# Patient Record
Sex: FEMALE | Race: WHITE | Employment: OTHER | URBAN - METROPOLITAN AREA
[De-identification: names, ages, dates, MRNs, and addresses within clinical notes are randomized per-mention and may not be internally consistent; named-entity substitution may affect disease eponyms.]

---

## 2018-02-16 ENCOUNTER — HOSPITAL ENCOUNTER (OUTPATIENT)
Age: 58
Discharge: HOME | End: 2018-02-18
Payer: COMMERCIAL

## 2018-02-16 ENCOUNTER — HOSPITAL ENCOUNTER (OUTPATIENT)
Dept: GENERAL RADIOLOGY | Age: 58
Discharge: HOME | End: 2018-02-18
Payer: COMMERCIAL

## 2018-02-16 DIAGNOSIS — R06.00 DYSPNEA, UNSPECIFIED TYPE: ICD-10-CM

## 2018-02-16 PROCEDURE — 71046 X-RAY EXAM CHEST 2 VIEWS: CPT

## 2018-03-15 ENCOUNTER — HOSPITAL ENCOUNTER (OUTPATIENT)
Dept: PULMONOLOGY | Age: 58
Setting detail: THERAPIES SERIES
Discharge: HOME | End: 2018-03-15
Payer: COMMERCIAL

## 2018-03-15 VITALS — WEIGHT: 112.4 LBS

## 2018-03-15 PROCEDURE — G0424 PULMONARY REHAB W EXER: HCPCS

## 2018-03-15 RX ORDER — ALBUTEROL SULFATE 0.63 MG/3ML
1 SOLUTION RESPIRATORY (INHALATION) EVERY 6 HOURS PRN
COMMUNITY

## 2018-03-15 RX ORDER — MIRTAZAPINE 15 MG/1
15 TABLET, FILM COATED ORAL NIGHTLY
COMMUNITY
End: 2020-11-16

## 2018-03-15 RX ORDER — AMLODIPINE BESYLATE 2.5 MG/1
2.5 TABLET ORAL DAILY
COMMUNITY
End: 2019-01-25

## 2018-03-15 RX ORDER — CLONAZEPAM 0.5 MG/1
0.5 TABLET ORAL 4 TIMES DAILY
Status: ON HOLD | COMMUNITY
End: 2020-11-30 | Stop reason: HOSPADM

## 2018-03-15 RX ORDER — FLUCONAZOLE 100 MG/1
100 TABLET ORAL DAILY
Status: ON HOLD | COMMUNITY
End: 2019-04-08

## 2018-03-15 RX ORDER — IBUPROFEN 800 MG/1
800 TABLET ORAL 3 TIMES DAILY
COMMUNITY
End: 2019-01-25

## 2018-03-15 RX ORDER — ALBUTEROL SULFATE 90 UG/1
2 AEROSOL, METERED RESPIRATORY (INHALATION) EVERY 6 HOURS PRN
COMMUNITY
End: 2022-04-05 | Stop reason: SDUPTHER

## 2018-03-28 ENCOUNTER — HOSPITAL ENCOUNTER (OUTPATIENT)
Dept: PULMONOLOGY | Age: 58
Setting detail: THERAPIES SERIES
Discharge: HOME | End: 2018-03-28
Payer: COMMERCIAL

## 2018-03-28 VITALS — WEIGHT: 116 LBS

## 2018-03-28 PROCEDURE — G0424 PULMONARY REHAB W EXER: HCPCS

## 2018-04-11 ENCOUNTER — HOSPITAL ENCOUNTER (OUTPATIENT)
Dept: PULMONOLOGY | Age: 58
Setting detail: THERAPIES SERIES
Discharge: HOME | End: 2018-04-11
Payer: COMMERCIAL

## 2018-04-11 VITALS — WEIGHT: 116.8 LBS

## 2018-04-11 PROCEDURE — G0424 PULMONARY REHAB W EXER: HCPCS

## 2018-04-16 ENCOUNTER — HOSPITAL ENCOUNTER (OUTPATIENT)
Dept: PULMONOLOGY | Age: 58
Setting detail: THERAPIES SERIES
Discharge: HOME | End: 2018-04-16
Payer: COMMERCIAL

## 2018-04-16 VITALS — WEIGHT: 118.2 LBS

## 2018-04-16 PROCEDURE — G0424 PULMONARY REHAB W EXER: HCPCS

## 2018-04-25 ENCOUNTER — HOSPITAL ENCOUNTER (OUTPATIENT)
Dept: PULMONOLOGY | Age: 58
Setting detail: THERAPIES SERIES
Discharge: HOME | End: 2018-04-25
Payer: COMMERCIAL

## 2018-04-25 VITALS — WEIGHT: 121 LBS

## 2018-04-25 PROCEDURE — G0424 PULMONARY REHAB W EXER: HCPCS

## 2018-04-30 ENCOUNTER — HOSPITAL ENCOUNTER (OUTPATIENT)
Dept: PULMONOLOGY | Age: 58
Setting detail: THERAPIES SERIES
Discharge: HOME | End: 2018-04-30
Payer: COMMERCIAL

## 2018-04-30 VITALS — WEIGHT: 120.2 LBS

## 2018-04-30 PROCEDURE — G0424 PULMONARY REHAB W EXER: HCPCS

## 2018-05-02 ENCOUNTER — HOSPITAL ENCOUNTER (OUTPATIENT)
Dept: PULMONOLOGY | Age: 58
Setting detail: THERAPIES SERIES
Discharge: HOME | End: 2018-05-02
Payer: COMMERCIAL

## 2018-05-02 VITALS — WEIGHT: 118.4 LBS

## 2018-05-02 PROCEDURE — G0424 PULMONARY REHAB W EXER: HCPCS

## 2018-05-07 ENCOUNTER — HOSPITAL ENCOUNTER (OUTPATIENT)
Dept: PULMONOLOGY | Age: 58
Setting detail: THERAPIES SERIES
Discharge: HOME | End: 2018-05-07
Payer: COMMERCIAL

## 2018-05-07 VITALS — WEIGHT: 121 LBS

## 2018-05-07 PROCEDURE — G0424 PULMONARY REHAB W EXER: HCPCS

## 2018-05-09 ENCOUNTER — HOSPITAL ENCOUNTER (OUTPATIENT)
Dept: PULMONOLOGY | Age: 58
Setting detail: THERAPIES SERIES
Discharge: HOME | End: 2018-05-09
Payer: COMMERCIAL

## 2018-05-09 VITALS — WEIGHT: 121 LBS

## 2018-05-09 PROCEDURE — G0424 PULMONARY REHAB W EXER: HCPCS

## 2018-05-14 ENCOUNTER — HOSPITAL ENCOUNTER (OUTPATIENT)
Dept: PULMONOLOGY | Age: 58
Setting detail: THERAPIES SERIES
Discharge: HOME | End: 2018-05-14
Payer: COMMERCIAL

## 2018-05-14 VITALS — WEIGHT: 121.4 LBS

## 2018-05-14 PROCEDURE — G0424 PULMONARY REHAB W EXER: HCPCS

## 2018-05-23 ENCOUNTER — HOSPITAL ENCOUNTER (OUTPATIENT)
Dept: PULMONOLOGY | Age: 58
Setting detail: THERAPIES SERIES
Discharge: HOME | End: 2018-05-23
Payer: COMMERCIAL

## 2018-05-23 ENCOUNTER — APPOINTMENT (OUTPATIENT)
Dept: PULMONOLOGY | Age: 58
End: 2018-05-23
Payer: COMMERCIAL

## 2018-05-23 VITALS — WEIGHT: 121.8 LBS

## 2018-05-23 PROCEDURE — G0424 PULMONARY REHAB W EXER: HCPCS

## 2018-05-28 ENCOUNTER — APPOINTMENT (OUTPATIENT)
Dept: PULMONOLOGY | Age: 58
End: 2018-05-28
Payer: COMMERCIAL

## 2018-06-06 ENCOUNTER — HOSPITAL ENCOUNTER (OUTPATIENT)
Dept: PULMONOLOGY | Age: 58
Setting detail: THERAPIES SERIES
Discharge: HOME | End: 2018-06-06
Payer: COMMERCIAL

## 2018-06-06 VITALS — WEIGHT: 121 LBS

## 2018-06-06 PROCEDURE — G0424 PULMONARY REHAB W EXER: HCPCS

## 2018-06-25 ENCOUNTER — APPOINTMENT (OUTPATIENT)
Dept: PULMONOLOGY | Age: 58
End: 2018-06-25
Payer: COMMERCIAL

## 2018-07-04 ENCOUNTER — APPOINTMENT (OUTPATIENT)
Dept: PULMONOLOGY | Age: 58
End: 2018-07-04
Payer: COMMERCIAL

## 2018-07-09 ENCOUNTER — HOSPITAL ENCOUNTER (OUTPATIENT)
Dept: PULMONOLOGY | Age: 58
Setting detail: THERAPIES SERIES
Discharge: HOME | End: 2018-07-09
Payer: COMMERCIAL

## 2018-07-09 VITALS — WEIGHT: 126 LBS

## 2018-07-09 PROCEDURE — G0424 PULMONARY REHAB W EXER: HCPCS

## 2018-07-13 ENCOUNTER — HOSPITAL ENCOUNTER (OUTPATIENT)
Age: 58
Discharge: HOME | End: 2018-07-13
Payer: COMMERCIAL

## 2018-07-13 LAB
ABSOLUTE EOS #: 0 K/UL (ref 0–0.4)
ABSOLUTE IMMATURE GRANULOCYTE: ABNORMAL K/UL (ref 0–0.3)
ABSOLUTE LYMPH #: 2.4 K/UL (ref 1–4.8)
ABSOLUTE MONO #: 0.6 K/UL (ref 0.1–1.3)
ALBUMIN SERPL-MCNC: 4.3 G/DL (ref 3.5–5.2)
ALBUMIN/GLOBULIN RATIO: ABNORMAL (ref 1–2.5)
ALP BLD-CCNC: 47 U/L (ref 35–104)
ALT SERPL-CCNC: 14 U/L (ref 5–33)
ANION GAP SERPL CALCULATED.3IONS-SCNC: 12 MMOL/L (ref 9–17)
AST SERPL-CCNC: 18 U/L
BASOPHILS # BLD: 1 % (ref 0–2)
BASOPHILS ABSOLUTE: 0 K/UL (ref 0–0.2)
BILIRUB SERPL-MCNC: 0.54 MG/DL (ref 0.3–1.2)
BUN BLDV-MCNC: 7 MG/DL (ref 6–20)
BUN/CREAT BLD: ABNORMAL (ref 9–20)
CALCIUM SERPL-MCNC: 8.3 MG/DL (ref 8.6–10.4)
CHLORIDE BLD-SCNC: 102 MMOL/L (ref 98–107)
CHOLESTEROL/HDL RATIO: 2.4
CHOLESTEROL: 153 MG/DL
CO2: 26 MMOL/L (ref 20–31)
CREAT SERPL-MCNC: 0.71 MG/DL (ref 0.5–0.9)
DIFFERENTIAL TYPE: ABNORMAL
EOSINOPHILS RELATIVE PERCENT: 0 % (ref 0–4)
FOLATE: 19.3 NG/ML
GFR AFRICAN AMERICAN: >60 ML/MIN
GFR NON-AFRICAN AMERICAN: >60 ML/MIN
GFR SERPL CREATININE-BSD FRML MDRD: ABNORMAL ML/MIN/{1.73_M2}
GFR SERPL CREATININE-BSD FRML MDRD: ABNORMAL ML/MIN/{1.73_M2}
GLUCOSE BLD-MCNC: 91 MG/DL (ref 70–99)
HCT VFR BLD CALC: 41.7 % (ref 36–46)
HDLC SERPL-MCNC: 65 MG/DL
HEMOGLOBIN: 14 G/DL (ref 12–16)
IMMATURE GRANULOCYTES: ABNORMAL %
LDL CHOLESTEROL: 79 MG/DL (ref 0–130)
LYMPHOCYTES # BLD: 37 % (ref 24–44)
MAGNESIUM, IONIZED: 0.49 MMOL/L (ref 0.45–0.6)
MAGNESIUM: 1.7 MG/DL (ref 1.6–2.6)
MCH RBC QN AUTO: 32.8 PG (ref 26–34)
MCHC RBC AUTO-ENTMCNC: 33.5 G/DL (ref 31–37)
MCV RBC AUTO: 98.1 FL (ref 80–100)
MONOCYTES # BLD: 9 % (ref 1–7)
NRBC AUTOMATED: ABNORMAL PER 100 WBC
PDW BLD-RTO: 13.5 % (ref 11.5–14.9)
PLATELET # BLD: 241 K/UL (ref 150–450)
PLATELET ESTIMATE: ABNORMAL
PMV BLD AUTO: 7.6 FL (ref 6–12)
POTASSIUM SERPL-SCNC: 4.6 MMOL/L (ref 3.7–5.3)
RBC # BLD: 4.25 M/UL (ref 4–5.2)
RBC # BLD: ABNORMAL 10*6/UL
SEG NEUTROPHILS: 53 % (ref 36–66)
SEGMENTED NEUTROPHILS ABSOLUTE COUNT: 3.5 K/UL (ref 1.3–9.1)
SODIUM BLD-SCNC: 140 MMOL/L (ref 135–144)
TOTAL PROTEIN: 6.7 G/DL (ref 6.4–8.3)
TRIGL SERPL-MCNC: 44 MG/DL
TSH SERPL DL<=0.05 MIU/L-ACNC: 0.6 MIU/L (ref 0.3–5)
VITAMIN B-12: 449 PG/ML (ref 232–1245)
VITAMIN D 25-HYDROXY: 43.6 NG/ML (ref 30–100)
VLDLC SERPL CALC-MCNC: NORMAL MG/DL (ref 1–30)
WBC # BLD: 6.5 K/UL (ref 3.5–11)
WBC # BLD: ABNORMAL 10*3/UL

## 2018-07-13 PROCEDURE — 84443 ASSAY THYROID STIM HORMONE: CPT

## 2018-07-13 PROCEDURE — 82746 ASSAY OF FOLIC ACID SERUM: CPT

## 2018-07-13 PROCEDURE — 36415 COLL VENOUS BLD VENIPUNCTURE: CPT

## 2018-07-13 PROCEDURE — 85025 COMPLETE CBC W/AUTO DIFF WBC: CPT

## 2018-07-13 PROCEDURE — 80061 LIPID PANEL: CPT

## 2018-07-13 PROCEDURE — 80053 COMPREHEN METABOLIC PANEL: CPT

## 2018-07-13 PROCEDURE — 82306 VITAMIN D 25 HYDROXY: CPT

## 2018-07-13 PROCEDURE — 83735 ASSAY OF MAGNESIUM: CPT

## 2018-07-13 PROCEDURE — G0328 FECAL BLOOD SCRN IMMUNOASSAY: HCPCS

## 2018-07-13 PROCEDURE — 82607 VITAMIN B-12: CPT

## 2018-07-17 ENCOUNTER — HOSPITAL ENCOUNTER (OUTPATIENT)
Age: 58
Setting detail: SPECIMEN
Discharge: HOME | End: 2018-07-17
Payer: COMMERCIAL

## 2018-07-18 ENCOUNTER — HOSPITAL ENCOUNTER (OUTPATIENT)
Dept: PULMONOLOGY | Age: 58
Setting detail: THERAPIES SERIES
Discharge: HOME | End: 2018-07-18
Payer: COMMERCIAL

## 2018-07-18 VITALS — WEIGHT: 127 LBS

## 2018-07-18 LAB
DATE, STOOL #1: NORMAL
DATE, STOOL #2: NORMAL
DATE, STOOL #3: NORMAL
HEMOCCULT SP1 STL QL: NEGATIVE
HEMOCCULT SP2 STL QL: NEGATIVE
HEMOCCULT SP3 STL QL: NEGATIVE
TIME, STOOL #1: NORMAL
TIME, STOOL #2: NORMAL
TIME, STOOL #3: NORMAL

## 2018-07-18 PROCEDURE — G0424 PULMONARY REHAB W EXER: HCPCS

## 2018-07-18 NOTE — PROGRESS NOTES
-Discussed with patient and capacity to carry out ADL's & to understand his/her path     physiology of dyspnea so that exacerbations may be avoided or minimized. -Discussed individuals understanding of his/ her disease process so that the patient can   lessen the disease process thus frequency of exacerbations.  -Discussed with patient the goal to decrease reliance on others, promote independence    & prevent or delay worsening of lung disease progression & decrease number of ER   Visits. Instructed patient on controling anxiety and panic control with relaxation techniques.

## 2018-07-30 ENCOUNTER — HOSPITAL ENCOUNTER (OUTPATIENT)
Dept: PULMONOLOGY | Age: 58
Setting detail: THERAPIES SERIES
Discharge: HOME | End: 2018-07-30
Payer: COMMERCIAL

## 2018-07-30 VITALS — WEIGHT: 128.8 LBS

## 2018-07-30 PROCEDURE — G0424 PULMONARY REHAB W EXER: HCPCS

## 2018-08-01 ENCOUNTER — HOSPITAL ENCOUNTER (OUTPATIENT)
Dept: PULMONOLOGY | Age: 58
Setting detail: THERAPIES SERIES
Discharge: HOME | End: 2018-08-01
Payer: COMMERCIAL

## 2018-08-01 VITALS — WEIGHT: 129.2 LBS

## 2018-08-01 PROCEDURE — G0424 PULMONARY REHAB W EXER: HCPCS

## 2018-08-08 ENCOUNTER — APPOINTMENT (OUTPATIENT)
Dept: PULMONOLOGY | Age: 58
End: 2018-08-08
Payer: COMMERCIAL

## 2018-08-15 ENCOUNTER — HOSPITAL ENCOUNTER (OUTPATIENT)
Dept: PULMONOLOGY | Age: 58
Setting detail: THERAPIES SERIES
Discharge: HOME | End: 2018-08-15
Payer: COMMERCIAL

## 2018-08-15 VITALS — WEIGHT: 128 LBS

## 2018-08-15 PROCEDURE — G0424 PULMONARY REHAB W EXER: HCPCS

## 2018-08-20 ENCOUNTER — APPOINTMENT (OUTPATIENT)
Dept: PULMONOLOGY | Age: 58
End: 2018-08-20
Payer: COMMERCIAL

## 2018-08-22 ENCOUNTER — APPOINTMENT (OUTPATIENT)
Dept: PULMONOLOGY | Age: 58
End: 2018-08-22
Payer: COMMERCIAL

## 2018-08-27 ENCOUNTER — APPOINTMENT (OUTPATIENT)
Dept: PULMONOLOGY | Age: 58
End: 2018-08-27
Payer: COMMERCIAL

## 2018-08-29 ENCOUNTER — APPOINTMENT (OUTPATIENT)
Dept: PULMONOLOGY | Age: 58
End: 2018-08-29
Payer: COMMERCIAL

## 2018-09-08 ENCOUNTER — HOSPITAL ENCOUNTER (OUTPATIENT)
Dept: MRI IMAGING | Age: 58
Discharge: HOME | End: 2018-09-10
Payer: COMMERCIAL

## 2018-09-08 DIAGNOSIS — M48.061 SPINAL STENOSIS OF LUMBAR REGION, UNSPECIFIED WHETHER NEUROGENIC CLAUDICATION PRESENT: ICD-10-CM

## 2018-09-08 PROCEDURE — 72148 MRI LUMBAR SPINE W/O DYE: CPT

## 2019-01-21 ENCOUNTER — TELEPHONE (OUTPATIENT)
Dept: GASTROENTEROLOGY | Age: 59
End: 2019-01-21

## 2019-01-25 ENCOUNTER — OFFICE VISIT (OUTPATIENT)
Dept: GASTROENTEROLOGY | Age: 59
End: 2019-01-25
Payer: COMMERCIAL

## 2019-01-25 VITALS — HEART RATE: 67 BPM | DIASTOLIC BLOOD PRESSURE: 75 MMHG | SYSTOLIC BLOOD PRESSURE: 121 MMHG | WEIGHT: 126.2 LBS

## 2019-01-25 DIAGNOSIS — R10.10 PAIN OF UPPER ABDOMEN: ICD-10-CM

## 2019-01-25 DIAGNOSIS — R19.7 DIARRHEA, UNSPECIFIED TYPE: ICD-10-CM

## 2019-01-25 DIAGNOSIS — R14.0 ABDOMINAL BLOATING: ICD-10-CM

## 2019-01-25 PROCEDURE — 3017F COLORECTAL CA SCREEN DOC REV: CPT | Performed by: INTERNAL MEDICINE

## 2019-01-25 PROCEDURE — G8421 BMI NOT CALCULATED: HCPCS | Performed by: INTERNAL MEDICINE

## 2019-01-25 PROCEDURE — 4004F PT TOBACCO SCREEN RCVD TLK: CPT | Performed by: INTERNAL MEDICINE

## 2019-01-25 PROCEDURE — G8427 DOCREV CUR MEDS BY ELIG CLIN: HCPCS | Performed by: INTERNAL MEDICINE

## 2019-01-25 PROCEDURE — G8484 FLU IMMUNIZE NO ADMIN: HCPCS | Performed by: INTERNAL MEDICINE

## 2019-01-25 PROCEDURE — 99213 OFFICE O/P EST LOW 20 MIN: CPT | Performed by: INTERNAL MEDICINE

## 2019-01-25 RX ORDER — TRAMADOL HYDROCHLORIDE 50 MG/1
50 TABLET ORAL EVERY 6 HOURS PRN
Status: ON HOLD | COMMUNITY
End: 2019-04-08

## 2019-01-25 RX ORDER — DICYCLOMINE HYDROCHLORIDE 10 MG/1
10 CAPSULE ORAL
Qty: 120 CAPSULE | Refills: 2 | Status: SHIPPED | OUTPATIENT
Start: 2019-01-25 | End: 2019-04-29

## 2019-01-25 RX ORDER — PANTOPRAZOLE SODIUM 20 MG/1
20 TABLET, DELAYED RELEASE ORAL DAILY
Status: ON HOLD | COMMUNITY
End: 2019-04-08

## 2019-01-25 RX ORDER — LEVOTHYROXINE SODIUM 88 UG/1
88 TABLET ORAL DAILY
Status: ON HOLD | COMMUNITY
End: 2020-11-30 | Stop reason: HOSPADM

## 2019-01-25 RX ORDER — CARVEDILOL 3.12 MG/1
3.12 TABLET ORAL 2 TIMES DAILY WITH MEALS
COMMUNITY
End: 2022-08-11 | Stop reason: SDUPTHER

## 2019-01-25 ASSESSMENT — ENCOUNTER SYMPTOMS
CONSTIPATION: 0
SINUS PAIN: 1
SINUS PRESSURE: 1
BACK PAIN: 1
DIARRHEA: 1
VOMITING: 0
CHEST TIGHTNESS: 0
ANAL BLEEDING: 0
SHORTNESS OF BREATH: 0
RECTAL PAIN: 0
NAUSEA: 0
BLOOD IN STOOL: 0
ABDOMINAL DISTENTION: 1
SORE THROAT: 0
COUGH: 0
TROUBLE SWALLOWING: 0
ABDOMINAL PAIN: 1

## 2019-03-04 ENCOUNTER — OFFICE VISIT (OUTPATIENT)
Dept: GASTROENTEROLOGY | Age: 59
End: 2019-03-04
Payer: COMMERCIAL

## 2019-03-04 VITALS — SYSTOLIC BLOOD PRESSURE: 132 MMHG | WEIGHT: 128 LBS | DIASTOLIC BLOOD PRESSURE: 85 MMHG | HEART RATE: 78 BPM

## 2019-03-04 DIAGNOSIS — R10.84 GENERALIZED ABDOMINAL PAIN: Primary | ICD-10-CM

## 2019-03-04 PROCEDURE — G8427 DOCREV CUR MEDS BY ELIG CLIN: HCPCS | Performed by: INTERNAL MEDICINE

## 2019-03-04 PROCEDURE — 99213 OFFICE O/P EST LOW 20 MIN: CPT | Performed by: INTERNAL MEDICINE

## 2019-03-04 PROCEDURE — G8484 FLU IMMUNIZE NO ADMIN: HCPCS | Performed by: INTERNAL MEDICINE

## 2019-03-04 PROCEDURE — 3017F COLORECTAL CA SCREEN DOC REV: CPT | Performed by: INTERNAL MEDICINE

## 2019-03-04 PROCEDURE — 4004F PT TOBACCO SCREEN RCVD TLK: CPT | Performed by: INTERNAL MEDICINE

## 2019-03-04 PROCEDURE — G8421 BMI NOT CALCULATED: HCPCS | Performed by: INTERNAL MEDICINE

## 2019-03-04 RX ORDER — ALUMINUM ZIRCONIUM OCTACHLOROHYDREX GLY 16 G/100G
1 GEL TOPICAL DAILY
Qty: 30 CAPSULE | Refills: 4 | Status: SHIPPED | OUTPATIENT
Start: 2019-03-04 | End: 2019-04-03

## 2019-03-04 RX ORDER — PANTOPRAZOLE SODIUM 40 MG/1
40 TABLET, DELAYED RELEASE ORAL
Qty: 30 TABLET | Refills: 5 | Status: SHIPPED | OUTPATIENT
Start: 2019-03-04 | End: 2019-04-29

## 2019-03-05 RX ORDER — SODIUM, POTASSIUM,MAG SULFATES 17.5-3.13G
1 SOLUTION, RECONSTITUTED, ORAL ORAL ONCE
Qty: 1 BOTTLE | Refills: 0 | Status: SHIPPED | OUTPATIENT
Start: 2019-03-05 | End: 2019-03-05

## 2019-04-04 ENCOUNTER — TELEPHONE (OUTPATIENT)
Dept: GASTROENTEROLOGY | Age: 59
End: 2019-04-04

## 2019-04-08 ENCOUNTER — ANESTHESIA (OUTPATIENT)
Dept: OPERATING ROOM | Age: 59
End: 2019-04-08
Payer: COMMERCIAL

## 2019-04-08 ENCOUNTER — ANESTHESIA EVENT (OUTPATIENT)
Dept: OPERATING ROOM | Age: 59
End: 2019-04-08
Payer: COMMERCIAL

## 2019-04-08 ENCOUNTER — HOSPITAL ENCOUNTER (OUTPATIENT)
Age: 59
Setting detail: OUTPATIENT SURGERY
Discharge: HOME | End: 2019-04-08
Attending: INTERNAL MEDICINE | Admitting: INTERNAL MEDICINE
Payer: COMMERCIAL

## 2019-04-08 VITALS — SYSTOLIC BLOOD PRESSURE: 131 MMHG | OXYGEN SATURATION: 99 % | DIASTOLIC BLOOD PRESSURE: 67 MMHG

## 2019-04-08 VITALS
HEART RATE: 58 BPM | BODY MASS INDEX: 20.33 KG/M2 | WEIGHT: 122 LBS | HEIGHT: 65 IN | DIASTOLIC BLOOD PRESSURE: 62 MMHG | TEMPERATURE: 98.1 F | OXYGEN SATURATION: 98 % | RESPIRATION RATE: 12 BRPM | SYSTOLIC BLOOD PRESSURE: 113 MMHG

## 2019-04-08 PROCEDURE — 2709999900 HC NON-CHARGEABLE SUPPLY: Performed by: INTERNAL MEDICINE

## 2019-04-08 PROCEDURE — 3700000001 HC ADD 15 MINUTES (ANESTHESIA): Performed by: INTERNAL MEDICINE

## 2019-04-08 PROCEDURE — 2580000003 HC RX 258: Performed by: ANESTHESIOLOGY

## 2019-04-08 PROCEDURE — 45378 DIAGNOSTIC COLONOSCOPY: CPT | Performed by: INTERNAL MEDICINE

## 2019-04-08 PROCEDURE — 3609009500 HC COLONOSCOPY DIAGNOSTIC OR SCREENING: Performed by: INTERNAL MEDICINE

## 2019-04-08 PROCEDURE — 6360000002 HC RX W HCPCS: Performed by: NURSE ANESTHETIST, CERTIFIED REGISTERED

## 2019-04-08 PROCEDURE — 7100000000 HC PACU RECOVERY - FIRST 15 MIN: Performed by: INTERNAL MEDICINE

## 2019-04-08 PROCEDURE — 3700000000 HC ANESTHESIA ATTENDED CARE: Performed by: INTERNAL MEDICINE

## 2019-04-08 PROCEDURE — 7100000001 HC PACU RECOVERY - ADDTL 15 MIN: Performed by: INTERNAL MEDICINE

## 2019-04-08 RX ORDER — DIAZEPAM 5 MG/1
2.5 TABLET ORAL EVERY 6 HOURS PRN
COMMUNITY
End: 2019-09-30

## 2019-04-08 RX ORDER — OXYCODONE HYDROCHLORIDE AND ACETAMINOPHEN 5; 325 MG/1; MG/1
1 TABLET ORAL EVERY 4 HOURS PRN
COMMUNITY
End: 2019-04-29

## 2019-04-08 RX ORDER — FLUVOXAMINE MALEATE 100 MG
100 TABLET ORAL NIGHTLY
Status: ON HOLD | COMMUNITY
End: 2020-11-30 | Stop reason: HOSPADM

## 2019-04-08 RX ORDER — SODIUM CHLORIDE, SODIUM LACTATE, POTASSIUM CHLORIDE, CALCIUM CHLORIDE 600; 310; 30; 20 MG/100ML; MG/100ML; MG/100ML; MG/100ML
INJECTION, SOLUTION INTRAVENOUS CONTINUOUS
Status: DISCONTINUED | OUTPATIENT
Start: 2019-04-08 | End: 2019-04-08 | Stop reason: HOSPADM

## 2019-04-08 RX ORDER — PROPOFOL 10 MG/ML
INJECTION, EMULSION INTRAVENOUS CONTINUOUS PRN
Status: DISCONTINUED | OUTPATIENT
Start: 2019-04-08 | End: 2019-04-08 | Stop reason: SDUPTHER

## 2019-04-08 RX ADMIN — SODIUM CHLORIDE, POTASSIUM CHLORIDE, SODIUM LACTATE AND CALCIUM CHLORIDE: 600; 310; 30; 20 INJECTION, SOLUTION INTRAVENOUS at 09:47

## 2019-04-08 RX ADMIN — PROPOFOL 125 MCG/KG/MIN: 10 INJECTION, EMULSION INTRAVENOUS at 10:03

## 2019-04-08 ASSESSMENT — PULMONARY FUNCTION TESTS
PIF_VALUE: 0
PIF_VALUE: 1
PIF_VALUE: 0
PIF_VALUE: 1
PIF_VALUE: 0
PIF_VALUE: 2
PIF_VALUE: 1
PIF_VALUE: 0
PIF_VALUE: 1
PIF_VALUE: 0
PIF_VALUE: 0
PIF_VALUE: 1

## 2019-04-08 ASSESSMENT — PAIN SCALES - GENERAL
PAINLEVEL_OUTOF10: 0
PAINLEVEL_OUTOF10: 0

## 2019-04-08 ASSESSMENT — PAIN - FUNCTIONAL ASSESSMENT: PAIN_FUNCTIONAL_ASSESSMENT: 0-10

## 2019-04-08 ASSESSMENT — COPD QUESTIONNAIRES: CAT_SEVERITY: NO INTERVAL CHANGE

## 2019-04-08 ASSESSMENT — PAIN DESCRIPTION - DESCRIPTORS: DESCRIPTORS: ACHING

## 2019-04-08 ASSESSMENT — LIFESTYLE VARIABLES: SMOKING_STATUS: 1

## 2019-04-08 NOTE — H&P
HISTORY and [facility] [address]       NAME:  Rodger Asif  MRN: [id number]   YOB: 1960   Date: 4/8/2019   Age: 62 y.o. Gender: female       COMPLAINT AND PRESENT HISTORY:   Rodger Asif is 62 y.o.,   female, having a colonoscopy due to generalized abdominal pain. Pt reports diffuse bloating and pain, increase in belching. Denies rectal bleeding. No hx of colonoscopy. Patient denies any GI symptoms. No nausea / vomiting, No diarrhea or constipation. No abdominal pains or cramping. No changes in the color, caliber or consistency of the stools. No fever or chills. She takes Protonix for GERD which has been helping along with probiotic pill. she has hx of fibromyalgia, has chronic pain. She was admitted to the hospital within the last few months and was dx with \"bacterial lung infection,\" EGD was performed and she had some inflammation. PAST MEDICAL HISTORY     Past Medical History:   Diagnosis Date    Acid reflux     Anxiety     Arthritis     Cancer ([facility] [address]     cervical    COPD (chronic obstructive pulmonary disease) ([facility] [address] 02/23/2018    DDD (degenerative disc disease), cervical     Depression     Hiatal hernia     Spinal stenosis     Tachycardia     Thyroid disease     hypo     SURGICAL HISTORY       Past Surgical History:   Procedure Laterality Date    BACK SURGERY      diskectomy, L3-L5    DILATION AND CURETTAGE OF UTERUS      OTHER SURGICAL HISTORY      ectopic pregnancy with tube removal    THYROIDECTOMY, PARTIAL      UPPER GASTROINTESTINAL ENDOSCOPY         FAMILY HISTORY     History reviewed. No pertinent family history.     SOCIAL HISTORY       Social History     Socioeconomic History    Marital status:      Spouse name: None    Number of children: None    Years of education: None    Highest education level: None   Occupational History    None   Social Needs    Financial resource strain: None    Food insecurity:     Worry: None Inability: None    Transportation needs:     Medical: None     Non-medical: None   Tobacco Use    Smoking status: Light Tobacco Smoker    Smokeless tobacco: Never Used   Substance and Sexual Activity    Alcohol use: Yes     Comment: occassional    Drug use: Never    Sexual activity: None   Lifestyle    Physical activity:     Days per week: None     Minutes per session: None    Stress: None   Relationships    Social connections:     Talks on phone: None     Gets together: None     Attends [employer] service: None     Active member of club or organization: None     Attends meetings of clubs or organizations: None     Relationship status: None    Intimate partner violence:     Fear of current or ex partner: None     Emotionally abused: None     Physically abused: None     Forced sexual activity: None   Other Topics Concern    None   Social History Narrative    None           REVIEW OF SYSTEMS      No Known Allergies    No current facility-administered medications on file prior to encounter. Current Outpatient Medications on File Prior to Encounter   Medication Sig Dispense Refill    oxyCODONE-acetaminophen (PERCOCET) 5-325 MG per tablet Take 1 tablet by mouth every 4 hours as needed for Pain.  diazepam (VALIUM) 5 MG tablet Take 2.5 mg by mouth every 6 hours as needed for Anxiety.  fluvoxaMINE (LUVOX) 100 MG tablet Take 100 mg by mouth nightly      Lactobacillus (PROBIOTIC ACIDOPHILUS PO) Take 1 tablet by mouth daily      pantoprazole (PROTONIX) 40 MG tablet Take 1 tablet by mouth every morning (before breakfast) 30 tablet 5    carvedilol (COREG) 3.125 MG tablet Take 3.125 mg by mouth 2 times daily (with meals)      levothyroxine (SYNTHROID) 88 MCG tablet Take 88 mcg by mouth Daily      clonazePAM (KLONOPIN) 0.5 MG tablet Take 0.5 mg by mouth 3 times daily.       albuterol sulfate  (90 Base) MCG/ACT inhaler Inhale 2 puffs into the lungs every 6 hours as needed for Wheezing      albuterol (ACCUNEB) 0.63 MG/3ML nebulizer solution Take 1 ampule by nebulization every 6 hours as needed for Wheezing      mirtazapine (REMERON) 15 MG tablet Take 15 mg by mouth nightly      dicyclomine (BENTYL) 10 MG capsule Take 1 capsule by mouth 3 times daily (before meals) 120 capsule 2        General health:  Fairly good. No fever or chills. Skin:  No itching, redness or rash. HEENT:  No headache, epistaxis or sore throat. Neck:  No pain, stiffness or masses. Cardiovascular/Respiratory system:  No chest pain, palpitation. SOB on exertion, hx COPD. Gastrointestinal tract: See HPI. Genitourinary:  No burning on micturition. No hesitancy, urgency, frequency or discoloration of urine. Locomotor:  Fibromyalgia. Neuropsychiatric:  No referable complaints. GENERAL PHYSICAL EXAM:     Vitals: /61   Pulse 55   Temp 97 °F (36.1 °C) (Oral)   Resp 18   Ht 5' 5\" (1.651 m)   Wt 122 lb (55.3 kg)   SpO2 97%   BMI 20.30 kg/m²  Body mass index is 20.3 kg/m². GENERAL APPEARANCE:   John Landry is 62 y.o.,  female, not obese, nourished, conscious, alert. Does not appear to be distress or pain at this time. SKIN:  Warm, dry, no cyanosis or jaundice. HEAD:  Normocephalic, atraumatic, no swelling or tenderness. EYES:  Pupils equal, reactive to light. EARS:  No discharge, no marked hearing loss. NOSE:  No rhinorrhea, epistaxis or septal deformity. THROAT:  Dentures. Not congested. No ulceration bleeding or discharge. NECK:  No stiffness, trachea central.                  CHEST:  Symmetrical and equal on expansion. HEART:  Bradycardic rate, regular rhythm. S1 > S2. No audible murmurs or gallops.                  LUNGS:  Equal on expansion, mildly diminished bases, laterally. ABDOMEN:  Soft on palpation. No localized tenderness. No guarding or rigidity. LYMPHATICS:  No palpable cervical lymphadenopathy. LOCOMOTOR, BACK AND SPINE:  No tenderness or deformities. EXTREMITIES:  Symmetrical, no pedal edema. Moves all extremities with no difficulty. NEUROLOGIC:  The patient is conscious, alert, oriented, No apparent focal sensory or motor deficits.                                                                                      PROVISIONAL DIAGNOSES / SURGERY:      Generalized abdominal pain  Colonoscopy    Emelia Libman, APRN - CNP on 4/8/2019 at 9:46 AM

## 2019-04-08 NOTE — ANESTHESIA PRE PROCEDURE
Department of Anesthesiology  Preprocedure Note       Name:  Betsy Pinedo   Age:  62 y.o.  :  1960                                          MRN:  [id number]         Date:  2019      Surgeon: Ruben Arguelles):  iSnan Vickers MD    Procedure: COLONOSCOPY DIAGNOSTIC (N/A )    Medications prior to admission:   Prior to Admission medications    Medication Sig Start Date End Date Taking? Authorizing Provider   oxyCODONE-acetaminophen (PERCOCET) 5-325 MG per tablet Take 1 tablet by mouth every 4 hours as needed for Pain. Yes Historical Provider, MD   diazepam (VALIUM) 5 MG tablet Take 2.5 mg by mouth every 6 hours as needed for Anxiety. Yes Historical Provider, MD   fluvoxaMINE (LUVOX) 100 MG tablet Take 100 mg by mouth nightly   Yes Historical Provider, MD   Lactobacillus (PROBIOTIC ACIDOPHILUS PO) Take 1 tablet by mouth daily   Yes Historical Provider, MD   pantoprazole (PROTONIX) 40 MG tablet Take 1 tablet by mouth every morning (before breakfast) 3/4/19  Yes Sinan Vickers MD   carvedilol (COREG) 3.125 MG tablet Take 3.125 mg by mouth 2 times daily (with meals)   Yes Historical Provider, MD   levothyroxine (SYNTHROID) 88 MCG tablet Take 88 mcg by mouth Daily   Yes Historical Provider, MD   clonazePAM (KLONOPIN) 0.5 MG tablet Take 0.5 mg by mouth 3 times daily.    Yes Historical Provider, MD   albuterol sulfate  (90 Base) MCG/ACT inhaler Inhale 2 puffs into the lungs every 6 hours as needed for Wheezing   Yes Historical Provider, MD   albuterol (ACCUNEB) 0.63 MG/3ML nebulizer solution Take 1 ampule by nebulization every 6 hours as needed for Wheezing   Yes Historical Provider, MD   mirtazapine (REMERON) 15 MG tablet Take 15 mg by mouth nightly   Yes Historical Provider, MD   dicyclomine (BENTYL) 10 MG capsule Take 1 capsule by mouth 3 times daily (before meals) 19  Sinan Vickers MD       Current medications:    Current Facility-Administered Medications   Medication Dose Route Frequency Provider Last Rate Last Dose    lactated ringers infusion   Intravenous Continuous Valarie Galvez MD           Allergies:  No Known Allergies    Problem List:    Patient Active Problem List   Diagnosis Code    Abdominal bloating R14.0    Pain of upper abdomen R10.10    Diarrhea R19.7       Past Medical History:        Diagnosis Date    Acid reflux     Anxiety     Arthritis     Cancer ([facility] [address]     cervical    COPD (chronic obstructive pulmonary disease) ([facility] [address] 02/23/2018    DDD (degenerative disc disease), cervical     Depression     Hiatal hernia     Spinal stenosis     Tachycardia     Thyroid disease     hypo       Past Surgical History:        Procedure Laterality Date    BACK SURGERY      diskectomy, L3-L5    DILATION AND CURETTAGE OF UTERUS      OTHER SURGICAL HISTORY      ectopic pregnancy with tube removal    THYROIDECTOMY, PARTIAL      UPPER GASTROINTESTINAL ENDOSCOPY         Social History:    Social History     Tobacco Use    Smoking status: Light Tobacco Smoker    Smokeless tobacco: Never Used   Substance Use Topics    Alcohol use: Yes     Comment: occassional                                Ready to quit: Not Answered  Counseling given: Not Answered      Vital Signs (Current):   Vitals:    04/08/19 0920   BP: 101/61   Pulse: 55   Resp: 18   Temp: 97 °F (36.1 °C)   TempSrc: Oral   SpO2: 97%   Weight: 122 lb (55.3 kg)   Height: 5' 5\" (1.651 m)                                              BP Readings from Last 3 Encounters:   04/08/19 101/61   03/04/19 132/85   01/25/19 121/75       NPO Status:                                                                                 BMI:   Wt Readings from Last 3 Encounters:   04/08/19 122 lb (55.3 kg)   03/04/19 128 lb (58.1 kg)   01/25/19 126 lb 3.2 oz (57.2 kg)     Body mass index is 20.3 kg/m².     CBC:   Lab Results   Component Value Date    WBC 6.5 07/13/2018    RBC 4.25 07/13/2018    HGB 14.0 07/13/2018    HCT 41.7 07/13/2018    MCV 98.1 07/13/2018    RDW 13.5 07/13/2018     07/13/2018       CMP:   Lab Results   Component Value Date     07/13/2018    K 4.6 07/13/2018     07/13/2018    CO2 26 07/13/2018    BUN 7 07/13/2018    CREATININE 0.71 07/13/2018    GFRAA >60 07/13/2018    LABGLOM >60 07/13/2018    GLUCOSE 91 07/13/2018    PROT 6.7 07/13/2018    CALCIUM 8.3 07/13/2018    BILITOT 0.54 07/13/2018    ALKPHOS 47 07/13/2018    AST 18 07/13/2018    ALT 14 07/13/2018       POC Tests: No results for input(s): POCGLU, POCNA, POCK, POCCL, POCBUN, POCHEMO, POCHCT in the last 72 hours. Coags: No results found for: PROTIME, INR, APTT    HCG (If Applicable): No results found for: PREGTESTUR, PREGSERUM, HCG, HCGQUANT     ABGs: No results found for: PHART, PO2ART, PEY4FZY, ECR1CMF, BEART, L6JQNSAD     Type & Screen (If Applicable):  No results found for: LABABO, [address]    Anesthesia Evaluation  Patient summary reviewed and Nursing notes reviewed  Airway: Mallampati: II  TM distance: >3 FB   Neck ROM: full  Mouth opening: > = 3 FB Dental: normal exam         Pulmonary:normal exam    (+) COPD: no interval change,  current smoker                           Cardiovascular:            Rhythm: regular  Rate: normal                    Neuro/Psych:   (+) neuromuscular disease:, psychiatric history:            GI/Hepatic/Renal:   (+) hiatal hernia, GERD: no interval change,           Endo/Other:    (+) hypothyroidism::., .                 Abdominal:           Vascular:                                        Anesthesia Plan      MAC     ASA 2       Induction: intravenous. Anesthetic plan and risks discussed with patient. Plan discussed with CRNA.                   Reilly Levin MD   4/8/2019

## 2019-04-29 ENCOUNTER — OFFICE VISIT (OUTPATIENT)
Dept: GASTROENTEROLOGY | Age: 59
End: 2019-04-29
Payer: COMMERCIAL

## 2019-04-29 ENCOUNTER — TELEPHONE (OUTPATIENT)
Dept: GASTROENTEROLOGY | Age: 59
End: 2019-04-29

## 2019-04-29 VITALS
HEART RATE: 56 BPM | SYSTOLIC BLOOD PRESSURE: 127 MMHG | BODY MASS INDEX: 20.39 KG/M2 | DIASTOLIC BLOOD PRESSURE: 80 MMHG | WEIGHT: 122.5 LBS

## 2019-04-29 DIAGNOSIS — R14.0 ABDOMINAL BLOATING: ICD-10-CM

## 2019-04-29 DIAGNOSIS — R19.7 DIARRHEA, UNSPECIFIED TYPE: ICD-10-CM

## 2019-04-29 DIAGNOSIS — R10.10 PAIN OF UPPER ABDOMEN: Primary | ICD-10-CM

## 2019-04-29 PROCEDURE — 99213 OFFICE O/P EST LOW 20 MIN: CPT | Performed by: INTERNAL MEDICINE

## 2019-04-29 PROCEDURE — 4004F PT TOBACCO SCREEN RCVD TLK: CPT | Performed by: INTERNAL MEDICINE

## 2019-04-29 PROCEDURE — 3017F COLORECTAL CA SCREEN DOC REV: CPT | Performed by: INTERNAL MEDICINE

## 2019-04-29 PROCEDURE — G8427 DOCREV CUR MEDS BY ELIG CLIN: HCPCS | Performed by: INTERNAL MEDICINE

## 2019-04-29 PROCEDURE — G8420 CALC BMI NORM PARAMETERS: HCPCS | Performed by: INTERNAL MEDICINE

## 2019-04-29 RX ORDER — PANTOPRAZOLE SODIUM 40 MG/1
40 TABLET, DELAYED RELEASE ORAL
Qty: 60 TABLET | Refills: 5 | Status: SHIPPED | OUTPATIENT
Start: 2019-04-29 | End: 2019-08-05 | Stop reason: ALTCHOICE

## 2019-04-29 RX ORDER — PHENOBARBITAL, HYOSCYAMINE SULFATE, ATROPINE SULFATE AND SCOPOLAMINE HYDROBROMIDE .0194; .1037; 16.2; .0065 MG/1; MG/1; MG/1; MG/1
1 TABLET ORAL 3 TIMES DAILY PRN
Qty: 90 TABLET | Refills: 2 | Status: SHIPPED | OUTPATIENT
Start: 2019-04-29 | End: 2019-09-30

## 2019-04-29 NOTE — PROGRESS NOTES
DIGESTIVE HEALTH PROGRESS NOTE    HISTORY OF PRESENT ILLNESS: Ms. Marilynn Vidal is a 62 y.o. female who presents for follow up on bloating. She continues to have issues. No real improvement. Bloating. No diarrhea. No nausea or vomiting. Past Medical, Family, and Social History reviewed and does not contribute to the patient presenting condition. Patient's PMH/PSH,SH,PSYCH Hx, MEDs, ALLERGIES, and ROS were all reviewed and updated in the appropriate sections. PAST MEDICAL HISTORY:  Past Medical History:   Diagnosis Date    Acid reflux     Anxiety     Arthritis     Cancer ([facility] [address]     cervical    COPD (chronic obstructive pulmonary disease) ([facility] [address] 02/23/2018    DDD (degenerative disc disease), cervical     Depression     Hiatal hernia     Spinal stenosis     Tachycardia     Thyroid disease     hypo       Past Surgical History:   Procedure Laterality Date    BACK SURGERY      diskectomy, L3-L5    COLONOSCOPY N/A 4/8/2019    COLONOSCOPY DIAGNOSTIC performed by Alejandra Bell MD at [address] [place] [address]      ectopic pregnancy with tube removal    THYROIDECTOMY, PARTIAL      UPPER GASTROINTESTINAL ENDOSCOPY         CURRENT MEDICATIONS:    Current Outpatient Medications:     diazepam (VALIUM) 5 MG tablet, Take 2.5 mg by mouth every 6 hours as needed for Anxiety. , Disp: , Rfl:     fluvoxaMINE (LUVOX) 100 MG tablet, Take 100 mg by mouth nightly, Disp: , Rfl:     Lactobacillus (PROBIOTIC ACIDOPHILUS PO), Take 1 tablet by mouth daily, Disp: , Rfl:     pantoprazole (PROTONIX) 40 MG tablet, Take 1 tablet by mouth every morning (before breakfast), Disp: 30 tablet, Rfl: 5    carvedilol (COREG) 3.125 MG tablet, Take 3.125 mg by mouth 2 times daily (with meals), Disp: , Rfl:     levothyroxine (SYNTHROID) 88 MCG tablet, Take 88 mcg by mouth Daily, Disp: , Rfl:     clonazePAM (KLONOPIN) 0.5 MG tablet, Take 0.5 mg by mouth 3 times daily. , Disp: , Rfl:     albuterol sulfate  (90 Base) MCG/ACT inhaler, Inhale 2 puffs into the lungs every 6 hours as needed for Wheezing, Disp: , Rfl:     albuterol (ACCUNEB) 0.63 MG/3ML nebulizer solution, Take 1 ampule by nebulization every 6 hours as needed for Wheezing, Disp: , Rfl:     mirtazapine (REMERON) 15 MG tablet, Take 15 mg by mouth nightly, Disp: , Rfl:     ALLERGIES:   No Known Allergies    SOCIAL HISTORY:   Social History     Socioeconomic History    Marital status:      Spouse name: Not on file    Number of children: Not on file    Years of education: Not on file    Highest education level: Not on file   Occupational History    Not on file   Social Needs    Financial resource strain: Not on file    Food insecurity:     Worry: Not on file     Inability: Not on file    Transportation needs:     Medical: Not on file     Non-medical: Not on file   Tobacco Use    Smoking status: Light Tobacco Smoker    Smokeless tobacco: Never Used   Substance and Sexual Activity    Alcohol use: Yes     Comment: occassional    Drug use: Never    Sexual activity: Not on file   Lifestyle    Physical activity:     Days per week: Not on file     Minutes per session: Not on file    Stress: Not on file   Relationships    Social connections:     Talks on phone: Not on file     Gets together: Not on file     Attends Voodoo service: Not on file     Active member of club or organization: Not on file     Attends meetings of clubs or organizations: Not on file     Relationship status: Not on file    Intimate partner violence:     Fear of current or ex partner: Not on file     Emotionally abused: Not on file     Physically abused: Not on file     Forced sexual activity: Not on file   Other Topics Concern    Not on file   Social History Narrative    Not on file       REVIEW OF SYSTEMS: A 12-point review of systems was obtained and pertinent positives and negatives were enumerated above in the history of present illness. All other reviewed systems / symptoms were negative. Review of Systems     PHYSICAL EXAMINATION: Vital signs reviewed per the nursing documentation. /80   Pulse 56   Wt 122 lb 8 oz (55.6 kg)   BMI 20.39 kg/m²   Body mass index is 20.39 kg/m². I personally reviewed the nurse's notes and documentation and I agree with her notes. General: alert, appears stated age and cooperative Psych: Normal. and Alert and oriented, appropriate affect. . Normal affect. Mentation normal  HEENT: PERRLA. Clear conjunctivae and sclerae. Moist oral mucosae, no lesions or ulcers. The neck is supple, without lymphadenopathy or jugular venous distension. No masses. Normal thyroid. Cardiovascular: S1 S2 RRR no rubs or murmurs. Pulmonary: clear BL. No accessory muscle usage. Abdominal Exam: Soft, NT ND, no hepato or spleno megaly, +BS, no ascites. LABORATORY DATA: Reviewed  Lab Results   Component Value Date    WBC 6.5 07/13/2018    HGB 14.0 07/13/2018    HCT 41.7 07/13/2018    MCV 98.1 07/13/2018     07/13/2018     07/13/2018    K 4.6 07/13/2018     07/13/2018    CO2 26 07/13/2018    BUN 7 07/13/2018    CREATININE 0.71 07/13/2018    LABALBU 4.3 07/13/2018    BILITOT 0.54 07/13/2018    ALKPHOS 47 07/13/2018    AST 18 07/13/2018    ALT 14 07/13/2018         Lab Results   Component Value Date    RBC 4.25 07/13/2018    HGB 14.0 07/13/2018    MCV 98.1 07/13/2018    MCH 32.8 07/13/2018    MCHC 33.5 07/13/2018    RDW 13.5 07/13/2018    MPV 7.6 07/13/2018    BASOPCT 1 07/13/2018    LYMPHSABS 2.40 07/13/2018    MONOSABS 0.60 07/13/2018    NEUTROABS 3.50 07/13/2018    EOSABS 0.00 07/13/2018    BASOSABS 0.00 07/13/2018         DIAGNOSTIC TESTING:   No results found. IMPRESSION: Ms. Nevaeh Tena is a 62 y.o. female with vomiting. Very likely functional.  Check for celiac disease. Trial of probiotics. Donnatal.  Follow-up in one month.     Lindy Seip MD [facility]      Please note that this chart was generated using voice recognition Dragon dictation software. Although every effort was made to ensure the accuracy of this automated transcription, some errors in transcription may have occurred.

## 2019-06-04 ENCOUNTER — TELEPHONE (OUTPATIENT)
Dept: GASTROENTEROLOGY | Age: 59
End: 2019-06-04

## 2019-06-04 NOTE — TELEPHONE ENCOUNTER
Moved devon't from 7/8/19 to 8/5/19 ( schedule conflict) LVM for patient to call to confirm change Sending letter.

## 2019-06-24 NOTE — TELEPHONE ENCOUNTER
Patient calling stating she recently went on Vacation and was hospitalized. Patient says she is being tested for H pylori currently and is having issues with aspirating.  Patient asking if she can get in any sooner than August?

## 2019-08-02 ENCOUNTER — TELEPHONE (OUTPATIENT)
Dept: GASTROENTEROLOGY | Age: 59
End: 2019-08-02

## 2019-08-05 ENCOUNTER — OFFICE VISIT (OUTPATIENT)
Dept: GASTROENTEROLOGY | Age: 59
End: 2019-08-05
Payer: COMMERCIAL

## 2019-08-05 VITALS
WEIGHT: 127 LBS | SYSTOLIC BLOOD PRESSURE: 124 MMHG | HEART RATE: 68 BPM | DIASTOLIC BLOOD PRESSURE: 80 MMHG | BODY MASS INDEX: 21.13 KG/M2

## 2019-08-05 DIAGNOSIS — R14.0 ABDOMINAL BLOATING: Primary | ICD-10-CM

## 2019-08-05 DIAGNOSIS — R10.10 PAIN OF UPPER ABDOMEN: ICD-10-CM

## 2019-08-05 PROCEDURE — G8420 CALC BMI NORM PARAMETERS: HCPCS | Performed by: INTERNAL MEDICINE

## 2019-08-05 PROCEDURE — 99213 OFFICE O/P EST LOW 20 MIN: CPT | Performed by: INTERNAL MEDICINE

## 2019-08-05 PROCEDURE — 4004F PT TOBACCO SCREEN RCVD TLK: CPT | Performed by: INTERNAL MEDICINE

## 2019-08-05 PROCEDURE — 3017F COLORECTAL CA SCREEN DOC REV: CPT | Performed by: INTERNAL MEDICINE

## 2019-08-05 PROCEDURE — G8427 DOCREV CUR MEDS BY ELIG CLIN: HCPCS | Performed by: INTERNAL MEDICINE

## 2019-08-05 RX ORDER — FAMOTIDINE 20 MG/1
20 TABLET, FILM COATED ORAL DAILY
COMMUNITY
End: 2020-05-21 | Stop reason: SDUPTHER

## 2019-08-05 RX ORDER — RANITIDINE 300 MG/1
300 TABLET ORAL NIGHTLY
Qty: 30 TABLET | Refills: 3 | Status: SHIPPED | OUTPATIENT
Start: 2019-08-05 | End: 2020-02-18 | Stop reason: SDUPTHER

## 2019-08-05 RX ORDER — OMEPRAZOLE 40 MG/1
40 CAPSULE, DELAYED RELEASE ORAL
Qty: 30 CAPSULE | Refills: 5 | Status: SHIPPED | OUTPATIENT
Start: 2019-08-05 | End: 2019-09-30

## 2019-08-05 NOTE — PROGRESS NOTES
DIGESTIVE HEALTH PROGRESS NOTE    HISTORY OF PRESENT ILLNESS: Ms. Kvng Mendez is a 62 y.o. female who presents for follow up on abd she continues to report intermittent abdominal pain.pain. Frequent heartburns. Regurgitation. No blood in the stool or melena. No clear triggers for pain. She was in the hospital recently. CT scan was negative. Cardiac work-up was negative. She is currently on Pepcid AC. Prior to that she was on omeprazole twice a day. Bentyl did not help much. Donnatal was not covered. Past Medical, Family, and Social History reviewed and does not contribute to the patient presenting condition. Patient's PMH/PSH,SH,PSYCH Hx, MEDs, ALLERGIES, and ROS were all reviewed and updated in the appropriate sections. PAST MEDICAL HISTORY:  Past Medical History:   Diagnosis Date    Acid reflux     Anxiety     Arthritis     Cancer ([facility] [address]     cervical    COPD (chronic obstructive pulmonary disease) ([facility] [address] 02/23/2018    DDD (degenerative disc disease), cervical     Depression     Hiatal hernia     Spinal stenosis     Tachycardia     Thyroid disease     hypo       Past Surgical History:   Procedure Laterality Date    BACK SURGERY      diskectomy, L3-L5    COLONOSCOPY N/A 4/8/2019    COLONOSCOPY DIAGNOSTIC performed by Rafat Elam MD at [address] [place] [address]      ectopic pregnancy with tube removal    THYROIDECTOMY, PARTIAL      UPPER GASTROINTESTINAL ENDOSCOPY         CURRENT MEDICATIONS:    Current Outpatient Medications:     atropine-PHENobarbital-scopolamine-hyoscyamine (DONNATAL) 16.2 MG per tablet, Take 1 tablet by mouth 3 times daily as needed (abd pain), Disp: 90 tablet, Rfl: 2    pantoprazole (PROTONIX) 40 MG tablet, Take 1 tablet by mouth 2 times daily (before meals), Disp: 60 tablet, Rfl: 5    diazepam (VALIUM) 5 MG tablet, Take 2.5 mg by mouth every 6 hours as needed for Anxiety. , Disp: , Rfl:     fluvoxaMINE

## 2019-08-14 ENCOUNTER — HOSPITAL ENCOUNTER (OUTPATIENT)
Dept: GENERAL RADIOLOGY | Age: 59
Discharge: HOME | End: 2019-08-16
Payer: COMMERCIAL

## 2019-08-14 DIAGNOSIS — R10.10 PAIN OF UPPER ABDOMEN: ICD-10-CM

## 2019-08-14 DIAGNOSIS — R14.0 ABDOMINAL BLOATING: ICD-10-CM

## 2019-08-14 PROCEDURE — 74220 X-RAY XM ESOPHAGUS 1CNTRST: CPT

## 2019-08-14 PROCEDURE — 2500000003 HC RX 250 WO HCPCS: Performed by: INTERNAL MEDICINE

## 2019-08-14 RX ADMIN — BARIUM SULFATE 160 ML: 960 POWDER, FOR SUSPENSION ORAL at 10:29

## 2019-08-14 RX ADMIN — BARIUM SULFATE 140 ML: 980 POWDER, FOR SUSPENSION ORAL at 10:29

## 2019-09-27 ENCOUNTER — TELEPHONE (OUTPATIENT)
Dept: GASTROENTEROLOGY | Age: 59
End: 2019-09-27

## 2019-09-27 NOTE — TELEPHONE ENCOUNTER
Writer contacted patient and LVM to contact the office at [phone] to confirm appointment at the [facility] location. Writer states to please bring insurance card and ID and that if the patient has a co-pay, that would be due at the time of visit as well.

## 2019-09-30 ENCOUNTER — OFFICE VISIT (OUTPATIENT)
Dept: GASTROENTEROLOGY | Age: 59
End: 2019-09-30
Payer: COMMERCIAL

## 2019-09-30 VITALS
DIASTOLIC BLOOD PRESSURE: 82 MMHG | SYSTOLIC BLOOD PRESSURE: 134 MMHG | HEART RATE: 89 BPM | WEIGHT: 132 LBS | BODY MASS INDEX: 21.97 KG/M2

## 2019-09-30 DIAGNOSIS — R14.0 ABDOMINAL BLOATING: Primary | ICD-10-CM

## 2019-09-30 DIAGNOSIS — R10.10 PAIN OF UPPER ABDOMEN: ICD-10-CM

## 2019-09-30 PROCEDURE — 3017F COLORECTAL CA SCREEN DOC REV: CPT | Performed by: INTERNAL MEDICINE

## 2019-09-30 PROCEDURE — G8427 DOCREV CUR MEDS BY ELIG CLIN: HCPCS | Performed by: INTERNAL MEDICINE

## 2019-09-30 PROCEDURE — 4004F PT TOBACCO SCREEN RCVD TLK: CPT | Performed by: INTERNAL MEDICINE

## 2019-09-30 PROCEDURE — G8420 CALC BMI NORM PARAMETERS: HCPCS | Performed by: INTERNAL MEDICINE

## 2019-09-30 PROCEDURE — 99213 OFFICE O/P EST LOW 20 MIN: CPT | Performed by: INTERNAL MEDICINE

## 2019-09-30 RX ORDER — TRAMADOL HYDROCHLORIDE 50 MG/1
TABLET ORAL
Refills: 0 | COMMUNITY
Start: 2019-08-22 | End: 2020-07-24

## 2019-10-01 RX ORDER — POLYETHYLENE GLYCOL 3350 17 G/17G
POWDER, FOR SOLUTION ORAL
Qty: 255 G | Refills: 0 | Status: SHIPPED | OUTPATIENT
Start: 2019-10-01 | End: 2020-07-24

## 2019-11-11 ENCOUNTER — TELEPHONE (OUTPATIENT)
Dept: GASTROENTEROLOGY | Age: 59
End: 2019-11-11

## 2019-11-13 ENCOUNTER — ANESTHESIA (OUTPATIENT)
Dept: ENDOSCOPY | Age: 59
End: 2019-11-13
Payer: COMMERCIAL

## 2019-11-13 ENCOUNTER — ANESTHESIA EVENT (OUTPATIENT)
Dept: ENDOSCOPY | Age: 59
End: 2019-11-13
Payer: COMMERCIAL

## 2019-11-13 ENCOUNTER — HOSPITAL ENCOUNTER (OUTPATIENT)
Age: 59
Setting detail: OUTPATIENT SURGERY
Discharge: HOME | End: 2019-11-13
Attending: INTERNAL MEDICINE | Admitting: INTERNAL MEDICINE
Payer: COMMERCIAL

## 2019-11-13 VITALS
DIASTOLIC BLOOD PRESSURE: 79 MMHG | SYSTOLIC BLOOD PRESSURE: 118 MMHG | OXYGEN SATURATION: 93 % | RESPIRATION RATE: 19 BRPM

## 2019-11-13 VITALS
TEMPERATURE: 97.6 F | OXYGEN SATURATION: 94 % | BODY MASS INDEX: 21.16 KG/M2 | DIASTOLIC BLOOD PRESSURE: 79 MMHG | HEIGHT: 65 IN | RESPIRATION RATE: 24 BRPM | HEART RATE: 62 BPM | WEIGHT: 127 LBS | SYSTOLIC BLOOD PRESSURE: 131 MMHG

## 2019-11-13 PROCEDURE — 7100000011 HC PHASE II RECOVERY - ADDTL 15 MIN: Performed by: INTERNAL MEDICINE

## 2019-11-13 PROCEDURE — 6360000002 HC RX W HCPCS: Performed by: NURSE ANESTHETIST, CERTIFIED REGISTERED

## 2019-11-13 PROCEDURE — 7100000010 HC PHASE II RECOVERY - FIRST 15 MIN: Performed by: INTERNAL MEDICINE

## 2019-11-13 PROCEDURE — 45380 COLONOSCOPY AND BIOPSY: CPT | Performed by: INTERNAL MEDICINE

## 2019-11-13 PROCEDURE — 2500000003 HC RX 250 WO HCPCS: Performed by: NURSE ANESTHETIST, CERTIFIED REGISTERED

## 2019-11-13 PROCEDURE — 3700000000 HC ANESTHESIA ATTENDED CARE: Performed by: INTERNAL MEDICINE

## 2019-11-13 PROCEDURE — 88305 TISSUE EXAM BY PATHOLOGIST: CPT

## 2019-11-13 PROCEDURE — 2709999900 HC NON-CHARGEABLE SUPPLY: Performed by: INTERNAL MEDICINE

## 2019-11-13 PROCEDURE — 45385 COLONOSCOPY W/LESION REMOVAL: CPT | Performed by: INTERNAL MEDICINE

## 2019-11-13 PROCEDURE — 3609010600 HC COLONOSCOPY POLYPECTOMY SNARE/COLD BIOPSY: Performed by: INTERNAL MEDICINE

## 2019-11-13 PROCEDURE — 3700000001 HC ADD 15 MINUTES (ANESTHESIA): Performed by: INTERNAL MEDICINE

## 2019-11-13 PROCEDURE — 2580000003 HC RX 258: Performed by: INTERNAL MEDICINE

## 2019-11-13 RX ORDER — SODIUM CHLORIDE 9 MG/ML
INJECTION, SOLUTION INTRAVENOUS CONTINUOUS
Status: DISCONTINUED | OUTPATIENT
Start: 2019-11-13 | End: 2019-11-13 | Stop reason: HOSPADM

## 2019-11-13 RX ORDER — PROPOFOL 10 MG/ML
INJECTION, EMULSION INTRAVENOUS PRN
Status: DISCONTINUED | OUTPATIENT
Start: 2019-11-13 | End: 2019-11-13 | Stop reason: SDUPTHER

## 2019-11-13 RX ORDER — LIDOCAINE HYDROCHLORIDE 10 MG/ML
INJECTION, SOLUTION EPIDURAL; INFILTRATION; INTRACAUDAL; PERINEURAL PRN
Status: DISCONTINUED | OUTPATIENT
Start: 2019-11-13 | End: 2019-11-13 | Stop reason: SDUPTHER

## 2019-11-13 RX ADMIN — LIDOCAINE HYDROCHLORIDE 40 MG: 10 INJECTION, SOLUTION EPIDURAL; INFILTRATION; INTRACAUDAL at 13:26

## 2019-11-13 RX ADMIN — PROPOFOL 340 MG: 10 INJECTION, EMULSION INTRAVENOUS at 13:26

## 2019-11-13 RX ADMIN — SODIUM CHLORIDE: 9 INJECTION, SOLUTION INTRAVENOUS at 11:48

## 2019-11-13 ASSESSMENT — COPD QUESTIONNAIRES: CAT_SEVERITY: MODERATE

## 2019-11-13 ASSESSMENT — PAIN SCALES - GENERAL
PAINLEVEL_OUTOF10: 0

## 2019-11-13 ASSESSMENT — PAIN - FUNCTIONAL ASSESSMENT: PAIN_FUNCTIONAL_ASSESSMENT: 0-10

## 2019-11-13 ASSESSMENT — LIFESTYLE VARIABLES: SMOKING_STATUS: 1

## 2019-11-14 LAB — SURGICAL PATHOLOGY REPORT: NORMAL

## 2020-02-10 ENCOUNTER — TELEPHONE (OUTPATIENT)
Dept: GASTROENTEROLOGY | Age: 60
End: 2020-02-10

## 2020-02-18 ENCOUNTER — OFFICE VISIT (OUTPATIENT)
Dept: GASTROENTEROLOGY | Age: 60
End: 2020-02-18
Payer: COMMERCIAL

## 2020-02-18 ENCOUNTER — TELEPHONE (OUTPATIENT)
Dept: GASTROENTEROLOGY | Age: 60
End: 2020-02-18

## 2020-02-18 VITALS
BODY MASS INDEX: 21.97 KG/M2 | SYSTOLIC BLOOD PRESSURE: 139 MMHG | HEART RATE: 67 BPM | DIASTOLIC BLOOD PRESSURE: 89 MMHG | WEIGHT: 132 LBS

## 2020-02-18 PROCEDURE — G8484 FLU IMMUNIZE NO ADMIN: HCPCS | Performed by: INTERNAL MEDICINE

## 2020-02-18 PROCEDURE — G8420 CALC BMI NORM PARAMETERS: HCPCS | Performed by: INTERNAL MEDICINE

## 2020-02-18 PROCEDURE — 4004F PT TOBACCO SCREEN RCVD TLK: CPT | Performed by: INTERNAL MEDICINE

## 2020-02-18 PROCEDURE — 3017F COLORECTAL CA SCREEN DOC REV: CPT | Performed by: INTERNAL MEDICINE

## 2020-02-18 PROCEDURE — 99213 OFFICE O/P EST LOW 20 MIN: CPT | Performed by: INTERNAL MEDICINE

## 2020-02-18 PROCEDURE — G8427 DOCREV CUR MEDS BY ELIG CLIN: HCPCS | Performed by: INTERNAL MEDICINE

## 2020-02-18 RX ORDER — RANITIDINE 300 MG/1
300 TABLET ORAL NIGHTLY
Qty: 90 TABLET | Refills: 3 | Status: SHIPPED | OUTPATIENT
Start: 2020-02-18 | End: 2020-05-21 | Stop reason: SDUPTHER

## 2020-02-18 ASSESSMENT — ENCOUNTER SYMPTOMS
RECTAL PAIN: 0
TROUBLE SWALLOWING: 0
COUGH: 0
ABDOMINAL PAIN: 1
ABDOMINAL DISTENTION: 1
ALLERGIC/IMMUNOLOGIC NEGATIVE: 1
CHOKING: 1
ANAL BLEEDING: 0
BLOOD IN STOOL: 0
CONSTIPATION: 0
VOMITING: 0
NAUSEA: 0
SHORTNESS OF BREATH: 1
BACK PAIN: 1
DIARRHEA: 0
CHEST TIGHTNESS: 0
SINUS PRESSURE: 1
SINUS PAIN: 1
SORE THROAT: 0

## 2020-02-18 NOTE — PROGRESS NOTES
Activity    Alcohol use: Yes     Comment: occassional    Drug use: Never    Sexual activity: Not on file   Lifestyle    Physical activity:     Days per week: Not on file     Minutes per session: Not on file    Stress: Not on file   Relationships    Social connections:     Talks on phone: Not on file     Gets together: Not on file     Attends Voodoo service: Not on file     Active member of club or organization: Not on file     Attends meetings of clubs or organizations: Not on file     Relationship status: Not on file    Intimate partner violence:     Fear of current or ex partner: Not on file     Emotionally abused: Not on file     Physically abused: Not on file     Forced sexual activity: Not on file   Other Topics Concern    Not on file   Social History Narrative    Not on file       REVIEW OF SYSTEMS: A 12-point review of systemswas obtained and pertinent positives and negatives were enumerated above in the history of present illness. All other reviewed systems / symptoms were negative. Review of Systems   Constitutional: Negative for appetite change, fatigue and fever. HENT: Positive for sinus pressure and sinus pain. Negative for congestion, sore throat and trouble swallowing. Eyes: Positive for visual disturbance. Respiratory: Positive for choking and shortness of breath (COPD). Negative for cough and chest tightness. Cardiovascular: Negative for chest pain and leg swelling. Gastrointestinal: Positive for abdominal distention and abdominal pain. Negative for anal bleeding, blood in stool, constipation, diarrhea, nausea, rectal pain and vomiting. Endocrine: Negative. Genitourinary: Negative. Negative for difficulty urinating. Musculoskeletal: Positive for arthralgias, back pain and gait problem. Skin: Negative. Allergic/Immunologic: Negative. Negative for environmental allergies and food allergies.    Neurological: Negative for dizziness, weakness, light-headedness and headaches. Hematological: Negative. Does not bruise/bleed easily. Psychiatric/Behavioral: Positive for sleep disturbance. The patient is nervous/anxious. LABORATORY DATA: Reviewed  Lab Results   Component Value Date    WBC 6.5 07/13/2018    HGB 14.0 07/13/2018    HCT 41.7 07/13/2018    MCV 98.1 07/13/2018     07/13/2018     07/13/2018    K 4.6 07/13/2018     07/13/2018    CO2 26 07/13/2018    BUN 7 07/13/2018    CREATININE 0.71 07/13/2018    LABALBU 4.3 07/13/2018    BILITOT 0.54 07/13/2018    ALKPHOS 47 07/13/2018    AST 18 07/13/2018    ALT 14 07/13/2018         Lab Results   Component Value Date    RBC 4.25 07/13/2018    HGB 14.0 07/13/2018    MCV 98.1 07/13/2018    MCH 32.8 07/13/2018    MCHC 33.5 07/13/2018    RDW 13.5 07/13/2018    MPV 7.6 07/13/2018    BASOPCT 1 07/13/2018    LYMPHSABS 2.40 07/13/2018    MONOSABS 0.60 07/13/2018    NEUTROABS 3.50 07/13/2018    EOSABS 0.00 07/13/2018    BASOSABS 0.00 07/13/2018         DIAGNOSTIC TESTING:     No results found. PHYSICAL EXAMINATION: Vital signs reviewed per the nursing documentation. There were no vitals taken for this visit. There is no height or weight on file to calculate BMI. Physical Exam      I personally reviewed the nurse's notes and documentation and I agree with her notes. General: alert, appears stated age and cooperative Psych: Normal. and Alert and oriented, appropriate affect. . Normal affect. Mentation normal  HEENT: PERRLA. Clear conjunctivae and sclerae. Moist oral mucosae, no lesions or ulcers. The neck is supple, without lymphadenopathy or jugular venous distension. No masses. Normal thyroid. Cardiovascular: S1 S2 RRR no rubs or murmurs. Pulmonary: clear BL. No accessory muscle usage. Abdominal Exam: Soft, NT ND, no hepato or spleno megaly, +BS, no ascites. IMPRESSION: Ms. Jessenia Sandoval is a 61 y.o. female with bloating. Functional.  We will give her a printout of FODMAP diet. Follow-up in 1 month. Thank you for allowing me to participate in the care of Ms. Rhodes. For any further questions please do not hesitate to contact me. I have reviewed and agree with the ROS entered by the MA/LPN. Note is dictated utilizing voice recognition software. Unfortunately this leads to occasional typographical errors. Please contact our office if you have any questions.     Ruth Roldan MD  [facility]  O: #[phone]

## 2020-04-06 ENCOUNTER — TELEPHONE (OUTPATIENT)
Dept: GASTROENTEROLOGY | Age: 60
End: 2020-04-06

## 2020-05-21 ENCOUNTER — TELEPHONE (OUTPATIENT)
Dept: GASTROENTEROLOGY | Age: 60
End: 2020-05-21

## 2020-05-21 RX ORDER — RANITIDINE 300 MG/1
300 TABLET ORAL NIGHTLY
Qty: 90 TABLET | Refills: 3 | Status: SHIPPED | OUTPATIENT
Start: 2020-05-21 | End: 2020-07-24

## 2020-05-21 RX ORDER — FAMOTIDINE 20 MG/1
20 TABLET, FILM COATED ORAL 2 TIMES DAILY
Qty: 60 TABLET | Refills: 3 | Status: SHIPPED | OUTPATIENT
Start: 2020-05-21 | End: 2020-11-16

## 2020-05-21 NOTE — TELEPHONE ENCOUNTER
Patient called the office stating that she is out of her ranitidine. Needs refills. Stated that she does have some omeprazole but does not know how much she can take at bedtime if she does. Would like the ranitidine sent to her [facility] in [place]. Next appointment is on 7/24/20. Please follow up with the patient.

## 2020-07-24 ENCOUNTER — OFFICE VISIT (OUTPATIENT)
Dept: GASTROENTEROLOGY | Age: 60
End: 2020-07-24
Payer: COMMERCIAL

## 2020-07-24 VITALS — BODY MASS INDEX: 21.63 KG/M2 | WEIGHT: 130 LBS | TEMPERATURE: 97.1 F

## 2020-07-24 PROCEDURE — 4004F PT TOBACCO SCREEN RCVD TLK: CPT | Performed by: INTERNAL MEDICINE

## 2020-07-24 PROCEDURE — 3017F COLORECTAL CA SCREEN DOC REV: CPT | Performed by: INTERNAL MEDICINE

## 2020-07-24 PROCEDURE — G8420 CALC BMI NORM PARAMETERS: HCPCS | Performed by: INTERNAL MEDICINE

## 2020-07-24 PROCEDURE — 99213 OFFICE O/P EST LOW 20 MIN: CPT | Performed by: INTERNAL MEDICINE

## 2020-07-24 PROCEDURE — G8427 DOCREV CUR MEDS BY ELIG CLIN: HCPCS | Performed by: INTERNAL MEDICINE

## 2020-07-24 RX ORDER — OMEPRAZOLE 40 MG/1
40 CAPSULE, DELAYED RELEASE ORAL 2 TIMES DAILY
Qty: 180 CAPSULE | Refills: 3 | Status: ON HOLD | OUTPATIENT
Start: 2020-07-24 | End: 2020-11-30 | Stop reason: HOSPADM

## 2020-07-24 RX ORDER — OMEPRAZOLE 40 MG/1
CAPSULE, DELAYED RELEASE ORAL
COMMUNITY
Start: 2020-07-22 | End: 2020-07-24 | Stop reason: SDUPTHER

## 2020-07-24 ASSESSMENT — ENCOUNTER SYMPTOMS
ALLERGIC/IMMUNOLOGIC NEGATIVE: 1
RECTAL PAIN: 0
CONSTIPATION: 0
CHOKING: 1
ABDOMINAL DISTENTION: 1
ABDOMINAL PAIN: 1
BLOOD IN STOOL: 0
SINUS PAIN: 1
SINUS PRESSURE: 1
SHORTNESS OF BREATH: 1
CHEST TIGHTNESS: 0
BACK PAIN: 1
TROUBLE SWALLOWING: 0
NAUSEA: 0
VOMITING: 0
COUGH: 0
ANAL BLEEDING: 0
SORE THROAT: 0
DIARRHEA: 0

## 2020-07-24 NOTE — PROGRESS NOTES
GI CLINIC FOLLOW UP    INTERVAL HISTORY:   No referring provider defined for this encounter. Chief Complaint   Patient presents with    Bloated           HISTORY OF PRESENT ILLNESS: Jessa Tucker is a 61 y.o. female with bloating. She reports abdominal distention. No clear triggers. She has been avoiding certain foods. We gave her FODMAP diet last time. This helps some. Not a lot. No heartburns. No blood in stool or melena. Bowels moving okay. She has moderate ventral hernia. This bulges out and becomes painful at times. Past Medical,Family, and Social History reviewed and does not contribute to the patient presentingcondition. Patient's PMH/PSH,SH,PSYCH Hx, MEDs, ALLERGIES, and ROS were all reviewed and updated in the appropriate sections.     PAST MEDICAL HISTORY:  Past Medical History:   Diagnosis Date    Acid reflux     Anxiety     Arthritis     Cancer ([facility]     cervical    COPD (chronic obstructive pulmonary disease) ([facility] 02/23/2018    DDD (degenerative disc disease), cervical     Depression     Hiatal hernia     Spinal stenosis     Tachycardia     Thyroid disease     hypo       Past Surgical History:   Procedure Laterality Date    BACK SURGERY      diskectomy, L3-L5    COLONOSCOPY N/A 4/8/2019    COLONOSCOPY DIAGNOSTIC performed by Emmanuel Sanchez MD at [facility]  11/13/2019    COLONOSCOPY POLYPECTOMY SNARE/COLD BIOPSY performed by Emmanuel Sanchez MD at [address] [place] [address]      ectopic pregnancy with tube removal    THYROIDECTOMY, PARTIAL      UPPER GASTROINTESTINAL ENDOSCOPY         CURRENT MEDICATIONS:    Current Outpatient Medications:     raNITIdine (ZANTAC) 300 MG tablet, Take 1 tablet by mouth nightly, Disp: 90 tablet, Rfl: 3    famotidine (PEPCID) 20 MG tablet, Take 1 tablet by mouth 2 times daily, Disp: 60 tablet, Rfl: 3    polyethylene glycol (MIRALAX) powder, Use as directed by Negative. Does not bruise/bleed easily. Psychiatric/Behavioral: Positive for sleep disturbance. The patient is nervous/anxious. LABORATORY DATA: Reviewed  Lab Results   Component Value Date    WBC 6.5 07/13/2018    HGB 14.0 07/13/2018    HCT 41.7 07/13/2018    MCV 98.1 07/13/2018     07/13/2018     07/13/2018    K 4.6 07/13/2018     07/13/2018    CO2 26 07/13/2018    BUN 7 07/13/2018    CREATININE 0.71 07/13/2018    LABALBU 4.3 07/13/2018    BILITOT 0.54 07/13/2018    ALKPHOS 47 07/13/2018    AST 18 07/13/2018    ALT 14 07/13/2018         Lab Results   Component Value Date    RBC 4.25 07/13/2018    HGB 14.0 07/13/2018    MCV 98.1 07/13/2018    MCH 32.8 07/13/2018    MCHC 33.5 07/13/2018    RDW 13.5 07/13/2018    MPV 7.6 07/13/2018    BASOPCT 1 07/13/2018    LYMPHSABS 2.40 07/13/2018    MONOSABS 0.60 07/13/2018    NEUTROABS 3.50 07/13/2018    EOSABS 0.00 07/13/2018    BASOSABS 0.00 07/13/2018         DIAGNOSTIC TESTING:     No results found. PHYSICAL EXAMINATION: Vital signs reviewed per the nursing documentation. There were no vitals taken for this visit. There is no height or weight on file to calculate BMI. Physical Exam      I personally reviewed the nurse's notes and documentation and I agree with her notes. General: alert, appears stated age and cooperative Psych: Normal. and Alert and oriented, appropriate affect. . Normal affect. Mentation normal  HEENT: PERRLA. Clear conjunctivae and sclerae. Moist oral mucosae, no lesions or ulcers. The neck is supple, without lymphadenopathy or jugular venous distension. No masses. Normal thyroid. Cardiovascular: S1 S2 RRR no rubs or murmurs. Pulmonary: clear BL. No accessory muscle usage. Abdominal Exam: Soft, NT ND, no hepato or spleno megaly, +BS, no ascites. Moderate ventral hernia. Reducible. IMPRESSION: Ms. Mauricio Paz is a 61 y.o. female with bloating. Moderately large ventral hernia.   Will refer to surgery. Follow-up in 3 months. In case her bloating persists she may need further testing by allergy. Thank you for allowing me to participate in the care of Ms. Rhodes. For any further questions please do not hesitate to contact me. I have reviewed and agree with the ROS entered by the MA/LPN. Note is dictated utilizing voice recognition software. Unfortunately this leads to occasional typographical errors. Please contact our office if you have any questions.     Santos Francisco MD  [facility]  O: #[phone]

## 2020-08-19 ENCOUNTER — HOSPITAL ENCOUNTER (OUTPATIENT)
Dept: CT IMAGING | Age: 60
Discharge: HOME | End: 2020-08-21
Payer: COMMERCIAL

## 2020-08-19 PROCEDURE — 6360000004 HC RX CONTRAST MEDICATION: Performed by: INTERNAL MEDICINE

## 2020-08-19 PROCEDURE — 2580000003 HC RX 258: Performed by: INTERNAL MEDICINE

## 2020-08-19 PROCEDURE — 74177 CT ABD & PELVIS W/CONTRAST: CPT

## 2020-08-19 RX ORDER — 0.9 % SODIUM CHLORIDE 0.9 %
80 INTRAVENOUS SOLUTION INTRAVENOUS ONCE
Status: COMPLETED | OUTPATIENT
Start: 2020-08-19 | End: 2020-08-19

## 2020-08-19 RX ORDER — SODIUM CHLORIDE 0.9 % (FLUSH) 0.9 %
10 SYRINGE (ML) INJECTION ONCE
Status: COMPLETED | OUTPATIENT
Start: 2020-08-19 | End: 2020-08-19

## 2020-08-19 RX ADMIN — SODIUM CHLORIDE 80 ML: 9 INJECTION, SOLUTION INTRAVENOUS at 15:27

## 2020-08-19 RX ADMIN — IOVERSOL 75 ML: 741 INJECTION INTRA-ARTERIAL; INTRAVENOUS at 15:27

## 2020-08-19 RX ADMIN — Medication 10 ML: at 15:27

## 2020-08-21 ENCOUNTER — INITIAL CONSULT (OUTPATIENT)
Dept: BARIATRICS/WEIGHT MGMT | Age: 60
End: 2020-08-21
Payer: COMMERCIAL

## 2020-08-21 VITALS — TEMPERATURE: 97.2 F | BODY MASS INDEX: 21.83 KG/M2 | HEIGHT: 65 IN | WEIGHT: 131 LBS

## 2020-08-21 PROCEDURE — G8420 CALC BMI NORM PARAMETERS: HCPCS | Performed by: SURGERY

## 2020-08-21 PROCEDURE — G8427 DOCREV CUR MEDS BY ELIG CLIN: HCPCS | Performed by: SURGERY

## 2020-08-21 PROCEDURE — 3017F COLORECTAL CA SCREEN DOC REV: CPT | Performed by: SURGERY

## 2020-08-21 PROCEDURE — 4004F PT TOBACCO SCREEN RCVD TLK: CPT | Performed by: SURGERY

## 2020-08-21 PROCEDURE — 99204 OFFICE O/P NEW MOD 45 MIN: CPT | Performed by: SURGERY

## 2020-08-26 ENCOUNTER — TELEPHONE (OUTPATIENT)
Dept: BARIATRICS/WEIGHT MGMT | Age: 60
End: 2020-08-26

## 2020-08-26 NOTE — TELEPHONE ENCOUNTER
Waiting on Cardiac clearance and pulmonary clearance    Clearance faxed to  (Pulmonary)  [facility]

## 2020-08-26 NOTE — TELEPHONE ENCOUNTER
Patient seen dr Salas Anaya on 8/21 will need to be scheduled for a robotic ventral hernia repair ( length 2 hours)

## 2020-08-30 ENCOUNTER — HOSPITAL ENCOUNTER (EMERGENCY)
Age: 60
Discharge: HOME | End: 2020-08-30
Attending: EMERGENCY MEDICINE
Payer: COMMERCIAL

## 2020-08-30 ENCOUNTER — APPOINTMENT (OUTPATIENT)
Dept: GENERAL RADIOLOGY | Age: 60
End: 2020-08-30
Payer: COMMERCIAL

## 2020-08-30 ENCOUNTER — APPOINTMENT (OUTPATIENT)
Dept: CT IMAGING | Age: 60
End: 2020-08-30
Payer: COMMERCIAL

## 2020-08-30 VITALS
SYSTOLIC BLOOD PRESSURE: 95 MMHG | DIASTOLIC BLOOD PRESSURE: 75 MMHG | BODY MASS INDEX: 21.66 KG/M2 | OXYGEN SATURATION: 96 % | HEIGHT: 65 IN | RESPIRATION RATE: 20 BRPM | WEIGHT: 130 LBS | TEMPERATURE: 98 F | HEART RATE: 78 BPM

## 2020-08-30 LAB
-: ABNORMAL
ABSOLUTE EOS #: 0 K/UL (ref 0–0.4)
ABSOLUTE IMMATURE GRANULOCYTE: ABNORMAL K/UL (ref 0–0.3)
ABSOLUTE LYMPH #: 1.8 K/UL (ref 1–4.8)
ABSOLUTE MONO #: 0.8 K/UL (ref 0.1–1.3)
ALBUMIN SERPL-MCNC: 4 G/DL (ref 3.5–5.2)
ALBUMIN/GLOBULIN RATIO: NORMAL (ref 1–2.5)
ALP BLD-CCNC: 72 U/L (ref 35–104)
ALT SERPL-CCNC: 12 U/L (ref 5–33)
AMORPHOUS: ABNORMAL
ANION GAP SERPL CALCULATED.3IONS-SCNC: 10 MMOL/L (ref 9–17)
AST SERPL-CCNC: 21 U/L
BACTERIA: ABNORMAL
BASOPHILS # BLD: 1 % (ref 0–2)
BASOPHILS ABSOLUTE: 0.1 K/UL (ref 0–0.2)
BILIRUB SERPL-MCNC: 0.57 MG/DL (ref 0.3–1.2)
BILIRUBIN URINE: NEGATIVE
BUN BLDV-MCNC: 9 MG/DL (ref 6–20)
BUN/CREAT BLD: NORMAL (ref 9–20)
CALCIUM SERPL-MCNC: 9 MG/DL (ref 8.6–10.4)
CASTS UA: ABNORMAL /LPF
CHLORIDE BLD-SCNC: 102 MMOL/L (ref 98–107)
CO2: 30 MMOL/L (ref 20–31)
COLOR: YELLOW
COMMENT UA: ABNORMAL
CREAT SERPL-MCNC: 0.54 MG/DL (ref 0.5–0.9)
CRYSTALS, UA: ABNORMAL /HPF
DIFFERENTIAL TYPE: ABNORMAL
EOSINOPHILS RELATIVE PERCENT: 0 % (ref 0–4)
EPITHELIAL CELLS UA: ABNORMAL /HPF
GFR AFRICAN AMERICAN: >60 ML/MIN
GFR NON-AFRICAN AMERICAN: >60 ML/MIN
GFR SERPL CREATININE-BSD FRML MDRD: NORMAL ML/MIN/{1.73_M2}
GFR SERPL CREATININE-BSD FRML MDRD: NORMAL ML/MIN/{1.73_M2}
GLUCOSE BLD-MCNC: 90 MG/DL (ref 70–99)
GLUCOSE URINE: NEGATIVE
HCT VFR BLD CALC: 40.2 % (ref 36–46)
HEMOGLOBIN: 13.6 G/DL (ref 12–16)
IMMATURE GRANULOCYTES: ABNORMAL %
KETONES, URINE: NEGATIVE
LEUKOCYTE ESTERASE, URINE: ABNORMAL
LIPASE: 17 U/L (ref 13–60)
LYMPHOCYTES # BLD: 22 % (ref 24–44)
MCH RBC QN AUTO: 32.4 PG (ref 26–34)
MCHC RBC AUTO-ENTMCNC: 34 G/DL (ref 31–37)
MCV RBC AUTO: 95.5 FL (ref 80–100)
MONOCYTES # BLD: 9 % (ref 1–7)
MUCUS: ABNORMAL
NITRITE, URINE: NEGATIVE
NRBC AUTOMATED: ABNORMAL PER 100 WBC
OTHER OBSERVATIONS UA: ABNORMAL
PDW BLD-RTO: 14.1 % (ref 11.5–14.9)
PH UA: 7.5 (ref 5–8)
PLATELET # BLD: 251 K/UL (ref 150–450)
PLATELET ESTIMATE: ABNORMAL
PMV BLD AUTO: 7.8 FL (ref 6–12)
POTASSIUM SERPL-SCNC: 4.1 MMOL/L (ref 3.7–5.3)
PROTEIN UA: NEGATIVE
RBC # BLD: 4.21 M/UL (ref 4–5.2)
RBC # BLD: ABNORMAL 10*6/UL
RBC UA: ABNORMAL /HPF
RENAL EPITHELIAL, UA: ABNORMAL /HPF
SEG NEUTROPHILS: 68 % (ref 36–66)
SEGMENTED NEUTROPHILS ABSOLUTE COUNT: 5.8 K/UL (ref 1.3–9.1)
SODIUM BLD-SCNC: 142 MMOL/L (ref 135–144)
SPECIFIC GRAVITY UA: 1 (ref 1–1.03)
TOTAL PROTEIN: 6.5 G/DL (ref 6.4–8.3)
TRICHOMONAS: ABNORMAL
TURBIDITY: ABNORMAL
URINE HGB: ABNORMAL
UROBILINOGEN, URINE: NORMAL
WBC # BLD: 8.4 K/UL (ref 3.5–11)
WBC # BLD: ABNORMAL 10*3/UL
WBC UA: ABNORMAL /HPF
YEAST: ABNORMAL

## 2020-08-30 PROCEDURE — 83690 ASSAY OF LIPASE: CPT

## 2020-08-30 PROCEDURE — 87088 URINE BACTERIA CULTURE: CPT

## 2020-08-30 PROCEDURE — 80053 COMPREHEN METABOLIC PANEL: CPT

## 2020-08-30 PROCEDURE — 87186 SC STD MICRODIL/AGAR DIL: CPT

## 2020-08-30 PROCEDURE — 87086 URINE CULTURE/COLONY COUNT: CPT

## 2020-08-30 PROCEDURE — 94761 N-INVAS EAR/PLS OXIMETRY MLT: CPT

## 2020-08-30 PROCEDURE — 81001 URINALYSIS AUTO W/SCOPE: CPT

## 2020-08-30 PROCEDURE — 36415 COLL VENOUS BLD VENIPUNCTURE: CPT

## 2020-08-30 PROCEDURE — 85025 COMPLETE CBC W/AUTO DIFF WBC: CPT

## 2020-08-30 PROCEDURE — 99284 EMERGENCY DEPT VISIT MOD MDM: CPT

## 2020-08-30 PROCEDURE — 94640 AIRWAY INHALATION TREATMENT: CPT

## 2020-08-30 PROCEDURE — 6360000002 HC RX W HCPCS: Performed by: EMERGENCY MEDICINE

## 2020-08-30 PROCEDURE — 71045 X-RAY EXAM CHEST 1 VIEW: CPT

## 2020-08-30 PROCEDURE — 6370000000 HC RX 637 (ALT 250 FOR IP): Performed by: EMERGENCY MEDICINE

## 2020-08-30 PROCEDURE — 2580000003 HC RX 258: Performed by: EMERGENCY MEDICINE

## 2020-08-30 PROCEDURE — 74176 CT ABD & PELVIS W/O CONTRAST: CPT

## 2020-08-30 PROCEDURE — 96365 THER/PROPH/DIAG IV INF INIT: CPT

## 2020-08-30 PROCEDURE — 96375 TX/PRO/DX INJ NEW DRUG ADDON: CPT

## 2020-08-30 RX ORDER — PREDNISONE 10 MG/1
40 TABLET ORAL 2 TIMES DAILY
Qty: 40 TABLET | Refills: 0 | Status: SHIPPED | OUTPATIENT
Start: 2020-08-30 | End: 2020-09-04

## 2020-08-30 RX ORDER — CEPHALEXIN 500 MG/1
500 CAPSULE ORAL 2 TIMES DAILY
Qty: 20 CAPSULE | Refills: 0 | Status: SHIPPED | OUTPATIENT
Start: 2020-08-30 | End: 2020-09-09

## 2020-08-30 RX ORDER — ONDANSETRON 2 MG/ML
4 INJECTION INTRAMUSCULAR; INTRAVENOUS ONCE
Status: COMPLETED | OUTPATIENT
Start: 2020-08-30 | End: 2020-08-30

## 2020-08-30 RX ORDER — METHYLPREDNISOLONE SODIUM SUCCINATE 125 MG/2ML
125 INJECTION, POWDER, LYOPHILIZED, FOR SOLUTION INTRAMUSCULAR; INTRAVENOUS ONCE
Status: COMPLETED | OUTPATIENT
Start: 2020-08-30 | End: 2020-08-30

## 2020-08-30 RX ORDER — IPRATROPIUM BROMIDE AND ALBUTEROL SULFATE 2.5; .5 MG/3ML; MG/3ML
1 SOLUTION RESPIRATORY (INHALATION) ONCE
Status: COMPLETED | OUTPATIENT
Start: 2020-08-30 | End: 2020-08-30

## 2020-08-30 RX ORDER — MORPHINE SULFATE 4 MG/ML
4 INJECTION, SOLUTION INTRAMUSCULAR; INTRAVENOUS ONCE
Status: COMPLETED | OUTPATIENT
Start: 2020-08-30 | End: 2020-08-30

## 2020-08-30 RX ORDER — SODIUM CHLORIDE 9 MG/ML
1000 INJECTION, SOLUTION INTRAVENOUS CONTINUOUS
Status: DISCONTINUED | OUTPATIENT
Start: 2020-08-30 | End: 2020-08-30 | Stop reason: HOSPADM

## 2020-08-30 RX ADMIN — METHYLPREDNISOLONE SODIUM SUCCINATE 125 MG: 125 INJECTION, POWDER, FOR SOLUTION INTRAMUSCULAR; INTRAVENOUS at 19:14

## 2020-08-30 RX ADMIN — ONDANSETRON 4 MG: 2 INJECTION INTRAMUSCULAR; INTRAVENOUS at 16:15

## 2020-08-30 RX ADMIN — IPRATROPIUM BROMIDE AND ALBUTEROL SULFATE 1 AMPULE: .5; 3 SOLUTION RESPIRATORY (INHALATION) at 18:26

## 2020-08-30 RX ADMIN — SODIUM CHLORIDE 1000 ML: 9 INJECTION, SOLUTION INTRAVENOUS at 16:15

## 2020-08-30 RX ADMIN — CEFTRIAXONE SODIUM 1 G: 1 INJECTION, POWDER, FOR SOLUTION INTRAMUSCULAR; INTRAVENOUS at 17:56

## 2020-08-30 RX ADMIN — MORPHINE SULFATE 4 MG: 4 INJECTION, SOLUTION INTRAMUSCULAR; INTRAVENOUS at 16:15

## 2020-08-30 ASSESSMENT — PAIN SCALES - GENERAL
PAINLEVEL_OUTOF10: 7
PAINLEVEL_OUTOF10: 4
PAINLEVEL_OUTOF10: 7

## 2020-08-30 NOTE — PROGRESS NOTES
[facility]  [facility] INVASIVE BARIATRIC SURG  [address] [address]  Dept: [phone]    ROBOTIC & MINIMALLY INVASIVE SURGERY  PROGRESS NOTE INITIAL EVALUATION     Patient: Cm Ortiz        Service Date: 8/21/2020      HPI:     Chief Complaint   Patient presents with    Consultation    Referral - General     ina        The patient is a pleasant 61y.o. year old female  with a ventral hernia, who stands Height: 5' 5\" (165.1 cm) tall with a weight of Weight: 131 lb (59.4 kg) , resulting in a BMI of Body mass index is 21.8 kg/m². She believes the hernia has been present for about 10 years. It has become more tender over the last 3 months. She denies diarrhea, constipation or change in stools. She does smoke 1/2 PPD and has COPD. The hernia is tender in the epigastric region and getting harder to reduce. She states it has also enlarged. She would like the hernia repaired. She also has a history of tachycardia and follows with cardiology. She presents to discuss options for her hernia. The patient denies  a history of pulmonary embolism, renal failure, hepatic failure and stroke.     Medical History:  Past Medical History:   Diagnosis Date    Acid reflux     Anxiety     Arthritis     Cancer ([address]     cervical    COPD (chronic obstructive pulmonary disease) ([facility] [address] 02/23/2018    DDD (degenerative disc disease), cervical     Depression     Hiatal hernia     Spinal stenosis     Tachycardia     Thyroid disease     hypo       Surgical History:  Past Surgical History:   Procedure Laterality Date    BACK SURGERY      diskectomy, L3-L5    COLONOSCOPY N/A 4/8/2019    COLONOSCOPY DIAGNOSTIC performed by Tonie Prescott MD at [address] [place] [address]  11/13/2019    COLONOSCOPY POLYPECTOMY SNARE/COLD BIOPSY performed by Tonie Prescott MD at [address] [place] [address]      ectopic pregnancy with tube removal  THYROIDECTOMY, PARTIAL      UPPER GASTROINTESTINAL ENDOSCOPY         Family History:  No family history on file. Social History:   Social History     Tobacco Use    Smoking status: Heavy Tobacco Smoker     Packs/day: 0.50     Years: 48.00     Pack years: 24.00     Types: Cigarettes    Smokeless tobacco: Never Used    Tobacco comment: previous 3 ppd smoker   Substance Use Topics    Alcohol use: Yes     Comment: occassional    Drug use: Never       Current Med List:  No current facility-administered medications for this visit. Current Outpatient Medications   Medication Sig Dispense Refill    omeprazole (PRILOSEC) 40 MG delayed release capsule Take 1 capsule by mouth 2 times daily 180 capsule 3    famotidine (PEPCID) 20 MG tablet Take 1 tablet by mouth 2 times daily 60 tablet 3    Tiotropium Bromide-Olodaterol (STIOLTO RESPIMAT) 2.5-2.5 MCG/ACT AERS Inhale 2 puffs into the lungs      fluvoxaMINE (LUVOX) 100 MG tablet Take 100 mg by mouth nightly      carvedilol (COREG) 3.125 MG tablet Take 3.125 mg by mouth 2 times daily (with meals)      levothyroxine (SYNTHROID) 88 MCG tablet Take 88 mcg by mouth Daily      clonazePAM (KLONOPIN) 0.5 MG tablet Take 0.5 mg by mouth 3 times daily.       albuterol sulfate  (90 Base) MCG/ACT inhaler Inhale 2 puffs into the lungs every 6 hours as needed for Wheezing      albuterol (ACCUNEB) 0.63 MG/3ML nebulizer solution Take 1 ampule by nebulization every 6 hours as needed for Wheezing      mirtazapine (REMERON) 15 MG tablet Take 15 mg by mouth nightly       Facility-Administered Medications Ordered in Other Visits   Medication Dose Route Frequency Provider Last Rate Last Dose    0.9 % sodium chloride infusion  1,000 mL Intravenous Continuous Chai Roberson,         ondansetron (ZOFRAN) injection 4 mg  4 mg Intravenous Once [employer]do, DO        morphine sulfate (PF) injection 4 mg  4 mg Intravenous Once [employer]do, DO            No Known Allergies    SOCIAL:      This patient is alone for the evaluation today. [] HIV Risk Factors (i.e.) intravenous drug abuser; at risk sexual behavior; received blood products    [] TB Risk Factors (i.e.) Medically underserved, institutional care, foreign born, endemic area; exposure to active case    [] Hepatitis B&C Risk Factors (i.e.) Received blood transfusion prior to 1992; recreational drug use; high risk sexual behaviors; tattoos or body piercings; contact with blood or needle sticks in the workplace    Comprehension    Ability to grasp concepts and respond to questions:   [x] High   [] Medium   [] Low    Motivation    [x] Asks Questions; eager to learn   [] Needs education   [] Extreme anxiety    [] uncooperative   [] Denies need for education    English Speaking Ability    [x] Speaks English well   [x] Reads English well   [x] Understands spoken Bassam Nancy    [] Understands written English   [] No need for interpretive support      [] Might benefit from interpretive support   []  required for all services     REVIEW OF SYSTEMS: (Negative unless marked otherwise)   Do you or have you had any of the following?   Cardiovascular YES NO Respiratory YES NO   High Blood Pressure   []   [] COPD   [x]   []   Heart Attack   []   [] TB/Positive skin Test   []   []   Congestive Heart Failure   []   [] Obstructive Sleep Apnea   []   []   Coronary Artery Disease   []   [] Asthma   []   []   Circulation Problems   [x]   []      Activity Intolerance   []   [] Gastrointestinal YES NO   Peripheral Vascular Disease   [x]   [] Gastric Problems   [x]   []        Colorectal problems   []   []   Hematological YES NO Ulcer disease   [x]   []   Bleeding Tendencies   []   [] Liver disease   []   []   Blood Transfusion last 30d   []   [] Gallstones   []   []   Anemia   []   [] Refulx or Heartburn   [x]   []   Blood Clots   []   []      High Cholesterol   []   [] Muscoloskeletal YES NO   High Triglycerides   []   [] Joint Limitations   []   []      Muscle Weakness   []   []   Eyes, Ears, Nose, Throat YES NO Multiple Sclerosis   []   []   Cataracts   []   [] Arthritis   [x]   []   Glasses   [x]   []      Blurred Vision   []   [] Cancer   []   []   Hearing Aids   []   [] Type:     Ringing in Ears   []   []      Difficulty Swallowing   []   [] Encodrine YES NO      Diabetes   []   []   Neurological YES NO Thyroid   [x]   []   Stroke   []   []      Seizure   []   [] Psychiatric Disorder YES NO   Dizziness/Blackouts/Fainting   []   [] Depression   [x]   []   Memory Impairement   []   [] Bipolar   []   []   Parkinson's   []   [] Anxiety disorder   [x]   []           Genitourinary/Gyn YES NO Skin Intact   [x]   []   Urinary Infection   []   []      Stones   []   []      Kidney Disease   []   []      Incontinent   []   []      Irregular menstrual cycles   []   []      Possibly Pregnant? []     []      Date of LMP:               PRESENT ILLNESS:     Weight Parameters  Weight 131 lb (59.4 kg)   Height 5' 5\" (1.651 m)   BMI Body mass index is 21.8 kg/m². IBW     EBW               FALLS ASSESSMENT    [x] LOW RISK FOR FALLS    [] MODERATE RISK FOR FALLS    [] Difficulty walking/selfcare    [] Falls in the past 2 months    [] Suspicion of Clinician    [] Other:      SMOKING CESSATION     [] Not needed     [x] Instructed to stop smoking    [] Pamphlet community resources given     VTE SCREEN    [] Family hx DVT/PE  /   [] Personal hx of DVT/PE    [x] Denies any family or personal hx of DVT/PE    Physician Review    [x] Past medical, family, & social history reviewed and discussed with patient.      Review of surgery and post-surgical changes (by surgeon for surgical patients only)    [x] Lifelong diet expectations reviewed with patient    [x] Need for lifelong vitamin supplementation reviewed with patient    PHYSICAL EXAMINATION:      Temp 97.2 °F (36.2 °C) (Temporal)   Ht 5' 5\" (1.651 m)   Wt 131 lb (59.4 kg)   BMI 21.80 kg/m² Constitutional:  Vital signs are normal. The patient appears well-developed   HEENT:      Head: Normocephalic. Atraumatic     Eyes: pupils are equal and reactive. No scleral icterus is present. Neck: No mass and no thyromegaly present. Cardiovascular: Normal rate, regular rhythm, S1 normal and S2 normal.  Bilateral pulses present. Pulmonary/Chest: Effort normal and breath sounds normal. No retractions. Abdominal: Soft. Normal appearance. There is no organomegaly. No tenderness. There is no rigidity, no rebound, no guarding and no Obrien's sign. THERE IS A EPIGASTRIC HERNIA AND UMBILICAL HERNIA, BOTH REDUCE, EPIGASTRIC HERNIA IS TENDER  Musculoskeletal:      Right lower leg: Normal. No tenderness and no edema. Left lower leg: Normal. No tenderness and no edema. Lymphadenopathy:     No cervical adenopathy, No Exrtemity Adenopathy. Neurological: The patient is alert and oriented. Moving all four extremities equally, sensation grossly intact bilateral.  Skin: Skin is warm, dry and intact. Psychiatric: The patient has a normal mood and affect. Speech is normal and behavior is normal. Judgment and thought content normal. Cognition and memory are normal.     RECOMMENDATIONS:     We spent a great deal of time discussing the risks and benefits of robotic/laparoscopic ventral hernia repair, possible open, including but not limited to injury to intra-abdominal organs, breakdown of the hernia repair or recurrence, mesh infection or complication, the need for re-operative therapy, prolonged hospitalization,  mechanical ventilation,  and death. We discussed the possibility of bleeding, the need for blood transfusions, blood clots, hospital-acquired and intra-abdominal infection, and worsening pain or neuralgia. We discussed the need for post-operative visit compliance, behavior modifications and diet changes. PLAN:       Diagnosis Orders   1. Ventral hernia without obstruction or gangrene     2.

## 2020-08-30 NOTE — ED NOTES
Pt. desatting oxygen applied at 3lpm nasal cannula applied by respiratory therapy.      Osmani Cage RN  08/30/20 [id number]

## 2020-08-30 NOTE — ED PROVIDER NOTES
EMERGENCY DEPARTMENT ENCOUNTER    Pt Name: Tennille Delgado  MRN: [id number]  [place] 1960  Date of evaluation: 8/30/20  CHIEF COMPLAINT       Chief Complaint   Patient presents with    Back Pain    Abdominal Pain    Hematuria     Painful urination    Fatigue     HISTORY OF PRESENT ILLNESS   54-year-old female presents with complaint of right flank pain. Patient states he has been having on and off right flank pain for the last 2 weeks and pain is gotten much worse in the last week. Patient states that today she developed worsening pain as well as she noticed some blood in her urine and she had painful urination today. Patient admits nausea and episodes of vomiting, denies fever or chills at home. Denies chest pain, or other abdominal pain. Patient admits mild SOB, states history of COPD and wears 2L at home, has been feeling \"a little more SOB at home. \" Has not needed to increase home O2    The history is provided by the patient. REVIEW OF SYSTEMS     Review of Systems   Constitutional: Negative for fever. HENT: Negative for congestion and ear pain. Eyes: Negative for pain. Respiratory: Positive for shortness of breath. Cardiovascular: Negative for chest pain, palpitations and leg swelling. Gastrointestinal: Negative for abdominal pain. Genitourinary: Positive for dysuria and flank pain. Musculoskeletal: Negative for back pain. Skin: Negative for color change. Neurological: Negative for numbness and headaches. Psychiatric/Behavioral: Negative for confusion. All other systems reviewed and are negative.     PASTMEDICAL HISTORY     Past Medical History:   Diagnosis Date    Acid reflux     Anxiety     Arthritis     Cancer ([facility] [address]     cervical    COPD (chronic obstructive pulmonary disease) (HCC) 02/23/2018    DDD (degenerative disc disease), cervical     Depression     Hiatal hernia     Spinal stenosis     Tachycardia     Thyroid disease     hypo     Past Problem List  Patient Active Problem List   Diagnosis Code    Abdominal bloating R14.0    Pain of upper abdomen R10.10    Diarrhea R19.7     SURGICAL HISTORY       Past Surgical History:   Procedure Laterality Date    BACK SURGERY      diskectomy, L3-L5    COLONOSCOPY N/A 4/8/2019    COLONOSCOPY DIAGNOSTIC performed by Elvi Griffiths MD at [address] [place] [address]  11/13/2019    COLONOSCOPY POLYPECTOMY SNARE/COLD BIOPSY performed by Elvi Griffiths MD at [address] [place] [address]      ectopic pregnancy with tube removal    THYROIDECTOMY, PARTIAL      UPPER GASTROINTESTINAL ENDOSCOPY       CURRENT MEDICATIONS       Discharge Medication List as of 8/30/2020  8:02 PM      CONTINUE these medications which have NOT CHANGED    Details   omeprazole (PRILOSEC) 40 MG delayed release capsule Take 1 capsule by mouth 2 times daily, Disp-180 capsule,R-3Normal      famotidine (PEPCID) 20 MG tablet Take 1 tablet by mouth 2 times daily, Disp-60 tablet,R-3Normal      Tiotropium Bromide-Olodaterol (STIOLTO RESPIMAT) 2.5-2.5 MCG/ACT AERS Inhale 2 puffs into the lungsHistorical Med      fluvoxaMINE (LUVOX) 100 MG tablet Take 100 mg by mouth nightlyHistorical Med      carvedilol (COREG) 3.125 MG tablet Take 3.125 mg by mouth 2 times daily (with meals)Historical Med      levothyroxine (SYNTHROID) 88 MCG tablet Take 88 mcg by mouth DailyHistorical Med      clonazePAM (KLONOPIN) 0.5 MG tablet Take 0.5 mg by mouth 3 times daily. Historical Med      albuterol sulfate  (90 Base) MCG/ACT inhaler Inhale 2 puffs into the lungs every 6 hours as needed for WheezingHistorical Med      albuterol (ACCUNEB) 0.63 MG/3ML nebulizer solution Take 1 ampule by nebulization every 6 hours as needed for WheezingHistorical Med      mirtazapine (REMERON) 15 MG tablet Take 15 mg by mouth nightlyHistorical Med           ALLERGIES     has No Known Allergies.   FAMILY HISTORY     has no family status presents with complaint of right flank pain and painful urination will obtain UA as well as basic blood work to assess for infection. Labs reviewed significant for for large LE and numerous WBC on micro and few bacteria. With patient having flank pain and dysuria will treat for pyelonephritis. Patient will receive dose of rocephin and will be given keflex for outpatient treatment. During ED stay patient complaining of SOB and given breathing treatment which improved symptoms, patient not requiring more than home O2. Patient will also be given solumedrol and prednisone for home for AECOPD. Patient feels comfortable going home, patient given strict return precautions including worsening of pain, development of fever, or worsening of SOB. Patient/Guardian was informed of their diagnosis and told to follow up with PCP in 1-3 days. Patient demonstrates understanding and agreement with the plan. They were given the opportunity to ask questions and those questions were answered to the best of our ability with the available information. Patient/Guardian told to return to the ED for any new, worsening, changing or persistent symptoms. This dictation was prepared using [employer] voice recognition software. As a result, errors may have occurred. When identified, these errors have been corrected. While every attempt is made to correct errors in dictation, errors may still exist.          CRITICAL CARE:       PROCEDURES:    Procedures    DIAGNOSTIC RESULTS   EKG:All EKG's are interpreted by the Emergency Department Physician who either signs or Co-signs this chart in the absence of a cardiologist.        RADIOLOGY:All plain film, CT, MRI, and formal ultrasound images (except ED bedside ultrasound) are read by the radiologist, see reports below, unless otherwisenoted in MDM or here. XR CHEST PORTABLE   Final Result   Stable chest x-ray with evidence of apical predominant emphysema. No acute   disease. CT ABDOMEN PELVIS WO CONTRAST Additional Contrast? None   Final Result   1. No acute abnormality identified. No renal calculi. 2.  Unchanged chronic and benign findings, as above. LABS: All lab results were reviewed by myself, and all abnormals are listed below.   Labs Reviewed   URINE RT REFLEX TO CULTURE - Abnormal; Notable for the following components:       Result Value    Turbidity UA CLOUDY (*)     Urine Hgb LARGE (*)     Leukocyte Esterase, Urine LARGE (*)     All other components within normal limits   CBC WITH AUTO DIFFERENTIAL - Abnormal; Notable for the following components:    Seg Neutrophils 68 (*)     Lymphocytes 22 (*)     Monocytes 9 (*)     All other components within normal limits   MICROSCOPIC URINALYSIS - Abnormal; Notable for the following components:    Bacteria, UA FEW (*)     Amorphous, UA 1+ (*)     All other components within normal limits   CULTURE, URINE   COMPREHENSIVE METABOLIC PANEL W/ REFLEX TO MG FOR LOW K   LIPASE       EMERGENCY DEPARTMENTCOURSE:         Vitals:    Vitals:    08/30/20 1845 08/30/20 1915 08/30/20 1930 08/30/20 2000   BP:    95/75   Pulse: 82 83 75 78   Resp: 15 24 21 20   Temp:    98 °F (36.7 °C)   TempSrc:       SpO2: 95% 98% 98% 96%   Weight:       Height:           The patient was given the following medications while in the emergency department:  Orders Placed This Encounter   Medications    DISCONTD: 0.9 % sodium chloride infusion    ondansetron (ZOFRAN) injection 4 mg    morphine sulfate (PF) injection 4 mg    cefTRIAXone (ROCEPHIN) 1 g IVPB in 50 mL D5W minibag    ipratropium-albuterol (DUONEB) nebulizer solution 1 ampule    methylPREDNISolone sodium (SOLU-MEDROL) injection 125 mg    cephALEXin (KEFLEX) 500 MG capsule     Sig: Take 1 capsule by mouth 2 times daily for 10 days     Dispense:  20 capsule     Refill:  0    predniSONE (DELTASONE) 10 MG tablet     Sig: Take 4 tablets by mouth 2 times daily for 5 days     Dispense:  40 tablet     Refill:  0     CONSULTS:  None    FINAL IMPRESSION      1. Acute pyelonephritis    2.  Chronic obstructive pulmonary disease with acute exacerbation [facility])          DISPOSITION/PLAN   DISPOSITION Decision To Discharge 08/30/2020 07:59:15 PM      PATIENT REFERRED TO:  Loan PHAM Son  [facility] [address]  [phone]    Schedule an appointment as soon as possible for a visit       [facility] ED  [facility] [address]  [address] [address]  [phone]    As needed, If symptoms worsen    DISCHARGE MEDICATIONS:  Discharge Medication List as of 8/30/2020  8:02 PM      START taking these medications    Details   cephALEXin (KEFLEX) 500 MG capsule Take 1 capsule by mouth 2 times daily for 10 days, Disp-20 capsule,R-0Print      predniSONE (DELTASONE) 10 MG tablet Take 4 tablets by mouth 2 times daily for 5 days, Disp-40 tablet,R-0Print           Delos Snellen, DO  Attending Emergency Physician                  Delos Snellen, DO  08/31/20 9664

## 2020-08-31 ASSESSMENT — ENCOUNTER SYMPTOMS
EYE PAIN: 0
ABDOMINAL PAIN: 0
COLOR CHANGE: 0
BACK PAIN: 0
SHORTNESS OF BREATH: 1

## 2020-09-01 LAB
CULTURE: ABNORMAL
Lab: ABNORMAL
SPECIMEN DESCRIPTION: ABNORMAL

## 2020-09-02 NOTE — TELEPHONE ENCOUNTER
Patient will need robotic Vental hernia repair with mesh, possible open, PAT, outpatient, 1.5hrs.  needs cardiac & pulmonary clearance prior to scheduling

## 2020-09-30 ENCOUNTER — TELEPHONE (OUTPATIENT)
Dept: ONCOLOGY | Age: 60
End: 2020-09-30

## 2020-09-30 NOTE — LETTER
9/30/2020        [facility]    Dear Conor Butt: Your healthcare provider has ordered a low dose CT scan of the chest for lung cancer screening. You will find enclosed, information about CT lung screening. Please review the statement of understanding, you will be asked to sign a copy of this at the time of your CT scan    If you have not already been contacted to make the appointment for your scan, please call our scheduling department at [phone]    Keep in mind that CT lung screening does not take the place of smoking cessation. If you are a current smoker, you will find enclosed smoking cessation resources. Please do not hesitate to contact me if you have any questions or concerns.     [address] [place] [address],      [facility] Screening [facility]  [phone]

## 2020-10-05 ENCOUNTER — HOSPITAL ENCOUNTER (OUTPATIENT)
Dept: CT IMAGING | Age: 60
Discharge: HOME | End: 2020-10-07
Payer: COMMERCIAL

## 2020-10-05 PROCEDURE — G0297 LDCT FOR LUNG CA SCREEN: HCPCS

## 2020-11-16 ENCOUNTER — HOSPITAL ENCOUNTER (INPATIENT)
Age: 60
LOS: 14 days | Discharge: HOME | DRG: 885 | End: 2020-11-30
Attending: EMERGENCY MEDICINE | Admitting: PSYCHIATRY & NEUROLOGY
Payer: COMMERCIAL

## 2020-11-16 PROBLEM — R45.851 DEPRESSION WITH SUICIDAL IDEATION: Status: ACTIVE | Noted: 2020-11-16

## 2020-11-16 PROBLEM — F32.A DEPRESSION WITH SUICIDAL IDEATION: Status: ACTIVE | Noted: 2020-11-16

## 2020-11-16 LAB
ETHANOL PERCENT: 0.14 %
ETHANOL: 145 MG/DL
SARS-COV-2, RAPID: NOT DETECTED
SARS-COV-2: NORMAL
SARS-COV-2: NORMAL
SOURCE: NORMAL

## 2020-11-16 PROCEDURE — 99285 EMERGENCY DEPT VISIT HI MDM: CPT

## 2020-11-16 PROCEDURE — 36415 COLL VENOUS BLD VENIPUNCTURE: CPT

## 2020-11-16 PROCEDURE — 6370000000 HC RX 637 (ALT 250 FOR IP): Performed by: PSYCHIATRY & NEUROLOGY

## 2020-11-16 PROCEDURE — U0002 COVID-19 LAB TEST NON-CDC: HCPCS

## 2020-11-16 PROCEDURE — G0480 DRUG TEST DEF 1-7 CLASSES: HCPCS

## 2020-11-16 PROCEDURE — 1240000000 HC EMOTIONAL WELLNESS R&B

## 2020-11-16 RX ORDER — LEVOTHYROXINE SODIUM 88 UG/1
88 TABLET ORAL DAILY
Status: DISCONTINUED | OUTPATIENT
Start: 2020-11-17 | End: 2020-11-30 | Stop reason: HOSPADM

## 2020-11-16 RX ORDER — ALBUTEROL SULFATE 90 UG/1
2 AEROSOL, METERED RESPIRATORY (INHALATION) EVERY 6 HOURS PRN
Status: DISCONTINUED | OUTPATIENT
Start: 2020-11-16 | End: 2020-11-30 | Stop reason: HOSPADM

## 2020-11-16 RX ORDER — CLONAZEPAM 0.5 MG/1
0.5 TABLET ORAL 4 TIMES DAILY
Status: DISCONTINUED | OUTPATIENT
Start: 2020-11-16 | End: 2020-11-17

## 2020-11-16 RX ORDER — PREDNISONE 10 MG/1
1 TABLET ORAL DAILY
COMMUNITY
Start: 2020-09-28 | End: 2020-12-09

## 2020-11-16 RX ORDER — FLUTICASONE PROPIONATE 110 UG/1
1 AEROSOL, METERED RESPIRATORY (INHALATION) 2 TIMES DAILY
Status: DISCONTINUED | OUTPATIENT
Start: 2020-11-16 | End: 2020-11-30 | Stop reason: HOSPADM

## 2020-11-16 RX ORDER — LEVOTHYROXINE SODIUM 88 UG/1
1 CAPSULE ORAL DAILY
COMMUNITY
Start: 2020-08-24 | End: 2020-11-16

## 2020-11-16 RX ORDER — FLUTICASONE PROPIONATE 110 UG/1
1 AEROSOL, METERED RESPIRATORY (INHALATION) 2 TIMES DAILY
COMMUNITY
End: 2022-02-21 | Stop reason: SDUPTHER

## 2020-11-16 RX ORDER — FLUVOXAMINE MALEATE 50 MG/1
100 TABLET, COATED ORAL NIGHTLY
Status: DISCONTINUED | OUTPATIENT
Start: 2020-11-16 | End: 2020-11-18

## 2020-11-16 RX ORDER — MAGNESIUM HYDROXIDE/ALUMINUM HYDROXICE/SIMETHICONE 120; 1200; 1200 MG/30ML; MG/30ML; MG/30ML
30 SUSPENSION ORAL EVERY 6 HOURS PRN
Status: DISCONTINUED | OUTPATIENT
Start: 2020-11-16 | End: 2020-11-30 | Stop reason: HOSPADM

## 2020-11-16 RX ORDER — PREDNISONE 10 MG/1
10 TABLET ORAL DAILY
Status: DISCONTINUED | OUTPATIENT
Start: 2020-11-16 | End: 2020-11-30 | Stop reason: HOSPADM

## 2020-11-16 RX ORDER — MIRTAZAPINE 30 MG/1
30 TABLET, FILM COATED ORAL NIGHTLY
Status: DISCONTINUED | OUTPATIENT
Start: 2020-11-16 | End: 2020-11-24

## 2020-11-16 RX ORDER — CARVEDILOL 3.12 MG/1
3.12 TABLET ORAL 2 TIMES DAILY WITH MEALS
Status: DISCONTINUED | OUTPATIENT
Start: 2020-11-16 | End: 2020-11-30 | Stop reason: HOSPADM

## 2020-11-16 RX ORDER — POLYETHYLENE GLYCOL 3350 17 G/17G
17 POWDER, FOR SOLUTION ORAL DAILY PRN
Status: DISCONTINUED | OUTPATIENT
Start: 2020-11-16 | End: 2020-11-30 | Stop reason: HOSPADM

## 2020-11-16 RX ORDER — ASCORBIC ACID 500 MG
500 TABLET ORAL DAILY
Status: DISCONTINUED | OUTPATIENT
Start: 2020-11-16 | End: 2020-11-30 | Stop reason: HOSPADM

## 2020-11-16 RX ORDER — TRAZODONE HYDROCHLORIDE 50 MG/1
50 TABLET ORAL NIGHTLY PRN
Status: DISCONTINUED | OUTPATIENT
Start: 2020-11-16 | End: 2020-11-30 | Stop reason: HOSPADM

## 2020-11-16 RX ORDER — OYSTER SHELL CALCIUM WITH VITAMIN D 500; 200 MG/1; [IU]/1
1 TABLET, FILM COATED ORAL DAILY
Status: ON HOLD | COMMUNITY
End: 2020-11-30 | Stop reason: HOSPADM

## 2020-11-16 RX ORDER — ACETAMINOPHEN 325 MG/1
650 TABLET ORAL EVERY 4 HOURS PRN
Status: DISCONTINUED | OUTPATIENT
Start: 2020-11-16 | End: 2020-11-30 | Stop reason: HOSPADM

## 2020-11-16 RX ORDER — MIRTAZAPINE 30 MG/1
30 TABLET, FILM COATED ORAL NIGHTLY
Status: ON HOLD | COMMUNITY
Start: 2020-10-30 | End: 2020-11-30 | Stop reason: HOSPADM

## 2020-11-16 RX ORDER — ALBUTEROL SULFATE 2.5 MG/3ML
2.5 SOLUTION RESPIRATORY (INHALATION) EVERY 6 HOURS PRN
Status: DISCONTINUED | OUTPATIENT
Start: 2020-11-16 | End: 2020-11-30 | Stop reason: HOSPADM

## 2020-11-16 RX ORDER — HYDROXYZINE 50 MG/1
50 TABLET, FILM COATED ORAL 3 TIMES DAILY PRN
Status: DISCONTINUED | OUTPATIENT
Start: 2020-11-16 | End: 2020-11-30 | Stop reason: HOSPADM

## 2020-11-16 RX ORDER — IBUPROFEN 600 MG/1
600 TABLET ORAL EVERY 6 HOURS PRN
Status: DISCONTINUED | OUTPATIENT
Start: 2020-11-16 | End: 2020-11-30 | Stop reason: HOSPADM

## 2020-11-16 RX ORDER — FLUTICASONE FUROATE, UMECLIDINIUM BROMIDE AND VILANTEROL TRIFENATATE 100; 62.5; 25 UG/1; UG/1; UG/1
POWDER RESPIRATORY (INHALATION)
COMMUNITY
Start: 2020-09-01 | End: 2020-11-16

## 2020-11-16 RX ORDER — ASCORBIC ACID 500 MG
500 TABLET ORAL DAILY
Status: ON HOLD | COMMUNITY
End: 2020-11-30 | Stop reason: SDUPTHER

## 2020-11-16 RX ORDER — PANTOPRAZOLE SODIUM 40 MG/1
40 TABLET, DELAYED RELEASE ORAL
Status: DISCONTINUED | OUTPATIENT
Start: 2020-11-17 | End: 2020-11-22

## 2020-11-16 RX ADMIN — Medication 1 PUFF: at 21:28

## 2020-11-16 RX ADMIN — OXYCODONE HYDROCHLORIDE AND ACETAMINOPHEN 500 MG: 500 TABLET ORAL at 22:42

## 2020-11-16 RX ADMIN — PREDNISONE 10 MG: 10 TABLET ORAL at 22:42

## 2020-11-16 RX ADMIN — FLUVOXAMINE MALEATE 100 MG: 50 TABLET, COATED ORAL at 22:42

## 2020-11-16 RX ADMIN — MIRTAZAPINE 30 MG: 30 TABLET, FILM COATED ORAL at 21:28

## 2020-11-16 RX ADMIN — CARVEDILOL 3.12 MG: 3.12 TABLET, FILM COATED ORAL at 22:41

## 2020-11-16 RX ADMIN — CLONAZEPAM 0.5 MG: 0.5 TABLET ORAL at 21:28

## 2020-11-16 ASSESSMENT — PAIN DESCRIPTION - PAIN TYPE: TYPE: CHRONIC PAIN

## 2020-11-16 ASSESSMENT — SLEEP AND FATIGUE QUESTIONNAIRES
DIFFICULTY STAYING ASLEEP: YES
DIFFICULTY FALLING ASLEEP: YES
DIFFICULTY ARISING: YES
DO YOU USE A SLEEP AID: NO
DO YOU HAVE DIFFICULTY SLEEPING: YES
RESTFUL SLEEP: NO
SLEEP PATTERN: DIFFICULTY FALLING ASLEEP;DISTURBED/INTERRUPTED SLEEP
AVERAGE NUMBER OF SLEEP HOURS: 4

## 2020-11-16 ASSESSMENT — PAIN DESCRIPTION - LOCATION: LOCATION: BACK

## 2020-11-16 ASSESSMENT — PATIENT HEALTH QUESTIONNAIRE - PHQ9: SUM OF ALL RESPONSES TO PHQ QUESTIONS 1-9: 11

## 2020-11-16 ASSESSMENT — LIFESTYLE VARIABLES: HISTORY_ALCOHOL_USE: YES

## 2020-11-16 ASSESSMENT — PAIN SCALES - GENERAL: PAINLEVEL_OUTOF10: 8

## 2020-11-16 NOTE — ED NOTES
Provisional Diagnosis:   Major Depressive Disorder    Psychosocial and Contextual Factors:   Patient report an increase in depression, anxiety and suicidal thoughts. Patient reports daily alcohol use for the last year and is intoxicated upon arrival.    C-SSRS Summary:      Patient: X  Family:   Agency:     Substance Abuse: Patient reports today she drank a bottle of wine and two beers. Patient reports daily alcohol use for the last year. Present Suicidal Behavior:  Patient reports current suicidal ideation. Patient states she has been attempting to    Verbal:     Attempt:    Past Suicidal Behavior: Patient reports past suicide attempt when she was 15years old. Verbal:    Attempt:      Self-Injurious/Self-Mutilation:Patient denies. Trauma Identified:  Patient reports       Protective Factors:    Patient has housing with her . Patient linked with outpatient mental health services. Risk Factors:    Patient reports poor coping skills. Clinical Summary:    Alvino Hernandez is a 61 y.o. female who presents to the ED for current suicidal ideation. Patient was intoxicated upon arrival and states she is typically drinks 2 bottles of wine a day for the last year. Patient states she feels like a burden on her family and states\"I just don't want to live anymore, everyone would be better off without me\"    Patient states her Pulmonologist told her that \"if I don't stop smoking that I will be dead in a year, and I just keep smoking. \" Patient states she keeps smoking with the intent of it killing her and states \"I am slowly committing suicide\". Patient states \"My mom had COPD and I don't want to suffer like my mom did. \"  Patient reports severe anxiety, and daily panic attacks. Patient states she has been taking more of her klonopin than prescribed due to her increase in anxiety. Patient also reports increase in depressive symptoms and states she feels hopeless and helpless.  Patient reports poor sleep and appetite. Patient reports death of her mother and mother in law over the last 3 years as well as issues with her father have lead to an increase in stress and depression. Patient states she has been diagnosed with obsessive compulsive disorder. Patient states she has been frequently having panic attacks, and hyperventilating when she \"knows I have to leave my house and states she is unsure why. Patient states she has been recently been afraid to take off her oxygen at home due to fear, even though she acknowledges that \"its probably irrational and psychosomatic. 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 Patient states she has a therapist, whom recommended she come to the ED for possible inpatient admission. Upon sobriety, patient reports continued suicidal ideation. Patient still sates she has been intentionally smoking cigarettes to kill herself. Patient states she has been fantasizing of going into the woods and shooting herself with a gun. Patient states she has access to guns. Patient states \"I just feel so overwhelmed with my panic attacks. \"    Level of Care Disposition:    Writer consulted with Dr. Jessica Chapa who recommends inpatient psychiatric admission for safety and stabilization. Patient is in agreement and signed application for voluntary admission.

## 2020-11-16 NOTE — PROGRESS NOTES
Medication History completed:    New medications: Flovent, vitamin C, calcium-vitamin D    Medications discontinued: Famotidine, Tirosint, Trelegy Ellipta inhaler    Changes to dosing:   Mirtazapine increased from 15 mg nightly to 30 mg nightly  Clonazepam increased from 3 times daily to 4 times daily    Stated allergies: NKDA    Other pertinent information: Confirmed medications with patient and [facility] pharmacy.      Thank you,  Chioma Israel, PharmD Candidate [id number]

## 2020-11-16 NOTE — ED PROVIDER NOTES
[address] ED  eMERGENCY dEPARTMENT eNCOUnter      Pt Name: Lele Gutierrez  MRN: [id number]  YOB: 1960  Date of evaluation: 11/16/20  PCP: Loan Cline    CHIEF COMPLAINT:   Chief Complaint   Patient presents with    Suicidal    Anxiety    Depression     HISTORY OF PRESENT ILLNESS    Lele Gutierrez is a 61 y.o. female who presents with a chief complaint of suicidal ideations. Patient states that she is feeling suicidal but cannot really tell me an exact plan. She states that she has been told that she needs to quit smoking or will shorten her life but she continues to do so. She states that she tried to start drinking to stop her smoking and now she is been drinking alcohol every day. Admits to drinking a bottle of wine and 2 beers before coming today. Denies any drug use. No chest pain, difficulty breathing or cough. No recent fevers, chills other illnesses. Symptoms are acute on chronic. Symptoms are moderate. Nothing make symptoms better or worse. Patient has no other complaints at this time. REVIEW OF SYSTEMS       Constitutional: Denies recent fever, chills. Neck: No neck pain. Respiratory: Denies recent shortness of breath. Cardiac:  Denies recent chest pain. GI: Denies any recent abdominal pain nausea or vomiting. : Denies dysuria. Musculoskeletal: Denies focal weakness. Neurologic: Denies headache or focal weakness. Skin:  Denies any rash. Psychiatric: Positive for suicidal thoughts    Negative in 10 essential Systems except as mentioned above and in the HPI. PAST MEDICAL HISTORY   PMH:  has a past medical history of Acid reflux, Anxiety, Arthritis, Cancer ([address] COPD (chronic obstructive pulmonary disease) ([address] DDD (degenerative disc disease), cervical, Depression, Hiatal hernia, Spinal stenosis, Tachycardia, and Thyroid disease.   Surgical History:  has a past surgical history that includes Upper gastrointestinal endoscopy; Thyroidectomy, partial; back surgery; other surgical history; Dilation and curettage of uterus; Colonoscopy (N/A, 4/8/2019); and Colonoscopy (11/13/2019). Social History:  reports that she has been smoking cigarettes. She has a 48.00 pack-year smoking history. She has never used smokeless tobacco. She reports current alcohol use. She reports that she does not use drugs. Family History: Noncontributory at this time  Psychiatric History: See PMH  Allergies:has No Known Allergies. PHYSICAL EXAM     INITIAL VITALS: BP: 107/74  Pulse: 85  Resp: 18  Temp: 98.3 °F (36.8 °C) SpO2: 94 %     Constitutional:  Well developed, no acute distress   Eyes:  Pupils equal and readily reactive to light  HENT:  Atraumatic, external ears normal, nose normal, oropharynx moist. Neck- supple    Respiratory:  Clear to auscultation bilaterally with good air exchange  Cardiovascular:  RRR with normal S1 and S2  GI:  Soft, nondistended and nontender   Musculoskeletal:  No edema, no tenderness, no deformities  Integument:  No rash  Neurologic:  Alert & oriented x 4, no focal deficits noted   Psychiatric: Depressed mood      EMERGENCY DEPARTMENT COURSE     59-year-old female presents with suicidal ideations without a clear plan but does acknowledge that she knows her smoking is killing her. She does seem to be intoxicated. She has mildly slurred speech however she is alert and oriented x4. With how much she has been drinking I am going to check an ethanol level. We will social work evaluate when patient is sober. She may need hospital admission. 4:21 PM EST  Patient is sober at this time. Per social work she is still suicidal.  She never communicated a plan to me however did state that she has fantasize about shooting herself per social work. She is medically clear for psychiatric evaluation and likely admission. FINAL IMPRESSION     1. Suicidal ideation    2.  Acute alcoholic intoxication without complication ([address]

## 2020-11-17 PROBLEM — F10.939 ALCOHOL WITHDRAWAL SYNDROME WITH COMPLICATION (HCC): Status: ACTIVE | Noted: 2020-11-17

## 2020-11-17 PROBLEM — F42.9 OBSESSIVE COMPULSIVE DISORDER: Status: ACTIVE | Noted: 2020-11-17

## 2020-11-17 PROBLEM — F33.2 MDD (MAJOR DEPRESSIVE DISORDER), RECURRENT SEVERE, WITHOUT PSYCHOSIS (HCC): Status: ACTIVE | Noted: 2020-11-17

## 2020-11-17 PROBLEM — F41.0 PANIC DISORDER: Status: ACTIVE | Noted: 2020-11-17

## 2020-11-17 PROCEDURE — 6370000000 HC RX 637 (ALT 250 FOR IP): Performed by: NURSE PRACTITIONER

## 2020-11-17 PROCEDURE — 94664 DEMO&/EVAL PT USE INHALER: CPT

## 2020-11-17 PROCEDURE — 6360000002 HC RX W HCPCS: Performed by: PSYCHIATRY & NEUROLOGY

## 2020-11-17 PROCEDURE — 94640 AIRWAY INHALATION TREATMENT: CPT

## 2020-11-17 PROCEDURE — 94761 N-INVAS EAR/PLS OXIMETRY MLT: CPT

## 2020-11-17 PROCEDURE — 2700000000 HC OXYGEN THERAPY PER DAY

## 2020-11-17 PROCEDURE — 6370000000 HC RX 637 (ALT 250 FOR IP): Performed by: PSYCHIATRY & NEUROLOGY

## 2020-11-17 PROCEDURE — 99223 1ST HOSP IP/OBS HIGH 75: CPT | Performed by: PSYCHIATRY & NEUROLOGY

## 2020-11-17 PROCEDURE — 1240000000 HC EMOTIONAL WELLNESS R&B

## 2020-11-17 RX ORDER — CHLORDIAZEPOXIDE HYDROCHLORIDE 25 MG/1
25 CAPSULE, GELATIN COATED ORAL 4 TIMES DAILY
Status: DISCONTINUED | OUTPATIENT
Start: 2020-11-17 | End: 2020-11-19

## 2020-11-17 RX ORDER — M-VIT,TX,IRON,MINS/CALC/FOLIC 27MG-0.4MG
1 TABLET ORAL DAILY
Status: DISCONTINUED | OUTPATIENT
Start: 2020-11-17 | End: 2020-11-30 | Stop reason: HOSPADM

## 2020-11-17 RX ORDER — LORAZEPAM 2 MG/ML
1 INJECTION INTRAMUSCULAR
Status: DISCONTINUED | OUTPATIENT
Start: 2020-11-17 | End: 2020-11-30 | Stop reason: HOSPADM

## 2020-11-17 RX ORDER — LORAZEPAM 1 MG/1
4 TABLET ORAL
Status: DISCONTINUED | OUTPATIENT
Start: 2020-11-17 | End: 2020-11-30 | Stop reason: HOSPADM

## 2020-11-17 RX ORDER — LORAZEPAM 1 MG/1
3 TABLET ORAL
Status: DISCONTINUED | OUTPATIENT
Start: 2020-11-17 | End: 2020-11-30 | Stop reason: HOSPADM

## 2020-11-17 RX ORDER — LORAZEPAM 1 MG/1
2 TABLET ORAL
Status: DISCONTINUED | OUTPATIENT
Start: 2020-11-17 | End: 2020-11-30 | Stop reason: HOSPADM

## 2020-11-17 RX ORDER — ONDANSETRON 4 MG/1
4 TABLET, FILM COATED ORAL 4 TIMES DAILY PRN
Status: DISCONTINUED | OUTPATIENT
Start: 2020-11-17 | End: 2020-11-30 | Stop reason: HOSPADM

## 2020-11-17 RX ORDER — LORAZEPAM 2 MG/ML
4 INJECTION INTRAMUSCULAR
Status: DISCONTINUED | OUTPATIENT
Start: 2020-11-17 | End: 2020-11-30 | Stop reason: HOSPADM

## 2020-11-17 RX ORDER — LORAZEPAM 2 MG/ML
2 INJECTION INTRAMUSCULAR
Status: DISCONTINUED | OUTPATIENT
Start: 2020-11-17 | End: 2020-11-30 | Stop reason: HOSPADM

## 2020-11-17 RX ORDER — LORAZEPAM 2 MG/ML
3 INJECTION INTRAMUSCULAR
Status: DISCONTINUED | OUTPATIENT
Start: 2020-11-17 | End: 2020-11-30 | Stop reason: HOSPADM

## 2020-11-17 RX ORDER — LORAZEPAM 1 MG/1
1 TABLET ORAL
Status: DISCONTINUED | OUTPATIENT
Start: 2020-11-17 | End: 2020-11-30 | Stop reason: HOSPADM

## 2020-11-17 RX ORDER — THIAMINE MONONITRATE (VIT B1) 100 MG
100 TABLET ORAL 3 TIMES DAILY
Status: DISCONTINUED | OUTPATIENT
Start: 2020-11-17 | End: 2020-11-30 | Stop reason: HOSPADM

## 2020-11-17 RX ADMIN — OXYCODONE HYDROCHLORIDE AND ACETAMINOPHEN 500 MG: 500 TABLET ORAL at 09:04

## 2020-11-17 RX ADMIN — ALBUTEROL SULFATE 2.5 MG: 2.5 SOLUTION RESPIRATORY (INHALATION) at 11:41

## 2020-11-17 RX ADMIN — LEVOTHYROXINE SODIUM 88 MCG: 0.09 TABLET ORAL at 09:04

## 2020-11-17 RX ADMIN — PREDNISONE 10 MG: 10 TABLET ORAL at 09:04

## 2020-11-17 RX ADMIN — Medication 2 PUFF: at 17:00

## 2020-11-17 RX ADMIN — CLONAZEPAM 0.5 MG: 0.5 TABLET ORAL at 17:01

## 2020-11-17 RX ADMIN — CHLORDIAZEPOXIDE HYDROCHLORIDE 25 MG: 25 CAPSULE ORAL at 22:00

## 2020-11-17 RX ADMIN — Medication 1 PUFF: at 22:00

## 2020-11-17 RX ADMIN — THIAMINE HCL TAB 100 MG 100 MG: 100 TAB at 22:01

## 2020-11-17 RX ADMIN — CALCIUM CARBONATE 600 MG (1,500 MG)-VITAMIN D3 400 UNIT TABLET 1 TABLET: at 09:05

## 2020-11-17 RX ADMIN — GLYCOPYRROLATE AND FORMOTEROL FUMARATE 2 PUFF: 9; 4.8 AEROSOL, METERED RESPIRATORY (INHALATION) at 22:00

## 2020-11-17 RX ADMIN — PANTOPRAZOLE SODIUM 40 MG: 40 TABLET, DELAYED RELEASE ORAL at 09:04

## 2020-11-17 RX ADMIN — MIRTAZAPINE 30 MG: 30 TABLET, FILM COATED ORAL at 22:01

## 2020-11-17 RX ADMIN — CLONAZEPAM 0.5 MG: 0.5 TABLET ORAL at 13:34

## 2020-11-17 RX ADMIN — CLONAZEPAM 0.5 MG: 0.5 TABLET ORAL at 09:04

## 2020-11-17 RX ADMIN — CARVEDILOL 3.12 MG: 3.12 TABLET, FILM COATED ORAL at 09:05

## 2020-11-17 RX ADMIN — ONDANSETRON HYDROCHLORIDE 4 MG: 4 TABLET, FILM COATED ORAL at 18:08

## 2020-11-17 RX ADMIN — GLYCOPYRROLATE AND FORMOTEROL FUMARATE 2 PUFF: 9; 4.8 AEROSOL, METERED RESPIRATORY (INHALATION) at 09:03

## 2020-11-17 RX ADMIN — Medication 1 PUFF: at 09:02

## 2020-11-17 RX ADMIN — MULTIPLE VITAMINS W/ MINERALS TAB 1 TABLET: TAB at 18:08

## 2020-11-17 RX ADMIN — FLUVOXAMINE MALEATE 100 MG: 50 TABLET, COATED ORAL at 22:01

## 2020-11-17 ASSESSMENT — SLEEP AND FATIGUE QUESTIONNAIRES
SLEEP PATTERN: DIFFICULTY ARISING
DO YOU HAVE DIFFICULTY SLEEPING: NO
DIFFICULTY STAYING ASLEEP: YES
DIFFICULTY ARISING: YES
DIFFICULTY FALLING ASLEEP: NO
AVERAGE NUMBER OF SLEEP HOURS: 6
DO YOU USE A SLEEP AID: NO
RESTFUL SLEEP: NO

## 2020-11-17 ASSESSMENT — PATIENT HEALTH QUESTIONNAIRE - PHQ9: SUM OF ALL RESPONSES TO PHQ QUESTIONS 1-9: 10

## 2020-11-17 ASSESSMENT — LIFESTYLE VARIABLES: HISTORY_ALCOHOL_USE: YES

## 2020-11-17 NOTE — GROUP NOTE
Group Therapy Note    Date: 11/17/2020    Group Start Time: 1100  Group End Time: 8323  Group Topic: Recreational    GEMA Jimenez        Group Therapy Note    Pt did not attend recreational group d/t resting in room despite staff invitation to attend. 1:1 talk time offered as alternative to group session, pt declined.

## 2020-11-17 NOTE — PLAN OF CARE
Problem: Altered Mood, Depressive Behavior:  Goal: Ability to disclose and discuss suicidal ideas will improve  Description: Ability to disclose and discuss suicidal ideas will improve  11/17/2020 1036 by Maddie Tipton LPN  Outcome: Ongoing   Patient denies thoughts of self harm, remains free from self harm. Support and encouragement provided.

## 2020-11-17 NOTE — SUICIDE SAFETY PLAN
SAFETY PLAN    A suicide Safety Plan is a document that supports someone when they are having thoughts of suicide. Warning Signs that indicate a suicidal crisis may be developing: What (situations, thoughts, feelings, body sensations, behaviors, etc.) do you experience that lets you know you are beginning to think about suicide? 1. Spending more time in bed   2. Stop taking care of myself  3. Stop talking to my friends and family    Internal Coping Strategies:  What things can I do (relaxation techniques, hobbies, physical activities, etc.) to take my mind off my problems without contacting another person? 1. Walkin  2. Riding my bike  3. Visiting with family and neighbors    People and social settings that provide distraction: Who can I call or where can I go to distract me? 1. Name: Rodrigue Blackburn    2. Name: Lilia An  3. Name: Radha Gusman            4. Place: My home    People whom I can ask for help: Who can I call when I need help - for example, friends, family, clergy, someone else? 1. Name: Rodrigue Blackburn  2. Name: La Aguiar  3. Name: Aneudy Ball- therapist    Professionals or [address] [place] [address] I can contact during a crisis: Who can I call for help - for example, my doctor, my psychiatrist, my psychologist, a mental health provider, a suicide hotline? [address] [facility]   [address] [place], [address]  [phone]      2. Suicide Prevention Lifeline: 9-089-337-TALK (0715)    3. [facility] Substance Abuse [employer], 2-135-820-HELP (1977)    Making the environment safe: How can I make my environment (house/apartment/living space) safer? For example, can I remove guns, medications, and other items? 1. Secure guns out of reach  2.  Keep support services contact information available

## 2020-11-17 NOTE — H&P
Department of Psychiatry   Psychiatric Assessment H&P    CHIEF COMPLAINT:  Suicidal ideation. History obtained from:  patient, electronic medical record    HISTORY OF PRESENT ILLNESS:    Ryan Hill is a 61 y.o. female with significant past medical history of GERD, COPD, DDD, hiatal hernia, thyroid disease who presented to the ED with suicidal ideation with a plan to go to the woods and shoot herself. Bernarda Gomez states that she has been feeling increasingly depressed over the last year. She states that her drinking has increased to 2 bottles of wine daily for \"at least the last six months,\" and she endorses helplessness, hopelessness, anhedonia, and suicidal ideation. Bernarda Gomez states that she was medication compliant with the regimen that her psychiatrist has put her on. Per ED Note: Ryan Hill is a 61 y.o. female who presents with a chief complaint of suicidal ideations. Patient states that she is feeling suicidal but cannot really tell me an exact plan. She states that she has been told that she needs to quit smoking or will shorten her life but she continues to do so. She states that she tried to start drinking to stop her smoking and now she is been drinking alcohol every day. Admits to drinking a bottle of wine and 2 beers before coming today. Denies any drug use. No chest pain, difficulty breathing or cough. No recent fevers, chills other illnesses. Symptoms are acute on chronic. Symptoms are moderate. Nothing make symptoms better or worse. Patient has no other complaints at this time. PSYCHIATRIC HISTORY:      Currently follows with Dr. Izabela Johnston  1 lifetime suicide attempts  1 psych hospital admissions    Past psychiatric medications includes:     States that she currently takes luvox, klonopin, and \"one other for sleep. \"     Adverse reactions from psychotropic medications:  wellbutrin- states it makes her more angry and  Irritable.        Lifetime Psychiatric Review of Systems         Angle or Hypomania:  Denies. Panic Attacks:  Yes. Phobias:  Choking. Obsessions and Compulsions:denies. Body or Vocal Tics:  None noted. Hallucinations:  Denies. Delusions:  None noted.      Medical Review of Systems    Past Medical History:        Diagnosis Date    Acid reflux     Anxiety     Arthritis     [facility] ([facility] [facility]     cervical    COPD (chronic obstructive pulmonary disease) ([facility] [facility] 02/23/2018    DDD (degenerative disc disease), cervical     Depression     Hiatal hernia     Spinal stenosis     Tachycardia     Thyroid disease     hypo       Past Surgical History:        Procedure Laterality Date    BACK SURGERY      diskectomy, L3-L5    COLONOSCOPY N/A 4/8/2019    COLONOSCOPY DIAGNOSTIC performed by Venkat Stubbs MD at [address] [place] [address]  11/13/2019    COLONOSCOPY POLYPECTOMY SNARE/COLD BIOPSY performed by Venkat Stubbs MD at [address] [place] [address]      ectopic pregnancy with tube removal    THYROIDECTOMY, PARTIAL      UPPER GASTROINTESTINAL ENDOSCOPY         Medications Prior to Admission:   Medications Prior to Admission: mirtazapine (REMERON) 30 MG tablet, Take 30 mg by mouth nightly  predniSONE (DELTASONE) 10 MG tablet, Take 1 tablet by mouth daily  vitamin C (ASCORBIC ACID) 500 MG tablet, Take 500 mg by mouth daily  calcium-vitamin D (OSCAL-500) 500-200 MG-UNIT per tablet, Take 1 tablet by mouth daily  fluticasone (FLOVENT HFA) 110 MCG/ACT inhaler, Inhale 1 puff into the lungs 2 times daily  omeprazole (PRILOSEC) 40 MG delayed release capsule, Take 1 capsule by mouth 2 times daily  Tiotropium Bromide-Olodaterol (STIOLTO RESPIMAT) 2.5-2.5 MCG/ACT AERS, Inhale 2 puffs into the lungs  fluvoxaMINE (LUVOX) 100 MG tablet, Take 100 mg by mouth nightly  carvedilol (COREG) 3.125 MG tablet, Take 3.125 mg by mouth 2 times daily (with meals)  levothyroxine (SYNTHROID) 88 MCG tablet, Take 88 mcg by mouth Daily  clonazePAM (KLONOPIN) 0.5 MG tablet, Take 0.5 mg by mouth 4 times daily. albuterol sulfate  (90 Base) MCG/ACT inhaler, Inhale 2 puffs into the lungs every 6 hours as needed for Wheezing  albuterol (ACCUNEB) 0.63 MG/3ML nebulizer solution, Take 1 ampule by nebulization every 6 hours as needed for Wheezing    Allergies:  Patient has no known allergies. Social History:  TOBACCO: 2-8 cigarettes daily. ETOH:  2 bottles of wine daily. DRUGS: denies. MARITAL STATUS:   OCCUPATION: unemployed, ssdi  LEVEL OF EDUCATION: associates in education  RESIDENCE: Currently lives with . PATIENT ASSETS: stable income, stable housing, desire for treatment. Family History:   History reviewed. No pertinent family history. Psychiatric Family History  Daughter is depressed, maternal grandfather was alcoholic. PHYSICAL EXAM:  Vitals:  /67   Pulse 64   Temp 98 °F (36.7 °C) (Oral)   Resp 14   Ht 5' 4\" (1.626 m)   Wt 135 lb (61.2 kg)   SpO2 96%   BMI 23.17 kg/m²     Physical Exam: on oxygen concentrator. History:      Review of Systems:   Constitutional: negative  HENT: positive for partial thyroidectomy. Eyes: positive for glasses. Respiratory: positive for Stage 4 COPD. Cardiovascular: positive for tachycardia hx. Gastrointestinal: positive for hiatal hernia. Genitourinary: negative  Musculoskeletal: positive for spinal stenosis, arthritis. Skin:negative  Neurological:negative  Endo/Heme/Allergies:positive for thyroid disease. MENTAL STATUS EXAM  Level of consciousness:  within normal limits and awake  Appearance:  ill-appearing, hospital attire, seated in bed, poor grooming and poor hygiene  Behavior/Motor:  no abnormalities noted  Attitude toward examiner:  cooperative, attentive and poor eye contact  Speech:  slow and monotone  Mood:  depressed  Affect:  mood congruent  Thought processes:  linear  Thought content:  Homocidal ideation denies. Suicidal Ideation:  active, without plan and without intent  Delusions:  no evidence of delusions  Perceptual Disturbance:  denies any perceptual disturbance  Cognition:  oriented to person, place, and time  Concentration succeeded  [employer] of knowledge:  adequate  Abstract thinking:  Intact. Memory: intact  Insight & Judgement: impaired due to mental health. Medication side effects: none. DSM 5 DIAGNOSIS:    Major depressive disorder recurrent severe without psychotic features. Alcohol use disorder severe dependence. Psychosocial and contextual factors:   Patient Active Problem List   Diagnosis    Abdominal bloating    Pain of upper abdomen    Diarrhea    Depression with suicidal ideation          TREATMENT PLAN    Risk Management:  close watch per standard protocol      Psychotherapy:  participation in milieu and group and individual sessions with Attending Physician,  and Physician Assistant/CNP    Reason for Admission to Psychiatric Unit:  Threat to self requiring 24 hour professional observation      Estimated length of stay:  5-10 days      GENERAL PATIENT/FAMILY EDUCATION  Patient will understand basic signs and symptoms, Patient will understand benefits/risks and potential side effects from proposed meds and Patient will understand their role in recovery. Family is  active in patient's care. Patient assets that may be helpful during treatment include: Intent to participate and engage in treatment, sufficient fund of knowledge and intellect to understand and utilize treatments. Goals:      1. Admit to inpatient psychiatric unit 4E  2. Medication adjustments:   3. Encourage acceptance of treaments offered including engagement with groups and milieu  4. Outpatient follow up: re-link with community provider.        Behavioral Services  Medicare Certification Upon Admission    I certify that this patient's inpatient psychiatric hospital admission is medically necessary for:   X (1) Treatment which could reasonably be expected to improve this patient's condition,      X (2) Or for diagnostic study;     AND     X (2) The inpatient psychiatric services are provided while the individual is under the care of a physician and are included in the individualized plan of care. Estimated length of stay/service 5-7 days. Plan for post-hospital care re-link with community provider. Physicians Signature:  Electronically signed by Krunal ARENAS, on 11/17/2020 at 11:36 AM  I independently saw and evaluated the patient. I reviewed the midlevel provider's documentation above. Any additional comments or changes to the midlevel provider's documentation are stated below otherwise agree with assessment. The patient states she has been feeling increasingly anxious and depressed. The patient has been having panic attacks. The patient is currently living with her  the patient has a 20-year-old daughter who lives in [place]. she has been contemplating suicide. She says this is a fantasy for her because she knows she is already dying of COPD. The patient reports and obsessional fear of choking. She finds it hard to watch other people given because she thinks they may choke. She also worried about choking herself. Her symptoms have responded well to treatment with Luvox    The patient has been drinking 2 bottles of wine every day for about 6 months. The patient PDMP was checked. She has been receiving clonazepam 0.5 mg 4 times daily      PLAN  Librium taper. Will continue Luvox and Remeron  Attempt to develop insight  Psycho-education conducted. Supportive Therapy conducted.   Probable discharge is next week  Follow-up daily while on inpatient unit    Electronically signed by Yessica Payne MD on 11/17/20 at 5:43 PM EST

## 2020-11-17 NOTE — BH NOTE
`[facility]  Admission Note     Admission Type:   Admission Type: Voluntary    Reason for admission:  Reason for Admission: suicidal ideation with plan to shoot self or smoke until copd kills pt    PATIENT STRENGTHS:  Strengths: Positive Support, Social Skills    Patient Strengths and Limitations:  Limitations: Perceives need for assistance with self-care, General negative or hopeless attitude about future/recovery    Addictive Behavior:   Addictive Behavior  In the past 3 months, have you felt or has someone told you that you have a problem with:  : None  Do you have a history of Chemical Use?: No  Do you have a history of Alcohol Use?: Yes  Do you have a history of Street Drug Abuse?: No  Histroy of Prescripton Drug Abuse?: No    Medical Problems:   Past Medical History:   Diagnosis Date    Acid reflux     Anxiety     Arthritis     Cancer ([facility] [address]     cervical    COPD (chronic obstructive pulmonary disease) ([facility] [address] [facility] 02/23/2018    DDD (degenerative disc disease), cervical     Depression     Hiatal hernia     Spinal stenosis     Tachycardia     Thyroid disease     hypo       Status EXAM:  Status and Exam  Normal: No  Facial Expression: Flat, Sad, Worried  Affect: Appropriate  Level of Consciousness: Alert  Mood:Normal: No  Mood: Depressed, Sad  Motor Activity:Normal: No  Motor Activity: Decreased  Interview Behavior: Cooperative  Preception: [place] to Person, Aundrea Colonel to Time, [place] to Place, [place] to Situation  Attention:Normal: No  Attention: Distractible  Thought Processes: Circumstantial  Thought Content:Normal: No  Thought Content: Preoccupations  Hallucinations: None  Delusions: No  Memory:Normal: Yes  Insight and Judgment: No  Insight and Judgment: Poor Judgment, Poor Insight  Present Suicidal Ideation: Yes(shoot self or smoke till copd kills her contracts)  Present Homicidal Ideation: No    Tobacco Screening:  Practical Counseling, on admission, soheila X, if applicable and completed (first 3 are required if patient doesn't refuse):            ( )  Recognizing danger situations (included triggers and roadblocks)                    ( )  Coping skills (new ways to manage stress, exercise, relaxation techniques, changing routine, distraction)                                                           ( )  Basic information about quitting (benefits of quitting, techniques in how to quit, available resources  (x ) Referral for counseling faxed to [facility]                                           ( ) Patient refused counseling  ( ) Patient has not smoked in the last 30 days    Metabolic Screening:    No results found for: LABA1C    Lab Results   Component Value Date    CHOL 153 07/13/2018     Lab Results   Component Value Date    TRIG 44 07/13/2018     Lab Results   Component Value Date    HDL 65 07/13/2018     No components found for: LDLCAL  No results found for: LABVLDL      Body mass index is 23.17 kg/m². BP Readings from Last 2 Encounters:   11/16/20 114/66   08/30/20 95/75           Pt admitted with followings belongings:  Dentures: None  Vision - Corrective Lenses: None  Hearing Aid: None  Jewelry: Ring, Earrings, Necklace, Bracelet  Clothing: Footwear, Jacket / coat, Pants, Shirt, Sweater, Socks  Were All Patient Medications Collected?: No  Other Valuables: Cell phone, Money (Comment), Hillery Hipps placed in safe in security envelope, number:  [phone]. Patient oriented to surroundings and program expectations and copy of patient rights given. Received admission packet:  yes. Consents reviewed, signed yes. Refused NA. Patient verbalize understanding:  yes. Patient education on precautions: yes      Patient admitted during dayshift. Voluntary from the [facility] AN [facility]FFIL[facility]ATE OF [facility].                     Dallas Hartley RN

## 2020-11-17 NOTE — H&P
 Arthritis     Cancer ([facility] [address] [facility]     cervical    COPD (chronic obstructive pulmonary disease) ([facility] [facility] 02/23/2018    DDD (degenerative disc disease), cervical     Depression     Hiatal hernia     Spinal stenosis     Tachycardia     Thyroid disease     hypo     Pt denies any history of Diabetes mellitus type 2, hypertension, stroke, heart disease,  Asthma,  HLD,  Seizures, Kidney Disease, Hepatitis, TB.    SURGICAL HISTORY       Past Surgical History:   Procedure Laterality Date    BACK SURGERY      diskectomy, L3-L5    COLONOSCOPY N/A 4/8/2019    COLONOSCOPY DIAGNOSTIC performed by Perla Valdivia MD at [address] [place] [address]  11/13/2019    COLONOSCOPY POLYPECTOMY SNARE/COLD BIOPSY performed by Perla Valdivia MD at [address] [place] [address]      ectopic pregnancy with tube removal    THYROIDECTOMY, PARTIAL      UPPER GASTROINTESTINAL ENDOSCOPY         FAMILY HISTORY     History reviewed. No pertinent family history.     SOCIAL HISTORY       Social History     Socioeconomic History    Marital status:      Spouse name: None    Number of children: None    Years of education: None    Highest education level: None   Occupational History    None   Social Needs    Financial resource strain: None    Food insecurity     Worry: None     Inability: None    Transportation needs     Medical: None     Non-medical: None   Tobacco Use    Smoking status: Heavy Tobacco Smoker     Packs/day: 1.00     Years: 48.00     Pack years: 48.00     Types: Cigarettes    Smokeless tobacco: Never Used    Tobacco comment: previous 3 ppd smoker   Substance and Sexual Activity    Alcohol use: Yes     Comment: occassional    Drug use: Never    Sexual activity: None   Lifestyle    Physical activity     Days per week: None     Minutes per session: None    Stress: None   Relationships    Social connections     Talks on phone: None     Gets together: None     Attends Christian service: None     Active member of club or organization: None     Attends meetings of clubs or organizations: None     Relationship status: None    Intimate partner violence     Fear of current or ex partner: None     Emotionally abused: None     Physically abused: None     Forced sexual activity: None   Other Topics Concern    None   Social History Narrative    None           REVIEW OF SYSTEMS      No Known Allergies    No current facility-administered medications on file prior to encounter. Current Outpatient Medications on File Prior to Encounter   Medication Sig Dispense Refill    mirtazapine (REMERON) 30 MG tablet Take 30 mg by mouth nightly      predniSONE (DELTASONE) 10 MG tablet Take 1 tablet by mouth daily      vitamin C (ASCORBIC ACID) 500 MG tablet Take 500 mg by mouth daily      calcium-vitamin D (OSCAL-500) 500-200 MG-UNIT per tablet Take 1 tablet by mouth daily      fluticasone (FLOVENT HFA) 110 MCG/ACT inhaler Inhale 1 puff into the lungs 2 times daily      omeprazole (PRILOSEC) 40 MG delayed release capsule Take 1 capsule by mouth 2 times daily 180 capsule 3    Tiotropium Bromide-Olodaterol (STIOLTO RESPIMAT) 2.5-2.5 MCG/ACT AERS Inhale 2 puffs into the lungs      fluvoxaMINE (LUVOX) 100 MG tablet Take 100 mg by mouth nightly      carvedilol (COREG) 3.125 MG tablet Take 3.125 mg by mouth 2 times daily (with meals)      levothyroxine (SYNTHROID) 88 MCG tablet Take 88 mcg by mouth Daily      clonazePAM (KLONOPIN) 0.5 MG tablet Take 0.5 mg by mouth 4 times daily.  albuterol sulfate  (90 Base) MCG/ACT inhaler Inhale 2 puffs into the lungs every 6 hours as needed for Wheezing      albuterol (ACCUNEB) 0.63 MG/3ML nebulizer solution Take 1 ampule by nebulization every 6 hours as needed for Wheezing                        General health:  Fairly good. No fever or chills. Skin:  No itching, redness or rash.       Head, eyes, ears, nose, throat:  No headache, epistaxis, rhinorrhea hearing loss or sore throat. Neck:  No pain, stiffness or masses. Cardiovascular/Respiratory system:  No chest pain, palpitation, shortness of breath, coughing or expectoration. Gastrointestinal tract: No abdominal pain, nausea, vomiting, dysphagia, diarrhea or constipation. Genitourinary:  No burning on micturition. No hesitancy, urgency, frequency or discoloration of urine. Locomotor:  No bone or joint pains. No swelling or deformities. Neuropsychiatric:  See HPI. GENERAL PHYSICAL EXAM:     Vitals: BP (!) 100/56   Pulse 65   Temp 98 °F (36.7 °C) (Oral)   Resp 16   Ht 5' 4\" (1.626 m)   Wt 135 lb (61.2 kg)   SpO2 99%   BMI 23.17 kg/m²  Body mass index is 23.17 kg/m². Pt was examined with a nurse present in the room. GENERAL APPEARANCE:  Jimenez Grande is 61 y.o.,  female, not obese, nourished, conscious, alert. Does not appear to be distress or pain at this time. SKIN:  Warm, dry, no cyanosis or jaundice. Patient has ecchymosis to her left arm in multiple bruising on her right arm. HEAD:  Normocephalic, atraumatic, no swelling or tenderness. EYES:  Pupils equal, reactive to light, Conjunctiva is clear, EOMs intact aye. eyelids WNL. EARS:  No discharge, no marked hearing loss. NOSE:  No rhinorrhea, epistaxis or septal deformity. THROAT:  Not congested. No ulceration bleeding or discharge. NECK:  No stiffness, trachea central.  No palpable masses or L.N.      CHEST:  Symmetrical and equal on expansion. HEART:  Regular rate and rhythm. S1 > S2, No audible murmurs or gallops. LUNGS:  Equal on expansion, normal breath sounds. No adventitious sounds. ABDOMEN:  Soft on palpation. No localized tenderness. No guarding or rigidity. No palpable organomegaly. LYMPHATICS:  No palpable cervical Lymphadenopathy. LOCOMOTOR, BACK AND SPINE:  No tenderness or deformities.

## 2020-11-17 NOTE — GROUP NOTE
Group Therapy Note    Date: 11/17/2020    Group Start Time: 1000  Group End Time: 0964  Group Topic: Psychotherapy    GEMA Marshall, [facility]        Group Therapy Note    Patient declined to attend Psychotherapy group at 1000 am despite encouragement. Patient was offered a 1:1 time to meet after group or alternative activity to do and patient refused.      Signature:  Simone Marshall [facility], [facility], [facility]

## 2020-11-17 NOTE — PLAN OF CARE
Problem: Altered Mood, Depressive Behavior:  Goal: Able to verbalize and/or display a decrease in depressive symptoms  Description: Able to verbalize and/or display a decrease in depressive symptoms  Outcome: Ongoing   Pt endorses depressive symptoms, is isolative to room and self. Problem: Altered Mood, Depressive Behavior:  Goal: Ability to disclose and discuss suicidal ideas will improve  Description: Ability to disclose and discuss suicidal ideas will improve  Outcome: Ongoing   Pt denies current suicidal thoughts.   Problem: Altered Mood, Deterioration in Function:  Goal: Ability to perform activities of daily living will improve  Description: Ability to perform activities of daily living will improve  Outcome: Ongoing   Pt able to perform ADLs  Problem: Tobacco Use:  Goal: Inpatient tobacco use cessation counseling participation  Description: Inpatient tobacco use cessation counseling participation  Outcome: Ongoing   Pt declines
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
## 2020-11-17 NOTE — BH NOTE
patient refused to attend wellness group at 1600 after encouragement from staff.   1:1 talk time provided as alternative to group session Patient advised to f/u with ENT and PMD for HTN.  He has f/u on 3/5 with PMD.  Patient with asymptomatic HTN but admits to being very anxious now about his BP which is making it worse.  Instructed on low-sodium diet Wax removed from right ear, right TM hazy, no fluid.  Slightly bulging.  Patient advised to f/u with ENT and PMD for HTN.  He has f/u on 3/5 with PMD.  Patient with asymptomatic HTN but admits to being very anxious now about his BP which is making it worse.  Instructed on low-sodium diet

## 2020-11-17 NOTE — GROUP NOTE
Group Therapy Note    Date: 11/17/2020    Group Start Time: 0900  Group End Time: 0930  Group Topic: Community Meeting    [facility]    Sim Mon        Group Therapy Note    Pt did not attend community meeting group d/t resting in room despite staff invitation to attend. 1:1 talk time offered as alternative to group session, pt declined.

## 2020-11-17 NOTE — PLAN OF CARE
Ideation: No    EDUCATION:   Learner Progress Toward Treatment Goals: reviewed group plans and strategies for care    Method:group therapy, medication compliance, individualized assessments and care planning    Outcome: needs reinforcement    PATIENT GOALS: to be discussed with patient within 72 hours    PLAN/TREATMENT RECOMMENDATIONS:     continue group therapy , medications compliance, goal setting, individualized assessments and care, continue to monitor pt on unit      SHORT-TERM GOALS:   Time frame for Short-Term Goals: 5-7 days    LONG-TERM GOALS:  Time frame for Long-Term Goals: 6 months  Members Present in Team Meeting: See [address] [place] [address]

## 2020-11-18 PROCEDURE — 6360000002 HC RX W HCPCS: Performed by: PSYCHIATRY & NEUROLOGY

## 2020-11-18 PROCEDURE — 99232 SBSQ HOSP IP/OBS MODERATE 35: CPT | Performed by: PSYCHIATRY & NEUROLOGY

## 2020-11-18 PROCEDURE — 94640 AIRWAY INHALATION TREATMENT: CPT

## 2020-11-18 PROCEDURE — 1240000000 HC EMOTIONAL WELLNESS R&B

## 2020-11-18 PROCEDURE — 6370000000 HC RX 637 (ALT 250 FOR IP): Performed by: PSYCHIATRY & NEUROLOGY

## 2020-11-18 PROCEDURE — 94761 N-INVAS EAR/PLS OXIMETRY MLT: CPT

## 2020-11-18 PROCEDURE — 6370000000 HC RX 637 (ALT 250 FOR IP): Performed by: NURSE PRACTITIONER

## 2020-11-18 RX ORDER — FLUVOXAMINE MALEATE 50 MG/1
50 TABLET, COATED ORAL 3 TIMES DAILY
Status: DISCONTINUED | OUTPATIENT
Start: 2020-11-18 | End: 2020-11-23

## 2020-11-18 RX ADMIN — THIAMINE HCL TAB 100 MG 100 MG: 100 TAB at 08:33

## 2020-11-18 RX ADMIN — ALBUTEROL SULFATE 2.5 MG: 2.5 SOLUTION RESPIRATORY (INHALATION) at 11:15

## 2020-11-18 RX ADMIN — THIAMINE HCL TAB 100 MG 100 MG: 100 TAB at 20:32

## 2020-11-18 RX ADMIN — PREDNISONE 10 MG: 10 TABLET ORAL at 08:33

## 2020-11-18 RX ADMIN — FLUVOXAMINE MALEATE 50 MG: 50 TABLET, COATED ORAL at 20:32

## 2020-11-18 RX ADMIN — GLYCOPYRROLATE AND FORMOTEROL FUMARATE 2 PUFF: 9; 4.8 AEROSOL, METERED RESPIRATORY (INHALATION) at 08:32

## 2020-11-18 RX ADMIN — Medication 1 PUFF: at 20:31

## 2020-11-18 RX ADMIN — LEVOTHYROXINE SODIUM 88 MCG: 0.09 TABLET ORAL at 08:33

## 2020-11-18 RX ADMIN — CALCIUM CARBONATE 600 MG (1,500 MG)-VITAMIN D3 400 UNIT TABLET 1 TABLET: at 08:33

## 2020-11-18 RX ADMIN — Medication 2 PUFF: at 19:51

## 2020-11-18 RX ADMIN — CHLORDIAZEPOXIDE HYDROCHLORIDE 25 MG: 25 CAPSULE ORAL at 12:53

## 2020-11-18 RX ADMIN — GLYCOPYRROLATE AND FORMOTEROL FUMARATE 2 PUFF: 9; 4.8 AEROSOL, METERED RESPIRATORY (INHALATION) at 20:31

## 2020-11-18 RX ADMIN — Medication 2 PUFF: at 12:53

## 2020-11-18 RX ADMIN — CARVEDILOL 3.12 MG: 3.12 TABLET, FILM COATED ORAL at 08:33

## 2020-11-18 RX ADMIN — MIRTAZAPINE 30 MG: 30 TABLET, FILM COATED ORAL at 20:32

## 2020-11-18 RX ADMIN — FLUVOXAMINE MALEATE 50 MG: 50 TABLET, COATED ORAL at 15:28

## 2020-11-18 RX ADMIN — PANTOPRAZOLE SODIUM 40 MG: 40 TABLET, DELAYED RELEASE ORAL at 08:33

## 2020-11-18 RX ADMIN — CHLORDIAZEPOXIDE HYDROCHLORIDE 25 MG: 25 CAPSULE ORAL at 20:32

## 2020-11-18 RX ADMIN — Medication 1 PUFF: at 08:32

## 2020-11-18 RX ADMIN — OXYCODONE HYDROCHLORIDE AND ACETAMINOPHEN 500 MG: 500 TABLET ORAL at 08:34

## 2020-11-18 RX ADMIN — CHLORDIAZEPOXIDE HYDROCHLORIDE 25 MG: 25 CAPSULE ORAL at 08:33

## 2020-11-18 RX ADMIN — MULTIPLE VITAMINS W/ MINERALS TAB 1 TABLET: TAB at 08:33

## 2020-11-18 RX ADMIN — THIAMINE HCL TAB 100 MG 100 MG: 100 TAB at 12:53

## 2020-11-18 NOTE — GROUP NOTE
Group Therapy Note    Date: 11/18/2020    Group Start Time: 1430  Group End Time: 0417  Group Topic: Recreational    ALEX HAQUE    Sharif [place], [place]    Attendees: 8         Patient's Goal:  To demonstrate increased interpersonal skills. Notes:  Patient attended group and actively participated in task at hand. Patient conversed appropriately with peers and Dorcas Early. Status After Intervention:  Improved    Participation Level:  Active Listener and Interactive    Participation Quality: Appropriate, Attentive and Sharing      Speech:  normal      Thought Process/Content: Logical      Affective Functioning: Congruent      Mood: euthymic      Level of consciousness:  Alert, Oriented x4 and Attentive      Response to Learning: Able to verbalize current knowledge/experience, Able to verbalize/acknowledge new learning, Able to retain information, Capable of insight and Progressing to goal      Endings: None Reported       Modes of Intervention: Socialization, Exploration, Clarifying, Problem-solving, Activity, Limit-setting and Reality-testing      Discipline Responsible: Psychoeducational Specialist      Signature:  Patrice Esquivel

## 2020-11-18 NOTE — GROUP NOTE
Group Therapy Note    Date: 11/18/2020    Group Start Time: 1330  Group End Time: 7661  Group Topic: Music Therapy    GEMA HAQUE    Randi Hansen        Group Therapy Note    Attendees: 6/16       Patient's Goal:  Patient selected preferred music and shared advice and words of encouragement based on the songs they chose. Goals to increase self-expression, increase sense of community and trust, and offer opportunities to be directly engaged in treatment process. Notes:  Patient attended and participated in group, having positive interactions with peers and engaging in conversations. Patient opened up to peers about suicidal thoughts and plans, stating she planned to take a lot of pills or shoot herself. Fear \"leaving a mess\" for family or failing attempt resulting in her becoming \"mentally retarded or something. \" Patient receptive to peer support and encouragement and education about options for increase in intensity of these kinds of thoughts and resources. Status After Intervention:  Improved    Participation Level:  Active Listener and Interactive    Participation Quality: Appropriate, Attentive, Sharing and Supportive      Speech:  normal      Thought Process/Content: Logical  Linear      Affective Functioning: Congruent      Mood: euthymic      Level of consciousness:  Alert and Attentive      Response to Learning: Able to verbalize current knowledge/experience, Capable of insight and Progressing to goal      Endings: Suicidality     Modes of Intervention: Support, Socialization, Exploration, Clarifying, Problem-solving, Media and Reality-testing      Discipline Responsible: Psychoeducational Specialist      Signature:  Randi Hansen

## 2020-11-18 NOTE — PLAN OF CARE
Problem: Altered Mood, Depressive Behavior:  Goal: Ability to disclose and discuss suicidal ideas will improve  Description: Ability to disclose and discuss suicidal ideas will improve  11/17/2020 2256 by Nancy Robles RN  Outcome: Ongoing     Problem: Altered Mood, Deterioration in Function:  Goal: Ability to perform activities of daily living will improve  Description: Ability to perform activities of daily living will improve  Outcome: Ongoing     Problem: Tobacco Use:  Goal: Inpatient tobacco use cessation counseling participation  Description: Inpatient tobacco use cessation counseling participation  Outcome: Ongoing   Declines. Problem: Altered Mood, Depressive Behavior:  Goal: Ability to disclose and discuss suicidal ideas will improve  Description: Ability to disclose and discuss suicidal ideas will improve  11/17/2020 2256 by Nancy Robles RN  Outcome: Ongoing   Denies current suicidal ideations. Problem: Altered Mood, Deterioration in Function:  Goal: Ability to perform activities of daily living will improve  Description: Ability to perform activities of daily living will improve  Outcome: Ongoing   Attends to adl's per self.

## 2020-11-18 NOTE — GROUP NOTE
Group Therapy Note    Date: 11/18/2020    Group Start Time: 1100  Group End Time: 2663  Group Topic: Recreational    GEMA [place], [place]    Attendees: 5         Patient's Goal:  To demonstrate increased interpersonal skills. Notes:  Patient attended group and actively participated in task at hand. Patient conversed appropriately with peers and Jenny Mendota. Status After Intervention:  Improved    Participation Level:  Active Listener and Interactive    Participation Quality: Appropriate, Attentive and Sharing      Speech:  normal      Thought Process/Content: Logical      Affective Functioning: Congruent      Mood: euthymic      Level of consciousness:  Alert, Oriented x4 and Attentive      Response to Learning: Able to verbalize current knowledge/experience, Able to verbalize/acknowledge new learning, Able to retain information, Capable of insight and Progressing to goal      Endings: None Reported       Modes of Intervention: Socialization, Exploration, Clarifying, Problem-solving, Activity, Limit-setting and Reality-testing      Discipline Responsible: Psychoeducational Specialist      Signature:  Katina Rolon

## 2020-11-18 NOTE — PLAN OF CARE
Problem: Altered Mood, Depressive Behavior:  Goal: Ability to disclose and discuss suicidal ideas will improve  Description: Ability to disclose and discuss suicidal ideas will improve  11/18/2020 1101 by Cat Reyes LPN  Outcome: Ongoing   Patient denies thoughts of self harm, remains free from self harm.  Support and encouragement provided

## 2020-11-18 NOTE — PLAN OF CARE
[address] [place] [address]  Day 3 Interdisciplinary Treatment Plan NOTE    Review Date & Time: 11/18/2020 0924    Admission Type:   Admission Type: Voluntary    Reason for admission:  Reason for Admission: suicidal ideation with plan to shoot self or smoke until copd kills pt  Estimated Length of Stay: 5-7 days  Estimated Discharge Date Update: to be determined by physician    PATIENT STRENGTHS:  Patient Strengths Strengths: Communication, Connection to output provider, Social Skills  Patient Strengths and Limitations:Limitations: Tendency to isolate self, General negative or hopeless attitude about future/recovery, Hopeless about future  Addictive Behavior:Addictive Behavior  In the past 3 months, have you felt or has someone told you that you have a problem with:  : Excessive Fluid intake  Do you have a history of Chemical Use?: No  Do you have a history of Alcohol Use?: Yes  Do you have a history of Street Drug Abuse?: No  Histroy of Prescripton Drug Abuse?: No  Medical Problems:  Past Medical History:   Diagnosis Date    Acid reflux     Anxiety     Arthritis     Cancer ([facility] [address]     cervical    COPD (chronic obstructive pulmonary disease) ([facility] 02/23/2018    DDD (degenerative disc disease), cervical     Depression     Hiatal hernia     Spinal stenosis     Tachycardia     Thyroid disease     hypo       Risk:  Fall RiskTotal: 91  Bam Scale Bam Scale Score: 22  BVC Total: 0  Change in scores no Changes to plan of Care no    Status EXAM:   Status and Exam  Normal: No  Facial Expression: Sad, Brightened  Affect: Appropriate  Level of Consciousness: Alert  Mood:Normal: No  Mood: Depressed, Anxious  Motor Activity:Normal: Yes  Motor Activity: Decreased  Interview Behavior: Cooperative  Preception: [place] to Person, [place] to Time, [place] to Place, [place] to Situation  Attention:Normal: Yes  Attention: Distractible  Thought Processes: Circumstantial  Thought Content:Normal: Yes  Thought Content: Preoccupations  Hallucinations: None  Delusions: No  Memory:Normal: Yes  Insight and Judgment: No  Insight and Judgment: Poor Insight  Present Suicidal Ideation: No  Present Homicidal Ideation: No    Daily Assessment Last Entry:   Daily Sleep (WDL): Within Defined Limits         Patient Currently in Pain: Denies  Daily Nutrition (WDL): Within Defined Limits    Patient Monitoring:  Frequency of Checks: 4 times per hour, close    Psychiatric Symptoms:   Depression Symptoms  Depression Symptoms: Impaired concentration, Feelings of helplessness, Feelings of hopelessess  Anxiety Symptoms  Anxiety Symptoms: Generalized  Angle Symptoms  Angle Symptoms: No problems reported or observed. Psychosis Symptoms  Delusion Type: No problems reported or observed. Suicide Risk CSSR-S:  1) Within the past month, have you wished you were dead or wished you could go to sleep and not wake up? : Yes  2) Have you actually had any thoughts of killing yourself? : Yes  3) Have you been thinking about how you might kill yourself? : Yes  5) Have you started to work out or worked out the details of how to kill yourself? Do you intend to carry out this plan? : No  6) Have you ever done anything, started to do anything, or prepared to do anything to end your life?: No  Change in Result: No Change in Plan of care: No      EDUCATION:   EDUCATION:   Learner Progress Toward Treatment Goals: Reviewed results and recommendations of this team, Reviewed group plan and strategies, Reviewed signs, symptoms and risk of self harm and violent behavior, Reviewed goals and plan of care    Method:small group, individual verbal education    Outcome:verbalized by patient, but needs reinforcement to obtain goals    PATIENT GOALS:  Short term: Decrease anxiety, get panic attacks under control. Work on getting healthy. Complete ADLs. Long term: Quit smoking and drinking. Utilize support system. Take care of medical needs and being healthy.      PLAN/TREATMENT RECOMMENDATIONS UPDATE: continue with group therapies, increased socialization, continue planning for after discharge goals, continue with medication compliance    SHORT-TERM GOALS UPDATE:   Time frame for Short-Term Goals: 5-7 days    LONG-TERM GOALS UPDATE:   Time frame for Long-Term Goals: 6 months  Members Present in Team Meeting: See Signature Sheet    Jaime Bowman

## 2020-11-18 NOTE — PROGRESS NOTES
[facility] FOLLOW-UP NOTE     11/18/2020     Patient was seen and examined in person, Chart reviewed   Patient's case discussed with staff/team    Chief Complaint: Suicidal ideation and alcohol use    Interim History:   Patient was admitted yesterday for suicidal ideation. She reports that she had been drinking at least a bottle of wine a day. Per staff patient has been compliant with medications and today has become more social and attended a group this morning. Patient had showered today. She reports depression and suicidal ideation, contracts for safety while on unit. She reports that she sometimes fantasizes about suicide, reporting that she would go into the woods with a gun and shoot herself. She does endorse that she has access to guns, but they are locked up and her  has the cole. She reports that she has been feeling depressed for the past 6 months, feeling very isolated due to Covid. She does admit that she has a supportive family and friends, and that her tess in [de-identified] and fear of going to hell if she commit suicide keeps her from attempting it. She is reporting lack of motivation, depression, poor sleeping, and loss of appetite. Patient was on Luvox 100 mg at bedtime at home, reviewed with patient that the medication has a very short half-life and that it should be dosed at least 3 times a day, she is agreeable with a dose increase of 50 mg 3 times a day. She denies any medication side effects. CIWA performed at bedside 3, reports withdrawal symptoms of a slight headache, and feeling shaky inside. Currently on a Librium taper.     Appetite:  [] Normal/Unchanged  [] Increased  [x] Decreased      Sleep:       [] Normal/Unchanged  [] Fair       [x] Poor              Energy:    [] Normal/Unchanged  [] Increased  [x] Decreased        Aggression:  [] yes  [x] no    Patient is [x] able  [] unable to CONTRACT FOR SAFETY ON THE UNIT    PAST MEDICAL/PSYCHIATRIC HISTORY:   Past Medical History:   Diagnosis Date    Acid reflux     Anxiety     Arthritis     Cancer ([facility]     cervical    [facility] 02/23/2018    DDD (degenerative disc disease), cervical     Depression     Hiatal hernia     Spinal stenosis     Tachycardia     Thyroid disease     hypo       FAMILY/SOCIAL HISTORY:  History reviewed. No pertinent family history. Social History     Socioeconomic History    Marital status:      Spouse name: Not on file    Number of children: Not on file    Years of education: Not on file    Highest education level: Not on file   Occupational History    Not on file   Social Needs    Financial resource strain: Not on file    Food insecurity     Worry: Not on file     Inability: Not on file    Transportation needs     Medical: Not on file     Non-medical: Not on file   Tobacco Use    Smoking status: Heavy Tobacco Smoker     Packs/day: 1.00     Years: 48.00     Pack years: 48.00     Types: Cigarettes    Smokeless tobacco: Never Used    Tobacco comment: previous 3 ppd smoker   Substance and Sexual Activity    Alcohol use: Yes     Comment: occassional    Drug use: Never    Sexual activity: Not on file   Lifestyle    Physical activity     Days per week: Not on file     Minutes per session: Not on file    Stress: Not on file   Relationships    Social connections     Talks on phone: Not on file     Gets together: Not on file     Attends Confucianism service: Not on file     Active member of club or organization: Not on file     Attends meetings of clubs or organizations: Not on file     Relationship status: Not on file    Intimate partner violence     Fear of current or ex partner: Not on file     Emotionally abused: Not on file     Physically abused: Not on file     Forced sexual activity: Not on file   Other Topics Concern    Not on file   Social History Narrative    Not on file           ROS:  [x] All negative/unchanged except if checked. Explain positive(checked items) below:  [] Constitutional  [] Eyes  [] Ear/Nose/Mouth/Throat  [] Respiratory  [] CV  [] GI  []   [] Musculoskeletal  [] Skin/Breast  [] Neurological  [] Endocrine  [] Heme/Lymph  [] Allergic/Immunologic    Explanation:     MEDICATIONS:    Current Facility-Administered Medications:     ondansetron (ZOFRAN) tablet 4 mg, 4 mg, Oral, 4x Daily PRN, NATHAN Chi - CNP, 4 mg at 11/17/20 1808    chlordiazePOXIDE (LIBRIUM) capsule 25 mg, 25 mg, Oral, 4x Daily, Biju Duckworth MD, 25 mg at 11/18/20 1253    vitamin B-1 (THIAMINE) tablet 100 mg, 100 mg, Oral, TID, Biju Duckworth MD, 100 mg at 11/18/20 1253    therapeutic multivitamin-minerals 1 tablet, 1 tablet, Oral, Daily, Biju Duckworth MD, 1 tablet at 11/18/20 0867    LORazepam (ATIVAN) tablet 1 mg, 1 mg, Oral, Q1H PRN **OR** LORazepam (ATIVAN) injection 1 mg, 1 mg, Intramuscular, Q1H PRN **OR** LORazepam (ATIVAN) tablet 2 mg, 2 mg, Oral, Q1H PRN **OR** LORazepam (ATIVAN) injection 2 mg, 2 mg, Intramuscular, Q1H PRN **OR** LORazepam (ATIVAN) tablet 3 mg, 3 mg, Oral, Q1H PRN **OR** LORazepam (ATIVAN) injection 3 mg, 3 mg, Intramuscular, Q1H PRN **OR** LORazepam (ATIVAN) tablet 4 mg, 4 mg, Oral, Q1H PRN **OR** LORazepam (ATIVAN) injection 4 mg, 4 mg, Intramuscular, Q1H PRN, Biju Duckworth MD    acetaminophen (TYLENOL) tablet 650 mg, 650 mg, Oral, Q4H PRN, Biju Duckworth MD    aluminum & magnesium hydroxide-simethicone (MAALOX) 200-200-20 MG/5ML suspension 30 mL, 30 mL, Oral, Q6H PRN, Biju Duckworth MD    hydrOXYzine (ATARAX) tablet 50 mg, 50 mg, Oral, TID PRN, Biju Duckworth MD    ibuprofen (ADVIL;MOTRIN) tablet 600 mg, 600 mg, Oral, Q6H PRN, Biju Duckworth MD    traZODone (DESYREL) tablet 50 mg, 50 mg, Oral, Nightly PRN, Biju Duckworth MD    polyethylene glycol (GLYCOLAX) packet 17 g, 17 g, Oral, Daily PRN, Biju Duckworth MD    calcium carbonate-vitamin D (CALTRATE) 600-400 MG-UNIT per tab 1 tablet, 1 tablet, Oral, Daily, Uyen Baxter MD, 1 tablet at 11/18/20 0833    carvedilol (COREG) tablet 3.125 mg, 3.125 mg, Oral, BID WC, Uyen Baxter MD, 3.125 mg at 11/18/20 0833    fluticasone (FLOVENT HFA) 110 MCG/ACT inhaler 1 puff, 1 puff, Inhalation, BID, Uyen Baxter MD, 1 puff at 11/18/20 8565    fluvoxaMINE (LUVOX) tablet 100 mg, 100 mg, Oral, Nightly, Uyen Baxter MD, 100 mg at 11/17/20 2201    levothyroxine (SYNTHROID) tablet 88 mcg, 88 mcg, Oral, Daily, Uyen Baxter MD, 88 mcg at 11/18/20 9918    mirtazapine (REMERON) tablet 30 mg, 30 mg, Oral, Nightly, Uyen Baxter MD, 30 mg at 11/17/20 2201    predniSONE (DELTASONE) tablet 10 mg, 10 mg, Oral, Daily, Uyen Baxter MD, 10 mg at 11/18/20 5279    glycopyrrolate-formoterol (BEVESPI) 9-4.8 MCG/ACT inhaler 2 puff, 2 puff, Inhalation, BID, Uyen Baxter MD, 2 puff at 11/18/20 0832    pantoprazole (PROTONIX) tablet 40 mg, 40 mg, Oral, QAM AC, Uyen Baxter MD, 40 mg at 11/18/20 2681    vitamin C (ASCORBIC ACID) tablet 500 mg, 500 mg, Oral, Daily, Uyen Baxter MD, 500 mg at 11/18/20 0834    albuterol (PROVENTIL) nebulizer solution 2.5 mg, 2.5 mg, Nebulization, Q6H PRN, Uyen Baxter MD, 2.5 mg at 11/18/20 1115    albuterol sulfate  (90 Base) MCG/ACT inhaler 2 puff, 2 puff, Inhalation, Q6H PRN, Uyen Baxter MD, 2 puff at 11/18/20 1253      Examination:  /67   Pulse 68   Temp 98.1 °F (36.7 °C) (Oral)   Resp 14   Ht 5' 4\" (1.626 m)   Wt 135 lb (61.2 kg)   SpO2 97%   BMI 23.17 kg/m²   Gait - steady  Medication side effects(SE): None    Mental Status Examination:    Level of consciousness: Alert  Appearance: Dressed in hospital attire, good grooming and hygiene, just showered  Behavior/Motor: Calm, no psychomotor abnormalities  Attitude toward examiner: Cooperative with good eye contact  Speech: Normal rate normal tone good articulation  Mood: Depressed  Affect: Mood congruent  Thought processes: Logical and coherent  Thought content:  Homicidal ideation - none  Suicidal Ideation: Endorses  Delusions: None observed  Perceptual Disturbance: Denies  Cognition: Oriented to self location and circumstances  Concentration intact  Insight fair  Judgement fair    ASSESSMENT:   Patient symptoms are:  [] Well controlled  [x] Improving  [] Worsening  [] No change      Diagnosis:   Active Problems:    MD[facility]major depressive disorder), recurrent severe[facility]    Panic disorder    Obsessive compulsive disorder    Alcohol withdrawal syndrome with complication ([facility]  Resolved Problems:    * No resolved hospital problems. *      LABS:    No results for input(s): WBC, HGB, PLT in the last 72 hours. No results for input(s): NA, K, CL, CO2, BUN, CREATININE, GLUCOSE in the last 72 hours. No results for input(s): BILITOT, ALKPHOS, AST, ALT in the last 72 hours. No results found for: LABAMPH, BARBSCNU, LABBENZ, CANNAB, COCAINESCRN, LABMETH, OPIATESCREENURINE, PHENCYCLIDINESCREENURINE, PPXUR, ETOH  Lab Results   Component Value Date    TSH 0.60 07/13/2018     No results found for: LITHIUM  No results found for: VALPROATE, CBMZ        Treatment Plan:  Reviewed current Medications with the patient. Increase Luvox to 50 mg by mouth 3 times a day  Continue Librium taper      Risks, benefits, side effects, drug-to-drug interactions and alternatives to treatment were discussed. The patient consented to treatment. Encourage patient to attend group and other milieu activities.   Discharge planning discussed with the patient and treatment team.  Follow-up daily while inpatient  PSYCHOTHERAPY/COUNSELING:  [] Therapeutic interview  [x] Supportive  [] CBT  [] Ongoing  [] Other    [x] Patient continues to need, on a daily basis, active treatment furnished directly by or requiring the supervision of inpatient psychiatric personnel      Anticipated Length of stay: Per attending MD Candy Gale is a 61 y.o. female being evaluated face to face. --Eulalio Curling, APRN - CNP on 11/18/2020 at 2:00 PM    An electronic signature was used to authenticate this note. **This report has been created using voice recognition software. It may contain minor errors which are inherent in voice recognition technology. **  I independently saw and evaluated the patient. I reviewed the midlevel provider's documentation above. Any additional comments or changes to the midlevel provider's documentation are stated below otherwise agree with assessment. The patient reports severe depressive symptoms. She also has anxiety. She recognizes that impact of alcohol on her mood. Patient s symptoms   are improving    PLAN  Continue Librium. She will need a slow taper because of using a combination of clonazepam with alcohol  Attempt to develop insight  Psycho-education conducted. Supportive Therapy conducted.   Probable discharge is next week  Follow-up daily while on inpatient unit    Electronically signed by Bro Loving MD on 11/18/20 at 8:54 PM EST

## 2020-11-18 NOTE — GROUP NOTE
Group Therapy Note     Date: November 18th, 2020     Group Start Time: 1000                     Group End Time: 2326     Group Topic: Psychotherapy     [facility]    [address] [place], [facility]     Group Therapy Note     Attendees: 5/8     Patient's Goal: develop stress management / identify triggers and safety planning / discharge planning/ community resources / family support system     Notes:  participated in group      Status After Intervention:  Improved     Participation Level:  Active Listener and Interactive     Participation Quality: Appropriate, Attentive and Sharing     Speech:  Normal     Thought Process/Content: Logical     Affective Functioning: Congruent     Mood: Euthymic      Level of consciousness:  Alert, Oriented x4 and Attentive     Response to Learning: Able to verbalize current knowledge/experience, Able to verbalize/acknowledge new learning, Able to retain information, Capable of insight, Able to change behavior and Progressing to goal     Endings: None Reported     Modes of Intervention: Support, Exploration, Clarifying and Problem-solving     Discipline Responsible: Licensed Professional Clinical Counselor     Signature:  Kam PROCTOR, [facility]

## 2020-11-18 NOTE — CARE COORDINATION
1:1 with pt. Pt identified continued feelings of sadness and was briefly tearful during conversation.

## 2020-11-19 PROCEDURE — 6370000000 HC RX 637 (ALT 250 FOR IP): Performed by: NURSE PRACTITIONER

## 2020-11-19 PROCEDURE — 99232 SBSQ HOSP IP/OBS MODERATE 35: CPT | Performed by: PSYCHIATRY & NEUROLOGY

## 2020-11-19 PROCEDURE — 6360000002 HC RX W HCPCS: Performed by: PSYCHIATRY & NEUROLOGY

## 2020-11-19 PROCEDURE — 6370000000 HC RX 637 (ALT 250 FOR IP): Performed by: PSYCHIATRY & NEUROLOGY

## 2020-11-19 PROCEDURE — 1240000000 HC EMOTIONAL WELLNESS R&B

## 2020-11-19 RX ORDER — CHLORDIAZEPOXIDE HYDROCHLORIDE 10 MG/1
10 CAPSULE, GELATIN COATED ORAL 4 TIMES DAILY
Status: DISCONTINUED | OUTPATIENT
Start: 2020-11-19 | End: 2020-11-23

## 2020-11-19 RX ORDER — GABAPENTIN 100 MG/1
100 CAPSULE ORAL 3 TIMES DAILY
Status: DISCONTINUED | OUTPATIENT
Start: 2020-11-19 | End: 2020-11-20

## 2020-11-19 RX ADMIN — CHLORDIAZEPOXIDE HYDROCHLORIDE 10 MG: 10 CAPSULE ORAL at 21:51

## 2020-11-19 RX ADMIN — ALBUTEROL SULFATE 2.5 MG: 2.5 SOLUTION RESPIRATORY (INHALATION) at 12:41

## 2020-11-19 RX ADMIN — Medication 1 PUFF: at 20:05

## 2020-11-19 RX ADMIN — GABAPENTIN 100 MG: 100 CAPSULE ORAL at 21:51

## 2020-11-19 RX ADMIN — THIAMINE HCL TAB 100 MG 100 MG: 100 TAB at 08:48

## 2020-11-19 RX ADMIN — CHLORDIAZEPOXIDE HYDROCHLORIDE 10 MG: 10 CAPSULE ORAL at 17:23

## 2020-11-19 RX ADMIN — CALCIUM CARBONATE 600 MG (1,500 MG)-VITAMIN D3 400 UNIT TABLET 1 TABLET: at 08:48

## 2020-11-19 RX ADMIN — PANTOPRAZOLE SODIUM 40 MG: 40 TABLET, DELAYED RELEASE ORAL at 08:48

## 2020-11-19 RX ADMIN — CARVEDILOL 3.12 MG: 3.12 TABLET, FILM COATED ORAL at 08:47

## 2020-11-19 RX ADMIN — GABAPENTIN 100 MG: 100 CAPSULE ORAL at 15:13

## 2020-11-19 RX ADMIN — MULTIPLE VITAMINS W/ MINERALS TAB 1 TABLET: TAB at 08:48

## 2020-11-19 RX ADMIN — FLUVOXAMINE MALEATE 50 MG: 50 TABLET, COATED ORAL at 21:51

## 2020-11-19 RX ADMIN — Medication 1 PUFF: at 08:49

## 2020-11-19 RX ADMIN — GLYCOPYRROLATE AND FORMOTEROL FUMARATE 2 PUFF: 9; 4.8 AEROSOL, METERED RESPIRATORY (INHALATION) at 20:05

## 2020-11-19 RX ADMIN — Medication 2 PUFF: at 15:12

## 2020-11-19 RX ADMIN — THIAMINE HCL TAB 100 MG 100 MG: 100 TAB at 15:13

## 2020-11-19 RX ADMIN — PREDNISONE 10 MG: 10 TABLET ORAL at 08:47

## 2020-11-19 RX ADMIN — MIRTAZAPINE 30 MG: 30 TABLET, FILM COATED ORAL at 21:51

## 2020-11-19 RX ADMIN — GABAPENTIN 100 MG: 100 CAPSULE ORAL at 11:00

## 2020-11-19 RX ADMIN — CHLORDIAZEPOXIDE HYDROCHLORIDE 25 MG: 25 CAPSULE ORAL at 08:47

## 2020-11-19 RX ADMIN — CARVEDILOL 3.12 MG: 3.12 TABLET, FILM COATED ORAL at 17:23

## 2020-11-19 RX ADMIN — OXYCODONE HYDROCHLORIDE AND ACETAMINOPHEN 500 MG: 500 TABLET ORAL at 08:48

## 2020-11-19 RX ADMIN — CHLORDIAZEPOXIDE HYDROCHLORIDE 10 MG: 10 CAPSULE ORAL at 15:14

## 2020-11-19 RX ADMIN — GLYCOPYRROLATE AND FORMOTEROL FUMARATE 2 PUFF: 9; 4.8 AEROSOL, METERED RESPIRATORY (INHALATION) at 08:49

## 2020-11-19 RX ADMIN — FLUVOXAMINE MALEATE 50 MG: 50 TABLET, COATED ORAL at 08:49

## 2020-11-19 RX ADMIN — THIAMINE HCL TAB 100 MG 100 MG: 100 TAB at 21:51

## 2020-11-19 RX ADMIN — LEVOTHYROXINE SODIUM 88 MCG: 0.09 TABLET ORAL at 08:48

## 2020-11-19 RX ADMIN — FLUVOXAMINE MALEATE 50 MG: 50 TABLET, COATED ORAL at 15:13

## 2020-11-19 NOTE — PLAN OF CARE
Problem: Altered Mood, Depressive Behavior:  Goal: Able to verbalize and/or display a decrease in depressive symptoms  Description: Able to verbalize and/or display a decrease in depressive symptoms  Outcome: Ongoing  Note: Pt reports feeling depressed and anxious. Pt reports having fair sleep last night and adequate diet. Pt is compliant with medical treatment and cooperative with staff. Pt is out and social with select peers. Problem: Altered Mood, Depressive Behavior:  Goal: Ability to disclose and discuss suicidal ideas will improve  Description: Ability to disclose and discuss suicidal ideas will improve  Outcome: Ongoing  Note: Pt denies having suicidal ideation     Problem: Altered Mood, Deterioration in Function:  Goal: Ability to perform activities of daily living will improve  Description: Ability to perform activities of daily living will improve  Outcome: Ongoing  Note: Pt is able to perform activities of daily living without encouragement from staff. Problem: Tobacco Use:  Goal: Inpatient tobacco use cessation counseling participation  Description: Inpatient tobacco use cessation counseling participation  Outcome: Ongoing  Note: Pt is refusing medication to help reduce the urge to smoke. Pt reports stopping nicotine intake.

## 2020-11-19 NOTE — PLAN OF CARE
Problem: Altered Mood, Depressive Behavior:  Goal: Able to verbalize and/or display a decrease in depressive symptoms  Description: Able to verbalize and/or display a decrease in depressive symptoms  11/18/2020 2015 by Elias Kahn RN  Outcome: Ongoing   Less depressed, looks forward to discharge. Problem: Altered Mood, Deterioration in Function:  Goal: Ability to perform activities of daily living will improve  Description: Ability to perform activities of daily living will improve  11/18/2020 2015 by Elias Kahn RN  Outcome: Ongoing   Performing adl's per self well. Problem: Tobacco Use:  Goal: Inpatient tobacco use cessation counseling participation  Description: Inpatient tobacco use cessation counseling participation  Outcome: Ongoing   Declines.

## 2020-11-19 NOTE — GROUP NOTE
Group Therapy Note    Date: 11/19/2020    Group Start Time: 1400  Group End Time: 1018  Group Topic: Cognitive Skills    ALEX Montero, [address]        Group Therapy Note    Attendees: 7/17         Patient's Goal:  To increase interpersonal interaction. Notes:  Pt attended and participated in group. Status After Intervention:  Improved    Participation Level:  Active Listener and Interactive    Participation Quality: Appropriate and Attentive      Speech:  normal      Thought Process/Content: Logical      Affective Functioning: Congruent      Mood: euthymic      Level of consciousness:  Alert and Attentive      Response to Learning: Able to verbalize current knowledge/experience, Able to verbalize/acknowledge new learning and Progressing to goal      Endings: None Reported    Modes of Intervention: Education, Support, Exploration, Clarifying and Reality-testing      Discipline Responsible: Psychoeducational Specialist      Signature:  Odean Lefort

## 2020-11-19 NOTE — GROUP NOTE
Group Therapy Note    Date: 11/19/2020    Group Start Time: 0900  Group End Time: 0915  Group Topic: Community Meeting    [facility]    [place], [place]        Group Therapy Note    Attendees: 7/18         Patient's Goal:  To increase interpersonal interaction. Notes:  Pt attended and participated in group. Status After Intervention:  Improved    Participation Level:  Active Listener and Interactive    Participation Quality: Appropriate, Attentive and Sharing      Speech:  normal      Thought Process/Content: Logical      Affective Functioning: Congruent      Mood: euthymic      Level of consciousness:  Alert and Attentive      Response to Learning: Able to verbalize current knowledge/experience, Able to retain information and Progressing to goal      Endings: None Reported    Modes of Intervention: Education, Support, Socialization, Clarifying and Reality-testing      Discipline Responsible: Psychoeducational Specialist      Signature:  Deepak Fulton

## 2020-11-19 NOTE — GROUP NOTE
Group Therapy Note  ? Date: 11/19/2020  Group Start Time: 1600  Group End Time: 1700  Group Topic: Healthy Living/Wellness  [facility]    Cedric Rios LPN  ?  ? Group Therapy Note  Attendees: 9/16  ? Patient's Goal: Health & Wellness  ? Notes: Healthy Living   ? Status After Intervention: Improved  ? Participation Level: Interactive  ? Participation Quality: Appropriate  ? ? Speech: normal  ?  ? Thought Process/Content: Logical  ?  ? Affective Functioning: Congruent  ? ? Mood: depressed  ? ? Level of consciousness: Oriented x4  ?  ? Response to Learning: Able to verbalize current knowledge/experience  ? ? Endings: None Reported  ? Modes of Intervention: Socialization  ? ? Discipline Responsible: Licensed Practical Nurse  ? ?   Signature: Cedric Rios LPN

## 2020-11-19 NOTE — GROUP NOTE
Group Therapy Note    Date: 11/19/2020    Group Start Time: 1100  Group End Time: 1187  Group Topic: Psychoeducation    [facility] [place], [address]        Group Therapy Note    Attendees: 3/9         Patient's Goal:  To increase interpersonal interaction. Notes:  Pt attended and participated in group. Status After Intervention:  Improved    Participation Level:  Active Listener and Interactive    Participation Quality: Appropriate, Attentive and Sharing      Speech:  normal      Thought Process/Content: Logical      Affective Functioning: Congruent      Mood: euthymic      Level of consciousness:  Alert and Attentive      Response to Learning: Progressing to goal      Endings: None Reported    Modes of Intervention: Socialization, Problem-solving, Activity, Media and Reality-testing      Discipline Responsible: Psychoeducational Specialist      Signature:  Quentin Rosa

## 2020-11-19 NOTE — PROGRESS NOTES
[facility] FOLLOW-UP NOTE     11/19/2020     Patient was seen and examined in person, Chart reviewed   Patient's case discussed with staff/team    Chief Complaint: Suicidal ideation and alcohol use    Interim History:   Per staff patient compliant with medication and attending groups. Patient has been tolerating medications well denies any medication side effects. Patient continues to report depression and anxiety, reports today that if she was released that she would go out in the woods and shoot herself in the head. She is able to contract for safety on the unit. Patient reports anxiety is mostly related to nicotine withdrawal, discussed that we could start her on nicotine replacement therapy, patient does not want to do this as she does not want nicotine in her system due to a possible pulmonary procedure in the future. Patient is tearful when discussing her alcohol use, we explored possibly transitioning to recovery housing post discharge, patient is interested in this and encouraged her to discuss this with social work. Bedside CIWA 3, withdrawal symptoms of anxiety and slight headache. She is currently on a slow Librium taper due to her Klonopin and alcohol use. Discussed with patient starting her on gabapentin, she reports she has been on gabapentin in the past but was not compliant with it and unsure if it helped with anxiety or pain, she is willing to another trial today. Reports that her sleep is interrupted due to anxiety. Appetite is still decreased but improving. Social work was notified of patient's suicidal ideation regarding guns, and that the patient has access to guns at home, social work to follow-up with her  to make sure firearms are secured in the home at all times.     Appetite:  [] Normal/Unchanged  [] Increased  [x] Decreased      Sleep:       [] Normal/Unchanged  [] Fair       [x] Poor              Energy:    [] Normal/Unchanged  [] Increased  [x] Decreased Aggression:  [] yes  [x] no    Patient is [x] able  [] unable to CONTRACT FOR SAFETY ON THE UNIT    PAST MEDICAL/PSYCHIATRIC HISTORY:   Past Medical History:   Diagnosis Date    Acid reflux     Anxiety     Arthritis     Cancer ([facility] [address]     cervical    COPD (chronic obstructive pulmonary disease) ([facility] [address] 02/23/2018    DDD (degenerative disc disease), cervical     Depression     Hiatal hernia     Spinal stenosis     Tachycardia     Thyroid disease     hypo       FAMILY/SOCIAL HISTORY:  History reviewed. No pertinent family history.   Social History     Socioeconomic History    Marital status:      Spouse name: Not on file    Number of children: Not on file    Years of education: Not on file    Highest education level: Not on file   Occupational History    Not on file   Social Needs    Financial resource strain: Not on file    Food insecurity     Worry: Not on file     Inability: Not on file    Transportation needs     Medical: Not on file     Non-medical: Not on file   Tobacco Use    Smoking status: Heavy Tobacco Smoker     Packs/day: 1.00     Years: 48.00     Pack years: 48.00     Types: Cigarettes    Smokeless tobacco: Never Used    Tobacco comment: previous 3 ppd smoker   Substance and Sexual Activity    Alcohol use: Yes     Comment: occassional    Drug use: Never    Sexual activity: Not on file   Lifestyle    Physical activity     Days per week: Not on file     Minutes per session: Not on file    Stress: Not on file   Relationships    Social connections     Talks on phone: Not on file     Gets together: Not on file     Attends Jewish service: Not on file     Active member of club or organization: Not on file     Attends meetings of clubs or organizations: Not on file     Relationship status: Not on file    Intimate partner violence     Fear of current or ex partner: Not on file     Emotionally abused: Not on file     Physically abused: Not on file     Forced sexual activity: Not on file   Other Topics Concern    Not on file   Social History Narrative    Not on file           ROS:  [x] All negative/unchanged except if checked.  Explain positive(checked items) below:  [] Constitutional  [] Eyes  [] Ear/Nose/Mouth/Throat  [] Respiratory  [] CV  [] GI  []   [] Musculoskeletal  [] Skin/Breast  [x] Neurological  [] Endocrine  [] Heme/Lymph  [] Allergic/Immunologic    Explanation: Headache  MEDICATIONS:    Current Facility-Administered Medications:     chlordiazePOXIDE (LIBRIUM) capsule 10 mg, 10 mg, Oral, 4x Daily, Lydia Artis APRN - CNP    gabapentin (NEURONTIN) capsule 100 mg, 100 mg, Oral, TID, Lydia Artis APRN - CNP, 100 mg at 11/19/20 1100    fluvoxaMINE (LUVOX) tablet 50 mg, 50 mg, Oral, TID, Lydia Artis APRN - CNP, 50 mg at 11/19/20 0849    ondansetron (ZOFRAN) tablet 4 mg, 4 mg, Oral, 4x Daily PRN, NATHAN Veloz - CNP, 4 mg at 11/17/20 1808    vitamin B-1 (THIAMINE) tablet 100 mg, 100 mg, Oral, TID, Brooke Zaman MD, 100 mg at 11/19/20 0848    therapeutic multivitamin-minerals 1 tablet, 1 tablet, Oral, Daily, Brooke Zaman MD, 1 tablet at 11/19/20 0848    LORazepam (ATIVAN) tablet 1 mg, 1 mg, Oral, Q1H PRN **OR** LORazepam (ATIVAN) injection 1 mg, 1 mg, Intramuscular, Q1H PRN **OR** LORazepam (ATIVAN) tablet 2 mg, 2 mg, Oral, Q1H PRN **OR** LORazepam (ATIVAN) injection 2 mg, 2 mg, Intramuscular, Q1H PRN **OR** LORazepam (ATIVAN) tablet 3 mg, 3 mg, Oral, Q1H PRN **OR** LORazepam (ATIVAN) injection 3 mg, 3 mg, Intramuscular, Q1H PRN **OR** LORazepam (ATIVAN) tablet 4 mg, 4 mg, Oral, Q1H PRN **OR** LORazepam (ATIVAN) injection 4 mg, 4 mg, Intramuscular, Q1H PRN, Brooke Zaman MD    acetaminophen (TYLENOL) tablet 650 mg, 650 mg, Oral, Q4H PRN, Brooke Zaman MD    aluminum & magnesium hydroxide-simethicone (MAALOX) 200-200-20 MG/5ML suspension 30 mL, 30 mL, Oral, Q6H PRN, Brooke Zaman MD    hydrOXYzine (ATARAX) tablet 50 mg, 50 mg, Oral, TID PRN, Friddie Reasons Mabel Coleman MD    ibuprofen (ADVIL;MOTRIN) tablet 600 mg, 600 mg, Oral, Q6H PRN, Ok Mejia MD    traZODone (DESYREL) tablet 50 mg, 50 mg, Oral, Nightly PRN, Ok Mejia MD    polyethylene glycol (GLYCOLAX) packet 17 g, 17 g, Oral, Daily PRN, Ok Mejia MD    calcium carbonate-vitamin D (CALTRATE) 600-400 MG-UNIT per tab 1 tablet, 1 tablet, Oral, Daily, Ok Mejia MD, 1 tablet at 11/19/20 0848    carvedilol (COREG) tablet 3.125 mg, 3.125 mg, Oral, BID WC, Ok Mejia MD, 3.125 mg at 11/19/20 0847    fluticasone (FLOVENT HFA) 110 MCG/ACT inhaler 1 puff, 1 puff, Inhalation, BID, Ok Mejia MD, 1 puff at 11/19/20 0849    levothyroxine (SYNTHROID) tablet 88 mcg, 88 mcg, Oral, Daily, Ok Mejia MD, 88 mcg at 11/19/20 0848    mirtazapine (REMERON) tablet 30 mg, 30 mg, Oral, Nightly, Ok Mejia MD, 30 mg at 11/18/20 2032    predniSONE (DELTASONE) tablet 10 mg, 10 mg, Oral, Daily, Ok Mejia MD, 10 mg at 11/19/20 0847    glycopyrrolate-formoterol (BEVESPI) 9-4.8 MCG/ACT inhaler 2 puff, 2 puff, Inhalation, BID, Ok Mejia MD, 2 puff at 11/19/20 0849    pantoprazole (PROTONIX) tablet 40 mg, 40 mg, Oral, QAM AC, Ok Mejia MD, 40 mg at 11/19/20 0848    vitamin C (ASCORBIC ACID) tablet 500 mg, 500 mg, Oral, Daily, Ok Mejia MD, 500 mg at 11/19/20 0848    albuterol (PROVENTIL) nebulizer solution 2.5 mg, 2.5 mg, Nebulization, Q6H PRN, Ok Mejia MD, 2.5 mg at 11/19/20 1241    albuterol sulfate  (90 Base) MCG/ACT inhaler 2 puff, 2 puff, Inhalation, Q6H PRN, Ok Mejia MD, 2 puff at 11/18/20 1951      Examination:  /74   Pulse 71   Temp 97.9 °F (36.6 °C) (Oral)   Resp 14   Ht 5' 4\" (1.626 m)   Wt 135 lb (61.2 kg)   SpO2 97%   BMI 23.17 kg/m²   Gait - steady  Medication side effects(SE): None    Mental Status Examination:    Level of consciousness: Alert  Appearance: Dressed in hospital attire, good grooming and hygiene, just patient and treatment team.  Follow-up daily while inpatient  PSYCHOTHERAPY/COUNSELING:  [] Therapeutic interview  [x] Supportive  [] CBT  [] Ongoing  [] Other    [x] Patient continues to need, on a daily basis, active treatment furnished directly by or requiring the supervision of inpatient psychiatric personnel      Anticipated Length of stay: Per attending MD                                         Phong Virgen is a 61 y.o. female being evaluated face to face. --Raheem Alvarez, APRN - CNP on 11/19/2020 at 1:00 PM    An electronic signature was used to authenticate this note. **This report has been created using voice recognition software. It may contain minor errors which are inherent in voice recognition technology. **      I independently saw and evaluated the patient. I reviewed the midlevel provider's documentation above. Any additional comments or changes to the midlevel provider's documentation are stated below otherwise agree with assessment. The patient seem to have improved energy levels. However subjective experiences still of severe depression    Patient s symptoms   are improving    PLAN  Continue medications as above  Attempt to develop insight  Psycho-education conducted. Supportive Therapy conducted.   Probable discharge is next week  Follow-up daily while on inpatient unit    Electronically signed by Hollis Hudson MD on 11/19/20 at 6:35 PM EST

## 2020-11-20 PROCEDURE — 99232 SBSQ HOSP IP/OBS MODERATE 35: CPT | Performed by: PSYCHIATRY & NEUROLOGY

## 2020-11-20 PROCEDURE — 6370000000 HC RX 637 (ALT 250 FOR IP): Performed by: NURSE PRACTITIONER

## 2020-11-20 PROCEDURE — 6360000002 HC RX W HCPCS: Performed by: PSYCHIATRY & NEUROLOGY

## 2020-11-20 PROCEDURE — 6370000000 HC RX 637 (ALT 250 FOR IP): Performed by: PSYCHIATRY & NEUROLOGY

## 2020-11-20 PROCEDURE — 1240000000 HC EMOTIONAL WELLNESS R&B

## 2020-11-20 RX ORDER — GABAPENTIN 300 MG/1
300 CAPSULE ORAL 3 TIMES DAILY
Status: DISCONTINUED | OUTPATIENT
Start: 2020-11-20 | End: 2020-11-29

## 2020-11-20 RX ADMIN — FLUVOXAMINE MALEATE 50 MG: 50 TABLET, COATED ORAL at 08:29

## 2020-11-20 RX ADMIN — PANTOPRAZOLE SODIUM 40 MG: 40 TABLET, DELAYED RELEASE ORAL at 08:28

## 2020-11-20 RX ADMIN — GLYCOPYRROLATE AND FORMOTEROL FUMARATE 2 PUFF: 9; 4.8 AEROSOL, METERED RESPIRATORY (INHALATION) at 08:31

## 2020-11-20 RX ADMIN — FLUVOXAMINE MALEATE 50 MG: 50 TABLET, COATED ORAL at 21:17

## 2020-11-20 RX ADMIN — LEVOTHYROXINE SODIUM 88 MCG: 0.09 TABLET ORAL at 06:53

## 2020-11-20 RX ADMIN — CHLORDIAZEPOXIDE HYDROCHLORIDE 10 MG: 10 CAPSULE ORAL at 13:44

## 2020-11-20 RX ADMIN — GLYCOPYRROLATE AND FORMOTEROL FUMARATE 2 PUFF: 9; 4.8 AEROSOL, METERED RESPIRATORY (INHALATION) at 21:17

## 2020-11-20 RX ADMIN — CHLORDIAZEPOXIDE HYDROCHLORIDE 10 MG: 10 CAPSULE ORAL at 08:25

## 2020-11-20 RX ADMIN — GABAPENTIN 100 MG: 100 CAPSULE ORAL at 08:30

## 2020-11-20 RX ADMIN — CHLORDIAZEPOXIDE HYDROCHLORIDE 10 MG: 10 CAPSULE ORAL at 21:17

## 2020-11-20 RX ADMIN — Medication 1 PUFF: at 21:17

## 2020-11-20 RX ADMIN — MULTIPLE VITAMINS W/ MINERALS TAB 1 TABLET: TAB at 08:25

## 2020-11-20 RX ADMIN — GABAPENTIN 300 MG: 300 CAPSULE ORAL at 21:17

## 2020-11-20 RX ADMIN — CARVEDILOL 3.12 MG: 3.12 TABLET, FILM COATED ORAL at 17:18

## 2020-11-20 RX ADMIN — MIRTAZAPINE 30 MG: 30 TABLET, FILM COATED ORAL at 21:17

## 2020-11-20 RX ADMIN — THIAMINE HCL TAB 100 MG 100 MG: 100 TAB at 21:17

## 2020-11-20 RX ADMIN — CALCIUM CARBONATE 600 MG (1,500 MG)-VITAMIN D3 400 UNIT TABLET 1 TABLET: at 08:28

## 2020-11-20 RX ADMIN — THIAMINE HCL TAB 100 MG 100 MG: 100 TAB at 13:44

## 2020-11-20 RX ADMIN — FLUVOXAMINE MALEATE 50 MG: 50 TABLET, COATED ORAL at 13:45

## 2020-11-20 RX ADMIN — GABAPENTIN 100 MG: 100 CAPSULE ORAL at 13:44

## 2020-11-20 RX ADMIN — POLYETHYLENE GLYCOL 3350 17 G: 17 POWDER, FOR SOLUTION ORAL at 16:44

## 2020-11-20 RX ADMIN — Medication 1 PUFF: at 08:31

## 2020-11-20 RX ADMIN — PREDNISONE 10 MG: 10 TABLET ORAL at 08:30

## 2020-11-20 RX ADMIN — THIAMINE HCL TAB 100 MG 100 MG: 100 TAB at 08:25

## 2020-11-20 RX ADMIN — CHLORDIAZEPOXIDE HYDROCHLORIDE 10 MG: 10 CAPSULE ORAL at 17:18

## 2020-11-20 RX ADMIN — OXYCODONE HYDROCHLORIDE AND ACETAMINOPHEN 500 MG: 500 TABLET ORAL at 08:27

## 2020-11-20 RX ADMIN — ALBUTEROL SULFATE 2.5 MG: 2.5 SOLUTION RESPIRATORY (INHALATION) at 14:08

## 2020-11-20 NOTE — GROUP NOTE
Group Therapy Note    Date: 11/20/2020    Group Start Time: 1330  Group End Time: 4000  Group Topic: Cognitive Skills    [facility]    [place], [place]        Group Therapy Note    Attendees: 8/17         Patient's Goal:  To increase interpersonal interaction. Notes:  Pt attended and participated in group. Status After Intervention:  Improved    Participation Level:  Active Listener and Interactive    Participation Quality: Appropriate and Attentive      Speech:  normal      Thought Process/Content: Logical      Affective Functioning: Congruent      Mood: euthymic      Level of consciousness:  Alert and Attentive      Response to Learning: Progressing to goal      Endings: None Reported    Modes of Intervention: Education, Exploration, Problem-solving and Reality-testing      Discipline Responsible: Psychoeducational Specialist      Signature:  Cassi Hurst

## 2020-11-20 NOTE — GROUP NOTE
Group Therapy Note    Date: 11/20/2020    Group Start Time: 1600  Group End Time: 36  Group Topic: Healthy Living/Wellness    GEMA Briseno RN        Group Therapy Note    Attendees: 6 out of 15               Patient's Goal:  Improvement in mood through socialization and activity. Notes:  Patient was attentive and cooperative in group.      Status After Intervention:  Improved    Participation Level: Interactive    Participation Quality: Attentive      Speech:  normal      Thought Process/Content: Logical      Affective Functioning: Congruent      Mood: Appropriate      Level of consciousness:  Alert, Oriented x4 and Attentive      Response to Learning: Able to verbalize current knowledge/experience      Endings: None Reported    Modes of Intervention: Education      Discipline Responsible: Registered Nurse      Signature:  Dino Briseno RN

## 2020-11-20 NOTE — GROUP NOTE
Group Therapy Note    Date: 11/20/2020    Group Start Time: 1430  Group End Time: 1136  Group Topic: Psychoeducation    GEMA Umanzor, [address]    Attendees: 8         Patient's Goal:  To demonstrate increased interpersonal skills. Notes:  Patient attended group and actively participated in task at hand. Patient conversed appropriately with peers and Bj Loveer. Patient joined group approximately 15 minutes late after talking with a staff member. Status After Intervention:  Improved    Participation Level:  Active Listener and Interactive    Participation Quality: Appropriate, Attentive and Sharing      Speech:  normal      Thought Process/Content: Logical      Affective Functioning: Congruent      Mood: euthymic      Level of consciousness:  Alert, Oriented x4 and Attentive      Response to Learning: Able to verbalize current knowledge/experience, Able to verbalize/acknowledge new learning, Able to retain information, Capable of insight and Progressing to goal      Endings: None Reported       Modes of Intervention: Socialization, Exploration, Clarifying, Problem-solving, Activity, Confrontation, Limit-setting and Reality-testing      Discipline Responsible: Psychoeducational Specialist      Signature:  Austin Marsh

## 2020-11-20 NOTE — GROUP NOTE
Group Therapy Note    Date: 11/20/2020    Group Start Time: 0900  Group End Time: 0915  Group Topic: Community Meeting    G[facility] Olguin, [address]    Attendees: 8         Patient's Goal:  To verbalize obtainable short term goal for today pertaining to mental health and stability. Notes:  Patient verbalized goal for today to go through her folder of alcohol treatment options, to finish book and to shower. Status After Intervention:  Improved    Participation Level:  Active Listener and Interactive    Participation Quality: Appropriate, Attentive and Sharing      Speech:  normal      Thought Process/Content: Logical      Affective Functioning: Congruent      Mood: euthymic      Level of consciousness:  Alert, Oriented x4 and Attentive      Response to Learning: Able to verbalize current knowledge/experience, Able to verbalize/acknowledge new learning, Able to retain information, Capable of insight and Progressing to goal      Endings: None Reported       Modes of Intervention: Support, Socialization, Exploration, Clarifying, Problem-solving, Limit-setting and Reality-testing      Discipline Responsible: Psychoeducational Specialist      Signature:  Charlene Rowe

## 2020-11-20 NOTE — GROUP NOTE
Group Therapy Note   Date: November 20th, 2020   ? Group Start Time: 1000?????????????????   Group End Time: 3628   ? Group Topic: Psychotherapy   ? [facility], [facility]   ? Group Therapy Note   ? Attendees: 5/10   Patient's Goal: develop stress management / identify triggers and safety planning / discharge planning/ community resources / family support system   ? Notes: ?participated in group   ? Status After Intervention: ?Improved   ? Participation Level: Active Listener and Interactive   ? Participation Quality: Appropriate, Attentive and Sharing   ? Speech: ?Normal   ?   Thought Process/Content: Logical   ?   Affective Functioning: Congruent   ? Mood: Euthymic   ? Level of consciousness: ? Alert, Oriented x4 and Attentive   ? Response to Learning: Able to verbalize current knowledge/experience, Able to verbalize/acknowledge new learning, Able to retain information, Capable of insight, Able to change behavior and Progressing to goal   ?   Endings: None Reported   ? Modes of Intervention: Support, Exploration, Clarifying and Problem-solving   ? Discipline Responsible: Licensed Professional Clinical Counselor   ? Signature: ? Gauri [facility]CTOR, [facility]

## 2020-11-20 NOTE — PLAN OF CARE
Problem: Altered Mood, Depressive Behavior:  Goal: Able to verbalize and/or display a decrease in depressive symptoms  Description: Able to verbalize and/or display a decrease in depressive symptoms  11/19/2020 2217 by Kellie Spencer RN  Outcome: Ongoing   Pt endorses depressive symptoms, isolative to room and self for long intervals. Problem: Altered Mood, Depressive Behavior:  Goal: Ability to disclose and discuss suicidal ideas will improve  Description: Ability to disclose and discuss suicidal ideas will improve  11/19/2020 2217 by Kellie Spencer RN  Outcome: Ongoing   Pt denies suicidal thoughts at this time.    Problem: Altered Mood, Deterioration in Function:  Goal: Ability to perform activities of daily living will improve  Description: Ability to perform activities of daily living will improve  11/19/2020 2217 by Kellie Spencer RN  Outcome: Ongoing   Pt able to perform ADLs  Problem: Tobacco Use:  Goal: Inpatient tobacco use cessation counseling participation  Description: Inpatient tobacco use cessation counseling participation  11/19/2020 2217 by Kellie Spencer RN  Outcome: Ongoing   Pt declines

## 2020-11-20 NOTE — PROGRESS NOTES
Socioeconomic History    Marital status:      Spouse name: Not on file    Number of children: Not on file    Years of education: Not on file    Highest education level: Not on file   Occupational History    Not on file   Social Needs    Financial resource strain: Not on file    Food insecurity     Worry: Not on file     Inability: Not on file    Transportation needs     Medical: Not on file     Non-medical: Not on file   Tobacco Use    Smoking status: Heavy Tobacco Smoker     Packs/day: 1.00     Years: 48.00     Pack years: 48.00     Types: Cigarettes    Smokeless tobacco: Never Used    Tobacco comment: previous 3 ppd smoker   Substance and Sexual Activity    Alcohol use: Yes     Comment: occassional    Drug use: Never    Sexual activity: Not on file   Lifestyle    Physical activity     Days per week: Not on file     Minutes per session: Not on file    Stress: Not on file   Relationships    Social connections     Talks on phone: Not on file     Gets together: Not on file     Attends Yarsanism service: Not on file     Active member of club or organization: Not on file     Attends meetings of clubs or organizations: Not on file     Relationship status: Not on file    Intimate partner violence     Fear of current or ex partner: Not on file     Emotionally abused: Not on file     Physically abused: Not on file     Forced sexual activity: Not on file   Other Topics Concern    Not on file   Social History Narrative    Not on file           ROS:  [x] All negative/unchanged except if checked.  Explain positive(checked items) below:  [] Constitutional  [] Eyes  [] Ear/Nose/Mouth/Throat  [] Respiratory  [] CV  [] GI  []   [] Musculoskeletal  [] Skin/Breast  [] Neurological  [] Endocrine  [] Heme/Lymph  [] Allergic/Immunologic    Explanation:   MEDICATIONS:    Current Facility-Administered Medications:     gabapentin (NEURONTIN) capsule 300 mg, 300 mg, Oral, TID, NATHAN Black CNP  [place] chlordiazePOXIDE (LIBRIUM) capsule 10 mg, 10 mg, Oral, 4x Daily, NATHAN Oden - CNP, 10 mg at 11/20/20 1344    fluvoxaMINE (LUVOX) tablet 50 mg, 50 mg, Oral, TID, Rasheeda Pollock APRN - CNP, 50 mg at 11/20/20 1345    ondansetron (ZOFRAN) tablet 4 mg, 4 mg, Oral, 4x Daily PRN, NATHAN Cochran - CNP, 4 mg at 11/17/20 1808    vitamin B-1 (THIAMINE) tablet 100 mg, 100 mg, Oral, TID, Jonnie Cole MD, 100 mg at 11/20/20 1344    therapeutic multivitamin-minerals 1 tablet, 1 tablet, Oral, Daily, Jonnie Cole MD, 1 tablet at 11/20/20 0825    LORazepam (ATIVAN) tablet 1 mg, 1 mg, Oral, Q1H PRN **OR** LORazepam (ATIVAN) injection 1 mg, 1 mg, Intramuscular, Q1H PRN **OR** LORazepam (ATIVAN) tablet 2 mg, 2 mg, Oral, Q1H PRN **OR** LORazepam (ATIVAN) injection 2 mg, 2 mg, Intramuscular, Q1H PRN **OR** LORazepam (ATIVAN) tablet 3 mg, 3 mg, Oral, Q1H PRN **OR** LORazepam (ATIVAN) injection 3 mg, 3 mg, Intramuscular, Q1H PRN **OR** LORazepam (ATIVAN) tablet 4 mg, 4 mg, Oral, Q1H PRN **OR** LORazepam (ATIVAN) injection 4 mg, 4 mg, Intramuscular, Q1H PRN, Jonnie Cole MD    acetaminophen (TYLENOL) tablet 650 mg, 650 mg, Oral, Q4H PRN, Jonnie Cole MD    aluminum & magnesium hydroxide-simethicone (MAALOX) 200-200-20 MG/5ML suspension 30 mL, 30 mL, Oral, Q6H PRN, Jonnie Cole MD    hydrOXYzine (ATARAX) tablet 50 mg, 50 mg, Oral, TID PRN, Jonnie Cole MD    ibuprofen (ADVIL;MOTRIN) tablet 600 mg, 600 mg, Oral, Q6H PRN, Jonnie Cole MD    traZODone (DESYREL) tablet 50 mg, 50 mg, Oral, Nightly PRN, Jonnie Cole MD    polyethylene glycol (GLYCOLAX) packet 17 g, 17 g, Oral, Daily PRN, Jonnie Cole MD    calcium carbonate-vitamin D (CALTRATE) 600-400 MG-UNIT per tab 1 tablet, 1 tablet, Oral, Daily, Jonnie Cole MD, 1 tablet at 11/20/20 0828    carvedilol (COREG) tablet 3.125 mg, 3.125 mg, Oral, BID WC, Jonnie Cole MD, 3.125 mg at 11/19/20 1723    fluticasone (FLOVENT HFA) 110 MCG/ACT inhaler 1 puff, 1 puff, Inhalation, BID, Terry Mathias MD, 1 puff at 11/20/20 0831    levothyroxine (SYNTHROID) tablet 88 mcg, 88 mcg, Oral, Daily, Terry Mathias MD, 88 mcg at 11/20/20 0653    mirtazapine (REMERON) tablet 30 mg, 30 mg, Oral, Nightly, Terry Mathias MD, 30 mg at 11/19/20 2151    predniSONE (DELTASONE) tablet 10 mg, 10 mg, Oral, Daily, Terry Mathias MD, 10 mg at 11/20/20 0830    glycopyrrolate-formoterol (BEVESPI) 9-4.8 MCG/ACT inhaler 2 puff, 2 puff, Inhalation, BID, Terry Mathias MD, 2 puff at 11/20/20 0831    pantoprazole (PROTONIX) tablet 40 mg, 40 mg, Oral, QAM AC, Terry Mathias MD, 40 mg at 11/20/20 4661    vitamin C (ASCORBIC ACID) tablet 500 mg, 500 mg, Oral, Daily, Terry Mathias MD, 500 mg at 11/20/20 0827    albuterol (PROVENTIL) nebulizer solution 2.5 mg, 2.5 mg, Nebulization, Q6H PRN, Terry Mathias MD, 2.5 mg at 11/19/20 1241    albuterol sulfate  (90 Base) MCG/ACT inhaler 2 puff, 2 puff, Inhalation, Q6H PRN, Terry Mathias MD, 2 puff at 11/19/20 1512      Examination:  BP (!) 103/51   Pulse 53   Temp 98 °F (36.7 °C) (Oral)   Resp 16   Ht 5' 4\" (1.626 m)   Wt 135 lb (61.2 kg)   SpO2 97%   BMI 23.17 kg/m²   Gait - steady  Medication side effects(SE): None    Mental Status Examination:    Level of consciousness: Alert  Appearance: Dressed in hospital attire, good grooming and hygiene, just showered  Behavior/Motor: no psychomotor abnormalities  Attitude toward examiner: Cooperative with good eye contact  Speech: Normal rate normal tone good articulation  Mood: Depressed and anxious  Affect: Mood congruent  Thought processes: Logical and coherent  Thought content:  Homicidal ideation - none  Suicidal Ideation: Endorses  Delusions: None observed  Perceptual Disturbance: Denies  Cognition: Oriented to self location and circumstances  Concentration intact  Insight fair  Judgement fair    ASSESSMENT:   Patient symptoms are:  [] Well controlled  [x] Improving  [] Worsening  [] No change      Diagnosis:   Active Problems:    MDD (major depressive disorder), recurrent severe, without psychosis ([facility] [address]    Panic disorder    Obsessive compulsive disorder    Alcohol withdrawal syndrome with complication ([facility] [address]  Resolved Problems:    * No resolved hospital problems. *      LABS:    No results for input(s): WBC, HGB, PLT in the last 72 hours. No results for input(s): NA, K, CL, CO2, BUN, CREATININE, GLUCOSE in the last 72 hours. No results for input(s): BILITOT, ALKPHOS, AST, ALT in the last 72 hours. No results found for: LABAMPH, BARBSCNU, LABBENZ, CANNAB, COCAINESCRN, LABMETH, OPIATESCREENURINE, PHENCYCLIDINESCREENURINE, PPXUR, ETOH  Lab Results   Component Value Date    TSH 0.60 07/13/2018     No results found for: LITHIUM  No results found for: VALPROATE, CBMZ        Treatment Plan:  Reviewed current Medications with the patient. Increase gabapentin to 300 mg 3 times a day first dose today at 9 PM  Continue Librium taper    Encouraged patient to discuss outpatient recovery services post discharge with social work      Risks, benefits, side effects, drug-to-drug interactions and alternatives to treatment were discussed. The patient consented to treatment. Encourage patient to attend group and other milieu activities. Discharge planning discussed with the patient and treatment team.  Follow-up daily while inpatient  PSYCHOTHERAPY/COUNSELING:  [] Therapeutic interview  [x] Supportive  [] CBT  [] Ongoing  [] Other    [x] Patient continues to need, on a daily basis, active treatment furnished directly by or requiring the supervision of inpatient psychiatric personnel      Anticipated Length of stay: Per attending MD Carmella Mckay is a 61 y.o. female being evaluated face to face. --Keisha Manzano, NATHAN - CNP on 11/20/2020 at 2:18 PM    An electronic signature was used to authenticate this note.      **This report has been created using voice recognition software. It may contain minor errors which are inherent in voice recognition technology. **      I independently saw and evaluated the patient. I reviewed the midlevel provider's documentation above. Any additional comments or changes to the midlevel provider's documentation are stated below otherwise agree with assessment. Patient is discharge focused. However she acknowledges that she will likely relapse and become suicidal if she were to go home. She reports little improvement in her mood    Patient s symptoms   show no change    PLAN  Continue medications as above  Attempt to develop insight  Psycho-education conducted. Supportive Therapy conducted.   Probable discharge is next week  Follow-up daily while on inpatient unit    Electronically signed by Seble Fuller MD on 11/20/20 at 3:42 PM EST

## 2020-11-20 NOTE — CARE COORDINATION
Writer met with pt and dicussed AOD resources for inpatient support provided to her on previous date. She states she has not opened folder at this time despite CNP stating a 30 day program would benefit her. Pt contemplative, discussed holidays and trips with . Writer offered 1:1 support, discussed health concerns with COPD and alcohol use, and motivation interviewing techniques used to motivate stage of change.      Writer updated SW team

## 2020-11-20 NOTE — PLAN OF CARE
Problem: Altered Mood, Depressive Behavior:  Goal: Able to verbalize and/or display a decrease in depressive symptoms  Description: Able to verbalize and/or display a decrease in depressive symptoms  11/20/2020 0953 by Sherice Patel RN  Outcome: Ongoing  Note: Pt reports having depression and anxiety. Pt is cooperative with staff and compliant with medical treatment. Pt is out and social with select peers. Pt reports having fair sleep and an adequate diet. Problem: Altered Mood, Depressive Behavior:  Goal: Ability to disclose and discuss suicidal ideas will improve  Description: Ability to disclose and discuss suicidal ideas will improve  11/20/2020 0953 by Sherice Patel RN  Outcome: Ongoing  Note: Pt denies having suicidal ideation     Problem: Altered Mood, Deterioration in Function:  Goal: Ability to perform activities of daily living will improve  Description: Ability to perform activities of daily living will improve  11/20/2020 0953 by Sherice Patel RN  Outcome: Ongoing  Note: Pt is able to perform activities of daily living. Problem: Tobacco Use:  Goal: Inpatient tobacco use cessation counseling participation  Description: Inpatient tobacco use cessation counseling participation  11/20/2020 0953 by Sherice Patel RN  Outcome: Ongoing  Note: Pt is refusing medication to reduce the urge to smoke. Pt reports wanting to quit smoking.

## 2020-11-21 PROCEDURE — 1240000000 HC EMOTIONAL WELLNESS R&B

## 2020-11-21 PROCEDURE — 6370000000 HC RX 637 (ALT 250 FOR IP): Performed by: PSYCHIATRY & NEUROLOGY

## 2020-11-21 PROCEDURE — 94761 N-INVAS EAR/PLS OXIMETRY MLT: CPT

## 2020-11-21 PROCEDURE — 6370000000 HC RX 637 (ALT 250 FOR IP): Performed by: NURSE PRACTITIONER

## 2020-11-21 PROCEDURE — 94640 AIRWAY INHALATION TREATMENT: CPT

## 2020-11-21 PROCEDURE — 6360000002 HC RX W HCPCS: Performed by: PSYCHIATRY & NEUROLOGY

## 2020-11-21 PROCEDURE — 99232 SBSQ HOSP IP/OBS MODERATE 35: CPT | Performed by: PSYCHIATRY & NEUROLOGY

## 2020-11-21 RX ADMIN — OXYCODONE HYDROCHLORIDE AND ACETAMINOPHEN 500 MG: 500 TABLET ORAL at 08:49

## 2020-11-21 RX ADMIN — THIAMINE HCL TAB 100 MG 100 MG: 100 TAB at 20:51

## 2020-11-21 RX ADMIN — PREDNISONE 10 MG: 10 TABLET ORAL at 08:49

## 2020-11-21 RX ADMIN — GABAPENTIN 300 MG: 300 CAPSULE ORAL at 14:37

## 2020-11-21 RX ADMIN — FLUVOXAMINE MALEATE 50 MG: 50 TABLET, COATED ORAL at 20:51

## 2020-11-21 RX ADMIN — PANTOPRAZOLE SODIUM 40 MG: 40 TABLET, DELAYED RELEASE ORAL at 06:35

## 2020-11-21 RX ADMIN — THIAMINE HCL TAB 100 MG 100 MG: 100 TAB at 14:37

## 2020-11-21 RX ADMIN — CHLORDIAZEPOXIDE HYDROCHLORIDE 10 MG: 10 CAPSULE ORAL at 20:51

## 2020-11-21 RX ADMIN — CARVEDILOL 3.12 MG: 3.12 TABLET, FILM COATED ORAL at 08:53

## 2020-11-21 RX ADMIN — CALCIUM CARBONATE 600 MG (1,500 MG)-VITAMIN D3 400 UNIT TABLET 1 TABLET: at 08:49

## 2020-11-21 RX ADMIN — GABAPENTIN 300 MG: 300 CAPSULE ORAL at 08:49

## 2020-11-21 RX ADMIN — MULTIPLE VITAMINS W/ MINERALS TAB 1 TABLET: TAB at 08:48

## 2020-11-21 RX ADMIN — ALBUTEROL SULFATE 2.5 MG: 2.5 SOLUTION RESPIRATORY (INHALATION) at 14:42

## 2020-11-21 RX ADMIN — GLYCOPYRROLATE AND FORMOTEROL FUMARATE 2 PUFF: 9; 4.8 AEROSOL, METERED RESPIRATORY (INHALATION) at 20:51

## 2020-11-21 RX ADMIN — Medication 1 PUFF: at 08:48

## 2020-11-21 RX ADMIN — FLUVOXAMINE MALEATE 50 MG: 50 TABLET, COATED ORAL at 08:49

## 2020-11-21 RX ADMIN — GABAPENTIN 300 MG: 300 CAPSULE ORAL at 20:51

## 2020-11-21 RX ADMIN — CHLORDIAZEPOXIDE HYDROCHLORIDE 10 MG: 10 CAPSULE ORAL at 08:49

## 2020-11-21 RX ADMIN — MIRTAZAPINE 30 MG: 30 TABLET, FILM COATED ORAL at 20:51

## 2020-11-21 RX ADMIN — ACETAMINOPHEN 650 MG: 325 TABLET, FILM COATED ORAL at 10:04

## 2020-11-21 RX ADMIN — HYDROXYZINE HYDROCHLORIDE 50 MG: 50 TABLET, FILM COATED ORAL at 20:51

## 2020-11-21 RX ADMIN — Medication 1 PUFF: at 20:50

## 2020-11-21 RX ADMIN — GLYCOPYRROLATE AND FORMOTEROL FUMARATE 2 PUFF: 9; 4.8 AEROSOL, METERED RESPIRATORY (INHALATION) at 08:48

## 2020-11-21 RX ADMIN — FLUVOXAMINE MALEATE 50 MG: 50 TABLET, COATED ORAL at 14:37

## 2020-11-21 RX ADMIN — LEVOTHYROXINE SODIUM 88 MCG: 0.09 TABLET ORAL at 06:35

## 2020-11-21 RX ADMIN — THIAMINE HCL TAB 100 MG 100 MG: 100 TAB at 08:49

## 2020-11-21 RX ADMIN — ALUMINUM HYDROXIDE, MAGNESIUM HYDROXIDE, AND SIMETHICONE 30 ML: 200; 200; 20 SUSPENSION ORAL at 18:18

## 2020-11-21 ASSESSMENT — PAIN SCALES - GENERAL
PAINLEVEL_OUTOF10: 3
PAINLEVEL_OUTOF10: 6

## 2020-11-21 NOTE — PROGRESS NOTES
BEHAVIORAL HEALTH FOLLOW-UP NOTE     11/21/2020     Patient was seen and examined in person, Chart reviewed   Patient's case discussed with staff/team    Chief Complaint: Suicidal ideation and alcohol use    Interim History:   Patient is medication compliant and behaviorally in control. No overnight events reported. Patient continues to report anxiety and depression. She is feeling that her suicidal ideation is improving and has less frequent and less severe thoughts. She does discuss her concern about discontinuing her Klonopin. Patient reports she has been using it for many years related to her panic attacks and is fearful that when she is discharged she will not be prescribed Librium and will continue to use alcohol to help with her anxiety. Patient denies ever trying BuSpar for anxiety. Patient also discussed her concern with COPD. She verbalizes drinking alcohol as a way to avoid smoking so that she is able to have surgery that she believes will prolong her life. Patient is considering going to an inpatient alcohol treatment program, but is hopeful to be admitted to one that is a non-smoking facility. [facility] but is hopeful to be discharged by [place]. Patient feels that her current medication regimen is helping, and voices few withdrawal symptoms. Patient did have a headache today, but felt that as needed Tylenol provided relief. She also has mild tremors in her bilateral hands. Patient voices that she had poor sleep last night related to a \"loud patient\". She is hopeful that she will sleep better tonight and verbalizes that she is feeling tired right now. CIWA score 3. Continue on Librium taper of 10 mg 4 times daily. Slow taper due to alcohol detox and Klonopin discontinuation.     Appetite:  [x] Normal/Unchanged  [] Increased  [] Decreased      Sleep:       [] Normal/Unchanged  [x] Fair       [] Poor              Energy:    [] Normal/Unchanged  [] Increased  [x] Decreased        Aggression:  [] yes  [x] no    Patient is [x] able  [] unable to CONTRACT FOR SAFETY ON THE UNIT    PAST MEDICAL/PSYCHIATRIC HISTORY:   Past Medical History:   Diagnosis Date    Acid reflux     Anxiety     Arthritis     Cancer ([facility]     cervical    COPD (chronic obstructive pulmonary disease) ([facility] 02/23/2018    DDD (degenerative disc disease), cervical     Depression     Hiatal hernia     Spinal stenosis     Tachycardia     Thyroid disease     hypo       FAMILY/SOCIAL HISTORY:  History reviewed. No pertinent family history.   Social History     Socioeconomic History    Marital status:      Spouse name: Not on file    Number of children: Not on file    Years of education: Not on file    Highest education level: Not on file   Occupational History    Not on file   Social Needs    Financial resource strain: Not on file    Food insecurity     Worry: Not on file     Inability: Not on file    Transportation needs     Medical: Not on file     Non-medical: Not on file   Tobacco Use    Smoking status: Heavy Tobacco Smoker     Packs/day: 1.00     Years: 48.00     Pack years: 48.00     Types: Cigarettes    Smokeless tobacco: Never Used    Tobacco comment: previous 3 ppd smoker   Substance and Sexual Activity    Alcohol use: Yes     Comment: occassional    Drug use: Never    Sexual activity: Not on file   Lifestyle    Physical activity     Days per week: Not on file     Minutes per session: Not on file    Stress: Not on file   Relationships    Social connections     Talks on phone: Not on file     Gets together: Not on file     Attends Zoroastrianism service: Not on file     Active member of club or organization: Not on file     Attends meetings of clubs or organizations: Not on file     Relationship status: Not on file    Intimate partner violence     Fear of current or ex partner: Not on file     Emotionally abused: Not on file     Physically abused: Not on file     Forced sexual activity: Not on file   Other Topics Concern    Not on file   Social History Narrative    Not on file           ROS:  [x] All negative/unchanged except if checked.  Explain positive(checked items) below:  [] Constitutional  [] Eyes  [] Ear/Nose/Mouth/Throat  [] Respiratory  [] CV  [] GI  []   [] Musculoskeletal  [] Skin/Breast  [] Neurological  [] Endocrine  [] Heme/Lymph  [] Allergic/Immunologic    Explanation:   MEDICATIONS:    Current Facility-Administered Medications:     gabapentin (NEURONTIN) capsule 300 mg, 300 mg, Oral, TID, Mely Pali, APRN - CNP, 300 mg at 11/21/20 1437    chlordiazePOXIDE (LIBRIUM) capsule 10 mg, 10 mg, Oral, 4x Daily, Mely Pali, APRN - CNP, 10 mg at 11/21/20 0849    fluvoxaMINE (LUVOX) tablet 50 mg, 50 mg, Oral, TID, Mely Pali, APRN - CNP, 50 mg at 11/21/20 1437    ondansetron (ZOFRAN) tablet 4 mg, 4 mg, Oral, 4x Daily PRN, Inis Arn, APRN - CNP, 4 mg at 11/17/20 1808    vitamin B-1 (THIAMINE) tablet 100 mg, 100 mg, Oral, TID, Julian Duff MD, 100 mg at 11/21/20 1437    therapeutic multivitamin-minerals 1 tablet, 1 tablet, Oral, Daily, Julian Duff MD, 1 tablet at 11/21/20 0848    LORazepam (ATIVAN) tablet 1 mg, 1 mg, Oral, Q1H PRN **OR** LORazepam (ATIVAN) injection 1 mg, 1 mg, Intramuscular, Q1H PRN **OR** LORazepam (ATIVAN) tablet 2 mg, 2 mg, Oral, Q1H PRN **OR** LORazepam (ATIVAN) injection 2 mg, 2 mg, Intramuscular, Q1H PRN **OR** LORazepam (ATIVAN) tablet 3 mg, 3 mg, Oral, Q1H PRN **OR** LORazepam (ATIVAN) injection 3 mg, 3 mg, Intramuscular, Q1H PRN **OR** LORazepam (ATIVAN) tablet 4 mg, 4 mg, Oral, Q1H PRN **OR** LORazepam (ATIVAN) injection 4 mg, 4 mg, Intramuscular, Q1H PRN, Julian Duff MD    acetaminophen (TYLENOL) tablet 650 mg, 650 mg, Oral, Q4H PRN, Julian Duff MD, 650 mg at 11/21/20 1004    aluminum & magnesium hydroxide-simethicone (MAALOX) 200-200-20 MG/5ML suspension 30 mL, 30 mL, Oral, Q6H PRN, Elina Amaya MD    hydrOXYzine (ATARAX) tablet 50 mg, 50 mg, Oral, TID PRN, Elina Amaya MD    ibuprofen (ADVIL;MOTRIN) tablet 600 mg, 600 mg, Oral, Q6H PRN, Elina Amaya MD    traZODone (DESYREL) tablet 50 mg, 50 mg, Oral, Nightly PRN, Elina Amaya MD    polyethylene glycol (GLYCOLAX) packet 17 g, 17 g, Oral, Daily PRN, Elina Amaay MD, 17 g at 11/20/20 1644    calcium carbonate-vitamin D (CALTRATE) 600-400 MG-UNIT per tab 1 tablet, 1 tablet, Oral, Daily, Elina Amaya MD, 1 tablet at 11/21/20 0849    carvedilol (COREG) tablet 3.125 mg, 3.125 mg, Oral, BID WC, Elina Amaya MD, 3.125 mg at 11/21/20 0853    fluticasone (FLOVENT HFA) 110 MCG/ACT inhaler 1 puff, 1 puff, Inhalation, BID, Elina Amaya MD, 1 puff at 11/21/20 0848    levothyroxine (SYNTHROID) tablet 88 mcg, 88 mcg, Oral, Daily, Elina Amaya MD, 88 mcg at 11/21/20 9567    mirtazapine (REMERON) tablet 30 mg, 30 mg, Oral, Nightly, Elina Amaya MD, 30 mg at 11/20/20 2117    predniSONE (DELTASONE) tablet 10 mg, 10 mg, Oral, Daily, Elina Amaya MD, 10 mg at 11/21/20 0849    glycopyrrolate-formoterol (BEVESPI) 9-4.8 MCG/ACT inhaler 2 puff, 2 puff, Inhalation, BID, Elina Amaya MD, 2 puff at 11/21/20 0848    pantoprazole (PROTONIX) tablet 40 mg, 40 mg, Oral, QAM AC, Elina Amaya MD, 40 mg at 11/21/20 6496    vitamin C (ASCORBIC ACID) tablet 500 mg, 500 mg, Oral, Daily, Elina Amaya MD, 500 mg at 11/21/20 0849    albuterol (PROVENTIL) nebulizer solution 2.5 mg, 2.5 mg, Nebulization, Q6H PRN, Elina Amaya MD, 2.5 mg at 11/21/20 1442    albuterol sulfate  (90 Base) MCG/ACT inhaler 2 puff, 2 puff, Inhalation, Q6H PRN, Elina Amaya MD, 2 puff at 11/19/20 1512      Examination:  /65   Pulse 65   Temp 98.3 °F (36.8 °C) (Oral)   Resp 14   Ht 5' 4\" (1.626 m)   Wt 135 lb (61.2 kg)   SpO2 97%   BMI 23.17 kg/m²   Gait - steady  Medication side effects(SE): None    Mental Status Examination:    Level of consciousness: Alert  Appearance: Dressed in hospital attire, good grooming and hygiene  Behavior/Motor: no psychomotor abnormalities  Attitude toward examiner: Cooperative with good eye contact  Speech: Normal rate normal tone good articulation  Mood: Depressed and anxious  Affect: Mood congruent  Thought processes: Logical and coherent occasionally circumstantial  Thought content:  Homicidal ideation - none  Suicidal Ideation: Endorses  Delusions: None observed  Perceptual Disturbance: Denies  Cognition: Oriented to self location and circumstances  Concentration intact  Insight fair  Judgement fair    ASSESSMENT:   Patient symptoms are:  [] Well controlled  [x] Improving  [] Worsening  [] No change      Diagnosis:   Active Problems:    MDD (major depressive disorder), recurrent severe, without psychosis ([facility] [address]    Panic disorder    Obsessive compulsive disorder    Alcohol withdrawal syndrome with complication ([facility]  Resolved Problems:    * No resolved hospital problems. *      LABS:    No results for input(s): WBC, HGB, PLT in the last 72 hours. No results for input(s): NA, K, CL, CO2, BUN, CREATININE, GLUCOSE in the last 72 hours. No results for input(s): BILITOT, ALKPHOS, AST, ALT in the last 72 hours. No results found for: LABAMPH, BARBSCNU, LABBENZ, CANNAB, COCAINESCRN, LABMETH, OPIATESCREENURINE, PHENCYCLIDINESCREENURINE, PPXUR, ETOH  Lab Results   Component Value Date    TSH 0.60 07/13/2018     No results found for: LITHIUM  No results found for: VALPROATE, CBMZ        Treatment Plan:  Reviewed current Medications with the patient. Continue Librium taper    Encouraged patient to discuss outpatient recovery services post discharge with social work. Patient is considering 30-day inpatient treatment program.      Risks, benefits, side effects, drug-to-drug interactions and alternatives to treatment were discussed. The patient consented to treatment.      Encourage patient to attend group and other milieu

## 2020-11-21 NOTE — PLAN OF CARE
Problem: Altered Mood, Depressive Behavior:  Goal: Able to verbalize and/or display a decrease in depressive symptoms  Description: Able to verbalize and/or display a decrease in depressive symptoms  11/20/2020 2311 by Hali Edward RN  Outcome: Ongoing   Pt endorses depressive symptoms and that they are improving. Problem: Altered Mood, Depressive Behavior:  Goal: Ability to disclose and discuss suicidal ideas will improve  Description: Ability to disclose and discuss suicidal ideas will improve  11/20/2020 2311 by Hali Edward RN  Outcome: Ongoing   Pt denies suicidal thoughts at this time.   Problem: Altered Mood, Deterioration in Function:  Goal: Ability to perform activities of daily living will improve  Description: Ability to perform activities of daily living will improve  11/20/2020 2311 by Hali Edward RN  Outcome: Ongoing   Pt able to perform ADLs  Problem: Tobacco Use:  Goal: Inpatient tobacco use cessation counseling participation  Description: Inpatient tobacco use cessation counseling participation  11/20/2020 2311 by Hali Edward RN  Outcome: Ongoing   Pt declines

## 2020-11-21 NOTE — GROUP NOTE
Group Therapy Note    Date: 11/21/2020    Group Start Time: 1600  Group End Time: 1700  Group Topic: Recovery    [facility]    Wanda Soto LPN        Group Therapy Note    Attendees: 7/16         Patient's Goal:  Coping skills    Notes:  See below    Status After Intervention:Improving     Participation Level:  Active listener, adding to group discussion    Participation Quality: Appropriate, Attentive, Sharing      Speech: normal      Thought Process/Content: Logical      Mood: stable, uplifting      Level of consciousness: Alert and oriented      Response to Learning: Accepting      Endings: Accepting of situation, open to new ways of doing things    Modes of Intervention: Education      Discipline Responsible: Nurse      Signature:  Wanda Soto LPN

## 2020-11-21 NOTE — GROUP NOTE
Group Therapy Note    Date: 11/21/2020    Group Start Time: 1087  Group End Time: 0920  Group Topic: Community Meeting    GEMA HAQUE    Catalina Shelley        Group Therapy Note    Attendees: 4/14           Patient's Goal:  To orient to unit and set a daily goal    Notes:  Patient attended and participated in group. Daily goal: \"walk more\" patient talked about walking group from previous day and expressed excitement about being able to walk for 22 minutes during group. Patient wants to continue improving this when returning home but expresses it may be difficult to stop smoking, but is hopeful she can do it. Status After Intervention:  Improved    Participation Level:  Active Listener and Interactive    Participation Quality: Appropriate, Attentive and Sharing      Speech:  normal      Thought Process/Content: Logical  Linear      Affective Functioning: Congruent      Mood: euthymic      Level of consciousness:  Alert and Attentive      Response to Learning: Able to verbalize current knowledge/experience and Progressing to goal      Endings: None Reported    Modes of Intervention: Education, Support, Socialization, Exploration, Clarifying and Reality-testing      Discipline Responsible: Psychoeducational Specialist      Signature:  Catalina Shelley

## 2020-11-21 NOTE — GROUP NOTE
Group Therapy Note    Date: 11/21/2020    Group Start Time: 1000  Group End Time: 4170  Group Topic: Psychotherapy    GEMA HAQUE    LIAM Hunt LSW        Group Therapy Note    Attendees: 5/16         Patient's Goal:  Talk about forgiveness    Notes:  Therapeutic worksheet provided    Status After Intervention:  Unchanged    Participation Level:  Active Listener and Interactive    Participation Quality: Appropriate, Attentive and Sharing      Speech:  normal      Thought Process/Content: Logical      Affective Functioning: Congruent      Mood: dysphoric      Level of consciousness:  Alert      Response to Learning: Able to verbalize current knowledge/experience and Able to verbalize/acknowledge new learning      Endings: None Reported    Modes of Intervention: Education and Support      Discipline Responsible: /Counselor      Signature:  LIAM Hunt LSW

## 2020-11-22 PROCEDURE — 99232 SBSQ HOSP IP/OBS MODERATE 35: CPT | Performed by: PSYCHIATRY & NEUROLOGY

## 2020-11-22 PROCEDURE — 94640 AIRWAY INHALATION TREATMENT: CPT

## 2020-11-22 PROCEDURE — 1240000000 HC EMOTIONAL WELLNESS R&B

## 2020-11-22 PROCEDURE — 6370000000 HC RX 637 (ALT 250 FOR IP): Performed by: NURSE PRACTITIONER

## 2020-11-22 PROCEDURE — 94761 N-INVAS EAR/PLS OXIMETRY MLT: CPT

## 2020-11-22 PROCEDURE — 6360000002 HC RX W HCPCS: Performed by: PSYCHIATRY & NEUROLOGY

## 2020-11-22 PROCEDURE — 6370000000 HC RX 637 (ALT 250 FOR IP): Performed by: PSYCHIATRY & NEUROLOGY

## 2020-11-22 RX ORDER — FAMOTIDINE 20 MG/1
20 TABLET, FILM COATED ORAL 2 TIMES DAILY
Status: DISCONTINUED | OUTPATIENT
Start: 2020-11-22 | End: 2020-11-30 | Stop reason: HOSPADM

## 2020-11-22 RX ORDER — GUAIFENESIN 600 MG/1
600 TABLET, EXTENDED RELEASE ORAL 2 TIMES DAILY PRN
Status: DISCONTINUED | OUTPATIENT
Start: 2020-11-22 | End: 2020-11-30 | Stop reason: HOSPADM

## 2020-11-22 RX ORDER — OMEPRAZOLE 20 MG/1
20 CAPSULE, DELAYED RELEASE ORAL DAILY
Status: DISCONTINUED | OUTPATIENT
Start: 2020-11-23 | End: 2020-11-27

## 2020-11-22 RX ADMIN — PANTOPRAZOLE SODIUM 40 MG: 40 TABLET, DELAYED RELEASE ORAL at 08:21

## 2020-11-22 RX ADMIN — THIAMINE HCL TAB 100 MG 100 MG: 100 TAB at 13:22

## 2020-11-22 RX ADMIN — THIAMINE HCL TAB 100 MG 100 MG: 100 TAB at 21:02

## 2020-11-22 RX ADMIN — GABAPENTIN 300 MG: 300 CAPSULE ORAL at 13:22

## 2020-11-22 RX ADMIN — THIAMINE HCL TAB 100 MG 100 MG: 100 TAB at 08:22

## 2020-11-22 RX ADMIN — ALBUTEROL SULFATE 2.5 MG: 2.5 SOLUTION RESPIRATORY (INHALATION) at 16:33

## 2020-11-22 RX ADMIN — CHLORDIAZEPOXIDE HYDROCHLORIDE 10 MG: 10 CAPSULE ORAL at 21:02

## 2020-11-22 RX ADMIN — GLYCOPYRROLATE AND FORMOTEROL FUMARATE 2 PUFF: 9; 4.8 AEROSOL, METERED RESPIRATORY (INHALATION) at 08:20

## 2020-11-22 RX ADMIN — GLYCOPYRROLATE AND FORMOTEROL FUMARATE 2 PUFF: 9; 4.8 AEROSOL, METERED RESPIRATORY (INHALATION) at 21:01

## 2020-11-22 RX ADMIN — POLYETHYLENE GLYCOL 3350 17 G: 17 POWDER, FOR SOLUTION ORAL at 09:28

## 2020-11-22 RX ADMIN — FLUVOXAMINE MALEATE 50 MG: 50 TABLET, COATED ORAL at 13:22

## 2020-11-22 RX ADMIN — CARVEDILOL 3.12 MG: 3.12 TABLET, FILM COATED ORAL at 08:21

## 2020-11-22 RX ADMIN — MULTIPLE VITAMINS W/ MINERALS TAB 1 TABLET: TAB at 08:21

## 2020-11-22 RX ADMIN — HYDROXYZINE HYDROCHLORIDE 50 MG: 50 TABLET, FILM COATED ORAL at 21:02

## 2020-11-22 RX ADMIN — MIRTAZAPINE 30 MG: 30 TABLET, FILM COATED ORAL at 21:02

## 2020-11-22 RX ADMIN — FLUVOXAMINE MALEATE 50 MG: 50 TABLET, COATED ORAL at 08:20

## 2020-11-22 RX ADMIN — CALCIUM CARBONATE 600 MG (1,500 MG)-VITAMIN D3 400 UNIT TABLET 1 TABLET: at 08:21

## 2020-11-22 RX ADMIN — FAMOTIDINE 20 MG: 20 TABLET, FILM COATED ORAL at 21:02

## 2020-11-22 RX ADMIN — GUAIFENESIN 600 MG: 600 TABLET, EXTENDED RELEASE ORAL at 18:23

## 2020-11-22 RX ADMIN — CHLORDIAZEPOXIDE HYDROCHLORIDE 10 MG: 10 CAPSULE ORAL at 13:22

## 2020-11-22 RX ADMIN — OXYCODONE HYDROCHLORIDE AND ACETAMINOPHEN 500 MG: 500 TABLET ORAL at 08:21

## 2020-11-22 RX ADMIN — FLUVOXAMINE MALEATE 50 MG: 50 TABLET, COATED ORAL at 21:01

## 2020-11-22 RX ADMIN — CHLORDIAZEPOXIDE HYDROCHLORIDE 10 MG: 10 CAPSULE ORAL at 16:43

## 2020-11-22 RX ADMIN — CARVEDILOL 3.12 MG: 3.12 TABLET, FILM COATED ORAL at 16:43

## 2020-11-22 RX ADMIN — LEVOTHYROXINE SODIUM 88 MCG: 0.09 TABLET ORAL at 07:54

## 2020-11-22 RX ADMIN — Medication 1 PUFF: at 21:01

## 2020-11-22 RX ADMIN — CHLORDIAZEPOXIDE HYDROCHLORIDE 10 MG: 10 CAPSULE ORAL at 08:20

## 2020-11-22 RX ADMIN — HYDROXYZINE HYDROCHLORIDE 50 MG: 50 TABLET, FILM COATED ORAL at 04:19

## 2020-11-22 RX ADMIN — GABAPENTIN 300 MG: 300 CAPSULE ORAL at 21:02

## 2020-11-22 RX ADMIN — PREDNISONE 10 MG: 10 TABLET ORAL at 08:21

## 2020-11-22 RX ADMIN — Medication 1 PUFF: at 08:24

## 2020-11-22 RX ADMIN — GABAPENTIN 300 MG: 300 CAPSULE ORAL at 08:21

## 2020-11-22 NOTE — GROUP NOTE
Group Therapy Note    Date: 11/22/2020    Group Start Time: 4254  Group End Time: 0920  Group Topic: Community Meeting    GEMA Hansen        Group Therapy Note    Attendees: 4/17       Patient's Goal:  To orient to unit and set a daily goal     Notes:  Patient attended and participated in group. Daily goal: Shower and do 30 minutes of exercise today     Status After Intervention:  Improved     Participation Level:  Active Listener and Interactive     Participation Quality: Appropriate, Attentive and Sharing        Speech:  normal        Thought Process/Content: Logical  Linear        Affective Functioning: Congruent        Mood: euthymic        Level of consciousness:  Alert and Attentive        Response to Learning: Able to verbalize current knowledge/experience and Progressing to goal        Endings: None Reported     Modes of Intervention: Education, Support, Socialization, Exploration, Clarifying and Reality-testing        Discipline Responsible: Psychoeducational Specialist        Signature:  Randi Hansen

## 2020-11-22 NOTE — GROUP NOTE
Group Therapy Note    Date: 11/22/2020    Group Start Time: 1000  Group End Time: 1041  Group Topic: Psychoeducation    YOLANDA Ernst        Group Therapy Note    Attendees: 6/19      Pt denied 1:1. Despite staff encouragement, pt denied 10:00am psychoeducation group.             Signature:  YOLANDA Cuba

## 2020-11-22 NOTE — PLAN OF CARE
Problem: Altered Mood, Depressive Behavior:  Goal: Able to verbalize and/or display a decrease in depressive symptoms  Description: Able to verbalize and/or display a decrease in depressive symptoms  Note: PT is out and social on unit. PT appears brighter and is hoping for discharge in a couple of days. PT rates depression a 5 and anxiety a 7. PT reports poor sleep. Pt is awake a few times through out the night. Problem: Altered Mood, Depressive Behavior:  Goal: Ability to disclose and discuss suicidal ideas will improve  Description: Ability to disclose and discuss suicidal ideas will improve  Note: Pt denies thoughts of self harm and is agreeable to seeking out should thoughts of self harm arise. Safe environment maintained. Q15 minute checks for safety cont per unit policy. Will cont to monitor for safety and provides support and reassurance as needed.

## 2020-11-22 NOTE — PROGRESS NOTES
[facility] [address] [facility] [address] NOTE     11/22/2020     Patient was seen and examined in person, Chart reviewed   Patient's case discussed with staff/team    Chief Complaint: Suicidal ideation and alcohol use    Interim History:   Patient is medication compliant and behaviorally in control. No overnight events reported. Patient is making many somatic complaints to writer, and is requesting antibiotics. She voices concern that she is developing a sinus infection that could lead to her chest.  She believes that if she were to become sick, her COPD would exacerbate symptoms and she would not do well. Patient does not have a fever or cough. We will monitor for symptoms and start Mucinex as needed to help with congestion. Patient is anxious and tearful during discussion with writer. She voices that she is homesick and does not intend to discharge to a treatment program.  She endorses feeling hopeless and helpless in her situation and verbalizes having dreams consisting of smoking cigarettes and drinking alcohol. She explains that those dreams are both pleasing and upsetting. She states that she has been isolated to her room, but has been attending some groups. She does appear disheveled today and states that she slept poorly last night. Per review of purposeful rounding charting, patient had multiple interrupted sleep intervals throughout the night. She continues to endorse suicidal ideation, and feels that it was worse today than yesterday. She does however contract for safety and voices she does not intend to harm herself.     Appetite:  [x] Normal/Unchanged  [] Increased  [] Decreased      Sleep:       [] Normal/Unchanged  [x] Fair       [] Poor              Energy:    [] Normal/Unchanged  [] Increased  [x] Decreased        Aggression:  [] yes  [x] no    Patient is [x] able  [] unable to CONTRACT FOR SAFETY ON THE UNIT    PAST MEDICAL/PSYCHIATRIC HISTORY:   Past Medical History:   Diagnosis Date    Acid reflux     Anxiety     Arthritis     Cancer (HCC)     cervical    COPD [facility] obstructive [facility] 02/23/2018    DDD (degenerative disc disease), cervical     Depression     Hiatal hernia     Spinal stenosis     Tachycardia     Thyroid disease     hypo       FAMILY/SOCIAL HISTORY:  History reviewed. No pertinent family history. Social History     Socioeconomic History    Marital status:      Spouse name: Not on file    Number of children: Not on file    Years of education: Not on file    Highest education level: Not on file   Occupational History    Not on file   Social Needs    Financial resource strain: Not on file    Food insecurity     Worry: Not on file     Inability: Not on file    Transportation needs     Medical: Not on file     Non-medical: Not on file   Tobacco Use    Smoking status: Heavy Tobacco Smoker     Packs/day: 1.00     Years: 48.00     Pack years: 48.00     Types: Cigarettes    Smokeless tobacco: Never Used    Tobacco comment: previous 3 ppd smoker   Substance and Sexual Activity    Alcohol use: Yes     Comment: occassional    Drug use: Never    Sexual activity: Not on file   Lifestyle    Physical activity     Days per week: Not on file     Minutes per session: Not on file    Stress: Not on file   Relationships    Social connections     Talks on phone: Not on file     Gets together: Not on file     Attends [employer] service: Not on file     Active member of club or organization: Not on file     Attends meetings of clubs or organizations: Not on file     Relationship status: Not on file    Intimate partner violence     Fear of current or ex partner: Not on file     Emotionally abused: Not on file     Physically abused: Not on file     Forced sexual activity: Not on file   Other Topics Concern    Not on file   Social History Narrative    Not on file           ROS:  [x] All negative/unchanged except if checked.  Explain positive(checked items) below:  [] Constitutional  [] Eyes  [] Ear/Nose/Mouth/Throat  [] Respiratory  [] CV  [] GI  []   [] Musculoskeletal  [] Skin/Breast  [] Neurological  [] Endocrine  [] Heme/Lymph  [] Allergic/Immunologic    Explanation:   MEDICATIONS:    Current Facility-Administered Medications:     gabapentin (NEURONTIN) capsule 300 mg, 300 mg, Oral, TID, Ramiro Queen, APRN - CNP, 300 mg at 11/22/20 1322    chlordiazePOXIDE (LIBRIUM) capsule 10 mg, 10 mg, Oral, 4x Daily, Ramiro Seeing APRN - CNP, 10 mg at 11/22/20 1322    fluvoxaMINE (LUVOX) tablet 50 mg, 50 mg, Oral, TID, Ramiro Queen APRN - CNP, 50 mg at 11/22/20 1322    ondansetron (ZOFRAN) tablet 4 mg, 4 mg, Oral, 4x Daily PRN, Jennifer Bishopa, APRN - CNP, 4 mg at 11/17/20 1808    vitamin B-1 (THIAMINE) tablet 100 mg, 100 mg, Oral, TID, Mikel Goldstein MD, 100 mg at 11/22/20 1322    therapeutic multivitamin-minerals 1 tablet, 1 tablet, Oral, Daily, Mikel Goldstein MD, 1 tablet at 11/22/20 0821    LORazepam (ATIVAN) tablet 1 mg, 1 mg, Oral, Q1H PRN **OR** LORazepam (ATIVAN) injection 1 mg, 1 mg, Intramuscular, Q1H PRN **OR** LORazepam (ATIVAN) tablet 2 mg, 2 mg, Oral, Q1H PRN **OR** LORazepam (ATIVAN) injection 2 mg, 2 mg, Intramuscular, Q1H PRN **OR** LORazepam (ATIVAN) tablet 3 mg, 3 mg, Oral, Q1H PRN **OR** LORazepam (ATIVAN) injection 3 mg, 3 mg, Intramuscular, Q1H PRN **OR** LORazepam (ATIVAN) tablet 4 mg, 4 mg, Oral, Q1H PRN **OR** LORazepam (ATIVAN) injection 4 mg, 4 mg, Intramuscular, Q1H PRN, Mikel Goldstein MD    acetaminophen (TYLENOL) tablet 650 mg, 650 mg, Oral, Q4H PRN, Mikel Goldstein MD, 650 mg at 11/21/20 1004    aluminum & magnesium hydroxide-simethicone (MAALOX) 200-200-20 MG/5ML suspension 30 mL, 30 mL, Oral, Q6H PRN, Mikel Goldstein MD, 30 mL at 11/21/20 1818    hydrOXYzine (ATARAX) tablet 50 mg, 50 mg, Oral, TID PRN, Mikel Goldstein MD, 50 mg at 11/22/20 0419    ibuprofen (ADVIL;MOTRIN) tablet 600 mg, 600 mg, Oral, Q6H PRN, Mikel Goldstein MD  [address] [place] [address] traZODone (DESYREL) tablet 50 mg, 50 mg, Oral, Nightly PRN, Randee Pina MD    polyethylene glycol (GLYCOLAX) packet 17 g, 17 g, Oral, Daily PRN, Randee Pina MD, 17 g at 11/22/20 0928    calcium carbonate-vitamin D (CALTRATE) 600-400 MG-UNIT per tab 1 tablet, 1 tablet, Oral, Daily, Randee Pina MD, 1 tablet at 11/22/20 0821    carvedilol (COREG) tablet 3.125 mg, 3.125 mg, Oral, BID WC, Randee Pina MD, 3.125 mg at 11/22/20 0821    fluticasone (FLOVENT HFA) 110 MCG/ACT inhaler 1 puff, 1 puff, Inhalation, BID, Randee Pina MD, 1 puff at 11/22/20 0824    levothyroxine (SYNTHROID) tablet 88 mcg, 88 mcg, Oral, Daily, Randee Pina MD, 88 mcg at 11/22/20 0754    mirtazapine (REMERON) tablet 30 mg, 30 mg, Oral, Nightly, Randee Pina MD, 30 mg at 11/21/20 2051    predniSONE (DELTASONE) tablet 10 mg, 10 mg, Oral, Daily, Randee Pina MD, 10 mg at 11/22/20 8898    glycopyrrolate-formoterol (BEVESPI) 9-4.8 MCG/ACT inhaler 2 puff, 2 puff, Inhalation, BID, Randee Pina MD, 2 puff at 11/22/20 0820    pantoprazole (PROTONIX) tablet 40 mg, 40 mg, Oral, QAM AC, Randee Pina MD, 40 mg at 11/22/20 5466    vitamin C (ASCORBIC ACID) tablet 500 mg, 500 mg, Oral, Daily, Randee Pina MD, 500 mg at 11/22/20 7189    albuterol (PROVENTIL) nebulizer solution 2.5 mg, 2.5 mg, Nebulization, Q6H PRN, Randee Pina MD, 2.5 mg at 11/21/20 1442    albuterol sulfate  (90 Base) MCG/ACT inhaler 2 puff, 2 puff, Inhalation, Q6H PRN, Randee Pina MD, 2 puff at 11/19/20 1512      Examination:  /78   Pulse 75   Temp 98.1 °F (36.7 °C) (Oral)   Resp 14   Ht 5' 4\" (1.626 m)   Wt 135 lb (61.2 kg)   SpO2 97%   BMI 23.17 kg/m²   Gait - steady  Medication side effects(SE): None    Mental Status Examination:    Level of consciousness: Alert  Appearance: Dressed in hospital attire, good grooming and hygiene  Behavior/Motor: no psychomotor abnormalities  Attitude toward examiner: Cooperative with good eye contact  Speech: Normal rate normal tone good articulation  Mood: Depressed and anxious  Affect: Mood congruent  Thought processes: Logical and coherent occasionally circumstantial  Thought content:  Homicidal ideation - none  Suicidal Ideation: Endorses  Delusions: None observed  Perceptual Disturbance: Denies  Cognition: Oriented to self location and circumstances  Concentration intact  Insight fair  Judgement fair    ASSESSMENT:   Patient symptoms are:  [] Well controlled  [x] Improving  [] Worsening  [] No change      Diagnosis:   Active Problems:    MDD (major depressive disorder), recurrent severe, without psychosis ([facility])    Panic disorder    Obsessive compulsive disorder    Alcohol withdrawal syndrome with complication ([facility] [address]  Resolved Problems:    * No resolved hospital problems. *      LABS:    No results for input(s): WBC, HGB, PLT in the last 72 hours. No results for input(s): NA, K, CL, CO2, BUN, CREATININE, GLUCOSE in the last 72 hours. No results for input(s): BILITOT, ALKPHOS, AST, ALT in the last 72 hours. No results found for: LABAMPH, BARBSCNU, LABBENZ, CANNAB, COCAINESCRN, LABMETH, OPIATESCREENURINE, PHENCYCLIDINESCREENURINE, PPXUR, ETOH  Lab Results   Component Value Date    TSH 0.60 07/13/2018     No results found for: LITHIUM  No results found for: VALPROATE, CBMZ        Treatment Plan:  Reviewed current Medications with the patient. Continue Librium taper, consider Librium 5 mg 4 times daily  Restart patient home medication Prilosec. Protonix was substituted during admission. Patient reports the medication does not work as effectively as Prilosec for management of symptoms. Start Mucinex for management of congestion. Risks, benefits, side effects, drug-to-drug interactions and alternatives to treatment were discussed. The patient consented to treatment. Encourage patient to attend group and other milieu activities.   Discharge planning discussed with the patient and treatment

## 2020-11-22 NOTE — BH NOTE
Group Therapy Note     Date: 11/22/2020     Group Start Time: 1600  Group End Time: 2413  Group Topic: Wellness Group      [facility] SHABNAM Srinivasan RN       Group Therapy Note     Attendees: 9/18        Pt denied 1:1.  Despite staff encouragement, pt denied [facility]   Signature:  Carlos Eduardo Srinivasan RN

## 2020-11-22 NOTE — PLAN OF CARE
Problem: Altered Mood, Depressive Behavior:  Goal: Able to verbalize and/or display a decrease in depressive symptoms  Description: Able to verbalize and/or display a decrease in depressive symptoms  11/22/2020 1058 by Norma Luke LPN  Outcome: Ongoing   Patient is visible in the milieu social with staff and peers. Patient attends group eating and sleeping okay. Patient is medication compliant. Problem: Altered Mood, Depressive Behavior:  Goal: Ability to disclose and discuss suicidal ideas will improve  Description: Ability to disclose and discuss suicidal ideas will improve  11/22/2020 1058 by Norma Luke LPN  Outcome: Ongoing   Patient denies thoughts of harming themself and verbally agrees to remain safe while on the unit. No self harming behaviors are noted this shift. Q15 minute safety checks maintained. Will continue to monitor.

## 2020-11-23 PROCEDURE — 6370000000 HC RX 637 (ALT 250 FOR IP): Performed by: PSYCHIATRY & NEUROLOGY

## 2020-11-23 PROCEDURE — 6370000000 HC RX 637 (ALT 250 FOR IP): Performed by: NURSE PRACTITIONER

## 2020-11-23 PROCEDURE — 1240000000 HC EMOTIONAL WELLNESS R&B

## 2020-11-23 PROCEDURE — 99232 SBSQ HOSP IP/OBS MODERATE 35: CPT | Performed by: PSYCHIATRY & NEUROLOGY

## 2020-11-23 RX ORDER — FLUVOXAMINE MALEATE 50 MG/1
100 TABLET, COATED ORAL
Status: DISCONTINUED | OUTPATIENT
Start: 2020-11-23 | End: 2020-11-30 | Stop reason: HOSPADM

## 2020-11-23 RX ORDER — FLUVOXAMINE MALEATE 50 MG/1
50 TABLET, COATED ORAL 2 TIMES DAILY
Status: DISCONTINUED | OUTPATIENT
Start: 2020-11-23 | End: 2020-11-30 | Stop reason: HOSPADM

## 2020-11-23 RX ADMIN — PREDNISONE 10 MG: 10 TABLET ORAL at 08:56

## 2020-11-23 RX ADMIN — GLYCOPYRROLATE AND FORMOTEROL FUMARATE 2 PUFF: 9; 4.8 AEROSOL, METERED RESPIRATORY (INHALATION) at 09:00

## 2020-11-23 RX ADMIN — FLUVOXAMINE MALEATE 100 MG: 50 TABLET, COATED ORAL at 14:29

## 2020-11-23 RX ADMIN — MIRTAZAPINE 30 MG: 30 TABLET, FILM COATED ORAL at 21:07

## 2020-11-23 RX ADMIN — MULTIPLE VITAMINS W/ MINERALS TAB 1 TABLET: TAB at 08:56

## 2020-11-23 RX ADMIN — Medication 1 PUFF: at 21:07

## 2020-11-23 RX ADMIN — FAMOTIDINE 20 MG: 20 TABLET, FILM COATED ORAL at 21:06

## 2020-11-23 RX ADMIN — FLUVOXAMINE MALEATE 50 MG: 50 TABLET, COATED ORAL at 21:07

## 2020-11-23 RX ADMIN — FLUVOXAMINE MALEATE 50 MG: 50 TABLET, COATED ORAL at 08:55

## 2020-11-23 RX ADMIN — LORAZEPAM 1 MG: 1 TABLET ORAL at 21:12

## 2020-11-23 RX ADMIN — LEVOTHYROXINE SODIUM 88 MCG: 0.09 TABLET ORAL at 05:54

## 2020-11-23 RX ADMIN — GLYCOPYRROLATE AND FORMOTEROL FUMARATE 2 PUFF: 9; 4.8 AEROSOL, METERED RESPIRATORY (INHALATION) at 21:07

## 2020-11-23 RX ADMIN — GABAPENTIN 300 MG: 300 CAPSULE ORAL at 14:29

## 2020-11-23 RX ADMIN — ACETAMINOPHEN 650 MG: 325 TABLET, FILM COATED ORAL at 05:52

## 2020-11-23 RX ADMIN — OMEPRAZOLE 20 MG: 20 CAPSULE, DELAYED RELEASE ORAL at 05:54

## 2020-11-23 RX ADMIN — THIAMINE HCL TAB 100 MG 100 MG: 100 TAB at 08:56

## 2020-11-23 RX ADMIN — THIAMINE HCL TAB 100 MG 100 MG: 100 TAB at 14:29

## 2020-11-23 RX ADMIN — CARVEDILOL 3.12 MG: 3.12 TABLET, FILM COATED ORAL at 08:55

## 2020-11-23 RX ADMIN — FAMOTIDINE 20 MG: 20 TABLET, FILM COATED ORAL at 08:55

## 2020-11-23 RX ADMIN — LORAZEPAM 1 MG: 1 TABLET ORAL at 15:43

## 2020-11-23 RX ADMIN — GABAPENTIN 300 MG: 300 CAPSULE ORAL at 21:07

## 2020-11-23 RX ADMIN — OXYCODONE HYDROCHLORIDE AND ACETAMINOPHEN 500 MG: 500 TABLET ORAL at 08:56

## 2020-11-23 RX ADMIN — GABAPENTIN 300 MG: 300 CAPSULE ORAL at 08:56

## 2020-11-23 RX ADMIN — CALCIUM CARBONATE 600 MG (1,500 MG)-VITAMIN D3 400 UNIT TABLET 1 TABLET: at 08:56

## 2020-11-23 RX ADMIN — HYDROXYZINE HYDROCHLORIDE 50 MG: 50 TABLET, FILM COATED ORAL at 21:06

## 2020-11-23 RX ADMIN — Medication 1 PUFF: at 09:00

## 2020-11-23 RX ADMIN — CHLORDIAZEPOXIDE HYDROCHLORIDE 10 MG: 10 CAPSULE ORAL at 08:55

## 2020-11-23 RX ADMIN — THIAMINE HCL TAB 100 MG 100 MG: 100 TAB at 21:07

## 2020-11-23 RX ADMIN — Medication 2 PUFF: at 20:18

## 2020-11-23 ASSESSMENT — PAIN - FUNCTIONAL ASSESSMENT: PAIN_FUNCTIONAL_ASSESSMENT: 0-10

## 2020-11-23 ASSESSMENT — PAIN SCALES - GENERAL: PAINLEVEL_OUTOF10: 8

## 2020-11-23 NOTE — PLAN OF CARE
Problem: Altered Mood, Deterioration in Function:  Goal: Ability to perform activities of daily living will improve  Description: Ability to perform activities of daily living will improve  11/23/2020 0933 by Hannah Harry LPN  Outcome: Ongoing     Problem: Altered Mood, Depressive Behavior:  Goal: Ability to disclose and discuss suicidal ideas will improve  Description: Ability to disclose and discuss suicidal ideas will improve  11/23/2020 0933 by Hannah Harry LPN  Outcome: Ongoing   Patient denies thoughts of self harm, remains free from self harm. Patient attends to ADL's, takes meds and attends groups.

## 2020-11-23 NOTE — GROUP NOTE
Group Therapy Note    Date: 2020    Group Start Time: 1000  Group End Time: 52  Group Topic: Psychotherapy    [facility]    LIAM Colvin LSW        Group Therapy Note    Attendees:          Patient's Goal:  Interpersonal relationships and communication     Notes:  Sharing/supportive: cues of anxiety and coping skills; how anxiety impacts relationships/thoughts/behaviors/actions    Status After Intervention:  Improved    Participation Level: Interactive    Participation Quality: Appropriate, Attentive, Sharing and Supportive      Speech:  normal      Thought Process/Content: Logical  Linear      Affective Functioning: Congruent      Mood: euthymic      Level of consciousness:  Alert, Oriented x4 and Attentive      Response to Learning: Able to verbalize current knowledge/experience, Able to verbalize/acknowledge new learning, Able to retain information and Capable of insight      Endings: None Reported    Modes of Intervention: Support      Discipline Responsible: /Counselor      Signature:   LIAM Colvin, YOLANDA

## 2020-11-23 NOTE — GROUP NOTE
Group Therapy Note    Date: 11/23/2020    Group Start Time: 1100  Group End Time: 2771  Group Topic: Recreational    GEMA HAQUE    [place], [place]    Attendees: 9         Patient's Goal:  To demonstrate increased interpersonal skills. Notes:  Patient attended group and actively participated in task at hand. Patient conversed appropriately with peers and Cynthia Coreasodore. Status After Intervention:  Improved    Participation Level:  Active Listener and Interactive    Participation Quality: Appropriate, Attentive and Sharing      Speech:  normal      Thought Process/Content: Logical      Affective Functioning: Congruent      Mood: euthymic      Level of consciousness:  Alert, Oriented x4 and Attentive      Response to Learning: Able to verbalize current knowledge/experience, Able to verbalize/acknowledge new learning, Able to retain information, Capable of insight and Progressing to goal      Endings: None Reported       Modes of Intervention: Socialization, Exploration, Clarifying, Problem-solving, Activity, Limit-setting and Reality-testing      Discipline Responsible: Psychoeducational Specialist      Signature:  Deepthi Dietrich

## 2020-11-23 NOTE — PLAN OF CARE
Problem: Altered Mood, Depressive Behavior:  Goal: Able to verbalize and/or display a decrease in depressive symptoms  Description: Able to verbalize and/or display a decrease in depressive symptoms  Note: PT reports her depression is getting better and rates it a 3 out of 10. PT states anxiety is still high and rates it a 8 out of 10. PT states she is still having racing thoughts and has a hard time concentrating at times. Problem: Altered Mood, Depressive Behavior:  Goal: Ability to disclose and discuss suicidal ideas will improve  Description: Ability to disclose and discuss suicidal ideas will improve  Note: Pt denies thoughts of self harm and is agreeable to seeking out should thoughts of self harm arise. Safe environment maintained. Q15 minute checks for safety cont per unit policy. Will cont to monitor for safety and provides support and reassurance as needed. Problem: Altered Mood, Deterioration in Function:  Goal: Ability to perform activities of daily living will improve  Description: Ability to perform activities of daily living will improve  Note: Pt is out and social on unit. PT is attending to her Adl's. PT continues to report poor sleep.

## 2020-11-23 NOTE — GROUP NOTE
Group Therapy Note    Date: 11/23/2020    Group Start Time: 1600  Group End Time: 0088  Group Topic: Healthy Living/Wellness    GEMA HAQUE    Delores Forbes RN        Group Therapy Note    Attendees: 09         Patient's Goal:  Positive coping skills    Notes:      Status After Intervention:  Improved    Participation Level: Active Listener    Participation Quality: Appropriate and Attentive      Speech:  normal      Thought Process/Content: Logical      Affective Functioning: Congruent      Mood: euthymic      Level of consciousness:  Alert and Oriented x4      Response to Learning: Able to verbalize current knowledge/experience and Resistant      Endings: None Reported    Modes of Intervention: Education and Socialization      Discipline Responsible: Registered Nurse      Signature:   Delores Forbes RN

## 2020-11-23 NOTE — PROGRESS NOTES
BEHAVIORAL HEALTH FOLLOW-UP NOTE     11/23/2020     Patient was seen and examined in person, Chart reviewed   Patient's case discussed with staff/team    Chief Complaint: Suicidal ideation and alcohol use    Interim History:   Patient is medication compliant and behaviorally in control. No overnight events reported. Patient reports depression, with passive suicidal ideation. She reports that her anxiety is improved. She denies any withdrawal symptoms. She has contracted for safety while on unit, is fearful of going home and becoming suicidal again. Discussed again inpatient recovery after discharge, patient does not want to commit to a 30-day program.  Encouraged her to seek out an outpatient program in the area she lives. She denies any medication side effects  She reports improvement and mucus production on Mucinex she still has a slight scratchy throat. She has been afebrile with no loss of taste or smell. Appetite:  [x] Normal/Unchanged  [] Increased  [] Decreased      Sleep:       [] Normal/Unchanged  [x] Fair       [] Poor              Energy:    [] Normal/Unchanged  [] Increased  [x] Decreased        Aggression:  [] yes  [x] no    Patient is [x] able  [] unable to CONTRACT FOR SAFETY ON THE UNIT    PAST MEDICAL/PSYCHIATRIC HISTORY:   Past Medical History:   Diagnosis Date    Acid reflux     Anxiety     Arthritis     Cancer ([facility] [address]     cervical    COPD (chronic obstructive pulmonary disease) ([facility] [address] 02/23/2018    DDD (degenerative disc disease), cervical     Depression     Hiatal hernia     Spinal stenosis     Tachycardia     Thyroid disease     hypo       FAMILY/SOCIAL HISTORY:  History reviewed. No pertinent family history.   Social History     Socioeconomic History    Marital status:      Spouse name: Not on file    Number of children: Not on file    Years of education: Not on file    Highest education level: Not on file   Occupational History    Not on file   Social Needs    Financial resource strain: Not on file    Food insecurity     Worry: Not on file     Inability: Not on file    Transportation needs     Medical: Not on file     Non-medical: Not on file   Tobacco Use    Smoking status: Heavy Tobacco Smoker     Packs/day: 1.00     Years: 48.00     Pack years: 48.00     Types: Cigarettes    Smokeless tobacco: Never Used    Tobacco comment: previous 3 ppd smoker   Substance and Sexual Activity    Alcohol use: Yes     Comment: occassional    Drug use: Never    Sexual activity: Not on file   Lifestyle    Physical activity     Days per week: Not on file     Minutes per session: Not on file    Stress: Not on file   Relationships    Social connections     Talks on phone: Not on file     Gets together: Not on file     Attends Anabaptist service: Not on file     Active member of club or organization: Not on file     Attends meetings of clubs or organizations: Not on file     Relationship status: Not on file    Intimate partner violence     Fear of current or ex partner: Not on file     Emotionally abused: Not on file     Physically abused: Not on file     Forced sexual activity: Not on file   Other Topics Concern    Not on file   Social History Narrative    Not on file           ROS:  [x] All negative/unchanged except if checked.  Explain positive(checked items) below:  [] Constitutional  [] Eyes  [x] Ear/Nose/Mouth/Throat  [] Respiratory  [] CV  [] GI  []   [] Musculoskeletal  [] Skin/Breast  [] Neurological  [] Endocrine  [] Heme/Lymph  [] Allergic/Immunologic    Explanation: \"Scratchy throat\"  MEDICATIONS:    Current Facility-Administered Medications:     fluvoxaMINE (LUVOX) tablet 50 mg, 50 mg, Oral, BID, Tk Garcia APRN - CNP    fluvoxaMINE (LUVOX) tablet 100 mg, 100 mg, Oral, Lunch, NATHAN Hernandes - CNP    omeprazole (PRILOSEC) delayed release capsule 20 mg, 20 mg, Oral, Daily, NATHAN Cortés - CNP, 20 mg at 11/23/20 0547    guaiFENesin ([address]) extended release tablet 600 mg, 600 mg, Oral, BID PRN, Johnnye Schirmer, APRN - CNP, 600 mg at 11/22/20 1823    famotidine (PEPCID) tablet 20 mg, 20 mg, Oral, BID, Juliana Teran MD, 20 mg at 11/23/20 0855    gabapentin (NEURONTIN) capsule 300 mg, 300 mg, Oral, TID, Oralping Moise APRN - CNP, 300 mg at 11/23/20 0856    ondansetron (ZOFRAN) tablet 4 mg, 4 mg, Oral, 4x Daily PRN, Boy Hermosillo, APRN - CNP, 4 mg at 11/17/20 1808    vitamin B-1 (THIAMINE) tablet 100 mg, 100 mg, Oral, TID, Juliana Teran MD, 100 mg at 11/23/20 0856    therapeutic multivitamin-minerals 1 tablet, 1 tablet, Oral, Daily, Juliana Teran MD, 1 tablet at 11/23/20 0856    LORazepam (ATIVAN) tablet 1 mg, 1 mg, Oral, Q1H PRN **OR** LORazepam (ATIVAN) injection 1 mg, 1 mg, Intramuscular, Q1H PRN **OR** LORazepam (ATIVAN) tablet 2 mg, 2 mg, Oral, Q1H PRN **OR** LORazepam (ATIVAN) injection 2 mg, 2 mg, Intramuscular, Q1H PRN **OR** LORazepam (ATIVAN) tablet 3 mg, 3 mg, Oral, Q1H PRN **OR** LORazepam (ATIVAN) injection 3 mg, 3 mg, Intramuscular, Q1H PRN **OR** LORazepam (ATIVAN) tablet 4 mg, 4 mg, Oral, Q1H PRN **OR** LORazepam (ATIVAN) injection 4 mg, 4 mg, Intramuscular, Q1H PRN, Juliana Teran MD    acetaminophen (TYLENOL) tablet 650 mg, 650 mg, Oral, Q4H PRN, Juliana Teran MD, 650 mg at 11/23/20 0552    aluminum & magnesium hydroxide-simethicone (MAALOX) 200-200-20 MG/5ML suspension 30 mL, 30 mL, Oral, Q6H PRN, Juliana Teran MD, 30 mL at 11/21/20 1818    hydrOXYzine (ATARAX) tablet 50 mg, 50 mg, Oral, TID PRN, Juliana Teran MD, 50 mg at 11/22/20 2102    ibuprofen (ADVIL;MOTRIN) tablet 600 mg, 600 mg, Oral, Q6H PRN, Juliana Teran MD    traZODone (DESYREL) tablet 50 mg, 50 mg, Oral, Nightly PRN, Juliana Teran MD    polyethylene glycol (GLYCOLAX) packet 17 g, 17 g, Oral, Daily PRN, Juliana Teran MD, 17 g at 11/22/20 0928    calcium carbonate-vitamin D (CALTRATE) 600-400 MG-UNIT per tab 1 tablet, 1 tablet, Oral, Daily, [facility] evaluated face to face. --NATHAN Hawthorne - CNP on 11/23/2020 at 11:30 AM    An electronic signature was used to authenticate this note. **This report has been created using voice recognition software. It may contain minor errors which are inherent in voice recognition technology. **      I independently saw and evaluated the patient. I reviewed the midlevel provider's documentation above. Any additional comments or changes to the midlevel provider's documentation are stated below otherwise agree with assessment. The patient is reluctant to go to the rehab. She continues to have some suicidal ideation. She has been compliant with her medications    Patient s symptoms   are improving    PLAN  Continue medications as above  Attempt to develop insight  Psycho-education conducted. Supportive Therapy conducted.   Probable discharge is tomorrow  Follow-up daily while on inpatient unit    Electronically signed by Jef Corrigan MD on 11/23/20 at 7:59 PM EST

## 2020-11-23 NOTE — GROUP NOTE
Group Therapy Note    Date: 11/23/2020    Group Start Time: 1330  Group End Time: 4667  Group Topic: Cognitive Skills    [facility]    [place], [place]        Group Therapy Note    Attendees: 8/18         Patient's Goal:  To increase interpersonal interaction. Notes:  Pt attended and participated in group. Status After Intervention:  Improved    Participation Level:  Active Listener and Interactive    Participation Quality: Appropriate and Attentive      Speech:  normal      Thought Process/Content: Logical      Affective Functioning: Congruent      Mood: euthymic      Level of consciousness:  Alert and Attentive      Response to Learning: Able to verbalize current knowledge/experience and Progressing to goal      Endings: None Reported    Modes of Intervention: Education, Socialization, Exploration, Problem-solving, Media and Reality-testing      Discipline Responsible: Psychoeducational Specialist      Signature:  Antonio Oneill

## 2020-11-23 NOTE — BH NOTE
LOS;  Line of sight started due to pt asking for her oxygen so she can go to bed will continue to monitor.

## 2020-11-23 NOTE — GROUP NOTE
Group Therapy Note    Date: 11/23/2020    Group Start Time: 1430  Group End Time: 4008  Group Topic: Psychoeducation    [facility] [place], [place]    Attendees: 7         Patient's Goal:  To be educated on facts vs myths about mental illness to better be able to advocate for self. Notes:  Patient attended group and actively participated in task at hand. Patient conversed appropriately with peers and Pearly Smoker. Status After Intervention:  Improved    Participation Level:  Active Listener and Interactive    Participation Quality: Appropriate, Attentive and Sharing      Speech:  Normal, tangential      Thought Process/Content: Logical      Affective Functioning: Congruent      Mood: euthymic      Level of consciousness:  Alert, Oriented x4 and Attentive      Response to Learning: Able to verbalize current knowledge/experience, Able to verbalize/acknowledge new learning, Able to retain information, Capable of insight and Progressing to goal      Endings: None Reported       Modes of Intervention: Education, Support, Socialization, Exploration, Clarifying, Problem-solving, Activity, Confrontation, Limit-setting and Reality-testing      Discipline Responsible: Psychoeducational Specialist      Signature:  Modesto Stevens

## 2020-11-23 NOTE — GROUP NOTE
Group Therapy Note    Date: 11/23/2020    Group Start Time: 0900  Group End Time: 6308  Group Topic: Community Meeting    [facility], [address]        Group Therapy Note    Attendees: 7/20         Patient's Goal:  To increase interpersonal interaction. Notes:  Pt attended and participated in group. Status After Intervention:  Improved    Participation Level:  Active Listener and Interactive    Participation Quality: Appropriate, Attentive and Sharing      Speech:  normal      Thought Process/Content: Logical      Affective Functioning: Congruent      Mood: euthymic      Level of consciousness:  Alert and Attentive      Response to Learning: Able to verbalize current knowledge/experience, Able to retain information and Progressing to goal      Endings: None Reported    Modes of Intervention: Education, Support, Clarifying and Reality-testing      Discipline Responsible: Psychoeducational Specialist      Signature:  Mary Carmen Witt

## 2020-11-24 PROCEDURE — 6360000002 HC RX W HCPCS: Performed by: PSYCHIATRY & NEUROLOGY

## 2020-11-24 PROCEDURE — 94761 N-INVAS EAR/PLS OXIMETRY MLT: CPT

## 2020-11-24 PROCEDURE — 1240000000 HC EMOTIONAL WELLNESS R&B

## 2020-11-24 PROCEDURE — 99232 SBSQ HOSP IP/OBS MODERATE 35: CPT | Performed by: PSYCHIATRY & NEUROLOGY

## 2020-11-24 PROCEDURE — 93005 ELECTROCARDIOGRAM TRACING: CPT | Performed by: PSYCHIATRY & NEUROLOGY

## 2020-11-24 PROCEDURE — 6370000000 HC RX 637 (ALT 250 FOR IP): Performed by: PSYCHIATRY & NEUROLOGY

## 2020-11-24 PROCEDURE — 94640 AIRWAY INHALATION TREATMENT: CPT

## 2020-11-24 PROCEDURE — 6370000000 HC RX 637 (ALT 250 FOR IP): Performed by: NURSE PRACTITIONER

## 2020-11-24 PROCEDURE — 6370000000 HC RX 637 (ALT 250 FOR IP): Performed by: INTERNAL MEDICINE

## 2020-11-24 RX ORDER — DOXYCYCLINE 100 MG/1
100 CAPSULE ORAL EVERY 12 HOURS SCHEDULED
Status: COMPLETED | OUTPATIENT
Start: 2020-11-24 | End: 2020-11-29

## 2020-11-24 RX ORDER — ALBUTEROL SULFATE 2.5 MG/3ML
2.5 SOLUTION RESPIRATORY (INHALATION) 2 TIMES DAILY
Status: DISCONTINUED | OUTPATIENT
Start: 2020-11-24 | End: 2020-11-30 | Stop reason: HOSPADM

## 2020-11-24 RX ORDER — DOCUSATE SODIUM 100 MG/1
100 CAPSULE, LIQUID FILLED ORAL DAILY
Status: DISCONTINUED | OUTPATIENT
Start: 2020-11-24 | End: 2020-11-30 | Stop reason: HOSPADM

## 2020-11-24 RX ADMIN — DOCUSATE SODIUM 100 MG: 100 CAPSULE ORAL at 13:08

## 2020-11-24 RX ADMIN — PREDNISONE 10 MG: 10 TABLET ORAL at 08:52

## 2020-11-24 RX ADMIN — GLYCOPYRROLATE AND FORMOTEROL FUMARATE 2 PUFF: 9; 4.8 AEROSOL, METERED RESPIRATORY (INHALATION) at 20:28

## 2020-11-24 RX ADMIN — DOXYCYCLINE 100 MG: 100 CAPSULE ORAL at 20:28

## 2020-11-24 RX ADMIN — MIRTAZAPINE 45 MG: 30 TABLET, FILM COATED ORAL at 20:28

## 2020-11-24 RX ADMIN — THIAMINE HCL TAB 100 MG 100 MG: 100 TAB at 08:51

## 2020-11-24 RX ADMIN — Medication 2 PUFF: at 19:16

## 2020-11-24 RX ADMIN — BISACODYL 5 MG: 5 TABLET, COATED ORAL at 14:20

## 2020-11-24 RX ADMIN — OMEPRAZOLE 20 MG: 20 CAPSULE, DELAYED RELEASE ORAL at 06:01

## 2020-11-24 RX ADMIN — LORAZEPAM 1 MG: 1 TABLET ORAL at 09:02

## 2020-11-24 RX ADMIN — LEVOTHYROXINE SODIUM 88 MCG: 0.09 TABLET ORAL at 06:01

## 2020-11-24 RX ADMIN — MULTIPLE VITAMINS W/ MINERALS TAB 1 TABLET: TAB at 08:51

## 2020-11-24 RX ADMIN — FLUVOXAMINE MALEATE 50 MG: 50 TABLET, COATED ORAL at 08:51

## 2020-11-24 RX ADMIN — GABAPENTIN 300 MG: 300 CAPSULE ORAL at 08:51

## 2020-11-24 RX ADMIN — CALCIUM CARBONATE 600 MG (1,500 MG)-VITAMIN D3 400 UNIT TABLET 1 TABLET: at 08:51

## 2020-11-24 RX ADMIN — HYDROXYZINE HYDROCHLORIDE 50 MG: 50 TABLET, FILM COATED ORAL at 20:29

## 2020-11-24 RX ADMIN — FAMOTIDINE 20 MG: 20 TABLET, FILM COATED ORAL at 20:28

## 2020-11-24 RX ADMIN — HYDROXYZINE HYDROCHLORIDE 50 MG: 50 TABLET, FILM COATED ORAL at 14:21

## 2020-11-24 RX ADMIN — THIAMINE HCL TAB 100 MG 100 MG: 100 TAB at 20:29

## 2020-11-24 RX ADMIN — THIAMINE HCL TAB 100 MG 100 MG: 100 TAB at 13:08

## 2020-11-24 RX ADMIN — GABAPENTIN 300 MG: 300 CAPSULE ORAL at 20:28

## 2020-11-24 RX ADMIN — CARVEDILOL 3.12 MG: 3.12 TABLET, FILM COATED ORAL at 08:51

## 2020-11-24 RX ADMIN — FLUVOXAMINE MALEATE 50 MG: 50 TABLET, COATED ORAL at 20:29

## 2020-11-24 RX ADMIN — CARVEDILOL 3.12 MG: 3.12 TABLET, FILM COATED ORAL at 17:20

## 2020-11-24 RX ADMIN — FAMOTIDINE 20 MG: 20 TABLET, FILM COATED ORAL at 08:52

## 2020-11-24 RX ADMIN — GLYCOPYRROLATE AND FORMOTEROL FUMARATE 2 PUFF: 9; 4.8 AEROSOL, METERED RESPIRATORY (INHALATION) at 08:50

## 2020-11-24 RX ADMIN — OXYCODONE HYDROCHLORIDE AND ACETAMINOPHEN 500 MG: 500 TABLET ORAL at 08:51

## 2020-11-24 RX ADMIN — Medication 1 PUFF: at 20:27

## 2020-11-24 RX ADMIN — Medication 1 PUFF: at 09:03

## 2020-11-24 RX ADMIN — FLUVOXAMINE MALEATE 100 MG: 50 TABLET, COATED ORAL at 13:09

## 2020-11-24 RX ADMIN — Medication 2 PUFF: at 05:40

## 2020-11-24 RX ADMIN — ALBUTEROL SULFATE 2.5 MG: 2.5 SOLUTION RESPIRATORY (INHALATION) at 12:51

## 2020-11-24 RX ADMIN — GABAPENTIN 300 MG: 300 CAPSULE ORAL at 13:09

## 2020-11-24 ASSESSMENT — PAIN - FUNCTIONAL ASSESSMENT: PAIN_FUNCTIONAL_ASSESSMENT: 0-10

## 2020-11-24 NOTE — GROUP NOTE
Group Therapy Note    Date: 11/24/2020    Group Start Time: 0900  Group End Time: 0920  Group Topic: Community Meeting    [facility]    [place], [place]        Group Therapy Note    Attendees: 7/23         Patient's Goal:  To increase interpersonal interaction. Notes:  Pt attended and participated in group. Status After Intervention:  Improved    Participation Level:  Active Listener and Interactive    Participation Quality: Appropriate, Attentive and Sharing      Speech:  normal      Thought Process/Content: Logical      Affective Functioning: Congruent      Mood: euthymic      Level of consciousness:  Alert and Attentive      Response to Learning: Able to verbalize current knowledge/experience, Able to retain information and Progressing to goal      Endings: None Reported    Modes of Intervention: Education, Socialization, Exploration, Clarifying and Reality-testing      Discipline Responsible: Psychoeducational Specialist      Signature:  Richi Castillo

## 2020-11-24 NOTE — PLAN OF CARE
Problem: Altered Mood, Depressive Behavior:  Goal: Able to verbalize and/or display a decrease in depressive symptoms  Description: Able to verbalize and/or display a decrease in depressive symptoms  11/24/2020 1756 by Saulo Morales RN  Outcome: Ongoing  Note: Ms. Power Dobbs reports she is feeling depressed and is anxious about her discharge tomorrow. She denies suicidal ideation and thoughts of harm to self and others. Problem: Tobacco Use:  Goal: Inpatient tobacco use cessation counseling participation  Description: Inpatient tobacco use cessation counseling participation  Outcome: Ongoing  Note: Ms. Power Dobbs declines tobacco cessation education at this time.

## 2020-11-24 NOTE — GROUP NOTE
Group Therapy Note    Date: 11/24/2020    Group Start Time: 1600  Group End Time: 4081  Group Topic: Healthy Living/Wellness    [facility]        Group Therapy Note    Attendees: 8/19         Patient's Goal: Talk about values and reflect on life stressors. Notes:      Status After Intervention:  Improved    Participation Level:  Active Listener    Participation Quality: Appropriate      Speech:  normal      Thought Process/Content: Logical      Affective Functioning: Congruent      Mood: normal      Level of consciousness:  Alert      Response to Learning: Able to verbalize current knowledge/experience      Endings: None Reported    Modes of Intervention: Education      Discipline Responsible: [facility] [address]      Signature:  Omar Michael

## 2020-11-24 NOTE — PROGRESS NOTES
LOS     Patient now has removed her O2 therapy and does not require to be a line of sight for safety measures.

## 2020-11-24 NOTE — CONSULTS
[address] [facility]                 [address] [facility], [address]                                  CONSULTATION    PATIENT NAME: Kenan Soliman                    :        1960  MED REC NO:   [id number]                              ROOM:       0236  ACCOUNT NO:   [de-identified]                           ADMIT DATE: 2020  PROVIDER:     Layne Hunt    CONSULT DATE:  2020    REASON FOR CONSULTATION:  COPD, possible bronchitis. HISTORY OF PRESENT ILLNESS:  The patient is a very pleasant 63-year-old  female. She is hospitalized here in the second floor of the [facility]. The  patient presents with suicidal ideation. Of note, she requested to see  our service. She sees Dr. Chanelle Hunt. Her medications at home  include Stiolto, Flovent, albuterol nebulizers two to three times a day. She is on oxygen 2.5 liters at night. She claims that her lung capacity  is around the 20s. The patient presents for management here. She  requested to see us because she was having some cough. It was  nonproductive. She cannot tell if it is yellowish or whitish at this  time. She feels it is a wet cough. No fever, no chills, no sweats, no  headache, no diarrhea. No change in her sense of smell. We have been  asked to help assist this patient's evaluation. PAST MEDICAL HISTORY:  Notable for history of COPD, history of  depression, spinal stenosis, history of cervical cancer in the past.    PAST SURGICAL HISTORY:  Includes D and C, history of partial  thyroidectomy and multiple colonoscopies. ALLERGIES:  None. MEDICATIONS:  Pulmonary wise, per chart. She is on oxygen 2.5 liters at  night while sleeping per her report. SOCIAL HISTORY:  She is a smoker, long term. No cigarettes for about  eight days. She does drink over a bottle of wine per day. REVIEW OF SYSTEMS:  As per HPI, otherwise systems reviewed and negative.     PHYSICAL EXAMINATION:  GENERAL: The patient is a 24-year-old female, resting quietly. VITAL SIGNS:  Blood pressure 132/88, pulse 84, respirations 14,  temperature max 98.3, O2 saturations 95-97% on room air. HEENT:  No supraclavicular lymph node enlargement. LUNGS:  Clear posteriorly. No crackles, rales or wheezes. CARDIOVASCULAR:  Regular rhythm. ABDOMEN:  Soft. EXTREMITIES:  No edema. IMPRESSION:  1. The patient with evidence of COPD. 2.  Suspect acute bronchitis. PLAN:  1. Continue with the equivalent of Stiolto and Flovent. 2.  We will add the nebulizer treatments twice daily. 3.  Doxycycline 100 mg twice daily for five days. 4.  Followup for any signs of respiratory decline. Thank you Dr. Eileen Vidal for the consult.         Noreen Hartman    D: 11/24/2020 16:58:49       T: 11/24/2020 18:18:03     [email or web address]  Job#: [id number]     Doc#: [id number]    CC:

## 2020-11-24 NOTE — PROGRESS NOTES
[facility] FOLLOW-UP NOTE     11/24/2020     Patient was seen and examined in person, Chart reviewed   Patient's case discussed with staff/team    Chief Complaint: Suicidal ideation and alcohol use    Interim History:   The patient reports worsening anxiety. She continues to have suicidal ideation. She feels unable to commit to residential rehab because he does not want to be away from family during the holidays. She believes this would make her feel more depressed and suicidal.  The patient wants a rehab placement that she can spend a week. We discussed that the rehabs do not normally last 1 week. The patient has been taking her medications as prescribed. She is denying any side effects      Appetite:  [x] Normal/Unchanged  [] Increased  [] Decreased      Sleep:       [] Normal/Unchanged  [x] Fair       [] Poor              Energy:    [] Normal/Unchanged  [] Increased  [x] Decreased        Aggression:  [] yes  [x] no    Patient is [x] able  [] unable to CONTRACT FOR SAFETY ON THE UNIT    PAST MEDICAL/PSYCHIATRIC HISTORY:   Past Medical History:   Diagnosis Date    Acid reflux     Anxiety     Arthritis     Cancer ([facility] [address]     cervical    COPD (chronic obstructive pulmonary disease) ([facility] [address] 02/23/2018    DDD (degenerative disc disease), cervical     Depression     Hiatal hernia     Spinal stenosis     Tachycardia     Thyroid disease     hypo       FAMILY/SOCIAL HISTORY:  History reviewed. No pertinent family history.   Social History     Socioeconomic History    Marital status:      Spouse name: Not on file    Number of children: Not on file    Years of education: Not on file    Highest education level: Not on file   Occupational History    Not on file   Social Needs    Financial resource strain: Not on file    Food insecurity     Worry: Not on file     Inability: Not on file    Transportation needs     Medical: Not on file     Non-medical: Not on file   Tobacco Use    Smoking Quentin Gonsalez, APRN - CNP, 600 mg at 11/22/20 1823    famotidine (PEPCID) tablet 20 mg, 20 mg, Oral, BID, Azam Herrera MD, 20 mg at 11/24/20 0359    gabapentin (NEURONTIN) capsule 300 mg, 300 mg, Oral, TID, Catarina Ministerio, APRN - CNP, 300 mg at 11/24/20 1309    ondansetron (ZOFRAN) tablet 4 mg, 4 mg, Oral, 4x Daily PRN, Kristen [place], APRN - CNP, 4 mg at 11/17/20 1808    vitamin B-1 (THIAMINE) tablet 100 mg, 100 mg, Oral, TID, Azam Herrera MD, 100 mg at 11/24/20 1308    therapeutic multivitamin-minerals 1 tablet, 1 tablet, Oral, Daily, Azam Herrera MD, 1 tablet at 11/24/20 0851    LORazepam (ATIVAN) tablet 1 mg, 1 mg, Oral, Q1H PRN, 1 mg at 11/24/20 0902 **OR** LORazepam (ATIVAN) injection 1 mg, 1 mg, Intramuscular, Q1H PRN **OR** LORazepam (ATIVAN) tablet 2 mg, 2 mg, Oral, Q1H PRN **OR** LORazepam (ATIVAN) injection 2 mg, 2 mg, Intramuscular, Q1H PRN **OR** LORazepam (ATIVAN) tablet 3 mg, 3 mg, Oral, Q1H PRN **OR** LORazepam (ATIVAN) injection 3 mg, 3 mg, Intramuscular, Q1H PRN **OR** LORazepam (ATIVAN) tablet 4 mg, 4 mg, Oral, Q1H PRN **OR** LORazepam (ATIVAN) injection 4 mg, 4 mg, Intramuscular, Q1H PRN, Azam Herrera MD    acetaminophen (TYLENOL) tablet 650 mg, 650 mg, Oral, Q4H PRN, Azam Herrera MD, 650 mg at 11/23/20 0552    aluminum & magnesium hydroxide-simethicone (MAALOX) 200-200-20 MG/5ML suspension 30 mL, 30 mL, Oral, Q6H PRN, Azam Herrera MD, 30 mL at 11/21/20 1818    hydrOXYzine (ATARAX) tablet 50 mg, 50 mg, Oral, TID PRN, Azam Herrera MD, 50 mg at 11/24/20 1421    ibuprofen (ADVIL;MOTRIN) tablet 600 mg, 600 mg, Oral, Q6H PRN, Azam Herrera MD    traZODone (DESYREL) tablet 50 mg, 50 mg, Oral, Nightly PRN, Azam Herrera MD    polyethylene glycol (GLYCOLAX) packet 17 g, 17 g, Oral, Daily PRN, Azam Herrera MD, 17 g at 11/22/20 9273    calcium carbonate-vitamin D (CALTRATE) 600-400 MG-UNIT per tab 1 tablet, 1 tablet, Oral, Daily, Azam Herrera MD, 1 tablet at 11/24/20 Improving  [] Worsening  [] No change      Diagnosis:   Active Problems:    MDD (major depressive disorder), recurrent severe, without psychosis ([facility] [address]    Panic disorder    Obsessive compulsive disorder    Alcohol withdrawal syndrome with complication ([facility]  Resolved Problems:    * No resolved hospital problems. *      LABS:    No results for input(s): WBC, HGB, PLT in the last 72 hours. No results for input(s): NA, K, CL, CO2, BUN, CREATININE, GLUCOSE in the last 72 hours. No results for input(s): BILITOT, ALKPHOS, AST, ALT in the last 72 hours. No results found for: [place], LABBENZ, CANNAB, COCAINESCRN, LABMETH, OPIATESCREENURINE, PHENCYCLIDINESCREENURINE, PPXUR, ETOH  Lab Results   Component Value Date    TSH 0.60 07/13/2018     No results found for: LITHIUM  No results found for: VALPROATE, CBMZ        Treatment Plan:  PLAN    Increase Remeron to 45 mg nightly. Other medications left unchanged as above  Attempt to develop insight  Psycho-education conducted. Supportive Therapy conducted. Probable discharge is in 2 to 3 days      Risks, benefits, side effects, drug-to-drug interactions and alternatives to treatment were discussed. The patient consented to treatment. Encourage patient to attend group and other milieu activities. Discharge planning discussed with the patient and treatment team.  Follow-up daily while inpatient  PSYCHOTHERAPY/COUNSELING:  [] Therapeutic interview  [x] Supportive  [] CBT  [] Ongoing  [] Other    [x] Patient continues to need, on a daily basis, active treatment furnished directly by or requiring the supervision of inpatient psychiatric personnel      Anticipated Length of stay: 2 to 3 days    Kaushal Lane is a 61 y.o. female being evaluated face to face.     --Jessica Thomas MD on 11/24/2020 at 4:40 PM    An electronic signature was used to authenticate this note.

## 2020-11-24 NOTE — GROUP NOTE
Group Therapy Note    Date: 11/24/2020    Group Start Time: 1100  Group End Time: 1150  Group Topic: Psychoeducation    [facility]    [facility], [address]         Group Therapy Note    Attendees: 5/11         Patient's Goal:  To increase interpersonal interaction. Notes:  Pt attended and participated in group. Status After Intervention:  Improved    Participation Level:  Active Listener and Interactive    Participation Quality: Appropriate and Attentive      Speech:  normal      Thought Process/Content: Logical      Affective Functioning: Congruent      Mood: euthymic      Level of consciousness:  Alert and Attentive      Response to Learning: Able to verbalize/acknowledge new learning, Able to retain information and Progressing to goal      Endings: None Reported    Modes of Intervention: Support, Socialization, Activity, Media and Reality-testing      Discipline Responsible: Psychoeducational Specialist      Signature:  Mary Carmen Witt

## 2020-11-24 NOTE — PROGRESS NOTES
LOS    Patient is connected to O2 per nasal cannula at this time. Respirations are even and unlabored. No distress noted. Line of sight continued for patient safety.

## 2020-11-24 NOTE — PLAN OF CARE
Problem: Altered Mood, Depressive Behavior:  Goal: Able to verbalize and/or display a decrease in depressive symptoms  Description: Able to verbalize and/or display a decrease in depressive symptoms  Outcome: Ongoing  Note: Patient is able to verbalize a decrease in depressive symptoms. Reporting mild withdrawals and some issues with sleeping at night. No reported adverse effects from her medication. She remains in behavioral control. She was seen out on the unit social with her peers this evening. Safety maintained per unit policy, including q 35Z patient safety checks. Problem: Altered Mood, Depressive Behavior:  Goal: Ability to disclose and discuss suicidal ideas will improve  Description: Ability to disclose and discuss suicidal ideas will improve  11/23/2020 2251 by Curry Sandoval RN  Outcome: Ongoing  Note: Patient denies suicidal ideations at this time and agrees to seek assistance from staff should thoughts of self harm arise. She is seen out and social with select peers this evening. She does report mild withdrawals from her Klonopin this evening and was accepting of her prn withdrawal medication. Reporting increased anxiety and restlessness tonight. Emotional support and reassurance given. Will continue to monitor patient for safety and behavior. Problem: Altered Mood, Deterioration in Function:  Goal: Ability to perform activities of daily living will improve  Description: Ability to perform activities of daily living will improve  11/23/2020 2251 by Curry Sandoval RN  Outcome: Ongoing  Note: Patient's ability to perform adl's is adequate. Patient appears appropriately groomed this shift.  She is dressed correctly for the situation     Problem: Tobacco Use:  Goal: Inpatient tobacco use cessation counseling participation  Description: Inpatient tobacco use cessation counseling participation  Outcome: Ongoing  Note: Patient given tobacco quitline number 49587828972 at this time, refusing to call at this time, states \" I just dont want to quit now\"- patient given information as to the dangers of long term tobacco use. Continue to reinforce the importance of tobacco cessation.

## 2020-11-24 NOTE — PROGRESS NOTES
LOS    Patient continues to rest in bed with no distress noted. O2 therapy connected. Line of sight maintained for patient safety.

## 2020-11-24 NOTE — PROGRESS NOTES
LOS    Patient continues to rest in bed with O2 therapy. No distress noted and respirations are even. Line of sight maintained for patient safety.

## 2020-11-24 NOTE — PROGRESS NOTES
LOS    Patient continues to rest in bed at this time with O2 connected. No distress noted and respirations are even. Line of sight contined for patient safety.

## 2020-11-24 NOTE — GROUP NOTE
Group Therapy Note    Date: 11/24/2020    Group Start Time: 1430  Group End Time: 8425  Group Topic: Cognitive Skills    Clint Morales        Group Therapy Note    Attendees: 7/20       Patient's Goal:  To increase interpersonal interaction. Notes:  Pt attended and participated in group. Status After Intervention:  Improved    Participation Level:  Active Listener and Interactive    Participation Quality: Appropriate, Attentive and Sharing      Speech:  normal      Thought Process/Content: Logical      Affective Functioning: Congruent      Mood: euthymic      Level of consciousness:  Alert and Attentive      Response to Learning: Progressing to goal      Endings: None Reported    Modes of Intervention: Support, Socialization, Exploration, Media and Reality-testing      Discipline Responsible: Psychoeducational Specialist      Signature:  Parul Ochoa

## 2020-11-24 NOTE — PLAN OF CARE
[id number] [facility] Interdisciplinary Treatment Plan Note     Review Date & Time: 11/24/2020 1308    Admission Type:   Admission Type: Voluntary    Reason for admission:  Reason for Admission: suicidal ideation with plan to shoot self or smoke until copd kills pt    Estimated Length of Stay :  5-7 days  Estimated Discharge Date Update: to be determined by physician    PATIENT STRENGTHS:  Patient Strengths:Strengths: Communication, Connection to output provider, Social Skills  Patient Strengths and Limitations:Limitations: Tendency to isolate self, General negative or hopeless attitude about future/recovery, Hopeless about future  Addictive Behavior:Addictive Behavior  In the past 3 months, have you felt or has someone told you that you have a problem with:  : Excessive Fluid intake  Do you have a history of Chemical Use?: No  Do you have a history of Alcohol Use?: Yes  Do you have a history of Street Drug Abuse?: No  Histroy of Prescripton Drug Abuse?: No  Medical Problems:   Past Medical History:   Diagnosis Date    Acid reflux     Anxiety     Arthritis     Cancer ([facility] [address]     cervical    COPD (chronic obstructive pulmonary disease) ([facility] [address] 02/23/2018    DDD (degenerative disc disease), cervical     Depression     Hiatal hernia     Spinal stenosis     Tachycardia     Thyroid disease     hypo       Risk:  Fall RiskTotal: 91  Bam Scale Bam Scale Score: 22  BVC Total: 0    Change in scores no.  Changes to plan of Care no    Status EXAM:   Status and Exam  Normal: No  Facial Expression: Worried  Affect: Congruent  Level of Consciousness: Alert  Mood:Normal: No  Mood: Anxious, Helpless  Motor Activity:Normal: Yes  Motor Activity: Decreased  Interview Behavior: Cooperative  Preception: [place] to Person, Mary Jo Anjum to Time, [place] to Place, [place] to Situation  Attention:Normal: No  Attention: Distractible  Thought Processes: Circumstantial  Thought Content:Normal: No  Thought Content: Preoccupations  Hallucinations: None  Delusions: No  Memory:Normal: No  Memory: Poor Recent  Insight and Judgment: No  Insight and Judgment: Poor Judgment  Present Suicidal Ideation: No  Present Homicidal Ideation: No    Daily Assessment Last Entry:   Daily Sleep (WDL): Within Defined Limits         Patient Currently in Pain: Denies  Daily Nutrition (WDL): Within Defined Limits    Patient Monitoring:  Frequency of Checks: 4 times per hour, close    Psychiatric Symptoms:   Depression Symptoms  Depression Symptoms: Feelings of helplessness, Feelings of hopelessess, Impaired concentration  Anxiety Symptoms  Anxiety Symptoms: Generalized  Angle Symptoms  Angle Symptoms: No problems reported or observed. Psychosis Symptoms  Delusion Type: No problems reported or observed. Suicide Risk CSSR-S:  1) Within the past month, have you wished you were dead or wished you could go to sleep and not wake up? : Yes  2) Have you actually had any thoughts of killing yourself? : Yes  3) Have you been thinking about how you might kill yourself? : Yes  5) Have you started to work out or worked out the details of how to kill yourself? Do you intend to carry out this plan? : No  6) Have you ever done anything, started to do anything, or prepared to do anything to end your life?: No  Change in Result: No Change in Plan of care: No    EDUCATION:   Learner Progress Toward Treatment Goals: Reviewed results and recommendations of this team, Reviewed group plan and strategies, Reviewed signs, symptoms and risk of self harm and violent behavior, Reviewed goals and plan of care    Method: individual education, small group, verbal education    Outcome: verbalized understanding, but needs reinforcement to obtain goals    PATIENT GOALS:  Short term: Keep exercising. Continue to work through withdrawals. Manage anxiety. Long term: Manage anxiety- explore coping skills. Quit smoking. Explore outpatient treatment options. Maintain sobriety. PLAN/TREATMENT RECOMMENDATIONS UPDATE:   Continue with group therapies, education of coping skills   Continue to monitor patient on unit. Medications provided/ medication compliance by patient. Continue for plans to obtain long term goals after discharge.     SHORT-TERM GOALS UPDATE:  Time frame for Short-Term Goals: 8-14days     LONG-TERM GOALS UPDATE:  Time frame for Long-Term Goals: 6 months  Members Present in Team Meeting: See Signature Sheet    Jaime Da Silva

## 2020-11-25 LAB
EKG ATRIAL RATE: 59 BPM
EKG P AXIS: 83 DEGREES
EKG P-R INTERVAL: 136 MS
EKG Q-T INTERVAL: 436 MS
EKG QRS DURATION: 74 MS
EKG QTC CALCULATION (BAZETT): 431 MS
EKG R AXIS: 71 DEGREES
EKG T AXIS: 79 DEGREES
EKG VENTRICULAR RATE: 59 BPM

## 2020-11-25 PROCEDURE — 99232 SBSQ HOSP IP/OBS MODERATE 35: CPT | Performed by: PSYCHIATRY & NEUROLOGY

## 2020-11-25 PROCEDURE — 6370000000 HC RX 637 (ALT 250 FOR IP): Performed by: NURSE PRACTITIONER

## 2020-11-25 PROCEDURE — 6370000000 HC RX 637 (ALT 250 FOR IP): Performed by: PSYCHIATRY & NEUROLOGY

## 2020-11-25 PROCEDURE — 94761 N-INVAS EAR/PLS OXIMETRY MLT: CPT

## 2020-11-25 PROCEDURE — 94640 AIRWAY INHALATION TREATMENT: CPT

## 2020-11-25 PROCEDURE — 1240000000 HC EMOTIONAL WELLNESS R&B

## 2020-11-25 PROCEDURE — 93010 ELECTROCARDIOGRAM REPORT: CPT | Performed by: INTERNAL MEDICINE

## 2020-11-25 PROCEDURE — 6370000000 HC RX 637 (ALT 250 FOR IP): Performed by: INTERNAL MEDICINE

## 2020-11-25 PROCEDURE — 6360000002 HC RX W HCPCS: Performed by: INTERNAL MEDICINE

## 2020-11-25 RX ORDER — OMEPRAZOLE 20 MG/1
20 CAPSULE, DELAYED RELEASE ORAL DAILY
Qty: 30 CAPSULE | Refills: 3 | Status: CANCELLED | OUTPATIENT
Start: 2020-11-26

## 2020-11-25 RX ORDER — DOXYCYCLINE 100 MG/1
100 CAPSULE ORAL EVERY 12 HOURS SCHEDULED
Qty: 8 CAPSULE | Refills: 0 | Status: CANCELLED | OUTPATIENT
Start: 2020-11-25

## 2020-11-25 RX ADMIN — GABAPENTIN 300 MG: 300 CAPSULE ORAL at 21:43

## 2020-11-25 RX ADMIN — HYDROXYZINE HYDROCHLORIDE 50 MG: 50 TABLET, FILM COATED ORAL at 21:42

## 2020-11-25 RX ADMIN — FAMOTIDINE 20 MG: 20 TABLET, FILM COATED ORAL at 07:49

## 2020-11-25 RX ADMIN — GLYCOPYRROLATE AND FORMOTEROL FUMARATE 2 PUFF: 9; 4.8 AEROSOL, METERED RESPIRATORY (INHALATION) at 21:42

## 2020-11-25 RX ADMIN — CARVEDILOL 3.12 MG: 3.12 TABLET, FILM COATED ORAL at 08:11

## 2020-11-25 RX ADMIN — DOXYCYCLINE 100 MG: 100 CAPSULE ORAL at 07:49

## 2020-11-25 RX ADMIN — FLUVOXAMINE MALEATE 50 MG: 50 TABLET, COATED ORAL at 21:48

## 2020-11-25 RX ADMIN — LEVOTHYROXINE SODIUM 88 MCG: 0.09 TABLET ORAL at 06:14

## 2020-11-25 RX ADMIN — GABAPENTIN 300 MG: 300 CAPSULE ORAL at 17:01

## 2020-11-25 RX ADMIN — Medication 2 PUFF: at 11:45

## 2020-11-25 RX ADMIN — THIAMINE HCL TAB 100 MG 100 MG: 100 TAB at 07:49

## 2020-11-25 RX ADMIN — Medication 1 PUFF: at 21:42

## 2020-11-25 RX ADMIN — FLUVOXAMINE MALEATE 100 MG: 50 TABLET, COATED ORAL at 11:44

## 2020-11-25 RX ADMIN — Medication 2 PUFF: at 06:11

## 2020-11-25 RX ADMIN — OMEPRAZOLE 20 MG: 20 CAPSULE, DELAYED RELEASE ORAL at 06:14

## 2020-11-25 RX ADMIN — Medication 1 PUFF: at 08:34

## 2020-11-25 RX ADMIN — ALUMINUM HYDROXIDE, MAGNESIUM HYDROXIDE, AND SIMETHICONE 30 ML: 200; 200; 20 SUSPENSION ORAL at 18:21

## 2020-11-25 RX ADMIN — GABAPENTIN 300 MG: 300 CAPSULE ORAL at 07:50

## 2020-11-25 RX ADMIN — GLYCOPYRROLATE AND FORMOTEROL FUMARATE 2 PUFF: 9; 4.8 AEROSOL, METERED RESPIRATORY (INHALATION) at 08:34

## 2020-11-25 RX ADMIN — THIAMINE HCL TAB 100 MG 100 MG: 100 TAB at 21:42

## 2020-11-25 RX ADMIN — Medication 2 PUFF: at 16:14

## 2020-11-25 RX ADMIN — FLUVOXAMINE MALEATE 50 MG: 50 TABLET, COATED ORAL at 07:49

## 2020-11-25 RX ADMIN — ALBUTEROL SULFATE 2.5 MG: 2.5 SOLUTION RESPIRATORY (INHALATION) at 07:49

## 2020-11-25 RX ADMIN — FAMOTIDINE 20 MG: 20 TABLET, FILM COATED ORAL at 21:42

## 2020-11-25 RX ADMIN — HYDROXYZINE HYDROCHLORIDE 50 MG: 50 TABLET, FILM COATED ORAL at 06:14

## 2020-11-25 RX ADMIN — CALCIUM CARBONATE 600 MG (1,500 MG)-VITAMIN D3 400 UNIT TABLET 1 TABLET: at 07:50

## 2020-11-25 RX ADMIN — PREDNISONE 10 MG: 10 TABLET ORAL at 07:49

## 2020-11-25 RX ADMIN — OXYCODONE HYDROCHLORIDE AND ACETAMINOPHEN 500 MG: 500 TABLET ORAL at 07:49

## 2020-11-25 RX ADMIN — CARVEDILOL 3.12 MG: 3.12 TABLET, FILM COATED ORAL at 17:02

## 2020-11-25 RX ADMIN — THIAMINE HCL TAB 100 MG 100 MG: 100 TAB at 17:01

## 2020-11-25 RX ADMIN — ALBUTEROL SULFATE 2.5 MG: 2.5 SOLUTION RESPIRATORY (INHALATION) at 19:19

## 2020-11-25 RX ADMIN — MIRTAZAPINE 45 MG: 30 TABLET, FILM COATED ORAL at 21:43

## 2020-11-25 RX ADMIN — MULTIPLE VITAMINS W/ MINERALS TAB 1 TABLET: TAB at 07:50

## 2020-11-25 RX ADMIN — HYDROXYZINE HYDROCHLORIDE 50 MG: 50 TABLET, FILM COATED ORAL at 12:51

## 2020-11-25 RX ADMIN — DOCUSATE SODIUM 100 MG: 100 CAPSULE ORAL at 07:49

## 2020-11-25 RX ADMIN — DOXYCYCLINE 100 MG: 100 CAPSULE ORAL at 21:42

## 2020-11-25 NOTE — GROUP NOTE
Group Therapy Note    Date: 11/25/2020    Group Start Time: 1610  Group End Time: 1700  Group Topic: Group Documentation    [facility]    Pascual Cao LPN        Group Therapy Note    Attendees: 6/17         Patient's Goal:  Recognizing stressors and coping skills    Notes:  participating and encouraging to others    Status After Intervention:  Improved    Participation Level: Monopolizing    Participation Quality: Supportive      Speech:  normal      Thought Process/Content: Logical      Affective Functioning: Congruent      Mood: euthymic      Level of consciousness:  Alert      Response to Learning: Able to verbalize current knowledge/experience      Endings: None Reported    Modes of Intervention: Problem-solving      Discipline Responsible: Licensed Practical Nurse      Signature:  Pascual Cao LPN

## 2020-11-25 NOTE — BH NOTE
LOS     Patient is now resting in bed with O2 therapy connected. No issues or distress noted. Line of sight maintained for patient safety.

## 2020-11-25 NOTE — PROGRESS NOTES
Pharmacy Med Education Group Note    Date: 11/25/20  Start Time: 1430  End Time: 6286    Number Participants in Group:  7    Goal:  Patient will demonstrate an understanding of the medications intended purpose and possible adverse effects  Topic: [place] for Pharmacy Med Ed Group    Discipline Responsible:     OT  AT  [facility].  RT     X Other       Participation Level:     None  Minimal      X Active Listener    X Interactive    Monopolizing         Participation Quality:    X Appropriate  Inappropriate     X       Attentive        Intrusive          Sharing        Resistant          Supportive        Lethargic       Affective:     X Congruent  Incongruent  Blunted  Flat    Constricted  Anxious  Elated  Angry    Labile  Depressed  Other         Cognitive:    X Alert  Oriented PPTP     Concentration   X G  F  P   Attention Span   X G  F  P   Short-Term Memory   X G  F  P   Long-Term Memory  G  F  P   ProblemSolving/  Decision Making  G  F  P   Ability to Process  Information   X G  F  P      Contributing Factors             Delusional             Hallucinating             Flight of Ideas             Other:       Modes of Intervention:    X Education   X Support  Exploration    Clarifying  Problem Solving  Confrontation    Socialization  Limit Setting  Reality Testing    Activity  Movement  Media    Other:            Response to Learning:    X Able to verbalize current knowledge/experience    Able to verbalize/acknowledge new learning    Able to retain information    Capable of insight    Able to change behavior    Progressing to goal    Other:        Comments:     Zoe Altamirano,PharmD,  11/25/2020, 4:33 PM

## 2020-11-25 NOTE — GROUP NOTE
Group Therapy Note    Date: 11/25/2020    Group Start Time: 1100  Group End Time: 3246  Group Topic: Cognitive Skills    GEMA BHNESTOR Arteaga, CTRS        Group Therapy Note    Attendees:5         Patient's Goal:  To improve coping skills     Notes:   Pt was pleasant and participated well     Status After Intervention:  Improved    Participation Level:  Active Listener and Interactive    Participation Quality: Appropriate, Attentive and Supportive      Speech:  normal      Thought Process/Content: Logical      Affective Functioning: flat      Mood: depressed       Level of consciousness:  Alert and Oriented x4      Response to Learning: Able to verbalize current knowledge/experience and Progressing to goal      Endings: None Reported    Modes of Intervention: Education, Support and Socialization      Discipline Responsible: Psychoeducational Specialist      Signature:  Tatianna Cornell

## 2020-11-25 NOTE — GROUP NOTE
Group Therapy Note    Date: 11/25/2020    Group Start Time: 0900  Group End Time: 7126  Group Topic: Community Meeting    [address] [place] [address] GARYNESTOR [place]        Group Therapy Note    Attendees: 11/20         Patient's Goal:  To increase social interaction and to explore and identify short term goals r/t wellness and recovery. RT discussed group schedule and unit structure/resources and encouraged pts to be engaged in their treatment. Pts were given opportunities to ask questions. Notes: Pt participated in group task and was able to identify short term goals r/t wellness and recovery. Pt is pleasant and supportive of peers. Pt states not ready to be discharged-wants to get rehab tx. Status After Intervention:  Improved     Participation Level:  Active Listener and Interactive     Participation Quality: Appropriate, Attentive, Sharing         Speech:   Normal     Thought Process/Content: Logical,linear     Affective Functioning: Congruent     Mood: euthymic        Level of consciousness:  Alert, and Attentive        Response to Learning: Able to verbalize current knowledge/experience, Able to verbalize/acknowledge new learning, and Progressing to goal        Endings: None Reported     Modes of Intervention: Education, Support, Socialization, Exploration, Clarifying and Problem-solving        Discipline Responsible: Psychoeducational Specialist        Signature:  Norman Stewart

## 2020-11-25 NOTE — PLAN OF CARE
Problem: Altered Mood, Depressive Behavior:  Goal: Able to verbalize and/or display a decrease in depressive symptoms  Description: Able to verbalize and/or display a decrease in depressive symptoms  11/24/2020 2157 by Aj Lundberg RN  Outcome: Ongoing  Note: Patient is not able to verbalize a decrease in depression this evening. She reports high depression and some anxiety related to being \"forced to go home tomorrow. \" Patient reports she does not feel safe to go back to home environment and would prefer a treatment facility to help with substance abuse issues. Patient reports she is afraid she will relapse if sent back home too early. Patient informed there is no discharge order as of now. Emotional support and reassurance given. Encouraged to speak with MD and  tomorrow and patient is agreeable. Safety maintained per unit policy, including q 90F patient safety checks. Problem: Altered Mood, Depressive Behavior:  Goal: Ability to disclose and discuss suicidal ideas will improve  Description: Ability to disclose and discuss suicidal ideas will improve  11/24/2020 2157 by Aj Lundberg RN  Outcome: Ongoing  Note: Patient denies suicidal ideations at this time and agrees to seek assistance from staff should thoughts of self harm arise. Will continue to monitor patient for safety and behavior. Problem: Altered Mood, Deterioration in Function:  Goal: Ability to perform activities of daily living will improve  Description: Ability to perform activities of daily living will improve  11/24/2020 2157 by Aj Lundberg RN  Outcome: Ongoing  Note: Patient is able to perform adl's appropriately. She appears groomed and does not have any malodorous odors. She is dressed appropriately for situation.      Problem: Tobacco Use:  Goal: Inpatient tobacco use cessation counseling participation  Description: Inpatient tobacco use cessation counseling participation  11/24/2020 2157 by Raul Miguel, RN  Outcome: Ongoing  Note: Patient given tobacco quitline number 92214969431 at this time, refusing to call at this time, states \" I just dont want to quit now\"- patient given information as to the dangers of long term tobacco use. Continue to reinforce the importance of tobacco cessation.

## 2020-11-25 NOTE — CARE COORDINATION
Met with pt regarding AOD programs. Pt continues to be uncertain of going to a 30 day program. Pt has not contacted any programs. Explored options with pt; explained intake process with AOD programs. Pt agreeable to contact [facility]. Pt has list of additional AOD programs in her room. Pt to notify sw of her decision for AOD program.    2:30 PM- Pt provided update. [facility] is not able to provide services due to her medical dx. Per pt they did refer to another agency which will be contacting her at [facility]. Pt to notify sw of determination. 4:11 PM Pt provided update. Pt has contacted [facility] who did not have any concerns regarding her medical dx; pt's tentative plan for d/c at this time . Pt will need to have discharge from [facility] straight to [facility] for their intake process. Pt will need AVS faxed upon discharge to agency if pt confirms plan. Pt plans to contact additional programs and notify sw of decision.

## 2020-11-25 NOTE — PROGRESS NOTES
[facility] 44 NOTE     11/25/2020     Patient was seen and examined in person, Chart reviewed   Patient's case discussed with staff/team    Chief Complaint: Suicidal ideation and alcohol use    Interim History:   The patient continues to be reluctant to commit to rehab. We discussed that there is no week long rehab. She would have to commit for at least 4 weeks. The patient is unsure about returning home before Thanksgiving because she is likely to relapse. She has ongoing depression and anxiety symptoms. She told me that she has been shaking with anxiety. She has suicidal ideation. She denies any auditory or visual hallucinations or psychotic phenomena      Appetite:  [x] Normal/Unchanged  [] Increased  [] Decreased      Sleep:       [] Normal/Unchanged  [x] Fair       [] Poor              Energy:    [] Normal/Unchanged  [] Increased  [x] Decreased        Aggression:  [] yes  [x] no    Patient is [x] able  [] unable to CONTRACT FOR SAFETY ON THE UNIT    PAST MEDICAL/PSYCHIATRIC HISTORY:   Past Medical History:   Diagnosis Date    Acid reflux     Anxiety     Arthritis     Cancer ([facility] [address]     cervical    COPD (chronic obstructive pulmonary disease) ([facility] [address] 02/23/2018    DDD (degenerative disc disease), cervical     Depression     Hiatal hernia     Spinal stenosis     Tachycardia     Thyroid disease     hypo       FAMILY/SOCIAL HISTORY:  History reviewed. No pertinent family history.   Social History     Socioeconomic History    Marital status:      Spouse name: Not on file    Number of children: Not on file    Years of education: Not on file    Highest education level: Not on file   Occupational History    Not on file   Social Needs    Financial resource strain: Not on file    Food insecurity     Worry: Not on file     Inability: Not on file    Transportation needs     Medical: Not on file     Non-medical: Not on file   Tobacco Use    Smoking status: Heavy Tobacco Smoker Packs/day: 1.00     Years: 48.00     Pack years: 48.00     Types: Cigarettes    Smokeless tobacco: Never Used    Tobacco comment: previous 3 ppd smoker   Substance and Sexual Activity    Alcohol use: Yes     Comment: occassional    Drug use: Never    Sexual activity: Not on file   Lifestyle    Physical activity     Days per week: Not on file     Minutes per session: Not on file    Stress: Not on file   Relationships    Social connections     Talks on phone: Not on file     Gets together: Not on file     Attends [employer] service: Not on file     Active member of club or organization: Not on file     Attends meetings of clubs or organizations: Not on file     Relationship status: Not on file    Intimate partner violence     Fear of current or ex partner: Not on file     Emotionally abused: Not on file     Physically abused: Not on file     Forced sexual activity: Not on file   Other Topics Concern    Not on file   Social History Narrative    Not on file           ROS:  [x] All negative/unchanged except if checked.  Explain positive(checked items) below:  [] Constitutional  [] Eyes  [x] Ear/Nose/Mouth/Throat  [] Respiratory  [] CV  [] GI  []   [] Musculoskeletal  [] Skin/Breast  [] Neurological  [] Endocrine  [] Heme/Lymph  [] Allergic/Immunologic    Explanation: \"Scratchy throat\"  MEDICATIONS:    Current Facility-Administered Medications:     docusate sodium (COLACE) capsule 100 mg, 100 mg, Oral, Daily, Subhash Maldonado MD, 100 mg at 11/25/20 0749    mirtazapine (REMERON) tablet 45 mg, 45 mg, Oral, Nightly, Subhash Maldonado MD, 45 mg at 11/24/20 2028    albuterol (PROVENTIL) nebulizer solution 2.5 mg, 2.5 mg, Nebulization, BID, Ambar Blue MD, 2.5 mg at 11/25/20 0749    doxycycline monohydrate (MONODOX) capsule 100 mg, 100 mg, Oral, 2 times per day, Ambar Blue MD, 100 mg at 11/25/20 0749    fluvoxaMINE (LUVOX) tablet 50 mg, 50 mg, Oral, BID, Eulalio Curling, APRN - CNP, 50 mg at 11/25/20 0749   fluvoxaMINE (LUVOX) tablet 100 mg, 100 mg, Oral, Lunch, Racquelivan Braxton, APRN - CNP, 100 mg at 11/25/20 1144    omeprazole (PRILOSEC) delayed release capsule 20 mg, 20 mg, Oral, Daily, Diane Langston APRN - CNP, 20 mg at 11/25/20 5578    guaiFENesin [facility]) extended release tablet 600 mg, 600 mg, Oral, BID PRN, Diane Langston, APRN - CNP, 600 mg at 11/22/20 1823    famotidine (PEPCID) tablet 20 mg, 20 mg, Oral, BID, Lukasz Oswald MD, 20 mg at 11/25/20 0749    gabapentin (NEURONTIN) capsule 300 mg, 300 mg, Oral, TID, Jairo Livingston, APRN - CNP, 300 mg at 11/25/20 1701    ondansetron (ZOFRAN) tablet 4 mg, 4 mg, Oral, 4x Daily PRN, Sienna Shipman, APRN - CNP, 4 mg at 11/17/20 1808    vitamin B-1 (THIAMINE) tablet 100 mg, 100 mg, Oral, TID, Lukasz Oswald MD, 100 mg at 11/25/20 1701    therapeutic multivitamin-minerals 1 tablet, 1 tablet, Oral, Daily, Lukasz Oswald MD, 1 tablet at 11/25/20 0750    LORazepam (ATIVAN) tablet 1 mg, 1 mg, Oral, Q1H PRN, 1 mg at 11/24/20 0902 **OR** LORazepam (ATIVAN) injection 1 mg, 1 mg, Intramuscular, Q1H PRN **OR** LORazepam (ATIVAN) tablet 2 mg, 2 mg, Oral, Q1H PRN **OR** LORazepam (ATIVAN) injection 2 mg, 2 mg, Intramuscular, Q1H PRN **OR** LORazepam (ATIVAN) tablet 3 mg, 3 mg, Oral, Q1H PRN **OR** LORazepam (ATIVAN) injection 3 mg, 3 mg, Intramuscular, Q1H PRN **OR** LORazepam (ATIVAN) tablet 4 mg, 4 mg, Oral, Q1H PRN **OR** LORazepam (ATIVAN) injection 4 mg, 4 mg, Intramuscular, Q1H PRN, Lukasz Oswald MD    acetaminophen (TYLENOL) tablet 650 mg, 650 mg, Oral, Q4H PRN, Lukasz Oswald MD, 650 mg at 11/23/20 0552    aluminum & magnesium hydroxide-simethicone (MAALOX) 200-200-20 MG/5ML suspension 30 mL, 30 mL, Oral, Q6H PRN, Lukasz Oswald MD, 30 mL at 11/21/20 1818    hydrOXYzine (ATARAX) tablet 50 mg, 50 mg, Oral, TID PRN, Lukasz Oswald MD, 50 mg at 11/25/20 1251    ibuprofen (ADVIL;MOTRIN) tablet 600 mg, 600 mg, Oral, Q6H PRN, Lukasz Oswald MD    traZODone (DESYREL) tablet 50 mg, 50 mg, Oral, Nightly PRN, Gwendolyn Romero MD    polyethylene glycol (GLYCOLAX) packet 17 g, 17 g, Oral, Daily PRN, Gwendolyn Romero MD, 17 g at 11/22/20 8774    calcium carbonate-vitamin D (CALTRATE) 600-400 MG-UNIT per tab 1 tablet, 1 tablet, Oral, Daily, Gwendolyn Romero MD, 1 tablet at 11/25/20 0750    carvedilol (COREG) tablet 3.125 mg, 3.125 mg, Oral, BID WC, Gwendolyn Romero MD, 3.125 mg at 11/25/20 1702    fluticasone (FLOVENT HFA) 110 MCG/ACT inhaler 1 puff, 1 puff, Inhalation, BID, Gwendolyn Romero MD, 1 puff at 11/25/20 0834    levothyroxine (SYNTHROID) tablet 88 mcg, 88 mcg, Oral, Daily, Gwendolyn Romero MD, 88 mcg at 11/25/20 1413    predniSONE (DELTASONE) tablet 10 mg, 10 mg, Oral, Daily, Gwendolyn Romero MD, 10 mg at 11/25/20 0749    glycopyrrolate-formoterol (BEVESPI) 9-4.8 MCG/ACT inhaler 2 puff, 2 puff, Inhalation, BID, Gwendolyn Romero MD, 2 puff at 11/25/20 0834    vitamin C (ASCORBIC ACID) tablet 500 mg, 500 mg, Oral, Daily, Gwendolyn Romero MD, 500 mg at 11/25/20 0749    albuterol (PROVENTIL) nebulizer solution 2.5 mg, 2.5 mg, Nebulization, Q6H PRN, Gwendolyn Romero MD, 2.5 mg at 11/24/20 1251    albuterol sulfate  (90 Base) MCG/ACT inhaler 2 puff, 2 puff, Inhalation, Q6H PRN, Gwendolyn Romero MD, 2 puff at 11/25/20 1614      Examination:  /77   Pulse 80   Temp 97.9 °F (36.6 °C) (Oral)   Resp 18   Ht 5' 4\" (1.626 m)   Wt 135 lb (61.2 kg)   SpO2 96%   BMI 23.17 kg/m²   Gait - steady  Medication side effects(SE): None    Mental Status Examination:    Level of consciousness: Alert  Appearance: Casually dressed, slightly disheveled  Behavior/Motor: Somewhat tremulous  Attitude toward examiner: Cooperative with good eye contact  Speech: Rapid and abrupt  mood: Anxious and depressed  Affect: Mood congruent  Thought processes: Logical and coherent with some loose associations  Thought content:  Homicidal ideation - none  Suicidal Ideation: Active  Delusions: None observed  Perceptual Disturbance: Denies  Cognition: Oriented to self location and circumstances  Concentration intact  Insight fair  Judgement fair    ASSESSMENT:   Patient symptoms are:  [] Well controlled  [x] Improving  [] Worsening  [] No change      Diagnosis:   Active Problems:    MDD (major depressive disorder), recurrent severe, without psychosis ([facility] [address]    Panic disorder    Obsessive compulsive disorder    Alcohol withdrawal syndrome with complication ([facility]  Resolved Problems:    * No resolved hospital problems. *      LABS:    No results for input(s): WBC, HGB, PLT in the last 72 hours. No results for input(s): NA, K, CL, CO2, BUN, CREATININE, GLUCOSE in the last 72 hours. No results for input(s): BILITOT, ALKPHOS, AST, ALT in the last 72 hours. No results found for: LABAMPH, BARBSCNU, LABBENZ, CANNAB, COCAINESCRN, LABMETH, OPIATESCREENURINE, PHENCYCLIDINESCREENURINE, PPXUR, ETOH  Lab Results   Component Value Date    TSH 0.60 07/13/2018     No results found for: LITHIUM  No results found for: VALPROATE, CBMZ        Treatment Plan:  PLAN    No medication changes today  Attempt to develop insight  Psycho-education conducted. Supportive Therapy conducted. Probable discharge is in 2 to 3 days      Risks, benefits, side effects, drug-to-drug interactions and alternatives to treatment were discussed. The patient consented to treatment. Encourage patient to attend group and other milieu activities.   Discharge planning discussed with the patient and treatment team.  Follow-up daily while inpatient  PSYCHOTHERAPY/COUNSELING:  [] Therapeutic interview  [x] Supportive  [] CBT  [] Ongoing  [] Other    [x] Patient continues to need, on a daily basis, active treatment furnished directly by or requiring the supervision of inpatient psychiatric personnel      Anticipated Length of stay: 2 to 3 days    Madeleine Stevens is a 61 y.o. female being evaluated face to face.     --Lamar Johnston MD on 11/25/2020 at 5:38 PM    An electronic signature was used to authenticate this note.

## 2020-11-25 NOTE — FLOWSHEET NOTE
*Patient participated in the [address] [place] [address]       11/25/20 1422   Encounter Summary   Services provided to: Patient   Referral/Consult From: Rounding   Continue Visiting   (11/25/20)   Complexity of Encounter Moderate   Length of Encounter 30 minutes   Spiritual Assessment Completed Yes   Spiritual/Mormonism   Type Spiritual support   Assessment Calm; Approachable   Intervention Active listening   Outcome Receptive

## 2020-11-25 NOTE — BH NOTE
Ms. Gloria Solorio was observed in her bathroom had a small amt of emesis in the toilet during dinner tonight. She reported having a bm this morning, reports this is her first bm since last Wednesday. She described it as about 10-15 quarter sized balls, that were soft, but formed. Her abdomen is distended, and firm in the lower quadrants. She had hypoactive bowel sounds in all 4 quads. She expressed \"pressure\" pain when palpating in all 4 quads, and notes that pain isn't new. She is belching, but is passing no flatulence. Around 5 pm, she complained of upper right quad pain that resolved without intervention. She is keeping ice down, she has kept PRN Maalox and Miralax down. Writer notified provider through perfect serve. Awaiting response.

## 2020-11-25 NOTE — GROUP NOTE
Group Therapy Note    Date: 11/25/2020    Group Start Time: 1000  Group End Time: 3393  Group Topic: Psychotherapy    [facility] BHYOLANDA Schaefer        Group Therapy Note    Attendees: 4/9    Pt declined to attend psychotherapy at 1000 am despite encouragement. Pt offered 1:1 and refused.          Signature:  YOLANDA Siddiqi

## 2020-11-26 ENCOUNTER — APPOINTMENT (OUTPATIENT)
Dept: GENERAL RADIOLOGY | Age: 60
DRG: 885 | End: 2020-11-26
Payer: COMMERCIAL

## 2020-11-26 PROCEDURE — 94640 AIRWAY INHALATION TREATMENT: CPT

## 2020-11-26 PROCEDURE — 6370000000 HC RX 637 (ALT 250 FOR IP): Performed by: INTERNAL MEDICINE

## 2020-11-26 PROCEDURE — 6370000000 HC RX 637 (ALT 250 FOR IP): Performed by: PSYCHIATRY & NEUROLOGY

## 2020-11-26 PROCEDURE — 6370000000 HC RX 637 (ALT 250 FOR IP): Performed by: NURSE PRACTITIONER

## 2020-11-26 PROCEDURE — 1240000000 HC EMOTIONAL WELLNESS R&B

## 2020-11-26 PROCEDURE — 74018 RADEX ABDOMEN 1 VIEW: CPT

## 2020-11-26 PROCEDURE — 6360000002 HC RX W HCPCS: Performed by: INTERNAL MEDICINE

## 2020-11-26 PROCEDURE — 99254 IP/OBS CNSLTJ NEW/EST MOD 60: CPT | Performed by: INTERNAL MEDICINE

## 2020-11-26 PROCEDURE — 94761 N-INVAS EAR/PLS OXIMETRY MLT: CPT

## 2020-11-26 PROCEDURE — 99232 SBSQ HOSP IP/OBS MODERATE 35: CPT | Performed by: PSYCHIATRY & NEUROLOGY

## 2020-11-26 RX ADMIN — DOXYCYCLINE 100 MG: 100 CAPSULE ORAL at 20:48

## 2020-11-26 RX ADMIN — DOCUSATE SODIUM 100 MG: 100 CAPSULE ORAL at 08:39

## 2020-11-26 RX ADMIN — ONDANSETRON HYDROCHLORIDE 4 MG: 4 TABLET, FILM COATED ORAL at 14:35

## 2020-11-26 RX ADMIN — THIAMINE HCL TAB 100 MG 100 MG: 100 TAB at 08:39

## 2020-11-26 RX ADMIN — PREDNISONE 10 MG: 10 TABLET ORAL at 08:39

## 2020-11-26 RX ADMIN — FLUVOXAMINE MALEATE 100 MG: 50 TABLET, COATED ORAL at 12:43

## 2020-11-26 RX ADMIN — CARVEDILOL 3.12 MG: 3.12 TABLET, FILM COATED ORAL at 08:38

## 2020-11-26 RX ADMIN — GLYCOPYRROLATE AND FORMOTEROL FUMARATE 2 PUFF: 9; 4.8 AEROSOL, METERED RESPIRATORY (INHALATION) at 08:38

## 2020-11-26 RX ADMIN — FAMOTIDINE 20 MG: 20 TABLET, FILM COATED ORAL at 08:39

## 2020-11-26 RX ADMIN — HYDROXYZINE HYDROCHLORIDE 50 MG: 50 TABLET, FILM COATED ORAL at 04:36

## 2020-11-26 RX ADMIN — MULTIPLE VITAMINS W/ MINERALS TAB 1 TABLET: TAB at 08:39

## 2020-11-26 RX ADMIN — Medication 2 PUFF: at 14:35

## 2020-11-26 RX ADMIN — ALUMINUM HYDROXIDE, MAGNESIUM HYDROXIDE, AND SIMETHICONE 30 ML: 200; 200; 20 SUSPENSION ORAL at 18:32

## 2020-11-26 RX ADMIN — DOXYCYCLINE 100 MG: 100 CAPSULE ORAL at 08:39

## 2020-11-26 RX ADMIN — FLUVOXAMINE MALEATE 50 MG: 50 TABLET, COATED ORAL at 08:38

## 2020-11-26 RX ADMIN — MIRTAZAPINE 45 MG: 30 TABLET, FILM COATED ORAL at 20:48

## 2020-11-26 RX ADMIN — OMEPRAZOLE 20 MG: 20 CAPSULE, DELAYED RELEASE ORAL at 06:24

## 2020-11-26 RX ADMIN — GABAPENTIN 300 MG: 300 CAPSULE ORAL at 08:39

## 2020-11-26 RX ADMIN — CARVEDILOL 3.12 MG: 3.12 TABLET, FILM COATED ORAL at 16:30

## 2020-11-26 RX ADMIN — THIAMINE HCL TAB 100 MG 100 MG: 100 TAB at 20:48

## 2020-11-26 RX ADMIN — ALBUTEROL SULFATE 2.5 MG: 2.5 SOLUTION RESPIRATORY (INHALATION) at 19:42

## 2020-11-26 RX ADMIN — Medication 1 PUFF: at 20:44

## 2020-11-26 RX ADMIN — OXYCODONE HYDROCHLORIDE AND ACETAMINOPHEN 500 MG: 500 TABLET ORAL at 08:38

## 2020-11-26 RX ADMIN — LEVOTHYROXINE SODIUM 88 MCG: 0.09 TABLET ORAL at 06:24

## 2020-11-26 RX ADMIN — CALCIUM CARBONATE 600 MG (1,500 MG)-VITAMIN D3 400 UNIT TABLET 1 TABLET: at 08:39

## 2020-11-26 RX ADMIN — THIAMINE HCL TAB 100 MG 100 MG: 100 TAB at 13:51

## 2020-11-26 RX ADMIN — FAMOTIDINE 20 MG: 20 TABLET, FILM COATED ORAL at 20:48

## 2020-11-26 RX ADMIN — Medication 1 PUFF: at 08:37

## 2020-11-26 RX ADMIN — GLYCOPYRROLATE AND FORMOTEROL FUMARATE 2 PUFF: 9; 4.8 AEROSOL, METERED RESPIRATORY (INHALATION) at 20:45

## 2020-11-26 RX ADMIN — HYDROXYZINE HYDROCHLORIDE 50 MG: 50 TABLET, FILM COATED ORAL at 13:54

## 2020-11-26 RX ADMIN — GABAPENTIN 300 MG: 300 CAPSULE ORAL at 13:51

## 2020-11-26 RX ADMIN — GABAPENTIN 300 MG: 300 CAPSULE ORAL at 20:48

## 2020-11-26 RX ADMIN — FLUVOXAMINE MALEATE 50 MG: 50 TABLET, COATED ORAL at 20:49

## 2020-11-26 RX ADMIN — ALBUTEROL SULFATE 2.5 MG: 2.5 SOLUTION RESPIRATORY (INHALATION) at 08:16

## 2020-11-26 RX ADMIN — HYDROXYZINE HYDROCHLORIDE 50 MG: 50 TABLET, FILM COATED ORAL at 21:00

## 2020-11-26 ASSESSMENT — ENCOUNTER SYMPTOMS
COLOR CHANGE: 0
SHORTNESS OF BREATH: 0
ABDOMINAL PAIN: 1
ABDOMINAL DISTENTION: 1
COUGH: 0
NAUSEA: 0
BACK PAIN: 0
EYES NEGATIVE: 1
SORE THROAT: 0
CONSTIPATION: 1
WHEEZING: 0
CHOKING: 0
VOMITING: 0
TROUBLE SWALLOWING: 0
VOICE CHANGE: 0
BLOOD IN STOOL: 0

## 2020-11-26 ASSESSMENT — PAIN SCALES - GENERAL: PAINLEVEL_OUTOF10: 0

## 2020-11-26 NOTE — PLAN OF CARE
Problem: Altered Mood, Depressive Behavior:  Goal: Able to verbalize and/or display a decrease in depressive symptoms  Description: Able to verbalize and/or display a decrease in depressive symptoms  Outcome: Ongoing  Note: Patient has been out to dayroom, social with peers and staff. Patient has been medication compliant, and behavior controled. She has been calm/cooperative. Patient denies suicidal/homicidal ideation, auditory/visual hallucinations, and depression. She admits to anxiety about her food getting stuck when she is eating, GI was consulted, KUB ordered and completed. Patient has had nausea with mild emesis this morning. Patient has hygiene supplies in room, declined shower this shift. Problem: Altered Mood, Depressive Behavior:  Goal: Ability to disclose and discuss suicidal ideas will improve  Description: Ability to disclose and discuss suicidal ideas will improve  Outcome: Ongoing  Note: Patient remains free from harm     Problem: Altered Mood, Deterioration in Function:  Goal: Ability to perform activities of daily living will improve  Description: Ability to perform activities of daily living will improve  Outcome: Ongoing  Note: Patient has been completing ADL's independently. Problem: Tobacco Use:  Goal: Inpatient tobacco use cessation counseling participation  Description: Inpatient tobacco use cessation counseling participation  Outcome: Ongoing  Note: Patient remains free from tobacco use.

## 2020-11-26 NOTE — PROGRESS NOTES
[facility] 44 NOTE     11/26/2020     Patient was seen and examined in person, Chart reviewed   Patient's case discussed with staff/team    Chief Complaint: Suicidal ideation and alcohol use    Interim History:     The patient has expressed concern about going to her rehab where   patients are not tested for COVID. The patient has been constipated with abdominal pain and vomiting. She has a history of a hiatal hernia. She has been referred to gastroenterology. She has been compliant with medications. Her mood is slightly better but her anxiety continues to be severe and she has ongoing suicidal ideation      Appetite:  [x] Normal/Unchanged  [] Increased  [] Decreased      Sleep:       [] Normal/Unchanged  [x] Fair       [] Poor              Energy:    [] Normal/Unchanged  [] Increased  [x] Decreased        Aggression:  [] yes  [x] no    Patient is [x] able  [] unable to CONTRACT FOR SAFETY ON THE UNIT    PAST MEDICAL/PSYCHIATRIC HISTORY:   Past Medical History:   Diagnosis Date    Acid reflux     Anxiety     Arthritis     Cancer ([facility] [address]     cervical    COPD (chronic obstructive pulmonary disease) ([facility] [address] 02/23/2018    DDD (degenerative disc disease), cervical     Depression     Hiatal hernia     Spinal stenosis     Tachycardia     Thyroid disease     hypo       FAMILY/SOCIAL HISTORY:  History reviewed. No pertinent family history.   Social History     Socioeconomic History    Marital status:      Spouse name: Not on file    Number of children: Not on file    Years of education: Not on file    Highest education level: Not on file   Occupational History    Not on file   Social Needs    Financial resource strain: Not on file    Food insecurity     Worry: Not on file     Inability: Not on file    Transportation needs     Medical: Not on file     Non-medical: Not on file   Tobacco Use    Smoking status: Heavy Tobacco Smoker     Packs/day: 1.00     Years: 48.00     Pack years: 48.00     Types: Cigarettes    Smokeless tobacco: Never Used    Tobacco comment: previous 3 ppd smoker   Substance and Sexual Activity    Alcohol use: Yes     Comment: occassional    Drug use: Never    Sexual activity: Not on file   Lifestyle    Physical activity     Days per week: Not on file     Minutes per session: Not on file    Stress: Not on file   Relationships    Social connections     Talks on phone: Not on file     Gets together: Not on file     Attends [employer] service: Not on file     Active member of club or organization: Not on file     Attends meetings of clubs or organizations: Not on file     Relationship status: Not on file    Intimate partner violence     Fear of current or ex partner: Not on file     Emotionally abused: Not on file     Physically abused: Not on file     Forced sexual activity: Not on file   Other Topics Concern    Not on file   Social History Narrative    Not on file           ROS:  [x] All negative/unchanged except if checked.  Explain positive(checked items) below:  [] Constitutional  [] Eyes  [x] Ear/Nose/Mouth/Throat  [] Respiratory  [] CV  [] GI  []   [] Musculoskeletal  [] Skin/Breast  [] Neurological  [] Endocrine  [] Heme/Lymph  [] Allergic/Immunologic    Explanation: \"Scratchy throat\"  MEDICATIONS:    Current Facility-Administered Medications:     docusate sodium (COLACE) capsule 100 mg, 100 mg, Oral, Daily, Dimas Paris MD, 100 mg at 11/26/20 0839    mirtazapine (REMERON) tablet 45 mg, 45 mg, Oral, Nightly, Dimas Paris MD, 45 mg at 11/25/20 2143    albuterol (PROVENTIL) nebulizer solution 2.5 mg, 2.5 mg, Nebulization, BID, Lloyd Horn MD, 2.5 mg at 11/26/20 0816    doxycycline monohydrate (MONODOX) capsule 100 mg, 100 mg, Oral, 2 times per day, Lloyd Horn MD, 100 mg at 11/26/20 0839    fluvoxaMINE (LUVOX) tablet 50 mg, 50 mg, Oral, BID, NATHAN Burgos CNP, 50 mg at 11/26/20 0838    fluvoxaMINE (LUVOX) tablet 100 mg, 100 mg, Oral, Lunch, NATHAN Wagner - CNP, 100 mg at 11/26/20 1243    omeprazole (PRILOSEC) delayed release capsule 20 mg, 20 mg, Oral, Daily, NATHAN Pichardo - CNP, 20 mg at 11/26/20 0891    guaiFENesin [facility]) extended release tablet 600 mg, 600 mg, Oral, BID PRN, NATHAN Pichardo - CNP, 600 mg at 11/22/20 1823    famotidine (PEPCID) tablet 20 mg, 20 mg, Oral, BID, Cassidy Bowden MD, 20 mg at 11/26/20 7866    gabapentin (NEURONTIN) capsule 300 mg, 300 mg, Oral, TID, NATHAN Wagner - CNP, 300 mg at 11/26/20 1351    ondansetron (ZOFRAN) tablet 4 mg, 4 mg, Oral, 4x Daily PRN, NATHAN Carreno - CNP, 4 mg at 11/26/20 1435    vitamin B-1 (THIAMINE) tablet 100 mg, 100 mg, Oral, TID, Cassidy Bowden MD, 100 mg at 11/26/20 1351    therapeutic multivitamin-minerals 1 tablet, 1 tablet, Oral, Daily, Cassidy Bowdne MD, 1 tablet at 11/26/20 0839    LORazepam (ATIVAN) tablet 1 mg, 1 mg, Oral, Q1H PRN, 1 mg at 11/24/20 0902 **OR** LORazepam (ATIVAN) injection 1 mg, 1 mg, Intramuscular, Q1H PRN **OR** LORazepam (ATIVAN) tablet 2 mg, 2 mg, Oral, Q1H PRN **OR** LORazepam (ATIVAN) injection 2 mg, 2 mg, Intramuscular, Q1H PRN **OR** LORazepam (ATIVAN) tablet 3 mg, 3 mg, Oral, Q1H PRN **OR** LORazepam (ATIVAN) injection 3 mg, 3 mg, Intramuscular, Q1H PRN **OR** LORazepam (ATIVAN) tablet 4 mg, 4 mg, Oral, Q1H PRN **OR** LORazepam (ATIVAN) injection 4 mg, 4 mg, Intramuscular, Q1H PRN, Cassidy Bowden MD    acetaminophen (TYLENOL) tablet 650 mg, 650 mg, Oral, Q4H PRN, Geroldine MD Kal, 650 mg at 11/23/20 0552    aluminum & magnesium hydroxide-simethicone (MAALOX) 200-200-20 MG/5ML suspension 30 mL, 30 mL, Oral, Q6H PRN, Cassidy Bowden MD, 30 mL at 11/25/20 1821    hydrOXYzine (ATARAX) tablet 50 mg, 50 mg, Oral, TID PRN, Cassidy Bowden MD, 50 mg at 11/26/20 1354    ibuprofen (ADVIL;MOTRIN) tablet 600 mg, 600 mg, Oral, Q6H PRN, Cassidy Bowden MD    traZODone (DESYREL) tablet 50 mg, 50 mg, Oral, Nightly PRN, Hector Bazzi MD    polyethylene glycol [facility]) packet 17 g, 17 g, Oral, Daily PRN, Hector Bazzi MD, 17 g at 11/22/20 0928    calcium carbonate-vitamin D (CALTRATE) 600-400 MG-UNIT per tab 1 tablet, 1 tablet, Oral, Daily, Hector Bazzi MD, 1 tablet at 11/26/20 0839    carvedilol (COREG) tablet 3.125 mg, 3.125 mg, Oral, BID WC, Hector Bazzi MD, 3.125 mg at 11/26/20 0838    fluticasone (FLOVENT HFA) 110 MCG/ACT inhaler 1 puff, 1 puff, Inhalation, BID, Hector Bazzi MD, 1 puff at 11/26/20 0837    levothyroxine (SYNTHROID) tablet 88 mcg, 88 mcg, Oral, Daily, Hector Bazzi MD, 88 mcg at 11/26/20 8140    predniSONE (DELTASONE) tablet 10 mg, 10 mg, Oral, Daily, Hector Bazzi MD, 10 mg at 11/26/20 0839    glycopyrrolate-formoterol (BEVESPI) 9-4.8 MCG/ACT inhaler 2 puff, 2 puff, Inhalation, BID, Hector Bazzi MD, 2 puff at 11/26/20 4327    vitamin C (ASCORBIC ACID) tablet 500 mg, 500 mg, Oral, Daily, Hector Bazzi MD, 500 mg at 11/26/20 0838    albuterol (PROVENTIL) nebulizer solution 2.5 mg, 2.5 mg, Nebulization, Q6H PRN, Hector Bazzi MD, 2.5 mg at 11/24/20 1251    albuterol sulfate  (90 Base) MCG/ACT inhaler 2 puff, 2 puff, Inhalation, Q6H PRN, Hector Bazzi MD, 2 puff at 11/26/20 1435      Examination:  /82   Pulse 64   Temp 98.3 °F (36.8 °C) (Oral)   Resp 18   Ht 5' 4\" (1.626 m)   Wt 135 lb (61.2 kg)   SpO2 96%   BMI 23.17 kg/m²   Gait - steady  Medication side effects(SE): None    Mental Status Examination:    Level of consciousness: Alert  Appearance: Disheveled and distressed  Behavior/Motor: Walking hunched over because of abdominal pain  Attitude toward examiner: Cooperative with good eye contact  Speech: Normal in tone volume rate and rhythm  mood: Anxious and depressed  Affect: Mood congruent  Thought processes: Logical and coherent with some loose associations  Thought content:  Homicidal ideation - none  Suicidal Ideation: Active  Delusions: None observed  Perceptual Disturbance: Denies  Cognition: Oriented to self location and circumstances  Concentration intact  Insight fair  Judgement fair    ASSESSMENT:   Patient symptoms are:  [] Well controlled  [x] Improving  [] Worsening  [] No change      Diagnosis:   Active Problems:    MDD (major depressive disorder), recurrent severe, without psychosis ([facility] [address]    Panic disorder    Obsessive compulsive disorder    Alcohol withdrawal syndrome with complication ([facility]  Resolved Problems:    * No resolved hospital problems. *      LABS:    No results for input(s): WBC, HGB, PLT in the last 72 hours. No results for input(s): NA, K, CL, CO2, BUN, CREATININE, GLUCOSE in the last 72 hours. No results for input(s): BILITOT, ALKPHOS, AST, ALT in the last 72 hours. No results found for: Marcine Moritz, LABBENZ, CANNAB, COCAINESCRN, LABMETH, OPIATESCREENURINE, PHENCYCLIDINESCREENURINE, PPXUR, ETOH  Lab Results   Component Value Date    TSH 0.60 07/13/2018     No results found for: LITHIUM  No results found for: VALPROATE, CBMZ        Treatment Plan:  PLAN    No medication changes today. Indications and risk-benefit analysis was discussed  Attempt to develop insight  Psycho-education conducted. Supportive Therapy conducted. Probable discharge is in 2 to 3 days      Risks, benefits, side effects, drug-to-drug interactions and alternatives to treatment were discussed. The patient consented to treatment. Encourage patient to attend group and other milieu activities.   Discharge planning discussed with the patient and treatment team.  Follow-up daily while inpatient  PSYCHOTHERAPY/COUNSELING:  [] Therapeutic interview  [x] Supportive  [] CBT  [] Ongoing  [] Other    [x] Patient continues to need, on a daily basis, active treatment furnished directly by or requiring the supervision of inpatient psychiatric personnel      Anticipated Length of stay: 2 to 3 days    Shann Spurling is a 61 y.o. female being evaluated face to face.     --Hollis Hudson MD on 11/26/2020 at 4:24 PM    An electronic signature was used to authenticate this note.

## 2020-11-26 NOTE — BH NOTE
patient is having difficulty swallowing pills and some food this morning. She said she feels like its all getting stuck in her esophagus. When taking her meds this morning she started belching to try to relieve the pressure and had a small emesis, about 10-20mL. She has since said her stomach is feeling better but I thought maybe a med consult, as she has hx of hiatal hernia, diverticulitis and gastritis, she sees GI. Patient also states she feels constipated, has been taking meds as ordered, had small bowel movement yesterday, but did not feel she was empty. Rigoberto sent to provider.

## 2020-11-26 NOTE — GROUP NOTE
Group Therapy Note    Date: 11/26/2020    Group Start Time: 1600  Group End Time: 36  Group Topic: Healthy Living/Wellness    [facility]    Lc Reveles, LILY        Group Therapy Note    Attendees: 8         Patient's Goal:  Socialization and word scramble    Notes:  See below    Status After Intervention:  Unchanged    Participation Level:  Active Listener    Participation Quality: Attentive      Speech:  normal      Thought Process/Content: Logical      Affective Functioning: Congruent      Mood: WNL      Level of consciousness:  Alert      Response to Learning: Able to verbalize current knowledge/experience      Endings: None Reported    Modes of Intervention: Socialization      Discipline Responsible: Registered Nurse

## 2020-11-26 NOTE — BH NOTE
LOS:  PT resting in her room eyes closed respirations even and non labored will continue to monitor.

## 2020-11-26 NOTE — CONSULTS
GI Consult Note:    Name: Gunnar Anguiano  MRN: [id number]     Acct: [de-identified]  Room: [address] [place] [address]    Admit Date: 11/16/2020  PCP: Myriam Cline    Physician Requesting Consult: Juliana Teran MD     Reason for Consult: Abdominal discomfort, constipation, questionable issues of swallowing    Chief Complaint:     Chief Complaint   Patient presents with    Suicidal    Anxiety    Depression       History Obtained From:     patient, electronic medical record, nursing staff    History of Present Illness:      Gunnar Anguiano is a  61 y.o.  female who presents with Suicidal; Anxiety; and Depression      Symptoms:  Patient seen along with the nursing staff at [facility]. Patient states that she had issues swallowing. Since yesterday after swallowing food, she is getting regurgitation and throwing up. Apparently she had some salads yesterday and since then she is having this issues. However he is able to eat breakfast with scrambled egg this morning. She also drank some coffee. However she stated when she drank coffee she threw it up. She also drank prune juice without problem. Patient had similar symptoms of difficulty swallowing in the past and work-up was negative. Previous records reviewed and she had a EGD done in number of 2018 and at that time no abnormalities noted in the esophagus except that she has small sliding hiatal hernia. She had colonoscopies done twice in 2019 and was found to have small polyps. Patient also complains of abdominal bloating, discomfort and constipation. She has been taking laxatives and stool softeners without significant help. No hematochezia. No diarrhea. Patient had a similar symptoms in the past and in fact she had 2 colonoscopies done by Dr. Samantha Corrales in 2019 and no stricture or diverticulosis described. Was found to have small polyps that were removed. She has a fair appetite. She has been gaining weight. No prior history of known liver disease.   She has multiple other medical issues reviewed from the chart. Also it appears that this patient has a history of chronic alcoholism. Past Medical History:     Past Medical History:   Diagnosis Date    Acid reflux     Anxiety     Arthritis     Cancer ([facility]     cervical    COPD (chronic obstructive pulmonary disease) ([facility] 02/23/2018    DDD (degenerative disc disease), cervical     Depression     Hiatal hernia     Spinal stenosis     Tachycardia     Thyroid disease     hypo        Past Surgical History:     Past Surgical History:   Procedure Laterality Date    BACK SURGERY      diskectomy, L3-L5    COLONOSCOPY N/A 4/8/2019    COLONOSCOPY DIAGNOSTIC performed by Ashley Robledo MD at [address] [place] [address]  11/13/2019    COLONOSCOPY POLYPECTOMY SNARE/COLD BIOPSY performed by Ashley Robledo MD at [address] [place] [address]      ectopic pregnancy with tube removal    THYROIDECTOMY, PARTIAL      UPPER GASTROINTESTINAL ENDOSCOPY          Medications Prior to Admission:       Prior to Admission medications    Medication Sig Start Date End Date Taking?  Authorizing Provider   mirtazapine (REMERON) 30 MG tablet Take 30 mg by mouth nightly 10/30/20 10/30/21 Yes Historical Provider, MD   predniSONE (DELTASONE) 10 MG tablet Take 1 tablet by mouth daily 9/28/20  Yes Historical Provider, MD   vitamin C (ASCORBIC ACID) 500 MG tablet Take 500 mg by mouth daily   Yes Historical Provider, MD   calcium-vitamin D (OSCAL-500) 500-200 MG-UNIT per tablet Take 1 tablet by mouth daily   Yes Historical Provider, MD   fluticasone (FLOVENT HFA) 110 MCG/ACT inhaler Inhale 1 puff into the lungs 2 times daily   Yes Historical Provider, MD   omeprazole (PRILOSEC) 40 MG delayed release capsule Take 1 capsule by mouth 2 times daily 7/24/20  Yes Ashley Robledo MD   Tiotropium Bromide-Olodaterol (STIOLTO RESPIMAT) 2.5-2.5 MCG/ACT AERS Inhale 2 puffs into the lungs 9/7/18  Yes Historical Provider, MD   fluvoxaMINE (LUVOX) 100 MG tablet Take 100 mg by mouth nightly   Yes Historical Provider, MD   carvedilol (COREG) 3.125 MG tablet Take 3.125 mg by mouth 2 times daily (with meals)   Yes Historical Provider, MD   levothyroxine (SYNTHROID) 88 MCG tablet Take 88 mcg by mouth Daily   Yes Historical Provider, MD   clonazePAM (KLONOPIN) 0.5 MG tablet Take 0.5 mg by mouth 4 times daily. Yes Historical Provider, MD   albuterol sulfate  (90 Base) MCG/ACT inhaler Inhale 2 puffs into the lungs every 6 hours as needed for Wheezing   Yes Historical Provider, MD   albuterol (ACCUNEB) 0.63 MG/3ML nebulizer solution Take 1 ampule by nebulization every 6 hours as needed for Wheezing   Yes Historical Provider, MD        Allergies:       Patient has no known allergies. Social History:     Tobacco:    reports that she has been smoking cigarettes. She has a 48.00 pack-year smoking history. She has never used smokeless tobacco.  Alcohol:      reports current alcohol use. Drug Use: reports no history of drug use. Family History:     History reviewed. No pertinent family history. Review of Systems:     Review of Systems   Constitutional: Positive for appetite change, fatigue and unexpected weight change. Negative for fever. HENT: Negative for mouth sores, sore throat, trouble swallowing and voice change. Eyes: Negative. Respiratory: Negative for cough, choking, shortness of breath and wheezing. Cardiovascular: Negative for chest pain, palpitations and leg swelling. Gastrointestinal: Positive for abdominal distention, abdominal pain and constipation. Negative for blood in stool, nausea and vomiting. Endocrine: Negative for polyphagia and polyuria. Genitourinary: Negative for difficulty urinating, frequency, hematuria, pelvic pain, urgency and vaginal bleeding. Musculoskeletal: Positive for myalgias. Negative for arthralgias, back pain, gait problem and joint swelling.    Skin: Negative for color change, pallor and rash. Allergic/Immunologic: Negative for food allergies. Neurological: Negative for dizziness, seizures, weakness, light-headedness and headaches. Hematological: Negative for adenopathy. Does not bruise/bleed easily. Psychiatric/Behavioral: Positive for behavioral problems. Negative for sleep disturbance. The patient is nervous/anxious. Code Status:  Full Code    Physical Exam:     Vitals:  /82   Pulse 64   Temp 98.3 °F (36.8 °C) (Oral)   Resp 18   Ht 5' 4\" (1.626 m)   Wt 135 lb (61.2 kg)   SpO2 96%   BMI 23.17 kg/m²   Temp (24hrs), Av °F (36.7 °C), Min:97.6 °F (36.4 °C), Max:98.3 °F (36.8 °C)      Physical Exam  Vitals signs and nursing note reviewed. Constitutional:       Appearance: She is well-developed. HENT:      Head: Normocephalic and atraumatic. Eyes:      General: No scleral icterus. Conjunctiva/sclera: Conjunctivae normal.      Pupils: Pupils are equal, round, and reactive to light. Neck:      Musculoskeletal: Normal range of motion and neck supple. Thyroid: No thyromegaly. Vascular: No hepatojugular reflux or JVD. Trachea: No tracheal deviation. Cardiovascular:      Rate and Rhythm: Normal rate and regular rhythm. Heart sounds: Normal heart sounds. Pulmonary:      Effort: Pulmonary effort is normal. No respiratory distress. Breath sounds: Normal breath sounds. No wheezing or rales. Abdominal:      General: Bowel sounds are normal. There is no distension. Palpations: Abdomen is soft. There is no hepatomegaly or mass. Tenderness: There is no abdominal tenderness. There is no rebound. Hernia: No hernia is present. Musculoskeletal:         General: No tenderness. Comments: No joint swelling   Lymphadenopathy:      Cervical: No cervical adenopathy. Skin:     General: Skin is warm. Findings: No bruising, ecchymosis, erythema or rash.    Neurological:      Mental Status: She is alert and oriented to person, place, and time. Cranial Nerves: No cranial nerve deficit. Psychiatric:         Thought Content:  Thought content normal.       Data:   CBC:   Lab Results   Component Value Date    WBC 8.4 08/30/2020    RBC 4.21 08/30/2020    HGB 13.6 08/30/2020    HCT 40.2 08/30/2020    MCV 95.5 08/30/2020    MCH 32.4 08/30/2020    MCHC 34.0 08/30/2020    RDW 14.1 08/30/2020     08/30/2020    MPV 7.8 08/30/2020     CBC with Differential:  Lab Results   Component Value Date    WBC 8.4 08/30/2020    RBC 4.21 08/30/2020    HGB 13.6 08/30/2020    HCT 40.2 08/30/2020     08/30/2020    MCV 95.5 08/30/2020    MCH 32.4 08/30/2020    MCHC 34.0 08/30/2020    RDW 14.1 08/30/2020    LYMPHOPCT 22 08/30/2020    MONOPCT 9 08/30/2020    BASOPCT 1 08/30/2020    MONOSABS 0.80 08/30/2020    LYMPHSABS 1.80 08/30/2020    EOSABS 0.00 08/30/2020    BASOSABS 0.10 08/30/2020    DIFFTYPE NOT REPORTED 08/30/2020     Hemoglobin/Hematocrit:  Lab Results   Component Value Date    HGB 13.6 08/30/2020    HCT 40.2 08/30/2020     CMP:    Lab Results   Component Value Date     08/30/2020    K 4.1 08/30/2020     08/30/2020    CO2 30 08/30/2020    BUN 9 08/30/2020    CREATININE 0.54 08/30/2020    GFRAA >60 08/30/2020    LABGLOM >60 08/30/2020    GLUCOSE 90 08/30/2020    PROT 6.5 08/30/2020    LABALBU 4.0 08/30/2020    CALCIUM 9.0 08/30/2020    BILITOT 0.57 08/30/2020    ALKPHOS 72 08/30/2020    AST 21 08/30/2020    ALT 12 08/30/2020     BMP:  Lab Results   Component Value Date     08/30/2020    K 4.1 08/30/2020     08/30/2020    CO2 30 08/30/2020    BUN 9 08/30/2020    LABALBU 4.0 08/30/2020    CREATININE 0.54 08/30/2020    CALCIUM 9.0 08/30/2020    GFRAA >60 08/30/2020    LABGLOM >60 08/30/2020    GLUCOSE 90 08/30/2020     PT/INR:  No results found for: PROTIME, INR  PTT:  No results found for: APTT, PTT[APTT}    Assesment:     Primary Problem  <principal problem not specified>    Active Hospital Problems    Diagnosis Date Noted    MDD (major depressive disorder), recurrent severe, without psychosis ([facility] [F33.2] 11/17/2020    Panic disorder [F41.0] 11/17/2020    Obsessive compulsive disorder [F42.9] 11/17/2020    Alcohol withdrawal syndrome with complication ([facility] [D12.863] 11/17/2020       Plan:     1. Basing on the history I do not think that she has esophageal obstruction. 2. Patient is reassured and advised diet as tolerated. 3. Advised her to have x-ray of the abdomen to make sure that she does not have fecal impaction. 4. Basing on the abdominal x-rays will decide whether she needs oral laxatives and advised the nursing staff to contact me. 5. If she continued to have symptoms of swallowing she may need EGD. 6. We will reevaluate patient in 24 hours. 7. Probable constipation predominant IBS. Thank you for allowing me to participate in the care of your patient. Please feel free to contact me with anyquestions or concerns. Electronically signed by Trisha Hicks MD on 11/26/2020 at 3:40 PM     Copy sent to Dr. Frank Cline    Note is dictated utilizing voice recognition software. Unfortunately this leads to occasional typographical errors. Please contact our office if you have any questions.

## 2020-11-26 NOTE — GROUP NOTE
Group Therapy Note    Date: 11/26/2020    Group Start Time: 1000  Group End Time: 1030  Group Topic: Group Documentation    GEMA Ramirez RN        Group Therapy Note    Attendees:          Patient's Goal:  Having a good outlook    Notes:  Staying upbeat    Status After Intervention:  Improved    Participation Level: Interactive    Participation Quality: Sharing      Speech:  normal      Thought Process/Content: Logical      Affective Functioning: Congruent      Mood: anxious      Level of consciousness:  Alert      Response to Learning: Able to retain information      Endings: None Reported    Modes of Intervention: Education      Discipline Responsible: Registered Nurse      Signature:  Alphonse Ramirez RN

## 2020-11-26 NOTE — GROUP NOTE
Group Therapy Note    Date: 11/26/2020    Group Start Time: 0905  Group End Time: 0940  Group Topic: Community Meeting    GEMA [facility] SHABNAM [place]    Attendees: 8         Patient's Goal:  To be able to verbalize obtainable short term goal for today pertaining to mental health and stability. To be informed of today's group schedule. Notes:  Patient verbalized goal for today to walk + use the exercise bike, work on discharge planning and to continue to look into inpatient treatment options she can go to once discharged from [facility]. Status After Intervention:  Improved    Participation Level:  Active Listener and Interactive    Participation Quality: Appropriate, Attentive and Sharing      Speech:  normal      Thought Process/Content: Logical      Affective Functioning: Congruent      Mood: euthymic      Level of consciousness:  Alert, Oriented x4 and Attentive      Response to Learning: Able to verbalize current knowledge/experience, Able to verbalize/acknowledge new learning, Able to retain information, Capable of insight and Progressing to goal      Endings: None Reported       Modes of Intervention: Support, Socialization, Exploration, Clarifying, Problem-solving, Confrontation, Limit-setting and Reality-testing      Discipline Responsible: Psychoeducational Specialist      Signature:  Rahul Kapadia

## 2020-11-27 ENCOUNTER — ANESTHESIA EVENT (OUTPATIENT)
Dept: ENDOSCOPY | Age: 60
End: 2020-11-27

## 2020-11-27 ENCOUNTER — APPOINTMENT (OUTPATIENT)
Dept: GENERAL RADIOLOGY | Age: 60
DRG: 885 | End: 2020-11-27
Payer: COMMERCIAL

## 2020-11-27 ENCOUNTER — ANESTHESIA (OUTPATIENT)
Dept: ENDOSCOPY | Age: 60
End: 2020-11-27

## 2020-11-27 VITALS
OXYGEN SATURATION: 99 % | RESPIRATION RATE: 19 BRPM | DIASTOLIC BLOOD PRESSURE: 77 MMHG | SYSTOLIC BLOOD PRESSURE: 123 MMHG

## 2020-11-27 PROCEDURE — 6370000000 HC RX 637 (ALT 250 FOR IP): Performed by: INTERNAL MEDICINE

## 2020-11-27 PROCEDURE — 43239 EGD BIOPSY SINGLE/MULTIPLE: CPT | Performed by: INTERNAL MEDICINE

## 2020-11-27 PROCEDURE — 1240000000 HC EMOTIONAL WELLNESS R&B

## 2020-11-27 PROCEDURE — 43450 DILATE ESOPHAGUS 1/MULT PASS: CPT | Performed by: INTERNAL MEDICINE

## 2020-11-27 PROCEDURE — 71046 X-RAY EXAM CHEST 2 VIEWS: CPT

## 2020-11-27 PROCEDURE — 88312 SPECIAL STAINS GROUP 1: CPT

## 2020-11-27 PROCEDURE — 3700000001 HC ADD 15 MINUTES (ANESTHESIA): Performed by: INTERNAL MEDICINE

## 2020-11-27 PROCEDURE — 2580000003 HC RX 258: Performed by: ANESTHESIOLOGY

## 2020-11-27 PROCEDURE — 7100000000 HC PACU RECOVERY - FIRST 15 MIN: Performed by: INTERNAL MEDICINE

## 2020-11-27 PROCEDURE — 88305 TISSUE EXAM BY PATHOLOGIST: CPT

## 2020-11-27 PROCEDURE — 6370000000 HC RX 637 (ALT 250 FOR IP): Performed by: NURSE PRACTITIONER

## 2020-11-27 PROCEDURE — 2709999900 HC NON-CHARGEABLE SUPPLY: Performed by: INTERNAL MEDICINE

## 2020-11-27 PROCEDURE — 6370000000 HC RX 637 (ALT 250 FOR IP): Performed by: PSYCHIATRY & NEUROLOGY

## 2020-11-27 PROCEDURE — 94761 N-INVAS EAR/PLS OXIMETRY MLT: CPT

## 2020-11-27 PROCEDURE — 0DB58ZX EXCISION OF ESOPHAGUS, VIA NATURAL OR ARTIFICIAL OPENING ENDOSCOPIC, DIAGNOSTIC: ICD-10-PCS | Performed by: INTERNAL MEDICINE

## 2020-11-27 PROCEDURE — 6360000002 HC RX W HCPCS: Performed by: NURSE ANESTHETIST, CERTIFIED REGISTERED

## 2020-11-27 PROCEDURE — 3609012400 HC EGD TRANSORAL BIOPSY SINGLE/MULTIPLE: Performed by: INTERNAL MEDICINE

## 2020-11-27 PROCEDURE — 99232 SBSQ HOSP IP/OBS MODERATE 35: CPT | Performed by: PSYCHIATRY & NEUROLOGY

## 2020-11-27 PROCEDURE — 2700000000 HC OXYGEN THERAPY PER DAY

## 2020-11-27 PROCEDURE — 6360000002 HC RX W HCPCS: Performed by: INTERNAL MEDICINE

## 2020-11-27 PROCEDURE — 0D758ZZ DILATION OF ESOPHAGUS, VIA NATURAL OR ARTIFICIAL OPENING ENDOSCOPIC: ICD-10-PCS | Performed by: INTERNAL MEDICINE

## 2020-11-27 PROCEDURE — 7100000001 HC PACU RECOVERY - ADDTL 15 MIN: Performed by: INTERNAL MEDICINE

## 2020-11-27 PROCEDURE — 3700000000 HC ANESTHESIA ATTENDED CARE: Performed by: INTERNAL MEDICINE

## 2020-11-27 PROCEDURE — 2500000003 HC RX 250 WO HCPCS: Performed by: NURSE ANESTHETIST, CERTIFIED REGISTERED

## 2020-11-27 PROCEDURE — 94640 AIRWAY INHALATION TREATMENT: CPT

## 2020-11-27 RX ORDER — LIDOCAINE HYDROCHLORIDE 10 MG/ML
INJECTION, SOLUTION EPIDURAL; INFILTRATION; INTRACAUDAL; PERINEURAL PRN
Status: DISCONTINUED | OUTPATIENT
Start: 2020-11-27 | End: 2020-11-27 | Stop reason: SDUPTHER

## 2020-11-27 RX ORDER — OXYCODONE HYDROCHLORIDE AND ACETAMINOPHEN 5; 325 MG/1; MG/1
2 TABLET ORAL PRN
Status: ACTIVE | OUTPATIENT
Start: 2020-11-27 | End: 2020-11-27

## 2020-11-27 RX ORDER — OXYCODONE HYDROCHLORIDE AND ACETAMINOPHEN 5; 325 MG/1; MG/1
1 TABLET ORAL PRN
Status: ACTIVE | OUTPATIENT
Start: 2020-11-27 | End: 2020-11-27

## 2020-11-27 RX ORDER — LABETALOL HYDROCHLORIDE 5 MG/ML
5 INJECTION, SOLUTION INTRAVENOUS EVERY 10 MIN PRN
Status: DISCONTINUED | OUTPATIENT
Start: 2020-11-27 | End: 2020-11-30 | Stop reason: HOSPADM

## 2020-11-27 RX ORDER — SODIUM CHLORIDE, SODIUM LACTATE, POTASSIUM CHLORIDE, CALCIUM CHLORIDE 600; 310; 30; 20 MG/100ML; MG/100ML; MG/100ML; MG/100ML
INJECTION, SOLUTION INTRAVENOUS CONTINUOUS
Status: DISCONTINUED | OUTPATIENT
Start: 2020-11-27 | End: 2020-11-30 | Stop reason: HOSPADM

## 2020-11-27 RX ORDER — ONDANSETRON 2 MG/ML
4 INJECTION INTRAMUSCULAR; INTRAVENOUS
Status: ACTIVE | OUTPATIENT
Start: 2020-11-27 | End: 2020-11-27

## 2020-11-27 RX ORDER — OMEPRAZOLE 40 MG/1
40 CAPSULE, DELAYED RELEASE ORAL DAILY
Status: DISCONTINUED | OUTPATIENT
Start: 2020-11-28 | End: 2020-11-28 | Stop reason: RX

## 2020-11-27 RX ORDER — PROPOFOL 10 MG/ML
INJECTION, EMULSION INTRAVENOUS PRN
Status: DISCONTINUED | OUTPATIENT
Start: 2020-11-27 | End: 2020-11-27 | Stop reason: SDUPTHER

## 2020-11-27 RX ORDER — DIPHENHYDRAMINE HYDROCHLORIDE 50 MG/ML
12.5 INJECTION INTRAMUSCULAR; INTRAVENOUS
Status: ACTIVE | OUTPATIENT
Start: 2020-11-27 | End: 2020-11-27

## 2020-11-27 RX ADMIN — BENZOCAINE AND MENTHOL, UNSPECIFIED FORM 1 LOZENGE: 15; 3.6 LOZENGE ORAL at 17:48

## 2020-11-27 RX ADMIN — ALBUTEROL SULFATE 2.5 MG: 2.5 SOLUTION RESPIRATORY (INHALATION) at 19:43

## 2020-11-27 RX ADMIN — LEVOTHYROXINE SODIUM 88 MCG: 0.09 TABLET ORAL at 06:36

## 2020-11-27 RX ADMIN — GLYCOPYRROLATE AND FORMOTEROL FUMARATE 2 PUFF: 9; 4.8 AEROSOL, METERED RESPIRATORY (INHALATION) at 20:55

## 2020-11-27 RX ADMIN — FLUVOXAMINE MALEATE 50 MG: 50 TABLET, COATED ORAL at 08:55

## 2020-11-27 RX ADMIN — Medication 1 PUFF: at 08:58

## 2020-11-27 RX ADMIN — DOCUSATE SODIUM 100 MG: 100 CAPSULE ORAL at 08:55

## 2020-11-27 RX ADMIN — GABAPENTIN 300 MG: 300 CAPSULE ORAL at 15:09

## 2020-11-27 RX ADMIN — THIAMINE HCL TAB 100 MG 100 MG: 100 TAB at 08:55

## 2020-11-27 RX ADMIN — OXYCODONE HYDROCHLORIDE AND ACETAMINOPHEN 500 MG: 500 TABLET ORAL at 08:55

## 2020-11-27 RX ADMIN — ALUMINUM HYDROXIDE, MAGNESIUM HYDROXIDE, AND SIMETHICONE 30 ML: 200; 200; 20 SUSPENSION ORAL at 17:05

## 2020-11-27 RX ADMIN — Medication 1 PUFF: at 20:58

## 2020-11-27 RX ADMIN — PREDNISONE 10 MG: 10 TABLET ORAL at 08:55

## 2020-11-27 RX ADMIN — HYDROXYZINE HYDROCHLORIDE 50 MG: 50 TABLET, FILM COATED ORAL at 20:54

## 2020-11-27 RX ADMIN — CARVEDILOL 3.12 MG: 3.12 TABLET, FILM COATED ORAL at 08:55

## 2020-11-27 RX ADMIN — MULTIPLE VITAMINS W/ MINERALS TAB 1 TABLET: TAB at 08:55

## 2020-11-27 RX ADMIN — GLYCOPYRROLATE AND FORMOTEROL FUMARATE 2 PUFF: 9; 4.8 AEROSOL, METERED RESPIRATORY (INHALATION) at 08:56

## 2020-11-27 RX ADMIN — OMEPRAZOLE 20 MG: 20 CAPSULE, DELAYED RELEASE ORAL at 06:36

## 2020-11-27 RX ADMIN — PROPOFOL 120 MG: 10 INJECTION, EMULSION INTRAVENOUS at 10:45

## 2020-11-27 RX ADMIN — Medication 2 PUFF: at 14:03

## 2020-11-27 RX ADMIN — DOXYCYCLINE 100 MG: 100 CAPSULE ORAL at 20:54

## 2020-11-27 RX ADMIN — MIRTAZAPINE 45 MG: 30 TABLET, FILM COATED ORAL at 20:54

## 2020-11-27 RX ADMIN — FAMOTIDINE 20 MG: 20 TABLET, FILM COATED ORAL at 08:55

## 2020-11-27 RX ADMIN — BENZOCAINE AND MENTHOL, UNSPECIFIED FORM 1 LOZENGE: 15; 3.6 LOZENGE ORAL at 20:14

## 2020-11-27 RX ADMIN — THIAMINE HCL TAB 100 MG 100 MG: 100 TAB at 20:54

## 2020-11-27 RX ADMIN — FAMOTIDINE 20 MG: 20 TABLET, FILM COATED ORAL at 20:54

## 2020-11-27 RX ADMIN — CALCIUM CARBONATE 600 MG (1,500 MG)-VITAMIN D3 400 UNIT TABLET 1 TABLET: at 08:55

## 2020-11-27 RX ADMIN — SODIUM CHLORIDE, POTASSIUM CHLORIDE, SODIUM LACTATE AND CALCIUM CHLORIDE: 600; 310; 30; 20 INJECTION, SOLUTION INTRAVENOUS at 10:37

## 2020-11-27 RX ADMIN — FLUVOXAMINE MALEATE 50 MG: 50 TABLET, COATED ORAL at 20:54

## 2020-11-27 RX ADMIN — FLUVOXAMINE MALEATE 100 MG: 50 TABLET, COATED ORAL at 15:11

## 2020-11-27 RX ADMIN — DOXYCYCLINE 100 MG: 100 CAPSULE ORAL at 08:55

## 2020-11-27 RX ADMIN — GABAPENTIN 300 MG: 300 CAPSULE ORAL at 20:54

## 2020-11-27 RX ADMIN — GABAPENTIN 300 MG: 300 CAPSULE ORAL at 08:55

## 2020-11-27 RX ADMIN — CARVEDILOL 3.12 MG: 3.12 TABLET, FILM COATED ORAL at 16:59

## 2020-11-27 RX ADMIN — ALBUTEROL SULFATE 2.5 MG: 2.5 SOLUTION RESPIRATORY (INHALATION) at 07:45

## 2020-11-27 RX ADMIN — LIDOCAINE HYDROCHLORIDE 50 MG: 10 INJECTION, SOLUTION EPIDURAL; INFILTRATION; INTRACAUDAL; PERINEURAL at 10:45

## 2020-11-27 RX ADMIN — THIAMINE HCL TAB 100 MG 100 MG: 100 TAB at 15:09

## 2020-11-27 ASSESSMENT — LIFESTYLE VARIABLES: SMOKING_STATUS: 1

## 2020-11-27 ASSESSMENT — COPD QUESTIONNAIRES: CAT_SEVERITY: MODERATE

## 2020-11-27 ASSESSMENT — PULMONARY FUNCTION TESTS
PIF_VALUE: 0

## 2020-11-27 ASSESSMENT — PAIN DESCRIPTION - PAIN TYPE: TYPE: SURGICAL PAIN

## 2020-11-27 ASSESSMENT — PAIN DESCRIPTION - ONSET: ONSET: GRADUAL

## 2020-11-27 ASSESSMENT — PAIN DESCRIPTION - ORIENTATION: ORIENTATION: INNER

## 2020-11-27 ASSESSMENT — PAIN DESCRIPTION - LOCATION: LOCATION: THROAT

## 2020-11-27 ASSESSMENT — PAIN SCALES - GENERAL
PAINLEVEL_OUTOF10: 5
PAINLEVEL_OUTOF10: 0

## 2020-11-27 ASSESSMENT — PAIN DESCRIPTION - DESCRIPTORS: DESCRIPTORS: SORE

## 2020-11-27 ASSESSMENT — PAIN DESCRIPTION - FREQUENCY: FREQUENCY: CONTINUOUS

## 2020-11-27 NOTE — GROUP NOTE
Group Therapy Note    Date: 11/27/2020    Group Start Time: 1430  Group End Time: 1520  Group Topic: Recreational    [facility] GARYI NINO Montero, [address]         Group Therapy Note    Attendees: 6/18         Pt did not attend RT skills group d/t resting in room despite staff invitation to attend. 1:1 talk time offered as alternative to group session, pt declined.

## 2020-11-27 NOTE — PROGRESS NOTES
Patient transported to Radiology department for post EGD xray. VS stable as charted. Patient c/o sore throat.

## 2020-11-27 NOTE — GROUP NOTE
Group Therapy Note    Date: 11/27/2020    Group Start Time: 1330  Group End Time: 9913  Group Topic: Psychoeducation    ISAMAR Squires        Group Therapy Note    Attendees: 9    Pt did not attend Therapeutic Recreation d/t resting in room despite staff invitation to attend. 1:1 talk time offered as alternative to group session, pt declined.

## 2020-11-27 NOTE — PLAN OF CARE
Problem: Altered Mood, Depressive Behavior:  Goal: Able to verbalize and/or display a decrease in depressive symptoms  Description: Able to verbalize and/or display a decrease in depressive symptoms  11/27/2020 0224 by Kylee Lopez RN  Outcome: Ongoing  Patient stated that she has slight symptoms of depression. Patient is worried about food getting caught in her throat but is relieved to be getting help for her smoking and drinking habits. Patient is out in dayroom being social with peers and staff. She is open and compliant with staff and treatment and is willing to follow her treatment regimen. Problem: Altered Mood, Depressive Behavior:  Goal: Ability to disclose and discuss suicidal ideas will improve  Description: Ability to disclose and discuss suicidal ideas will improve  11/27/2020 0224 by Kylee Lopez RN  Outcome: Ongoing  Patient denied having thoughts of harming herself. Patient states that she is feeling much better and is in a better place in her life. Patient observed every 15 minutes and as needed for safety and environmental checks. Patient stated she would come to staff if suicidal ideations arose. Problem: Altered Mood, Deterioration in Function:  Goal: Ability to perform activities of daily living will improve  Description: Ability to perform activities of daily living will improve  11/27/2020 0224 by Kylee Lopez RN  Outcome: Ongoing  Patient has been able to perform activities of daily living on her own. Patient is able to feed, shower, use the toilet, brush teeth/hair, and dress all by herself.

## 2020-11-27 NOTE — CARE COORDINATION
SOCIAL SERVICE NOTE: SW meets with PT to discuss discharge planning. PT reports that she has decided not to go to [facility] for treatment, because they do not test for Covid and PT expresses concern for that reporting that she is at high risk due to dx of COPD. SW Meets with PT and assists her with contacting [facility] that PT reports she was encouraged to call by staff at [facility] by the [facility]. PT telephoned [facility] and spoke with Admissions Department. Admissions requested PT paperwork be faxed to them for review. PT signed SHONNA for [facility] and SW faxed requested information to admissions at [facility] at [phone]. SW is waiting to hear from [facility] of whether she has been accepted there or not.  PT also reports that her best friend is off of work tomorrow Saturday 11?28/2020 and is able to  PT from the hospital and take her to a treatment program.

## 2020-11-27 NOTE — GROUP NOTE
Group Therapy Note    Date: 11/27/2020    Group Start Time: 1000  Group End Time: 6185  Group Topic: Psychoeducation    [facility]    LIAM Tejada LSW        Group Therapy Note    Attendees: 4/8       Patient was offered group therapy but declined to participate.   1:1 was offered    Signature:  LIAM Tejada LSW

## 2020-11-27 NOTE — ANESTHESIA PRE PROCEDURE
Department of Anesthesiology  Preprocedure Note       Name:  Jose A Pleitez   Age:  61 y.o.  :  1960                                          MRN:  [id number]         Date:  2020      Surgeon: Kelly Márquez):  Michael Meza MD    Procedure: EGD ESOPHAGOGASTRODUODENOSCOPY (N/A Esophagus)    Medications prior to admission:   Prior to Admission medications    Medication Sig Start Date End Date Taking? Authorizing Provider   mirtazapine (REMERON) 30 MG tablet Take 30 mg by mouth nightly 10/30/20 10/30/21  Historical Provider, MD   predniSONE (DELTASONE) 10 MG tablet Take 1 tablet by mouth daily 20   Historical Provider, MD   vitamin C (ASCORBIC ACID) 500 MG tablet Take 500 mg by mouth daily    Historical Provider, MD   calcium-vitamin D (OSCAL-500) 500-200 MG-UNIT per tablet Take 1 tablet by mouth daily    Historical Provider, MD   fluticasone (FLOVENT HFA) 110 MCG/ACT inhaler Inhale 1 puff into the lungs 2 times daily    Historical Provider, MD   omeprazole (PRILOSEC) 40 MG delayed release capsule Take 1 capsule by mouth 2 times daily 20   Kate Cooper MD   Tiotropium Bromide-Olodaterol (STIOLTO RESPIMAT) 2.5-2.5 MCG/ACT AERS Inhale 2 puffs into the lungs 18   Historical Provider, MD   fluvoxaMINE (LUVOX) 100 MG tablet Take 100 mg by mouth nightly    Historical Provider, MD   carvedilol (COREG) 3.125 MG tablet Take 3.125 mg by mouth 2 times daily (with meals)    Historical Provider, MD   levothyroxine (SYNTHROID) 88 MCG tablet Take 88 mcg by mouth Daily    Historical Provider, MD   clonazePAM (KLONOPIN) 0.5 MG tablet Take 0.5 mg by mouth 4 times daily.      Historical Provider, MD   albuterol sulfate  (90 Base) MCG/ACT inhaler Inhale 2 puffs into the lungs every 6 hours as needed for Wheezing    Historical Provider, MD   albuterol (ACCUNEB) 0.63 MG/3ML nebulizer solution Take 1 ampule by nebulization every 6 hours as needed for Wheezing    Historical Provider, MD       Current medications:    No current facility-administered medications for this visit. No current outpatient medications on file.      Facility-Administered Medications Ordered in Other Visits   Medication Dose Route Frequency Provider Last Rate Last Dose    docusate sodium (COLACE) capsule 100 mg  100 mg Oral Daily Julian Duff MD   100 mg at 11/27/20 0855    mirtazapine (REMERON) tablet 45 mg  45 mg Oral Nightly Julian Duff MD   45 mg at 11/26/20 2048    albuterol (PROVENTIL) nebulizer solution 2.5 mg  2.5 mg Nebulization BID Pato Meredith MD   2.5 mg at 11/27/20 0745    doxycycline monohydrate (MONODOX) capsule 100 mg  100 mg Oral 2 times per day Pato Meredith MD   100 mg at 11/27/20 0855    fluvoxaMINE (LUVOX) tablet 50 mg  50 mg Oral BID Mely NATHAN Godwin - CNP   50 mg at 11/27/20 0855    fluvoxaMINE (LUVOX) tablet 100 mg  100 mg Oral Lunch NATHAN Byrne - CNP   100 mg at 11/26/20 1243    omeprazole (PRILOSEC) delayed release capsule 20 mg  20 mg Oral Daily NATHAN Toscano CNP   20 mg at 11/27/20 0636    guaiFENesin (MUCINEX) extended release tablet 600 mg  600 mg Oral BID PRN NATHAN Toscano - CNP   600 mg at 11/22/20 1823    famotidine (PEPCID) tablet 20 mg  20 mg Oral BID Julian Duff MD   20 mg at 11/27/20 0855    gabapentin (NEURONTIN) capsule 300 mg  300 mg Oral TID NATHAN Byrne - CNP   300 mg at 11/27/20 0855    ondansetron (ZOFRAN) tablet 4 mg  4 mg Oral 4x Daily PRN NATHAN Oh CNP   4 mg at 11/26/20 1435    vitamin B-1 (THIAMINE) tablet 100 mg  100 mg Oral TID Julian Duff MD   100 mg at 11/27/20 0855    therapeutic multivitamin-minerals 1 tablet  1 tablet Oral Daily Julian Duff MD   1 tablet at 11/27/20 0855    LORazepam (ATIVAN) tablet 1 mg  1 mg Oral Q1H PRN uJlian Duff MD   1 mg at 11/24/20 6011    Or    LORazepam (ATIVAN) injection 1 mg  1 mg Intramuscular Q1H PRN Julian Duff MD        Or    LORazepam (ATIVAN) tablet 2 mg  2 mg Oral Q1H PRN Biju Duckworth MD        Or    LORazepam (ATIVAN) injection 2 mg  2 mg Intramuscular Q1H PRN Biju Duckworth MD        Or    LORazepam (ATIVAN) tablet 3 mg  3 mg Oral Q1H PRN Biju Duckworth MD        Or    LORazepam (ATIVAN) injection 3 mg  3 mg Intramuscular Q1H PRN Biju Duckworth MD        Or    LORazepam (ATIVAN) tablet 4 mg  4 mg Oral Q1H PRN Biju Duckworth MD        Or    LORazepam (ATIVAN) injection 4 mg  4 mg Intramuscular Q1H PRN Biju Duckworth MD        acetaminophen (TYLENOL) tablet 650 mg  650 mg Oral Q4H PRN Biju Duckworth MD   650 mg at 11/23/20 0552    aluminum & magnesium hydroxide-simethicone (MAALOX) 200-200-20 MG/5ML suspension 30 mL  30 mL Oral Q6H PRN Biju Duckworth MD   30 mL at 11/26/20 1832    hydrOXYzine (ATARAX) tablet 50 mg  50 mg Oral TID PRN Biju Duckworth MD   50 mg at 11/26/20 2100    ibuprofen (ADVIL;MOTRIN) tablet 600 mg  600 mg Oral Q6H PRN Biju Duckworth MD        traZODone (DESYREL) tablet 50 mg  50 mg Oral Nightly PRN Biju Duckworth MD        polyethylene glycol (GLYCOLAX) packet 17 g  17 g Oral Daily PRN Biju Duckworth MD   17 g at 11/22/20 0928    calcium carbonate-vitamin D (CALTRATE) 600-400 MG-UNIT per tab 1 tablet  1 tablet Oral Daily Biju Duckworth MD   1 tablet at 11/27/20 0855    carvedilol (COREG) tablet 3.125 mg  3.125 mg Oral BID WC Biju Duckworth MD   3.125 mg at 11/27/20 0855    fluticasone (FLOVENT HFA) 110 MCG/ACT inhaler 1 puff  1 puff Inhalation BID Biju Duckworth MD   1 puff at 11/27/20 0858    levothyroxine (SYNTHROID) tablet 88 mcg  88 mcg Oral Daily Biju Duckworth MD   88 mcg at 11/27/20 0636    predniSONE (DELTASONE) tablet 10 mg  10 mg Oral Daily Biju Duckworth MD   10 mg at 11/27/20 0855    glycopyrrolate-formoterol (BEVESPI) 9-4.8 MCG/ACT inhaler 2 puff  2 puff Inhalation BID Biju Duckworth MD   2 puff at 11/27/20 0856    vitamin C (ASCORBIC ACID) tablet 500 mg  500 mg Oral Daily Biju Duckworth MD   500 mg at 11/27/20 0855    albuterol (PROVENTIL) nebulizer solution 2.5 mg  2.5 mg Nebulization Q6H PRN Randee Pina MD   2.5 mg at 11/24/20 1251    albuterol sulfate  (90 Base) MCG/ACT inhaler 2 puff  2 puff Inhalation Q6H PRN Randee Pina MD   2 puff at 11/26/20 1435       Allergies:  No Known Allergies    Problem List:    Patient Active Problem List   Diagnosis Code    Abdominal bloating R14.0    Pain of upper abdomen R10.10    Diarrhea R19.7    Depression with suicidal ideation F32.9, R45.851    MDD (major depressive disorder), recurrent severe, without psychosis ([facility] F33.2    Panic disorder F41.0    Obsessive compulsive disorder F42.9    [facility] Y51.307       Past Medical History:        Diagnosis Date    Acid reflux     Anxiety     Arthritis     Cancer ([facility]     cervical    COPD [facility]chronic [facility] disease) [facility] 02/23/2018    DDD (degenerative disc disease), cervical     Depression     Hiatal hernia     Spinal stenosis     Tachycardia     Thyroid disease     hypo       Past Surgical History:        Procedure Laterality Date    BACK SURGERY      diskectomy, L3-L5    COLONOSCOPY N/A 4/8/2019    COLONOSCOPY DIAGNOSTIC performed by Malorie Kaur MD at [place] [address]  11/13/2019    COLONOSCOPY POLYPECTOMY SNARE/COLD BIOPSY performed by Malorie Kaur MD at [place] [address]      OTHER SURGICAL HISTORY      ectopic pregnancy with tube removal    THYROIDECTOMY, PARTIAL      UPPER GASTROINTESTINAL ENDOSCOPY         Social History:    Social History     Tobacco Use    Smoking status: Heavy Tobacco Smoker     Packs/day: 1.00     Years: 48.00     Pack years: 48.00     Types: Cigarettes    Smokeless tobacco: Never Used    Tobacco comment: previous 3 ppd smoker   Substance Use Topics    Alcohol use: Yes     Comment: occassional                                Ready to quit: Not decreased breath sounds,  current smoker (recently quit - 12 days)                           Cardiovascular:  Exercise tolerance: good (>4 METS),   (+) dysrhythmias (Tachycardia):,       ECG reviewed  Rhythm: regular  Rate: normal           Beta Blocker:  Dose within 24 Hrs         Neuro/Psych:   (+) neuromuscular disease:, psychiatric history:depression/anxiety              ROS comment: Spinal Stenosis  DDD  Obsessive compulsive disorder    suicidal ideation GI/Hepatic/Renal:   (+) hiatal hernia, GERD:,          ROS comment: dysphagia. Endo/Other:    (+) hypothyroidism: arthritis: OA., malignancy/cancer (  cervical  Cancer ). Abdominal:           Vascular: negative vascular ROS. Anesthesia Plan      MAC     ASA 3       Induction: intravenous. Anesthetic plan and risks discussed with patient. Plan discussed with attending.                 Merced Collet, MD   11/27/2020

## 2020-11-27 NOTE — OP NOTE
ESOPHAGOGASTRODUODENOSCOPY   ( EGD )  DATE OF PROCEDURE: 11/27/2020     SURGEON: Deniz Pereira MD    ASSISTANT: None    PREOPERATIVE DIAGNOSIS: Dysphagia. POSTOPERATIVE DIAGNOSIS: Mild stricture in the lower esophagus. Also it is possible that she may have esophageal motility issues. Small sliding hiatal hernia. OPERATION: Upper GI endoscopy with Biopsy, Cevallos dilation of the esophagus. ANESTHESIA: MAC    ESTIMATED BLOOD LOSS: None    COMPLICATIONS: None. SPECIMENS:  Was Obtained: From the body of the esophagus to evaluate eosinophilic esophagitis    HISTORY: The patient is a 61y.o. year old female with history of above preop diagnosis. I recommended esophagogastroduodenoscopy with possible biopsy and I explained the risk, benefits, expected outcome, and alternatives to the procedure. Risks included but are not limited to bleeding, infection, respiratory distress, hypotension, and perforation of the esophagus, stomach, or duodenum. Patient understands and is in agreement. PROCEDURE: The patient was given IV conscious sedation. The patient's SPO2 remained above 90% throughout the procedure. Cetacaine spray given. Patient placed in left lateral position. Olympus  videogastroscope was inserted orally under vision into the esophagus without difficulty and advanced into the stomach then through the pylorus up to the second part of duodenum. Findings:    Retropharyngeal area was grossly normal appearing    Esophagus: abnormal: May have esophageal motility issues. No foreign body of the esophagus noted. Squamocolumnar junction is at about 37 cm. In this area there appears to be mild stricture noted. Minimal resistance to pass 190 videogastroscope through this area.     Stomach:    Fundus and Cardia Examined in Retroflexed View: normal    Body: normal    Antrum: normal    Duodenum:     Descending: normal    Bulb: normal                While withdrawing the scope the above

## 2020-11-27 NOTE — ANESTHESIA POSTPROCEDURE EVALUATION
Department of Anesthesiology  Postprocedure Note    Patient: Sarthak Mtz  MRN: [id number]  YOB: 1960  Date of evaluation: 11/27/2020  Time:  12:30 PM     Procedure Summary     Date:  11/27/20 Room / Location:  [address] [place] [address] ENDO 04 / 250 [facility] ENDO    Anesthesia Start:  1040 Anesthesia Stop:  3337    Procedure:  EGD BIOPSY with dilatation (N/A Esophagus) Diagnosis:  (UNKNOWN)    Surgeon:  Reed Banegas MD Responsible Provider:  Saadia Tsai MD    Anesthesia Type:  MAC ASA Status:  3          Anesthesia Type: MAC    Linda Phase I: Linda Score: 10    Linda Phase II:      Last vitals: Reviewed and per EMR flowsheets.        Anesthesia Post Evaluation    Comments: POST- ANESTHESIA EVALUATION       Pt Name: Sarthak Mtz  MRN: [id number]  YOB: 1960  Date of evaluation: 11/27/2020  Time:  12:30 PM      /64   Pulse 54   Temp 97.5 °F (36.4 °C) (Infrared)   Resp 13   Ht 5' 4\" (1.626 m)   Wt 135 lb (61.2 kg)   SpO2 96%   BMI 23.17 kg/m²      Consciousness Level  Awake  Cardiopulmonary Status  Stable  Pain Adequately Treated YES  Nausea / Vomiting  NO  Adequate Hydration  YES  Anesthesia Related Complications NONE      Electronically signed by Saadia Tsai MD on 11/27/2020 at 12:30 PM

## 2020-11-27 NOTE — BH NOTE
RN spoke to Dr. James Walters and asked her about patient's diet. Dr. James Walters stated that patient is to remain NPO as she has an EGD scheduled. Transport arrieved as RN was speaking to Dr. James Walters. RN called Dr. James Walters and and informed her that patient was given her morning medications and that there was no order for an EGD. Dr. James Walters stated that no order gets put in on their end for an EGD.

## 2020-11-27 NOTE — GROUP NOTE
Group Therapy Note    Date: 11/27/2020    Group Start Time: 0845  Group End Time: 0915  Group Topic: Clementina [address] WILLIAM [place], [address]        Group Therapy Note    Attendees: 7    Pt did not attend [employer] d/t resting in room despite staff invitation to attend. 1:1 talk time offered as alternative to group session, pt declined.

## 2020-11-27 NOTE — PROGRESS NOTES
BEHAVIORAL HEALTH FOLLOW-UP NOTE     11/27/2020     Patient was seen and examined in person, Chart reviewed   Patient's case discussed with staff/team    Chief Complaint: Suicidal ideation and alcohol use    Interim History:     The patient went for EGD this morning. She had stricture dilation. The patient is feeling very anxious and tremulous. She says her throat is hurting from the EGD. Her mood remains low. She continues to have suicidal ideation. She has been taking her medications without any side effects. Appetite:  [x] Normal/Unchanged  [] Increased  [] Decreased      Sleep:       [] Normal/Unchanged  [x] Fair       [] Poor              Energy:    [] Normal/Unchanged  [] Increased  [x] Decreased        Aggression:  [] yes  [x] no    Patient is [x] able  [] unable to CONTRACT FOR SAFETY ON THE UNIT    PAST MEDICAL/PSYCHIATRIC HISTORY:   Past Medical History:   Diagnosis Date    Acid reflux     Anxiety     Arthritis     Cancer ([facility]     cervical    COPD (chronic obstructive pulmonary disease) ([facility] 02/23/2018    DDD (degenerative disc disease), cervical     Depression     Hiatal hernia     Spinal stenosis     Tachycardia     Thyroid disease     hypo       FAMILY/SOCIAL HISTORY:  History reviewed. No pertinent family history.   Social History     Socioeconomic History    Marital status:      Spouse name: Not on file    Number of children: Not on file    Years of education: Not on file    Highest education level: Not on file   Occupational History    Not on file   Social Needs    Financial resource strain: Not on file    Food insecurity     Worry: Not on file     Inability: Not on file    Transportation needs     Medical: Not on file     Non-medical: Not on file   Tobacco Use    Smoking status: Heavy Tobacco Smoker     Packs/day: 1.00     Years: 48.00     Pack years: 48.00     Types: Cigarettes    Smokeless tobacco: Never Used    Tobacco comment: previous 3 ppd smoker Substance and Sexual Activity    Alcohol use: Yes     Comment: occassional    Drug use: Never    Sexual activity: Not on file   Lifestyle    Physical activity     Days per week: Not on file     Minutes per session: Not on file    Stress: Not on file   Relationships    Social connections     Talks on phone: Not on file     Gets together: Not on file     Attends [employer] service: Not on file     Active member of club or organization: Not on file     Attends meetings of clubs or organizations: Not on file     Relationship status: Not on file    Intimate partner violence     Fear of current or ex partner: Not on file     Emotionally abused: Not on file     Physically abused: Not on file     Forced sexual activity: Not on file   Other Topics Concern    Not on file   Social History Narrative    Not on file           ROS:  [x] All negative/unchanged except if checked.  Explain positive(checked items) below:  [] Constitutional  [] Eyes  [x] Ear/Nose/Mouth/Throat  [] Respiratory  [] CV  [] GI  []   [] Musculoskeletal  [] Skin/Breast  [] Neurological  [] Endocrine  [] Heme/Lymph  [] Allergic/Immunologic    Explanation: \"Scratchy throat\"  MEDICATIONS:    Current Facility-Administered Medications:     lactated ringers infusion, , Intravenous, Continuous, Asiya Benavidez MD, Last Rate: 100 mL/hr at 11/27/20 1037    HYDROmorphone (DILAUDID) injection 0.25 mg, 0.25 mg, Intravenous, Q5 Min PRN, Asiya Benavidez MD    HYDROmorphone (DILAUDID) injection 0.5 mg, 0.5 mg, Intravenous, Q5 Min PRN, Asiya Benavidez MD    HYDROmorphone (DILAUDID) injection 0.25 mg, 0.25 mg, Intravenous, Q5 Min PRN, Asiya Benavidez MD    HYDROmorphone (DILAUDID) injection 0.5 mg, 0.5 mg, Intravenous, Q5 Min PRN, Asiya Benavidez MD    oxyCODONE-acetaminophen (PERCOCET) 5-325 MG per tablet 1 tablet, 1 tablet, Oral, PRN **OR** oxyCODONE-acetaminophen (PERCOCET) 5-325 MG per tablet 2 tablet, 2 tablet, Oral, PRN, MD Shira Deal 11/24/20 0902 **OR** LORazepam (ATIVAN) injection 1 mg, 1 mg, Intramuscular, Q1H PRN **OR** LORazepam (ATIVAN) tablet 2 mg, 2 mg, Oral, Q1H PRN **OR** LORazepam (ATIVAN) injection 2 mg, 2 mg, Intramuscular, Q1H PRN **OR** LORazepam (ATIVAN) tablet 3 mg, 3 mg, Oral, Q1H PRN **OR** LORazepam (ATIVAN) injection 3 mg, 3 mg, Intramuscular, Q1H PRN **OR** LORazepam (ATIVAN) tablet 4 mg, 4 mg, Oral, Q1H PRN **OR** LORazepam (ATIVAN) injection 4 mg, 4 mg, Intramuscular, Q1H PRN, Randee Pina MD    acetaminophen (TYLENOL) tablet 650 mg, 650 mg, Oral, Q4H PRN, Randee Pina MD, 650 mg at 11/23/20 0552    aluminum & magnesium hydroxide-simethicone (MAALOX) 200-200-20 MG/5ML suspension 30 mL, 30 mL, Oral, Q6H PRN, Randee Pina MD, 30 mL at 11/26/20 1832    hydrOXYzine (ATARAX) tablet 50 mg, 50 mg, Oral, TID PRN, Randee Pina MD, 50 mg at 11/26/20 2100    ibuprofen (ADVIL;MOTRIN) tablet 600 mg, 600 mg, Oral, Q6H PRN, Randee Pina MD    traZODone (DESYREL) tablet 50 mg, 50 mg, Oral, Nightly PRN, Randee Pina MD    polyethylene glycol (GLYCOLAX) packet 17 g, 17 g, Oral, Daily PRN, Randee Pina MD, 17 g at 11/22/20 0928    calcium carbonate-vitamin D (CALTRATE) 600-400 MG-UNIT per tab 1 tablet, 1 tablet, Oral, Daily, Randee Pina MD, 1 tablet at 11/27/20 0855    carvedilol (COREG) tablet 3.125 mg, 3.125 mg, Oral, BID WC, Randee Pina MD, 3.125 mg at 11/27/20 0855    fluticasone (FLOVENT HFA) 110 MCG/ACT inhaler 1 puff, 1 puff, Inhalation, BID, Randee Pina MD, 1 puff at 11/27/20 0858    levothyroxine (SYNTHROID) tablet 88 mcg, 88 mcg, Oral, Daily, Randee Pina MD, 88 mcg at 11/27/20 0636    predniSONE (DELTASONE) tablet 10 mg, 10 mg, Oral, Daily, Randee Pina MD, 10 mg at 11/27/20 0855    glycopyrrolate-formoterol (BEVESPI) 9-4.8 MCG/ACT inhaler 2 puff, 2 puff, Inhalation, BID, Randee Pina MD, 2 puff at 11/27/20 0856    vitamin C (ASCORBIC ACID) tablet 500 mg, 500 mg, Oral, Daily, Sung Francisco Florencia Lainez MD, 500 mg at 11/27/20 0855    albuterol (PROVENTIL) nebulizer solution 2.5 mg, 2.5 mg, Nebulization, Q6H PRN, Karen Sanchez MD, 2.5 mg at 11/24/20 1251    albuterol sulfate  (90 Base) MCG/ACT inhaler 2 puff, 2 puff, Inhalation, Q6H PRN, Karen Sanchez MD, 2 puff at 11/26/20 1435      Examination:  /64   Pulse 54   Temp 97.5 °F (36.4 °C) (Infrared)   Resp 13   Ht 5' 4\" (1.626 m)   Wt 135 lb (61.2 kg)   SpO2 96%   BMI 23.17 kg/m²   Gait - steady  Medication side effects(SE): None    Mental Status Examination:    Level of consciousness: Slightly drowsy  Appearance: Disheveled and distressed  Behavior/Motor: Restless and tremulous  Attitude toward examiner: Cooperative with good eye contact  Speech: Normal in tone volume rate and rhythm  mood: Anxious and depressed  Affect: Mood congruent  Thought processes: Coherent and goal-directed   thought content:  Homicidal ideation - none  Suicidal Ideation: Active  Delusions: None observed  Perceptual Disturbance: Denies  Cognition: Oriented to self location and circumstances  Concentration intact  Insight fair  Judgement fair    ASSESSMENT:   Patient symptoms are:  [] Well controlled  [x] Improving  [] Worsening  [] No change      Diagnosis:   Active Problems:    MDD (major depressive disorder), recurrent severe, without psychosis ([facility])    Panic disorder    Obsessive compulsive disorder    [facility]  Resolved Problems:    * No resolved hospital problems. *      LABS:    No results for input(s): WBC, HGB, PLT in the last 72 hours. No results for input(s): NA, K, CL, CO2, BUN, CREATININE, GLUCOSE in the last 72 hours. No results for input(s): BILITOT, ALKPHOS, AST, ALT in the last 72 hours.   No results found for: LABAMPH, BARBSCNU, LABBENZ, CANNAB, COCAINESCRN, LABMETH, OPIATESCREENURINE, PHENCYCLIDINESCREENURINE, PPXUR, ETOH  Lab Results   Component Value Date    TSH 0.60 07/13/2018     No results found for: LITHIUM  No results found for: VALPROATE, CBMZ        Treatment Plan:  PLAN    No medication changes today. Indications and risk-benefit analysis was discussed  Attempt to develop insight  Psycho-education conducted. Supportive Therapy conducted. Probable discharge is in 2 to 3 days      Risks, benefits, side effects, drug-to-drug interactions and alternatives to treatment were discussed. The patient consented to treatment. Encourage patient to attend group and other milieu activities. Discharge planning discussed with the patient and treatment team.  Follow-up daily while inpatient  PSYCHOTHERAPY/COUNSELING:  [] Therapeutic interview  [x] Supportive  [] CBT  [] Ongoing  [] Other    [x] Patient continues to need, on a daily basis, active treatment furnished directly by or requiring the supervision of inpatient psychiatric personnel      Anticipated Length of stay: 2 to 3 days    Gina Petersen is a 61 y.o. female being evaluated face to face.     --Timothy Sim MD on 11/27/2020 at 12:57 PM    An electronic signature was used to authenticate this note.

## 2020-11-27 NOTE — GROUP NOTE
Group Therapy Note    Date: 11/27/2020    Group Start Time: 1100  Group End Time: 1140  Group Topic: Psychoeducation    [facility] [place]        Group Therapy Note    Attendees: 2/8         Pt did not attend RT group d/t being off the floor for testing.

## 2020-11-27 NOTE — PLAN OF CARE
Problem: Nausea/Vomiting:  Goal: Absence of nausea/vomiting  Description: Absence of nausea/vomiting  Outcome: Ongoing  Patient reported feeling a bit nauseas after taking her medications this morning. Patient was sent to have an EGD today. Awaiting patient's return from the EGD. Patient is currently NPO due to her nausea and vomiting. Problem: Altered Mood, Depressive Behavior:  Goal: Able to verbalize and/or display a decrease in depressive symptoms  Description: Able to verbalize and/or display a decrease in depressive symptoms  11/27/2020 1045 by Anthony Young RN  Outcome: Ongoing  Patient reports feeling anxious and feels hopeless regarding getting into an inpatient drug rehab facility. Patient has been actively calling places to find placement but due to her physical health problems she has been having difficulty finding a place. Problem: Tobacco Use:  Goal: Inpatient tobacco use cessation counseling participation  Description: Inpatient tobacco use cessation counseling participation  Outcome: Ongoing   Patient has expressed a desire to quit smoking multiple times so that she will be eligible for surgery.

## 2020-11-27 NOTE — BH NOTE
Dr. Kedar Mcclain called RN to ask how patient was doing. RN informed him that she was doing fine, no vomitting, but some burping. He stated that patient should continue on clear liquid diet today and if she does fine then she can silke up to a soft diet tomorrow.

## 2020-11-27 NOTE — BH NOTE
Arjun Zhu called RN to ask if patient was able to complete an intake right now. RN informed her that all of the social workers were gone for the day and asked if she could call back tomorrow. RN gave her the number to call the social workers.

## 2020-11-28 PROCEDURE — 6360000002 HC RX W HCPCS: Performed by: INTERNAL MEDICINE

## 2020-11-28 PROCEDURE — 6370000000 HC RX 637 (ALT 250 FOR IP): Performed by: INTERNAL MEDICINE

## 2020-11-28 PROCEDURE — APPSS30 APP SPLIT SHARED TIME 16-30 MINUTES: Performed by: NURSE PRACTITIONER

## 2020-11-28 PROCEDURE — 6360000002 HC RX W HCPCS: Performed by: PSYCHIATRY & NEUROLOGY

## 2020-11-28 PROCEDURE — 94761 N-INVAS EAR/PLS OXIMETRY MLT: CPT

## 2020-11-28 PROCEDURE — 6370000000 HC RX 637 (ALT 250 FOR IP): Performed by: NURSE PRACTITIONER

## 2020-11-28 PROCEDURE — 6370000000 HC RX 637 (ALT 250 FOR IP): Performed by: PSYCHIATRY & NEUROLOGY

## 2020-11-28 PROCEDURE — 6370000000 HC RX 637 (ALT 250 FOR IP): Performed by: PHYSICIAN ASSISTANT

## 2020-11-28 PROCEDURE — 99232 SBSQ HOSP IP/OBS MODERATE 35: CPT | Performed by: INTERNAL MEDICINE

## 2020-11-28 PROCEDURE — 1240000000 HC EMOTIONAL WELLNESS R&B

## 2020-11-28 PROCEDURE — 99232 SBSQ HOSP IP/OBS MODERATE 35: CPT | Performed by: PHYSICIAN ASSISTANT

## 2020-11-28 PROCEDURE — 94640 AIRWAY INHALATION TREATMENT: CPT

## 2020-11-28 PROCEDURE — 94760 N-INVAS EAR/PLS OXIMETRY 1: CPT

## 2020-11-28 RX ORDER — DIPHENHYDRAMINE HCL 25 MG
25 TABLET ORAL NIGHTLY PRN
Status: DISCONTINUED | OUTPATIENT
Start: 2020-11-28 | End: 2020-11-30 | Stop reason: HOSPADM

## 2020-11-28 RX ORDER — SUCRALFATE 1 G/1
1 TABLET ORAL EVERY 6 HOURS SCHEDULED
Status: DISCONTINUED | OUTPATIENT
Start: 2020-11-28 | End: 2020-11-30 | Stop reason: HOSPADM

## 2020-11-28 RX ORDER — PANTOPRAZOLE SODIUM 40 MG/1
40 TABLET, DELAYED RELEASE ORAL
Status: DISCONTINUED | OUTPATIENT
Start: 2020-11-28 | End: 2020-11-30 | Stop reason: HOSPADM

## 2020-11-28 RX ADMIN — LEVOTHYROXINE SODIUM 88 MCG: 0.09 TABLET ORAL at 06:12

## 2020-11-28 RX ADMIN — HYDROXYZINE HYDROCHLORIDE 50 MG: 50 TABLET, FILM COATED ORAL at 14:45

## 2020-11-28 RX ADMIN — OXYCODONE HYDROCHLORIDE AND ACETAMINOPHEN 500 MG: 500 TABLET ORAL at 07:46

## 2020-11-28 RX ADMIN — DOXYCYCLINE 100 MG: 100 CAPSULE ORAL at 20:52

## 2020-11-28 RX ADMIN — Medication 1 PUFF: at 07:44

## 2020-11-28 RX ADMIN — DIPHENHYDRAMINE HCL 25 MG: 25 TABLET ORAL at 20:52

## 2020-11-28 RX ADMIN — SUCRALFATE 1 G: 1 TABLET ORAL at 12:13

## 2020-11-28 RX ADMIN — FLUVOXAMINE MALEATE 50 MG: 50 TABLET, COATED ORAL at 21:20

## 2020-11-28 RX ADMIN — SUCRALFATE 1 G: 1 TABLET ORAL at 17:11

## 2020-11-28 RX ADMIN — GABAPENTIN 300 MG: 300 CAPSULE ORAL at 07:45

## 2020-11-28 RX ADMIN — BENZOCAINE AND MENTHOL, UNSPECIFIED FORM 1 LOZENGE: 15; 3.6 LOZENGE ORAL at 08:14

## 2020-11-28 RX ADMIN — CARVEDILOL 3.12 MG: 3.12 TABLET, FILM COATED ORAL at 08:12

## 2020-11-28 RX ADMIN — HYDROXYZINE HYDROCHLORIDE 50 MG: 50 TABLET, FILM COATED ORAL at 07:46

## 2020-11-28 RX ADMIN — FLUVOXAMINE MALEATE 100 MG: 50 TABLET, COATED ORAL at 13:00

## 2020-11-28 RX ADMIN — THIAMINE HCL TAB 100 MG 100 MG: 100 TAB at 20:52

## 2020-11-28 RX ADMIN — Medication 2 PUFF: at 12:19

## 2020-11-28 RX ADMIN — PREDNISONE 10 MG: 10 TABLET ORAL at 07:46

## 2020-11-28 RX ADMIN — FAMOTIDINE 20 MG: 20 TABLET, FILM COATED ORAL at 07:45

## 2020-11-28 RX ADMIN — MIRTAZAPINE 45 MG: 30 TABLET, FILM COATED ORAL at 20:52

## 2020-11-28 RX ADMIN — GABAPENTIN 300 MG: 300 CAPSULE ORAL at 14:44

## 2020-11-28 RX ADMIN — ALBUTEROL SULFATE 2.5 MG: 2.5 SOLUTION RESPIRATORY (INHALATION) at 09:00

## 2020-11-28 RX ADMIN — ALBUTEROL SULFATE 2.5 MG: 2.5 SOLUTION RESPIRATORY (INHALATION) at 16:32

## 2020-11-28 RX ADMIN — DOXYCYCLINE 100 MG: 100 CAPSULE ORAL at 07:45

## 2020-11-28 RX ADMIN — CARVEDILOL 3.12 MG: 3.12 TABLET, FILM COATED ORAL at 17:11

## 2020-11-28 RX ADMIN — GLYCOPYRROLATE AND FORMOTEROL FUMARATE 2 PUFF: 9; 4.8 AEROSOL, METERED RESPIRATORY (INHALATION) at 20:57

## 2020-11-28 RX ADMIN — DOCUSATE SODIUM 100 MG: 100 CAPSULE ORAL at 07:46

## 2020-11-28 RX ADMIN — FAMOTIDINE 20 MG: 20 TABLET, FILM COATED ORAL at 20:52

## 2020-11-28 RX ADMIN — MULTIPLE VITAMINS W/ MINERALS TAB 1 TABLET: TAB at 07:46

## 2020-11-28 RX ADMIN — PANTOPRAZOLE SODIUM 40 MG: 40 TABLET, DELAYED RELEASE ORAL at 06:12

## 2020-11-28 RX ADMIN — FLUVOXAMINE MALEATE 50 MG: 50 TABLET, COATED ORAL at 07:45

## 2020-11-28 RX ADMIN — GLYCOPYRROLATE AND FORMOTEROL FUMARATE 2 PUFF: 9; 4.8 AEROSOL, METERED RESPIRATORY (INHALATION) at 07:44

## 2020-11-28 RX ADMIN — BENZOCAINE AND MENTHOL, UNSPECIFIED FORM 1 LOZENGE: 15; 3.6 LOZENGE ORAL at 20:58

## 2020-11-28 RX ADMIN — Medication 1 PUFF: at 20:53

## 2020-11-28 RX ADMIN — CALCIUM CARBONATE 600 MG (1,500 MG)-VITAMIN D3 400 UNIT TABLET 1 TABLET: at 07:45

## 2020-11-28 RX ADMIN — THIAMINE HCL TAB 100 MG 100 MG: 100 TAB at 07:46

## 2020-11-28 RX ADMIN — GABAPENTIN 300 MG: 300 CAPSULE ORAL at 20:52

## 2020-11-28 RX ADMIN — Medication 2 PUFF: at 01:55

## 2020-11-28 NOTE — GROUP NOTE
Group Therapy Note    Date: 11/28/2020    Group Start Time: 1330  Group End Time: 1430  Group Topic: Recreational    GEMA Reeder, CTRS        Group Therapy Note    Attendees: 5/18         Pt did not participate in Recreation Group at [phone] when encouraged by RT due to resting in room. Pt was offered talk time as an alternative to group but declined.        Discipline Responsible: Psychoeducational Specialist      Signature:  Ronan Moser

## 2020-11-28 NOTE — PROGRESS NOTES
GI Progress notes    11/28/2020   2:07 PM    Name:  Lele Gutierrez  MRN:    [id number]     Acct:     [de-identified]   Room:  [address] [place] [address] Day: 12     Admit Date: 11/16/2020 12:19 PM  PCP: Danika Su 84 M Blunt    Subjective:     C/C:   Chief Complaint   Patient presents with    Suicidal    Anxiety    Depression       Interval History: Status: improved. Patient seen and examined. No acute events overnight. Patient reports mild epigastric discomfort with meals. Tolerating diet. No nausea, vomiting. Has abdominal bloating;  Reports diarrhea. Previously, she was constipated. No GI bleeding. No fevers, chills. progress notes reviewed. Chest xray reviewed. Labs and     ROS:  Constitutional: negative for chills, fevers and sweats  Gastrointestinal: negative for constipation, nausea and vomiting    Medications:      Allergies: No Known Allergies    Current Meds: pantoprazole (PROTONIX) tablet 40 mg, QAM AC  sucralfate (CARAFATE) tablet 1 g, 4 times per day  lactated ringers infusion, Continuous  HYDROmorphone (DILAUDID) injection 0.25 mg, Q5 Min PRN  HYDROmorphone (DILAUDID) injection 0.5 mg, Q5 Min PRN  HYDROmorphone (DILAUDID) injection 0.25 mg, Q5 Min PRN  HYDROmorphone (DILAUDID) injection 0.5 mg, Q5 Min PRN  labetalol (NORMODYNE;TRANDATE) injection 5 mg, Q10 Min PRN  benzocaine-menthol (CEPACOL SORE THROAT) lozenge 1 lozenge, Q2H PRN  docusate sodium (COLACE) capsule 100 mg, Daily  mirtazapine (REMERON) tablet 45 mg, Nightly  albuterol (PROVENTIL) nebulizer solution 2.5 mg, BID  doxycycline monohydrate (MONODOX) capsule 100 mg, 2 times per day  fluvoxaMINE (LUVOX) tablet 50 mg, BID  fluvoxaMINE (LUVOX) tablet 100 mg, Lunch  guaiFENesin (MUCINEX) extended release tablet 600 mg, BID PRN  famotidine (PEPCID) tablet 20 mg, BID  gabapentin (NEURONTIN) capsule 300 mg, TID  ondansetron (ZOFRAN) tablet 4 mg, 4x Daily PRN  vitamin B-1 (THIAMINE) tablet 100 mg, TID  therapeutic multivitamin-minerals 1 tablet, Daily  LORazepam (ATIVAN) tablet 1 mg, Q1H PRN    Or  LORazepam (ATIVAN) injection 1 mg, Q1H PRN    Or  LORazepam (ATIVAN) tablet 2 mg, Q1H PRN    Or  LORazepam (ATIVAN) injection 2 mg, Q1H PRN    Or  LORazepam (ATIVAN) tablet 3 mg, Q1H PRN    Or  LORazepam (ATIVAN) injection 3 mg, Q1H PRN    Or  LORazepam (ATIVAN) tablet 4 mg, Q1H PRN    Or  LORazepam (ATIVAN) injection 4 mg, Q1H PRN  acetaminophen (TYLENOL) tablet 650 mg, Q4H PRN  aluminum & magnesium hydroxide-simethicone (MAALOX) 200-200-20 MG/5ML suspension 30 mL, Q6H PRN  hydrOXYzine (ATARAX) tablet 50 mg, TID PRN  ibuprofen (ADVIL;MOTRIN) tablet 600 mg, Q6H PRN  traZODone (DESYREL) tablet 50 mg, Nightly PRN  polyethylene glycol (GLYCOLAX) packet 17 g, Daily PRN  calcium carbonate-vitamin D (CALTRATE) 600-400 MG-UNIT per tab 1 tablet, Daily  carvedilol (COREG) tablet 3.125 mg, BID WC  fluticasone (FLOVENT HFA) 110 MCG/ACT inhaler 1 puff, BID  levothyroxine (SYNTHROID) tablet 88 mcg, Daily  predniSONE (DELTASONE) tablet 10 mg, Daily  glycopyrrolate-formoterol (BEVESPI) 9-4.8 MCG/ACT inhaler 2 puff, BID  vitamin C (ASCORBIC ACID) tablet 500 mg, Daily  albuterol (PROVENTIL) nebulizer solution 2.5 mg, Q6H PRN  albuterol sulfate  (90 Base) MCG/ACT inhaler 2 puff, Q6H PRN        Data:     Code Status:  Full Code    History reviewed. No pertinent family history.     Social History     Socioeconomic History    Marital status:      Spouse name: Not on file    Number of children: Not on file    Years of education: Not on file    Highest education level: Not on file   Occupational History    Not on file   Social Needs    Financial resource strain: Not on file    Food insecurity     Worry: Not on file     Inability: Not on file    Transportation needs     Medical: Not on file     Non-medical: Not on file   Tobacco Use    Smoking status: Heavy Tobacco Smoker     Packs/day: 1.00     Years: 48.00     Pack years: 48.00     Types: Cigarettes    Smokeless disease     hypo        Plan:        1. Dysphagia s/p EGD with distal stricture with [facility] dilatation  1. Diet as tolerated  Pt was asked to chew food well  Take time in eating  Sit up or prop up when eating  Don't talk when eating  Walk after finish eating  Use liquids with meals if has issues  The patient has verbalized understanding and agreemenet to this  Continue PPI  Continue Carafate    Constipation - resolved    Will see as needed. Explained to the patient and d/W Nursing Staff  Will F/U with you  Please call or Page for any issues or change in status  Thanks    Electronically signed by NATHAN Hogue - NP on 11/28/2020 at 2:07 PM       I independently performed an evaluation on [address]. I have reviewed the above documentation completed by the Nurse Practitioner and agree with the documented findings and plan of care. I have personally evaluated this patient with the APRN and have completed at least one if not all key elements of the E/M (history, physical exam, and MDM). Please see my additional contributions to the HPI, physical exam, assessment, and medical decision making. Patient was visited around 12 PM.  At that time she stated that she has abdominal discomfort secondary to bloating. She has several bowel movements today. Prior to today she was having constipation for several days. She denies heartburns. She is able to swallow liquids without difficulty. No fever chills. No shortness of breath. The discomfort that she was experiencing is secondary to abdominal bloating for patient and this appears to be chronic. Examination revealed patient is awake, alert and oriented. She appears to be anxious. Abdominal examination benign. No acute distention. Bowel sounds active. No acute tenderness as well. Lungs expands fairly. No wheezing, rales or rhonchi. Assessment: Stable. Status post esophageal dilation. Recommendations: To continue soft diet. To make food into small pieces, chew well and swallow. If she has any further issues to contact me.

## 2020-11-28 NOTE — PLAN OF CARE
Problem: Altered Mood, Depressive Behavior:  Goal: Able to verbalize and/or display a decrease in depressive symptoms  Description: Able to verbalize and/or display a decrease in depressive symptoms  Outcome: Ongoing     Problem: Altered Mood, Depressive Behavior:  Goal: Ability to disclose and discuss suicidal ideas will improve  Description: Ability to disclose and discuss suicidal ideas will improve  Outcome: Ongoing   Pt eats meals, takes medications, sleeps through the night, attends ADL's, denies suicidal thoughts and hallucinations.

## 2020-11-28 NOTE — PROGRESS NOTES
Department of Psychiatry  Progress Note     Chief Complaint:  Suicidal ideation and alcohol use    SUBJECTIVE:    PROGRESS:  Анна Shi is feeling very irritable and ready to go home today  She is discharged focused and becomes tearful when told she is not being discharged today. She states she has been up since 2 am, having diarrhea, she has the shakes, doesn't feel well. She states no one gave a flying flip about her last night. She is also upset because she has not been able to find a rehab place to go to and social work  refuses to talk to her. She states the only place that will take her is [facility] but they do not test for COVID so she is afraid to go there and get COVID because she has reduced lung function. She appears anxious and remains tremulous on examination. She feels her anxiety is up there, off the charts because \"if I go home my  goes deer hunting and he was expecting that I would be in a rehabilitation facility while he was gone and I will be home alone. \" Has been having anxiety attacks of \"my heart thumping, and my hands Kandice been trembling. \"    She states her depression is \"pretty low. \" Continues to feel really depressed about \"things not working out the way Id hoped. \"  Denies any thoughts of harm to herself or others today. Feels a little helpless and hopeless about her medical issues and rehabilitation options. She reports she had trouble staying asleep last night. When asked  If anything kept her up, she states thinking about going home, her bloating, sore throat and her diarrhea is what kept her up last night. She reports she slept maybe an hour this morning. She states she asked for PRN benadryl last night for sleep but it was not ordered. Will order 25mg PRN nightly for sleep starting tonight. She states she has been hungry and has been eating well. She has been trying to eat slowly and in little bites. Endorses good medication compliance.  She feels the trembling is a side effect from the medications but she is not sure. I advised her that is most likely a physical manifestation of her anxiety. She has not been attending groups today because she has not been feeling well.      Suicidal ideations: passive  Compliance with medications: good   Medication side effects: absent  ROS: Patient has new complaints:  no  Sleep quality: Trouble staying asleep   Attending groups: no, isolative to room today      OBJECTIVE      Medications  Current Facility-Administered Medications: pantoprazole (PROTONIX) tablet 40 mg, 40 mg, Oral, QAM AC  sucralfate (CARAFATE) tablet 1 g, 1 g, Oral, 4 times per day  lactated ringers infusion, , Intravenous, Continuous  HYDROmorphone (DILAUDID) injection 0.25 mg, 0.25 mg, Intravenous, Q5 Min PRN  HYDROmorphone (DILAUDID) injection 0.5 mg, 0.5 mg, Intravenous, Q5 Min PRN  HYDROmorphone (DILAUDID) injection 0.25 mg, 0.25 mg, Intravenous, Q5 Min PRN  HYDROmorphone (DILAUDID) injection 0.5 mg, 0.5 mg, Intravenous, Q5 Min PRN  labetalol (NORMODYNE;TRANDATE) injection 5 mg, 5 mg, Intravenous, Q10 Min PRN  benzocaine-menthol (CEPACOL SORE THROAT) lozenge 1 lozenge, 1 lozenge, Oral, Q2H PRN  docusate sodium (COLACE) capsule 100 mg, 100 mg, Oral, Daily  mirtazapine (REMERON) tablet 45 mg, 45 mg, Oral, Nightly  albuterol (PROVENTIL) nebulizer solution 2.5 mg, 2.5 mg, Nebulization, BID  doxycycline monohydrate (MONODOX) capsule 100 mg, 100 mg, Oral, 2 times per day  fluvoxaMINE (LUVOX) tablet 50 mg, 50 mg, Oral, BID  fluvoxaMINE (LUVOX) tablet 100 mg, 100 mg, Oral, Lunch  guaiFENesin (MUCINEX) extended release tablet 600 mg, 600 mg, Oral, BID PRN  famotidine (PEPCID) tablet 20 mg, 20 mg, Oral, BID  gabapentin (NEURONTIN) capsule 300 mg, 300 mg, Oral, TID  ondansetron (ZOFRAN) tablet 4 mg, 4 mg, Oral, 4x Daily PRN  vitamin B-1 (THIAMINE) tablet 100 mg, 100 mg, Oral, TID  therapeutic multivitamin-minerals 1 tablet, 1 tablet, Oral, Daily  LORazepam (ATIVAN) tablet 1 mg, 1 mg, Oral, Q1H PRN **OR** LORazepam (ATIVAN) injection 1 mg, 1 mg, Intramuscular, Q1H PRN **OR** LORazepam (ATIVAN) tablet 2 mg, 2 mg, Oral, Q1H PRN **OR** LORazepam (ATIVAN) injection 2 mg, 2 mg, Intramuscular, Q1H PRN **OR** LORazepam (ATIVAN) tablet 3 mg, 3 mg, Oral, Q1H PRN **OR** LORazepam (ATIVAN) injection 3 mg, 3 mg, Intramuscular, Q1H PRN **OR** LORazepam (ATIVAN) tablet 4 mg, 4 mg, Oral, Q1H PRN **OR** LORazepam (ATIVAN) injection 4 mg, 4 mg, Intramuscular, Q1H PRN  acetaminophen (TYLENOL) tablet 650 mg, 650 mg, Oral, Q4H PRN  aluminum & magnesium hydroxide-simethicone (MAALOX) 200-200-20 MG/5ML suspension 30 mL, 30 mL, Oral, Q6H PRN  hydrOXYzine (ATARAX) tablet 50 mg, 50 mg, Oral, TID PRN  ibuprofen (ADVIL;MOTRIN) tablet 600 mg, 600 mg, Oral, Q6H PRN  traZODone (DESYREL) tablet 50 mg, 50 mg, Oral, Nightly PRN  polyethylene glycol (GLYCOLAX) packet 17 g, 17 g, Oral, Daily PRN  calcium carbonate-vitamin D (CALTRATE) 600-400 MG-UNIT per tab 1 tablet, 1 tablet, Oral, Daily  carvedilol (COREG) tablet 3.125 mg, 3.125 mg, Oral, BID WC  fluticasone (FLOVENT HFA) 110 MCG/ACT inhaler 1 puff, 1 puff, Inhalation, BID  levothyroxine (SYNTHROID) tablet 88 mcg, 88 mcg, Oral, Daily  predniSONE (DELTASONE) tablet 10 mg, 10 mg, Oral, Daily  glycopyrrolate-formoterol (BEVESPI) 9-4.8 MCG/ACT inhaler 2 puff, 2 puff, Inhalation, BID  vitamin C (ASCORBIC ACID) tablet 500 mg, 500 mg, Oral, Daily  albuterol (PROVENTIL) nebulizer solution 2.5 mg, 2.5 mg, Nebulization, Q6H PRN  albuterol sulfate  (90 Base) MCG/ACT inhaler 2 puff, 2 puff, Inhalation, Q6H PRN     Physical     height is 5' 4\" (1.626 m) and weight is 135 lb (61.2 kg). Her temperature is 98.1 °F (36.7 °C). Her blood pressure is 118/70 and her pulse is 56. Her respiration is 16 and oxygen saturation is 96%.    Lab Results   Component Value Date    WBC 8.4 08/30/2020    HGB 13.6 08/30/2020    HCT 40.2 08/30/2020     08/30/2020    CHOL 153 07/13/2018    TRIG 44 07/13/2018    HDL 65 07/13/2018    ALT 12 08/30/2020    AST 21 08/30/2020     08/30/2020    K 4.1 08/30/2020     08/30/2020    CREATININE 0.54 08/30/2020    BUN 9 08/30/2020    CO2 30 08/30/2020    TSH 0.60 07/13/2018          Mental Status Exam:   Level of consciousness: awake  Appearance: Disheveled and distressed. Personal attire seated in chair  Behavior/Motor: Restless and tremulous  Attitude toward examiner: Cooperative with good eye contact  Speech: Normal in tone volume. Shaky at times  mood: Anxious and depressed  Affect: Mood congruent  Thought processes: Coherent and goal-directed   thought content:  Homicidal ideation - none  Suicidal Ideation:  passive  Delusions: None observed  Perceptual Disturbance: Denies  Cognition: Oriented to self location and circumstances  Concentration intact  Insight fair  Judgement fair       ASSESSMENT    MDD (major depressive disorder), recurrent severe, without psychosis  Panic disorder  Obsessive compulsive disorder  Alcohol abuse, severe with dependence  Alcohol withdrawal syndrome with complication     PLAN    Patient's symptoms show no change today  Medication adjustments: Will add Benadryl 25mg nightly PRN for sleep. Attempt to develop insight, psycho-education and supportive therapy conducted. Probable discharge: To be determined  Follow-up: daily while on the inpatient unit       Carl Jauregui is a 61 y.o. female being evaluated by a Virtual Visit (video visit) encounter to address concerns as mentioned above. A caregiver was present in the room along with the patient. Pursuant to the emergency declaration under the [facility], [address] [place] [address] and the [place] [employer] Act, this Virtual Visit was conducted with patient's (and/or legal guardian's) consent, to reduce the patient's risk of exposure to COVID-19 and provide necessary medical care.    Services were provided through a video synchronous discussion virtually to substitute for in-person visit by provider. Patient is present at [facility] in [place], [place] and I am physically present at my home in [address] on 11/28/2020 at 11:47 AM     An electronic signature was used to authenticate this note.

## 2020-11-28 NOTE — PLAN OF CARE
Problem: Altered Mood, Depressive Behavior:  Goal: Able to verbalize and/or display a decrease in depressive symptoms  Description: Able to verbalize and/or display a decrease in depressive symptoms  11/27/2020 2022 by Dori Green RN  Outcome: Ongoing   Less depressed but chronic lung condition poses challenge to mood daily. Problem: Altered Mood, Deterioration in Function:  Goal: Ability to perform activities of daily living will improve  Description: Ability to perform activities of daily living will improve  11/27/2020 2022 by Dori Green RN  Outcome: Ongoing   Performs ADL's per self. Problem: Tobacco Use:  Goal: Inpatient tobacco use cessation counseling participation  Description: Inpatient tobacco use cessation counseling participation  11/27/2020 2022 by Dori Green RN  Outcome: Ongoing   Talks about sneaking cigarettes despite COPD/SOB. Problem: Pain:  Goal: Control of acute pain  Description: Control of acute pain  11/27/2020 2022 by Dori Green, RN  Outcome: Ongoing   Denies current pain.   Problem: Nausea/Vomiting:  Goal: Able to drink  Description: Able to drink  11/27/2020 2022 by Dori Green RN  Outcome: Ongoing

## 2020-11-28 NOTE — GROUP NOTE
Group Therapy Note    Date: 11/28/2020    Group Start Time: 1600  Group End Time: 9766  Group Topic: Group Therapy    [facility]    Afshin Griffith RN; Jayna Greenberg RN        Group Therapy Note    Attendees: 4       patient refused to attend wellness group at 1600 after encouragement from staff.   1:1 talk time provided as alternative to group session and refused      Signature:  Jayna Greenberg RN

## 2020-11-28 NOTE — GROUP NOTE
Group Therapy Note    Date: 11/28/2020    Group Start Time: 0900  Group End Time: 0930  Group Topic: Community Meeting    [facility] WILLIAM Farmer [place]        Group Therapy Note    Attendees: 9/18         Pt did not participate in Community Meeting/Goals Group at 900am when encouraged by RT due to pt receiving a breathing treatment in room. Pt was offered talk time as an alternative to group but declined.       Discipline Responsible: Psychoeducational Specialist      Signature:  Herrera Person

## 2020-11-28 NOTE — BH NOTE
Writer meets with client 1:1 to explore 3 different ways to cope with depression and anxiety to include: (1) Reaching out to support systems and ideas on how to make new supportive relationships, (2) Deep breathing techniques, e.g., mindfulness, (3) Different free APS on smart phones that provide encouragement for healthy eating, and (4) Benefits of writing in a journal (client given a journal). Writer and client discuss ways to reach out for help when having suicidal thoughts, to include: (1) National and local suicide hotline supportive numbers provided, (2) Chain list of friends and family to reach out to when starting to feel depression and sadness increase, (3) Stay active and linked with [facility] to obtain a  and counselor. Writer and client talk about different support groups and community organizations that provide peer groups and activities to decrease isolation.

## 2020-11-28 NOTE — BH NOTE
1:1 interview was done via Telehealth by Rafael HAQUE. Pt was irritable, tearful at times. She wants to go home, but states her anxiety is \"off the charts\". She wants to go to a rehab facility at discharge.

## 2020-11-28 NOTE — BH NOTE
Paged GI doctor regarding patients complaints of burning chest pain that radiates to her right side under the ribs and into her back. Awaiting call back at this time.

## 2020-11-29 PROCEDURE — 6370000000 HC RX 637 (ALT 250 FOR IP): Performed by: NURSE PRACTITIONER

## 2020-11-29 PROCEDURE — 99232 SBSQ HOSP IP/OBS MODERATE 35: CPT | Performed by: INTERNAL MEDICINE

## 2020-11-29 PROCEDURE — 1240000000 HC EMOTIONAL WELLNESS R&B

## 2020-11-29 PROCEDURE — 6370000000 HC RX 637 (ALT 250 FOR IP): Performed by: INTERNAL MEDICINE

## 2020-11-29 PROCEDURE — 94640 AIRWAY INHALATION TREATMENT: CPT

## 2020-11-29 PROCEDURE — 94761 N-INVAS EAR/PLS OXIMETRY MLT: CPT

## 2020-11-29 PROCEDURE — 6360000002 HC RX W HCPCS: Performed by: PSYCHIATRY & NEUROLOGY

## 2020-11-29 PROCEDURE — 6370000000 HC RX 637 (ALT 250 FOR IP): Performed by: PSYCHIATRY & NEUROLOGY

## 2020-11-29 PROCEDURE — 6360000002 HC RX W HCPCS: Performed by: INTERNAL MEDICINE

## 2020-11-29 PROCEDURE — 6370000000 HC RX 637 (ALT 250 FOR IP): Performed by: PHYSICIAN ASSISTANT

## 2020-11-29 PROCEDURE — APPSS30 APP SPLIT SHARED TIME 16-30 MINUTES: Performed by: NURSE PRACTITIONER

## 2020-11-29 PROCEDURE — 99232 SBSQ HOSP IP/OBS MODERATE 35: CPT | Performed by: PHYSICIAN ASSISTANT

## 2020-11-29 PROCEDURE — 2700000000 HC OXYGEN THERAPY PER DAY

## 2020-11-29 PROCEDURE — 94760 N-INVAS EAR/PLS OXIMETRY 1: CPT

## 2020-11-29 RX ORDER — GABAPENTIN 100 MG/1
200 CAPSULE ORAL 3 TIMES DAILY
Status: DISCONTINUED | OUTPATIENT
Start: 2020-11-29 | End: 2020-11-30 | Stop reason: HOSPADM

## 2020-11-29 RX ADMIN — GLYCOPYRROLATE AND FORMOTEROL FUMARATE 2 PUFF: 9; 4.8 AEROSOL, METERED RESPIRATORY (INHALATION) at 20:36

## 2020-11-29 RX ADMIN — THIAMINE HCL TAB 100 MG 100 MG: 100 TAB at 13:39

## 2020-11-29 RX ADMIN — CALCIUM CARBONATE 600 MG (1,500 MG)-VITAMIN D3 400 UNIT TABLET 1 TABLET: at 08:31

## 2020-11-29 RX ADMIN — HYDROXYZINE HYDROCHLORIDE 50 MG: 50 TABLET, FILM COATED ORAL at 01:45

## 2020-11-29 RX ADMIN — CARVEDILOL 3.12 MG: 3.12 TABLET, FILM COATED ORAL at 17:03

## 2020-11-29 RX ADMIN — LEVOTHYROXINE SODIUM 88 MCG: 0.09 TABLET ORAL at 05:54

## 2020-11-29 RX ADMIN — DOCUSATE SODIUM 100 MG: 100 CAPSULE ORAL at 08:31

## 2020-11-29 RX ADMIN — THIAMINE HCL TAB 100 MG 100 MG: 100 TAB at 08:31

## 2020-11-29 RX ADMIN — HYDROXYZINE HYDROCHLORIDE 50 MG: 50 TABLET, FILM COATED ORAL at 17:03

## 2020-11-29 RX ADMIN — GLYCOPYRROLATE AND FORMOTEROL FUMARATE 2 PUFF: 9; 4.8 AEROSOL, METERED RESPIRATORY (INHALATION) at 08:32

## 2020-11-29 RX ADMIN — CARVEDILOL 3.12 MG: 3.12 TABLET, FILM COATED ORAL at 08:31

## 2020-11-29 RX ADMIN — FLUVOXAMINE MALEATE 50 MG: 50 TABLET, COATED ORAL at 08:30

## 2020-11-29 RX ADMIN — SUCRALFATE 1 G: 1 TABLET ORAL at 03:47

## 2020-11-29 RX ADMIN — DIPHENHYDRAMINE HCL 25 MG: 25 TABLET ORAL at 21:10

## 2020-11-29 RX ADMIN — SUCRALFATE 1 G: 1 TABLET ORAL at 11:56

## 2020-11-29 RX ADMIN — DOXYCYCLINE 100 MG: 100 CAPSULE ORAL at 08:31

## 2020-11-29 RX ADMIN — HYDROXYZINE HYDROCHLORIDE 50 MG: 50 TABLET, FILM COATED ORAL at 22:02

## 2020-11-29 RX ADMIN — THIAMINE HCL TAB 100 MG 100 MG: 100 TAB at 20:36

## 2020-11-29 RX ADMIN — GABAPENTIN 300 MG: 300 CAPSULE ORAL at 08:31

## 2020-11-29 RX ADMIN — GABAPENTIN 300 MG: 300 CAPSULE ORAL at 13:39

## 2020-11-29 RX ADMIN — Medication 2 PUFF: at 11:34

## 2020-11-29 RX ADMIN — SUCRALFATE 1 G: 1 TABLET ORAL at 17:03

## 2020-11-29 RX ADMIN — Medication 1 PUFF: at 21:10

## 2020-11-29 RX ADMIN — Medication 1 PUFF: at 08:32

## 2020-11-29 RX ADMIN — MIRTAZAPINE 45 MG: 30 TABLET, FILM COATED ORAL at 20:36

## 2020-11-29 RX ADMIN — ALBUTEROL SULFATE 2.5 MG: 2.5 SOLUTION RESPIRATORY (INHALATION) at 16:00

## 2020-11-29 RX ADMIN — IBUPROFEN 600 MG: 600 TABLET, FILM COATED ORAL at 19:50

## 2020-11-29 RX ADMIN — FLUVOXAMINE MALEATE 50 MG: 50 TABLET, COATED ORAL at 20:38

## 2020-11-29 RX ADMIN — OXYCODONE HYDROCHLORIDE AND ACETAMINOPHEN 500 MG: 500 TABLET ORAL at 08:30

## 2020-11-29 RX ADMIN — MULTIPLE VITAMINS W/ MINERALS TAB 1 TABLET: TAB at 08:31

## 2020-11-29 RX ADMIN — BENZOCAINE AND MENTHOL, UNSPECIFIED FORM 1 LOZENGE: 15; 3.6 LOZENGE ORAL at 13:39

## 2020-11-29 RX ADMIN — HYDROXYZINE HYDROCHLORIDE 50 MG: 50 TABLET, FILM COATED ORAL at 08:40

## 2020-11-29 RX ADMIN — FAMOTIDINE 20 MG: 20 TABLET, FILM COATED ORAL at 20:36

## 2020-11-29 RX ADMIN — ALBUTEROL SULFATE 2.5 MG: 2.5 SOLUTION RESPIRATORY (INHALATION) at 08:15

## 2020-11-29 RX ADMIN — PREDNISONE 10 MG: 10 TABLET ORAL at 08:30

## 2020-11-29 RX ADMIN — PANTOPRAZOLE SODIUM 40 MG: 40 TABLET, DELAYED RELEASE ORAL at 05:55

## 2020-11-29 RX ADMIN — FLUVOXAMINE MALEATE 100 MG: 50 TABLET, COATED ORAL at 11:56

## 2020-11-29 RX ADMIN — FAMOTIDINE 20 MG: 20 TABLET, FILM COATED ORAL at 08:30

## 2020-11-29 ASSESSMENT — PAIN SCALES - GENERAL
PAINLEVEL_OUTOF10: 0
PAINLEVEL_OUTOF10: 8

## 2020-11-29 ASSESSMENT — PAIN - FUNCTIONAL ASSESSMENT
PAIN_FUNCTIONAL_ASSESSMENT: 0-10
PAIN_FUNCTIONAL_ASSESSMENT: 0-10

## 2020-11-29 NOTE — PLAN OF CARE
Problem: Altered Mood, Depressive Behavior:  Goal: Able to verbalize and/or display a decrease in depressive symptoms  Description: Able to verbalize and/or display a decrease in depressive symptoms  11/29/2020 1020 by Courtney Fernandez RN  Outcome: Ongoing     Problem: Altered Mood, Depressive Behavior:  Goal: Ability to disclose and discuss suicidal ideas will improve  Description: Ability to disclose and discuss suicidal ideas will improve  Outcome: Ongoing   Pt eats meals, takes medications, sleeps through the night, attends ADL's, denies suicidal thoughts and hallucinations.

## 2020-11-29 NOTE — BH NOTE
Pt was at desk during group time, upset, tearful, asking for medication for her tremors. Stated that [facility]. [facility].  was going to order her something for her tremors, that she suggests they could be from her Neurontin & could decrease the dose. Pt demanding she have a breathing treatment and respiratory therapist was called and provided one. She returned to nurses station and was again upset about the tremors. Stated that P. A.  was going to order different meds for her tremors, became upset when told that was not what was said during Telehealth. (Writer was present during telehealth interview)  Asked her to sit so BP could be taken before giving her Coreg. She appeared to be trying to hold her breath while BP was taken and it was elevated. Put cuff on other arm and asked her to take some deep breaths at that time. Coreg & Ativan were given at that time. (During Telehealth she had stated that she didn't feel the Atarax did anything for her.) Stated that these tremors are not the same as she has at home in the morning when she hasn't had alcohol yet. She later came to desk and demanded that a cardiologist be called about her blood pressure. Explained about consults requiring an order. Instructed her to return to her room and rest and then staff would recheck her blood pressure. She was becoming more upset as she tried to argue with staff & she did go to her room for a few minutes. She then returned to dayroom as staff was getting BP monitor to recheck her, she walked past and said \"never mind\" and declined to have BP taken.

## 2020-11-29 NOTE — PLAN OF CARE
Problem: Altered Mood, Depressive Behavior:  Goal: Able to verbalize and/or display a decrease in depressive symptoms  Description: Able to verbalize and/or display a decrease in depressive symptoms  11/28/2020 2208 by Colton Castillo RN  Outcome: Ongoing   Less depressed stating she is ready for discharge. Problem: Altered Mood, Deterioration in Function:  Goal: Ability to perform activities of daily living will improve  Description: Ability to perform activities of daily living will improve  11/28/2020 2208 by Colton Castillo RN  Outcome: Ongoing   Performs adl's per self. Problem: Tobacco Use:  Goal: Inpatient tobacco use cessation counseling participation  Description: Inpatient tobacco use cessation counseling participation  11/28/2020 2208 by Colton Castillo RN  Outcome: Ongoing   Declines but vows not to smoke again. Problem: Pain:  Goal: Control of acute pain  Description: Control of acute pain  11/28/2020 2208 by Colton Castillo RN  Outcome: Ongoing   Satisfied with current interventions. Problem: Nausea/Vomiting:  Goal: Able to drink  Description: Able to drink  11/28/2020 2208 by Colton Castillo RN  Outcome: Ongoing   Eating & drinking well.

## 2020-11-29 NOTE — GROUP NOTE
Group Therapy Note    Date: 11/29/2020    Group Start Time: 1330  Group End Time: 2445  Group Topic: Cognitive Skills    GEMA [facility]istry [facility], CTRS        Group Therapy Note    Attendees: 5/19         Pt did not participate in Cognitive Skills Group at 1330 when encouraged by RT due to resting in room. Pt was offered talk time as an alternative to group but declined.          Discipline Responsible: Psychoeducational Specialist        Signature:  Moris Perry

## 2020-11-29 NOTE — BH NOTE
1:1 interview was done via Telehealth by [facility] include [facility] beds at the [facility]. A. Pt states she noticed a jerking motion in her head & extremities last night. Did not sleep well last night. States her anxiety has been elevated today. She made plans with her  to leave today and go to [place] for the weekend. Wants to find an alcohol treatment center to attend after discharge. Has more anxiety than depression today, feels more in control of her emotions. Denies suicidal ideation.

## 2020-11-29 NOTE — GROUP NOTE
Group Therapy Note    Date: 11/29/2020    Group Start Time: 0900  Group End Time: 0935  Group Topic: Community Meeting    GEMA Farmer, [place]        Group Therapy Note    Attendees: 9/20         Pt did not participate in Community Meeting/Goals Group at 900am when encouraged by RT due to resting in room. Pt was offered talk time as an alternative to group but declined.       Discipline Responsible: Psychoeducational Specialist      Signature:  Herrera Person

## 2020-11-29 NOTE — PROGRESS NOTES
Department of Psychiatry  Progress Note     Chief Complaint:  Suicidal ideation and alcohol use    SUBJECTIVE:    PROGRESS:  Aj English is feeling \"okay, ready to go home. \"  She states \"I am having some side effects. The nurses aren't sure what is causing it. I have terrible tremors in my hands and Im started to get a tic. It will be my arm or foot. \"   She remains discharge focused. She appears anxious and remains tremulous on examination. She states her anxiety is high because her  decided he wants her down there with him. He made reservations at a hotel and is going to stay with her at the hotel rather than at the cabin with his hunting buddies. She states she has not driven in a while and it is a 4.5 hour drive. She states she needs to go tonight because we are supposed to get 4-8 inches of snow tomorrow  She states her depression is \"about the same but its more anxiety than depression today. \" She feels yesterday she was a \"bad girl. Crying, yelling and complaining. \"   Denies any thoughts of harm to herself or others today. Continues to feel a little helpless and hopeless about her medical issues and rehabilitation options. She is planning on looking for rehabilitation places following discharge. She reports she had trouble staying asleep last night. She states she woke up at 2 am, took the benadryl but was not able to go back to sleep. She has been trying to sleep throughout the day. She states her appetite has been okay despite all of her GI issues. She has been trying to eat slowly and in little bites. Endorses good medication compliance. She feels her hand tremoring is a side effect from the medications, She thinks maybe Gabapentin but she is not sure. She asks for her Gabapentin to be decreased as she is concerned it is too activating. Will cut down to 200mg TID.    She has been attending a few groups     Suicidal ideations: passive- unchanged  Compliance with medications: good Medication side effects: absent  ROS: Patient has new complaints:  no  Sleep quality: Trouble staying asleep   Attending groups: yes, few    OBJECTIVE      Medications  Current Facility-Administered Medications: pantoprazole (PROTONIX) tablet 40 mg, 40 mg, Oral, QAM AC  sucralfate (CARAFATE) tablet 1 g, 1 g, Oral, 4 times per day  diphenhydrAMINE (BENADRYL) tablet 25 mg, 25 mg, Oral, Nightly PRN  lactated ringers infusion, , Intravenous, Continuous  HYDROmorphone (DILAUDID) injection 0.25 mg, 0.25 mg, Intravenous, Q5 Min PRN  HYDROmorphone (DILAUDID) injection 0.5 mg, 0.5 mg, Intravenous, Q5 Min PRN  HYDROmorphone (DILAUDID) injection 0.25 mg, 0.25 mg, Intravenous, Q5 Min PRN  HYDROmorphone (DILAUDID) injection 0.5 mg, 0.5 mg, Intravenous, Q5 Min PRN  labetalol (NORMODYNE;TRANDATE) injection 5 mg, 5 mg, Intravenous, Q10 Min PRN  benzocaine-menthol (CEPACOL SORE THROAT) lozenge 1 lozenge, 1 lozenge, Oral, Q2H PRN  docusate sodium (COLACE) capsule 100 mg, 100 mg, Oral, Daily  mirtazapine (REMERON) tablet 45 mg, 45 mg, Oral, Nightly  albuterol (PROVENTIL) nebulizer solution 2.5 mg, 2.5 mg, Nebulization, BID  fluvoxaMINE (LUVOX) tablet 50 mg, 50 mg, Oral, BID  fluvoxaMINE (LUVOX) tablet 100 mg, 100 mg, Oral, Lunch  guaiFENesin (MUCINEX) extended release tablet 600 mg, 600 mg, Oral, BID PRN  famotidine (PEPCID) tablet 20 mg, 20 mg, Oral, BID  gabapentin (NEURONTIN) capsule 300 mg, 300 mg, Oral, TID  ondansetron (ZOFRAN) tablet 4 mg, 4 mg, Oral, 4x Daily PRN  vitamin B-1 (THIAMINE) tablet 100 mg, 100 mg, Oral, TID  therapeutic multivitamin-minerals 1 tablet, 1 tablet, Oral, Daily  LORazepam (ATIVAN) tablet 1 mg, 1 mg, Oral, Q1H PRN **OR** LORazepam (ATIVAN) injection 1 mg, 1 mg, Intramuscular, Q1H PRN **OR** LORazepam (ATIVAN) tablet 2 mg, 2 mg, Oral, Q1H PRN **OR** LORazepam (ATIVAN) injection 2 mg, 2 mg, Intramuscular, Q1H PRN **OR** LORazepam (ATIVAN) tablet 3 mg, 3 mg, Oral, Q1H PRN **OR** LORazepam (ATIVAN) injection 3 mg, 3 mg, Intramuscular, Q1H PRN **OR** LORazepam (ATIVAN) tablet 4 mg, 4 mg, Oral, Q1H PRN **OR** LORazepam (ATIVAN) injection 4 mg, 4 mg, Intramuscular, Q1H PRN  acetaminophen (TYLENOL) tablet 650 mg, 650 mg, Oral, Q4H PRN  aluminum & magnesium hydroxide-simethicone (MAALOX) 200-200-20 MG/5ML suspension 30 mL, 30 mL, Oral, Q6H PRN  hydrOXYzine (ATARAX) tablet 50 mg, 50 mg, Oral, TID PRN  ibuprofen (ADVIL;MOTRIN) tablet 600 mg, 600 mg, Oral, Q6H PRN  traZODone (DESYREL) tablet 50 mg, 50 mg, Oral, Nightly PRN  polyethylene glycol (GLYCOLAX) packet 17 g, 17 g, Oral, Daily PRN  calcium carbonate-vitamin D (CALTRATE) 600-400 MG-UNIT per tab 1 tablet, 1 tablet, Oral, Daily  carvedilol (COREG) tablet 3.125 mg, 3.125 mg, Oral, BID WC  fluticasone (FLOVENT HFA) 110 MCG/ACT inhaler 1 puff, 1 puff, Inhalation, BID  levothyroxine (SYNTHROID) tablet 88 mcg, 88 mcg, Oral, Daily  predniSONE (DELTASONE) tablet 10 mg, 10 mg, Oral, Daily  glycopyrrolate-formoterol (BEVESPI) 9-4.8 MCG/ACT inhaler 2 puff, 2 puff, Inhalation, BID  vitamin C (ASCORBIC ACID) tablet 500 mg, 500 mg, Oral, Daily  albuterol (PROVENTIL) nebulizer solution 2.5 mg, 2.5 mg, Nebulization, Q6H PRN  albuterol sulfate  (90 Base) MCG/ACT inhaler 2 puff, 2 puff, Inhalation, Q6H PRN     Physical     height is 5' 4\" (1.626 m) and weight is 135 lb (61.2 kg). Her oral temperature is 98.2 °F (36.8 °C). Her blood pressure is 140/74 (abnormal) and her pulse is 58. Her respiration is 16 and oxygen saturation is 94%.    Lab Results   Component Value Date    WBC 8.4 08/30/2020    HGB 13.6 08/30/2020    HCT 40.2 08/30/2020     08/30/2020    CHOL 153 07/13/2018    TRIG 44 07/13/2018    HDL 65 07/13/2018    ALT 12 08/30/2020    AST 21 08/30/2020     08/30/2020    K 4.1 08/30/2020     08/30/2020    CREATININE 0.54 08/30/2020    BUN 9 08/30/2020    CO2 30 08/30/2020    TSH 0.60 07/13/2018          Mental Status Exam:   Level of consciousness: awake  Appearance: Disheveled and distressed. Personal attire seated in chair  Behavior/Motor: Restless and tremulous  Attitude toward examiner: Cooperative with good eye contact  Speech: Normal in tone volume. Shaky at times  mood: Anxious  Affect: Mood congruent  Thought processes: Coherent and goal-directed   thought content:  discharge focused. Homicidal ideation - none  Suicidal Ideation:  Passive, unchanged  Delusions: None observed  Perceptual Disturbance: Denies  Cognition: Oriented to self location and circumstances  Concentration intact  Insight fair  Judgement fair       ASSESSMENT    MDD (major depressive disorder), recurrent severe, without psychosis  Panic disorder  Obsessive compulsive disorder  Alcohol abuse, severe with dependence  Alcohol withdrawal syndrome with complication     PLAN    Patient's symptoms show no change today  Medication adjustments: Will decrease Gabapentin to 200mg TID per patient's request  Attempt to develop insight, psycho-education and supportive therapy conducted. Probable discharge: To be determined  Follow-up: daily while on the inpatient unit       Ximena Cordero is a 61 y.o. female being evaluated by a Virtual Visit (video visit) encounter to address concerns as mentioned above. A caregiver was present in the room along with the patient. Pursuant to the emergency declaration under the [facility], [address] [place] [address] [facility] authority and the [employer] Act, this Virtual Visit was conducted with patient's (and/or legal guardian's) consent, to reduce the patient's risk of exposure to COVID-19 and provide necessary medical care. Services were provided through a video synchronous discussion virtually to substitute for in-person visit by provider.    Patient is present at [facility] in [place], [place] and I am physically present at my home in [facility]    --Viraj Mcmillan PA-C on 11/29/2020 at 10:50 AM     An electronic signature was used to authenticate this note.

## 2020-11-29 NOTE — GROUP NOTE
Group Therapy Note    Date: 11/29/2020    Group Start Time: 1000  Group End Time: 2829  Group Topic: Psychoeducation    [facility]    SCHEDULE, MIKEX PETRA [facility]; Araceli Cotton        Group Therapy Note    Attendees: 9/12     Group Psychotherapy Note     Date: 11/28/2020     Group Start Time: 1000  Group End Time: 0188  Group Topic: Group Psychoeducation    [facility]    Attendees: 8/12    Patient's Goal:  To actively participate in psychotherapy group and discuss ways to address issues regarding interpersonal relationships and social support systems. Patient's Notes: To remain in the here-and-now and share current support systems. Status After Intervention:  Improved     Participation Level: Active Listener and Interactive     Participation Quality: Appropriate and Attentive      Speech:  Normal      Thought Process/Content: Logical      Affective Functioning: Normal     Mood: Sad and Depressed       Level of consciousness:  Alert       Response to Learning: Able to verbalize current knowledge/experience and progressing to goal      Endings: None Reported     Modes of Intervention: Psychotherapy, Education, Support and Problem-solving     Discipline Responsible: Clinical     Signature: Nba Barbour.  Rusty Silva, MSW, LSW

## 2020-11-29 NOTE — GROUP NOTE
Group Therapy Note    Date: 11/28/2020    Group Start Time: 1000  Group End Time: 9376  Group Topic: Psychoeducation    [facility]    Jaja Obregon        Group Therapy Note    Attendees: 8/12    Group Psychotherapy Note     Date: 11/28/2020     Group Start Time: 1000  Group End Time: 8582  Group Topic: Group Psychoeducation    [facility]    Attendees: 8/12    Patient's Goal:  To actively participate in psychotherapy group and discuss ways to address issues regarding interpersonal relationships and social support systems. Patient's Notes: To remain in the here-and-now and share current support systems. Status After Intervention:  Improved     Participation Level: Active Listener and Interactive     Participation Quality: Appropriate and Attentive      Speech:  Normal      Thought Process/Content: Logical      Affective Functioning: Normal     Mood: Sad and Depressed       Level of consciousness:  Alert       Response to Learning: Able to verbalize current knowledge/experience and progressing to goal      Endings: None Reported     Modes of Intervention: Psychotherapy, Education, Support and Problem-solving     Discipline Responsible: Clinical     Signature: Susan Duckworth MSW, LSW

## 2020-11-29 NOTE — PROGRESS NOTES
GI Progress notes    11/29/2020   12:20 PM    Name:  Katy Barrera  MRN:    [id number]     Acct:     [de-identified]   Room:  [address] [place] [address] Day: 15     Admit Date: 11/16/2020 12:19 PM  PCP: Loan Cline    Subjective:     C/C:   Chief Complaint   Patient presents with    Suicidal    Anxiety    Depression       Interval History: Status: improved. Patient seen and examined. No acute events overnight. Patient tolerating diet. No dysphagia, odynophagia. Still reports some abdominal bloating. Bowel movements are satisfactory. No melena, hematochezia. ROS:  Constitutional: negative for chills, fevers and sweats  Gastrointestinal: negative for abdominal pain, constipation, diarrhea, nausea and vomiting      Medications:      Allergies: No Known Allergies    Current Meds: pantoprazole (PROTONIX) tablet 40 mg, QAM AC  sucralfate (CARAFATE) tablet 1 g, 4 times per day  diphenhydrAMINE (BENADRYL) tablet 25 mg, Nightly PRN  lactated ringers infusion, Continuous  HYDROmorphone (DILAUDID) injection 0.25 mg, Q5 Min PRN  HYDROmorphone (DILAUDID) injection 0.5 mg, Q5 Min PRN  HYDROmorphone (DILAUDID) injection 0.25 mg, Q5 Min PRN  HYDROmorphone (DILAUDID) injection 0.5 mg, Q5 Min PRN  labetalol (NORMODYNE;TRANDATE) injection 5 mg, Q10 Min PRN  benzocaine-menthol (CEPACOL SORE THROAT) lozenge 1 lozenge, Q2H PRN  docusate sodium (COLACE) capsule 100 mg, Daily  mirtazapine (REMERON) tablet 45 mg, Nightly  albuterol (PROVENTIL) nebulizer solution 2.5 mg, BID  fluvoxaMINE (LUVOX) tablet 50 mg, BID  fluvoxaMINE (LUVOX) tablet 100 mg, Lunch  guaiFENesin (MUCINEX) extended release tablet 600 mg, BID PRN  famotidine (PEPCID) tablet 20 mg, BID  gabapentin (NEURONTIN) capsule 300 mg, TID  ondansetron (ZOFRAN) tablet 4 mg, 4x Daily PRN  vitamin B-1 (THIAMINE) tablet 100 mg, TID  therapeutic multivitamin-minerals 1 tablet, Daily  LORazepam (ATIVAN) tablet 1 mg, Q1H PRN    Or  LORazepam (ATIVAN) injection 1 mg, Q1H PRN Or  LORazepam (ATIVAN) tablet 2 mg, Q1H PRN    Or  LORazepam (ATIVAN) injection 2 mg, Q1H PRN    Or  LORazepam (ATIVAN) tablet 3 mg, Q1H PRN    Or  LORazepam (ATIVAN) injection 3 mg, Q1H PRN    Or  LORazepam (ATIVAN) tablet 4 mg, Q1H PRN    Or  LORazepam (ATIVAN) injection 4 mg, Q1H PRN  acetaminophen (TYLENOL) tablet 650 mg, Q4H PRN  aluminum & magnesium hydroxide-simethicone (MAALOX) 200-200-20 MG/5ML suspension 30 mL, Q6H PRN  hydrOXYzine (ATARAX) tablet 50 mg, TID PRN  ibuprofen (ADVIL;MOTRIN) tablet 600 mg, Q6H PRN  traZODone (DESYREL) tablet 50 mg, Nightly PRN  polyethylene glycol (GLYCOLAX) packet 17 g, Daily PRN  calcium carbonate-vitamin D (CALTRATE) 600-400 MG-UNIT per tab 1 tablet, Daily  carvedilol (COREG) tablet 3.125 mg, BID WC  fluticasone (FLOVENT HFA) 110 MCG/ACT inhaler 1 puff, BID  levothyroxine (SYNTHROID) tablet 88 mcg, Daily  predniSONE (DELTASONE) tablet 10 mg, Daily  glycopyrrolate-formoterol (BEVESPI) 9-4.8 MCG/ACT inhaler 2 puff, BID  vitamin C (ASCORBIC ACID) tablet 500 mg, Daily  albuterol (PROVENTIL) nebulizer solution 2.5 mg, Q6H PRN  albuterol sulfate  (90 Base) MCG/ACT inhaler 2 puff, Q6H PRN        Data:     Code Status:  Full Code    History reviewed. No pertinent family history.     Social History     Socioeconomic History    Marital status:      Spouse name: Not on file    Number of children: Not on file    Years of education: Not on file    Highest education level: Not on file   Occupational History    Not on file   Social Needs    Financial resource strain: Not on file    Food insecurity     Worry: Not on file     Inability: Not on file    Transportation needs     Medical: Not on file     Non-medical: Not on file   Tobacco Use    Smoking status: Heavy Tobacco Smoker     Packs/day: 1.00     Years: 48.00     Pack years: 48.00     Types: Cigarettes    Smokeless tobacco: Never Used    Tobacco comment: previous 3 ppd smoker   Substance and Sexual Activity    Alcohol use: Yes     Comment: occassional    Drug use: Never    Sexual activity: Not on file   Lifestyle    Physical activity     Days per week: Not on file     Minutes per session: Not on file    Stress: Not on file   Relationships    Social connections     Talks on phone: Not on file     Gets together: Not on file     Attends [employer] service: Not on file     Active member of club or organization: Not on file     Attends meetings of clubs or organizations: Not on file     Relationship status: Not on file    Intimate partner violence     Fear of current or ex partner: Not on file     Emotionally abused: Not on file     Physically abused: Not on file     Forced sexual activity: Not on file   Other Topics Concern    Not on file   Social History Narrative    Not on file       Vitals:  BP (!) 140/74   Pulse 58   Temp 98.2 °F (36.8 °C) (Oral)   Resp 16   Ht 5' 4\" (1.626 m)   Wt 135 lb (61.2 kg)   SpO2 94%   BMI 23.17 kg/m²   Temp (24hrs), Av.9 °F (36.6 °C), Min:97.5 °F (36.4 °C), Max:98.2 °F (36.8 °C)    No results for input(s): POCGLU in the last 72 hours. I/O (24Hr):   No intake or output data in the 24 hours ending 20 1220    Labs:      CBC:   Lab Results   Component Value Date    WBC 8.4 2020    RBC 4.21 2020    HGB 13.6 2020    HCT 40.2 2020    MCV 95.5 2020    MCH 32.4 2020    MCHC 34.0 2020    RDW 14.1 2020     2020    MPV 7.8 2020     CBC with Differential:    Lab Results   Component Value Date    WBC 8.4 2020    RBC 4.21 2020    HGB 13.6 2020    HCT 40.2 2020     2020    MCV 95.5 2020    MCH 32.4 2020    MCHC 34.0 2020    RDW 14.1 2020    LYMPHOPCT 22 2020    MONOPCT 9 2020    BASOPCT 1 2020    MONOSABS 0.80 2020    LYMPHSABS 1.80 2020    EOSABS 0.00 2020    BASOSABS 0.10 2020    DIFFTYPE NOT REPORTED 2020 Depression     Hiatal hernia     Spinal stenosis     Tachycardia     Thyroid disease     hypo        Plan:        1. Dysphagia s/p EGD with distal stricture with [facility]  1. Diet as tolerated  2. Pt was asked to chew food well  3. Take time in eating  4. Sit up or prop up when eating  5. Don't talk when eating  6. Walk after finish eating  7. Use liquids with meals if has issues  8. The patient has verbalized understanding and agreemenet to this  9. Continue PPI  10. Continue Carafate  GI signing off    Explained to the patient and d/W Nursing Staff  Will F/U with you  Please call or Page for any issues or change in status  Thanks    Electronically signed by NATHAN Holloway NP on 11/29/2020 at 12:20 PM     Gastroenterology attending physician note:    Patient swallowing significantly improved. No regurgitation of food. No emesis. Continues to complain of abdominal bloating, loose bowels alternating with constipation. Patient has these symptoms for long time. Patient is reassured. Advised to make food into small pieces, chew well and swallow. May need further work-up as an outpatient.

## 2020-11-30 VITALS
TEMPERATURE: 98.2 F | OXYGEN SATURATION: 99 % | HEART RATE: 69 BPM | HEIGHT: 64 IN | RESPIRATION RATE: 20 BRPM | DIASTOLIC BLOOD PRESSURE: 61 MMHG | WEIGHT: 135 LBS | BODY MASS INDEX: 23.05 KG/M2 | SYSTOLIC BLOOD PRESSURE: 110 MMHG

## 2020-11-30 PROBLEM — M47.812 CERVICAL OSTEOARTHRITIS: Status: ACTIVE | Noted: 2020-11-30

## 2020-11-30 PROCEDURE — 6360000002 HC RX W HCPCS: Performed by: INTERNAL MEDICINE

## 2020-11-30 PROCEDURE — 2700000000 HC OXYGEN THERAPY PER DAY

## 2020-11-30 PROCEDURE — 6370000000 HC RX 637 (ALT 250 FOR IP): Performed by: INTERNAL MEDICINE

## 2020-11-30 PROCEDURE — 6370000000 HC RX 637 (ALT 250 FOR IP): Performed by: NURSE PRACTITIONER

## 2020-11-30 PROCEDURE — 6370000000 HC RX 637 (ALT 250 FOR IP): Performed by: PSYCHIATRY & NEUROLOGY

## 2020-11-30 PROCEDURE — 94640 AIRWAY INHALATION TREATMENT: CPT

## 2020-11-30 PROCEDURE — 99239 HOSP IP/OBS DSCHRG MGMT >30: CPT | Performed by: PSYCHIATRY & NEUROLOGY

## 2020-11-30 PROCEDURE — 94761 N-INVAS EAR/PLS OXIMETRY MLT: CPT

## 2020-11-30 RX ORDER — FLUVOXAMINE MALEATE 50 MG/1
50 TABLET, COATED ORAL 2 TIMES DAILY
Qty: 60 TABLET | Refills: 3 | Status: SHIPPED | OUTPATIENT
Start: 2020-11-30 | End: 2021-11-15

## 2020-11-30 RX ORDER — FLUVOXAMINE MALEATE 100 MG
100 TABLET ORAL
Qty: 30 TABLET | Refills: 3 | Status: SHIPPED | OUTPATIENT
Start: 2020-11-30 | End: 2021-11-15

## 2020-11-30 RX ORDER — MIRTAZAPINE 45 MG/1
45 TABLET, FILM COATED ORAL NIGHTLY
Qty: 30 TABLET | Refills: 3 | Status: SHIPPED | OUTPATIENT
Start: 2020-11-30 | End: 2022-03-08 | Stop reason: DRUGHIGH

## 2020-11-30 RX ORDER — LANOLIN ALCOHOL/MO/W.PET/CERES
100 CREAM (GRAM) TOPICAL 3 TIMES DAILY
Qty: 30 TABLET | Refills: 3 | Status: SHIPPED | OUTPATIENT
Start: 2020-11-30 | End: 2021-11-15

## 2020-11-30 RX ORDER — M-VIT,TX,IRON,MINS/CALC/FOLIC 27MG-0.4MG
1 TABLET ORAL DAILY
Qty: 30 TABLET | Refills: 0 | Status: SHIPPED | OUTPATIENT
Start: 2020-11-30

## 2020-11-30 RX ORDER — ASCORBIC ACID 500 MG
500 TABLET ORAL DAILY
Qty: 30 TABLET | Refills: 3 | Status: SHIPPED | OUTPATIENT
Start: 2020-11-30

## 2020-11-30 RX ORDER — METHYLPREDNISOLONE SODIUM SUCCINATE 125 MG/2ML
125 INJECTION, POWDER, LYOPHILIZED, FOR SOLUTION INTRAMUSCULAR; INTRAVENOUS ONCE
Status: CANCELLED | OUTPATIENT
Start: 2020-11-30

## 2020-11-30 RX ORDER — DIPHENHYDRAMINE HYDROCHLORIDE 50 MG/ML
50 INJECTION INTRAMUSCULAR; INTRAVENOUS ONCE
Status: CANCELLED | OUTPATIENT
Start: 2020-11-30

## 2020-11-30 RX ORDER — EPINEPHRINE 1 MG/ML
0.3 INJECTION, SOLUTION, CONCENTRATE INTRAVENOUS PRN
Status: CANCELLED | OUTPATIENT
Start: 2020-11-30

## 2020-11-30 RX ORDER — SODIUM CHLORIDE 9 MG/ML
INJECTION, SOLUTION INTRAVENOUS CONTINUOUS
Status: CANCELLED | OUTPATIENT
Start: 2020-11-30

## 2020-11-30 RX ORDER — GABAPENTIN 100 MG/1
200 CAPSULE ORAL 3 TIMES DAILY
Qty: 90 CAPSULE | Refills: 3 | Status: SHIPPED | OUTPATIENT
Start: 2020-11-30 | End: 2021-11-15

## 2020-11-30 RX ORDER — LEVOTHYROXINE SODIUM 88 UG/1
88 TABLET ORAL DAILY
Qty: 30 TABLET | Refills: 3 | Status: SHIPPED | OUTPATIENT
Start: 2020-11-30 | End: 2021-11-15 | Stop reason: SDUPTHER

## 2020-11-30 RX ADMIN — MULTIPLE VITAMINS W/ MINERALS TAB 1 TABLET: TAB at 08:27

## 2020-11-30 RX ADMIN — SUCRALFATE 1 G: 1 TABLET ORAL at 11:55

## 2020-11-30 RX ADMIN — HYDROXYZINE HYDROCHLORIDE 50 MG: 50 TABLET, FILM COATED ORAL at 08:35

## 2020-11-30 RX ADMIN — LEVOTHYROXINE SODIUM 88 MCG: 0.09 TABLET ORAL at 06:08

## 2020-11-30 RX ADMIN — THIAMINE HCL TAB 100 MG 100 MG: 100 TAB at 08:27

## 2020-11-30 RX ADMIN — PANTOPRAZOLE SODIUM 40 MG: 40 TABLET, DELAYED RELEASE ORAL at 06:05

## 2020-11-30 RX ADMIN — CARVEDILOL 3.12 MG: 3.12 TABLET, FILM COATED ORAL at 08:27

## 2020-11-30 RX ADMIN — Medication 2 PUFF: at 10:59

## 2020-11-30 RX ADMIN — DOCUSATE SODIUM 100 MG: 100 CAPSULE ORAL at 08:26

## 2020-11-30 RX ADMIN — Medication 2 PUFF: at 03:28

## 2020-11-30 RX ADMIN — FLUVOXAMINE MALEATE 50 MG: 50 TABLET, COATED ORAL at 08:31

## 2020-11-30 RX ADMIN — CALCIUM CARBONATE 600 MG (1,500 MG)-VITAMIN D3 400 UNIT TABLET 1 TABLET: at 08:27

## 2020-11-30 RX ADMIN — SUCRALFATE 1 G: 1 TABLET ORAL at 06:06

## 2020-11-30 RX ADMIN — GLYCOPYRROLATE AND FORMOTEROL FUMARATE 2 PUFF: 9; 4.8 AEROSOL, METERED RESPIRATORY (INHALATION) at 08:26

## 2020-11-30 RX ADMIN — FAMOTIDINE 20 MG: 20 TABLET, FILM COATED ORAL at 08:26

## 2020-11-30 RX ADMIN — ALBUTEROL SULFATE 2.5 MG: 2.5 SOLUTION RESPIRATORY (INHALATION) at 07:35

## 2020-11-30 RX ADMIN — PREDNISONE 10 MG: 10 TABLET ORAL at 08:27

## 2020-11-30 RX ADMIN — ACETAMINOPHEN 650 MG: 325 TABLET, FILM COATED ORAL at 08:26

## 2020-11-30 RX ADMIN — OXYCODONE HYDROCHLORIDE AND ACETAMINOPHEN 500 MG: 500 TABLET ORAL at 08:27

## 2020-11-30 ASSESSMENT — PAIN SCALES - GENERAL: PAINLEVEL_OUTOF10: 7

## 2020-11-30 NOTE — DISCHARGE SUMMARY
DISCHARGE SUMMARY      Patient ID:  Ximena Cordero  [id number]  45 y.o.  1960    Admit date: 11/16/2020    Discharge date and time: 11/30/2020    Disposition: Home     Admitting Physician: Julian Duff MD     Discharge Physician: Dr Jeannie Hood MD    Admission Diagnoses: Depression with suicidal ideation [F32.9, R45.851]    Admission Condition: poor    Discharged Condition: stable    Admission Circumstance: The patient was admitted to the hospital after presenting to ER with a friend for increasing suicidal ideation. The patient reported severe anxiety and depression. She reported a past suicide attempt several years ago. The patient estimates that she drinks 2 bottles of wine a day. She also smokes 8 cigarettes a day. The patient has stage IV COPD. She is on oxygen at night. The patient reported a number of stressors including interpersonal conflict at home. The patient has been disowned by her daughter. The patient has also been told that her life expectancy is not high because of her severe COPD. Following admission, the patient had a Librium taper. The patient understood that she would have a better prognosis if she is into residential rehab but she felt unable to commit. PAST MEDICAL/PSYCHIATRIC HISTORY:   Past Medical History:   Diagnosis Date    Acid reflux     Anxiety     Arthritis     Cancer ([facility] [address]     cervical    Cervical osteoarthritis 11/30/2020    COPD (chronic obstructive pulmonary disease) ([facility] [address] 02/23/2018    DDD (degenerative disc disease), cervical     Depression     Hiatal hernia     Spinal stenosis     Tachycardia     Thyroid disease     hypo       FAMILY/SOCIAL HISTORY:  History reviewed. No pertinent family history.   Social History     Socioeconomic History    Marital status:      Spouse name: Not on file    Number of children: Not on file    Years of education: Not on file    Highest education level: Not on file   Occupational History    Not on file MD Pramod    HYDROmorphone (DILAUDID) injection 0.5 mg, 0.5 mg, Intravenous, Q5 Min PRN, Asiya Benavidez MD    HYDROmorphone (DILAUDID) injection 0.25 mg, 0.25 mg, Intravenous, Q5 Min PRN, Asiya Benavidez MD    HYDROmorphone (DILAUDID) injection 0.5 mg, 0.5 mg, Intravenous, Q5 Min PRN, Asiya Benavidez MD    labetalol (NORMODYNE;TRANDATE) injection 5 mg, 5 mg, Intravenous, Q10 Min PRN, Asiya Benavidez MD    benzocaine-menthol (CEPACOL SORE THROAT) lozenge 1 lozenge, 1 lozenge, Oral, Q2H PRN, Brooke Zaman MD, 1 lozenge at 11/29/20 1339    docusate sodium (COLACE) capsule 100 mg, 100 mg, Oral, Daily, Brooke Zaman MD, 100 mg at 11/30/20 0826    mirtazapine (REMERON) tablet 45 mg, 45 mg, Oral, Nightly, Brooke Zaman MD, 45 mg at 11/29/20 2036    albuterol (PROVENTIL) nebulizer solution 2.5 mg, 2.5 mg, Nebulization, BID, Jadyn Youngblood MD, 2.5 mg at 11/30/20 0735    fluvoxaMINE (LUVOX) tablet 50 mg, 50 mg, Oral, BID, Lydia Artis, APRN - CNP, 50 mg at 11/30/20 0831    fluvoxaMINE (LUVOX) tablet 100 mg, 100 mg, Oral, Lunch, Racquel Braxton APRN - CNP, 100 mg at 11/29/20 1156    guaiFENesin (MUCINEX) extended release tablet 600 mg, 600 mg, Oral, BID PRN, Kaiser Alonso APRN - CNP, 600 mg at 11/22/20 1823    famotidine (PEPCID) tablet 20 mg, 20 mg, Oral, BID, Brooke Zaman MD, 20 mg at 11/30/20 0826    ondansetron (ZOFRAN) tablet 4 mg, 4 mg, Oral, 4x Daily PRN, Tila Alicea, APRN - CNP, 4 mg at 11/26/20 1435    vitamin B-1 (THIAMINE) tablet 100 mg, 100 mg, Oral, TID, Brooke Zaman MD, 100 mg at 11/30/20 0827    therapeutic multivitamin-minerals 1 tablet, 1 tablet, Oral, Daily, Brooke Zaman MD, 1 tablet at 11/30/20 0827    LORazepam (ATIVAN) tablet 1 mg, 1 mg, Oral, Q1H PRN, 1 mg at 11/24/20 0902 **OR** LORazepam (ATIVAN) injection 1 mg, 1 mg, Intramuscular, Q1H PRN **OR** LORazepam (ATIVAN) tablet 2 mg, 2 mg, Oral, Q1H PRN **OR** LORazepam (ATIVAN) injection 2 mg, 2 mg, Intramuscular, Q1H PRN **OR** LORazepam (ATIVAN) tablet 3 mg, 3 mg, Oral, Q1H PRN **OR** LORazepam (ATIVAN) injection 3 mg, 3 mg, Intramuscular, Q1H PRN **OR** LORazepam (ATIVAN) tablet 4 mg, 4 mg, Oral, Q1H PRN **OR** LORazepam (ATIVAN) injection 4 mg, 4 mg, Intramuscular, Q1H PRN, Tsering Schreiber MD    acetaminophen (TYLENOL) tablet 650 mg, 650 mg, Oral, Q4H PRN, Tsering Schreiber MD, 650 mg at 11/30/20 0826    aluminum & magnesium hydroxide-simethicone (MAALOX) 200-200-20 MG/5ML suspension 30 mL, 30 mL, Oral, Q6H PRN, Tsering Schreiber MD, 30 mL at 11/27/20 1705    hydrOXYzine (ATARAX) tablet 50 mg, 50 mg, Oral, TID PRN, Tsering Schreiber MD, 50 mg at 11/30/20 0835    ibuprofen (ADVIL;MOTRIN) tablet 600 mg, 600 mg, Oral, Q6H PRN, sTering Schreiber MD, 600 mg at 11/29/20 1950    traZODone (DESYREL) tablet 50 mg, 50 mg, Oral, Nightly PRN, Tsering Schreiber MD    polyethylene glycol (GLYCOLAX) packet 17 g, 17 g, Oral, Daily PRN, Tsering Schreiber MD, 17 g at 11/22/20 0928    calcium carbonate-vitamin D (CALTRATE) 600-400 MG-UNIT per tab 1 tablet, 1 tablet, Oral, Daily, Tsering Schreiber MD, 1 tablet at 11/30/20 0827    carvedilol (COREG) tablet 3.125 mg, 3.125 mg, Oral, BID WC, Tsering Schreiber MD, 3.125 mg at 11/30/20 0827    fluticasone (FLOVENT HFA) 110 MCG/ACT inhaler 1 puff, 1 puff, Inhalation, BID, Tsering Schreiber MD, 1 puff at 11/29/20 2110    levothyroxine (SYNTHROID) tablet 88 mcg, 88 mcg, Oral, Daily, Tsering Schreiber MD, 88 mcg at 11/30/20 0608    predniSONE (DELTASONE) tablet 10 mg, 10 mg, Oral, Daily, Tsering Schreiber MD, 10 mg at 11/30/20 0827    glycopyrrolate-formoterol (BEVESPI) 9-4.8 MCG/ACT inhaler 2 puff, 2 puff, Inhalation, BID, Tsering Schreiber MD, 2 puff at 11/30/20 0826    vitamin C (ASCORBIC ACID) tablet 500 mg, 500 mg, Oral, Daily, Tsering Schreiber MD, 500 mg at 11/30/20 0827    albuterol (PROVENTIL) nebulizer solution 2.5 mg, 2.5 mg, Nebulization, Q6H PRN, Tsering Schreiber MD, 2.5 mg at 11/29/20 1600    albuterol sulfate  (90 Base) MCG/ACT inhaler 2 puff, 2 puff, Inhalation, Q6H PRN, Juliana Teran MD, 2 puff at 11/30/20 1059    Examination:  /61   Pulse 69   Temp 98.2 °F (36.8 °C) (Oral)   Resp 20   Ht 5' 4\" (1.626 m)   Wt 135 lb (61.2 kg)   SpO2 99%   BMI 23.17 kg/m²   Gait - steady    HOSPITAL COURSE[de-identified]  Following admission to the hospital, patient had a complete physical exam and blood work up. The patient was referred to gastroenterology after she had vomiting and severe constipation. The patient had an EGD. Patient was monitored closely with suicide precaution  Patient was started on a Librium taper which was completed. She also got gabapentin, fluvoxamine and Remeron. Was encouraged to participate in group and other milieu activity  Patient started to feel better with this combination of treatment. Significant progress in the symptoms since admission. Mood is improved  The patient denies AVH or paranoid thoughts  The patient denies any hopelessness or worthlessness  No active SI/HI  Appetite:  [x] Normal  [] Increased  [] Decreased    Sleep:       [x] Normal  [] Fair       [] Poor            Energy:    [x] Normal  [] Increased  [] Decreased     SI [] Present  [x] Absent  HI  []Present  [x] Absent   Aggression:  [] yes  [] no  Patient is [x] able  [] unable to CONTRACT FOR SAFETY   Medication side effects(SE):  [x] None(Psych.  Meds.) [] Other      Mental Status Examination on discharge:    Level of consciousness:  within normal limits   Appearance:  well-appearing  Behavior/Motor:  no abnormalities noted  Attitude toward examiner:  attentive and good eye contact  Speech:  spontaneous, normal rate and normal volume   Mood: anxious  Affect:  mood congruent  Thought processes:  linear, goal directed and coherent   Thought content:  Suicidal Ideation:  denies suicidal ideation  Delusions:  no evidence of delusions  Perceptual Disturbance:  denies any perceptual disturbance  Cognition:  oriented to person, place, and time   Concentration intact  Memory intact  Insight good   Judgement fair   Fund of Knowledge adequate      ASSESSMENT:  Patient symptoms are:  [x] Well controlled  [x] Improving  [] Worsening  [] No change      Diagnosis:  Principal Problem:    MDD (major depressive disorder), recurrent severe, without psychosis ([facility] [address]  Active Problems:    Panic disorder    Obsessive compulsive disorder    Alcohol withdrawal syndrome with complication (HCC)    Irritable bowel syndrome with constipation    Chronic constipation    Esophageal dysphagia  Resolved Problems:    * No resolved hospital problems. *      LABS:    No results for input(s): WBC, HGB, PLT in the last 72 hours. No results for input(s): NA, K, CL, CO2, BUN, CREATININE, GLUCOSE in the last 72 hours. No results for input(s): BILITOT, ALKPHOS, AST, ALT in the last 72 hours. No results found for: Rogena Nipper, LABBENZ, CANNAB, COCAINESCRN, LABMETH, OPIATESCREENURINE, PHENCYCLIDINESCREENURINE, PPXUR, ETOH  Lab Results   Component Value Date    TSH 0.60 07/13/2018     No results found for: LITHIUM  No results found for: VALPROATE, CBMZ    RISK ASSESSMENT AT DISCHARGE: Low risk for suicide and homicide. Treatment Plan:  Reviewed current Medications with the patient. Education provided on the complaince with treatment. Risks, benefits, side effects, drug-to-drug interactions and alternatives to treatment were discussed. Encourage patient to attend outpatient follow up appointment and therapy. Patient was advised to call the outpatient provider, visit the nearest ED or call 911 if symptoms are not manageable. Patient's family member was contacted prior to the discharge. Medication List      START taking these medications    calcium carbonate-vitamin D 600-400 MG-UNIT Tabs per tab  Commonly known as:  CALTRATE  Take 1 tablet by mouth daily  Replaces:  calcium-vitamin D 500-200 MG-UNIT per tablet  Notes to patient:  supp. gabapentin 100 MG capsule  Commonly known as:  NEURONTIN  Take 2 capsules by mouth 3 times daily for 90 days. Notes to patient:  mood     therapeutic multivitamin-minerals tablet  Take 1 tablet by mouth daily  Notes to patient:  supp.     thiamine 100 MG tablet  Take 1 tablet by mouth 3 times daily  Notes to patient:  supp. CHANGE how you take these medications    * fluvoxaMINE 50 MG tablet  Commonly known as:  LUVOX  Take 1 tablet by mouth 2 times daily  What changed:    · medication strength  · how much to take  · when to take this  Notes to patient:  antidepressant     * fluvoxaMINE 100 MG tablet  Commonly known as:  LUVOX  Take 1 tablet by mouth Daily with lunch  What changed: You were already taking a medication with the same name, and this prescription was added. Make sure you understand how and when to take each. Notes to patient:  antidepressant     mirtazapine 45 MG tablet  Commonly known as:  REMERON  Take 1 tablet by mouth nightly  What changed:    · medication strength  · how much to take  Notes to patient:  antidepressant         * This list has 2 medication(s) that are the same as other medications prescribed for you. Read the directions carefully, and ask your doctor or other care provider to review them with you. CONTINUE taking these medications    * albuterol sulfate  (90 Base) MCG/ACT inhaler  Notes to patient:  wheezing     * albuterol 0.63 MG/3ML nebulizer solution  Commonly known as:  ACCUNEB  Notes to patient:  wheezing     carvedilol 3.125 MG tablet  Commonly known as:  COREG  Notes to patient:  Heart health     fluticasone 110 MCG/ACT inhaler  Commonly known as:  FLOVENT HFA  Notes to patient:  wheezing     levothyroxine 88 MCG tablet  Commonly known as:  SYNTHROID  Take 1 tablet by mouth Daily  Notes to patient:  supp.     predniSONE 10 MG tablet  Commonly known as:  DELTASONE  Notes to patient:  supp.      Stiolto Respimat 2.5-2.5 MCG/ACT Aers  Generic drug: Tiotropium Bromide-Olodaterol  Notes to patient:  COPD     vitamin C 500 MG tablet  Commonly known as:  ASCORBIC ACID  Take 1 tablet by mouth daily  Notes to patient:  supp. * This list has 2 medication(s) that are the same as other medications prescribed for you. Read the directions carefully, and ask your doctor or other care provider to review them with you. STOP taking these medications    calcium-vitamin D 500-200 MG-UNIT per tablet  Commonly known as:  OSCAL-500  Replaced by:  calcium carbonate-vitamin D 600-400 MG-UNIT Tabs per tab     clonazePAM 0.5 MG tablet  Commonly known as:  KLONOPIN     omeprazole 40 MG delayed release capsule  Commonly known as:  PRILOSEC           Where to Get Your Medications      These medications were sent to [address] [place], [place] [id number] [facility] [address]    Phone:  [phone]   · calcium carbonate-vitamin D 600-400 MG-UNIT Tabs per tab  · fluvoxaMINE 100 MG tablet  · fluvoxaMINE 50 MG tablet  · gabapentin 100 MG capsule  · levothyroxine 88 MCG tablet  · mirtazapine 45 MG tablet  · therapeutic multivitamin-minerals tablet  · thiamine 100 MG tablet  · vitamin C 500 MG tablet           TIME SPENT - 35 MINUTES TO COMPLETE THE EVALUATION, DISCHARGE SUMMARY, MEDICATION RECONCILIATION AND FOLLOW UP CARE                                         Laura Cartagena is a 61 y.o. female being evaluated Roberto Zheng MD on 11/30/2020 at 11:40 AM    An electronic signature was used to authenticate this note. **This report has been created using voice recognition software. It may contain minor errors which are inherent in voice recognition technology. **

## 2020-11-30 NOTE — GROUP NOTE
Group Therapy Note    Date: 11/30/2020    Group Start Time: 0900  Group End Time: 2605  Group Topic: Community Meeting    GIANFRANCO AlanisS      Pt did not attend 0900 goal setting skills group d/t resting in room despite staff invitation to attend. 1:1 talk time offered as alternative to group session, pt declined.               Signature:  Nimisha Diallo

## 2020-11-30 NOTE — BH NOTE
[address] [facility]  Discharge Note    Pt discharged with followings belongings:   Dentures: None  Vision - Corrective Lenses: None  Hearing Aid: None  Jewelry: Ring, Earrings(right ring, belly button, bilateral earrings)  Body Piercings Removed: No(belly button and bilateral ears)  Clothing: (hospital gown and underwear)  Were All Patient Medications Collected?: No  Other Valuables: None   Valuables sent home with patient. Valuables retrieved from safe, Security envelope number:  [id number] and returned to patient. Patient education on aftercare instructions: given  [facility] Patient verbalize understanding of AVS:  Yes.  Patient transported home by friend    Status EXAM upon discharge:  Status and Exam  Normal: Yes  Facial Expression: Brightened  Affect: Appropriate  Level of Consciousness: Alert  Mood:Normal: Yes  Mood: Anxious  Motor Activity:Normal: Yes  Motor Activity: Increased  Interview Behavior: Cooperative  Preception: [place] to Person, Ronne Soja to Time, [place] to Place, [place] to Situation  Attention:Normal: Yes  Attention: Distractible  Thought Processes: Circumstantial  Thought Content:Normal: Yes  Thought Content: Preoccupations  Hallucinations: None  Delusions: No  Memory:Normal: Yes  Memory: Poor Recent  Insight and Judgment: Yes  Insight and Judgment: Poor Insight, Unmotivated  Present Suicidal Ideation: No  Present Homicidal Ideation: No      Metabolic Screening:    No results found for: LABA1C    Lab Results   Component Value Date    CHOL 153 07/13/2018     Lab Results   Component Value Date    TRIG 44 07/13/2018     Lab Results   Component Value Date    HDL 65 07/13/2018     No components found for: LDLCAL  No results found for: Danya Tellez LPN

## 2020-11-30 NOTE — PLAN OF CARE
Problem: Altered Mood, Depressive Behavior:  Goal: Able to verbalize and/or display a decrease in depressive symptoms  Description: Able to verbalize and/or display a decrease in depressive symptoms  11/29/2020 8693 by Octavio Kelley RN  Outcome: Ongoing  Note: Patient denies suicidal ideation and verbally contracts for safety. Patient remains safe during Q15 min and random safety checks. Patient remains in hospital appropriate clothing at all times and free from harmful objects. Patient is accepting of talk time with writer. Social with peers, still concerned about her tremors. Talked to the doctor today and patient thinks they are from her Neurontin, refused this medication tonight as wants to see what happens with her tremors, but states she will take the gabapentin tomorrow. States she is ready to be discharged.

## 2020-11-30 NOTE — GROUP NOTE
Group Therapy Note    Date: 11/30/2020    Group Start Time: 1100  Group End Time: 4218  Group Topic: Cognitive Skills    ISAMAR Chapin    Pt did not attend 1100 cognitive skills group d/t resting in room despite staff invitation to attend. 1:1 talk time offered as alternative to group session, pt declined.             Signature:  Bill Crowe

## 2020-11-30 NOTE — BH NOTE
Patient given tobacco quitline number 43286677665 at this time, refusing to call at this time and states they are interested in quitting. Will consider options. Patient given information as to the dangers of long term tobacco use. Continue to reinforce the importance of tobacco cessation.

## 2020-12-01 LAB — SURGICAL PATHOLOGY REPORT: NORMAL

## 2020-12-02 NOTE — CARE COORDINATION
Name: Phong Virgen    : 1960    Discharge Date: 2020    Primary Auth/Cert #: [id number]    Destination: home    Discharge Medications:      Medication List      START taking these medications    calcium carbonate-vitamin D 600-400 MG-UNIT Tabs per tab  Commonly known as:  CALTRATE  Take 1 tablet by mouth daily  Replaces:  calcium-vitamin D 500-200 MG-UNIT per tablet  Notes to patient:  supp.     gabapentin 100 MG capsule  Commonly known as:  NEURONTIN  Take 2 capsules by mouth 3 times daily for 90 days. Notes to patient:  mood     therapeutic multivitamin-minerals tablet  Take 1 tablet by mouth daily  Notes to patient:  supp.     thiamine 100 MG tablet  Take 1 tablet by mouth 3 times daily  Notes to patient:  supp. CHANGE how you take these medications    * fluvoxaMINE 50 MG tablet  Commonly known as:  LUVOX  Take 1 tablet by mouth 2 times daily  What changed:    · medication strength  · how much to take  · when to take this  Notes to patient:  antidepressant     * fluvoxaMINE 100 MG tablet  Commonly known as:  LUVOX  Take 1 tablet by mouth Daily with lunch  What changed: You were already taking a medication with the same name, and this prescription was added. Make sure you understand how and when to take each. Notes to patient:  antidepressant     mirtazapine 45 MG tablet  Commonly known as:  REMERON  Take 1 tablet by mouth nightly  What changed:    · medication strength  · how much to take  Notes to patient:  antidepressant         * This list has 2 medication(s) that are the same as other medications prescribed for you. Read the directions carefully, and ask your doctor or other care provider to review them with you.             CONTINUE taking these medications    * albuterol sulfate  (90 Base) MCG/ACT inhaler  Notes to patient:  wheezing     * albuterol 0.63 MG/3ML nebulizer solution  Commonly known as:  ACCUNEB  Notes to patient:  wheezing     carvedilol 3.125 MG 37068  Phone: [phone]  Fax: [phone]  On 12/7/2020  Therapist Medina Weiner at 1 PM    Discharge to home:  [address]   Go on 11/30/2020  If Claude Shah does not have a ride home, please send by [facility] and [facility]

## 2020-12-09 ENCOUNTER — VIRTUAL VISIT (OUTPATIENT)
Dept: GASTROENTEROLOGY | Age: 60
End: 2020-12-09
Payer: COMMERCIAL

## 2020-12-09 PROCEDURE — 99212 OFFICE O/P EST SF 10 MIN: CPT | Performed by: INTERNAL MEDICINE

## 2020-12-09 RX ORDER — OMEPRAZOLE 40 MG/1
40 CAPSULE, DELAYED RELEASE ORAL 2 TIMES DAILY
COMMUNITY
End: 2022-03-24 | Stop reason: SDUPTHER

## 2020-12-09 ASSESSMENT — ENCOUNTER SYMPTOMS
ANAL BLEEDING: 0
CHEST TIGHTNESS: 0
ABDOMINAL PAIN: 1
SINUS PRESSURE: 1
BACK PAIN: 1
NAUSEA: 0
SINUS PAIN: 1
ALLERGIC/IMMUNOLOGIC NEGATIVE: 1
VOMITING: 0
TROUBLE SWALLOWING: 0
DIARRHEA: 0
COUGH: 0
CONSTIPATION: 0
SHORTNESS OF BREATH: 1
ABDOMINAL DISTENTION: 1
RECTAL PAIN: 0
SORE THROAT: 0
BLOOD IN STOOL: 0
CHOKING: 1

## 2020-12-09 NOTE — PROGRESS NOTES
GI CLINIC FOLLOW UP    INTERVAL HISTORY:   No referring provider defined for this encounter. Chief Complaint   Patient presents with    EGD     Patient was in the hospital recently and while she was there Dr Honey Sewell did an EGD as she was having trouble swallowing and Acid reflux. She states she is feeling much better since the EGD and no ACID.  Bloated     Bloating not as bad as it was but still a little there. Psychologist thinks the tremors and the bloating is from the low dose Prednisone she has been on for so long. HISTORY OF PRESENT ILLNESS: Katlyn Raymond is a 61 y.o. female with bloating. She has been doing better. Recently she was in the hospital with depression. She underwent EGD. She continues on Protonix. She was recently started on prednisone. Overall she feels better. Bowels moving okay. Past Medical,Family, and Social History reviewed and does not contribute to the patient presentingcondition. Patient's PMH/PSH,SH,PSYCH Hx, MEDs, ALLERGIES, and ROS were all reviewed and updated in the appropriate sections.     PAST MEDICAL HISTORY:  Past Medical History:   Diagnosis Date    Acid reflux     Anxiety     Arthritis     Cancer ([facility] [address]     cervical    Cervical osteoarthritis 11/30/2020    COPD (chronic obstructive pulmonary disease) ([facility] [address] 02/23/2018    DDD (degenerative disc disease), cervical     Depression     Hiatal hernia     Spinal stenosis     Tachycardia     Thyroid disease     hypo       Past Surgical History:   Procedure Laterality Date    BACK SURGERY      diskectomy, L3-L5    COLONOSCOPY N/A 4/8/2019    COLONOSCOPY DIAGNOSTIC performed by Ashley Robledo MD at [address]  11/13/2019    COLONOSCOPY POLYPECTOMY SNARE/COLD BIOPSY performed by Ashley Robledo MD at [address] [place] [address]      ectopic pregnancy with tube removal    THYROIDECTOMY, PARTIAL      UPPER family history on file. SOCIAL HISTORY:   Social History     Socioeconomic History    Marital status:      Spouse name: Not on file    Number of children: Not on file    Years of education: Not on file    Highest education level: Not on file   Occupational History    Not on file   Social Needs    Financial resource strain: Not on file    Food insecurity     Worry: Not on file     Inability: Not on file    Transportation needs     Medical: Not on file     Non-medical: Not on file   Tobacco Use    Smoking status: Heavy Tobacco Smoker     Packs/day: 1.00     Years: 48.00     Pack years: 48.00     Types: Cigarettes    Smokeless tobacco: Never Used    Tobacco comment: previous 3 ppd smoker   Substance and Sexual Activity    Alcohol use: Yes     Comment: occassional    Drug use: Never    Sexual activity: Not on file   Lifestyle    Physical activity     Days per week: Not on file     Minutes per session: Not on file    Stress: Not on file   Relationships    Social connections     Talks on phone: Not on file     Gets together: Not on file     Attends Rastafari service: Not on file     Active member of club or organization: Not on file     Attends meetings of clubs or organizations: Not on file     Relationship status: Not on file    Intimate partner violence     Fear of current or ex partner: Not on file     Emotionally abused: Not on file     Physically abused: Not on file     Forced sexual activity: Not on file   Other Topics Concern    Not on file   Social History Narrative    Not on file       REVIEW OF SYSTEMS: A 12-point review of systemswas obtained and pertinent positives and negatives were enumerated above in the history of present illness. All other reviewed systems / symptoms were negative. Review of Systems   Constitutional: Negative for appetite change, fatigue and fever. HENT: Positive for sinus pressure and sinus pain.  Negative for congestion, sore throat and trouble swallowing. Eyes: Positive for visual disturbance. Respiratory: Positive for choking and shortness of breath (COPD). Negative for cough and chest tightness. Cardiovascular: Negative for chest pain and leg swelling. Gastrointestinal: Positive for abdominal distention and abdominal pain. Negative for anal bleeding, blood in stool, constipation, diarrhea, nausea, rectal pain and vomiting. Endocrine: Negative. Genitourinary: Negative. Negative for difficulty urinating. Musculoskeletal: Positive for arthralgias, back pain and gait problem. Skin: Negative. Allergic/Immunologic: Negative. Negative for environmental allergies and food allergies. Neurological: Negative for dizziness, weakness, light-headedness and headaches. Hematological: Negative. Does not bruise/bleed easily. Psychiatric/Behavioral: Positive for sleep disturbance. The patient is nervous/anxious.             LABORATORY DATA: Reviewed  Lab Results   Component Value Date    WBC 8.4 08/30/2020    HGB 13.6 08/30/2020    HCT 40.2 08/30/2020    MCV 95.5 08/30/2020     08/30/2020     08/30/2020    K 4.1 08/30/2020     08/30/2020    CO2 30 08/30/2020    BUN 9 08/30/2020    CREATININE 0.54 08/30/2020    LABALBU 4.0 08/30/2020    BILITOT 0.57 08/30/2020    ALKPHOS 72 08/30/2020    AST 21 08/30/2020    ALT 12 08/30/2020         Lab Results   Component Value Date    RBC 4.21 08/30/2020    HGB 13.6 08/30/2020    MCV 95.5 08/30/2020    MCH 32.4 08/30/2020    MCHC 34.0 08/30/2020    RDW 14.1 08/30/2020    MPV 7.8 08/30/2020    BASOPCT 1 08/30/2020    LYMPHSABS 1.80 08/30/2020    MONOSABS 0.80 08/30/2020    NEUTROABS 5.80 08/30/2020    EOSABS 0.00 08/30/2020    BASOSABS 0.10 08/30/2020         DIAGNOSTIC TESTING:     Xr Chest (2 Vw)    Result Date: 11/27/2020  EXAMINATION: TWO XRAY VIEWS OF THE CHEST 11/27/2020 11:48 am COMPARISON: October 5, 2020 HISTORY: ORDERING SYSTEM PROVIDED HISTORY: rule out pneumo TECHNOLOGIST PROVIDED HISTORY: rule out pneumo Reason for Exam: rule out pneumo Acuity: Acute Type of Exam: Initial Shortness of breath FINDINGS: Cardiomediastinal silhouette appears unchanged. Hyperinflation redemonstrated. Diffusion interstitial prominence related to underlying emphysema. The lungs are clear. No effusion or pneumothorax. No acute osseous abnormality appreciated. No pneumothorax. No acute pulmonary consolidation. Severe emphysema. Xr Abdomen (kub) (single Ap View)    Result Date: 11/26/2020  EXAMINATION: ONE SUPINE XRAY VIEW(S) OF THE ABDOMEN 11/26/2020 1:39 pm COMPARISON: CT abdomen and pelvis 08/30/2020 HISTORY: ORDERING SYSTEM PROVIDED HISTORY: constipation, swallowing difficulties TECHNOLOGIST PROVIDED HISTORY: constipation, swallowing difficulties Reason for Exam: constipation, swallowing difficulties Acuity: Acute Type of Exam: Initial FINDINGS: Nonobstructive bowel gas pattern. Mild stool burden throughout the colon. No soft tissue calcification identified. No acute osseous abnormality appreciated. Status post L4 laminectomy. Nonobstructive bowel gas pattern. Mild stool burden throughout the colon. PHYSICAL EXAMINATION: Vital signs reviewed per the nursing documentation. There were no vitals taken for this visit. There is no height or weight on file to calculate BMI. Physical Exam      I personally reviewed the nurse's notes and documentation and I agree with her notes. General: alert, appears stated age and cooperative Psych: Normal. and Alert and oriented, appropriate affect. . Normal affect. Mentation normal  HEENT: PERRLA. Clear conjunctivae and sclerae. Moist oral mucosae, no lesions or ulcers. The neck is supple, without lymphadenopathy or jugular venous distension. No masses. Normal thyroid. Cardiovascular: S1 S2 RRR no rubs or murmurs. Pulmonary: clear BL. No accessory muscle usage.   Abdominal Exam: Soft, NT ND, no hepato or spleno megaly, +BS, no

## 2020-12-10 ENCOUNTER — TELEPHONE (OUTPATIENT)
Dept: GASTROENTEROLOGY | Age: 60
End: 2020-12-10

## 2021-08-10 ENCOUNTER — OUTSIDE SERVICES (OUTPATIENT)
Dept: PRIMARY CARE CLINIC | Age: 61
End: 2021-08-10

## 2021-08-10 DIAGNOSIS — J44.1 COPD WITH ACUTE EXACERBATION (HCC): ICD-10-CM

## 2021-08-10 DIAGNOSIS — M54.50 CHRONIC BILATERAL LOW BACK PAIN WITHOUT SCIATICA: ICD-10-CM

## 2021-08-10 DIAGNOSIS — G89.29 CHRONIC BILATERAL LOW BACK PAIN WITHOUT SCIATICA: ICD-10-CM

## 2021-08-10 DIAGNOSIS — F41.0 PANIC DISORDER: ICD-10-CM

## 2021-08-10 PROBLEM — M48.061 LUMBAR SPINAL STENOSIS: Status: ACTIVE | Noted: 2019-02-15

## 2021-08-10 ASSESSMENT — ENCOUNTER SYMPTOMS
SHORTNESS OF BREATH: 1
COUGH: 1
DIARRHEA: 0
VOMITING: 0
NAUSEA: 0
BACK PAIN: 1

## 2021-08-10 NOTE — PROGRESS NOTES
Shreyas Patle is a 61 y.o. female being seen for her weekly follow up. Location of visit: [facility]    HPI:  New today:Pt c/o some back pain- chronic for her- flexeril helps. No increased SOB. Cough with sputum, but is now milky colored, instead of green. Review of Systems   Constitutional: Negative for activity change, appetite change, chills, diaphoresis and fever. HENT: Negative for congestion. Respiratory: Positive for cough and shortness of breath (her chronic). Cardiovascular: Negative for chest pain and palpitations. Gastrointestinal: Negative for diarrhea, nausea and vomiting. Genitourinary: Negative for hematuria. Musculoskeletal: Positive for back pain. Negative for arthralgias and myalgias. Skin: Negative for rash and wound. Neurological: Negative for weakness and headaches. Psychiatric/Behavioral: Negative for agitation and sleep disturbance. Physical Exam  Vitals reviewed. Constitutional:       General: She is not in acute distress. HENT:      Head: Normocephalic and atraumatic. Nose: No congestion or rhinorrhea. Eyes:      General:         Right eye: No discharge. Left eye: No discharge. Cardiovascular:      Rate and Rhythm: Normal rate and regular rhythm. Pulmonary:      Effort: Pulmonary effort is normal. No respiratory distress. Breath sounds: Normal breath sounds. Abdominal:      Palpations: Abdomen is soft. Tenderness: There is no abdominal tenderness. Musculoskeletal:      Right lower leg: No edema. Left lower leg: No edema. Skin:     General: Skin is warm and dry. Neurological:      Mental Status: She is alert. Mental status is at baseline. ASSESSMENT:   Diagnosis Orders   1. COPD with acute exacerbation ([address]     2. Chronic bilateral low back pain without sciatica     3. Panic disorder         PLAN:    1.  COPD with acute exacerbation [facility]  Assessment & Plan:   Well-controlled, continue current medications- home inhalers and aerosols, as well as accapella  2. Chronic bilateral low back pain without sciatica  Assessment & Plan:   pt takes flexeril regularly at home- ordered here as well  3.  Panic disorder  Assessment & Plan:   Monitored by specialist- no acute findings meriting change in the plan- takes clonazepam tid ATC at home, not just PRN- changed here so pt does not have withrdrawal

## 2021-08-10 NOTE — ASSESSMENT & PLAN NOTE
Monitored by specialist- no acute findings meriting change in the plan- takes clonazepam tid ATC at home, not just PRN- changed here so pt does not have withrdrawal

## 2021-08-13 DIAGNOSIS — M54.50 LOW BACK PAIN WITHOUT SCIATICA, UNSPECIFIED BACK PAIN LATERALITY, UNSPECIFIED CHRONICITY: Primary | ICD-10-CM

## 2021-08-13 RX ORDER — TRAMADOL HYDROCHLORIDE 50 MG/1
50 TABLET ORAL EVERY 6 HOURS PRN
Qty: 20 TABLET | Refills: 0 | Status: SHIPPED | OUTPATIENT
Start: 2021-08-13 | End: 2021-08-18

## 2021-09-16 ENCOUNTER — TELEPHONE (OUTPATIENT)
Dept: ONCOLOGY | Age: 61
End: 2021-09-16

## 2021-09-29 ENCOUNTER — TELEPHONE (OUTPATIENT)
Dept: PRIMARY CARE CLINIC | Age: 61
End: 2021-09-29

## 2021-09-29 NOTE — TELEPHONE ENCOUNTER
----- Message from [address] sent at 2021  3:24 PM EDT -----  Subject: Appointment Request    Reason for Call: New Patient Request Appointment    QUESTIONS  Type of Appointment? New Patient/New to Provider  Reason for appointment request? Requested Provider unavailable - [employer]  Additional Information for Provider? Qian Cevallos is calling in to   establish care with Dr. Arneta Najjar her  Sweetie Hall is a long term pt   of Marilyn Pollock and wife was wondering if it was okay for her to become a pt   ---------------------------------------------------------------------------  --------------  [address]  What is the best way for the office to contact you? OK to leave message on   voicemail  Preferred Call Back Phone Number? [phone]  ---------------------------------------------------------------------------  --------------  SCRIPT ANSWERS  Relationship to Patient? Self  Specialty Confirmation? Primary Care  Is this the first appointment to establish care for a ? No  Have you been diagnosed with, awaiting test results for, or told that you   are suspected of having COVID-19 (Coronavirus)? (If patient has tested   negative or was tested as a requirement for work, school, or travel and   not based on symptoms, answer no)? No  Within the past two weeks have you developed any of the following symptoms   (answer no if symptoms have been present longer than 2 weeks or began   more than 2 weeks ago)? Fever or Chills, Cough, Shortness of breath or   difficulty breathing, Loss of taste or smell, Sore throat, Nasal   congestion, Sneezing or runny nose, Fatigue or generalized body aches   (answer no if pain is specific to a body part e.g. back pain), Diarrhea,   Headache? No  Have you had close contact with someone with COVID-19 in the last 14 days? No  (Service Expert  click yes below to proceed with [employer] As Usual   Scheduling)?  Yes

## 2021-10-01 ENCOUNTER — TELEPHONE (OUTPATIENT)
Dept: PRIMARY CARE CLINIC | Age: 61
End: 2021-10-01

## 2021-10-01 NOTE — TELEPHONE ENCOUNTER
----- Message from Munir Arzola sent at 10/1/2021  1:57 PM EDT -----  Subject: Appointment Request    Reason for Call: New Patient Request Appointment    QUESTIONS  Type of Appointment? New Patient/New to Provider  Reason for appointment request? No appointments available during search  Additional Information for Provider? Pt has note from  on Rolando Scruggs that   she can see Dr Johanna Bird as a new patient.   ---------------------------------------------------------------------------  --------------  [place]  What is the best way for the office to contact you? OK to leave message on   voicemail  Preferred Call Back Phone Number? [phone]  ---------------------------------------------------------------------------  --------------  SCRIPT ANSWERS  Relationship to Patient? Self  Specialty Confirmation? Primary Care  Is this the first appointment to establish care for a ? No  Have you been diagnosed with, awaiting test results for, or told that you   are suspected of having COVID-19 (Coronavirus)? (If patient has tested   negative or was tested as a requirement for work, school, or travel and   not based on symptoms, answer no)? No  Within the past two weeks have you developed any of the following symptoms   (answer no if symptoms have been present longer than 2 weeks or began   more than 2 weeks ago)? Fever or Chills, Cough, Shortness of breath or   difficulty breathing, Loss of taste or smell, Sore throat, Nasal   congestion, Sneezing or runny nose, Fatigue or generalized body aches   (answer no if pain is specific to a body part e.g. back pain), Diarrhea,   Headache? No  Have you had close contact with someone with COVID-19 in the last 14 days? No  (Service Expert  click yes below to proceed with "[employer] As Usual   Scheduling)?  Yes

## 2021-11-12 ENCOUNTER — TELEPHONE (OUTPATIENT)
Dept: ONCOLOGY | Age: 61
End: 2021-11-12

## 2021-11-15 ENCOUNTER — OFFICE VISIT (OUTPATIENT)
Dept: PRIMARY CARE CLINIC | Age: 61
End: 2021-11-15
Payer: COMMERCIAL

## 2021-11-15 VITALS
HEIGHT: 64 IN | DIASTOLIC BLOOD PRESSURE: 80 MMHG | HEART RATE: 77 BPM | OXYGEN SATURATION: 99 % | BODY MASS INDEX: 19.74 KG/M2 | SYSTOLIC BLOOD PRESSURE: 128 MMHG | WEIGHT: 115.6 LBS

## 2021-11-15 DIAGNOSIS — F33.2 MDD (MAJOR DEPRESSIVE DISORDER), RECURRENT SEVERE, WITHOUT PSYCHOSIS (HCC): ICD-10-CM

## 2021-11-15 DIAGNOSIS — E03.9 ACQUIRED HYPOTHYROIDISM: Primary | ICD-10-CM

## 2021-11-15 DIAGNOSIS — Z00.00 ENCOUNTER FOR WELL ADULT EXAM WITHOUT ABNORMAL FINDINGS: ICD-10-CM

## 2021-11-15 DIAGNOSIS — Z23 NEED FOR VACCINATION: ICD-10-CM

## 2021-11-15 PROBLEM — G25.0 ESSENTIAL TREMOR: Status: ACTIVE | Noted: 2021-04-14

## 2021-11-15 PROBLEM — F32.A DEPRESSION WITH SUICIDAL IDEATION: Status: RESOLVED | Noted: 2020-11-16 | Resolved: 2021-11-15

## 2021-11-15 PROBLEM — F10.939 ALCOHOL WITHDRAWAL SYNDROME WITH COMPLICATION (HCC): Status: RESOLVED | Noted: 2020-11-17 | Resolved: 2021-11-15

## 2021-11-15 PROBLEM — R45.851 DEPRESSION WITH SUICIDAL IDEATION: Status: RESOLVED | Noted: 2020-11-16 | Resolved: 2021-11-15

## 2021-11-15 PROBLEM — R10.10 PAIN OF UPPER ABDOMEN: Status: RESOLVED | Noted: 2019-01-25 | Resolved: 2021-11-15

## 2021-11-15 PROBLEM — M80.88XA OTHER OSTEOPOROSIS WITH CURRENT PATHOLOGICAL FRACTURE, VERTEBRA(E), INITIAL ENCOUNTER FOR FRACTURE (HCC): Status: ACTIVE | Noted: 2021-04-18

## 2021-11-15 PROBLEM — I10 ESSENTIAL HYPERTENSION: Status: ACTIVE | Noted: 2021-07-25

## 2021-11-15 PROBLEM — R19.7 DIARRHEA: Status: RESOLVED | Noted: 2019-01-25 | Resolved: 2021-11-15

## 2021-11-15 PROBLEM — S32.020A COMPRESSION FRACTURE OF L2 LUMBAR VERTEBRA (HCC): Status: ACTIVE | Noted: 2021-02-09

## 2021-11-15 PROCEDURE — G8482 FLU IMMUNIZE ORDER/ADMIN: HCPCS | Performed by: FAMILY MEDICINE

## 2021-11-15 PROCEDURE — 99213 OFFICE O/P EST LOW 20 MIN: CPT | Performed by: FAMILY MEDICINE

## 2021-11-15 PROCEDURE — 90471 IMMUNIZATION ADMIN: CPT | Performed by: FAMILY MEDICINE

## 2021-11-15 PROCEDURE — G8427 DOCREV CUR MEDS BY ELIG CLIN: HCPCS | Performed by: FAMILY MEDICINE

## 2021-11-15 PROCEDURE — 3017F COLORECTAL CA SCREEN DOC REV: CPT | Performed by: FAMILY MEDICINE

## 2021-11-15 PROCEDURE — 90674 CCIIV4 VAC NO PRSV 0.5 ML IM: CPT | Performed by: FAMILY MEDICINE

## 2021-11-15 PROCEDURE — G8420 CALC BMI NORM PARAMETERS: HCPCS | Performed by: FAMILY MEDICINE

## 2021-11-15 PROCEDURE — 4004F PT TOBACCO SCREEN RCVD TLK: CPT | Performed by: FAMILY MEDICINE

## 2021-11-15 RX ORDER — VENLAFAXINE HYDROCHLORIDE 150 MG/1
150 CAPSULE, EXTENDED RELEASE ORAL DAILY
COMMUNITY
Start: 2021-07-16

## 2021-11-15 RX ORDER — LORATADINE 10 MG/1
TABLET ORAL
COMMUNITY
Start: 2021-09-20 | End: 2022-03-08

## 2021-11-15 RX ORDER — AMLODIPINE BESYLATE 2.5 MG/1
2.5 TABLET ORAL DAILY
Qty: 90 TABLET | Refills: 3 | Status: SHIPPED | OUTPATIENT
Start: 2021-11-15 | End: 2022-06-23

## 2021-11-15 RX ORDER — BUSPIRONE HYDROCHLORIDE 10 MG/1
10 TABLET ORAL 2 TIMES DAILY
COMMUNITY
Start: 2021-09-30

## 2021-11-15 RX ORDER — PREDNISONE 10 MG/1
20 TABLET ORAL DAILY
COMMUNITY
Start: 2021-11-09 | End: 2022-03-08

## 2021-11-15 RX ORDER — PRIMIDONE 50 MG/1
50 TABLET ORAL 2 TIMES DAILY
COMMUNITY
Start: 2021-10-19 | End: 2022-06-23

## 2021-11-15 RX ORDER — LEVOTHYROXINE SODIUM 88 UG/1
88 TABLET ORAL DAILY
Qty: 90 TABLET | Refills: 1 | Status: SHIPPED | OUTPATIENT
Start: 2021-11-15

## 2021-11-15 RX ORDER — CLONAZEPAM 0.5 MG/1
0.5 TABLET ORAL 3 TIMES DAILY PRN
COMMUNITY
End: 2021-11-15

## 2021-11-15 RX ORDER — NICOTINE 21 MG/24HR
PATCH, TRANSDERMAL 24 HOURS TRANSDERMAL
COMMUNITY
Start: 2021-11-09 | End: 2022-04-05

## 2021-11-15 RX ORDER — CYCLOBENZAPRINE HCL 10 MG
10 TABLET ORAL
COMMUNITY
Start: 2021-07-16 | End: 2021-12-14 | Stop reason: SDUPTHER

## 2021-11-15 RX ORDER — MONTELUKAST SODIUM 10 MG/1
10 TABLET ORAL DAILY
COMMUNITY
Start: 2021-08-15 | End: 2022-08-11 | Stop reason: SDUPTHER

## 2021-11-15 RX ORDER — PHENOL 1.4 %
10 AEROSOL, SPRAY (ML) MUCOUS MEMBRANE NIGHTLY
Qty: 90 TABLET | Refills: 3 | Status: SHIPPED | OUTPATIENT
Start: 2021-11-15 | End: 2022-02-20 | Stop reason: SDUPTHER

## 2021-11-15 RX ORDER — AMLODIPINE BESYLATE 2.5 MG/1
2.5 TABLET ORAL DAILY
COMMUNITY
Start: 2021-07-31 | End: 2021-11-15

## 2021-11-15 RX ORDER — LANOLIN ALCOHOL/MO/W.PET/CERES
3 CREAM (GRAM) TOPICAL NIGHTLY
COMMUNITY
End: 2021-11-15

## 2021-11-15 RX ORDER — CLONAZEPAM 0.5 MG/1
0.5 TABLET ORAL 3 TIMES DAILY PRN
Qty: 90 TABLET | Refills: 2 | Status: SHIPPED | OUTPATIENT
Start: 2021-11-15 | End: 2022-02-10 | Stop reason: SDUPTHER

## 2021-11-15 SDOH — ECONOMIC STABILITY: FOOD INSECURITY: WITHIN THE PAST 12 MONTHS, YOU WORRIED THAT YOUR FOOD WOULD RUN OUT BEFORE YOU GOT MONEY TO BUY MORE.: NEVER TRUE

## 2021-11-15 SDOH — ECONOMIC STABILITY: FOOD INSECURITY: WITHIN THE PAST 12 MONTHS, THE FOOD YOU BOUGHT JUST DIDN'T LAST AND YOU DIDN'T HAVE MONEY TO GET MORE.: NEVER TRUE

## 2021-11-15 ASSESSMENT — ENCOUNTER SYMPTOMS
ABDOMINAL PAIN: 0
RHINORRHEA: 0
WHEEZING: 0
SHORTNESS OF BREATH: 0
COUGH: 0
DIARRHEA: 0
EYE DISCHARGE: 0
NAUSEA: 0
VOMITING: 0
EYE REDNESS: 0
SORE THROAT: 0

## 2021-11-15 ASSESSMENT — PATIENT HEALTH QUESTIONNAIRE - PHQ9
SUM OF ALL RESPONSES TO PHQ QUESTIONS 1-9: 2
SUM OF ALL RESPONSES TO PHQ QUESTIONS 1-9: 2
2. FEELING DOWN, DEPRESSED OR HOPELESS: 1
SUM OF ALL RESPONSES TO PHQ9 QUESTIONS 1 & 2: 2
1. LITTLE INTEREST OR PLEASURE IN DOING THINGS: 1
SUM OF ALL RESPONSES TO PHQ QUESTIONS 1-9: 2

## 2021-11-15 ASSESSMENT — SOCIAL DETERMINANTS OF HEALTH (SDOH): HOW HARD IS IT FOR YOU TO PAY FOR THE VERY BASICS LIKE FOOD, HOUSING, MEDICAL CARE, AND HEATING?: NOT HARD AT ALL

## 2021-11-15 NOTE — PROGRESS NOTES
[facility] PRIMARY CARE  [address] [place] [id number]  Dept: [phone]    Georgette Acuña is a 61 y.o. female New patient, who presents today for her medical conditions/complaints as noted below. Chief Complaint   Patient presents with    New Patient       HPI:     HPI  Patient here to become established. Denies any chest heaviness or shortness of breath. Otherwise unremarkable. Past records reviewed. Patient states feeling somewhat antsy and jittery especially with the prednisone. Patient states has been on Klonopin for many years. Requesting a refill. Patient states still trying to quit smoking. Patient has been taking her thyroid medication every day. For a while was holding it on Sundays. Patient had been diagnosed with essential tremor. Was started on primidone by neurology. Patient states has been sleeping better with that. EGD report was reviewed. Patient states has been breathing better since having a newer ventilator that she uses at home.       Reviewed prior notes Neurology and Pulmonary  Reviewed previous Labs and Imaging    LDL Cholesterol (mg/dL)   Date Value   07/13/2018 79       (goal LDL is <100)   AST (U/L)   Date Value   08/30/2020 21     ALT (U/L)   Date Value   08/30/2020 12     BUN (mg/dL)   Date Value   08/30/2020 9     TSH (mIU/L)   Date Value   07/13/2018 0.60     BP Readings from Last 3 Encounters:   11/15/21 128/80   11/27/20 123/77   08/30/20 95/75          (goal 120/80)    Past Medical History:   Diagnosis Date    Acid reflux     Anxiety     Arthritis     Cancer (HCC)     cervical    Cervical osteoarthritis 11/30/2020    COPD (chronic obstructive pulmonary disease) ([facility] [address] 02/23/2018    DDD (degenerative disc disease), cervical     Depression     Hiatal hernia     Spinal stenosis     Tachycardia     Thyroid disease     hypo      Past Surgical History:   Procedure Laterality Date    BACK SURGERY diskectomy, L3-L5    COLONOSCOPY N/A 4/8/2019    COLONOSCOPY DIAGNOSTIC performed by Elsie Snellen, MD at [address] [place] [address] Rd  11/13/2019    COLONOSCOPY POLYPECTOMY SNARE/COLD BIOPSY performed by Elsie Snellen, MD at [address] [place] [address]      ectopic pregnancy with tube removal    THYROIDECTOMY, PARTIAL      UPPER GASTROINTESTINAL ENDOSCOPY      UPPER GASTROINTESTINAL ENDOSCOPY N/A 11/27/2020    EGD BIOPSY with dilatation performed by Melissa Pierre MD at [facility]       No family history on file. Social History     Tobacco Use    Smoking status: Current Every Day Smoker     Packs/day: 0.50     Years: 48.00     Pack years: 24.00     Types: Cigarettes    Smokeless tobacco: Never Used    Tobacco comment: previous 3 ppd smoker   Substance Use Topics    Alcohol use: Yes     Comment: occassional      Current Outpatient Medications   Medication Sig Dispense Refill    busPIRone (BUSPAR) 10 MG tablet Take 10 mg by mouth 2 times daily      cyclobenzaprine (FLEXERIL) 10 MG tablet Take 10 mg by mouth      loratadine (CLARITIN) 10 MG tablet       montelukast (SINGULAIR) 10 MG tablet Take 10 mg by mouth daily      nicotine (NICODERM CQ) 14 MG/24HR       predniSONE (DELTASONE) 10 MG tablet Take 20 mg by mouth daily      primidone (MYSOLINE) 50 MG tablet Take 50 mg by mouth 2 times daily      venlafaxine (EFFEXOR XR) 150 MG extended release capsule Take 150 mg by mouth daily      clonazePAM (KLONOPIN) 0.5 MG tablet Take 1 tablet by mouth 3 times daily as needed for Anxiety for up to 30 days.  90 tablet 2    levothyroxine (SYNTHROID) 88 MCG tablet Take 1 tablet by mouth Daily 90 tablet 1    amLODIPine (NORVASC) 2.5 MG tablet Take 1 tablet by mouth daily 90 tablet 3    Melatonin 10 MG TABS Take 10 mg by mouth nightly 90 tablet 3    omeprazole (PRILOSEC) 40 MG delayed release capsule Take 40 mg by mouth 2 times daily      vitamin C (ASCORBIC ACID) 500 MG tablet Take 1 tablet by mouth daily 30 tablet 3    Multiple Vitamins-Minerals (THERAPEUTIC MULTIVITAMIN-MINERALS) tablet Take 1 tablet by mouth daily 30 tablet 0    calcium carbonate-vitamin D (CALTRATE) 600-400 MG-UNIT TABS per tab Take 1 tablet by mouth daily 30 tablet 0    mirtazapine (REMERON) 45 MG tablet Take 1 tablet by mouth nightly 30 tablet 3    fluticasone (FLOVENT HFA) 110 MCG/ACT inhaler Inhale 1 puff into the lungs 2 times daily      Tiotropium Bromide-Olodaterol (STIOLTO RESPIMAT) 2.5-2.5 MCG/ACT AERS Inhale 2 puffs into the lungs      carvedilol (COREG) 3.125 MG tablet Take 3.125 mg by mouth 2 times daily (with meals)      albuterol sulfate  (90 Base) MCG/ACT inhaler Inhale 2 puffs into the lungs every 6 hours as needed for Wheezing      albuterol (ACCUNEB) 0.63 MG/3ML nebulizer solution Take 1 ampule by nebulization every 6 hours as needed for Wheezing       No current facility-administered medications for this visit. No Known Allergies    Health Maintenance   Topic Date Due    Hepatitis C screen  Never done    COVID-19 Vaccine (1) Never done    HIV screen  Never done    DTaP/Tdap/Td vaccine (1 - Tdap) Never done    Cervical cancer screen  Never done    Breast cancer screen  Never done    Shingles Vaccine (1 of 2) Never done    TSH testing  07/13/2019    Potassium monitoring  08/30/2021    Creatinine monitoring  08/30/2021    Flu vaccine (1) 09/01/2021    Low dose CT lung screening  10/05/2021    Colon cancer screen colonoscopy  11/13/2022    Lipid screen  07/13/2023    Pneumococcal 0-64 years Vaccine (2 of 2 - PPSV23) 12/25/2025    Hepatitis A vaccine  Aged Out    Hepatitis B vaccine  Aged Out    Hib vaccine  Aged Out    Meningococcal (ACWY) vaccine  Aged Out       Subjective:      Review of Systems   Constitutional: Negative for chills and fever. HENT: Negative for rhinorrhea and sore throat.     Eyes: Negative for discharge and redness. Respiratory: Negative for cough, shortness of breath and wheezing. Cardiovascular: Negative for chest pain and palpitations. Gastrointestinal: Negative for abdominal pain, diarrhea, nausea and vomiting. Genitourinary: Negative for dysuria and frequency. Musculoskeletal: Negative for arthralgias and myalgias. Neurological: Negative for dizziness, light-headedness and headaches. Psychiatric/Behavioral: Negative for sleep disturbance. Objective:     /80   Pulse 77   Ht 5' 3.96\" (1.625 m)   Wt 115 lb 9.6 oz (52.4 kg)   SpO2 99%   BMI 19.87 kg/m²   Physical Exam  Vitals and nursing note reviewed. Constitutional:       General: She is not in acute distress. Appearance: She is well-developed. She is not ill-appearing. HENT:      Head: Normocephalic and atraumatic. Right Ear: External ear normal.      Left Ear: External ear normal.   Eyes:      General: No scleral icterus. Right eye: No discharge. Left eye: No discharge. Conjunctiva/sclera: Conjunctivae normal.      Pupils: Pupils are equal, round, and reactive to light. Neck:      Thyroid: No thyromegaly. Trachea: No tracheal deviation. Cardiovascular:      Rate and Rhythm: Normal rate and regular rhythm. Heart sounds: Normal heart sounds. Pulmonary:      Effort: Pulmonary effort is normal. No respiratory distress. Breath sounds: Normal breath sounds. No wheezing. Lymphadenopathy:      Cervical: No cervical adenopathy. Skin:     General: Skin is warm. Findings: No rash. Neurological:      Mental Status: She is alert and oriented to person, place, and time. Psychiatric:         Mood and Affect: Mood normal.         Behavior: Behavior normal.         Thought Content: Thought content normal.         Assessment:       Diagnosis Orders   1. Acquired hypothyroidism  T4, Free    TSH   2.  Need for vaccination  INFLUENZA, MDCK QUADV, 2 YRS AND OLDER, IM, PF, PREFILL SYR OR SDV, 0.5ML (FLUCELVAX QUADV, PF)   3. MDD (major depressive disorder), recurrent severe, without psychosis (HCC)  clonazePAM (KLONOPIN) 0.5 MG tablet   4. Encounter for well adult exam without abnormal findings          Plan:    Patient to hold thyroid medication on Sundays. Repeat thyroid blood work in 3 months. Renew Klonopin. Keep follow-up with pulmonary. Mammogram and blood work was reviewed. Return in about 6 months (around 5/15/2022). Orders Placed This Encounter   Procedures    INFLUENZA, MDCK QUADV, 2 YRS AND OLDER, IM, PF, PREFILL SYR OR SDV, 0.5ML (FLUCELVAX QUADV, PF)    T4, Free     Standing Status:   Future     Standing Expiration Date:   2/15/2023    TSH     Standing Status:   Future     Standing Expiration Date:   2/15/2023     Orders Placed This Encounter   Medications    clonazePAM (KLONOPIN) 0.5 MG tablet     Sig: Take 1 tablet by mouth 3 times daily as needed for Anxiety for up to 30 days. Dispense:  90 tablet     Refill:  2    levothyroxine (SYNTHROID) 88 MCG tablet     Sig: Take 1 tablet by mouth Daily     Dispense:  90 tablet     Refill:  1    amLODIPine (NORVASC) 2.5 MG tablet     Sig: Take 1 tablet by mouth daily     Dispense:  90 tablet     Refill:  3    Melatonin 10 MG TABS     Sig: Take 10 mg by mouth nightly     Dispense:  90 tablet     Refill:  3       Patient given educational materials - see patient instructions. Discussed use, benefit, and side effects of prescribed medications. All patient questions answered. Pt voiced understanding. Reviewed health maintenance. Instructed to continue current medications, diet andexercise. Patient agreed with treatment plan. Follow up as directed.      Electronicallysigned by Jessi Salas MD on 11/15/2021 at 4:51 PM

## 2021-12-14 ENCOUNTER — TELEPHONE (OUTPATIENT)
Dept: PRIMARY CARE CLINIC | Age: 61
End: 2021-12-14

## 2021-12-14 DIAGNOSIS — Z87.891 PERSONAL HISTORY OF TOBACCO USE: Primary | ICD-10-CM

## 2021-12-14 RX ORDER — CYCLOBENZAPRINE HCL 10 MG
10 TABLET ORAL 2 TIMES DAILY PRN
Qty: 60 TABLET | Refills: 0 | Status: SHIPPED | OUTPATIENT
Start: 2021-12-14 | End: 2022-03-24 | Stop reason: SDUPTHER

## 2021-12-14 NOTE — TELEPHONE ENCOUNTER
Patient called stating that she is due for her yearly lung screening. Please advise and place order. Patient would like to get [facility] done at [facility]    Patient is requesting refill on flexeril. Pending      Patient is also requesting a appetite stimulate so that she doesn't loose any more weight.  Please advise

## 2021-12-16 ENCOUNTER — TELEPHONE (OUTPATIENT)
Dept: ONCOLOGY | Age: 61
End: 2021-12-16

## 2021-12-20 LAB
T4 FREE: NORMAL
TSH SERPL DL<=0.05 MIU/L-ACNC: NORMAL M[IU]/L

## 2022-01-07 ENCOUNTER — TELEPHONE (OUTPATIENT)
Dept: PRIMARY CARE CLINIC | Age: 62
End: 2022-01-07

## 2022-01-07 DIAGNOSIS — U07.1 COVID-19: Primary | ICD-10-CM

## 2022-01-10 RX ORDER — AZITHROMYCIN 250 MG/1
250 TABLET, FILM COATED ORAL SEE ADMIN INSTRUCTIONS
Qty: 6 TABLET | Refills: 0 | Status: SHIPPED | OUTPATIENT
Start: 2022-01-10 | End: 2022-01-15

## 2022-01-13 NOTE — TELEPHONE ENCOUNTER
Pt notified. Pt states she is still having a lot of chest congestion and would like prednisone sent in. [facility] [place].

## 2022-01-14 RX ORDER — DEXAMETHASONE 6 MG/1
6 TABLET ORAL
Qty: 10 TABLET | Refills: 0 | Status: SHIPPED | OUTPATIENT
Start: 2022-01-14 | End: 2022-01-24

## 2022-01-18 DIAGNOSIS — E03.9 ACQUIRED HYPOTHYROIDISM: ICD-10-CM

## 2022-02-03 ENCOUNTER — TELEPHONE (OUTPATIENT)
Dept: PRIMARY CARE CLINIC | Age: 62
End: 2022-02-03

## 2022-02-03 DIAGNOSIS — R19.7 DIARRHEA OF PRESUMED INFECTIOUS ORIGIN: Primary | ICD-10-CM

## 2022-02-03 NOTE — TELEPHONE ENCOUNTER
----- Message from Deepa Josh sent at 2/2/2022  4:19 PM EST -----  Subject: Message to Provider    QUESTIONS  Information for Provider? Laura with [address] says that   patient has been having a diarrhea for 21/2 weeks. Had Covid and was on a   z pack and was doing well but the diarrhea does not stop. Laura   [phone]. In case PCP calls a prescription in use [facility] in [place].  ---------------------------------------------------------------------------  --------------  [address]  What is the best way for the office to contact you? OK to leave message on   voicemail  Preferred Call Back Phone Number?  [phone]  ---------------------------------------------------------------------------  --------------  SCRIPT ANSWERS  undefined

## 2022-02-08 ENCOUNTER — OFFICE VISIT (OUTPATIENT)
Dept: PRIMARY CARE CLINIC | Age: 62
End: 2022-02-08
Payer: COMMERCIAL

## 2022-02-08 ENCOUNTER — NURSE TRIAGE (OUTPATIENT)
Dept: OTHER | Facility: CLINIC | Age: 62
End: 2022-02-08

## 2022-02-08 VITALS
WEIGHT: 119.4 LBS | HEART RATE: 64 BPM | SYSTOLIC BLOOD PRESSURE: 104 MMHG | HEIGHT: 64 IN | DIASTOLIC BLOOD PRESSURE: 68 MMHG | OXYGEN SATURATION: 100 % | BODY MASS INDEX: 20.38 KG/M2

## 2022-02-08 DIAGNOSIS — R06.02 SOB (SHORTNESS OF BREATH): ICD-10-CM

## 2022-02-08 DIAGNOSIS — R14.0 BLOATING: Primary | ICD-10-CM

## 2022-02-08 DIAGNOSIS — R10.2 SUPRAPUBIC ABDOMINAL PAIN: ICD-10-CM

## 2022-02-08 DIAGNOSIS — R14.2 BELCHING: ICD-10-CM

## 2022-02-08 LAB
BILIRUBIN, POC: NORMAL
BLOOD URINE, POC: NORMAL
CLARITY, POC: CLEAR
COLOR, POC: NORMAL
GLUCOSE URINE, POC: NORMAL
KETONES, POC: NORMAL
LEUKOCYTE EST, POC: NORMAL
NITRITE, POC: NORMAL
PH, POC: 8.5
PROTEIN, POC: NORMAL
SPECIFIC GRAVITY, POC: 1.01
UROBILINOGEN, POC: NORMAL

## 2022-02-08 PROCEDURE — 81003 URINALYSIS AUTO W/O SCOPE: CPT | Performed by: FAMILY MEDICINE

## 2022-02-08 PROCEDURE — G8420 CALC BMI NORM PARAMETERS: HCPCS | Performed by: FAMILY MEDICINE

## 2022-02-08 PROCEDURE — 4004F PT TOBACCO SCREEN RCVD TLK: CPT | Performed by: FAMILY MEDICINE

## 2022-02-08 PROCEDURE — G8482 FLU IMMUNIZE ORDER/ADMIN: HCPCS | Performed by: FAMILY MEDICINE

## 2022-02-08 PROCEDURE — 3017F COLORECTAL CA SCREEN DOC REV: CPT | Performed by: FAMILY MEDICINE

## 2022-02-08 PROCEDURE — G8427 DOCREV CUR MEDS BY ELIG CLIN: HCPCS | Performed by: FAMILY MEDICINE

## 2022-02-08 PROCEDURE — 99214 OFFICE O/P EST MOD 30 MIN: CPT | Performed by: FAMILY MEDICINE

## 2022-02-08 RX ORDER — MIRTAZAPINE 30 MG/1
TABLET, FILM COATED ORAL
COMMUNITY
Start: 2022-01-18

## 2022-02-08 ASSESSMENT — ENCOUNTER SYMPTOMS
SHORTNESS OF BREATH: 1
ABDOMINAL DISTENTION: 1

## 2022-02-08 NOTE — TELEPHONE ENCOUNTER
Received call from [facility] at [facility] with [employer]. Subjective: Caller states \"swelling to eyes feet,diarrhea\"     Current Symptoms: COVID in January, diarrhea since [place], Friday passing brown water, pt reports increased weight of 8lbs over night on Friday night, edema noted to eyes and nose, edema to legs and feet, BIPAP at night, SOB more than normal, BP elevated this am 140/80, denies chest pain, pt reports being on 2 lpmnc of home O2, bloated, pt reports that [facility] nurse came today and recommended that she be on diurectic, pt has not problem handling secretions    Onset: 4 days ago; unchanged    Associated Symptoms: reduced appetite    Pain Severity: 0/10; N/A; none    Temperature: denies      What has been tried: immodium-has helped, pushing fluid-no change    LMP: NA Pregnant: NA    Recommended disposition: go to office now, clinic/[facility] if unable to schedule    Care advice provided, patient verbalizes understanding; denies any other questions or concerns; instructed to call back for any new or worsening symptoms. Writer provided warm transfer to [facility] at [facility] for appointment scheduling     Attention Provider: Thank you for allowing me to participate in the care of your patient. The patient was connected to triage in response to information provided to the ECC/PSC. Please do not respond through this encounter as the response is not directed to a shared pool.           Reason for Disposition   Swelling of face, arm or hands (Exception: slight puffiness of fingers during hot weather)    Protocols used: LEG SWELLING AND EDEMA-ADULT-OH

## 2022-02-08 NOTE — PROGRESS NOTES
Angélica Hernandez is a 64 y.o. femalewho presents today for her medical conditions/complaints as noted below. Chief Complaint   Patient presents with    Edema     Pt here today for face and bilateral leg and feet edema x2-3 days         HPI:     HPI  Had covid in January and started having diarrhea since then. Tried imodium AD and then the diarrhea changed to constipation this weekend. BM soft stool today. Bloated abdomen x 1 month. Friday night: got more swollen and got red blood blisters and 5 pm she had a lot of watery brown stools x 4 hrs. Diarrhea has been gone since Friday. Not eating well. Drinking fluids. gatorade and water and hydration packets. She thinks she has more facial swelling in the last few days. Uses Bipap    Hx of stomach ulcer    At home yesterday she was 117# and last week she weighed 112#     Current Outpatient Medications   Medication Sig Dispense Refill    mirtazapine (REMERON) 30 MG tablet       cyclobenzaprine (FLEXERIL) 10 MG tablet Take 1 tablet by mouth 2 times daily as needed for Muscle spasms 60 tablet 0    busPIRone (BUSPAR) 10 MG tablet Take 10 mg by mouth 2 times daily      montelukast (SINGULAIR) 10 MG tablet Take 10 mg by mouth daily      primidone (MYSOLINE) 50 MG tablet Take 50 mg by mouth 2 times daily      venlafaxine (EFFEXOR XR) 150 MG extended release capsule Take 150 mg by mouth daily      clonazePAM (KLONOPIN) 0.5 MG tablet Take 1 tablet by mouth 3 times daily as needed for Anxiety for up to 30 days.  90 tablet 2    levothyroxine (SYNTHROID) 88 MCG tablet Take 1 tablet by mouth Daily 90 tablet 1    Melatonin 10 MG TABS Take 10 mg by mouth nightly 90 tablet 3    omeprazole (PRILOSEC) 40 MG delayed release capsule Take 40 mg by mouth 2 times daily      vitamin C (ASCORBIC ACID) 500 MG tablet Take 1 tablet by mouth daily 30 tablet 3    Multiple Vitamins-Minerals (THERAPEUTIC MULTIVITAMIN-MINERALS) tablet Take 1 tablet by mouth daily 30 tablet normal.      Comments: Mild edema under the eyes    Neck:      Thyroid: No thyromegaly. Trachea: No tracheal deviation. Cardiovascular:      Rate and Rhythm: Normal rate and regular rhythm. Heart sounds: Normal heart sounds. Pulmonary:      Effort: Pulmonary effort is normal. No respiratory distress. Breath sounds: No wheezing. Comments: Distant BS    Abdominal:      General: There is no distension. Palpations: There is no mass. Tenderness: There is abdominal tenderness in the right upper quadrant, epigastric area and suprapubic area. There is no guarding or rebound. Musculoskeletal:      Right lower leg: No edema. Left lower leg: No edema. Comments: Ankles : OA changes, no pitting edema  Toes: cyanotic and slightly edematous   Lymphadenopathy:      Cervical: No cervical adenopathy. Skin:     General: Skin is warm. Findings: No rash. Neurological:      Mental Status: She is alert and oriented to person, place, and time. Psychiatric:         Mood and Affect: Mood normal.         Behavior: Behavior normal.         Thought Content: Thought content normal.         Assessment:       Diagnosis Orders   1. Bloating  Basic Metabolic Panel    CBC    XR ABDOMEN (KUB) (SINGLE AP VIEW)    XR CHEST (2 VW)    POCT Urinalysis No Micro (Auto)    US ABDOMEN LIMITED   2. Belching  XR ABDOMEN (KUB) (SINGLE AP VIEW)    US ABDOMEN LIMITED   3. SOB (shortness of breath)  Basic Metabolic Panel    CBC    XR CHEST (2 VW)   4. Suprapubic abdominal pain  POCT Urinalysis No Micro (Auto)        Plan:      Return in about 4 weeks (around 3/8/2022). Orders Placed This Encounter   Procedures    XR ABDOMEN (KUB) (SINGLE AP VIEW)     Standing Status:   Future     Standing Expiration Date:   2/8/2023    XR CHEST (2 VW)     Standing Status:   Future     Standing Expiration Date:   2/8/2023    US ABDOMEN LIMITED     This procedure can be scheduled via [employer].   Access your [employer] account by visiting [email or web address]. Standing Status:   Future     Standing Expiration Date:   2/8/2023     Order Specific Question:   Specify organ? Answer:   LIVER     Order Specific Question:   Specify organ? Answer:   GALLBLADDER     Order Specific Question:   Specify organ? Answer:   PANCREAS    Basic Metabolic Panel     Standing Status:   Future     Standing Expiration Date:   2/8/2023    CBC     Standing Status:   Future     Standing Expiration Date:   2/8/2023    POCT Urinalysis No Micro (Auto)     No orders of the defined types were placed in this encounter. Reviewed medications and possibleside effects.        Electronically signed by Lexus Mccollum MD on 2/8/2022 at 5:57 PM

## 2022-02-08 NOTE — PATIENT INSTRUCTIONS
Patient Education        Gas and Bloating: Care Instructions  Your Care Instructions     Gas and bloating can be uncomfortable and embarrassing problems. All people pass gas, but some people produce more gas than others, sometimes enough to cause distress. It is normal to pass gas from 6 to 20 times per day. Excess gas usually is not caused by a serious health problem. Gas and bloating usually are caused by something you eat or drink, including some food supplements and medicines. Gas and bloating are usually harmless and go away without treatment. However, changing your diet can help end the problem. Some over-the-counter medicines can help prevent gas and relieve bloating. Follow-up care is a key part of your treatment and safety. Be sure to make and go to all appointments, and call your doctor if you are having problems. It's also a good idea to know your test results and keep a list of the medicines you take. How can you care for yourself at home? · Keep a food diary if you think a food gives you gas. Write down what you eat or drink. Also record when you get gas. If you notice that a food seems to cause your gas each time, avoid it and see if the gas goes away. Examples of foods that cause gas include:  ? Fried and fatty foods. ? Beans. ? Vegetables such as artichokes, asparagus, broccoli, brussels sprouts, cabbage, cauliflower, cucumbers, green peppers, onions, peas, radishes, and raw potatoes. ? Fruits such as apricots, bananas, melons, peaches, pears, prunes, and raw apples. ? Wheat and wheat bran. · Soak dry beans in water overnight, then dump the water and cook the soaked beans in new water. This can help prevent gas and bloating. · If you have problems with lactose, avoid dairy products such as milk and cheese. · Try not to swallow air. Do not drink through a straw, gulp your food, or chew gum. · Take an over-the-counter medicine. Read and follow all instructions on the label. ?  Food enzymes, such as Beano, can be added to gas-producing foods to prevent gas. ? Antacids, such as Maalox Anti-Gas and Mylanta Gas, can relieve bloating by making you burp. Be careful when you take over-the-counter antacid medicines. Many of these medicines have aspirin in them. Read the label to make sure that you are not taking more than the recommended dose. Too much aspirin can be harmful. ? Activated charcoal tablets, such as CharcoCaps, may decrease odor from gas you pass. ? If you have problems with lactose, you can take medicines such as Dairy Ease and Lactaid with dairy products to prevent gas and bloating. · Get some exercise regularly. When should you call for help? Call 911 anytime you think you may need emergency care. For example, call if:    · You have gas and signs of a heart attack, such as:  ? Chest pain or pressure. ? Sweating. ? Shortness of breath. ? Nausea or vomiting. ? Pain that spreads from the chest to the neck, jaw, or one or both shoulders or arms. ? Dizziness or lightheadedness. ? A fast or uneven pulse. After calling 911, chew 1 adult-strength aspirin. Wait for an ambulance. Do not try to drive yourself. Call your doctor now or seek immediate medical care if:    · You have severe belly pain.     · You have blood in your stool. Watch closely for changes in your health, and be sure to contact your doctor if:    · You have blood or pus in your urine.     · Your urine is cloudy or smells bad.     · You are burping and have trouble swallowing.     · You feel bloated and have swelling in your belly.     · You do not get better as expected. Where can you learn more? Go to [email or web address]://[email or web address] org and sign in to your [employer] account. Enter K418 in the [employer] box to learn more about \"Gas and Bloating: Care Instructions. \"     If you do not have an account, please click on the \"Sign Up Now\" link.   Current as of: July 1, 2021               Content Version: 13.1  © 2563-5316 [employer]. Care instructions adapted under license by [facility]. If you have questions about a medical condition or this instruction, always ask your healthcare professional. [address] any warranty or liability for your use of this information.

## 2022-02-09 DIAGNOSIS — F33.2 MDD (MAJOR DEPRESSIVE DISORDER), RECURRENT SEVERE, WITHOUT PSYCHOSIS (HCC): ICD-10-CM

## 2022-02-10 RX ORDER — CLONAZEPAM 0.5 MG/1
0.5 TABLET ORAL 3 TIMES DAILY PRN
Qty: 90 TABLET | Refills: 2 | Status: SHIPPED | OUTPATIENT
Start: 2022-02-10 | End: 2022-02-28 | Stop reason: SDUPTHER

## 2022-02-11 ENCOUNTER — HOSPITAL ENCOUNTER (OUTPATIENT)
Age: 62
Discharge: HOME | End: 2022-02-11
Payer: COMMERCIAL

## 2022-02-11 ENCOUNTER — HOSPITAL ENCOUNTER (OUTPATIENT)
Dept: ULTRASOUND IMAGING | Age: 62
Discharge: HOME | End: 2022-02-13
Payer: COMMERCIAL

## 2022-02-11 ENCOUNTER — HOSPITAL ENCOUNTER (OUTPATIENT)
Age: 62
Discharge: HOME | End: 2022-02-13
Payer: COMMERCIAL

## 2022-02-11 ENCOUNTER — HOSPITAL ENCOUNTER (OUTPATIENT)
Dept: GENERAL RADIOLOGY | Age: 62
Discharge: HOME | End: 2022-02-13
Payer: COMMERCIAL

## 2022-02-11 DIAGNOSIS — R14.2 BELCHING: ICD-10-CM

## 2022-02-11 DIAGNOSIS — R14.0 BLOATING: ICD-10-CM

## 2022-02-11 DIAGNOSIS — R06.02 SOB (SHORTNESS OF BREATH): ICD-10-CM

## 2022-02-11 LAB
ANION GAP SERPL CALCULATED.3IONS-SCNC: 8 MMOL/L (ref 9–17)
BUN BLDV-MCNC: 6 MG/DL (ref 8–23)
BUN/CREAT BLD: ABNORMAL (ref 9–20)
CALCIUM SERPL-MCNC: 8.3 MG/DL (ref 8.6–10.4)
CHLORIDE BLD-SCNC: 97 MMOL/L (ref 98–107)
CO2: 30 MMOL/L (ref 20–31)
CREAT SERPL-MCNC: 0.82 MG/DL (ref 0.5–0.9)
GFR AFRICAN AMERICAN: >60 ML/MIN
GFR NON-AFRICAN AMERICAN: >60 ML/MIN
GFR SERPL CREATININE-BSD FRML MDRD: ABNORMAL ML/MIN/{1.73_M2}
GFR SERPL CREATININE-BSD FRML MDRD: ABNORMAL ML/MIN/{1.73_M2}
GLUCOSE BLD-MCNC: 82 MG/DL (ref 70–99)
HCT VFR BLD CALC: 35 % (ref 36–46)
HEMOGLOBIN: 11.8 G/DL (ref 12–16)
MCH RBC QN AUTO: 32.3 PG (ref 26–34)
MCHC RBC AUTO-ENTMCNC: 33.6 G/DL (ref 31–37)
MCV RBC AUTO: 96.2 FL (ref 80–100)
NRBC AUTOMATED: ABNORMAL PER 100 WBC
PDW BLD-RTO: 15.8 % (ref 11.5–14.9)
PLATELET # BLD: 364 K/UL (ref 150–450)
PMV BLD AUTO: 6.5 FL (ref 6–12)
POTASSIUM SERPL-SCNC: 4.7 MMOL/L (ref 3.7–5.3)
RBC # BLD: 3.64 M/UL (ref 4–5.2)
SODIUM BLD-SCNC: 135 MMOL/L (ref 135–144)
WBC # BLD: 4.6 K/UL (ref 3.5–11)

## 2022-02-11 PROCEDURE — 80048 BASIC METABOLIC PNL TOTAL CA: CPT

## 2022-02-11 PROCEDURE — 85027 COMPLETE CBC AUTOMATED: CPT

## 2022-02-11 PROCEDURE — 71046 X-RAY EXAM CHEST 2 VIEWS: CPT

## 2022-02-11 PROCEDURE — 74018 RADEX ABDOMEN 1 VIEW: CPT

## 2022-02-11 PROCEDURE — 76705 ECHO EXAM OF ABDOMEN: CPT

## 2022-02-11 PROCEDURE — 36415 COLL VENOUS BLD VENIPUNCTURE: CPT

## 2022-02-14 DIAGNOSIS — R10.11 RUQ PAIN: ICD-10-CM

## 2022-02-14 DIAGNOSIS — K82.9 GALLBLADDER DISEASE: Primary | ICD-10-CM

## 2022-02-21 ENCOUNTER — TELEPHONE (OUTPATIENT)
Dept: PRIMARY CARE CLINIC | Age: 62
End: 2022-02-21

## 2022-02-21 DIAGNOSIS — J06.9 UPPER RESPIRATORY TRACT INFECTION, UNSPECIFIED TYPE: Primary | ICD-10-CM

## 2022-02-21 RX ORDER — AZITHROMYCIN 250 MG/1
250 TABLET, FILM COATED ORAL SEE ADMIN INSTRUCTIONS
Qty: 6 TABLET | Refills: 0 | Status: SHIPPED | OUTPATIENT
Start: 2022-02-21 | End: 2022-02-26

## 2022-02-21 RX ORDER — PREDNISONE 20 MG/1
20 TABLET ORAL 2 TIMES DAILY
Qty: 10 TABLET | Refills: 0 | Status: SHIPPED | OUTPATIENT
Start: 2022-02-21 | End: 2022-02-26

## 2022-02-21 RX ORDER — PHENOL 1.4 %
10 AEROSOL, SPRAY (ML) MUCOUS MEMBRANE NIGHTLY
Qty: 90 TABLET | Refills: 3 | Status: SHIPPED | OUTPATIENT
Start: 2022-02-21 | End: 2022-03-08 | Stop reason: SDUPTHER

## 2022-02-21 RX ORDER — FLUTICASONE PROPIONATE 110 UG/1
1 AEROSOL, METERED RESPIRATORY (INHALATION) 2 TIMES DAILY
Qty: 12 G | Refills: 2 | Status: SHIPPED | OUTPATIENT
Start: 2022-02-21 | End: 2022-08-26 | Stop reason: SDUPTHER

## 2022-02-21 NOTE — TELEPHONE ENCOUNTER
Prednisone and zpak sent in   [facility] can effect accuracy of test, should refrain from taking it

## 2022-02-21 NOTE — TELEPHONE ENCOUNTER
Asking for prednisone and zpak to be sent in. Has a lot of nasal drainage making her throat sore. Home nurse said she can hear the congestion in her lungs. Pt states a lot of times it turns into bronchitis with her COPD and she wants to get ahead of it. Pharmacy [address]. Having a HIDA scan tomorrow and is not supposed to take any Benzo's she is nervous and was told to ask you if she can take the Clonazepam. Please advise.

## 2022-02-25 ENCOUNTER — TELEPHONE (OUTPATIENT)
Dept: PRIMARY CARE CLINIC | Age: 62
End: 2022-02-25

## 2022-02-25 DIAGNOSIS — K58.1 IRRITABLE BOWEL SYNDROME WITH CONSTIPATION: Primary | ICD-10-CM

## 2022-02-25 DIAGNOSIS — R19.7 DIARRHEA OF PRESUMED INFECTIOUS ORIGIN: ICD-10-CM

## 2022-02-25 NOTE — TELEPHONE ENCOUNTER
Laura with [facility] calling to see if you would prescribe something for her diarrhea. She did go to give a sample at the lab but forgot her license and they wouldn't take the sample without it. She is living on imodium right now. Please advise.      Call patient back    [facility]

## 2022-02-28 DIAGNOSIS — F33.2 MDD (MAJOR DEPRESSIVE DISORDER), RECURRENT SEVERE, WITHOUT PSYCHOSIS (HCC): ICD-10-CM

## 2022-02-28 RX ORDER — DIPHENOXYLATE HYDROCHLORIDE AND ATROPINE SULFATE 2.5; .025 MG/1; MG/1
1 TABLET ORAL 4 TIMES DAILY PRN
Qty: 40 TABLET | Refills: 2 | Status: SHIPPED | OUTPATIENT
Start: 2022-02-28 | End: 2022-05-23

## 2022-02-28 RX ORDER — CLONAZEPAM 0.5 MG/1
0.5 TABLET ORAL 3 TIMES DAILY PRN
Qty: 90 TABLET | Refills: 2 | Status: SHIPPED | OUTPATIENT
Start: 2022-02-28 | End: 2022-06-14 | Stop reason: SDUPTHER

## 2022-03-02 ENCOUNTER — TELEPHONE (OUTPATIENT)
Dept: PRIMARY CARE CLINIC | Age: 62
End: 2022-03-02

## 2022-03-02 NOTE — TELEPHONE ENCOUNTER
Path labs calling states they have received the same stool study order 3 times today. She said they have an order but the problem is stool samples keep being brought in non sterile containers. Pt was already given a sterile container. Just asked I document so no more orders get sent.

## 2022-03-03 ENCOUNTER — HOSPITAL ENCOUNTER (OUTPATIENT)
Age: 62
Setting detail: SPECIMEN
Discharge: HOME | End: 2022-03-03

## 2022-03-03 DIAGNOSIS — R19.7 DIARRHEA OF PRESUMED INFECTIOUS ORIGIN: ICD-10-CM

## 2022-03-04 LAB
C DIFF AG + TOXIN: NEGATIVE
SPECIMEN DESCRIPTION: NORMAL

## 2022-03-08 ENCOUNTER — HOSPITAL ENCOUNTER (OUTPATIENT)
Dept: NUCLEAR MEDICINE | Age: 62
Discharge: HOME | End: 2022-03-10
Payer: COMMERCIAL

## 2022-03-08 ENCOUNTER — OFFICE VISIT (OUTPATIENT)
Dept: PRIMARY CARE CLINIC | Age: 62
End: 2022-03-08
Payer: COMMERCIAL

## 2022-03-08 VITALS
SYSTOLIC BLOOD PRESSURE: 90 MMHG | HEART RATE: 71 BPM | WEIGHT: 110.3 LBS | OXYGEN SATURATION: 97 % | DIASTOLIC BLOOD PRESSURE: 62 MMHG | BODY MASS INDEX: 18.96 KG/M2

## 2022-03-08 DIAGNOSIS — R10.13 EPIGASTRIC PAIN: ICD-10-CM

## 2022-03-08 DIAGNOSIS — R10.11 RUQ PAIN: ICD-10-CM

## 2022-03-08 DIAGNOSIS — I95.9 HYPOTENSION, UNSPECIFIED HYPOTENSION TYPE: ICD-10-CM

## 2022-03-08 DIAGNOSIS — R42 DIZZINESS: Primary | ICD-10-CM

## 2022-03-08 DIAGNOSIS — G47.00 INSOMNIA, UNSPECIFIED TYPE: ICD-10-CM

## 2022-03-08 DIAGNOSIS — R19.7 DIARRHEA OF PRESUMED INFECTIOUS ORIGIN: ICD-10-CM

## 2022-03-08 DIAGNOSIS — K82.9 GALLBLADDER DISEASE: ICD-10-CM

## 2022-03-08 PROCEDURE — 3017F COLORECTAL CA SCREEN DOC REV: CPT | Performed by: NURSE PRACTITIONER

## 2022-03-08 PROCEDURE — G8420 CALC BMI NORM PARAMETERS: HCPCS | Performed by: NURSE PRACTITIONER

## 2022-03-08 PROCEDURE — G8427 DOCREV CUR MEDS BY ELIG CLIN: HCPCS | Performed by: NURSE PRACTITIONER

## 2022-03-08 PROCEDURE — 4004F PT TOBACCO SCREEN RCVD TLK: CPT | Performed by: NURSE PRACTITIONER

## 2022-03-08 PROCEDURE — 78227 HEPATOBIL SYST IMAGE W/DRUG: CPT

## 2022-03-08 PROCEDURE — 99214 OFFICE O/P EST MOD 30 MIN: CPT | Performed by: NURSE PRACTITIONER

## 2022-03-08 PROCEDURE — G8482 FLU IMMUNIZE ORDER/ADMIN: HCPCS | Performed by: NURSE PRACTITIONER

## 2022-03-08 PROCEDURE — 3430000000 HC RX DIAGNOSTIC RADIOPHARMACEUTICAL: Performed by: FAMILY MEDICINE

## 2022-03-08 PROCEDURE — 2580000003 HC RX 258: Performed by: FAMILY MEDICINE

## 2022-03-08 PROCEDURE — A9537 TC99M MEBROFENIN: HCPCS | Performed by: FAMILY MEDICINE

## 2022-03-08 RX ORDER — PHENOL 1.4 %
10 AEROSOL, SPRAY (ML) MUCOUS MEMBRANE NIGHTLY
Qty: 90 TABLET | Refills: 3 | Status: SHIPPED | OUTPATIENT
Start: 2022-03-08

## 2022-03-08 RX ORDER — SODIUM CHLORIDE 0.9 % (FLUSH) 0.9 %
10 SYRINGE (ML) INJECTION PRN
Status: DISCONTINUED | OUTPATIENT
Start: 2022-03-08 | End: 2022-03-11 | Stop reason: HOSPADM

## 2022-03-08 RX ORDER — MECLIZINE HYDROCHLORIDE 25 MG/1
25 TABLET ORAL 3 TIMES DAILY PRN
Qty: 270 TABLET | Refills: 0 | Status: SHIPPED | OUTPATIENT
Start: 2022-03-08 | End: 2022-06-06

## 2022-03-08 RX ORDER — ADHESIVE BANDAGE 3/4"
1 BANDAGE TOPICAL DAILY
Qty: 1 EACH | Refills: 0 | Status: SHIPPED | OUTPATIENT
Start: 2022-03-08

## 2022-03-08 RX ORDER — SUCRALFATE ORAL 1 G/10ML
1 SUSPENSION ORAL
Qty: 3600 ML | Refills: 0 | Status: SHIPPED | OUTPATIENT
Start: 2022-03-08 | End: 2022-06-23

## 2022-03-08 RX ADMIN — Medication 5.45 MILLICURIE: at 08:15

## 2022-03-08 RX ADMIN — SODIUM CHLORIDE, PRESERVATIVE FREE 10 ML: 5 INJECTION INTRAVENOUS at 08:15

## 2022-03-08 ASSESSMENT — ENCOUNTER SYMPTOMS
COUGH: 1
SINUS PRESSURE: 0
SHORTNESS OF BREATH: 1
DIARRHEA: 1
BACK PAIN: 1
SINUS PAIN: 0
NAUSEA: 1
CONSTIPATION: 0

## 2022-03-08 NOTE — PROGRESS NOTES
[address]  PRIMARY CARE  [place] [address]  [address] [address]  Dept: [phone]    Angel Carrington is a 64 y.o. female Established patient, who presents today for her medical conditions/complaints as noted below. Chief Complaint   Patient presents with    Dizziness     balance problems    Diarrhea     since January - gets bloated and has diarrhea everytime she eats.  Other     itching - patient gets an itch and scratches until she leaves bruises under skin. HPI:     HPI  HIDA scan normal.  C.diff check was normal.  She is OK when she is taking lomotil but as soon as she stops taking it the diarrhea returns. She takes lomotil 2x/day. When she eats her stomach immediately blows up and becomes distended and then in a couple hours she has diarrhea. She is constantly burping. Seems to have started with Covid    Dizziness  Comes and goes but has been worse in the last couple weeks. Walking to kitchen will cause heart to beat fast.  When she wakes up she feels drunk. Sometimes she gets disoriented. That comes and goes. She is on 2L of oxygen all the time and runs SpO2 in high 90's. Itching has been happening about 6 months. When she itches she leaves bruises. She has lost significant weight. She is about 10-15 lbs lower than normal.    She is taking Klonopin about 3x/day now  - she is stressed about being sick. Swelling always in face but comes and goes in feet. She sometimes drinks two drinks twice per week.       Reviewed prior notes Previous PCP and gastroenterology   Reviewed previous Labs and Imaging    LDL Cholesterol (mg/dL)   Date Value   07/13/2018 79       (goal LDL is <100)   AST (U/L)   Date Value   08/30/2020 21     ALT (U/L)   Date Value   08/30/2020 12     BUN (mg/dL)   Date Value   02/11/2022 6 (L)     TSH (mIU/L)   Date Value   07/13/2018 0.60     BP Readings from Last 3 Encounters:   03/08/22 102/76   02/08/22 104/68   11/15/21 128/80          (goal 120/80)    Past Medical History:   Diagnosis Date    Acid reflux     Anxiety     Arthritis     Cancer ([facility] [address]     cervical    Cervical osteoarthritis 11/30/2020    COPD (chronic obstructive pulmonary disease) ([facility] [address] 02/23/2018    DDD (degenerative disc disease), cervical     Depression     Hiatal hernia     Spinal stenosis     Tachycardia     Thyroid disease     hypo      Past Surgical History:   Procedure Laterality Date    BACK SURGERY      diskectomy, L3-L5    COLONOSCOPY N/A 4/8/2019    COLONOSCOPY DIAGNOSTIC performed by Otoniel De La Garza MD at [address] [place] [address]  11/13/2019    COLONOSCOPY POLYPECTOMY SNARE/COLD BIOPSY performed by Otoniel De La Garza MD at [address] [place] [address]      ectopic pregnancy with tube removal    THYROIDECTOMY, PARTIAL      UPPER GASTROINTESTINAL ENDOSCOPY      UPPER GASTROINTESTINAL ENDOSCOPY N/A 11/27/2020    EGD BIOPSY with dilatation performed by Indira Maloney MD at [facility]       No family history on file.     Social History     Tobacco Use    Smoking status: Current Every Day Smoker     Packs/day: 0.50     Years: 48.00     Pack years: 24.00     Types: Cigarettes    Smokeless tobacco: Never Used    Tobacco comment: previous 3 ppd smoker   Substance Use Topics    Alcohol use: Yes     Comment: occassional      Current Outpatient Medications   Medication Sig Dispense Refill    Blood Pressure Monitoring (BLOOD PRESSURE CUFF) MISC 1 Units by Does not apply route daily 1 each 0    meclizine (ANTIVERT) 25 MG tablet Take 1 tablet by mouth 3 times daily as needed for Dizziness or Nausea 270 tablet 0    sucralfate (CARAFATE) 1 GM/10ML suspension Take 10 mLs by mouth 4 times daily (before meals and nightly) 3600 mL 0    Melatonin 10 MG TABS Take 10 mg by mouth nightly 90 tablet 3    diphenoxylate-atropine (DIPHENATOL) 2.5-0.025 MG per tablet Take 1 tablet by mouth 4 times daily as needed for Diarrhea for up to 10 days. 40 tablet 2    clonazePAM (KLONOPIN) 0.5 MG tablet Take 1 tablet by mouth 3 times daily as needed for Anxiety for up to 30 days. 90 tablet 2    fluticasone (FLOVENT HFA) 110 MCG/ACT inhaler Inhale 1 puff into the lungs 2 times daily 12 g 2    mirtazapine (REMERON) 30 MG tablet       cyclobenzaprine (FLEXERIL) 10 MG tablet Take 1 tablet by mouth 2 times daily as needed for Muscle spasms 60 tablet 0    busPIRone (BUSPAR) 10 MG tablet Take 10 mg by mouth 2 times daily      montelukast (SINGULAIR) 10 MG tablet Take 10 mg by mouth daily      nicotine (NICODERM CQ) 14 MG/24HR       primidone (MYSOLINE) 50 MG tablet Take 50 mg by mouth 2 times daily      venlafaxine (EFFEXOR XR) 150 MG extended release capsule Take 150 mg by mouth daily      levothyroxine (SYNTHROID) 88 MCG tablet Take 1 tablet by mouth Daily 90 tablet 1    amLODIPine (NORVASC) 2.5 MG tablet Take 1 tablet by mouth daily 90 tablet 3    omeprazole (PRILOSEC) 40 MG delayed release capsule Take 40 mg by mouth 2 times daily      vitamin C (ASCORBIC ACID) 500 MG tablet Take 1 tablet by mouth daily 30 tablet 3    Multiple Vitamins-Minerals (THERAPEUTIC MULTIVITAMIN-MINERALS) tablet Take 1 tablet by mouth daily 30 tablet 0    calcium carbonate-vitamin D (CALTRATE) 600-400 MG-UNIT TABS per tab Take 1 tablet by mouth daily 30 tablet 0    Tiotropium Bromide-Olodaterol (STIOLTO RESPIMAT) 2.5-2.5 MCG/ACT AERS Inhale 2 puffs into the lungs      carvedilol (COREG) 3.125 MG tablet Take 3.125 mg by mouth 2 times daily (with meals)      albuterol sulfate  (90 Base) MCG/ACT inhaler Inhale 2 puffs into the lungs every 6 hours as needed for Wheezing      albuterol (ACCUNEB) 0.63 MG/3ML nebulizer solution Take 1 ampule by nebulization every 6 hours as needed for Wheezing       No current facility-administered medications for this visit.      Facility-Administered Medications Ordered in Other Visits   Medication Dose Route Frequency Provider Last Rate Last Admin    sodium chloride flush 0.9 % injection 10 mL  10 mL IntraVENous PRN Tiffany Bell MD   10 mL at 03/08/22 0815     No Known Allergies    Health Maintenance   Topic Date Due    Hepatitis C screen  Never done    COVID-19 Vaccine (1) Never done    HIV screen  Never done    DTaP/Tdap/Td vaccine (1 - Tdap) Never done    Cervical cancer screen  Never done    Shingles Vaccine (1 of 2) Never done    Annual Wellness Visit (AWV)  03/08/2022    Colorectal Cancer Screen  11/13/2022    Depression Monitoring  11/15/2022    TSH testing  12/20/2022    Low dose CT lung screening  12/28/2022    Potassium monitoring  02/11/2023    Creatinine monitoring  02/11/2023    Breast cancer screen  06/04/2023    Lipid screen  07/13/2023    Pneumococcal 0-64 years Vaccine (2 of 2 - PPSV23) 12/25/2025    Flu vaccine  Completed    Hepatitis A vaccine  Aged Out    Hepatitis B vaccine  Aged Out    Hib vaccine  Aged Out    Meningococcal (ACWY) vaccine  Aged Out       Subjective:      Review of Systems   Constitutional: Positive for activity change and fatigue. Negative for chills and fever. HENT: Negative for congestion, sinus pressure and sinus pain. Respiratory: Positive for cough and shortness of breath. Cardiovascular: Negative for chest pain and leg swelling. Gastrointestinal: Positive for diarrhea and nausea. Negative for constipation. Genitourinary: Negative for difficulty urinating and dysuria. Musculoskeletal: Positive for arthralgias and back pain. Skin: Negative for rash and wound. Neurological: Positive for dizziness and light-headedness. Hematological: Bruises/bleeds easily. Psychiatric/Behavioral: Positive for sleep disturbance. The patient is nervous/anxious. Objective:     /76   Pulse 70   Wt 110 lb 4.8 oz (50 kg)   SpO2 96%   BMI 18.96 kg/m²   Physical Exam  Vitals and nursing note reviewed.    Constitutional: Appearance: She is ill-appearing. HENT:      Head: Normocephalic and atraumatic. Right Ear: External ear normal.      Left Ear: External ear normal.   Eyes:      Extraocular Movements: Extraocular movements intact. Pupils: Pupils are equal, round, and reactive to light. Cardiovascular:      Rate and Rhythm: Normal rate and regular rhythm. Pulmonary:      Breath sounds: Decreased breath sounds present. Comments: Oxygen dependent  Abdominal:      General: Bowel sounds are increased. Tenderness: There is abdominal tenderness in the epigastric area. Skin:     General: Skin is warm. Findings: Bruising present. Neurological:      Mental Status: She is alert and oriented to person, place, and time. Motor: Weakness present. Gait: Gait abnormal.   Psychiatric:         Mood and Affect: Mood normal.         Behavior: Behavior normal.         Assessment/Plan:   1. Dizziness  -     Blood Pressure Monitoring (BLOOD PRESSURE CUFF) MISC; DAILY Starting Tue 3/8/2022, Disp-1 each, R-0, Normal  -     meclizine (ANTIVERT) 25 MG tablet; Take 1 tablet by mouth 3 times daily as needed for Dizziness or Nausea, Disp-270 tablet, R-0Normal  2. Hypotension, unspecified hypotension type  -     Blood Pressure Monitoring (BLOOD PRESSURE CUFF) MISC; DAILY Starting Tue 3/8/2022, Disp-1 each, R-0, Normal  3. Diarrhea of presumed infectious origin  -     [facility]  -     sucralfate (CARAFATE) 1 GM/10ML suspension; Take 10 mLs by mouth 4 times daily (before meals and nightly), Disp-3600 mL, R-0Normal  4. Epigastric pain  -     [facility]  -     sucralfate (CARAFATE) 1 GM/10ML suspension; Take 10 mLs by mouth 4 times daily (before meals and nightly), Disp-3600 mL, R-0Normal  5.  Insomnia, unspecified type  -     Melatonin 10 MG TABS; Take 10 mg by mouth nightly, Disp-90 tablet, R-3Normal     Try benadryl for itching  Melatonin for insomnia  Sucralfate before meals and at bed time  Referral to Dr. Betty MCKEON for dizziness  Check blood pressure when dizzy and record. Follow up with cardiologist.      Return in about 4 weeks (around 2022) for diarrhea, dizzness . Orders Placed This Encounter   Procedures   Naveen Rider MD, General Surgery, [place]     Referral Priority:   Routine     Referral Type:   Eval and Treat     Referral Reason:   Specialty Services Required     Referred to Provider:   Natalie Andres MD     Requested Specialty:   General Surgery     Number of Visits Requested:   1     Orders Placed This Encounter   Medications    Blood Pressure Monitoring (BLOOD PRESSURE CUFF) MISC     Si Units by Does not apply route daily     Dispense:  1 each     Refill:  0    meclizine (ANTIVERT) 25 MG tablet     Sig: Take 1 tablet by mouth 3 times daily as needed for Dizziness or Nausea     Dispense:  270 tablet     Refill:  0    sucralfate (CARAFATE) 1 GM/10ML suspension     Sig: Take 10 mLs by mouth 4 times daily (before meals and nightly)     Dispense:  3600 mL     Refill:  0    Melatonin 10 MG TABS     Sig: Take 10 mg by mouth nightly     Dispense:  90 tablet     Refill:  3       Patient given educational materials - see patient instructions. Discussed use, benefit, and side effects of prescribed medications. All patient questions answered. Pt voiced understanding. Reviewed health maintenance. Instructed to continue current medications, diet and exercise. Patient agreed with treatment plan. Follow up as directed.      Electronically signed by NATHAN Valero CNP on 3/8/2022 at 4:18 PM

## 2022-03-24 ENCOUNTER — TELEPHONE (OUTPATIENT)
Dept: PRIMARY CARE CLINIC | Age: 62
End: 2022-03-24

## 2022-03-24 RX ORDER — FLUTICASONE PROPIONATE 50 MCG
2 SPRAY, SUSPENSION (ML) NASAL DAILY
Qty: 16 G | Refills: 5 | Status: SHIPPED | OUTPATIENT
Start: 2022-03-24 | End: 2022-09-14 | Stop reason: SDUPTHER

## 2022-03-24 RX ORDER — PREDNISONE 20 MG/1
20 TABLET ORAL 2 TIMES DAILY
Qty: 10 TABLET | Refills: 0 | Status: SHIPPED | OUTPATIENT
Start: 2022-03-24 | End: 2022-03-29

## 2022-03-24 RX ORDER — CYCLOBENZAPRINE HCL 10 MG
10 TABLET ORAL 2 TIMES DAILY PRN
Qty: 60 TABLET | Refills: 0 | Status: SHIPPED | OUTPATIENT
Start: 2022-03-24 | End: 2022-05-13

## 2022-03-24 RX ORDER — OMEPRAZOLE 40 MG/1
40 CAPSULE, DELAYED RELEASE ORAL 2 TIMES DAILY
Qty: 180 CAPSULE | Refills: 3 | Status: SHIPPED | OUTPATIENT
Start: 2022-03-24 | End: 2022-08-26 | Stop reason: SDUPTHER

## 2022-03-24 NOTE — TELEPHONE ENCOUNTER
Pt called stating insurance needs PA for Artie Tobias nurse, [employer], states they sent the request to pervious pcp and it was denied because they did not do it. She stated it needs to back date to 3/3/22 and if called in as URGENT they will process it within 72 hrs.      Please do PA that is needed     [phone] is the number to call

## 2022-03-24 NOTE — TELEPHONE ENCOUNTER
Pt called requesting refills but states medications were prescribed by pervious pcp, she asked for refill of nasal spray but could not find on med list. Please reorder as well if possible.      Please approve or deny

## 2022-03-24 NOTE — TELEPHONE ENCOUNTER
Pt called stating Artie Tobias nurse checked her lungs and they are clear but she is having some wheezing and requesting a steroid if possible, she is also requesting a nasal spray, she states pervious pcp prescribed one but I did not see on med list.     Please advise    Pharm- [facility] in [place]

## 2022-04-04 ENCOUNTER — TELEPHONE (OUTPATIENT)
Dept: PRIMARY CARE CLINIC | Age: 62
End: 2022-04-04

## 2022-04-04 NOTE — TELEPHONE ENCOUNTER
Pt has thrush in her mouth due to being on a steroid. Mouth, tongue and lips are sore, with a film on it. Has had Thrush many times, per pt. Asking, if you would send in Nystatin for her to [facility]? Pt does have an appt with you tomorrow, but would like to get started on it.

## 2022-04-05 ENCOUNTER — OFFICE VISIT (OUTPATIENT)
Dept: PRIMARY CARE CLINIC | Age: 62
End: 2022-04-05
Payer: COMMERCIAL

## 2022-04-05 VITALS — SYSTOLIC BLOOD PRESSURE: 118 MMHG | WEIGHT: 112.5 LBS | BODY MASS INDEX: 19.33 KG/M2 | DIASTOLIC BLOOD PRESSURE: 86 MMHG

## 2022-04-05 DIAGNOSIS — R10.13 EPIGASTRIC PAIN: ICD-10-CM

## 2022-04-05 DIAGNOSIS — Z79.899 HIGH RISK MEDICATION USE: ICD-10-CM

## 2022-04-05 DIAGNOSIS — Z13.6 ENCOUNTER FOR LIPID SCREENING FOR CARDIOVASCULAR DISEASE: ICD-10-CM

## 2022-04-05 DIAGNOSIS — Z00.00 ENCOUNTER FOR WELL ADULT EXAM WITHOUT ABNORMAL FINDINGS: Primary | ICD-10-CM

## 2022-04-05 DIAGNOSIS — E03.9 ACQUIRED HYPOTHYROIDISM: ICD-10-CM

## 2022-04-05 DIAGNOSIS — Z13.220 ENCOUNTER FOR LIPID SCREENING FOR CARDIOVASCULAR DISEASE: ICD-10-CM

## 2022-04-05 DIAGNOSIS — M25.552 ACUTE HIP PAIN, LEFT: ICD-10-CM

## 2022-04-05 DIAGNOSIS — J44.1 COPD WITH ACUTE EXACERBATION (HCC): ICD-10-CM

## 2022-04-05 DIAGNOSIS — R42 DIZZINESS: ICD-10-CM

## 2022-04-05 DIAGNOSIS — R53.83 FATIGUE, UNSPECIFIED TYPE: ICD-10-CM

## 2022-04-05 DIAGNOSIS — F33.2 MDD (MAJOR DEPRESSIVE DISORDER), RECURRENT SEVERE, WITHOUT PSYCHOSIS (HCC): ICD-10-CM

## 2022-04-05 DIAGNOSIS — M81.0 AGE-RELATED OSTEOPOROSIS WITHOUT CURRENT PATHOLOGICAL FRACTURE: ICD-10-CM

## 2022-04-05 PROBLEM — E07.9 DISORDER OF THYROID: Status: ACTIVE | Noted: 2022-04-05

## 2022-04-05 PROBLEM — M51.36 DEGENERATION OF INTERVERTEBRAL DISC OF LUMBAR REGION: Status: ACTIVE | Noted: 2022-04-05

## 2022-04-05 PROBLEM — M79.7 FIBROMYALGIA: Status: ACTIVE | Noted: 2022-04-05

## 2022-04-05 LAB
ALCOHOL URINE: NEGATIVE
AMPHETAMINE SCREEN, URINE: NEGATIVE
BARBITURATE SCREEN, URINE: NEGATIVE
BENZODIAZEPINE SCREEN, URINE: NEGATIVE
BUPRENORPHINE URINE: NEGATIVE
COCAINE METABOLITE SCREEN URINE: NEGATIVE
FENTANYL SCREEN, URINE: NEGATIVE
GABAPENTIN SCREEN, URINE: NEGATIVE
MDMA URINE: NEGATIVE
METHADONE SCREEN, URINE: NEGATIVE
METHAMPHETAMINE, URINE: NEGATIVE
OPIATE SCREEN URINE: NEGATIVE
OXYCODONE SCREEN URINE: NEGATIVE
PHENCYCLIDINE SCREEN URINE: NEGATIVE
PROPOXYPHENE SCREEN, URINE: NEGATIVE
SYNTHETIC CANNABINOIDS(K2) SCREEN, URINE: NEGATIVE
THC SCREEN, URINE: NEGATIVE
TRAMADOL SCREEN URINE: NEGATIVE
TRICYCLIC ANTIDEPRESSANTS, UR: NEGATIVE

## 2022-04-05 PROCEDURE — 99396 PREV VISIT EST AGE 40-64: CPT | Performed by: NURSE PRACTITIONER

## 2022-04-05 PROCEDURE — 80305 DRUG TEST PRSMV DIR OPT OBS: CPT | Performed by: NURSE PRACTITIONER

## 2022-04-05 RX ORDER — DENOSUMAB 60 MG/ML
60 INJECTION SUBCUTANEOUS ONCE
Qty: 1 ML | Refills: 1 | Status: SHIPPED | OUTPATIENT
Start: 2022-04-05 | End: 2022-04-18

## 2022-04-05 RX ORDER — ALBUTEROL SULFATE 90 UG/1
2 AEROSOL, METERED RESPIRATORY (INHALATION) EVERY 6 HOURS PRN
Qty: 18 G | Refills: 3 | Status: SHIPPED | OUTPATIENT
Start: 2022-04-05 | End: 2022-08-12 | Stop reason: SDUPTHER

## 2022-04-05 RX ORDER — TRAMADOL HYDROCHLORIDE 50 MG/1
50 TABLET ORAL EVERY 6 HOURS PRN
Qty: 30 TABLET | Refills: 0 | Status: SHIPPED | OUTPATIENT
Start: 2022-04-05 | End: 2022-04-13

## 2022-04-05 ASSESSMENT — ENCOUNTER SYMPTOMS
BACK PAIN: 1
ABDOMINAL PAIN: 1
SHORTNESS OF BREATH: 1
DIARRHEA: 1
NAUSEA: 1

## 2022-04-05 NOTE — PATIENT INSTRUCTIONS
Well Visit, Women 48 to 72: Care Instructions  Overview     Well visits can help you stay healthy. Your doctor has checked your overall health and may have suggested ways to take good care of yourself. Your doctor also may have recommended tests. At home, you can help prevent illness withhealthy eating, regular exercise, and other steps. Follow-up care is a key part of your treatment and safety. Be sure to make and go to all appointments, and call your doctor if you are having problems. It's also a good idea to know your test results and keep alist of the medicines you take. How can you care for yourself at home?  Get screening tests that you and your doctor decide on. Screening helps find diseases before any symptoms appear.  Eat healthy foods. Choose fruits, vegetables, whole grains, protein, and low-fat dairy foods. Limit fat, especially saturated fat. Reduce salt in your diet.  Limit alcohol. Have no more than 1 drink a day or 7 drinks a week.  Get at least 30 minutes of exercise on most days of the week. Walking is a good choice. You also may want to do other activities, such as running, swimming, cycling, or playing tennis or team sports.  Reach and stay at a healthy weight. This will lower your risk for many problems, such as obesity, diabetes, heart disease, and high blood pressure.  Do not smoke. Smoking can make health problems worse. If you need help quitting, talk to your doctor about stop-smoking programs and medicines. These can increase your chances of quitting for good.  Care for your mental health. It is easy to get weighed down by worry and stress. Learn strategies to manage stress, like deep breathing and mindfulness, and stay connected with your family and community. If you find you often feel sad or hopeless, talk with your doctor. Treatment can help.  Talk to your doctor about whether you have any risk factors for sexually transmitted infections (STIs).  You can help prevent STIs if you wait to have sex with a new partner (or partners) until you've each been tested for STIs. It also helps if you use condoms (male or female condoms) and if you limit your sex partners to one person who only has sex with you. Vaccines are available for some STIs.  If you think you may have a problem with alcohol or drug use, talk to your doctor. This includes prescription medicines (such as amphetamines and opioids) and illegal drugs (such as cocaine and methamphetamine). Your doctor can help you figure out what type of treatment is best for you.  Protect your skin from too much sun. When you're outdoors from 10 a.m. to 4 p.m., stay in the shade or cover up with clothing and a hat with a wide brim. Wear sunglasses that block UV rays. Even when it's cloudy, put broad-spectrum sunscreen (SPF 30 or higher) on any exposed skin.  See a dentist one or two times a year for checkups and to have your teeth cleaned.  Wear a seat belt in the car. When should you call for help? Watch closely for changes in your health, and be sure to contact your doctor if you have any problems or symptoms that concern you. Where can you learn more? Go to https://[employer].[facility]. org and sign in to your [employer] account. Enter E917 in the IMGuest box to learn more about \"Well Visit, Women 50 to 72: Care Instructions. \"     If you do not have an account, please click on the \"Sign Up Now\" link. Current as of: October 6, 2021               Content Version: 13.2  © 2006-2022 [employer]. Care instructions adapted under license by [facility] ([facility]). If you have questions about a medical condition or this instruction, always ask your healthcare professional. [place] [address] any warranty or liability for your use of this information.

## 2022-04-05 NOTE — PROGRESS NOTES
8 days.  Yes NATHAN Treadwell CNP   albuterol sulfate  (90 Base) MCG/ACT inhaler Inhale 2 puffs into the lungs every 6 hours as needed for Wheezing or Shortness of Breath Yes NATHAN Treadwell CNP   denosumab (PROLIA) 60 MG/ML SOSY SC injection Inject 1 mL into the skin once for 1 dose Yes NATHAN Treadwell CNP   nystatin (MYCOSTATIN) 212226 UNIT/ML suspension Take 5 mLs by mouth 4 times daily for 14 days Retain in mouth as long as possible Yes NATHAN Treadwell CNP   cyclobenzaprine (FLEXERIL) 10 MG tablet Take 1 tablet by mouth 2 times daily as needed for Muscle spasms Yes Denisse Cardoza MD   omeprazole (PRILOSEC) 40 MG delayed release capsule Take 1 capsule by mouth 2 times daily Yes Denisse Cardoza MD   fluticasone [facility]) 50 MCG/ACT nasal spray 2 sprays by Each Nostril route daily Yes Denisse Cardoza MD   Blood Pressure Monitoring (BLOOD PRESSURE CUFF) MISC 1 Units by Does not apply route daily Yes NATHAN Treadwell CNP   meclizine (ANTIVERT) 25 MG tablet Take 1 tablet by mouth 3 times daily as needed for Dizziness or Nausea Yes NATHAN Treadwell CNP   sucralfate (CARAFATE) 1 GM/10ML suspension Take 10 mLs by mouth 4 times daily (before meals and nightly) Yes NATHAN Treadwell CNP   Melatonin 10 MG TABS Take 10 mg by mouth nightly Yes NATHAN Treadwell CNP   fluticasone (FLOVENT HFA) 110 MCG/ACT inhaler Inhale 1 puff into the lungs 2 times daily Yes Denisse Cardoza MD   mirtazapine (REMERON) 30 MG tablet  Yes Historical Provider, MD   busPIRone (BUSPAR) 10 MG tablet Take 10 mg by mouth 2 times daily Yes Historical Provider, MD   montelukast (SINGULAIR) 10 MG tablet Take 10 mg by mouth daily Yes Historical Provider, MD   primidone (MYSOLINE) 50 MG tablet Take 50 mg by mouth 2 times daily Yes Historical Provider, MD   venlafaxine (EFFEXOR XR) 150 MG extended release capsule Take 150 mg by mouth daily Yes Historical Provider, MD   levothyroxine (SYNTHROID) 88 MCG tablet Take 1 tablet by mouth Daily Yes Jaguar Crow MD   amLODIPine (NORVASC) 2.5 MG tablet Take 1 tablet by mouth daily Yes Jaguar Crow MD   vitamin C (ASCORBIC ACID) 500 MG tablet Take 1 tablet by mouth daily Yes Altamese Apgar, MD   Multiple Vitamins-Minerals (THERAPEUTIC MULTIVITAMIN-MINERALS) tablet Take 1 tablet by mouth daily Yes Altamese Apgar, MD   calcium carbonate-vitamin D (CALTRATE) 600-400 MG-UNIT TABS per tab Take 1 tablet by mouth daily Yes Altamese Apgar, MD   Tiotropium Bromide-Olodaterol (STIOLTO RESPIMAT) 2.5-2.5 MCG/ACT AERS Inhale 2 puffs into the lungs Yes Historical Provider, MD   carvedilol (COREG) 3.125 MG tablet Take 3.125 mg by mouth 2 times daily (with meals) Yes Historical Provider, MD   albuterol (ACCUNEB) 0.63 MG/3ML nebulizer solution Take 1 ampule by nebulization every 6 hours as needed for Wheezing Yes Historical Provider, MD   clonazePAM (KLONOPIN) 0.5 MG tablet Take 1 tablet by mouth 3 times daily as needed for Anxiety for up to 30 days.   Jaguar Crow MD         Past Medical History:   Diagnosis Date    Acid reflux     Anxiety     Arthritis     Cancer ([facility]     cervical    Cervical osteoarthritis 2020    COPD (chronic obstructive pulmonary disease) ([facility] 2018    DDD (degenerative disc disease), cervical     Depression     Hiatal hernia     Spinal stenosis     Tachycardia     Thyroid disease     hypo       Past Surgical History:   Procedure Laterality Date    BACK SURGERY      diskectomy, L3-L5    COLONOSCOPY N/A 2019    COLONOSCOPY DIAGNOSTIC performed by Ilsa Aase, MD at [address] [place] [address]  2019    COLONOSCOPY POLYPECTOMY SNARE/COLD BIOPSY performed by Ilsa Aase, MD at [address] [place] [address]      ectopic pregnancy with tube removal    THYROIDECTOMY, PARTIAL      UPPER GASTROINTESTINAL ENDOSCOPY      UPPER GASTROINTESTINAL ENDOSCOPY N/A 11/27/2020    EGD BIOPSY with dilatation performed by Madelin Rdz MD at [address] [place] [address] reviewed. No pertinent family history. Social History     Tobacco Use    Smoking status: Current Every Day Smoker     Packs/day: 0.50     Years: 48.00     Pack years: 24.00     Types: Cigarettes    Smokeless tobacco: Never Used    Tobacco comment: previous 3 ppd smoker   Vaping Use    Vaping Use: Former   Substance Use Topics    Alcohol use: Yes     Comment: occassional    Drug use: Never       Objective   /86   Wt 112 lb 8 oz (51 kg)   BMI 19.33 kg/m²   Wt Readings from Last 3 Encounters:   04/05/22 112 lb 8 oz (51 kg)   03/08/22 110 lb 4.8 oz (50 kg)   02/08/22 119 lb 6.4 oz (54.2 kg)     There were no vitals filed for this visit. Physical Exam  Vitals and nursing note reviewed. Constitutional:       Appearance: Normal appearance. HENT:      Head: Normocephalic and atraumatic. Right Ear: Tympanic membrane, ear canal and external ear normal.      Left Ear: Tympanic membrane, ear canal and external ear normal.   Cardiovascular:      Rate and Rhythm: Normal rate and regular rhythm. Heart sounds: Normal heart sounds. Pulmonary:      Breath sounds: Decreased breath sounds present. Comments: Oxygen dependent  Abdominal:      General: Bowel sounds are normal.      Palpations: Abdomen is soft. Tenderness: There is abdominal tenderness. Skin:     General: Skin is warm and dry. Neurological:      Mental Status: She is alert and oriented to person, place, and time. Cranial Nerves: No cranial nerve deficit. Motor: No weakness.    Psychiatric:         Mood and Affect: Mood normal.         Cognition and Memory: Cognition and memory normal.         Controlled substances monitoring: possible medication side effects, risk of tolerance and/or dependence, and alternative treatments discussed, no signs of potential drug abuse or diversion identified, OARRS report reviewed today- activity consistent with treatment plan, medication contract signed today and random urine drug screen sent today. Assessment   Plan   1. Encounter for well adult exam without abnormal findings  -     Vitamin B12 & Folate; Future  -     Vitamin D 25 Hydroxy; Future  -     Comprehensive Metabolic Panel; Future  -     CBC with Auto Differential; Future  -     Lipid, Fasting; Future  2. COPD with acute exacerbation (HCC)  -     albuterol sulfate  (90 Base) MCG/ACT inhaler; Inhale 2 puffs into the lungs every 6 hours as needed for Wheezing or Shortness of Breath, Disp-18 g, R-3May fillNormal  3. MDD (major depressive disorder), recurrent severe, without psychosis ([facility]  4. Dizziness  5. Epigastric pain  -     Comprehensive Metabolic Panel; Future  -     CBC with Auto Differential; Future  6. Acute hip pain, left  -     traMADol (ULTRAM) 50 MG tablet; Take 1 tablet by mouth every 6 hours as needed for Pain for up to 8 days. , Disp-30 tablet, R-0Normal  7. Acquired hypothyroidism  8. Encounter for lipid screening for cardiovascular disease  -     Lipid, Fasting; Future  9. Fatigue, unspecified type  -     Vitamin B12 & Folate; Future  -     Vitamin D 25 Hydroxy; Future  -     Comprehensive Metabolic Panel; Future  -     CBC with Auto Differential; Future  10. High risk medication use  -     POCT Rapid Drug Screen  11. Age-related osteoporosis without current pathological fracture  -     denosumab (PROLIA) 60 MG/ML SOSY SC injection; Inject 1 mL into the skin once for 1 dose, Disp-1 mL, R-1Normal     Complete lab work  Complete Prolia injection for osteoporosis  Start Tramadol hip and back pain. Continue Carafate  Follow up with Dr. Marko Morris.     Personalized Preventive Plan   Current Health Maintenance Status  Immunization History   Administered Date(s) Administered    Influenza, MDCK Quadv, IM, PF (Flucelvax 2 yrs and older) 11/15/2021    Influenza, Quadv, IM, PF (6 mo and older Fluzone, Flulaval, Fluarix, and 3 yrs and older Afluria) 10/05/2019, 10/14/2020    Pneumococcal Polysaccharide ([id number]) 10/05/2019        Health Maintenance   Topic Date Due    Hepatitis C screen  Never done    COVID-19 Vaccine (1) Never done    HIV screen  Never done    DTaP/Tdap/Td vaccine (1 - Tdap) Never done    Cervical cancer screen  Never done    Shingles Vaccine (1 of 2) Never done    Colorectal Cancer Screen  11/13/2022    Depression Monitoring  11/15/2022    TSH testing  12/20/2022    Low dose CT lung screening  12/28/2022    Potassium monitoring  02/11/2023    Creatinine monitoring  02/11/2023    Breast cancer screen  06/04/2023    Lipid screen  07/13/2023    Pneumococcal 0-64 years Vaccine (2 of 2 - PPSV23) 12/25/2025    Flu vaccine  Completed    Hepatitis A vaccine  Aged Out    Hepatitis B vaccine  Aged Out    Hib vaccine  Aged Out    Meningococcal (ACWY) vaccine  Aged Out     Recommendations for [employer] Due: see orders and patient instructions/AV'S. Return in about 4 months (around 8/5/2022) for HTN, dizziness with Dr. Tamara Zapata if has opening. Advance Care Planning   Advanced Care Planning: Discussed the patients choices for care and treatment in case of a health event that adversely affects decision-making abilities. Also discussed the patients long-term treatment options. Reviewed with the patient the appropriate state-specific advance directive documents. Reviewed the process of designating a competent adult as an Agent (or -in-fact) that could take make health care decisions for the patient if incompetent. Patient was asked to complete the declaration forms, either acknowledge the forms by a public notary or an eligible witness and provide a signed copy to the practice office. Time spent (minutes): 5     Cardiovascular Disease Risk Counseling: Assessed the patient's risk to develop cardiovascular disease and reviewed main risk factors.    Reviewed steps to reduce disease risk including:   · Quitting tobacco use, reducing amount smoked, or not starting the habit  · Making healthy food choices  · Being physically active and gradually increasing activity levels   · Reduce weight and determine a healthy BMI goal  · Monitor blood pressure and treat if higher than 140/90 mmHg  · Maintain blood total cholesterol levels under 5 mmol/l or 190 mg/dl  · Maintain LDL cholesterol levels under 3.0 mmol/l or 115 mg/dl   · Control blood glucose levels  · Consider taking aspirin (75 mg daily), once blood pressure is controlled   Provided a follow up plan.   Time spent (minutes): 5

## 2022-04-08 ENCOUNTER — HOSPITAL ENCOUNTER (OUTPATIENT)
Dept: PREADMISSION TESTING | Age: 62
Discharge: HOME | End: 2022-04-12

## 2022-04-08 VITALS — HEIGHT: 64 IN | BODY MASS INDEX: 18.78 KG/M2 | WEIGHT: 110 LBS

## 2022-04-08 NOTE — PROGRESS NOTES
Pre-op Instructions For Out-Patient Surgery    Medication Instructions:  · Please stop herbs and any supplements now (includes vitamins and minerals). · Please contact your surgeon and prescribing physician for pre-op instructions for any blood thinners. · If you have inhalers/aerosol treatments at home, please use them the morning of your surgery and bring the inhalers with you to the hospital.    · Please take the following medications the morning of your surgery with a sip of water:    Inhaler, Carvedilol and Levothyroxine. Thor Mcclellan takes her Amlodipine at night )     Surgery Instructions:  1. After midnight before surgery:  Do not eat or drink anything, including water, mints, gum, and hard candy. You may brush your teeth without swallowing. No smoking, chewing tobacco, or street drugs. 2. Please shower or bathe before surgery. 3. Please do not wear any cologne, lotion, powder, deodorant, jewelry, piercings, perfume, makeup, nail polish, hair accessories, or hair spray on the day of surgery. Wear loose comfortable clothing. 4. Leave your valuables at home. Bring a storage case for any glasses/contacts. 5. An adult who is responsible for you MUST drive you home and should be with you for the first 24 hours after surgery. 6. If having out-patient knee and foot surgeries, please arrange for planned crutches, walker, or wheelchair before arriving to the hospital.    The Day of Surgery:  · Arrive at [facility] AT [facility] Surgery Entrance at the time directed by your surgeon and check in at the desk. · If you have a living will or healthcare power of , please bring a copy. · You will be taken to the pre-op holding area where you will be prepared for surgery. A physical assessment will be performed by a nurse practitioner or house officer.   Your IV will be started and you will meet your anesthesiologist.    · When you go to surgery, your family will be directed to the surgical waiting room, where the doctor should speak with them after your surgery. · After surgery, you will be taken to the recovery room then when you are awake and stable you will go to the short stay unit for preparation to be discharged. · If you use a Bi-PAP or C-PAP machine, please bring it with you and leave it in the car in case it is needed in recovery room. Instructions read to [facility] and understanding verbalized.      4/22/22 JAMES

## 2022-04-10 NOTE — TELEPHONE ENCOUNTER
Patient called back to confirm her procedure. She does have a  and had no questions regarding her prep.
Writer ADITYA for patient to call the office to confirm colon screen scheduled Monday 4/8/19 at [facility], understanding of bowel prep instructions and need for a  for arrival time at 830am.
none

## 2022-04-17 ENCOUNTER — TELEPHONE (OUTPATIENT)
Dept: PRIMARY CARE CLINIC | Age: 62
End: 2022-04-17

## 2022-04-17 DIAGNOSIS — M81.0 AGE-RELATED OSTEOPOROSIS WITHOUT CURRENT PATHOLOGICAL FRACTURE: Primary | ICD-10-CM

## 2022-04-18 ENCOUNTER — HOSPITAL ENCOUNTER (OUTPATIENT)
Dept: LAB | Age: 62
Setting detail: SPECIMEN
Discharge: HOME | End: 2022-04-18
Payer: COMMERCIAL

## 2022-04-18 PROCEDURE — U0005 INFEC AGEN DETEC AMPLI PROBE: HCPCS

## 2022-04-18 PROCEDURE — U0003 INFECTIOUS AGENT DETECTION BY NUCLEIC ACID (DNA OR RNA); SEVERE ACUTE RESPIRATORY SYNDROME CORONAVIRUS 2 (SARS-COV-2) (CORONAVIRUS DISEASE [COVID-19]), AMPLIFIED PROBE TECHNIQUE, MAKING USE OF HIGH THROUGHPUT TECHNOLOGIES AS DESCRIBED BY CMS-2020-01-R: HCPCS

## 2022-04-18 RX ORDER — ALENDRONATE SODIUM 70 MG/1
70 TABLET ORAL
Qty: 12 TABLET | Refills: 1 | Status: SHIPPED | OUTPATIENT
Start: 2022-04-18

## 2022-04-18 NOTE — TELEPHONE ENCOUNTER
Prolia discontinued due to insurance not covering. Alendronate started. Take one tablet weekly. Please update pt.

## 2022-04-19 LAB
SARS-COV-2: NORMAL
SARS-COV-2: NOT DETECTED
SOURCE: NORMAL

## 2022-04-21 ENCOUNTER — ANESTHESIA EVENT (OUTPATIENT)
Dept: ENDOSCOPY | Age: 62
End: 2022-04-21
Payer: COMMERCIAL

## 2022-04-22 ENCOUNTER — HOSPITAL ENCOUNTER (OUTPATIENT)
Age: 62
Setting detail: OUTPATIENT SURGERY
Discharge: HOME | End: 2022-04-22
Attending: SURGERY | Admitting: SURGERY
Payer: COMMERCIAL

## 2022-04-22 ENCOUNTER — ANESTHESIA (OUTPATIENT)
Dept: ENDOSCOPY | Age: 62
End: 2022-04-22
Payer: COMMERCIAL

## 2022-04-22 VITALS
TEMPERATURE: 98.6 F | RESPIRATION RATE: 18 BRPM | DIASTOLIC BLOOD PRESSURE: 74 MMHG | OXYGEN SATURATION: 100 % | SYSTOLIC BLOOD PRESSURE: 141 MMHG

## 2022-04-22 VITALS
RESPIRATION RATE: 20 BRPM | TEMPERATURE: 98.6 F | DIASTOLIC BLOOD PRESSURE: 68 MMHG | SYSTOLIC BLOOD PRESSURE: 134 MMHG | OXYGEN SATURATION: 98 % | BODY MASS INDEX: 18.78 KG/M2 | WEIGHT: 110 LBS | HEIGHT: 64 IN | HEART RATE: 68 BPM

## 2022-04-22 PROCEDURE — 6370000000 HC RX 637 (ALT 250 FOR IP): Performed by: SURGERY

## 2022-04-22 PROCEDURE — 7100000000 HC PACU RECOVERY - FIRST 15 MIN: Performed by: SURGERY

## 2022-04-22 PROCEDURE — 7100000011 HC PHASE II RECOVERY - ADDTL 15 MIN: Performed by: SURGERY

## 2022-04-22 PROCEDURE — 7100000010 HC PHASE II RECOVERY - FIRST 15 MIN: Performed by: SURGERY

## 2022-04-22 PROCEDURE — 2580000003 HC RX 258: Performed by: ANESTHESIOLOGY

## 2022-04-22 PROCEDURE — 88305 TISSUE EXAM BY PATHOLOGIST: CPT

## 2022-04-22 PROCEDURE — 6360000002 HC RX W HCPCS: Performed by: NURSE ANESTHETIST, CERTIFIED REGISTERED

## 2022-04-22 PROCEDURE — 3700000000 HC ANESTHESIA ATTENDED CARE: Performed by: SURGERY

## 2022-04-22 PROCEDURE — 3609012400 HC EGD TRANSORAL BIOPSY SINGLE/MULTIPLE: Performed by: SURGERY

## 2022-04-22 PROCEDURE — 7100000001 HC PACU RECOVERY - ADDTL 15 MIN: Performed by: SURGERY

## 2022-04-22 PROCEDURE — 2709999900 HC NON-CHARGEABLE SUPPLY: Performed by: SURGERY

## 2022-04-22 PROCEDURE — 3700000001 HC ADD 15 MINUTES (ANESTHESIA): Performed by: SURGERY

## 2022-04-22 PROCEDURE — 7100000031 HC ASPR PHASE II RECOVERY - ADDTL 15 MIN: Performed by: SURGERY

## 2022-04-22 PROCEDURE — 7100000030 HC ASPR PHASE II RECOVERY - FIRST 15 MIN: Performed by: SURGERY

## 2022-04-22 PROCEDURE — 88312 SPECIAL STAINS GROUP 1: CPT

## 2022-04-22 PROCEDURE — 2500000003 HC RX 250 WO HCPCS: Performed by: NURSE ANESTHETIST, CERTIFIED REGISTERED

## 2022-04-22 RX ORDER — DIPHENHYDRAMINE HYDROCHLORIDE 50 MG/ML
12.5 INJECTION INTRAMUSCULAR; INTRAVENOUS
Status: DISCONTINUED | OUTPATIENT
Start: 2022-04-22 | End: 2022-04-22 | Stop reason: HOSPADM

## 2022-04-22 RX ORDER — SODIUM CHLORIDE 9 MG/ML
INJECTION, SOLUTION INTRAVENOUS PRN
Status: DISCONTINUED | OUTPATIENT
Start: 2022-04-22 | End: 2022-04-22 | Stop reason: HOSPADM

## 2022-04-22 RX ORDER — SODIUM CHLORIDE 0.9 % (FLUSH) 0.9 %
5-40 SYRINGE (ML) INJECTION PRN
Status: DISCONTINUED | OUTPATIENT
Start: 2022-04-22 | End: 2022-04-22 | Stop reason: HOSPADM

## 2022-04-22 RX ORDER — MIDAZOLAM HYDROCHLORIDE 1 MG/ML
INJECTION INTRAMUSCULAR; INTRAVENOUS PRN
Status: DISCONTINUED | OUTPATIENT
Start: 2022-04-22 | End: 2022-04-22 | Stop reason: SDUPTHER

## 2022-04-22 RX ORDER — LIDOCAINE HYDROCHLORIDE 20 MG/ML
INJECTION, SOLUTION EPIDURAL; INFILTRATION; INTRACAUDAL; PERINEURAL PRN
Status: DISCONTINUED | OUTPATIENT
Start: 2022-04-22 | End: 2022-04-22 | Stop reason: SDUPTHER

## 2022-04-22 RX ORDER — SODIUM CHLORIDE 0.9 % (FLUSH) 0.9 %
5-40 SYRINGE (ML) INJECTION EVERY 12 HOURS SCHEDULED
Status: DISCONTINUED | OUTPATIENT
Start: 2022-04-22 | End: 2022-04-22 | Stop reason: HOSPADM

## 2022-04-22 RX ORDER — ONDANSETRON 2 MG/ML
4 INJECTION INTRAMUSCULAR; INTRAVENOUS
Status: DISCONTINUED | OUTPATIENT
Start: 2022-04-22 | End: 2022-04-22 | Stop reason: HOSPADM

## 2022-04-22 RX ORDER — MEPERIDINE HYDROCHLORIDE 25 MG/ML
12.5 INJECTION INTRAMUSCULAR; INTRAVENOUS; SUBCUTANEOUS EVERY 5 MIN PRN
Status: DISCONTINUED | OUTPATIENT
Start: 2022-04-22 | End: 2022-04-22 | Stop reason: HOSPADM

## 2022-04-22 RX ORDER — PROPOFOL 10 MG/ML
INJECTION, EMULSION INTRAVENOUS PRN
Status: DISCONTINUED | OUTPATIENT
Start: 2022-04-22 | End: 2022-04-22 | Stop reason: SDUPTHER

## 2022-04-22 RX ORDER — LIDOCAINE HYDROCHLORIDE 10 MG/ML
1 INJECTION, SOLUTION EPIDURAL; INFILTRATION; INTRACAUDAL; PERINEURAL
Status: DISCONTINUED | OUTPATIENT
Start: 2022-04-22 | End: 2022-04-22 | Stop reason: HOSPADM

## 2022-04-22 RX ORDER — SODIUM CHLORIDE, SODIUM LACTATE, POTASSIUM CHLORIDE, CALCIUM CHLORIDE 600; 310; 30; 20 MG/100ML; MG/100ML; MG/100ML; MG/100ML
INJECTION, SOLUTION INTRAVENOUS CONTINUOUS
Status: DISCONTINUED | OUTPATIENT
Start: 2022-04-22 | End: 2022-04-22 | Stop reason: HOSPADM

## 2022-04-22 RX ORDER — LIDOCAINE HYDROCHLORIDE 20 MG/ML
15 SOLUTION OROPHARYNGEAL ONCE
Status: COMPLETED | OUTPATIENT
Start: 2022-04-22 | End: 2022-04-22

## 2022-04-22 RX ADMIN — SODIUM CHLORIDE, POTASSIUM CHLORIDE, SODIUM LACTATE AND CALCIUM CHLORIDE: 600; 310; 30; 20 INJECTION, SOLUTION INTRAVENOUS at 09:07

## 2022-04-22 RX ADMIN — MIDAZOLAM 2 MG: 1 INJECTION INTRAMUSCULAR; INTRAVENOUS at 11:26

## 2022-04-22 RX ADMIN — LIDOCAINE HYDROCHLORIDE 40 MG: 20 INJECTION, SOLUTION EPIDURAL; INFILTRATION; INTRACAUDAL; PERINEURAL at 11:34

## 2022-04-22 RX ADMIN — PROPOFOL 160 MG: 10 INJECTION, EMULSION INTRAVENOUS at 11:34

## 2022-04-22 RX ADMIN — Medication 15 ML: at 10:57

## 2022-04-22 ASSESSMENT — PULMONARY FUNCTION TESTS
PIF_VALUE: 1

## 2022-04-22 ASSESSMENT — ENCOUNTER SYMPTOMS
SHORTNESS OF BREATH: 0
STRIDOR: 0

## 2022-04-22 ASSESSMENT — COPD QUESTIONNAIRES: CAT_SEVERITY: NO INTERVAL CHANGE

## 2022-04-22 ASSESSMENT — PAIN - FUNCTIONAL ASSESSMENT: PAIN_FUNCTIONAL_ASSESSMENT: 0-10

## 2022-04-22 ASSESSMENT — PAIN DESCRIPTION - DESCRIPTORS: DESCRIPTORS: ACHING

## 2022-04-22 ASSESSMENT — LIFESTYLE VARIABLES: SMOKING_STATUS: 0

## 2022-04-22 NOTE — H&P
HISTORY and [address]       NAME:  Harry Guillermo  MRN: [id number]   YOB: 1960   Date: 4/22/2022   Age: 64 y.o. Gender: female       COMPLAINT AND PRESENT HISTORY:                Harry Guillermo is 64 y.o.,  female, undergoing for EGD ESOPHAGOGASTRODUODENOSCOPY. Pt is being seen for hx of:  EPIGASTRIC PAIN. Patient also adds that she has had abdominal bloating and diarrhea. She reports onset since January this year. patient complains of weakness and weight loss ( approx 15 lbs since January) with SOB. Pt comes in with wheelchair. Patient has had previous endoscopies done and with esophogeal Dilation done. Patient report FH of Esophogeal Cancer in her maternal grandmother. Patient complains of frequent heartburn, sometimes awakes one from sleep. Patient denies any hoarseness or changes in voice. Pt has hx of GERD  and is taking Prilosec and Carafate. She states her condition as been under control with the previous meds,  She has hx of Hiatal hernia    Patient denies any Dysphagia. Patient states that appetite is not good. \" can't seem to eat in day hours\"  She has been trying to drink supplemental shakes for extra calories. Patient complains of epigastric pains. nausea and no vomiting . Reports dry heaves. Patient denies any indigestion, difficulty swallowing  or heartburn. No constipation, no changes in the color, caliber or consistency of the stools. Pt states that she is on lomotil and help with her diarrhea. Hx of any significant  medical hx   COPD, THYROID DISEASE, TACHYCARDIA-  Pt is on home supplemental oxygen for 24/7 use at 2 L p/nc. Denies current chest pain, SOB, dizziness, leg swelling, headache. No recent URI, fever or chills. She has SOB and  occasional palpitations with her anxiety. Hx of smoking : yes  Pt denies any issues with Anesthesia. Patient has been NPO since midnight.  No blood thinners in the past 5-7 days. Pt took her coreg and inhalers and nebulizer treatmt this am    PAST MEDICAL HISTORY     Past Medical History:   Diagnosis Date    Acid reflux     Anxiety     Arthritis     Cancer ([facility] [address]     cervical    Cervical osteoarthritis 11/30/2020    COPD (chronic obstructive pulmonary disease) ([facility] 02/23/2018    DDD (degenerative disc disease), cervical     Depression     Hiatal hernia     History of blood transfusion     Reaction fever went up to 107 . per Pt.  Spinal stenosis     Tachycardia     Thyroid disease     hypo       SURGICAL HISTORY       Past Surgical History:   Procedure Laterality Date    BACK SURGERY      diskectomy, L3-L5    COLONOSCOPY N/A 4/8/2019    COLONOSCOPY DIAGNOSTIC performed by Isha Doan MD at [facility]  11/13/2019    COLONOSCOPY POLYPECTOMY SNARE/COLD BIOPSY performed by Isha Doan MD at [address] [place] [address]      ectopic pregnancy with tube removal    THYROIDECTOMY, PARTIAL      UPPER GASTROINTESTINAL ENDOSCOPY      UPPER GASTROINTESTINAL ENDOSCOPY N/A 11/27/2020    EGD BIOPSY with dilatation performed by Ethel Shaw MD at [address] [place] [address]     No family history on file.     SOCIAL HISTORY       Social History     Socioeconomic History    Marital status:      Spouse name: Not on file    Number of children: Not on file    Years of education: Not on file    Highest education level: Not on file   Occupational History    Not on file   Tobacco Use    Smoking status: Current Every Day Smoker     Packs/day: 0.50     Years: 48.00     Pack years: 24.00     Types: Cigarettes    Smokeless tobacco: Never Used    Tobacco comment: previous 3 ppd smoker   Vaping Use    Vaping Use: Former   Substance and Sexual Activity    Alcohol use: Yes     Comment: occassional    Drug use: Never    Sexual activity: Not on file   Other Topics Concern    Not on file   Social History Narrative    Not on file     Social Determinants of Health     Financial Resource Strain: Low Risk     Difficulty of Paying Living Expenses: Not hard at all   Food Insecurity: No Food Insecurity    Worried About Running Out of Food in the Last Year: Never true    [address] in the Last Year: Never true   Transportation Needs:     Lack of Transportation (Medical): Not on file    Lack of Transportation (Non-Medical): Not on file   Physical Activity:     Days of Exercise per Week: Not on file    Minutes of Exercise per Session: Not on file   Stress:     Feeling of Stress : Not on file   Social Connections:     Frequency of Communication with Friends and Family: Not on file    Frequency of Social Gatherings with Friends and Family: Not on file    Attends [employer] Services: Not on file    Active Member of [address] [place] [address] or Organizations: Not on file    Attends Club or Organization Meetings: Not on file    Marital Status: Not on file   Intimate Partner Violence:     Fear of Current or Ex-Partner: Not on file    Emotionally Abused: Not on file    Physically Abused: Not on file    Sexually Abused: Not on file   Housing Stability:     Unable to Pay for Housing in the Last Year: Not on file    Number of [place] in the Last Year: Not on file    Unstable Housing in the Last Year: Not on file       REVIEW OF SYSTEMS      No Known Allergies    No current facility-administered medications on file prior to encounter.      Current Outpatient Medications on File Prior to Encounter   Medication Sig Dispense Refill    cyclobenzaprine (FLEXERIL) 10 MG tablet Take 1 tablet by mouth 2 times daily as needed for Muscle spasms 60 tablet 0    omeprazole (PRILOSEC) 40 MG delayed release capsule Take 1 capsule by mouth 2 times daily 180 capsule 3    fluticasone (FLONASE) 50 MCG/ACT nasal spray 2 sprays by Each Nostril route daily 16 g 5    Blood Pressure Monitoring (BLOOD PRESSURE CUFF) MISC 1 Units by Does not apply route daily (Patient not taking: Reported on 4/8/2022) 1 each 0    meclizine (ANTIVERT) 25 MG tablet Take 1 tablet by mouth 3 times daily as needed for Dizziness or Nausea 270 tablet 0    sucralfate (CARAFATE) 1 GM/10ML suspension Take 10 mLs by mouth 4 times daily (before meals and nightly) 3600 mL 0    Melatonin 10 MG TABS Take 10 mg by mouth nightly 90 tablet 3    clonazePAM (KLONOPIN) 0.5 MG tablet Take 1 tablet by mouth 3 times daily as needed for Anxiety for up to 30 days. 90 tablet 2    fluticasone (FLOVENT HFA) 110 MCG/ACT inhaler Inhale 1 puff into the lungs 2 times daily 12 g 2    mirtazapine (REMERON) 30 MG tablet       busPIRone (BUSPAR) 10 MG tablet Take 10 mg by mouth 2 times daily      montelukast (SINGULAIR) 10 MG tablet Take 10 mg by mouth daily      primidone (MYSOLINE) 50 MG tablet Take 50 mg by mouth 2 times daily      venlafaxine (EFFEXOR XR) 150 MG extended release capsule Take 150 mg by mouth daily      levothyroxine (SYNTHROID) 88 MCG tablet Take 1 tablet by mouth Daily 90 tablet 1    amLODIPine (NORVASC) 2.5 MG tablet Take 1 tablet by mouth daily 90 tablet 3    vitamin C (ASCORBIC ACID) 500 MG tablet Take 1 tablet by mouth daily 30 tablet 3    Multiple Vitamins-Minerals (THERAPEUTIC MULTIVITAMIN-MINERALS) tablet Take 1 tablet by mouth daily 30 tablet 0    calcium carbonate-vitamin D (CALTRATE) 600-400 MG-UNIT TABS per tab Take 1 tablet by mouth daily 30 tablet 0    Tiotropium Bromide-Olodaterol (STIOLTO RESPIMAT) 2.5-2.5 MCG/ACT AERS Inhale 2 puffs into the lungs      carvedilol (COREG) 3.125 MG tablet Take 3.125 mg by mouth 2 times daily (with meals)      albuterol (ACCUNEB) 0.63 MG/3ML nebulizer solution Take 1 ampule by nebulization every 6 hours as needed for Wheezing         Negative except for what is mentioned in the HPI. GENERAL PHYSICAL EXAM     Vitals :   See vital signs in RN flow sheet.      GENERAL APPEARANCE:   Shreyas Pool is 64 y.o., female, not obese, appears cachetic  conscious, alert. Does not appear to be distress or pain at this time. SKIN:  Warm, dry, no cyanosis or jaundice. HEAD:  Normocephalic, atraumatic, no swelling or tenderness. EYES:  Pupils equal, reactive to light. EARS:  No discharge, no marked hearing loss. NOSE:  No rhinorrhea, epistaxis or septal deformity. 02 2L p/nc                 THROAT:  Not congested. No ulceration bleeding or discharge. NECK:  No stiffness, trachea central.  No palpable masses or L.N.                 CHEST:  Symmetrical and equal on expansion. jerson pectoris in chest.                HEART:  RRR . No audible murmurs or gallops. LUNGS:  Equal on expansion, normal breath sounds. No adventitious sounds. ABDOMEN: flat. palpation. No dysphagia, No localized tenderness. No guarding or rigidity. LYMPHATICS:  No palpable cervical lymphadenopathy. LOCOMOTOR, BACK AND SPINE:  No tenderness or deformities. EXTREMITIES:  Symmetrical, no pretibial edema. No discoloration or ulcerations. NEUROLOGIC:  The patient is conscious, alert, oriented,Cranial nerve II-XII intact, taste and smell were not examined. No apparent focal sensory or motor deficits.              PROVISIONAL DIAGNOSES / SURGERY:        EGD ESOPHAGOGASTRODUODENOSCOPY    EPIGASTRIC PAIN    Patient Active Problem List    Diagnosis Date Noted    Fibromyalgia 04/05/2022    Disorder of thyroid 04/05/2022    Degeneration of intervertebral disc of lumbar region 04/05/2022    Essential hypertension 07/25/2021    Other osteoporosis with current pathological fracture, vertebra(e), initial encounter for fracture ([address] 04/18/2021    Essential tremor 04/14/2021    Compression fracture of L2 lumbar vertebra ([address] 02/09/2021    Cervical osteoarthritis 11/30/2020    Irritable bowel syndrome with constipation     Chronic constipation     Esophageal dysphagia     MDD (major depressive disorder), recurrent severe, without psychosis ([facility] 11/17/2020    Panic disorder 11/17/2020    Obsessive compulsive disorder 11/17/2020    Lumbar spinal stenosis 02/15/2019    Abdominal bloating 01/25/2019    Chronic bilateral low back pain without sciatica 11/06/2018    COPD with acute exacerbation ([facility] 11/04/2018    Acquired hypothyroidism 11/04/2018           JOHN Hoover, APRN - CNP on 4/22/2022 at 8:32 AM

## 2022-04-22 NOTE — ANESTHESIA POSTPROCEDURE EVALUATION
Department of Anesthesiology  Postprocedure Note    Patient: Porter Brown  MRN: [id number]  YOB: 1960  Date of evaluation: 4/22/2022  Time:  12:46 PM     Procedure Summary     Date: 04/22/22 Room / Location: [place] [address] 03 / 250 [facility] ENDO    Anesthesia Start: 3971 Anesthesia Stop: 0457    Procedure: EGD BIOPSY (N/A Esophagus) Diagnosis: (EPIGASTRIC PAIN)    Surgeons: Neto Bernal MD Responsible Provider:     Anesthesia Type: general ASA Status: 3          Anesthesia Type: general    Linda Phase I: Linda Score: 8    Linda Phase II:      Last vitals: Reviewed and per EMR flowsheets.        Anesthesia Post Evaluation    Comments: POST- ANESTHESIA EVALUATION       Pt Name: Porter Brown  MRN: [id number]  YOB: 1960  Date of evaluation: 4/22/2022  Time:  12:46 PM      BP (!) 140/83   Pulse 67   Temp 98.6 °F (37 °C) (Infrared)   Resp 22   Ht 5' 3.5\" (1.613 m)   Wt 110 lb (49.9 kg)   SpO2 94%   BMI 19.18 kg/m²      Consciousness Level  Awake  Cardiopulmonary Status  Stable  Pain Adequately Treated YES  Nausea / Vomiting  NO  Adequate Hydration  YES  Anesthesia Related Complications NONE      Electronically signed by Timothy Rosales MD on 4/22/2022 at 12:46 PM

## 2022-04-22 NOTE — ANESTHESIA PRE PROCEDURE
Department of Anesthesiology  Preprocedure Note       Name:  Gunnar Anguiano   Age:  64 y.o.  :  1960                                          MRN:  [id number]         Date:  2022      Surgeon: Chace Martinez):  Chioma Anderson MD    Procedure: Procedure(s):  EGD ESOPHAGOGASTRODUODENOSCOPY    Medications prior to admission:   Prior to Admission medications    Medication Sig Start Date End Date Taking? Authorizing Provider   alendronate (FOSAMAX) 70 MG tablet Take 1 tablet by mouth every 7 days 22   NATHAN Bellamy - CNP   albuterol sulfate  (90 Base) MCG/ACT inhaler Inhale 2 puffs into the lungs every 6 hours as needed for Wheezing or Shortness of Breath 22   NATHAN Bellamy CNP   cyclobenzaprine (FLEXERIL) 10 MG tablet Take 1 tablet by mouth 2 times daily as needed for Muscle spasms 3/24/22   Juan Whitman MD   omeprazole (PRILOSEC) 40 MG delayed release capsule Take 1 capsule by mouth 2 times daily 3/24/22   Juan Whitman MD   fluticasone [facility]) 50 MCG/ACT nasal spray 2 sprays by Each Nostril route daily 3/24/22   Juan Whitman MD   Blood Pressure Monitoring (BLOOD PRESSURE CUFF) MISC 1 Units by Does not apply route daily  Patient not taking: Reported on 2022 3/8/22   NATHAN Bellamy CNP   meclizine (ANTIVERT) 25 MG tablet Take 1 tablet by mouth 3 times daily as needed for Dizziness or Nausea 3/8/22 6/6/22  NATHAN Bellamy CNP   sucralfate (CARAFATE) 1 GM/10ML suspension Take 10 mLs by mouth 4 times daily (before meals and nightly) 3/8/22 6/6/22  NATHAN Bellamy CNP   Melatonin 10 MG TABS Take 10 mg by mouth nightly 3/8/22   NATHAN Bellamy CNP   clonazePAM (KLONOPIN) 0.5 MG tablet Take 1 tablet by mouth 3 times daily as needed for Anxiety for up to 30 days.  2/28/22 3/30/22  Juan Whitman MD   fluticasone [facility]) 110 MCG/ACT inhaler Inhale 1 puff into the lungs 2 times daily 22   Juan Whitman MD mirtazapine (REMERON) 30 MG tablet  1/18/22   Historical Provider, MD   busPIRone (BUSPAR) 10 MG tablet Take 10 mg by mouth 2 times daily 9/30/21   Historical Provider, MD   montelukast (SINGULAIR) 10 MG tablet Take 10 mg by mouth daily 8/15/21   Historical Provider, MD   primidone (MYSOLINE) 50 MG tablet Take 50 mg by mouth 2 times daily 10/19/21   Historical Provider, MD   venlafaxine (EFFEXOR XR) 150 MG extended release capsule Take 150 mg by mouth daily 7/16/21   Historical Provider, MD   levothyroxine (SYNTHROID) 88 MCG tablet Take 1 tablet by mouth Daily 11/15/21   Denisse Cardoza MD   amLODIPine (NORVASC) 2.5 MG tablet Take 1 tablet by mouth daily 11/15/21   Denisse Cardoza MD   vitamin C (ASCORBIC ACID) 500 MG tablet Take 1 tablet by mouth daily 11/30/20   Dimas Paris MD   Multiple Vitamins-Minerals (THERAPEUTIC MULTIVITAMIN-MINERALS) tablet Take 1 tablet by mouth daily 11/30/20   Dimas Paris MD   calcium carbonate-vitamin D (CALTRATE) 600-400 MG-UNIT TABS per tab Take 1 tablet by mouth daily 11/30/20   Dimas Paris MD   Tiotropium Bromide-Olodaterol (STIOLTO RESPIMAT) 2.5-2.5 MCG/ACT AERS Inhale 2 puffs into the lungs 9/7/18   Historical Provider, MD   carvedilol (COREG) 3.125 MG tablet Take 3.125 mg by mouth 2 times daily (with meals)    Historical Provider, MD   albuterol (ACCUNEB) 0.63 MG/3ML nebulizer solution Take 1 ampule by nebulization every 6 hours as needed for Wheezing    Historical Provider, MD       Current medications:    Current Facility-Administered Medications   Medication Dose Route Frequency Provider Last Rate Last Admin    lidocaine viscous hcl (XYLOCAINE) 2 % solution 15 mL  15 mL Mouth/Throat Once Jacy Schmitz MD        lidocaine PF 1 % injection 1 mL  1 mL IntraDERmal Once PRN Amrita Hirsch MD        lactated ringers infusion   IntraVENous Continuous Amrita Hirsch  mL/hr at 04/22/22 0907 New Bag at 04/22/22 0907    sodium chloride flush 0.9 % injection 5-40 mL  5-40 mL IntraVENous 2 times per day Eoly Veras MD        sodium chloride flush 0.9 % injection 5-40 mL  5-40 mL IntraVENous PRN Asiya Benavidez MD        0.9 % sodium chloride infusion   IntraVENous PRN Eloy Vears MD           Allergies:  No Known Allergies    Problem List:    Patient Active Problem List   Diagnosis Code    Abdominal bloating R14.0    MDD (major depressive disorder), recurrent severe, without psychosis ([address] F33.2    Panic disorder F41.0    Obsessive compulsive disorder F42.9    Irritable bowel syndrome with constipation K58.1    Chronic constipation K59.09    Esophageal dysphagia R13.19    Cervical osteoarthritis M47.812    Chronic bilateral low back pain without sciatica M54.50, G89.29    COPD with acute exacerbation ([address] J44.1    Lumbar spinal stenosis M48.061    Acquired hypothyroidism E03.9    Compression fracture of L2 lumbar vertebra (HCC) S32.020A    Essential hypertension I10    Essential tremor G25.0    Other osteoporosis with current pathological fracture, vertebra(e), initial encounter for fracture ([address] M80.88XA    Fibromyalgia M79.7    Disorder of thyroid E07.9    Degeneration of intervertebral disc of lumbar region M51.36       Past Medical History:        Diagnosis Date    Acid reflux     Anxiety     Arthritis     Cancer ([address]     cervical    Cervical osteoarthritis 11/30/2020    COPD (chronic obstructive pulmonary disease) ([address] 02/23/2018    DDD (degenerative disc disease), cervical     Depression     Hiatal hernia     History of blood transfusion     Reaction fever went up to 107 . per Pt.      On home O2     Spinal stenosis     Tachycardia     Thyroid disease     hypo       Past Surgical History:        Procedure Laterality Date    BACK SURGERY      diskectomy, L3-L5    COLONOSCOPY N/A 4/8/2019    COLONOSCOPY DIAGNOSTIC performed by Ti Pyle MD at [facility]  11/13/2019    COLONOSCOPY POLYPECTOMY SNARE/COLD BIOPSY performed by Tavia Villalta MD at [place] [address]      OTHER SURGICAL HISTORY      ectopic pregnancy with tube removal    THYROIDECTOMY, PARTIAL      UPPER GASTROINTESTINAL ENDOSCOPY      UPPER GASTROINTESTINAL ENDOSCOPY N/A 11/27/2020    EGD BIOPSY with dilatation performed by Katelyn Mabry MD at [address] [place] [address] History:    Social History     Tobacco Use    Smoking status: Current Every Day Smoker     Packs/day: 0.50     Years: 48.00     Pack years: 24.00     Types: Cigarettes    Smokeless tobacco: Never Used    Tobacco comment: previous 3 ppd smoker   Substance Use Topics    Alcohol use: Yes     Comment: occassional                                Ready to quit: Not Answered  Counseling given: Not Answered  Comment: previous 3 ppd smoker      Vital Signs (Current):   Vitals:    04/22/22 0842   BP: (!) 108/57   Pulse: 66   Resp: 20   Temp: 97.7 °F (36.5 °C)   TempSrc: Infrared   SpO2: 99%   Weight: 110 lb (49.9 kg)   Height: 5' 3.5\" (1.613 m)                                              BP Readings from Last 3 Encounters:   04/22/22 (!) 108/57   04/05/22 118/86   03/08/22 90/62       NPO Status: Time of last liquid consumption: 2000                        Time of last solid consumption: 2000                        Date of last liquid consumption: 04/21/22                        Date of last solid food consumption: 04/21/22    BMI:   Wt Readings from Last 3 Encounters:   04/22/22 110 lb (49.9 kg)   04/08/22 110 lb (49.9 kg)   04/05/22 112 lb 8 oz (51 kg)     Body mass index is 19.18 kg/m².     CBC:   Lab Results   Component Value Date    WBC 4.6 02/11/2022    RBC 3.64 02/11/2022    HGB 11.8 02/11/2022    HCT 35.0 02/11/2022    MCV 96.2 02/11/2022    RDW 15.8 02/11/2022     02/11/2022     LR    CMP:   Lab Results   Component Value Date     02/11/2022    K 4.7 02/11/2022    CL 97 02/11/2022    CO2 30 02/11/2022    BUN 6 02/11/2022    CREATININE 0.82 02/11/2022    GFRAA >60 02/11/2022    LABGLOM >60 02/11/2022    GLUCOSE 82 02/11/2022    PROT 6.5 08/30/2020    CALCIUM 8.3 02/11/2022    BILITOT 0.57 08/30/2020    ALKPHOS 72 08/30/2020    AST 21 08/30/2020    ALT 12 08/30/2020       POC Tests: No results for input(s): POCGLU, POCNA, POCK, POCCL, POCBUN, POCHEMO, POCHCT in the last 72 hours.     Coags: No results found for: PROTIME, INR, APTT    HCG (If Applicable): No results found for: PREGTESTUR, PREGSERUM, HCG, HCGQUANT     ABGs: No results found for: PHART, PO2ART, OTC2QIN, YEH4PCC, BEART, V4SXAMCK     Type & Screen (If Applicable):  No results found for: LABABO, LABRH    Drug/Infectious Status (If Applicable):  No results found for: HIV, HEPCAB    COVID-19 Screening (If Applicable):   Lab Results   Component Value Date    COVID19 Not Detected 04/18/2022           Anesthesia Evaluation  Patient summary reviewed and Nursing notes reviewed no history of anesthetic complications:   Airway: Mallampati: II  TM distance: >3 FB   Neck ROM: full  Mouth opening: > = 3 FB Dental:          Pulmonary:normal exam  breath sounds clear to auscultation  (+) COPD: no interval change,      (-) pneumonia, asthma, shortness of breath, recent URI, sleep apnea, rhonchi, wheezes, rales, stridor, not a current smoker and no decreased breath sounds                           Cardiovascular:  Exercise tolerance: good (>4 METS),   (+) hypertension: no interval change,     (-) pacemaker, valvular problems/murmurs, past MI, CAD, CABG/stent, dysrhythmias,  angina,  CHF, orthopnea, PND,  KU, murmur, weak pulses,  friction rub, systolic click, carotid bruit,  JVD, peripheral edema, no pulmonary hypertension and no hyperlipidemia    ECG reviewed  Rhythm: regular  Rate: normal           Beta Blocker:  Dose within 24 Hrs         Neuro/Psych:   (+) neuromuscular disease:, psychiatric history:   (-) seizures, TIA, CVA and depression/anxiety GI/Hepatic/Renal:   (+) hiatal hernia, GERD: no interval change,      (-) PUD, hepatitis, liver disease, no renal disease, bowel prep and no morbid obesity       Endo/Other:    (+) hypothyroidism::., no malignancy/cancer. (-) diabetes mellitus, hyperthyroidism, blood dyscrasia, arthritis, no electrolyte abnormalities, no malignancy/cancer               Abdominal:             Vascular: negative vascular ROS. - PVD, DVT and PE. Other Findings:           Anesthesia Plan      general     ASA 3       Induction: intravenous. MIPS: Postoperative opioids intended and Prophylactic antiemetics administered. Anesthetic plan and risks discussed with patient. Plan discussed with CRNA.                   Nicanor Barrera MD   4/22/2022

## 2022-04-22 NOTE — OP NOTE
patient was examined and remains hemodynamically stable. Discharge home when criteria met. Recommendations/Plan:   1. F/U Biopsies  2. F/U In Office as instructed  3.  Discussed with the family    Electronically signed by Helen Spencer MD  on 4/22/2022 at 11:52 AM

## 2022-04-26 LAB — SURGICAL PATHOLOGY REPORT: NORMAL

## 2022-04-28 DIAGNOSIS — R10.13 EPIGASTRIC PAIN: ICD-10-CM

## 2022-04-28 DIAGNOSIS — Z13.220 ENCOUNTER FOR LIPID SCREENING FOR CARDIOVASCULAR DISEASE: ICD-10-CM

## 2022-04-28 DIAGNOSIS — Z13.6 ENCOUNTER FOR LIPID SCREENING FOR CARDIOVASCULAR DISEASE: ICD-10-CM

## 2022-04-28 DIAGNOSIS — Z00.00 ENCOUNTER FOR WELL ADULT EXAM WITHOUT ABNORMAL FINDINGS: ICD-10-CM

## 2022-04-28 DIAGNOSIS — R53.83 FATIGUE, UNSPECIFIED TYPE: ICD-10-CM

## 2022-04-28 LAB
ABSOLUTE EOS #: <0.03 K/UL (ref 0–0.44)
ABSOLUTE IMMATURE GRANULOCYTE: <0.03 K/UL (ref 0–0.3)
ABSOLUTE LYMPH #: 2.3 K/UL (ref 1.1–3.7)
ABSOLUTE MONO #: 0.75 K/UL (ref 0.1–1.2)
ALBUMIN SERPL-MCNC: 4.2 G/DL (ref 3.5–5.2)
ALBUMIN/GLOBULIN RATIO: 2 (ref 1–2.5)
ALP BLD-CCNC: 54 U/L (ref 35–104)
ALT SERPL-CCNC: 9 U/L (ref 5–33)
ANION GAP SERPL CALCULATED.3IONS-SCNC: 11 MMOL/L (ref 9–17)
AST SERPL-CCNC: 17 U/L
BASOPHILS # BLD: 1 % (ref 0–2)
BASOPHILS ABSOLUTE: 0.04 K/UL (ref 0–0.2)
BILIRUB SERPL-MCNC: 0.34 MG/DL (ref 0.3–1.2)
BUN BLDV-MCNC: 5 MG/DL (ref 8–23)
BUN/CREAT BLD: ABNORMAL (ref 9–20)
CALCIUM SERPL-MCNC: 8.8 MG/DL (ref 8.6–10.4)
CHLORIDE BLD-SCNC: 103 MMOL/L (ref 98–107)
CHOLESTEROL, FASTING: 172 MG/DL
CHOLESTEROL/HDL RATIO: 2.3
CO2: 29 MMOL/L (ref 20–31)
CREAT SERPL-MCNC: 0.63 MG/DL (ref 0.5–0.9)
DIFFERENTIAL TYPE: ABNORMAL
EOSINOPHILS RELATIVE PERCENT: 0 % (ref 1–4)
FOLATE: 8.6 NG/ML
GFR AFRICAN AMERICAN: >60 ML/MIN
GFR NON-AFRICAN AMERICAN: >60 ML/MIN
GFR SERPL CREATININE-BSD FRML MDRD: ABNORMAL ML/MIN/{1.73_M2}
GFR SERPL CREATININE-BSD FRML MDRD: ABNORMAL ML/MIN/{1.73_M2}
GLUCOSE BLD-MCNC: 83 MG/DL (ref 70–99)
HCT VFR BLD CALC: 37.7 % (ref 36.3–47.1)
HDLC SERPL-MCNC: 75 MG/DL
HEMOGLOBIN: 12.4 G/DL (ref 11.9–15.1)
IMMATURE GRANULOCYTES: 0 %
LDL CHOLESTEROL: 81 MG/DL (ref 0–130)
LYMPHOCYTES # BLD: 36 % (ref 24–43)
MCH RBC QN AUTO: 32.9 PG (ref 25.2–33.5)
MCHC RBC AUTO-ENTMCNC: 32.9 G/DL (ref 28.4–34.8)
MCV RBC AUTO: 100 FL (ref 82.6–102.9)
MONOCYTES # BLD: 12 % (ref 3–12)
NRBC AUTOMATED: 0 PER 100 WBC
PDW BLD-RTO: 13.7 % (ref 11.8–14.4)
PLATELET # BLD: 351 K/UL (ref 138–453)
PLATELET ESTIMATE: ABNORMAL
PMV BLD AUTO: 9.5 FL (ref 8.1–13.5)
POTASSIUM SERPL-SCNC: 4.6 MMOL/L (ref 3.7–5.3)
RBC # BLD: 3.77 M/UL (ref 3.95–5.11)
RBC # BLD: ABNORMAL 10*6/UL
SEG NEUTROPHILS: 51 % (ref 36–65)
SEGMENTED NEUTROPHILS ABSOLUTE COUNT: 3.3 K/UL (ref 1.5–8.1)
SODIUM BLD-SCNC: 143 MMOL/L (ref 135–144)
TOTAL PROTEIN: 6.3 G/DL (ref 6.4–8.3)
TRIGLYCERIDE, FASTING: 81 MG/DL
VITAMIN B-12: 244 PG/ML (ref 232–1245)
VITAMIN D 25-HYDROXY: 35 NG/ML
VLDLC SERPL CALC-MCNC: NORMAL MG/DL (ref 1–30)
WBC # BLD: 6.4 K/UL (ref 3.5–11.3)
WBC # BLD: ABNORMAL 10*3/UL

## 2022-05-12 ENCOUNTER — HOSPITAL ENCOUNTER (OUTPATIENT)
Dept: GENERAL RADIOLOGY | Age: 62
Discharge: HOME | End: 2022-05-14
Payer: COMMERCIAL

## 2022-05-12 ENCOUNTER — HOSPITAL ENCOUNTER (OUTPATIENT)
Age: 62
Discharge: HOME | End: 2022-05-14
Payer: COMMERCIAL

## 2022-05-12 DIAGNOSIS — M25.552 ACUTE HIP PAIN, LEFT: ICD-10-CM

## 2022-05-12 PROCEDURE — 73502 X-RAY EXAM HIP UNI 2-3 VIEWS: CPT

## 2022-05-13 ENCOUNTER — TELEPHONE (OUTPATIENT)
Dept: PRIMARY CARE CLINIC | Age: 62
End: 2022-05-13

## 2022-05-13 RX ORDER — CYCLOBENZAPRINE HCL 10 MG
10 TABLET ORAL 2 TIMES DAILY PRN
Qty: 60 TABLET | Refills: 0 | Status: SHIPPED | OUTPATIENT
Start: 2022-05-13 | End: 2022-06-30 | Stop reason: SDUPTHER

## 2022-05-13 NOTE — TELEPHONE ENCOUNTER
Insurance isn't covering her home health, they are only covering every other week since December. They need to have a peer to peer review or letter of medical necessity for [employer] as to why she needs these services are medically necessary since they started weekly in December. DOS 12/07/21, 12/13/21, 03/24/22, and everything since 03/24/22 This needs to be done with in 10 days.      Phone [phone]

## 2022-05-19 DIAGNOSIS — R19.7 DIARRHEA OF PRESUMED INFECTIOUS ORIGIN: ICD-10-CM

## 2022-05-23 RX ORDER — DIPHENOXYLATE HYDROCHLORIDE AND ATROPINE SULFATE 2.5; .025 MG/1; MG/1
TABLET ORAL
Qty: 40 TABLET | Refills: 1 | Status: SHIPPED | OUTPATIENT
Start: 2022-05-23 | End: 2022-06-02

## 2022-05-27 ENCOUNTER — TELEPHONE (OUTPATIENT)
Dept: PRIMARY CARE CLINIC | Age: 62
End: 2022-05-27

## 2022-05-27 NOTE — TELEPHONE ENCOUNTER
Genevieve Dolan from [employer]. States that pt was getting at home nursing support. [employer] was to get P. Auths for dates of service:    Dec 7th claim # [id number]    Dec 13th Claim # [id number]    Dec 17th, did not have a claim #, but said services were provided. Needed a letter of medical necessity for 3/24/2022. Asking, when were these submitted and how submitted? Can call Main # [phone] EXT: [phone] Genev[address] Dolan or     EXT: Yolette Brandon    Can L.M. or speak to them re any questions etc.    You may need to forward this to Dr. Sabrina Brand. Wanted to run it by you first.  Milton Wilkerson!

## 2022-06-08 ENCOUNTER — TELEPHONE (OUTPATIENT)
Dept: PRIMARY CARE CLINIC | Age: 62
End: 2022-06-08

## 2022-06-08 NOTE — TELEPHONE ENCOUNTER
Spoke with insurance today and they said [employer] had started this on 6/2/22. Patient notified this is not something our office is taking care of we have not ordered this Dr. Kehinde Mcneil office ordered this, patient understands.

## 2022-06-08 NOTE — TELEPHONE ENCOUNTER
----- Message from [address] [facility] [employer] sent at 6/8/2022 11:25 AM EDT -----  Subject: Message to Provider    QUESTIONS  Information for Provider? PtYusuf Vera; Provider Rehana Lim has   paperwork that needs to be completed for disability patient is asking if   an appt needs to be scheduled or can she drop off the paperwork.  ---------------------------------------------------------------------------  --------------  [address]  What is the best way for the office to contact you? OK to leave message on   voicemail  Preferred Call Back Phone Number? [phone]  ---------------------------------------------------------------------------  --------------  SCRIPT ANSWERS  Relationship to Patient?  Self

## 2022-06-08 NOTE — TELEPHONE ENCOUNTER
Spoke with patient she has the forms and will bring to her appt. I scheduled for her so they can be filled out.

## 2022-06-13 ENCOUNTER — TELEPHONE (OUTPATIENT)
Dept: PRIMARY CARE CLINIC | Age: 62
End: 2022-06-13

## 2022-06-13 DIAGNOSIS — F33.2 MDD (MAJOR DEPRESSIVE DISORDER), RECURRENT SEVERE, WITHOUT PSYCHOSIS (HCC): ICD-10-CM

## 2022-06-13 NOTE — TELEPHONE ENCOUNTER
Patient asking to be seen sooner than 06/23 having worsening edema but will be out of town on the 23rd and is on a deadline for the forms that need to be filled out.

## 2022-06-14 RX ORDER — CLONAZEPAM 0.5 MG/1
0.5 TABLET ORAL 3 TIMES DAILY PRN
Qty: 90 TABLET | Refills: 2 | Status: SHIPPED | OUTPATIENT
Start: 2022-06-14 | End: 2022-08-11 | Stop reason: SDUPTHER

## 2022-06-21 ENCOUNTER — TELEPHONE (OUTPATIENT)
Dept: ONCOLOGY | Age: 62
End: 2022-06-21

## 2022-06-23 ENCOUNTER — OFFICE VISIT (OUTPATIENT)
Dept: PRIMARY CARE CLINIC | Age: 62
End: 2022-06-23
Payer: COMMERCIAL

## 2022-06-23 VITALS
OXYGEN SATURATION: 94 % | DIASTOLIC BLOOD PRESSURE: 78 MMHG | HEIGHT: 64 IN | BODY MASS INDEX: 20.11 KG/M2 | SYSTOLIC BLOOD PRESSURE: 110 MMHG | WEIGHT: 117.8 LBS | HEART RATE: 87 BPM

## 2022-06-23 DIAGNOSIS — Z23 NEED FOR VIRAL IMMUNIZATION: ICD-10-CM

## 2022-06-23 DIAGNOSIS — I95.9 HYPOTENSION, UNSPECIFIED HYPOTENSION TYPE: ICD-10-CM

## 2022-06-23 DIAGNOSIS — J44.1 COPD WITH ACUTE EXACERBATION (HCC): Primary | ICD-10-CM

## 2022-06-23 DIAGNOSIS — F33.2 MDD (MAJOR DEPRESSIVE DISORDER), RECURRENT SEVERE, WITHOUT PSYCHOSIS (HCC): ICD-10-CM

## 2022-06-23 PROCEDURE — 3017F COLORECTAL CA SCREEN DOC REV: CPT | Performed by: FAMILY MEDICINE

## 2022-06-23 PROCEDURE — G8420 CALC BMI NORM PARAMETERS: HCPCS | Performed by: FAMILY MEDICINE

## 2022-06-23 PROCEDURE — 90677 PCV20 VACCINE IM: CPT | Performed by: FAMILY MEDICINE

## 2022-06-23 PROCEDURE — G8427 DOCREV CUR MEDS BY ELIG CLIN: HCPCS | Performed by: FAMILY MEDICINE

## 2022-06-23 PROCEDURE — 3023F SPIROM DOC REV: CPT | Performed by: FAMILY MEDICINE

## 2022-06-23 PROCEDURE — 4004F PT TOBACCO SCREEN RCVD TLK: CPT | Performed by: FAMILY MEDICINE

## 2022-06-23 PROCEDURE — 99214 OFFICE O/P EST MOD 30 MIN: CPT | Performed by: FAMILY MEDICINE

## 2022-06-23 RX ORDER — MIDODRINE HYDROCHLORIDE 2.5 MG/1
2.5 TABLET ORAL 3 TIMES DAILY
Qty: 90 TABLET | Refills: 5 | Status: SHIPPED | OUTPATIENT
Start: 2022-06-23 | End: 2022-09-29

## 2022-06-23 RX ORDER — PRIMIDONE 50 MG/1
50 TABLET ORAL 2 TIMES DAILY
Qty: 180 TABLET | Refills: 3 | Status: SHIPPED | OUTPATIENT
Start: 2022-06-23 | End: 2022-08-11 | Stop reason: SDUPTHER

## 2022-06-23 RX ORDER — DIPHENOXYLATE HYDROCHLORIDE AND ATROPINE SULFATE 2.5; .025 MG/1; MG/1
TABLET ORAL
COMMUNITY
Start: 2022-06-09 | End: 2022-06-30

## 2022-06-23 RX ORDER — LEVALBUTEROL INHALATION SOLUTION 1.25 MG/3ML
SOLUTION RESPIRATORY (INHALATION)
COMMUNITY
Start: 2022-05-27

## 2022-06-23 ASSESSMENT — ENCOUNTER SYMPTOMS
EYE DISCHARGE: 0
VOMITING: 0
COUGH: 0
SHORTNESS OF BREATH: 1
DIARRHEA: 0
ABDOMINAL PAIN: 0
SORE THROAT: 0
NAUSEA: 0
EYE REDNESS: 0
RHINORRHEA: 0
WHEEZING: 0

## 2022-06-23 ASSESSMENT — PATIENT HEALTH QUESTIONNAIRE - PHQ9
4. FEELING TIRED OR HAVING LITTLE ENERGY: 3
10. IF YOU CHECKED OFF ANY PROBLEMS, HOW DIFFICULT HAVE THESE PROBLEMS MADE IT FOR YOU TO DO YOUR WORK, TAKE CARE OF THINGS AT HOME, OR GET ALONG WITH OTHER PEOPLE: 0
9. THOUGHTS THAT YOU WOULD BE BETTER OFF DEAD, OR OF HURTING YOURSELF: 0
3. TROUBLE FALLING OR STAYING ASLEEP: 0
SUM OF ALL RESPONSES TO PHQ QUESTIONS 1-9: 11
1. LITTLE INTEREST OR PLEASURE IN DOING THINGS: 0
7. TROUBLE CONCENTRATING ON THINGS, SUCH AS READING THE NEWSPAPER OR WATCHING TELEVISION: 1
SUM OF ALL RESPONSES TO PHQ QUESTIONS 1-9: 11
5. POOR APPETITE OR OVEREATING: 0
8. MOVING OR SPEAKING SO SLOWLY THAT OTHER PEOPLE COULD HAVE NOTICED. OR THE OPPOSITE, BEING SO FIGETY OR RESTLESS THAT YOU HAVE BEEN MOVING AROUND A LOT MORE THAN USUAL: 1
2. FEELING DOWN, DEPRESSED OR HOPELESS: 3
SUM OF ALL RESPONSES TO PHQ9 QUESTIONS 1 & 2: 3
6. FEELING BAD ABOUT YOURSELF - OR THAT YOU ARE A FAILURE OR HAVE LET YOURSELF OR YOUR FAMILY DOWN: 3

## 2022-06-23 NOTE — PROGRESS NOTES
[address] [place] [address] PRIMARY CARE  [address] [facility] [address]  Dept: [phone]    Sarthak Mtz is a 64 y.o. female Established patient, who presents today for her medical conditions/complaints as noted below. Chief Complaint   Patient presents with    Hypertension     Pt here for a f/u. Pt has disability papers needed to be filled out       HPI:     HPI  Pt states having dizziness now for the past month. Pt states feels like has balance issues. Pt states sees a psychiatrist and therapist.   Pt sees a cardiologist.  States could not be put on a diuretic. Pt with swelling in both feet. States comes and goes. Started around [place] back in January. Pt states still with diarrhea. STates needs to use lomotil. Pt had egd and colonoscopy done. Pt states vns was monitoring blood pressure, checking on lungs, assisting with medication compliance. On Coreg for tachycardia. Upped to TID per cardiology. Continues to have pain all over. Patient states since her kyphoplasty, was recommended not to lift greater than 5 pounds by her surgeon. Reviewed prior notes None  Reviewed previous Labs    LDL Cholesterol (mg/dL)   Date Value   04/28/2022 81   07/13/2018 79       (goal LDL is <100)   AST (U/L)   Date Value   04/28/2022 17     ALT (U/L)   Date Value   04/28/2022 9     BUN (mg/dL)   Date Value   04/28/2022 5 (L)     TSH (mIU/L)   Date Value   07/13/2018 0.60     BP Readings from Last 3 Encounters:   06/23/22 110/78   04/22/22 (!) 141/74   04/22/22 134/68          (goal 120/80)    Past Medical History:   Diagnosis Date    Acid reflux     Anxiety     Arthritis     Cancer ([address]     cervical    Cervical osteoarthritis 11/30/2020    COPD (chronic obstructive pulmonary disease) ([address] 02/23/2018    DDD (degenerative disc disease), cervical     Depression     Hiatal hernia     History of blood transfusion     Reaction fever went up to 107 . per Pt.      On home O2     Spinal stenosis     Tachycardia     Thyroid disease     hypo      Past Surgical History:   Procedure Laterality Date    BACK SURGERY      diskectomy, L3-L5    COLONOSCOPY N/A 4/8/2019    COLONOSCOPY DIAGNOSTIC performed by Mary Alice Martines MD at [address] [place] [address] Rd  11/13/2019    COLONOSCOPY POLYPECTOMY SNARE/COLD BIOPSY performed by Mary Alice Martines MD at [address] [place] [address]      ectopic pregnancy with tube removal    THYROIDECTOMY, PARTIAL      UPPER GASTROINTESTINAL ENDOSCOPY      UPPER GASTROINTESTINAL ENDOSCOPY N/A 11/27/2020    EGD BIOPSY with dilatation performed by Ijeoma Smiley MD at [place] [address] N/A 4/22/2022    EGD BIOPSY performed by Bharti Dimas MD at [address] [place] [address]       No family history on file. Social History     Tobacco Use    Smoking status: Current Every Day Smoker     Packs/day: 0.50     Years: 48.00     Pack years: 24.00     Types: Cigarettes    Smokeless tobacco: Never Used    Tobacco comment: previous 3 ppd smoker   Substance Use Topics    Alcohol use: Yes     Comment: occassional      Current Outpatient Medications   Medication Sig Dispense Refill    diphenoxylate-atropine (LOMOTIL) 2.5-0.025 MG per tablet take 1 tablet by mouth four times a day if needed for diarrhea      levalbuterol (XOPENEX) 1.25 MG/3ML nebulizer solution inhale contents of 1 vial in nebulizer every 4 hours      midodrine (PROAMATINE) 2.5 MG tablet Take 1 tablet by mouth 3 times daily 90 tablet 5    primidone (MYSOLINE) 50 MG tablet Take 1 tablet by mouth 2 times daily 180 tablet 3    clonazePAM (KLONOPIN) 0.5 MG tablet Take 1 tablet by mouth 3 times daily as needed for Anxiety for up to 30 days. 90 tablet 2    cyclobenzaprine (FLEXERIL) 10 MG tablet TAKE 1 TABLET BY MOUTH 2 TIMES DAILY AS NEEDED FOR MUSCLE SPASMS.  60 tablet 0    estradiol (ESTRACE VAGINAL) 0.1 MG/GM vaginal cream Place 1 g vaginally daily 3 each 0    alendronate (FOSAMAX) 70 MG tablet Take 1 tablet by mouth every 7 days 12 tablet 1    albuterol sulfate  (90 Base) MCG/ACT inhaler Inhale 2 puffs into the lungs every 6 hours as needed for Wheezing or Shortness of Breath 18 g 3    omeprazole (PRILOSEC) 40 MG delayed release capsule Take 1 capsule by mouth 2 times daily 180 capsule 3    fluticasone (FLONASE) 50 MCG/ACT nasal spray 2 sprays by Each Nostril route daily 16 g 5    Blood Pressure Monitoring (BLOOD PRESSURE CUFF) MISC 1 Units by Does not apply route daily 1 each 0    sucralfate (CARAFATE) 1 GM/10ML suspension Take 10 mLs by mouth 4 times daily (before meals and nightly) 3600 mL 0    Melatonin 10 MG TABS Take 10 mg by mouth nightly 90 tablet 3    fluticasone (FLOVENT HFA) 110 MCG/ACT inhaler Inhale 1 puff into the lungs 2 times daily 12 g 2    mirtazapine (REMERON) 30 MG tablet       busPIRone (BUSPAR) 10 MG tablet Take 10 mg by mouth 2 times daily      montelukast (SINGULAIR) 10 MG tablet Take 10 mg by mouth daily      venlafaxine (EFFEXOR XR) 150 MG extended release capsule Take 150 mg by mouth daily      levothyroxine (SYNTHROID) 88 MCG tablet Take 1 tablet by mouth Daily 90 tablet 1    vitamin C (ASCORBIC ACID) 500 MG tablet Take 1 tablet by mouth daily 30 tablet 3    Multiple Vitamins-Minerals (THERAPEUTIC MULTIVITAMIN-MINERALS) tablet Take 1 tablet by mouth daily 30 tablet 0    calcium carbonate-vitamin D (CALTRATE) 600-400 MG-UNIT TABS per tab Take 1 tablet by mouth daily 30 tablet 0    Tiotropium Bromide-Olodaterol (STIOLTO RESPIMAT) 2.5-2.5 MCG/ACT AERS Inhale 2 puffs into the lungs      carvedilol (COREG) 3.125 MG tablet Take 3.125 mg by mouth 2 times daily (with meals)      albuterol (ACCUNEB) 0.63 MG/3ML nebulizer solution Take 1 ampule by nebulization every 6 hours as needed for Wheezing       No current facility-administered medications for this visit.      No Known Allergies    Health Maintenance   Topic Date Due    Annual Wellness Visit (AWV)  Never done    COVID-19 Vaccine (1) Never done    HIV screen  Never done    Hepatitis C screen  Never done    DTaP/Tdap/Td vaccine (1 - Tdap) Never done    Cervical cancer screen  Never done    Shingles vaccine (1 of 2) Never done    Pneumococcal 0-64 years Vaccine (2 - PCV) 10/05/2020    Low dose CT lung screening  10/05/2021    Colorectal Cancer Screen  11/13/2022    Depression Monitoring  11/15/2022    Breast cancer screen  06/04/2023    Lipids  04/28/2027    Flu vaccine  Completed    Hepatitis A vaccine  Aged Out    Hepatitis B vaccine  Aged Out    Hib vaccine  Aged Out    Meningococcal (ACWY) vaccine  Aged Out       Subjective:      Review of Systems   Constitutional: Negative for chills and fever. HENT: Negative for rhinorrhea and sore throat. Eyes: Negative for discharge and redness. Respiratory: Positive for shortness of breath. Negative for cough and wheezing. Cardiovascular: Negative for chest pain and palpitations. Gastrointestinal: Negative for abdominal pain, diarrhea, nausea and vomiting. Genitourinary: Negative for dysuria and frequency. Musculoskeletal: Negative for arthralgias and myalgias. Neurological: Positive for weakness. Negative for dizziness, light-headedness and headaches. Psychiatric/Behavioral: Negative for sleep disturbance. Objective:     /78   Pulse 87   Ht 5' 3.5\" (1.613 m)   Wt 117 lb 12.8 oz (53.4 kg)   SpO2 94%   BMI 20.54 kg/m²   Physical Exam  Vitals and nursing note reviewed. Constitutional:       General: She is not in acute distress. Appearance: She is well-developed. She is not ill-appearing. HENT:      Head: Normocephalic and atraumatic. Right Ear: External ear normal.      Left Ear: External ear normal.   Eyes:      General: No scleral icterus. Right eye: No discharge. Left eye: No discharge. Conjunctiva/sclera: Conjunctivae normal.   Neck:      Thyroid: No thyromegaly. Trachea: No tracheal deviation. Cardiovascular:      Rate and Rhythm: Normal rate and regular rhythm. Heart sounds: Normal heart sounds. Pulmonary:      Effort: Pulmonary effort is normal. No respiratory distress. Breath sounds: No wheezing. Comments: Diminished breath sounds bilateral  Lymphadenopathy:      Cervical: No cervical adenopathy. Skin:     General: Skin is warm. Findings: No rash. Neurological:      Mental Status: She is alert and oriented to person, place, and time. Psychiatric:         Mood and Affect: Mood normal.         Behavior: Behavior normal.         Thought Content: Thought content normal.         Assessment:       Diagnosis Orders   1. COPD with acute exacerbation ([facility]     2. MDD (major depressive disorder), recurrent severe, without psychosis ([facility]     3. Hypotension, unspecified hypotension type  midodrine (PROAMATINE) 2.5 MG tablet   4. Need for viral immunization  Pneumococcal, PCV20, PREVNAR 20, (age 25 yrs+), IM, PF        Plan:    keep appt with cardiology in August   Add midodrine for hypotension   Keep appt with psychiatry  continue with vns. Monitor blood pressure with adding midodrine  Needs medication and vitals monitoring   Prevnar 20 today  Patient strongly encouraged to quit smoking  Staff to get copy of cardiac echo    Return in about 3 months (around 9/23/2022). Orders Placed This Encounter   Procedures    Pneumococcal, PCV20, PREVNAR 20, (age 25 yrs+), IM, PF     Orders Placed This Encounter   Medications    midodrine (PROAMATINE) 2.5 MG tablet     Sig: Take 1 tablet by mouth 3 times daily     Dispense:  90 tablet     Refill:  5    primidone (MYSOLINE) 50 MG tablet     Sig: Take 1 tablet by mouth 2 times daily     Dispense:  180 tablet     Refill:  3       Patient given educational materials - see patient instructions.   Discussed use, benefit, and side effects of prescribed medications. All patient questions answered. Pt voiced understanding. Reviewed health maintenance. Instructed to continue current medications, diet andexercise. Patient agreed with treatment plan. Follow up as directed.      Electronicallysigned by Jaguar Crow MD on 6/23/2022 at 3:06 PM

## 2022-06-28 ENCOUNTER — HOSPITAL ENCOUNTER (OUTPATIENT)
Dept: CT IMAGING | Age: 62
Discharge: HOME | End: 2022-06-30
Payer: COMMERCIAL

## 2022-06-28 DIAGNOSIS — Z87.891 PERSONAL HISTORY OF TOBACCO USE: ICD-10-CM

## 2022-06-28 PROCEDURE — 71271 CT THORAX LUNG CANCER SCR C-: CPT

## 2022-06-30 DIAGNOSIS — R19.7 DIARRHEA OF PRESUMED INFECTIOUS ORIGIN: ICD-10-CM

## 2022-06-30 DIAGNOSIS — K58.1 IRRITABLE BOWEL SYNDROME WITH CONSTIPATION: Primary | ICD-10-CM

## 2022-06-30 DIAGNOSIS — M25.552 ACUTE HIP PAIN, LEFT: ICD-10-CM

## 2022-06-30 RX ORDER — DIPHENOXYLATE HYDROCHLORIDE AND ATROPINE SULFATE 2.5; .025 MG/1; MG/1
TABLET ORAL
Qty: 40 TABLET | Refills: 0 | Status: SHIPPED | OUTPATIENT
Start: 2022-06-30 | End: 2022-07-30

## 2022-06-30 RX ORDER — CYCLOBENZAPRINE HCL 10 MG
10 TABLET ORAL 2 TIMES DAILY PRN
Qty: 60 TABLET | Refills: 0 | Status: SHIPPED | OUTPATIENT
Start: 2022-06-30

## 2022-06-30 RX ORDER — HYDROCODONE BITARTRATE AND ACETAMINOPHEN 5; 325 MG/1; MG/1
1 TABLET ORAL EVERY 8 HOURS PRN
Qty: 20 TABLET | Refills: 0 | Status: SHIPPED | OUTPATIENT
Start: 2022-06-30 | End: 2022-09-29 | Stop reason: SDUPTHER

## 2022-06-30 NOTE — TELEPHONE ENCOUNTER
LAST VISIT:   6/23/2022     Future Appointments   Date Time Provider Manda Jaimes   9/29/2022  2:00 PM MD AMA Francois

## 2022-08-11 DIAGNOSIS — F33.2 MDD (MAJOR DEPRESSIVE DISORDER), RECURRENT SEVERE, WITHOUT PSYCHOSIS (HCC): ICD-10-CM

## 2022-08-11 RX ORDER — MONTELUKAST SODIUM 10 MG/1
10 TABLET ORAL DAILY
Qty: 30 TABLET | Refills: 3 | Status: SHIPPED | OUTPATIENT
Start: 2022-08-11 | End: 2022-08-12 | Stop reason: SDUPTHER

## 2022-08-11 RX ORDER — PRIMIDONE 50 MG/1
50 TABLET ORAL 2 TIMES DAILY
Qty: 180 TABLET | Refills: 3 | Status: SHIPPED | OUTPATIENT
Start: 2022-08-11 | End: 2022-08-12 | Stop reason: SDUPTHER

## 2022-08-11 RX ORDER — CLONAZEPAM 0.5 MG/1
0.5 TABLET ORAL 3 TIMES DAILY PRN
Qty: 90 TABLET | Refills: 0 | Status: SHIPPED | OUTPATIENT
Start: 2022-08-11 | End: 2022-08-12 | Stop reason: SDUPTHER

## 2022-08-11 RX ORDER — CARVEDILOL 3.12 MG/1
3.12 TABLET ORAL 2 TIMES DAILY WITH MEALS
Qty: 60 TABLET | Refills: 3 | Status: SHIPPED | OUTPATIENT
Start: 2022-08-11 | End: 2022-08-12 | Stop reason: SDUPTHER

## 2022-08-12 DIAGNOSIS — J44.1 COPD WITH ACUTE EXACERBATION (HCC): ICD-10-CM

## 2022-08-12 DIAGNOSIS — F33.2 MDD (MAJOR DEPRESSIVE DISORDER), RECURRENT SEVERE, WITHOUT PSYCHOSIS (HCC): ICD-10-CM

## 2022-08-12 RX ORDER — MONTELUKAST SODIUM 10 MG/1
10 TABLET ORAL DAILY
Qty: 30 TABLET | Refills: 3 | Status: SHIPPED | OUTPATIENT
Start: 2022-08-12 | End: 2022-08-26 | Stop reason: SDUPTHER

## 2022-08-12 RX ORDER — CARVEDILOL 3.12 MG/1
3.12 TABLET ORAL 2 TIMES DAILY WITH MEALS
Qty: 60 TABLET | Refills: 3 | Status: SHIPPED | OUTPATIENT
Start: 2022-08-12 | End: 2022-08-26 | Stop reason: SDUPTHER

## 2022-08-12 RX ORDER — ALBUTEROL SULFATE 90 UG/1
2 AEROSOL, METERED RESPIRATORY (INHALATION) EVERY 6 HOURS PRN
Qty: 18 G | Refills: 3 | Status: SHIPPED | OUTPATIENT
Start: 2022-08-12 | End: 2022-08-26 | Stop reason: SDUPTHER

## 2022-08-12 RX ORDER — CLONAZEPAM 0.5 MG/1
0.5 TABLET ORAL 3 TIMES DAILY PRN
Qty: 90 TABLET | Refills: 0 | Status: SHIPPED | OUTPATIENT
Start: 2022-08-12 | End: 2022-08-26 | Stop reason: SDUPTHER

## 2022-08-12 RX ORDER — PRIMIDONE 50 MG/1
50 TABLET ORAL 2 TIMES DAILY
Qty: 180 TABLET | Refills: 3 | Status: SHIPPED | OUTPATIENT
Start: 2022-08-12 | End: 2022-08-26 | Stop reason: SDUPTHER

## 2022-08-23 ENCOUNTER — TELEPHONE (OUTPATIENT)
Dept: PRIMARY CARE CLINIC | Age: 62
End: 2022-08-23

## 2022-08-25 DIAGNOSIS — J44.1 COPD WITH ACUTE EXACERBATION (HCC): ICD-10-CM

## 2022-08-25 DIAGNOSIS — F33.2 MDD (MAJOR DEPRESSIVE DISORDER), RECURRENT SEVERE, WITHOUT PSYCHOSIS (HCC): ICD-10-CM

## 2022-08-26 RX ORDER — FLUTICASONE PROPIONATE 110 UG/1
1 AEROSOL, METERED RESPIRATORY (INHALATION) 2 TIMES DAILY
Qty: 3 EACH | Refills: 3 | Status: SHIPPED | OUTPATIENT
Start: 2022-08-26

## 2022-08-26 RX ORDER — MONTELUKAST SODIUM 10 MG/1
10 TABLET ORAL DAILY
Qty: 90 TABLET | Refills: 3 | Status: SHIPPED | OUTPATIENT
Start: 2022-08-26

## 2022-08-26 RX ORDER — OMEPRAZOLE 40 MG/1
40 CAPSULE, DELAYED RELEASE ORAL 2 TIMES DAILY
Qty: 180 CAPSULE | Refills: 3 | Status: SHIPPED | OUTPATIENT
Start: 2022-08-26

## 2022-08-26 RX ORDER — PRIMIDONE 50 MG/1
50 TABLET ORAL 2 TIMES DAILY
Qty: 180 TABLET | Refills: 3 | Status: SHIPPED | OUTPATIENT
Start: 2022-08-26

## 2022-08-26 RX ORDER — ESTRADIOL 0.1 MG/G
1 CREAM VAGINAL DAILY
Qty: 3 EACH | Refills: 3 | Status: SHIPPED | OUTPATIENT
Start: 2022-08-26

## 2022-08-26 RX ORDER — CARVEDILOL 3.12 MG/1
3.12 TABLET ORAL 2 TIMES DAILY WITH MEALS
Qty: 180 TABLET | Refills: 3 | Status: SHIPPED | OUTPATIENT
Start: 2022-08-26 | End: 2022-09-29

## 2022-08-26 RX ORDER — CLONAZEPAM 0.5 MG/1
0.5 TABLET ORAL 3 TIMES DAILY PRN
Qty: 270 TABLET | Refills: 0 | Status: SHIPPED | OUTPATIENT
Start: 2022-08-26 | End: 2022-09-25

## 2022-08-26 RX ORDER — ALBUTEROL SULFATE 90 UG/1
2 AEROSOL, METERED RESPIRATORY (INHALATION) EVERY 6 HOURS PRN
Qty: 18 G | Refills: 3 | Status: SHIPPED | OUTPATIENT
Start: 2022-08-26

## 2022-09-15 RX ORDER — FLUTICASONE PROPIONATE 50 MCG
2 SPRAY, SUSPENSION (ML) NASAL DAILY
Qty: 16 G | Refills: 5 | Status: SHIPPED | OUTPATIENT
Start: 2022-09-15

## 2022-09-29 ENCOUNTER — OFFICE VISIT (OUTPATIENT)
Dept: PRIMARY CARE CLINIC | Age: 62
End: 2022-09-29
Payer: COMMERCIAL

## 2022-09-29 VITALS
BODY MASS INDEX: 20.66 KG/M2 | WEIGHT: 116.6 LBS | HEART RATE: 79 BPM | SYSTOLIC BLOOD PRESSURE: 136 MMHG | HEIGHT: 63 IN | DIASTOLIC BLOOD PRESSURE: 82 MMHG

## 2022-09-29 DIAGNOSIS — M25.552 ACUTE HIP PAIN, LEFT: ICD-10-CM

## 2022-09-29 DIAGNOSIS — M48.061 SPINAL STENOSIS OF LUMBAR REGION, UNSPECIFIED WHETHER NEUROGENIC CLAUDICATION PRESENT: Primary | ICD-10-CM

## 2022-09-29 DIAGNOSIS — Z23 NEED FOR VIRAL IMMUNIZATION: ICD-10-CM

## 2022-09-29 DIAGNOSIS — Z23 NEED FOR VACCINATION: ICD-10-CM

## 2022-09-29 PROCEDURE — 3017F COLORECTAL CA SCREEN DOC REV: CPT | Performed by: FAMILY MEDICINE

## 2022-09-29 PROCEDURE — 90472 IMMUNIZATION ADMIN EACH ADD: CPT | Performed by: FAMILY MEDICINE

## 2022-09-29 PROCEDURE — 4004F PT TOBACCO SCREEN RCVD TLK: CPT | Performed by: FAMILY MEDICINE

## 2022-09-29 PROCEDURE — G8427 DOCREV CUR MEDS BY ELIG CLIN: HCPCS | Performed by: FAMILY MEDICINE

## 2022-09-29 PROCEDURE — 90674 CCIIV4 VAC NO PRSV 0.5 ML IM: CPT | Performed by: FAMILY MEDICINE

## 2022-09-29 PROCEDURE — G8420 CALC BMI NORM PARAMETERS: HCPCS | Performed by: FAMILY MEDICINE

## 2022-09-29 PROCEDURE — 90471 IMMUNIZATION ADMIN: CPT | Performed by: FAMILY MEDICINE

## 2022-09-29 PROCEDURE — 90750 HZV VACC RECOMBINANT IM: CPT | Performed by: FAMILY MEDICINE

## 2022-09-29 PROCEDURE — 99213 OFFICE O/P EST LOW 20 MIN: CPT | Performed by: FAMILY MEDICINE

## 2022-09-29 RX ORDER — CARVEDILOL 3.12 MG/1
3.12 TABLET ORAL 3 TIMES DAILY
Qty: 270 TABLET | Refills: 1
Start: 2022-09-29

## 2022-09-29 RX ORDER — HYDROCODONE BITARTRATE AND ACETAMINOPHEN 5; 325 MG/1; MG/1
1 TABLET ORAL EVERY 8 HOURS PRN
Qty: 20 TABLET | Refills: 0 | Status: SHIPPED | OUTPATIENT
Start: 2022-09-29 | End: 2022-10-06

## 2022-09-29 ASSESSMENT — ENCOUNTER SYMPTOMS
VOMITING: 0
RHINORRHEA: 1
EYE REDNESS: 0
NAUSEA: 0
WHEEZING: 0
EYE DISCHARGE: 0
SORE THROAT: 0
COUGH: 0
SHORTNESS OF BREATH: 1
ABDOMINAL PAIN: 0
DIARRHEA: 0

## 2022-09-29 NOTE — PROGRESS NOTES
[address] [facility] PRIMARY CARE  [address] [place]  [facility] [address]  Dept: [phone]    Odessa Ramachandran is a 64 y.o. female Established patient, who presents today for her medical conditions/complaints as noted below. Chief Complaint   Patient presents with    Back Pain       HPI:     HPI  Here for 3 month follow up. End stage COPD, BP issues, on oxygen. On 2L oxygen 24 hours a day. Now down to 5 cigarettes a day, down from 1 PPD. Continuing with physical exercise. URI symptoms onset Sunday night, at home COVID test negative. Symptoms beginning to resolve. Continued lumbar back and neck pain. Ibuprofen 800 with limited relief. She has history of gastric irritation/erosion. Patient not taking medication for osteoporosis because she is anticipating dental surgery. Not taking midodrine. No other medications changes. Patient requests flu vaccine if it is okay to. Wishes to discuss shingles vaccine. Patient believes she had mammogram performed in June of this year. Pt requesting something for back pain. Pain in lower back. History of compression fractures. Used tramadol without benefit. Pt states has prolapsed disk in neck. No plans for surgery.          Reviewed prior notes None  Reviewed previous Labs    LDL Cholesterol (mg/dL)   Date Value   04/28/2022 81   07/13/2018 79       (goal LDL is <100)   AST (U/L)   Date Value   04/28/2022 17     ALT (U/L)   Date Value   04/28/2022 9     BUN (mg/dL)   Date Value   04/28/2022 5 (L)     TSH (mIU/L)   Date Value   07/13/2018 0.60     BP Readings from Last 3 Encounters:   09/29/22 136/82   06/23/22 110/78   04/22/22 (!) 141/74          (goal 120/80)    Past Medical History:   Diagnosis Date    Acid reflux     Anxiety     Arthritis     Cancer (HCC)     cervical    Cervical osteoarthritis 11/30/2020    COPD (chronic obstructive pulmonary disease) ([facility] 02/23/2018    DDD (degenerative disc disease), cervical Depression     Hiatal hernia     History of blood transfusion     Reaction fever went up to 107 . per Pt. On home O2     Spinal stenosis     Tachycardia     Thyroid disease     hypo      Past Surgical History:   Procedure Laterality Date    BACK SURGERY      diskectomy, L3-L5    COLONOSCOPY N/A 4/8/2019    COLONOSCOPY DIAGNOSTIC performed by Serena Aden MD at [address]  11/13/2019    COLONOSCOPY POLYPECTOMY SNARE/COLD BIOPSY performed by Serena Aden MD at [facility]      ectopic pregnancy with tube removal    THYROIDECTOMY, PARTIAL      UPPER GASTROINTESTINAL ENDOSCOPY      UPPER GASTROINTESTINAL ENDOSCOPY N/A 11/27/2020    EGD BIOPSY with dilatation performed by Christina Womack MD at [address]  N/A 4/22/2022    EGD BIOPSY performed by Camden Andres MD at [facility]       No family history on file. Social History     Tobacco Use    Smoking status: Every Day     Packs/day: 0.50     Years: 48.00     Pack years: 24.00     Types: Cigarettes    Smokeless tobacco: Never    Tobacco comments:     previous 3 ppd smoker   Substance Use Topics    Alcohol use: Yes     Comment: occassional      Current Outpatient Medications   Medication Sig Dispense Refill    carvedilol (COREG) 3.125 MG tablet Take 1 tablet by mouth 3 times daily 270 tablet 1    HYDROcodone-acetaminophen (NORCO) 5-325 MG per tablet Take 1 tablet by mouth every 8 hours as needed for Pain for up to 7 days.  Take lowest dose possible to manage pain 20 tablet 0    fluticasone (FLONASE) 50 MCG/ACT nasal spray 2 sprays by Each Nostril route daily 16 g 5    fluticasone (FLOVENT HFA) 110 MCG/ACT inhaler Inhale 1 puff into the lungs 2 times daily 3 each 3    estradiol (ESTRACE VAGINAL) 0.1 MG/GM vaginal cream Place 1 g vaginally daily 3 each 3    albuterol sulfate HFA (PROVENTIL;VENTOLIN;PROAIR) 108 (90 Base) MCG/ACT inhaler Inhale 2 puffs into the lungs every 6 hours as needed for Wheezing or Shortness of Breath 18 g 3    montelukast (SINGULAIR) 10 MG tablet Take 1 tablet by mouth daily 90 tablet 3    primidone (MYSOLINE) 50 MG tablet Take 1 tablet by mouth 2 times daily 180 tablet 3    omeprazole (PRILOSEC) 40 MG delayed release capsule Take 1 capsule by mouth in the morning and 1 capsule in the evening. 180 capsule 3    cyclobenzaprine (FLEXERIL) 10 MG tablet Take 1 tablet by mouth 2 times daily as needed for Muscle spasms 60 tablet 0    levalbuterol (XOPENEX) 1.25 MG/3ML nebulizer solution inhale contents of 1 vial in nebulizer every 4 hours      alendronate (FOSAMAX) 70 MG tablet Take 1 tablet by mouth every 7 days 12 tablet 1    Blood Pressure Monitoring (BLOOD PRESSURE CUFF) MISC 1 Units by Does not apply route daily 1 each 0    Melatonin 10 MG TABS Take 10 mg by mouth nightly 90 tablet 3    mirtazapine (REMERON) 30 MG tablet       busPIRone (BUSPAR) 10 MG tablet Take 10 mg by mouth 2 times daily      venlafaxine (EFFEXOR XR) 150 MG extended release capsule Take 150 mg by mouth daily      levothyroxine (SYNTHROID) 88 MCG tablet Take 1 tablet by mouth Daily 90 tablet 1    vitamin C (ASCORBIC ACID) 500 MG tablet Take 1 tablet by mouth daily 30 tablet 3    Multiple Vitamins-Minerals (THERAPEUTIC MULTIVITAMIN-MINERALS) tablet Take 1 tablet by mouth daily 30 tablet 0    calcium carbonate-vitamin D (CALTRATE) 600-400 MG-UNIT TABS per tab Take 1 tablet by mouth daily 30 tablet 0    tiotropium-olodaterol (STIOLTO) 2.5-2.5 MCG/ACT AERS Inhale 2 puffs into the lungs      albuterol (ACCUNEB) 0.63 MG/3ML nebulizer solution Take 1 ampule by nebulization every 6 hours as needed for Wheezing      clonazePAM (KLONOPIN) 0.5 MG tablet Take 1 tablet by mouth 3 times daily as needed for Anxiety for up to 30 days.  270 tablet 0    sucralfate (CARAFATE) 1 GM/10ML suspension Take 10 mLs by mouth 4 times daily (before meals and nightly) 3600 mL 0     No current facility-administered medications for this visit. No Known Allergies    Health Maintenance   Topic Date Due    COVID-19 Vaccine (1) Never done    HIV screen  Never done    Hepatitis C screen  Never done    DTaP/Tdap/Td vaccine (1 - Tdap) Never done    Cervical cancer screen  Never done    Shingles vaccine (1 of 2) Never done    Flu vaccine (1) 08/01/2022    Colorectal Cancer Screen  11/13/2022    Breast cancer screen  06/04/2023    Depression Monitoring  06/23/2023    Low dose CT lung screening  06/28/2023    Lipids  04/28/2027    Pneumococcal 0-64 years Vaccine  Completed    Hepatitis A vaccine  Aged Out    Hepatitis B vaccine  Aged Out    Hib vaccine  Aged Out    Meningococcal (ACWY) vaccine  Aged Out       Subjective:      Review of Systems   Constitutional:  Negative for chills and fever. HENT:  Positive for postnasal drip and rhinorrhea. Negative for sore throat. Eyes:  Negative for discharge and redness. Respiratory:  Positive for shortness of breath. Negative for cough and wheezing. Cardiovascular:  Negative for chest pain and palpitations. Gastrointestinal:  Negative for abdominal pain, diarrhea, nausea and vomiting. Genitourinary:  Negative for dysuria and frequency. Musculoskeletal:  Negative for arthralgias and myalgias. Neurological:  Negative for dizziness, light-headedness and headaches. Psychiatric/Behavioral:  Negative for sleep disturbance. Objective:     /82   Pulse 79   Ht 5' 3.48\" (1.612 m)   Wt 116 lb 9.6 oz (52.9 kg)   BMI 20.34 kg/m²   Physical Exam  Vitals and nursing note reviewed. Constitutional:       General: She is not in acute distress. Appearance: She is well-developed. She is not ill-appearing. HENT:      Head: Normocephalic and atraumatic. Right Ear: External ear normal.      Left Ear: External ear normal.   Eyes:      General: No scleral icterus. Right eye: No discharge. Left eye: No discharge. Conjunctiva/sclera: Conjunctivae normal.   Neck:      Thyroid: No thyromegaly. Trachea: No tracheal deviation. Cardiovascular:      Rate and Rhythm: Normal rate and regular rhythm. Heart sounds: Normal heart sounds. Pulmonary:      Effort: Pulmonary effort is normal. No respiratory distress. Breath sounds: Normal breath sounds. No wheezing. Lymphadenopathy:      Cervical: No cervical adenopathy. Skin:     General: Skin is warm. Findings: No rash. Neurological:      Mental Status: She is alert and oriented to person, place, and time. Psychiatric:         Mood and Affect: Mood normal.         Behavior: Behavior normal.         Thought Content: Thought content normal.       Assessment:       Diagnosis Orders   1. Spinal stenosis of lumbar region, unspecified whether neurogenic claudication present  MRI [address], MD, Pain Management, [place]      2. Need for viral immunization  Zoster, SHINGRIX, (50 yrs +), IM      3. Acute hip pain, left  HYDROcodone-acetaminophen (NORCO) 5-325 MG per tablet           Plan:    Pt to keep appt with pulmonary in October   Flu shot today  Mri lumbar spine  Refer to pain management for second opinion  Short Term prescription for Norco  MRI from 2018 reviewed      Return in about 3 months (around 12/29/2022). Orders Placed This Encounter   Procedures    MRI LUMBAR SPINE WO CONTRAST     Standing Status:   Future     Standing Expiration Date:   9/29/2023     Order Specific Question:   Reason for exam:     Answer:   severe low back pain     Order Specific Question:   What is the sedation requirement?      Answer:   None    Zoster, SHINGRIX, (50 yrs +), IM    Claudia Barron MD, Pain Management, [place]     Referral Priority:   Routine     Referral Type:   Eval and Treat     Referral Reason:   Specialty Services Required     Referred to Provider:   Kia Jay MD     Requested Specialty:   Pain Management Number of Visits Requested:   1     Orders Placed This Encounter   Medications    carvedilol (COREG) 3.125 MG tablet     Sig: Take 1 tablet by mouth 3 times daily     Dispense:  270 tablet     Refill:  1    HYDROcodone-acetaminophen (NORCO) 5-325 MG per tablet     Sig: Take 1 tablet by mouth every 8 hours as needed for Pain for up to 7 days. Take lowest dose possible to manage pain     Dispense:  20 tablet     Refill:  0     Reduce doses taken as pain becomes manageable       Patient given educational materials - see patient instructions. Discussed use, benefit, and side effects of prescribed medications. All patient questions answered. Pt voiced understanding. Reviewed health maintenance. Instructed to continue current medications, diet andexercise. Patient agreed with treatment plan. Follow up as directed.      Electronicallysigned by Eliud Hinkle MD on 9/29/2022 at 2:43 PM

## 2022-09-30 RX ORDER — HYDROCODONE BITARTRATE AND ACETAMINOPHEN 5; 325 MG/1; MG/1
1 TABLET ORAL EVERY 8 HOURS PRN
Qty: 20 TABLET | Refills: 0 | OUTPATIENT
Start: 2022-09-30 | End: 2022-10-07

## 2022-12-08 RX ORDER — LEVOTHYROXINE SODIUM 88 UG/1
88 TABLET ORAL DAILY
Qty: 90 TABLET | Refills: 0 | Status: SHIPPED | OUTPATIENT
Start: 2022-12-08

## 2022-12-08 RX ORDER — FLUTICASONE PROPIONATE 110 UG/1
1 AEROSOL, METERED RESPIRATORY (INHALATION) 2 TIMES DAILY
Qty: 3 EACH | Refills: 0 | Status: SHIPPED | OUTPATIENT
Start: 2022-12-08

## 2022-12-20 RX ORDER — LEVOTHYROXINE SODIUM 88 UG/1
88 TABLET ORAL DAILY
Qty: 90 TABLET | Refills: 3 | Status: SHIPPED | OUTPATIENT
Start: 2022-12-20

## 2022-12-20 NOTE — TELEPHONE ENCOUNTER
Patient states medication was sent to wrong pharmacy- insurance will not cover     [facility]

## 2022-12-23 ENCOUNTER — TELEPHONE (OUTPATIENT)
Dept: PRIMARY CARE CLINIC | Age: 62
End: 2022-12-23

## 2022-12-23 DIAGNOSIS — K58.1 IRRITABLE BOWEL SYNDROME WITH CONSTIPATION: Primary | ICD-10-CM

## 2022-12-23 DIAGNOSIS — J06.9 UPPER RESPIRATORY TRACT INFECTION, UNSPECIFIED TYPE: ICD-10-CM

## 2022-12-23 NOTE — TELEPHONE ENCOUNTER
Patient calls to notify that she has has concern for COVID infection because she was exposed to someone with covid last week and at 2pm today some symptoms of illness have begun. Patient reports dry cough, runny nose, sneezing, back pain, chest congestion, and some shortness of breath but that it is the same amount she normally has with her COPD. Patient denies chest pain at this time. Per after hour covid guideline, patient informed to call office Tuesday when they re-open and to go to the ER of any emergent symptoms. Patient is agreeable.

## 2022-12-27 DIAGNOSIS — J44.1 COPD WITH ACUTE EXACERBATION (HCC): ICD-10-CM

## 2022-12-27 RX ORDER — ALBUTEROL SULFATE 0.63 MG/3ML
1 SOLUTION RESPIRATORY (INHALATION) EVERY 6 HOURS PRN
Qty: 270 ML | Refills: 2 | Status: SHIPPED | OUTPATIENT
Start: 2022-12-27

## 2022-12-27 RX ORDER — AZITHROMYCIN 250 MG/1
250 TABLET, FILM COATED ORAL SEE ADMIN INSTRUCTIONS
Qty: 6 TABLET | Refills: 0 | Status: SHIPPED | OUTPATIENT
Start: 2022-12-27 | End: 2023-01-01

## 2022-12-27 NOTE — TELEPHONE ENCOUNTER
Nena Suh sent in   Not legal to prescribe antiviral medication for covid unless there is a positive test.

## 2022-12-27 NOTE — TELEPHONE ENCOUNTER
Pt refused appointment and refused covid testing at this time     Pt is asking for medication sent to pharmacy     Please advise

## 2022-12-30 ENCOUNTER — HOSPITAL ENCOUNTER (OUTPATIENT)
Dept: MRI IMAGING | Age: 62
Discharge: HOME | End: 2022-12-30
Payer: COMMERCIAL

## 2022-12-30 DIAGNOSIS — M48.061 SPINAL STENOSIS OF LUMBAR REGION, UNSPECIFIED WHETHER NEUROGENIC CLAUDICATION PRESENT: ICD-10-CM

## 2022-12-30 PROCEDURE — 72148 MRI LUMBAR SPINE W/O DYE: CPT

## 2023-01-16 ENCOUNTER — TELEPHONE (OUTPATIENT)
Dept: PRIMARY CARE CLINIC | Age: 63
End: 2023-01-16

## 2023-01-16 DIAGNOSIS — R10.13 EPIGASTRIC PAIN: ICD-10-CM

## 2023-01-16 DIAGNOSIS — R42 DIZZINESS: ICD-10-CM

## 2023-01-16 DIAGNOSIS — R19.7 DIARRHEA OF PRESUMED INFECTIOUS ORIGIN: ICD-10-CM

## 2023-01-16 DIAGNOSIS — F33.2 MDD (MAJOR DEPRESSIVE DISORDER), RECURRENT SEVERE, WITHOUT PSYCHOSIS (HCC): ICD-10-CM

## 2023-01-16 DIAGNOSIS — I95.9 HYPOTENSION, UNSPECIFIED HYPOTENSION TYPE: ICD-10-CM

## 2023-01-16 RX ORDER — SUCRALFATE ORAL 1 G/10ML
1 SUSPENSION ORAL
Qty: 3600 ML | Refills: 0 | Status: SHIPPED | OUTPATIENT
Start: 2023-01-16 | End: 2023-04-16

## 2023-01-16 RX ORDER — CLONAZEPAM 0.5 MG/1
0.5 TABLET ORAL 3 TIMES DAILY PRN
Qty: 270 TABLET | Refills: 0 | Status: SHIPPED | OUTPATIENT
Start: 2023-01-16 | End: 2023-02-15

## 2023-01-16 RX ORDER — ADHESIVE BANDAGE 3/4"
1 BANDAGE TOPICAL DAILY
Qty: 1 EACH | Refills: 0 | Status: SHIPPED | OUTPATIENT
Start: 2023-01-16

## 2023-01-16 RX ORDER — CYCLOBENZAPRINE HCL 10 MG
10 TABLET ORAL 2 TIMES DAILY PRN
Qty: 60 TABLET | Refills: 0 | Status: SHIPPED | OUTPATIENT
Start: 2023-01-16

## 2023-01-16 NOTE — TELEPHONE ENCOUNTER
Care Transitions Initial Follow Up Call    Outreach made within 2 business days of discharge: Yes    Patient: Shann Spurling Patient : 1960   MRN: [id number]  Reason for Admission: There are no discharge diagnoses documented for the most recent discharge. Discharge Date: 2023       Spoke with: Pt    Discharge department/facility: [facility] Interactive Patient Contact:  Was patient able to fill all prescriptions: Yes  Was patient instructed to bring all medications to the follow-up visit: Yes  Is patient taking all medications as directed in the discharge summary?  Yes  Does patient understand their discharge instructions: Yes  Does patient have questions or concerns that need addressed prior to 7-14 day follow up office visit: yes - NO    Scheduled appointment with PCP within 7-14 days    Follow Up  Future Appointments   Date Time Provider Manda Jaimes   2023  3:20 PM Addy Gasca MD [place], [place]

## 2023-01-16 NOTE — TELEPHONE ENCOUNTER
Can you call and schedule pt for a Hosp F/up? I did the TCM call. Pt d/c on 1/13/23 from [facility]. Would like to come in after 1:00 on any of these dates.   1/18/23 1/19 1/23 1/24     ANNE   [phone]

## 2023-01-18 ENCOUNTER — TELEPHONE (OUTPATIENT)
Dept: PRIMARY CARE CLINIC | Age: 63
End: 2023-01-18

## 2023-01-18 ENCOUNTER — OFFICE VISIT (OUTPATIENT)
Dept: PRIMARY CARE CLINIC | Age: 63
End: 2023-01-18

## 2023-01-18 VITALS
DIASTOLIC BLOOD PRESSURE: 72 MMHG | WEIGHT: 103.2 LBS | HEART RATE: 72 BPM | BODY MASS INDEX: 18.01 KG/M2 | SYSTOLIC BLOOD PRESSURE: 118 MMHG

## 2023-01-18 DIAGNOSIS — M25.552 ACUTE HIP PAIN, LEFT: ICD-10-CM

## 2023-01-18 DIAGNOSIS — Z23 NEED FOR VACCINATION: ICD-10-CM

## 2023-01-18 DIAGNOSIS — M48.061 SPINAL STENOSIS OF LUMBAR REGION WITHOUT NEUROGENIC CLAUDICATION: ICD-10-CM

## 2023-01-18 DIAGNOSIS — K52.9 CHRONIC DIARRHEA: ICD-10-CM

## 2023-01-18 DIAGNOSIS — M48.061 NEURAL FORAMINAL STENOSIS OF LUMBAR SPINE: ICD-10-CM

## 2023-01-18 DIAGNOSIS — J10.1 INFLUENZA A: ICD-10-CM

## 2023-01-18 DIAGNOSIS — Z09 HOSPITAL DISCHARGE FOLLOW-UP: ICD-10-CM

## 2023-01-18 DIAGNOSIS — J44.1 COPD WITH ACUTE EXACERBATION (HCC): Primary | ICD-10-CM

## 2023-01-18 RX ORDER — DIPHENOXYLATE HYDROCHLORIDE AND ATROPINE SULFATE 2.5; .025 MG/1; MG/1
1 TABLET ORAL 4 TIMES DAILY PRN
Qty: 120 TABLET | Refills: 2 | Status: SHIPPED | OUTPATIENT
Start: 2023-01-18 | End: 2023-02-17

## 2023-01-18 RX ORDER — HYDROCODONE BITARTRATE AND ACETAMINOPHEN 5; 325 MG/1; MG/1
1 TABLET ORAL EVERY 8 HOURS PRN
Qty: 90 TABLET | Refills: 0 | Status: SHIPPED | OUTPATIENT
Start: 2023-01-18 | End: 2023-02-17

## 2023-01-18 SDOH — ECONOMIC STABILITY: FOOD INSECURITY: WITHIN THE PAST 12 MONTHS, YOU WORRIED THAT YOUR FOOD WOULD RUN OUT BEFORE YOU GOT MONEY TO BUY MORE.: NEVER TRUE

## 2023-01-18 SDOH — ECONOMIC STABILITY: FOOD INSECURITY: WITHIN THE PAST 12 MONTHS, THE FOOD YOU BOUGHT JUST DIDN'T LAST AND YOU DIDN'T HAVE MONEY TO GET MORE.: NEVER TRUE

## 2023-01-18 ASSESSMENT — PATIENT HEALTH QUESTIONNAIRE - PHQ9
SUM OF ALL RESPONSES TO PHQ QUESTIONS 1-9: 12
SUM OF ALL RESPONSES TO PHQ QUESTIONS 1-9: 12
9. THOUGHTS THAT YOU WOULD BE BETTER OFF DEAD, OR OF HURTING YOURSELF: 0
SUM OF ALL RESPONSES TO PHQ QUESTIONS 1-9: 12
SUM OF ALL RESPONSES TO PHQ9 QUESTIONS 1 & 2: 6
10. IF YOU CHECKED OFF ANY PROBLEMS, HOW DIFFICULT HAVE THESE PROBLEMS MADE IT FOR YOU TO DO YOUR WORK, TAKE CARE OF THINGS AT HOME, OR GET ALONG WITH OTHER PEOPLE: 0
8. MOVING OR SPEAKING SO SLOWLY THAT OTHER PEOPLE COULD HAVE NOTICED. OR THE OPPOSITE, BEING SO FIGETY OR RESTLESS THAT YOU HAVE BEEN MOVING AROUND A LOT MORE THAN USUAL: 0
2. FEELING DOWN, DEPRESSED OR HOPELESS: 3
1. LITTLE INTEREST OR PLEASURE IN DOING THINGS: 3
SUM OF ALL RESPONSES TO PHQ QUESTIONS 1-9: 12
6. FEELING BAD ABOUT YOURSELF - OR THAT YOU ARE A FAILURE OR HAVE LET YOURSELF OR YOUR FAMILY DOWN: 0
5. POOR APPETITE OR OVEREATING: 3
7. TROUBLE CONCENTRATING ON THINGS, SUCH AS READING THE NEWSPAPER OR WATCHING TELEVISION: 0
4. FEELING TIRED OR HAVING LITTLE ENERGY: 3
3. TROUBLE FALLING OR STAYING ASLEEP: 0

## 2023-01-18 ASSESSMENT — SOCIAL DETERMINANTS OF HEALTH (SDOH): HOW HARD IS IT FOR YOU TO PAY FOR THE VERY BASICS LIKE FOOD, HOUSING, MEDICAL CARE, AND HEATING?: NOT HARD AT ALL

## 2023-01-18 ASSESSMENT — ENCOUNTER SYMPTOMS
DIARRHEA: 0
SHORTNESS OF BREATH: 1
WHEEZING: 0
VOMITING: 0
EYE DISCHARGE: 0
ABDOMINAL PAIN: 0
SORE THROAT: 0
COUGH: 0
RHINORRHEA: 0
EYE REDNESS: 0
NAUSEA: 0

## 2023-01-18 NOTE — PROGRESS NOTES
Post-Discharge Transitional Care Follow Up    Sarthak Mtz   YOB: 1960    Date of Office Visit:  1/18/2023  Date of Hospital Admission: 1/4/2023  Date of Hospital Discharge: 1/13/2023    Care management risk score Rising risk (score 2-5) and Complex Care (Scores >=6): No Risk Score On File     Non face to face  following discharge, date last encounter closed (first attempt may have been earlier): 01/16/2023     Call initiated 2 business days of discharge: Yes    ASSESSMENT/PLAN:   COPD with acute exacerbation ([address]  Influenza A  Neural foraminal stenosis of lumbar spine  Spinal stenosis of lumbar region without neurogenic claudication  Hospital discharge follow-up  -     [place] DISCHARGE MEDS RECONCILED W/ CURRENT OUTPATIENT MED LIST  Chronic diarrhea  -     diphenoxylate-atropine (DIPHENATOL) 2.5-0.025 MG per tablet; Take 1 tablet by mouth 4 times daily as needed for Diarrhea for up to 30 days. Max Daily Amount: 4 tablets, Disp-120 tablet, R-2Normal    Medical Decision Making: high complexity  Return in about 3 months (around 4/18/2023). Subjective:   HPI    Inpatient course: Discharge summary reviewed- see chart. Interval history/Current status:   Patient was admitted at [facility] for influenza A and exacerbation of COPD. Patient was on BiPAP. Was in the hospital for 10 days. Patient continues to have chronic diarrhea ever since having COVID in the past.  Patient states still smoking. Patient denies any fevers or chills. No lightheadedness or dizziness. Patient requesting her second shingles vaccine. Patient continues to have issues trying to gain weight.     Patient Active Problem List   Diagnosis    Abdominal bloating    MDD (major depressive disorder), recurrent severe, without psychosis ([facility])    Panic disorder    Obsessive compulsive disorder    Irritable bowel syndrome with constipation    Chronic constipation    Esophageal dysphagia    Cervical osteoarthritis Chronic bilateral low back pain without sciatica    COPD with acute exacerbation (HCC)    Lumbar spinal stenosis    Acquired hypothyroidism    Compression fracture of L2 lumbar vertebra (HCC)    Essential hypertension    Essential tremor    Other osteoporosis with current pathological fracture, vertebra(e), initial encounter for fracture [facility])    Fibromyalgia    Disorder of thyroid    Degeneration of intervertebral disc of lumbar region       Medication list at time of discharge reviewed: Yes    Medications marked \"taking\" at this time  Outpatient Medications Marked as Taking for the 1/18/23 encounter (Office Visit) with Emilie Norris MD   Medication Sig Dispense Refill    diphenoxylate-atropine (DIPHENATOL) 2.5-0.025 MG per tablet Take 1 tablet by mouth 4 times daily as needed for Diarrhea for up to 30 days. Max Daily Amount: 4 tablets 120 tablet 2    cyclobenzaprine (FLEXERIL) 10 MG tablet Take 1 tablet by mouth 2 times daily as needed for Muscle spasms 60 tablet 0    clonazePAM (KLONOPIN) 0.5 MG tablet Take 1 tablet by mouth 3 times daily as needed for Anxiety for up to 30 days.  270 tablet 0    Blood Pressure Monitoring (BLOOD PRESSURE CUFF) MISC 1 Units by Does not apply route daily 1 each 0    sucralfate (CARAFATE) 1 GM/10ML suspension Take 10 mLs by mouth 4 times daily (before meals and nightly) 3600 mL 0    albuterol (ACCUNEB) 0.63 MG/3ML nebulizer solution Take 3 mLs by nebulization every 6 hours as needed for Wheezing 270 mL 2    levothyroxine (SYNTHROID) 88 MCG tablet Take 1 tablet by mouth Daily 90 tablet 3    fluticasone (FLOVENT HFA) 110 MCG/ACT inhaler Inhale 1 puff into the lungs 2 times daily 3 each 0    carvedilol (COREG) 3.125 MG tablet Take 1 tablet by mouth 3 times daily 270 tablet 1    fluticasone (FLONASE) 50 MCG/ACT nasal spray 2 sprays by Each Nostril route daily 16 g 5    estradiol (ESTRACE VAGINAL) 0.1 MG/GM vaginal cream Place 1 g vaginally daily 3 each 3    albuterol sulfate HFA (PROVENTIL;VENTOLIN;PROAIR) 108 (90 Base) MCG/ACT inhaler Inhale 2 puffs into the lungs every 6 hours as needed for Wheezing or Shortness of Breath 18 g 3    montelukast (SINGULAIR) 10 MG tablet Take 1 tablet by mouth daily 90 tablet 3    primidone (MYSOLINE) 50 MG tablet Take 1 tablet by mouth 2 times daily 180 tablet 3    omeprazole (PRILOSEC) 40 MG delayed release capsule Take 1 capsule by mouth in the morning and 1 capsule in the evening. 180 capsule 3    levalbuterol (XOPENEX) 1.25 MG/3ML nebulizer solution inhale contents of 1 vial in nebulizer every 4 hours      Melatonin 10 MG TABS Take 10 mg by mouth nightly 90 tablet 3    mirtazapine (REMERON) 30 MG tablet       venlafaxine (EFFEXOR XR) 150 MG extended release capsule Take 150 mg by mouth daily      vitamin C (ASCORBIC ACID) 500 MG tablet Take 1 tablet by mouth daily 30 tablet 3    Multiple Vitamins-Minerals (THERAPEUTIC MULTIVITAMIN-MINERALS) tablet Take 1 tablet by mouth daily 30 tablet 0    calcium carbonate-vitamin D (CALTRATE) 600-400 MG-UNIT TABS per tab Take 1 tablet by mouth daily 30 tablet 0    tiotropium-olodaterol (STIOLTO) 2.5-2.5 MCG/ACT AERS Inhale 2 puffs into the lungs          Medications patient taking as of now reconciled against medications ordered at time of hospital discharge: Yes    Review of Systems   Constitutional:  Negative for chills and fever. HENT:  Negative for rhinorrhea and sore throat. Eyes:  Negative for discharge and redness. Respiratory:  Positive for shortness of breath. Negative for cough and wheezing. Cardiovascular:  Negative for chest pain and palpitations. Gastrointestinal:  Negative for abdominal pain, diarrhea, nausea and vomiting. Genitourinary:  Negative for dysuria and frequency. Musculoskeletal:  Negative for arthralgias and myalgias. Neurological:  Negative for dizziness, light-headedness and headaches. Psychiatric/Behavioral:  Negative for sleep disturbance.       Objective:    /72 Pulse 72   Wt 103 lb 3.2 oz (46.8 kg)   BMI 18.01 kg/m²   Physical Exam  Vitals and nursing note reviewed. Constitutional:       General: She is not in acute distress. Appearance: She is well-developed. She is not ill-appearing. HENT:      Head: Normocephalic and atraumatic. Right Ear: External ear normal.      Left Ear: External ear normal.   Eyes:      General: No scleral icterus. Right eye: No discharge. Left eye: No discharge. Conjunctiva/sclera: Conjunctivae normal.   Neck:      Thyroid: No thyromegaly. Trachea: No tracheal deviation. Cardiovascular:      Rate and Rhythm: Normal rate and regular rhythm. Heart sounds: Normal heart sounds. Pulmonary:      Effort: Pulmonary effort is normal. No respiratory distress. Breath sounds: No wheezing. Comments: Decreased breath sounds bilateral  Lymphadenopathy:      Cervical: No cervical adenopathy. Skin:     General: Skin is warm. Findings: No rash. Neurological:      Mental Status: She is alert and oriented to person, place, and time. Psychiatric:         Mood and Affect: Mood normal.         Behavior: Behavior normal.         Thought Content: Thought content normal.       Information given on living will    Shingrix vaccine today    Trial Lomotil 2.5 mg 4 times daily as needed for chronic diarrhea    Continue other medications as is    Possible CODE STATUS changes were discussed. Patient desires to remain full code at present. Refill of [place] up to 3 times daily given. Patient continues to be in severe pain. With her current health condition, is not a surgical candidate. Her MRI results were reviewed with her. Hopefully pain management will have something else to offer. An electronic signature was used to authenticate this note.   --Yoana Araiza MD

## 2023-01-18 NOTE — TELEPHONE ENCOUNTER
Pt was here for an appt today. Said that you were going to send in for her to get a new Nebulizer machine and BP cuff. Wants it billed through her [employer]. Notes need to state why she is using them, dx and would like notes, amyo, orders faxed to [facility] on [address].  Fax # [phone]

## 2023-02-02 ENCOUNTER — HOSPITAL ENCOUNTER (OUTPATIENT)
Dept: PAIN MANAGEMENT | Age: 63
Discharge: HOME | End: 2023-02-02
Payer: COMMERCIAL

## 2023-02-02 VITALS
OXYGEN SATURATION: 100 % | HEART RATE: 64 BPM | BODY MASS INDEX: 17.93 KG/M2 | TEMPERATURE: 97.3 F | DIASTOLIC BLOOD PRESSURE: 77 MMHG | WEIGHT: 105 LBS | SYSTOLIC BLOOD PRESSURE: 110 MMHG | HEIGHT: 64 IN

## 2023-02-02 DIAGNOSIS — M48.061 LUMBAR FORAMINAL STENOSIS: ICD-10-CM

## 2023-02-02 DIAGNOSIS — J44.9 COPD, SEVERE (HCC): Chronic | ICD-10-CM

## 2023-02-02 DIAGNOSIS — Z99.81 ON HOME OXYGEN THERAPY: Chronic | ICD-10-CM

## 2023-02-02 DIAGNOSIS — M54.16 LEFT LUMBAR RADICULITIS: Primary | Chronic | ICD-10-CM

## 2023-02-02 DIAGNOSIS — Z86.59 HISTORY OF PSYCHIATRIC DISORDER: Chronic | ICD-10-CM

## 2023-02-02 PROCEDURE — 99203 OFFICE O/P NEW LOW 30 MIN: CPT

## 2023-02-02 PROCEDURE — 99244 OFF/OP CNSLTJ NEW/EST MOD 40: CPT | Performed by: ANESTHESIOLOGY

## 2023-02-02 ASSESSMENT — ENCOUNTER SYMPTOMS
EYES NEGATIVE: 1
BACK PAIN: 1
ALLERGIC/IMMUNOLOGIC NEGATIVE: 1
GASTROINTESTINAL NEGATIVE: 1
WHEEZING: 1
SHORTNESS OF BREATH: 1

## 2023-02-02 ASSESSMENT — PAIN SCALES - GENERAL: PAINLEVEL_OUTOF10: 9

## 2023-02-02 NOTE — PROGRESS NOTES
The patient is a 58 y. o. Declined female. Chief Complaint   Patient presents with    New Patient     Back pain        HPI    Requesting physician for the evaluation of Shanon Berumen [address] [place] [address] 1960:      Pain History  Pain score today  9  1. Location: back  2. Radiation: Left hip  3. Character: aching  5. Duration:   6. Onset: months  7. Did an injury cause pain: no  8. Aggravating factors: sitting, walking  9. Alleviating factors: laying down  10. Associated symptoms (numbness / tingling / weakness):  no  -Where at:n/a  -Down into finger tips or toes (specify which finger or toes): n/a  -constant or intermitting:  constant  11. Red Flags: (weight loss / chills / loss of bladder or bowel control): no    Previous management history  1. Previous diagnostic workup: (Imaging/EMG)   CT, MRI, or Xray: MRI  What part of the body: Lumbar  [facility]  What year or specific date: 12/2022  EMG:  yes    2. Previous non interventional treatments tried:  chiropractor or physical therapy: n/a  What part of the body: n/a  What facility was it done at:  n/a  How long ago was it last tried: n/a  Did it work: n/a  Did they complete it:n/a    3. Previous Medications tried  NSAID's: yes  Neurontin: yes  Lyrica: yes  Trycyclic antidepressant (Ellavil / Pamelor ): yes  Cymbalta: yes  Opioids (Ultram / Vicodin / Percocet / Morphine / Dilaudid / Oramorph/ Fentanyl etc.): Ultram, Percocet, Norco  Last Pain medication taken (name of med and date): 02/02/2023    4. Previous Interventional pain procedures tried:  What kind of injection: n/a  Who did the injection:  n/a  did the injection help: no  Last time injection was done:N/A    5. Previous surgeries for pain  What part of the body did they have the [address]  What physician did the surgery: Dr. Abel Wilkins did they have the surgery done:  [facility]  Date of Surgery: 2018    Social History:  Marital status:   Employment History:   Working No  Full time Or Part time:    Disability  Yes   Legal Issues related to pain complaint: No     Pain Disability Index score :      No results found for: LABA1C  No results found for: EAG      Informant: patient      Past Medical History:   Diagnosis Date    Acid reflux     Anxiety     Arthritis     Cancer ([facility]     cervical    Cervical osteoarthritis 11/30/2020    COPD (chronic obstructive pulmonary disease) ([facility] 02/23/2018    DDD (degenerative disc disease), cervical     Depression     Hiatal hernia     History of blood transfusion     Reaction fever went up to 107 . per Pt.      On home O2     Spinal stenosis     Tachycardia     Thyroid disease     hypo        Past Surgical History:   Procedure Laterality Date    BACK SURGERY      diskectomy, L3-L5    COLONOSCOPY N/A 4/8/2019    COLONOSCOPY DIAGNOSTIC performed by Gerry Tolliver MD at [address],[facility]  11/13/2019    COLONOSCOPY POLYPECTOMY SNARE/COLD BIOPSY performed by Gerry Tolliver MD at [facility]      ectopic pregnancy with tube removal    THYROIDECTOMY, PARTIAL      UPPER GASTROINTESTINAL ENDOSCOPY      UPPER GASTROINTESTINAL ENDOSCOPY N/A 11/27/2020    EGD BIOPSY with dilatation performed by Yzamin Perez MD at [address] [place] [address] N/A 4/22/2022    EGD BIOPSY performed by Rowena Guzman MD at [address] [place] [address] History     Socioeconomic History    Marital status:      Spouse name: None    Number of children: None    Years of education: None    Highest education level: None   Tobacco Use    Smoking status: Every Day     Packs/day: 0.50     Years: 48.00     Pack years: 24.00     Types: Cigarettes    Smokeless tobacco: Never    Tobacco comments:     previous 3 ppd smoker   Vaping Use    Vaping Use: Former   Substance and Sexual Activity    Alcohol use: Yes     Comment: occassional    Drug use: Never     Social Determinants of Health Financial Resource Strain: Low Risk     Difficulty of Paying Living Expenses: Not hard at all   Food Insecurity: No Food Insecurity    Worried About Running Out of Food in the Last Year: Never true    Ran Out of Food in the Last Year: Never true       History reviewed. No pertinent family history. No Known Allergies    There were no vitals filed for this visit. Current Outpatient Medications   Medication Sig Dispense Refill    diphenoxylate-atropine (DIPHENATOL) 2.5-0.025 MG per tablet Take 1 tablet by mouth 4 times daily as needed for Diarrhea for up to 30 days. Max Daily Amount: 4 tablets 120 tablet 2    HYDROcodone-acetaminophen (NORCO) 5-325 MG per tablet Take 1 tablet by mouth every 8 hours as needed for Pain for up to 30 days. Take lowest dose possible to manage pain Max Daily Amount: 3 tablets 90 tablet 0    cyclobenzaprine (FLEXERIL) 10 MG tablet Take 1 tablet by mouth 2 times daily as needed for Muscle spasms 60 tablet 0    clonazePAM (KLONOPIN) 0.5 MG tablet Take 1 tablet by mouth 3 times daily as needed for Anxiety for up to 30 days.  270 tablet 0    Blood Pressure Monitoring (BLOOD PRESSURE CUFF) MISC 1 Units by Does not apply route daily 1 each 0    sucralfate (CARAFATE) 1 GM/10ML suspension Take 10 mLs by mouth 4 times daily (before meals and nightly) 3600 mL 0    albuterol (ACCUNEB) 0.63 MG/3ML nebulizer solution Take 3 mLs by nebulization every 6 hours as needed for Wheezing 270 mL 2    levothyroxine (SYNTHROID) 88 MCG tablet Take 1 tablet by mouth Daily 90 tablet 3    fluticasone (FLOVENT HFA) 110 MCG/ACT inhaler Inhale 1 puff into the lungs 2 times daily 3 each 0    carvedilol (COREG) 3.125 MG tablet Take 1 tablet by mouth 3 times daily 270 tablet 1    fluticasone (FLONASE) 50 MCG/ACT nasal spray 2 sprays by Each Nostril route daily 16 g 5    estradiol (ESTRACE VAGINAL) 0.1 MG/GM vaginal cream Place 1 g vaginally daily 3 each 3    albuterol sulfate HFA (PROVENTIL;VENTOLIN;PROAIR) 108 (90 Base) MCG/ACT inhaler Inhale 2 puffs into the lungs every 6 hours as needed for Wheezing or Shortness of Breath 18 g 3    montelukast (SINGULAIR) 10 MG tablet Take 1 tablet by mouth daily 90 tablet 3    primidone (MYSOLINE) 50 MG tablet Take 1 tablet by mouth 2 times daily 180 tablet 3    omeprazole (PRILOSEC) 40 MG delayed release capsule Take 1 capsule by mouth in the morning and 1 capsule in the evening. 180 capsule 3    levalbuterol (XOPENEX) 1.25 MG/3ML nebulizer solution inhale contents of 1 vial in nebulizer every 4 hours      Melatonin 10 MG TABS Take 10 mg by mouth nightly 90 tablet 3    mirtazapine (REMERON) 30 MG tablet       venlafaxine (EFFEXOR XR) 150 MG extended release capsule Take 150 mg by mouth daily      vitamin C (ASCORBIC ACID) 500 MG tablet Take 1 tablet by mouth daily 30 tablet 3    Multiple Vitamins-Minerals (THERAPEUTIC MULTIVITAMIN-MINERALS) tablet Take 1 tablet by mouth daily 30 tablet 0    calcium carbonate-vitamin D (CALTRATE) 600-400 MG-UNIT TABS per tab Take 1 tablet by mouth daily 30 tablet 0    tiotropium-olodaterol (STIOLTO) 2.5-2.5 MCG/ACT AERS Inhale 2 puffs into the lungs       No current facility-administered medications for this encounter. Review of Systems   Constitutional: Negative. HENT: Negative. Eyes: Negative. Respiratory:  Positive for shortness of breath and wheezing. Cardiovascular: Negative. Gastrointestinal: Negative. Endocrine: Negative. Genitourinary: Negative. Musculoskeletal:  Positive for back pain. Skin: Negative. Allergic/Immunologic: Negative. Neurological:  Positive for tremors and weakness. Hematological:  Bruises/bleeds easily. Psychiatric/Behavioral:  The patient is nervous/anxious. Objective:  Vital signs: (most recent): There were no vitals taken for this visit. Assessment & Plan  No diagnosis found. No orders of the defined types were placed in this encounter.      No orders of the defined types were placed in this encounter.            Electronically signed by Blaine Franco MA on 2/2/2023 at 2:51 PM

## 2023-02-02 NOTE — PROGRESS NOTES
The patient is a 58 y. o. Declined female. Chief Complaint   Patient presents with    New Patient     Back pain        HPI    Requesting physician for the evaluation of Lance Rhodes 1960:  Dr Malu Virk    Pain History  70-year-old female with past medical history significant for severe COPD on continuous oxygen, history of significant psychiatric illness including dieting, psychosis and severe depression  On chronic benzodiazepine therapy with Klonopin    Referred for evaluation of flareup of chronic low back pain  She had chronic back pain located in the lower lumbar area as aching nagging stiffness  Had flareup several months ago without any particular injury  This pain is radiating down left leg over hip and outer thigh and into the groin  Describes it as sharp shooting aching throbbing burning sensation  Aggravated with prolonged sitting lifting bending walking  Associated with left-sided numbness in leg  No changes in bladder or bowel control  No history of fever chills or weight loss  Past history significant for lumbar laminectomy surgery in 2018 by Dr. Airam Don  Patient have done physical therapy in past  Doing physical therapy  Had recent MRI lumbar spine    Report was reviewed  Images reviewed independently  Finding discussed in detail with patient  Severe foraminal narrowing left side at L3-L4 level with foraminal stenosis and impingement of the exiting nerve root  Severe degenerative disc disease with Modic changes    Pain score today  9  1. Location: back  2. Radiation: Left hip  3. Character: aching  5. Duration:   6. Onset: months  7. Did an injury cause pain: no  8. Aggravating factors: sitting, walking  9. Alleviating factors: laying down  10. Associated symptoms (numbness / tingling / weakness):  no  -Where at:n/a  -Down into finger tips or toes (specify which finger or toes): n/a  -constant or intermitting:  constant  11.  Red Flags: (weight loss / chills / loss of bladder or bowel control): no    Previous management history  1. Previous diagnostic workup: (Imaging/EMG)   CT, MRI, or Xray: MRI  What part of the body: Lumbar  [facility]  What year or specific date: 12/2022  EMG:  yes    2. Previous non interventional treatments tried:  chiropractor or physical therapy: n/a  What part of the body: n/a  What facility was it done at:  n/a  How long ago was it last tried: n/a  Did it work: n/a  Did they complete it:n/a    3. Previous Medications tried  NSAID's: yes  Neurontin: yes  Lyrica: yes  Trycyclic antidepressant (Ellavil / Pamelor ): yes  Cymbalta: yes  Opioids (Ultram / Vicodin / Percocet / Morphine / Dilaudid / Oramorph/ Fentanyl etc.): Ultram, Percocet, Norco  Last Pain medication taken (name of med and date): 02/02/2023    4. Previous Interventional pain procedures tried:  What kind of injection: n/a  Who did the injection:  n/a  did the injection help: no  Last time injection was done:N/A    5. Previous surgeries for pain  What part of the body did they have the [address]  What physician did the surgery: Dr. Brijesh Dominguez did they have the surgery done: [facility]  Date of Surgery: 2018    Social History:  Marital status:   Employment History:   Working  No  Full time Or Part time:    Disability  Yes   Legal Issues related to pain complaint: No     Pain Disability Index score :      No results found for: LABA1C  No results found for: EAG      Informant: patient      Past Medical History:   Diagnosis Date    Acid reflux     Anxiety     Arthritis     Cancer ([address]     cervical    Cervical osteoarthritis 11/30/2020    COPD (chronic obstructive pulmonary disease) ([address] 02/23/2018    DDD (degenerative disc disease), cervical     Depression     Hiatal hernia     History of blood transfusion     Reaction fever went up to 107 . per Pt.      On home O2     Spinal stenosis     Tachycardia     Thyroid disease     hypo        Past Surgical History:   Procedure Laterality Date    BACK SURGERY      diskectomy, L3-L5    COLONOSCOPY N/A 4/8/2019    COLONOSCOPY DIAGNOSTIC performed by Gerry Tolliver MD at [address],[facility]  11/13/2019    COLONOSCOPY POLYPECTOMY SNARE/COLD BIOPSY performed by Gerry Tolliver MD at [facility]      ectopic pregnancy with tube removal    THYROIDECTOMY, PARTIAL      UPPER GASTROINTESTINAL ENDOSCOPY      UPPER GASTROINTESTINAL ENDOSCOPY N/A 11/27/2020    EGD BIOPSY with dilatation performed by Yazmin Perez MD at [facility] N/A 4/22/2022    EGD BIOPSY performed by Rowena Guzman MD at [address] History    Marital status:      Spouse name: None    Number of children: None    Years of education: None    Highest education level: None   Tobacco Use    Smoking status: Every Day     Packs/day: 0.50     Years: 48.00     Pack years: 24.00     Types: Cigarettes    Smokeless tobacco: Never    Tobacco comments:     previous 3 ppd smoker   Vaping Use    Vaping Use: Former   Substance and Sexual Activity    Alcohol use: Yes     Comment: occassional    Drug use: Never     Social Determinants of Health     Financial Resource Strain: Low Risk     Difficulty of Paying Living Expenses: Not hard at all   Food Insecurity: No Food Insecurity    Worried About Running Out of Food in the Last Year: Never true    Ran Out of Food in the Last Year: Never true       History reviewed. No pertinent family history. No Known Allergies    Vitals:    02/02/23 1506   BP: 110/77   Pulse: 64   Temp: 97.3 °F (36.3 °C)   SpO2: 100%       Current Outpatient Medications   Medication Sig Dispense Refill    diphenoxylate-atropine (DIPHENATOL) 2.5-0.025 MG per tablet Take 1 tablet by mouth 4 times daily as needed for Diarrhea for up to 30 days.  Max Daily Amount: 4 tablets 120 tablet 2    HYDROcodone-acetaminophen (NORCO) 5-325 MG per tablet Take 1 tablet by mouth every 8 hours as needed for Pain for up to 30 days. Take lowest dose possible to manage pain Max Daily Amount: 3 tablets 90 tablet 0    cyclobenzaprine (FLEXERIL) 10 MG tablet Take 1 tablet by mouth 2 times daily as needed for Muscle spasms 60 tablet 0    clonazePAM (KLONOPIN) 0.5 MG tablet Take 1 tablet by mouth 3 times daily as needed for Anxiety for up to 30 days. 270 tablet 0    Blood Pressure Monitoring (BLOOD PRESSURE CUFF) MISC 1 Units by Does not apply route daily 1 each 0    sucralfate (CARAFATE) 1 GM/10ML suspension Take 10 mLs by mouth 4 times daily (before meals and nightly) 3600 mL 0    albuterol (ACCUNEB) 0.63 MG/3ML nebulizer solution Take 3 mLs by nebulization every 6 hours as needed for Wheezing 270 mL 2    levothyroxine (SYNTHROID) 88 MCG tablet Take 1 tablet by mouth Daily 90 tablet 3    fluticasone (FLOVENT HFA) 110 MCG/ACT inhaler Inhale 1 puff into the lungs 2 times daily 3 each 0    carvedilol (COREG) 3.125 MG tablet Take 1 tablet by mouth 3 times daily 270 tablet 1    fluticasone (FLONASE) 50 MCG/ACT nasal spray 2 sprays by Each Nostril route daily 16 g 5    estradiol (ESTRACE VAGINAL) 0.1 MG/GM vaginal cream Place 1 g vaginally daily 3 each 3    albuterol sulfate HFA (PROVENTIL;VENTOLIN;PROAIR) 108 (90 Base) MCG/ACT inhaler Inhale 2 puffs into the lungs every 6 hours as needed for Wheezing or Shortness of Breath 18 g 3    montelukast (SINGULAIR) 10 MG tablet Take 1 tablet by mouth daily 90 tablet 3    primidone (MYSOLINE) 50 MG tablet Take 1 tablet by mouth 2 times daily 180 tablet 3    omeprazole (PRILOSEC) 40 MG delayed release capsule Take 1 capsule by mouth in the morning and 1 capsule in the evening.  180 capsule 3    levalbuterol (XOPENEX) 1.25 MG/3ML nebulizer solution inhale contents of 1 vial in nebulizer every 4 hours      Melatonin 10 MG TABS Take 10 mg by mouth nightly 90 tablet 3    mirtazapine (REMERON) 30 MG tablet       venlafaxine (EFFEXOR XR) 150 MG extended release capsule Take 150 mg by mouth daily      vitamin C (ASCORBIC ACID) 500 MG tablet Take 1 tablet by mouth daily 30 tablet 3    Multiple Vitamins-Minerals (THERAPEUTIC MULTIVITAMIN-MINERALS) tablet Take 1 tablet by mouth daily 30 tablet 0    calcium carbonate-vitamin D (CALTRATE) 600-400 MG-UNIT TABS per tab Take 1 tablet by mouth daily 30 tablet 0    tiotropium-olodaterol (STIOLTO) 2.5-2.5 MCG/ACT AERS Inhale 2 puffs into the lungs       No current facility-administered medications for this encounter. Review of Systems   Constitutional: Negative. Negative for fever. HENT: Negative. Eyes: Negative. Respiratory:  Positive for shortness of breath and wheezing. Cardiovascular: Negative. Gastrointestinal: Negative. Endocrine: Negative. Genitourinary: Negative. Musculoskeletal:  Positive for back pain. Skin: Negative. Allergic/Immunologic: Negative. Neurological:  Positive for tremors and weakness. Hematological:  Bruises/bleeds easily. Psychiatric/Behavioral:  The patient is nervous/anxious. Objective:  General Appearance:  Uncomfortable, in pain and ill-appearing. Vital signs: (most recent): Blood pressure 110/77, pulse 64, temperature 97.3 °F (36.3 °C), height 5' 3.5\" (1.613 m), weight 105 lb (47.6 kg), SpO2 100 %. Vital signs are normal.  No fever. Output: Producing urine and producing stool. HEENT: Normal HEENT exam.  (On oxygen via nc)    Lungs:  Normal effort and normal respiratory rate. She is not in respiratory distress. Heart: Normal rate. Extremities: Normal range of motion. There is no deformity. Neurological: Patient is alert and oriented to person, place and time. Normal strength. Patient has normal muscle tone and normal coordination. Pupils:  Pupils are equal, round, and reactive to light. Pupils are equal.   Skin:  Warm and dry. No rash or cyanosis.    Spine examination  No apparent deformity and inspection  Range of motion limited and associated with pain  Tenderness to palpation on left-sided in the lower lumbar area  Gait antalgic line   facet loading maneuver positive  Straight leg raise positive on left side  Motor strength 5/5 in both lower extremities in all major muscle group    Assessment & Plan    Pain History  58-year-old female with past medical history significant for severe COPD on continuous oxygen, history of significant psychiatric illness including dieting, psychosis and severe depression  On chronic benzodiazepine therapy with Klonopin    Referred for evaluation of flareup of chronic low back pain  She had chronic back pain located in the lower lumbar area as aching nagging stiffness  Had flareup several months ago without any particular injury  This pain is radiating down left leg over hip and outer thigh and into the groin  Describes it as sharp shooting aching throbbing burning sensation  Aggravated with prolonged sitting lifting bending walking  Associated with left-sided numbness in leg  No changes in bladder or bowel control  No history of fever chills or weight loss  Past history significant for lumbar laminectomy surgery in 2018 by Dr. Andres Slice  Patient have done physical therapy in past  Doing physical therapy  Had recent MRI lumbar spine    Report was reviewed  Images reviewed independently  Finding discussed in detail with patient  Severe foraminal narrowing left side at L3-L4 level with foraminal stenosis and impingement of the exiting nerve root  Severe degenerative disc disease with Modic changes    Pain score today  9    We will schedule patient for left-sided lumbar transforaminal epidural steroid injection  Consider for diagnostic lumbar medial branch nerve block at L3-4 and L4-5 level in the future for severe facet arthropathy and chronic axial lower back pain    Discussed with patient that considering severe COPD and use of Klonopin, I think opioid will not be appropriate choice for pain management with a high risk of respiratory depression  PCP have given her a prescription of Norco 90 tablet recently  I advised her to limit the use    Consultation note sent to the referring physician      EXAMINATION: TWO XRAY VIEWS OF THE LEFT HIP  5/12/2022 2:15 pm     FINDINGS: The hip demonstrates normal alignment. No evidence of acute fracture.  No focal osseus lesion.   Pelvis is intact.      EXAMINATION: MRI OF THE LUMBAR SPINE WITHOUT CONTRAST, 12/30/2022 4:34 pm      L2-L3: Disc protrusion, facet DJD, and ligamentum flavum hypertrophy with mild spinal canal stenosis.  There is mild narrowing of both lateral recesses.  Mild to moderate bilateral neural foraminal stenosis.  L3-L4: Disc extrusion, facet DJD, and ligamentum flavum hypertrophy with moderate spinal canal stenosis and narrowing of the left lateral recess. Moderate bilateral neural foraminal stenosis.  L4-L5: Prior posterior decompression without significant spinal canal stenosis.  Moderate bilateral neural foraminal stenosis.  L5-S1: Disc protrusion and facet DJD without significant spinal canal stenosis.  Mild right neural foraminal stenosis.          1. Left lumbar radiculitis    2. COPD, severe (HCC)    3. On home oxygen therapy    4. History of psychiatric disorder    5. Lumbar foraminal stenosis          Orders Placed This Encounter   Procedures    ND NJX AA&/STRD TFRML EPI LUMBAR/SACRAL 1 LEVEL      No orders of the defined types were placed in this encounter.           Electronically signed by Humble Crook MD on 2/2/2023 at 3:24 PM

## 2023-02-06 ENCOUNTER — TELEPHONE (OUTPATIENT)
Dept: PRIMARY CARE CLINIC | Age: 63
End: 2023-02-06

## 2023-02-06 RX ORDER — AZITHROMYCIN 250 MG/1
250 TABLET, FILM COATED ORAL SEE ADMIN INSTRUCTIONS
Qty: 6 TABLET | Refills: 0 | Status: SHIPPED | OUTPATIENT
Start: 2023-02-06 | End: 2023-02-11

## 2023-02-06 NOTE — TELEPHONE ENCOUNTER
She started Friday with sinus congestion. Having wheezing and worsening asking if you will send a zpack for her.      Pharmacy [address]

## 2023-02-23 RX ORDER — CYCLOBENZAPRINE HCL 10 MG
10 TABLET ORAL 2 TIMES DAILY PRN
Qty: 60 TABLET | Refills: 0 | Status: SHIPPED | OUTPATIENT
Start: 2023-02-23

## 2023-03-01 ENCOUNTER — HOSPITAL ENCOUNTER (OUTPATIENT)
Dept: PAIN MANAGEMENT | Facility: CLINIC | Age: 63
Discharge: HOME | End: 2023-03-01
Payer: COMMERCIAL

## 2023-03-01 VITALS
WEIGHT: 105 LBS | BODY MASS INDEX: 18.61 KG/M2 | RESPIRATION RATE: 11 BRPM | HEART RATE: 68 BPM | DIASTOLIC BLOOD PRESSURE: 78 MMHG | OXYGEN SATURATION: 99 % | SYSTOLIC BLOOD PRESSURE: 158 MMHG | HEIGHT: 63 IN | TEMPERATURE: 98 F

## 2023-03-01 DIAGNOSIS — M48.061 LUMBAR FORAMINAL STENOSIS: ICD-10-CM

## 2023-03-01 DIAGNOSIS — M54.16 LEFT LUMBAR RADICULITIS: Primary | Chronic | ICD-10-CM

## 2023-03-01 DIAGNOSIS — R52 PAIN MANAGEMENT: ICD-10-CM

## 2023-03-01 PROCEDURE — 64483 NJX AA&/STRD TFRM EPI L/S 1: CPT

## 2023-03-01 PROCEDURE — 6360000004 HC RX CONTRAST MEDICATION: Performed by: ANESTHESIOLOGY

## 2023-03-01 PROCEDURE — 2500000003 HC RX 250 WO HCPCS: Performed by: ANESTHESIOLOGY

## 2023-03-01 PROCEDURE — 6360000002 HC RX W HCPCS: Performed by: ANESTHESIOLOGY

## 2023-03-01 RX ORDER — FENTANYL CITRATE 50 UG/ML
INJECTION, SOLUTION INTRAMUSCULAR; INTRAVENOUS
Status: COMPLETED | OUTPATIENT
Start: 2023-03-01 | End: 2023-03-01

## 2023-03-01 RX ORDER — LIDOCAINE HYDROCHLORIDE 10 MG/ML
INJECTION, SOLUTION EPIDURAL; INFILTRATION; INTRACAUDAL; PERINEURAL
Status: COMPLETED | OUTPATIENT
Start: 2023-03-01 | End: 2023-03-01

## 2023-03-01 RX ORDER — MIDAZOLAM HYDROCHLORIDE 2 MG/2ML
INJECTION, SOLUTION INTRAMUSCULAR; INTRAVENOUS
Status: COMPLETED | OUTPATIENT
Start: 2023-03-01 | End: 2023-03-01

## 2023-03-01 RX ORDER — DEXAMETHASONE SODIUM PHOSPHATE 10 MG/ML
INJECTION, SOLUTION INTRAMUSCULAR; INTRAVENOUS
Status: COMPLETED | OUTPATIENT
Start: 2023-03-01 | End: 2023-03-01

## 2023-03-01 RX ADMIN — FENTANYL CITRATE 50 MCG: 50 INJECTION, SOLUTION INTRAMUSCULAR; INTRAVENOUS at 16:01

## 2023-03-01 RX ADMIN — DEXAMETHASONE SODIUM PHOSPHATE 10 MG: 10 INJECTION, SOLUTION INTRAMUSCULAR; INTRAVENOUS at 16:01

## 2023-03-01 RX ADMIN — IOHEXOL 3 ML: 180 INJECTION INTRAVENOUS at 16:01

## 2023-03-01 RX ADMIN — MIDAZOLAM HYDROCHLORIDE 1 MG: 1 INJECTION, SOLUTION INTRAMUSCULAR; INTRAVENOUS at 16:01

## 2023-03-01 RX ADMIN — LIDOCAINE HYDROCHLORIDE 3 ML: 10 INJECTION, SOLUTION EPIDURAL; INFILTRATION; INTRACAUDAL at 16:01

## 2023-03-01 ASSESSMENT — PAIN - FUNCTIONAL ASSESSMENT
PAIN_FUNCTIONAL_ASSESSMENT: 0-10
PAIN_FUNCTIONAL_ASSESSMENT: 0-10
PAIN_FUNCTIONAL_ASSESSMENT: PREVENTS OR INTERFERES WITH ALL ACTIVE AND SOME PASSIVE ACTIVITIES

## 2023-03-01 ASSESSMENT — PAIN DESCRIPTION - DESCRIPTORS: DESCRIPTORS: ACHING

## 2023-03-01 NOTE — H&P
UPDATE:  Office visit pain clinic in [facility] with all required elements of H&P dated 02/02/2023  Patient seen preop, chart reviewed,   No changes in medical history and health assessment since last evaluation.   PE:  AAO x 3, in NAD, VSS,.  Resp: breathing on RA, no respiratory distress  Cardiac: rate normal    Risk / Benefits explained to patient, patient agree to proceed with plan.  ASA 3  MP 3

## 2023-03-01 NOTE — OP NOTE
Patient Name: Estelle Sparrow   YOB: 1960  Room/Bed: Room/bed info not found  Medical Record Number: [id number]  Date: 3/1/2023       Preoperative Diagnosis:    1. Left lumbar radiculitis    2. Lumbar foraminal stenosis          Postoperative diagnosis:   1. Left lumbar radiculitis    2. Lumbar foraminal stenosis          Blood Loss: none    Procedure Performed:  Transforaminal lumbar epidural steroid injection at the levels of L3 AND L4 on the Left side under fluoroscopic guidance. Procedure: The Patient was seen in the preop area, chart was reviewed, informed consent was obtained. Patient was taken to procedure room and was placed in prone position. Vital signs were monitored through out the  Procedure. A time out was completed. The skin over the back was prepped and draped in sterile manner. The target point was marked in ipsilateral obliques just below the pedicle at the target levels. Skin and deep tissues were anesthetized with 1 % lidocaine. A 20-gauge, spinal needle was advanced  under fluoroscopy guidance in AP / Oblique and lateral views, such that the tip of the needle lies in the neuroforamina at about the 6 o'clock position under the pedicle of the target vertebra. This was confirmed with AP and lateral views of the fluoroscopy. Then after negative aspiration contrast dye Omnipaque-180 was injected with live fluoroscopy in AP views that showed  spread of the contrast in the epidural space  and no vascular runoff or intrathecal spread. Finally 3 ml of treatment solution containing 5 ml of  PF NS mixed with 1 ml of dexamethasone 10 mg / ml was injected. The needle was removed and a Band-Aid was placed over the needle  insertion site. The patient's vital signs remained stable and the patient tolerated the procedure well. The same procedure was then repeated at left at L 4 level with same technique. The remaining 3 ml of treatment solution was injected at that.  The total dose of steroid injected today was dexamethasone 10 mg.  Electronically signed by Humble Crook MD on 3/1/2023 at 4:17 PM  SEDATION NOTE:    ASA CLASSIFICATION  3  MP   CLASSIFICATION  3    Moderate intravenous conscious sedation was supervised by Dr. Crook  The patient was independently monitored by a Registered Nurse assigned to the Procedure Room  Monitoring included automated blood pressure, continuous EKG, Capnography and continuous pulse oximetry.   The detailed Conscious Record is permanently stored in the Hospital Information System.     The following is the conscious sedation record;  Start Time:  1556  End times:  64070  Duration:  20 minutes  MEDS GIVEN 1 MG VERSED AND 50 MCG FENTANYL

## 2023-03-01 NOTE — DISCHARGE INSTRUCTIONS
Discharge Instructions following Sedation or Anesthesia:  You have  received  a sedative/anesthetic therefore, you should not consume any alcoholic beverages for minimum of 12 hours. Do not drive or operate machinery for 24 hours. Do not sign legal documents for 24 hours. Dizziness, drowsiness, and unsteadiness may occur. Rest when need to. Increase diet as tolerated. Keep up on fluids if diet allows. General Instructions:  Do not take a tub bath for 72 hours after procedure (this includes hot tubs and swimming pools). You may shower, but avoid hot water to injection site. Avoid strenuous activity TODAY especially if you experience dizziness. Remove band-aid the next day. Wash off any residual iodine   Do not use heat, heating pad, or any other heating device over the injection site for 3 days after the procedure. If you experience pain after your procedure, you may continue with your current pain medication as prescribed. (DO NOT INCREASE YOUR PAIN MEDICATION WITHOUT TALKING TO DOCTOR)  Soreness and pain at injection site is common, may use ice to reduce soreness. What To Expect After A Steroid Injection  You should start to feel the effects of the steroid injection in about 48-72 hours  You may experience facial flushing, night sweats and irritability. Other common steroid related side effects are increased appetite, mood elevation, insomnia and fluid retention. These effects usually subside in a few days. Steroids used in epidural injections may cause muscle spasms for a few days. If you are diabetic, your blood sugar may be elevated after your procedure due to the steroids. You will need to monitor your blood sugar more closely while going through a series of injections (Check blood sugar at meals and bedtime for 5 days). You may require adjustment in your diabetic medications, contact your PCP office to discuss.     Call Vahe Cervantes at [phone] if you experience:   Fever, chills or temperature over 100    Vomiting, Headache, persistent stiff neck, nausea, blurred vision   Difficulty in urinating or unable to urinate with 8 hours   Increase in weakness, numbness or loss of function   Increased redness, swelling or drainage at the injection site

## 2023-03-21 ENCOUNTER — HOSPITAL ENCOUNTER (OUTPATIENT)
Dept: PAIN MANAGEMENT | Age: 63
Discharge: HOME | End: 2023-03-21
Payer: COMMERCIAL

## 2023-03-21 VITALS
HEART RATE: 57 BPM | RESPIRATION RATE: 20 BRPM | WEIGHT: 105 LBS | TEMPERATURE: 97.3 F | BODY MASS INDEX: 18.61 KG/M2 | HEIGHT: 63 IN | OXYGEN SATURATION: 100 % | SYSTOLIC BLOOD PRESSURE: 131 MMHG | DIASTOLIC BLOOD PRESSURE: 72 MMHG

## 2023-03-21 DIAGNOSIS — M48.061 LUMBAR FORAMINAL STENOSIS: ICD-10-CM

## 2023-03-21 DIAGNOSIS — M54.50 CHRONIC BILATERAL LOW BACK PAIN WITHOUT SCIATICA: ICD-10-CM

## 2023-03-21 DIAGNOSIS — M51.36 DEGENERATION OF INTERVERTEBRAL DISC OF LUMBAR REGION: ICD-10-CM

## 2023-03-21 DIAGNOSIS — G89.29 CHRONIC BILATERAL LOW BACK PAIN WITHOUT SCIATICA: ICD-10-CM

## 2023-03-21 DIAGNOSIS — M54.16 LEFT LUMBAR RADICULITIS: Primary | Chronic | ICD-10-CM

## 2023-03-21 PROCEDURE — 99214 OFFICE O/P EST MOD 30 MIN: CPT | Performed by: NURSE PRACTITIONER

## 2023-03-21 PROCEDURE — 99213 OFFICE O/P EST LOW 20 MIN: CPT

## 2023-03-21 ASSESSMENT — ENCOUNTER SYMPTOMS
BACK PAIN: 1
CONSTIPATION: 0
SHORTNESS OF BREATH: 0
BOWEL INCONTINENCE: 0
COUGH: 0

## 2023-03-21 NOTE — PROGRESS NOTES
Chief Complaint   Patient presents with    Back Pain         PMH     59-year-old female with past medical history significant for severe COPD on continuous oxygen, history of significant psychiatric illness On chronic benzodiazepine therapy with Klonopin    Referred for evaluation of flareup of chronic low back pain. lumbar laminectomy surgery in 2018 by Dr. Rowena Snellen. Pain flared up a few months ago with no known injury to the area   Pt reports pain is located in the lower lumbar area with radiation down left leg over hip and outer thigh and into the groin  No changes in bladder or bowel control  Doing physical therapy and feels it is helping with leg strengthening but no change in pain at this time   MRI lumbar spine 12/22 Severe foraminal narrowing left side at L3-L4 level with foraminal stenosis and impingement of the exiting nerve root. Severe degenerative disc disease with Modic changes    Here today to f/u after left Transforaminal lumbar epidural steroid injection at the levels of L3 AND L4  and reports  0% relief of pain and improved activity tolerance  Continues to report axial lumbar pain worse with sitting bending or standing upright  Has failed conservative options, significant axial low back pain with degenerative facet changes on MRI, good candidate for diagnostic lumbar facets  to see if pain is facet mediated, if this is beneficial would be a candidate for radiofrequency ablation  Clinical examination suggest facet mediated pain     Back Pain  This is a chronic problem. The current episode started more than 1 year ago. The problem occurs constantly. The problem is unchanged. The pain is present in the lumbar spine (left side/left hip more painful). The quality of the pain is described as aching. The pain does not radiate. The pain is at a severity of 9/10. The pain is severe. The pain is Worse during the day.  The symptoms are aggravated by sitting, bending, twisting and coughing (lifting,

## 2023-03-22 ENCOUNTER — TELEPHONE (OUTPATIENT)
Dept: PRIMARY CARE CLINIC | Age: 63
End: 2023-03-22

## 2023-03-22 NOTE — TELEPHONE ENCOUNTER
Patient called office requesting refill on norco. Patient states she is currently seeing pain management but was told to refer back to PCP for medication due to respiratory concerns/issues per pt. Patient states she is scheduled 4/12 for injections.      Please advise     [facility]     Future Appointments   Date Time Provider Manda Jaimes   4/12/2023  3:15 PM MD JOSÉ Rosales   4/18/2023  1:50 PM [facility] 71 [facility]ox4 presents to ED c/o headache and back pain s/p fall.

## 2023-03-22 NOTE — TELEPHONE ENCOUNTER
[address]  office regarding this- Feng Murdock states she will send message to [place] and call us back

## 2023-03-22 NOTE — TELEPHONE ENCOUNTER
Confirm with pain management that this is what they said. They should prescribe her pain meds if they are seeing her for pain management.

## 2023-03-22 NOTE — TELEPHONE ENCOUNTER
Per note from 2/2/23     We will schedule patient for left-sided lumbar transforaminal epidural steroid injection  Consider for diagnostic lumbar medial branch nerve block at L3-4 and L4-5 level in the future for severe facet arthropathy and chronic axial lower back pain     Discussed with patient that considering severe COPD and use of Klonopin, I think opioid will not be appropriate choice for pain management with a high risk of respiratory depression  PCP have given her a prescription of Norco 90 tablet recently  I advised her to limit the use    Consultation note sent to the referring physician    Pain management refusing to prescribe     FYI

## 2023-03-23 DIAGNOSIS — M25.552 ACUTE HIP PAIN, LEFT: ICD-10-CM

## 2023-03-23 DIAGNOSIS — M48.061 SPINAL STENOSIS OF LUMBAR REGION WITHOUT NEUROGENIC CLAUDICATION: ICD-10-CM

## 2023-03-23 RX ORDER — HYDROCODONE BITARTRATE AND ACETAMINOPHEN 5; 325 MG/1; MG/1
1 TABLET ORAL EVERY 8 HOURS PRN
Qty: 90 TABLET | Refills: 0 | Status: SHIPPED | OUTPATIENT
Start: 2023-03-23 | End: 2023-04-22

## 2023-04-04 ENCOUNTER — PATIENT MESSAGE (OUTPATIENT)
Dept: PRIMARY CARE CLINIC | Age: 63
End: 2023-04-04

## 2023-04-05 RX ORDER — PRIMIDONE 50 MG/1
50 TABLET ORAL 2 TIMES DAILY
Qty: 20 TABLET | Refills: 0 | Status: SHIPPED | OUTPATIENT
Start: 2023-04-05

## 2023-04-05 NOTE — TELEPHONE ENCOUNTER
From: Graciela Rhodes  To: Dr. Miguel Wolf  Sent: 4/4/2023 6:36 PM EDT  Subject: Out of med    [facility]! [facility] 10 day [facility] to get me through until script comes by mail, please? Thank you.  Sonny Maya

## 2023-04-12 PROBLEM — M47.817 LUMBOSACRAL SPONDYLOSIS WITHOUT MYELOPATHY: Chronic | Status: ACTIVE | Noted: 2023-04-12

## 2023-04-13 ENCOUNTER — TELEPHONE (OUTPATIENT)
Dept: PAIN MANAGEMENT | Age: 63
End: 2023-04-13

## 2023-04-13 DIAGNOSIS — M47.817 LUMBOSACRAL SPONDYLOSIS WITHOUT MYELOPATHY: Primary | ICD-10-CM

## 2023-04-18 ENCOUNTER — OFFICE VISIT (OUTPATIENT)
Dept: PRIMARY CARE CLINIC | Age: 63
End: 2023-04-18

## 2023-04-18 VITALS
HEART RATE: 54 BPM | HEIGHT: 63 IN | BODY MASS INDEX: 18.57 KG/M2 | DIASTOLIC BLOOD PRESSURE: 70 MMHG | SYSTOLIC BLOOD PRESSURE: 126 MMHG | WEIGHT: 104.8 LBS

## 2023-04-18 DIAGNOSIS — M48.061 SPINAL STENOSIS OF LUMBAR REGION WITHOUT NEUROGENIC CLAUDICATION: ICD-10-CM

## 2023-04-18 DIAGNOSIS — Z00.00 ANNUAL PHYSICAL EXAM: ICD-10-CM

## 2023-04-18 DIAGNOSIS — M25.552 ACUTE HIP PAIN, LEFT: ICD-10-CM

## 2023-04-18 DIAGNOSIS — Z12.31 ENCOUNTER FOR SCREENING MAMMOGRAM FOR MALIGNANT NEOPLASM OF BREAST: Primary | ICD-10-CM

## 2023-04-18 DIAGNOSIS — Z13.220 ENCOUNTER FOR LIPID SCREENING FOR CARDIOVASCULAR DISEASE: ICD-10-CM

## 2023-04-18 DIAGNOSIS — F33.2 MDD (MAJOR DEPRESSIVE DISORDER), RECURRENT SEVERE, WITHOUT PSYCHOSIS (HCC): ICD-10-CM

## 2023-04-18 DIAGNOSIS — Z13.6 ENCOUNTER FOR LIPID SCREENING FOR CARDIOVASCULAR DISEASE: ICD-10-CM

## 2023-04-18 DIAGNOSIS — R19.7 DIARRHEA OF PRESUMED INFECTIOUS ORIGIN: ICD-10-CM

## 2023-04-18 RX ORDER — FLUTICASONE PROPIONATE 110 UG/1
1 AEROSOL, METERED RESPIRATORY (INHALATION) 2 TIMES DAILY
Qty: 3 EACH | Refills: 3
Start: 2023-04-18

## 2023-04-18 RX ORDER — CARVEDILOL 12.5 MG/1
12.5 TABLET ORAL 2 TIMES DAILY
Qty: 60 TABLET | Refills: 5
Start: 2023-04-18

## 2023-04-18 RX ORDER — HYDROCODONE BITARTRATE AND ACETAMINOPHEN 5; 325 MG/1; MG/1
1 TABLET ORAL EVERY 8 HOURS PRN
Qty: 90 TABLET | Refills: 0 | Status: SHIPPED | OUTPATIENT
Start: 2023-04-18 | End: 2023-05-18

## 2023-04-18 RX ORDER — AMLODIPINE BESYLATE 5 MG/1
5 TABLET ORAL DAILY
Qty: 30 TABLET | Refills: 5
Start: 2023-04-18

## 2023-04-18 RX ORDER — CYCLOBENZAPRINE HCL 10 MG
10 TABLET ORAL 2 TIMES DAILY PRN
Qty: 180 TABLET | Refills: 3 | Status: SHIPPED | OUTPATIENT
Start: 2023-04-18

## 2023-04-18 SDOH — ECONOMIC STABILITY: HOUSING INSECURITY
IN THE LAST 12 MONTHS, WAS THERE A TIME WHEN YOU DID NOT HAVE A STEADY PLACE TO SLEEP OR SLEPT IN A SHELTER (INCLUDING NOW)?: NO

## 2023-04-18 SDOH — ECONOMIC STABILITY: FOOD INSECURITY: WITHIN THE PAST 12 MONTHS, THE FOOD YOU BOUGHT JUST DIDN'T LAST AND YOU DIDN'T HAVE MONEY TO GET MORE.: NEVER TRUE

## 2023-04-18 SDOH — ECONOMIC STABILITY: FOOD INSECURITY: WITHIN THE PAST 12 MONTHS, YOU WORRIED THAT YOUR FOOD WOULD RUN OUT BEFORE YOU GOT MONEY TO BUY MORE.: NEVER TRUE

## 2023-04-18 SDOH — ECONOMIC STABILITY: INCOME INSECURITY: HOW HARD IS IT FOR YOU TO PAY FOR THE VERY BASICS LIKE FOOD, HOUSING, MEDICAL CARE, AND HEATING?: NOT HARD AT ALL

## 2023-04-18 ASSESSMENT — ENCOUNTER SYMPTOMS
DIARRHEA: 0
SORE THROAT: 0
COUGH: 0
ABDOMINAL PAIN: 0
SHORTNESS OF BREATH: 0
RHINORRHEA: 0
EYE REDNESS: 0
NAUSEA: 0
WHEEZING: 0
VOMITING: 0
EYE DISCHARGE: 0

## 2023-04-18 NOTE — PROGRESS NOTES
[place] DIGITAL SCREEN W OR WO CAD BILATERAL     Standing Status:   Future     Standing Expiration Date:   6/18/2024     Order Specific Question:   Reason for exam:     Answer:   screen    CBC with Auto Differential     Standing Status:   Future     Standing Expiration Date:   4/18/2024    Lipid, Fasting     Standing Status:   Future     Standing Expiration Date:   8/04/8298    Basic Metabolic Panel, Fasting     Standing Status:   Future     Standing Expiration Date:   4/18/2024    Hepatic Function Panel     Standing Status:   Future     Standing Expiration Date:   4/18/2024    T4, Free     Standing Status:   Future     Standing Expiration Date:   4/18/2024    TSH     Standing Status:   Future     Standing Expiration Date:   4/18/2024    Lipase     Standing Status:   Future     Standing Expiration Date:   4/18/2024     Orders Placed This Encounter   Medications    carvedilol (COREG) 12.5 MG tablet     Sig: Take 1 tablet by mouth 2 times daily     Dispense:  60 tablet     Refill:  5    amLODIPine (NORVASC) 5 MG tablet     Sig: Take 1 tablet by mouth daily     Dispense:  30 tablet     Refill:  5    fluticasone (FLOVENT HFA) 110 MCG/ACT inhaler     Sig: Inhale 1 puff into the lungs 2 times daily     Dispense:  3 each     Refill:  3    cyclobenzaprine (FLEXERIL) 10 MG tablet     Sig: Take 1 tablet by mouth 2 times daily as needed for Muscle spasms     Dispense:  180 tablet     Refill:  3    HYDROcodone-acetaminophen (NORCO) 5-325 MG per tablet     Sig: Take 1 tablet by mouth every 8 hours as needed for Pain for up to 30 days. Take lowest dose possible to manage pain Max Daily Amount: 3 tablets     Dispense:  90 tablet     Refill:  0     Reduce doses taken as pain becomes manageable       Patient given educational materials - see patient instructions. Discussed use, benefit, and side effects of prescribed medications. All patient questions answered. Pt voiced understanding. Reviewed health maintenance.   Instructed to

## 2023-05-01 DIAGNOSIS — J44.1 COPD WITH ACUTE EXACERBATION (HCC): ICD-10-CM

## 2023-05-01 RX ORDER — ALBUTEROL SULFATE 90 UG/1
AEROSOL, METERED RESPIRATORY (INHALATION)
Qty: 17 G | Refills: 4 | Status: SHIPPED | OUTPATIENT
Start: 2023-05-01

## 2023-05-01 NOTE — TELEPHONE ENCOUNTER
LAST VISIT:   4/18/2023     Future Appointments   Date Time Provider Manda Jaimes   5/10/2023  2:00 PM MD JOSÉ Bergman   5/24/2023  1:45 PM [facility] [address] [address] Radiolog   6/12/2023  2:00 PM Jamie Magallon MD [facility]

## 2023-05-09 NOTE — TELEPHONE ENCOUNTER
Once pulmonary clearance received will call patient to schedule surgery normal/soft/nontender/nondistended/normal active bowel sounds/no guarding/no organomegaly/no masses palpable negative

## 2023-05-10 ENCOUNTER — HOSPITAL ENCOUNTER (OUTPATIENT)
Dept: PAIN MANAGEMENT | Facility: CLINIC | Age: 63
Discharge: HOME | End: 2023-05-10
Payer: COMMERCIAL

## 2023-05-10 VITALS
BODY MASS INDEX: 18.43 KG/M2 | RESPIRATION RATE: 16 BRPM | WEIGHT: 104 LBS | DIASTOLIC BLOOD PRESSURE: 89 MMHG | OXYGEN SATURATION: 95 % | HEIGHT: 63 IN | SYSTOLIC BLOOD PRESSURE: 137 MMHG | TEMPERATURE: 98 F | HEART RATE: 63 BPM

## 2023-05-10 DIAGNOSIS — M47.817 LUMBOSACRAL SPONDYLOSIS WITHOUT MYELOPATHY: Primary | Chronic | ICD-10-CM

## 2023-05-10 DIAGNOSIS — R52 PAIN MANAGEMENT: ICD-10-CM

## 2023-05-10 PROCEDURE — 64493 INJ PARAVERT F JNT L/S 1 LEV: CPT | Performed by: ANESTHESIOLOGY

## 2023-05-10 PROCEDURE — 64495 INJ PARAVERT F JNT L/S 3 LEV: CPT

## 2023-05-10 PROCEDURE — 99152 MOD SED SAME PHYS/QHP 5/>YRS: CPT | Performed by: ANESTHESIOLOGY

## 2023-05-10 PROCEDURE — 64494 INJ PARAVERT F JNT L/S 2 LEV: CPT

## 2023-05-10 PROCEDURE — 2500000003 HC RX 250 WO HCPCS: Performed by: ANESTHESIOLOGY

## 2023-05-10 PROCEDURE — 64493 INJ PARAVERT F JNT L/S 1 LEV: CPT

## 2023-05-10 PROCEDURE — 6360000004 HC RX CONTRAST MEDICATION: Performed by: ANESTHESIOLOGY

## 2023-05-10 PROCEDURE — 6360000002 HC RX W HCPCS: Performed by: ANESTHESIOLOGY

## 2023-05-10 PROCEDURE — 64494 INJ PARAVERT F JNT L/S 2 LEV: CPT | Performed by: ANESTHESIOLOGY

## 2023-05-10 RX ORDER — MIDAZOLAM HYDROCHLORIDE 2 MG/2ML
INJECTION, SOLUTION INTRAMUSCULAR; INTRAVENOUS
Status: COMPLETED | OUTPATIENT
Start: 2023-05-10 | End: 2023-05-10

## 2023-05-10 RX ORDER — LIDOCAINE HYDROCHLORIDE 10 MG/ML
INJECTION, SOLUTION EPIDURAL; INFILTRATION; INTRACAUDAL; PERINEURAL
Status: COMPLETED | OUTPATIENT
Start: 2023-05-10 | End: 2023-05-10

## 2023-05-10 RX ORDER — BUPIVACAINE HYDROCHLORIDE 5 MG/ML
INJECTION, SOLUTION EPIDURAL; INTRACAUDAL
Status: COMPLETED | OUTPATIENT
Start: 2023-05-10 | End: 2023-05-10

## 2023-05-10 RX ORDER — FENTANYL CITRATE 50 UG/ML
INJECTION, SOLUTION INTRAMUSCULAR; INTRAVENOUS
Status: COMPLETED | OUTPATIENT
Start: 2023-05-10 | End: 2023-05-10

## 2023-05-10 RX ADMIN — IOHEXOL 3 ML: 180 INJECTION INTRAVENOUS at 14:49

## 2023-05-10 RX ADMIN — BUPIVACAINE HYDROCHLORIDE 5 ML: 5 INJECTION, SOLUTION EPIDURAL; INTRACAUDAL; PERINEURAL at 14:49

## 2023-05-10 RX ADMIN — MIDAZOLAM HYDROCHLORIDE 1 MG: 1 INJECTION, SOLUTION INTRAMUSCULAR; INTRAVENOUS at 14:46

## 2023-05-10 RX ADMIN — LIDOCAINE HYDROCHLORIDE 5 ML: 10 INJECTION, SOLUTION EPIDURAL; INFILTRATION; INTRACAUDAL at 14:46

## 2023-05-10 RX ADMIN — FENTANYL CITRATE 25 MCG: 50 INJECTION, SOLUTION INTRAMUSCULAR; INTRAVENOUS at 14:46

## 2023-05-10 ASSESSMENT — PAIN DESCRIPTION - DESCRIPTORS: DESCRIPTORS: ACHING;SHARP

## 2023-05-10 ASSESSMENT — PAIN SCALES - GENERAL: PAINLEVEL_OUTOF10: 0

## 2023-05-10 ASSESSMENT — PAIN - FUNCTIONAL ASSESSMENT
PAIN_FUNCTIONAL_ASSESSMENT: 0-10
PAIN_FUNCTIONAL_ASSESSMENT: PREVENTS OR INTERFERES SOME ACTIVE ACTIVITIES AND ADLS

## 2023-05-10 NOTE — DISCHARGE INSTRUCTIONS
Discharge Instructions following Sedation or Anesthesia:  You have  received  a sedative/anesthetic therefore, you should not consume any alcoholic beverages for minimum of 12 hours. Do not drive or operate machinery for 24 hours. Do not sign legal documents for 24 hours. Dizziness, drowsiness, and unsteadiness may occur. Rest when need to. Increase diet as tolerated. Keep up on fluids if diet allows. General Instructions:  Do not take a tub bath for 72 hours after procedure (this includes hot tubs and swimming pools). You may shower, but avoid hot water to injection site. Avoid strenuous activity TODAY especially if you experience dizziness. Remove band-aid the next day. Wash off any residual iodine   Do not use heat, heating pad, or any other heating device over the injection site for 3 days after the procedure. If you experience pain after your procedure, you may continue with your current pain medication as prescribed. (DO NOT INCREASE YOUR PAIN MEDICATION WITHOUT TALKING TO DOCTOR)  Soreness and pain at injection site is common, may use ice to reduce soreness. Please complete pain diary as instructed.      Call Vahe Cervantes at [phone] if you experience:   Fever, chills or temperature over 100    Vomiting, Headache, persistent stiff neck, nausea, blurred vision   Difficulty in urinating or unable to urinate with 8 hours   Increase in weakness, numbness or loss of function   Increased redness, swelling or drainage at the injection site

## 2023-05-10 NOTE — H&P
inhaler, USE 2 INHALATIONS EVERY 6 HOURS AS NEEDED FOR WHEEZING OR SHORTNESS OF BREATH, Disp: 17 g, Rfl: 4    carvedilol (COREG) 12.5 MG tablet, Take 1 tablet by mouth 2 times daily, Disp: 60 tablet, Rfl: 5    amLODIPine (NORVASC) 5 MG tablet, Take 1 tablet by mouth daily, Disp: 30 tablet, Rfl: 5    fluticasone (FLOVENT HFA) 110 MCG/ACT inhaler, Inhale 1 puff into the lungs 2 times daily, Disp: 3 each, Rfl: 3    cyclobenzaprine (FLEXERIL) 10 MG tablet, Take 1 tablet by mouth 2 times daily as needed for Muscle spasms, Disp: 180 tablet, Rfl: 3    HYDROcodone-acetaminophen (NORCO) 5-325 MG per tablet, Take 1 tablet by mouth every 8 hours as needed for Pain for up to 30 days. Take lowest dose possible to manage pain Max Daily Amount: 3 tablets, Disp: 90 tablet, Rfl: 0    clonazePAM (KLONOPIN) 0.5 MG tablet, Take 1 tablet by mouth 3 times daily as needed for Anxiety for up to 30 days. , Disp: 270 tablet, Rfl: 0    primidone (MYSOLINE) 50 MG tablet, Take 1 tablet by mouth 2 times daily, Disp: 20 tablet, Rfl: 0    Blood Pressure Monitoring (BLOOD PRESSURE CUFF) MISC, 1 Units by Does not apply route daily, Disp: 1 each, Rfl: 0    sucralfate (CARAFATE) 1 GM/10ML suspension, Take 10 mLs by mouth 4 times daily (before meals and nightly), Disp: 3600 mL, Rfl: 0    albuterol (ACCUNEB) 0.63 MG/3ML nebulizer solution, Take 3 mLs by nebulization every 6 hours as needed for Wheezing, Disp: 270 mL, Rfl: 2    levothyroxine (SYNTHROID) 88 MCG tablet, Take 1 tablet by mouth Daily, Disp: 90 tablet, Rfl: 3    fluticasone (FLONASE) 50 MCG/ACT nasal spray, 2 sprays by Each Nostril route daily, Disp: 16 g, Rfl: 5    estradiol (ESTRACE VAGINAL) 0.1 MG/GM vaginal cream, Place 1 g vaginally daily, Disp: 3 each, Rfl: 3    montelukast (SINGULAIR) 10 MG tablet, Take 1 tablet by mouth daily, Disp: 90 tablet, Rfl: 3    omeprazole (PRILOSEC) 40 MG delayed release capsule, Take 1 capsule by mouth in the morning and 1 capsule in the evening., Disp: 180

## 2023-05-10 NOTE — OP NOTE
Patient Name: Steve Tam   YOB: 1960  Room/Bed: Room/bed info not found  Medical Record Number: [id number]  Date: 5/10/2023       Sedation/ Anesthesia Plan:   intravenous sedation   as needed. Medications Planned:   midazolam (Versed) / Fentanyl  Intravenously  as needed. Preoperative Diagnosis: Lumbar spondylosis w/o myelopathy or radiculopathy  Postoperative Diagnosis: Lumbar spondylosis w/o myelopathy or radiculopathy  Blood Loss: none    Procedure Performed:  Bilateral Lumbar Medial Branch nerve Blocks at the transverse processes of L4, L5 and sacral  ala under fluoroscopy guidance    Procedure: The Patient was seen in the preop area, chart was reviewed, informed consent was obtained. Patient was taken to procedure room and was placed in prone position. Vital signs were monitored through out the  Procedure. A time out was completed. The skin over the back was prepped and draped in sterile manner. The target point was marked at the junction of Transverse process and superior articular process at the target levels. Skin and deep tissues were anesthetized with 1 % lidocaine. A 25-gauge needlele was advanced to the target spots under fluoroscopy guidance in AP / Lateral and Oblique views. Then after negative aspiration contrast dye was injected with live fluoroscopy in AP views that showed  spread of the contrast with no epidural space and no vascular runoff or intrathecal spread. Finally 0.5 ml of treatment solution 0.5% BUPIVACAINE   was injected at each level. The needle was removed and a Band-Aid was placed over the needle  insertion site. The patient's vital signs remained stable and the patient tolerated the procedure well.       Electronically signed by Anna Cotter MD on 5/10/2023 at 2:58 PM    SEDATION NOTE:    ASA CLASSIFICATION  3  MP   CLASSIFICATION  3    Moderate intravenous conscious sedation was supervised by Dr. Sadie Gupta  The patient was independently

## 2023-05-15 ENCOUNTER — TELEPHONE (OUTPATIENT)
Dept: PAIN MANAGEMENT | Age: 63
End: 2023-05-15

## 2023-05-15 DIAGNOSIS — M47.817 LUMBOSACRAL SPONDYLOSIS WITHOUT MYELOPATHY: Primary | ICD-10-CM

## 2023-05-15 NOTE — TELEPHONE ENCOUNTER
S/P:  Bilateral Lumbar Medial Branch nerve Blocks DOS: 05/10/2023     Pain  before procedure with activity : 8    Pain after procedure with activity: 0    Activities following procedure include: washed clothes dishes, exercises     % of pain relief: 100%     Procedure successful: Yes    OV or OR scheduled: Nothing at this time please advise

## 2023-05-19 ENCOUNTER — TELEPHONE (OUTPATIENT)
Dept: PAIN MANAGEMENT | Age: 63
End: 2023-05-19

## 2023-05-19 NOTE — TELEPHONE ENCOUNTER
I called patient to schedule RFA; states she is in-house at [facility]. Requests I call her on Monday.

## 2023-05-22 ENCOUNTER — TELEPHONE (OUTPATIENT)
Dept: PRIMARY CARE CLINIC | Age: 63
End: 2023-05-22

## 2023-05-22 NOTE — TELEPHONE ENCOUNTER
Care Transitions Initial Follow Up Call    Outreach made within 2 business days of discharge: Yes    Patient: Shiraz Pascal Patient : 1960   MRN: [id number]  Reason for Admission: There are no discharge diagnoses documented for the most recent discharge. Discharge Date: 22       Spoke with: NABOR     Discharge department/facility: [facility] Patient Contact:  Was patient able to fill all prescriptions: Yes  Was patient instructed to bring all medications to the follow-up visit: Yes  Is patient taking all medications as directed in the discharge summary?  Yes  Does patient understand their discharge instructions: Yes  Does patient have questions or concerns that need addressed prior to 7-14 day follow up office visit: no    Scheduled appointment with PCP within 7-14 days    Follow Up  Future Appointments   Date Time Provider Manda Jaimes   2023  1:45 PM [address] [place] [address] Shaw Erickson   2023  2:00 PM Don Paul MD [facility] [address] [place], [place]

## 2023-05-23 ENCOUNTER — TELEPHONE (OUTPATIENT)
Dept: PRIMARY CARE CLINIC | Age: 63
End: 2023-05-23

## 2023-05-23 NOTE — TELEPHONE ENCOUNTER
Can you please find a sooner appointment for a TCM visit, Pt was at [facility] d/c on 5/22/23

## 2023-05-25 ENCOUNTER — TELEPHONE (OUTPATIENT)
Dept: PRIMARY CARE CLINIC | Age: 63
End: 2023-05-25

## 2023-05-25 NOTE — TELEPHONE ENCOUNTER
Naseem with [address] PT calling for a verbal order to continue PT. OK to continue? Jonelle Trejo - [phone]. OK to leave a voicemail.

## 2023-05-27 ENCOUNTER — APPOINTMENT (OUTPATIENT)
Dept: CT IMAGING | Age: 63
End: 2023-05-27
Payer: COMMERCIAL

## 2023-05-27 ENCOUNTER — APPOINTMENT (OUTPATIENT)
Dept: GENERAL RADIOLOGY | Age: 63
End: 2023-05-27
Payer: COMMERCIAL

## 2023-05-27 ENCOUNTER — HOSPITAL ENCOUNTER (EMERGENCY)
Age: 63
Discharge: HOME | End: 2023-05-28
Attending: EMERGENCY MEDICINE
Payer: COMMERCIAL

## 2023-05-27 DIAGNOSIS — F10.920 ACUTE ALCOHOLIC INTOXICATION WITHOUT COMPLICATION (HCC): Primary | ICD-10-CM

## 2023-05-27 LAB
ALBUMIN SERPL-MCNC: 4.3 G/DL (ref 3.5–5.2)
ALP SERPL-CCNC: 69 U/L (ref 35–104)
ALT SERPL-CCNC: 63 U/L (ref 5–33)
ANION GAP SERPL CALCULATED.3IONS-SCNC: 13 MMOL/L (ref 9–17)
APAP SERPL-MCNC: <5 UG/ML (ref 10–30)
AST SERPL-CCNC: 25 U/L
BASOPHILS # BLD: 0 K/UL (ref 0–0.2)
BASOPHILS NFR BLD: 0 % (ref 0–2)
BILIRUB SERPL-MCNC: 0.3 MG/DL (ref 0.3–1.2)
BUN SERPL-MCNC: 13 MG/DL (ref 8–23)
CALCIUM SERPL-MCNC: 8.4 MG/DL (ref 8.6–10.4)
CARBOXYHEMOGLOBIN: 9.3 % (ref 0–5)
CHLORIDE SERPL-SCNC: 98 MMOL/L (ref 98–107)
CO2 SERPL-SCNC: 30 MMOL/L (ref 20–31)
CREAT SERPL-MCNC: 0.62 MG/DL (ref 0.5–0.9)
EOSINOPHIL # BLD: 0 K/UL (ref 0–0.4)
EOSINOPHILS RELATIVE PERCENT: 0 % (ref 0–4)
ERYTHROCYTE [DISTWIDTH] IN BLOOD BY AUTOMATED COUNT: 14.1 % (ref 11.5–14.9)
ETHANOL PERCENT: 0.19 %
ETHANOLAMINE SERPL-MCNC: 193 MG/DL
FIO2: 21
GFR SERPL CREATININE-BSD FRML MDRD: >60 ML/MIN/1.73M2
GLUCOSE SERPL-MCNC: 99 MG/DL (ref 70–99)
HCO3 VENOUS: 29.8 MMOL/L (ref 24–30)
HCT VFR BLD AUTO: 34.9 % (ref 36–46)
HGB BLD-MCNC: 12 G/DL (ref 12–16)
LACTATE BLDV-SCNC: 2.4 MMOL/L (ref 0.5–2.2)
LYMPHOCYTES # BLD: 20 % (ref 24–44)
LYMPHOCYTES NFR BLD: 1.5 K/UL (ref 1–4.8)
MCH RBC QN AUTO: 31.7 PG (ref 26–34)
MCHC RBC AUTO-ENTMCNC: 34.5 G/DL (ref 31–37)
MCV RBC AUTO: 92 FL (ref 80–100)
METHEMOGLOBIN: 0.5 % (ref 0–1.9)
MONOCYTES NFR BLD: 0.5 K/UL (ref 0.1–1.3)
MONOCYTES NFR BLD: 6 % (ref 1–7)
NEUTROPHILS NFR BLD: 74 % (ref 36–66)
NEUTS SEG NFR BLD: 5.4 K/UL (ref 1.3–9.1)
O2 SAT, VEN: 79.6 % (ref 60–85)
PATIENT TEMP: 37
PCO2, VEN: 42.3 MM HG (ref 39–55)
PH VENOUS: 7.46 (ref 7.32–7.42)
PLATELET # BLD AUTO: 327 K/UL (ref 150–450)
PMV BLD AUTO: 6.5 FL (ref 6–12)
PO2, VEN: 49.4 MM HG (ref 30–50)
POSITIVE BASE EXCESS, VEN: 6 MMOL/L (ref 0–2)
POTASSIUM SERPL-SCNC: 5.1 MMOL/L (ref 3.7–5.3)
PROT SERPL-MCNC: 6.9 G/DL (ref 6.4–8.3)
RBC # BLD AUTO: 3.8 M/UL (ref 4–5.2)
REASON FOR REJECTION: NORMAL
SALICYLATES SERPL-MCNC: <1 MG/DL (ref 3–10)
SODIUM SERPL-SCNC: 141 MMOL/L (ref 135–144)
SPECIMEN SOURCE: NORMAL
TEXT FOR RESPIRATORY: ABNORMAL
TOXIC TRICYCLIC SC,BLOOD: ABNORMAL
TROPONIN I SERPL HS-MCNC: 11 NG/L (ref 0–14)
TSH SERPL-MCNC: 0.18 UIU/ML (ref 0.3–5)
WBC OTHER # BLD: 7.3 K/UL (ref 3.5–11)
ZZ NTE CLEAN UP: ORDERED TEST: NORMAL

## 2023-05-27 PROCEDURE — 82805 BLOOD GASES W/O2 SATURATION: CPT

## 2023-05-27 PROCEDURE — 99285 EMERGENCY DEPT VISIT HI MDM: CPT

## 2023-05-27 PROCEDURE — 93005 ELECTROCARDIOGRAM TRACING: CPT | Performed by: STUDENT IN AN ORGANIZED HEALTH CARE EDUCATION/TRAINING PROGRAM

## 2023-05-27 PROCEDURE — G0480 DRUG TEST DEF 1-7 CLASSES: HCPCS

## 2023-05-27 PROCEDURE — 85025 COMPLETE CBC W/AUTO DIFF WBC: CPT

## 2023-05-27 PROCEDURE — 70450 CT HEAD/BRAIN W/O DYE: CPT

## 2023-05-27 PROCEDURE — 80143 DRUG ASSAY ACETAMINOPHEN: CPT

## 2023-05-27 PROCEDURE — 83605 ASSAY OF LACTIC ACID: CPT

## 2023-05-27 PROCEDURE — 80053 COMPREHEN METABOLIC PANEL: CPT

## 2023-05-27 PROCEDURE — 84443 ASSAY THYROID STIM HORMONE: CPT

## 2023-05-27 PROCEDURE — 84484 ASSAY OF TROPONIN QUANT: CPT

## 2023-05-27 PROCEDURE — 80179 DRUG ASSAY SALICYLATE: CPT

## 2023-05-27 PROCEDURE — 82800 BLOOD PH: CPT

## 2023-05-27 PROCEDURE — 71045 X-RAY EXAM CHEST 1 VIEW: CPT

## 2023-05-27 PROCEDURE — 80307 DRUG TEST PRSMV CHEM ANLYZR: CPT

## 2023-05-27 PROCEDURE — 36415 COLL VENOUS BLD VENIPUNCTURE: CPT

## 2023-05-27 RX ORDER — 0.9 % SODIUM CHLORIDE 0.9 %
500 INTRAVENOUS SOLUTION INTRAVENOUS ONCE
Status: DISCONTINUED | OUTPATIENT
Start: 2023-05-28 | End: 2023-05-28 | Stop reason: HOSPADM

## 2023-05-27 ASSESSMENT — LIFESTYLE VARIABLES: HOW OFTEN DO YOU HAVE A DRINK CONTAINING ALCOHOL: MONTHLY OR LESS

## 2023-05-28 VITALS
BODY MASS INDEX: 20.38 KG/M2 | OXYGEN SATURATION: 97 % | RESPIRATION RATE: 16 BRPM | HEART RATE: 72 BPM | DIASTOLIC BLOOD PRESSURE: 71 MMHG | HEIGHT: 63 IN | SYSTOLIC BLOOD PRESSURE: 116 MMHG | WEIGHT: 115 LBS

## 2023-05-28 ASSESSMENT — ENCOUNTER SYMPTOMS
SHORTNESS OF BREATH: 0
COUGH: 0
ABDOMINAL DISTENTION: 0
BACK PAIN: 0
CHEST TIGHTNESS: 0
TROUBLE SWALLOWING: 0
CONSTIPATION: 0
DIARRHEA: 0
SORE THROAT: 0
VOMITING: 0
ABDOMINAL PAIN: 0

## 2023-05-30 ENCOUNTER — OFFICE VISIT (OUTPATIENT)
Dept: PRIMARY CARE CLINIC | Age: 63
End: 2023-05-30

## 2023-05-30 VITALS
HEIGHT: 63 IN | SYSTOLIC BLOOD PRESSURE: 116 MMHG | HEART RATE: 92 BPM | BODY MASS INDEX: 18.43 KG/M2 | DIASTOLIC BLOOD PRESSURE: 68 MMHG | OXYGEN SATURATION: 98 % | WEIGHT: 104 LBS

## 2023-05-30 DIAGNOSIS — Z09 HOSPITAL DISCHARGE FOLLOW-UP: Primary | ICD-10-CM

## 2023-05-30 DIAGNOSIS — J44.1 COPD WITH ACUTE EXACERBATION (HCC): ICD-10-CM

## 2023-05-30 DIAGNOSIS — M47.817 LUMBOSACRAL SPONDYLOSIS WITHOUT MYELOPATHY: Chronic | ICD-10-CM

## 2023-05-30 DIAGNOSIS — R60.0 LOCALIZED EDEMA: ICD-10-CM

## 2023-05-30 LAB
EKG ATRIAL RATE: 70 BPM
EKG P AXIS: 152 DEGREES
EKG P-R INTERVAL: 176 MS
EKG Q-T INTERVAL: 418 MS
EKG QRS DURATION: 88 MS
EKG QTC CALCULATION (BAZETT): 451 MS
EKG R AXIS: 120 DEGREES
EKG T AXIS: 142 DEGREES
EKG VENTRICULAR RATE: 70 BPM

## 2023-05-30 PROCEDURE — 93010 ELECTROCARDIOGRAM REPORT: CPT | Performed by: INTERNAL MEDICINE

## 2023-05-30 RX ORDER — HYDROCODONE BITARTRATE AND ACETAMINOPHEN 5; 325 MG/1; MG/1
1 TABLET ORAL 3 TIMES DAILY PRN
COMMUNITY
End: 2023-05-30 | Stop reason: SDUPTHER

## 2023-05-30 RX ORDER — BUDESONIDE, GLYCOPYRROLATE, AND FORMOTEROL FUMARATE 160; 9; 4.8 UG/1; UG/1; UG/1
2 AEROSOL, METERED RESPIRATORY (INHALATION) 2 TIMES DAILY
Qty: 1 EACH | Refills: 5
Start: 2023-05-30

## 2023-05-30 RX ORDER — TIZANIDINE 4 MG/1
4 TABLET ORAL 4 TIMES DAILY PRN
Qty: 40 TABLET | Refills: 0 | Status: SHIPPED | OUTPATIENT
Start: 2023-05-30

## 2023-05-30 RX ORDER — FUROSEMIDE 20 MG/1
20 TABLET ORAL DAILY PRN
Qty: 90 TABLET | Refills: 2 | Status: SHIPPED | OUTPATIENT
Start: 2023-05-30

## 2023-05-30 RX ORDER — BUDESONIDE, GLYCOPYRROLATE, AND FORMOTEROL FUMARATE 160; 9; 4.8 UG/1; UG/1; UG/1
AEROSOL, METERED RESPIRATORY (INHALATION)
COMMUNITY
End: 2023-05-30

## 2023-05-30 RX ORDER — HYDROCODONE BITARTRATE AND ACETAMINOPHEN 5; 325 MG/1; MG/1
1 TABLET ORAL 3 TIMES DAILY PRN
Qty: 90 TABLET | Refills: 0 | Status: SHIPPED | OUTPATIENT
Start: 2023-05-30 | End: 2023-06-29

## 2023-05-30 RX ORDER — PREDNISONE 10 MG/1
TABLET ORAL
COMMUNITY
Start: 2023-05-26

## 2023-05-30 ASSESSMENT — ENCOUNTER SYMPTOMS
SORE THROAT: 0
DIARRHEA: 0
COUGH: 0
VOMITING: 0
NAUSEA: 0
EYE DISCHARGE: 0
ABDOMINAL PAIN: 0
RHINORRHEA: 0
WHEEZING: 0
BACK PAIN: 1
SHORTNESS OF BREATH: 1
EYE REDNESS: 0

## 2023-05-30 NOTE — PROGRESS NOTES
mL 0    albuterol (ACCUNEB) 0.63 MG/3ML nebulizer solution Take 3 mLs by nebulization every 6 hours as needed for Wheezing 270 mL 2    levothyroxine (SYNTHROID) 88 MCG tablet Take 1 tablet by mouth Daily 90 tablet 3    fluticasone (FLONASE) 50 MCG/ACT nasal spray 2 sprays by Each Nostril route daily 16 g 5    estradiol (ESTRACE VAGINAL) 0.1 MG/GM vaginal cream Place 1 g vaginally daily 3 each 3    montelukast (SINGULAIR) 10 MG tablet Take 1 tablet by mouth daily 90 tablet 3    omeprazole (PRILOSEC) 40 MG delayed release capsule Take 1 capsule by mouth in the morning and 1 capsule in the evening. 180 capsule 3    levalbuterol (XOPENEX) 1.25 MG/3ML nebulizer solution       Melatonin 10 MG TABS Take 10 mg by mouth nightly 90 tablet 3    mirtazapine (REMERON) 30 MG tablet       venlafaxine (EFFEXOR XR) 150 MG extended release capsule Take 1 capsule by mouth daily      vitamin C (ASCORBIC ACID) 500 MG tablet Take 1 tablet by mouth daily 30 tablet 3    Multiple Vitamins-Minerals (THERAPEUTIC MULTIVITAMIN-MINERALS) tablet Take 1 tablet by mouth daily 30 tablet 0    calcium carbonate-vitamin D (CALTRATE) 600-400 MG-UNIT TABS per tab Take 1 tablet by mouth daily 30 tablet 0        Medications patient taking as of now reconciled against medications ordered at time of hospital discharge: Yes    Review of Systems   Constitutional:  Negative for chills and fever. HENT:  Negative for rhinorrhea and sore throat. Eyes:  Negative for discharge and redness. Respiratory:  Positive for shortness of breath. Negative for cough and wheezing. Cardiovascular:  Negative for chest pain and palpitations. Gastrointestinal:  Negative for abdominal pain, diarrhea, nausea and vomiting. Genitourinary:  Negative for dysuria and frequency. Musculoskeletal:  Positive for back pain. Negative for arthralgias and myalgias. Neurological:  Negative for dizziness, light-headedness and headaches.    Psychiatric/Behavioral:  Negative for

## 2023-05-31 ENCOUNTER — TELEPHONE (OUTPATIENT)
Dept: ONCOLOGY | Age: 63
End: 2023-05-31

## 2023-05-31 DIAGNOSIS — Z87.891 PERSONAL HISTORY OF NICOTINE DEPENDENCE: Primary | ICD-10-CM

## 2023-05-31 NOTE — TELEPHONE ENCOUNTER
Our records indicate that your patient is coming due for their annual lung cancer screening follow up testing. For your convenience, we have pended the order for the scan for you. If you do not agree with the need for the test, please cancel the order and let us know. Sincerely,    [address] [place] [address] Screening Program    Auto printed reminder letter sent to patient.

## 2023-06-05 ENCOUNTER — TELEPHONE (OUTPATIENT)
Dept: PRIMARY CARE CLINIC | Age: 63
End: 2023-06-05

## 2023-06-19 DIAGNOSIS — J44.1 COPD WITH ACUTE EXACERBATION (HCC): ICD-10-CM

## 2023-06-19 RX ORDER — ALBUTEROL SULFATE 90 UG/1
AEROSOL, METERED RESPIRATORY (INHALATION)
Qty: 8.5 G | Refills: 3 | Status: SHIPPED | OUTPATIENT
Start: 2023-06-19

## 2023-06-19 NOTE — TELEPHONE ENCOUNTER
LAST VISIT:   4/5/2022     Future Appointments   Date Time Provider Manda Jaimes   12/11/2023  2:00 PM lEvia Palmer MD [facility]

## 2023-07-04 DIAGNOSIS — F33.2 MDD (MAJOR DEPRESSIVE DISORDER), RECURRENT SEVERE, WITHOUT PSYCHOSIS (HCC): ICD-10-CM

## 2023-07-05 RX ORDER — CLONAZEPAM 0.5 MG/1
0.5 TABLET ORAL 3 TIMES DAILY PRN
Qty: 270 TABLET | Refills: 0 | Status: SHIPPED | OUTPATIENT
Start: 2023-07-05 | End: 2023-08-04

## 2023-07-05 NOTE — TELEPHONE ENCOUNTER
LAST VISIT:   5/30/2023     Future Appointments   Date Time Provider [address]  [facility]   12/11/2023  2:00 PM Elmarie Every, MD [facility]

## 2023-07-09 LAB
A/G RATIO: NORMAL
ALBUMIN SERPL-MCNC: NORMAL G/DL
ALP BLD-CCNC: NORMAL U/L
ALT SERPL-CCNC: NORMAL U/L
ANION GAP SERPL CALCULATED.3IONS-SCNC: NORMAL MMOL/L
AST SERPL-CCNC: NORMAL U/L
BASOPHILS ABSOLUTE: NORMAL
BASOPHILS RELATIVE PERCENT: NORMAL
BILIRUB SERPL-MCNC: NORMAL MG/DL
BILIRUBIN DIRECT: NORMAL
BILIRUBIN, INDIRECT: NORMAL
BUN BLDV-MCNC: NORMAL MG/DL
BUN/CREAT BLD: NORMAL
CALCIUM SERPL-MCNC: NORMAL MG/DL
CHLORIDE BLD-SCNC: NORMAL MMOL/L
CHOLESTEROL, FASTING: 172
CO2: NORMAL
CREAT SERPL-MCNC: NORMAL MG/DL
EOSINOPHILS ABSOLUTE: NORMAL
EOSINOPHILS RELATIVE PERCENT: NORMAL
GFR AFRICAN AMERICAN: NORMAL
GFR NON-AFRICAN AMERICAN: NORMAL
GLOBULIN: NORMAL
GLUCOSE FASTING: NORMAL
HCT VFR BLD CALC: NORMAL %
HDLC SERPL-MCNC: 88 MG/DL (ref 35–70)
HEMOGLOBIN: NORMAL
LDL CHOLESTEROL CALCULATED: 63 MG/DL (ref 0–160)
LIPASE: NORMAL
LYMPHOCYTES ABSOLUTE: NORMAL
LYMPHOCYTES RELATIVE PERCENT: NORMAL
MCH RBC QN AUTO: NORMAL PG
MCHC RBC AUTO-ENTMCNC: NORMAL G/DL
MCV RBC AUTO: NORMAL FL
MONOCYTES ABSOLUTE: NORMAL
MONOCYTES RELATIVE PERCENT: NORMAL
NEUTROPHILS ABSOLUTE: NORMAL
NEUTROPHILS RELATIVE PERCENT: NORMAL
PDW BLD-RTO: NORMAL %
PLATELET # BLD: NORMAL 10*3/UL
PMV BLD AUTO: NORMAL FL
POTASSIUM SERPL-SCNC: NORMAL MMOL/L
PROTEIN TOTAL: NORMAL
RBC # BLD: NORMAL 10*6/UL
SODIUM BLD-SCNC: NORMAL MMOL/L
T4 FREE: NORMAL
TRIGLYCERIDE, FASTING: 104
TSH SERPL DL<=0.05 MIU/L-ACNC: NORMAL M[IU]/L
WBC # BLD: NORMAL 10*3/UL

## 2023-07-10 ENCOUNTER — HOSPITAL ENCOUNTER (INPATIENT)
Age: 63
LOS: 8 days | Discharge: HOME | DRG: 190 | End: 2023-07-18
Attending: STUDENT IN AN ORGANIZED HEALTH CARE EDUCATION/TRAINING PROGRAM | Admitting: INTERNAL MEDICINE
Payer: COMMERCIAL

## 2023-07-10 ENCOUNTER — APPOINTMENT (OUTPATIENT)
Dept: GENERAL RADIOLOGY | Age: 63
DRG: 190 | End: 2023-07-10
Payer: COMMERCIAL

## 2023-07-10 DIAGNOSIS — F33.2 MDD (MAJOR DEPRESSIVE DISORDER), RECURRENT SEVERE, WITHOUT PSYCHOSIS (HCC): ICD-10-CM

## 2023-07-10 DIAGNOSIS — M47.817 LUMBOSACRAL SPONDYLOSIS WITHOUT MYELOPATHY: ICD-10-CM

## 2023-07-10 DIAGNOSIS — Z13.220 ENCOUNTER FOR LIPID SCREENING FOR CARDIOVASCULAR DISEASE: ICD-10-CM

## 2023-07-10 DIAGNOSIS — M47.817 LUMBOSACRAL SPONDYLOSIS WITHOUT MYELOPATHY: Primary | ICD-10-CM

## 2023-07-10 DIAGNOSIS — Z00.00 ANNUAL PHYSICAL EXAM: ICD-10-CM

## 2023-07-10 DIAGNOSIS — J44.1 COPD EXACERBATION (HCC): Primary | ICD-10-CM

## 2023-07-10 DIAGNOSIS — R19.7 DIARRHEA OF PRESUMED INFECTIOUS ORIGIN: ICD-10-CM

## 2023-07-10 DIAGNOSIS — Z13.6 ENCOUNTER FOR LIPID SCREENING FOR CARDIOVASCULAR DISEASE: ICD-10-CM

## 2023-07-10 LAB
ANION GAP SERPL CALCULATED.3IONS-SCNC: 5 MMOL/L (ref 9–17)
B PARAP IS1001 DNA NPH QL NAA+NON-PROBE: NOT DETECTED
B PERT DNA SPEC QL NAA+PROBE: NOT DETECTED
BASOPHILS # BLD: 0 K/UL (ref 0–0.2)
BASOPHILS NFR BLD: 0 % (ref 0–2)
BNP SERPL-MCNC: 336 PG/ML
BODY TEMPERATURE: 37
BUN SERPL-MCNC: 17 MG/DL (ref 8–23)
C PNEUM DNA NPH QL NAA+NON-PROBE: NOT DETECTED
CALCIUM SERPL-MCNC: 8.7 MG/DL (ref 8.6–10.4)
CHLORIDE SERPL-SCNC: 99 MMOL/L (ref 98–107)
CO2 SERPL-SCNC: 36 MMOL/L (ref 20–31)
COHGB MFR BLD: 4.2 % (ref 0–5)
CREAT SERPL-MCNC: 0.5 MG/DL (ref 0.5–0.9)
EOSINOPHIL # BLD: 0 K/UL (ref 0–0.4)
EOSINOPHILS RELATIVE PERCENT: 0 % (ref 0–4)
ERYTHROCYTE [DISTWIDTH] IN BLOOD BY AUTOMATED COUNT: 16.6 % (ref 11.5–14.9)
FLUAV RNA NPH QL NAA+NON-PROBE: NOT DETECTED
FLUAV RNA RESP QL NAA+PROBE: NOT DETECTED
FLUBV RNA NPH QL NAA+NON-PROBE: NOT DETECTED
FLUBV RNA RESP QL NAA+PROBE: NOT DETECTED
GAS FLOW.O2 O2 DELIVERY SYS: ABNORMAL L/MIN
GFR SERPL CREATININE-BSD FRML MDRD: >60 ML/MIN/1.73M2
GLUCOSE SERPL-MCNC: 110 MG/DL (ref 70–99)
HADV DNA NPH QL NAA+NON-PROBE: NOT DETECTED
HCO3 VENOUS: 36.2 MMOL/L (ref 24–30)
HCOV 229E RNA NPH QL NAA+NON-PROBE: NOT DETECTED
HCOV HKU1 RNA NPH QL NAA+NON-PROBE: NOT DETECTED
HCOV NL63 RNA NPH QL NAA+NON-PROBE: NOT DETECTED
HCOV OC43 RNA NPH QL NAA+NON-PROBE: NOT DETECTED
HCT VFR BLD AUTO: 33.1 % (ref 36–46)
HGB BLD-MCNC: 11.1 G/DL (ref 12–16)
HMPV RNA NPH QL NAA+NON-PROBE: NOT DETECTED
HPIV1 RNA NPH QL NAA+NON-PROBE: NOT DETECTED
HPIV2 RNA NPH QL NAA+NON-PROBE: NOT DETECTED
HPIV3 RNA NPH QL NAA+NON-PROBE: NOT DETECTED
HPIV4 RNA NPH QL NAA+NON-PROBE: NOT DETECTED
LYMPHOCYTES # BLD: 33 % (ref 24–44)
LYMPHOCYTES NFR BLD: 2.2 K/UL (ref 1–4.8)
M PNEUMO DNA NPH QL NAA+NON-PROBE: NOT DETECTED
MAGNESIUM SERPL-MCNC: 2 MG/DL (ref 1.6–2.6)
MCH RBC QN AUTO: 31.9 PG (ref 26–34)
MCHC RBC AUTO-ENTMCNC: 33.7 G/DL (ref 31–37)
MCV RBC AUTO: 94.9 FL (ref 80–100)
METHEMOGLOBIN: 1 % (ref 0–1.9)
MONOCYTES NFR BLD: 1 K/UL (ref 0.1–1.3)
MONOCYTES NFR BLD: 15 % (ref 1–7)
NEUTROPHILS NFR BLD: 52 % (ref 36–66)
NEUTS SEG NFR BLD: 3.5 K/UL (ref 1.3–9.1)
O2 SAT, VEN: 88.3 % (ref 60–85)
PCO2, VEN: 64.1 MM HG (ref 39–55)
PH VENOUS: 7.36 (ref 7.32–7.42)
PLATELET # BLD AUTO: 341 K/UL (ref 150–450)
PMV BLD AUTO: 7.6 FL (ref 6–12)
PO2, VEN: 66.6 MM HG (ref 30–50)
POSITIVE BASE EXCESS, VEN: 10.7 MMOL/L (ref 0–2)
POTASSIUM SERPL-SCNC: 4.5 MMOL/L (ref 3.7–5.3)
PT. POSITION: ABNORMAL
RBC # BLD AUTO: 3.49 M/UL (ref 4–5.2)
RESPIRATORY RATE: 24
RSV RNA NPH QL NAA+NON-PROBE: NOT DETECTED
RV+EV RNA NPH QL NAA+NON-PROBE: NOT DETECTED
SARS-COV-2 RNA NPH QL NAA+NON-PROBE: NOT DETECTED
SARS-COV-2 RNA RESP QL NAA+PROBE: NOT DETECTED
SODIUM SERPL-SCNC: 140 MMOL/L (ref 135–144)
SOURCE: NORMAL
SPECIMEN DESCRIPTION: NORMAL
SPECIMEN DESCRIPTION: NORMAL
TEXT FOR RESPIRATORY: ABNORMAL
TROPONIN I SERPL HS-MCNC: 14 NG/L (ref 0–14)
TROPONIN I SERPL HS-MCNC: 15 NG/L (ref 0–14)
WBC OTHER # BLD: 6.8 K/UL (ref 3.5–11)

## 2023-07-10 PROCEDURE — 2580000003 HC RX 258: Performed by: INTERNAL MEDICINE

## 2023-07-10 PROCEDURE — 84484 ASSAY OF TROPONIN QUANT: CPT

## 2023-07-10 PROCEDURE — 2700000000 HC OXYGEN THERAPY PER DAY

## 2023-07-10 PROCEDURE — 85027 COMPLETE CBC AUTOMATED: CPT

## 2023-07-10 PROCEDURE — 87636 SARSCOV2 & INF A&B AMP PRB: CPT

## 2023-07-10 PROCEDURE — 94640 AIRWAY INHALATION TREATMENT: CPT

## 2023-07-10 PROCEDURE — 71045 X-RAY EXAM CHEST 1 VIEW: CPT

## 2023-07-10 PROCEDURE — 6360000002 HC RX W HCPCS: Performed by: EMERGENCY MEDICINE

## 2023-07-10 PROCEDURE — 99223 1ST HOSP IP/OBS HIGH 75: CPT | Performed by: INTERNAL MEDICINE

## 2023-07-10 PROCEDURE — 96375 TX/PRO/DX INJ NEW DRUG ADDON: CPT

## 2023-07-10 PROCEDURE — 0202U NFCT DS 22 TRGT SARS-COV-2: CPT

## 2023-07-10 PROCEDURE — 99285 EMERGENCY DEPT VISIT HI MDM: CPT

## 2023-07-10 PROCEDURE — 6370000000 HC RX 637 (ALT 250 FOR IP): Performed by: EMERGENCY MEDICINE

## 2023-07-10 PROCEDURE — 94761 N-INVAS EAR/PLS OXIMETRY MLT: CPT

## 2023-07-10 PROCEDURE — 80048 BASIC METABOLIC PNL TOTAL CA: CPT

## 2023-07-10 PROCEDURE — 2580000003 HC RX 258: Performed by: EMERGENCY MEDICINE

## 2023-07-10 PROCEDURE — 6360000002 HC RX W HCPCS: Performed by: NURSE PRACTITIONER

## 2023-07-10 PROCEDURE — 6370000000 HC RX 637 (ALT 250 FOR IP)

## 2023-07-10 PROCEDURE — 6360000002 HC RX W HCPCS: Performed by: INTERNAL MEDICINE

## 2023-07-10 PROCEDURE — 6370000000 HC RX 637 (ALT 250 FOR IP): Performed by: NURSE PRACTITIONER

## 2023-07-10 PROCEDURE — 83880 ASSAY OF NATRIURETIC PEPTIDE: CPT

## 2023-07-10 PROCEDURE — 82800 BLOOD PH: CPT

## 2023-07-10 PROCEDURE — 83735 ASSAY OF MAGNESIUM: CPT

## 2023-07-10 PROCEDURE — 6370000000 HC RX 637 (ALT 250 FOR IP): Performed by: INTERNAL MEDICINE

## 2023-07-10 PROCEDURE — 82805 BLOOD GASES W/O2 SATURATION: CPT

## 2023-07-10 PROCEDURE — 6370000000 HC RX 637 (ALT 250 FOR IP): Performed by: STUDENT IN AN ORGANIZED HEALTH CARE EDUCATION/TRAINING PROGRAM

## 2023-07-10 PROCEDURE — 2060000000 HC ICU INTERMEDIATE R&B

## 2023-07-10 PROCEDURE — 36415 COLL VENOUS BLD VENIPUNCTURE: CPT

## 2023-07-10 PROCEDURE — 96365 THER/PROPH/DIAG IV INF INIT: CPT

## 2023-07-10 RX ORDER — AMLODIPINE BESYLATE 10 MG/1
10 TABLET ORAL EVERY EVENING
Status: DISCONTINUED | OUTPATIENT
Start: 2023-07-10 | End: 2023-07-18 | Stop reason: HOSPADM

## 2023-07-10 RX ORDER — SODIUM CHLORIDE 0.9 % (FLUSH) 0.9 %
5-40 SYRINGE (ML) INJECTION EVERY 12 HOURS SCHEDULED
Status: DISCONTINUED | OUTPATIENT
Start: 2023-07-10 | End: 2023-07-18 | Stop reason: HOSPADM

## 2023-07-10 RX ORDER — MAGNESIUM SULFATE 1 G/100ML
1000 INJECTION INTRAVENOUS ONCE
Status: COMPLETED | OUTPATIENT
Start: 2023-07-10 | End: 2023-07-10

## 2023-07-10 RX ORDER — ACETAMINOPHEN 325 MG/1
650 TABLET ORAL EVERY 6 HOURS PRN
Status: DISCONTINUED | OUTPATIENT
Start: 2023-07-10 | End: 2023-07-18 | Stop reason: HOSPADM

## 2023-07-10 RX ORDER — DIPHENOXYLATE HYDROCHLORIDE AND ATROPINE SULFATE 2.5; .025 MG/1; MG/1
1 TABLET ORAL 4 TIMES DAILY PRN
COMMUNITY

## 2023-07-10 RX ORDER — ACETAMINOPHEN 650 MG/1
650 SUPPOSITORY RECTAL EVERY 6 HOURS PRN
Status: DISCONTINUED | OUTPATIENT
Start: 2023-07-10 | End: 2023-07-18 | Stop reason: HOSPADM

## 2023-07-10 RX ORDER — SODIUM CHLORIDE 9 MG/ML
INJECTION, SOLUTION INTRAVENOUS PRN
Status: DISCONTINUED | OUTPATIENT
Start: 2023-07-10 | End: 2023-07-18 | Stop reason: HOSPADM

## 2023-07-10 RX ORDER — FUROSEMIDE 20 MG/1
20 TABLET ORAL DAILY PRN
Status: DISCONTINUED | OUTPATIENT
Start: 2023-07-10 | End: 2023-07-18 | Stop reason: HOSPADM

## 2023-07-10 RX ORDER — BUSPIRONE HYDROCHLORIDE 10 MG/1
10 TABLET ORAL 3 TIMES DAILY
COMMUNITY

## 2023-07-10 RX ORDER — FUROSEMIDE 10 MG/ML
20 INJECTION INTRAMUSCULAR; INTRAVENOUS ONCE
Status: COMPLETED | OUTPATIENT
Start: 2023-07-10 | End: 2023-07-10

## 2023-07-10 RX ORDER — MONTELUKAST SODIUM 10 MG/1
10 TABLET ORAL DAILY
Status: DISCONTINUED | OUTPATIENT
Start: 2023-07-10 | End: 2023-07-18 | Stop reason: HOSPADM

## 2023-07-10 RX ORDER — LEVOTHYROXINE SODIUM 88 UG/1
88 TABLET ORAL DAILY
Status: DISCONTINUED | OUTPATIENT
Start: 2023-07-10 | End: 2023-07-18 | Stop reason: HOSPADM

## 2023-07-10 RX ORDER — CLONAZEPAM 0.5 MG/1
0.5 TABLET ORAL 3 TIMES DAILY PRN
Status: DISCONTINUED | OUTPATIENT
Start: 2023-07-10 | End: 2023-07-18 | Stop reason: HOSPADM

## 2023-07-10 RX ORDER — POLYETHYLENE GLYCOL 3350 17 G/17G
17 POWDER, FOR SOLUTION ORAL DAILY PRN
Status: DISCONTINUED | OUTPATIENT
Start: 2023-07-10 | End: 2023-07-18 | Stop reason: HOSPADM

## 2023-07-10 RX ORDER — CARVEDILOL 12.5 MG/1
12.5 TABLET ORAL 2 TIMES DAILY
Status: DISCONTINUED | OUTPATIENT
Start: 2023-07-10 | End: 2023-07-18 | Stop reason: HOSPADM

## 2023-07-10 RX ORDER — IPRATROPIUM BROMIDE AND ALBUTEROL SULFATE 2.5; .5 MG/3ML; MG/3ML
SOLUTION RESPIRATORY (INHALATION)
Status: DISPENSED
Start: 2023-07-10 | End: 2023-07-10

## 2023-07-10 RX ORDER — SUCRALFATE 1 G/1
1 TABLET ORAL EVERY 6 HOURS SCHEDULED
Status: DISCONTINUED | OUTPATIENT
Start: 2023-07-10 | End: 2023-07-18 | Stop reason: HOSPADM

## 2023-07-10 RX ORDER — ALBUTEROL SULFATE 2.5 MG/3ML
2.5 SOLUTION RESPIRATORY (INHALATION) EVERY 4 HOURS PRN
Status: DISCONTINUED | OUTPATIENT
Start: 2023-07-10 | End: 2023-07-18 | Stop reason: HOSPADM

## 2023-07-10 RX ORDER — AZITHROMYCIN 250 MG/1
250 TABLET, FILM COATED ORAL
Status: DISCONTINUED | OUTPATIENT
Start: 2023-07-10 | End: 2023-07-18 | Stop reason: HOSPADM

## 2023-07-10 RX ORDER — PRIMIDONE 50 MG/1
50 TABLET ORAL NIGHTLY
Status: DISCONTINUED | OUTPATIENT
Start: 2023-07-10 | End: 2023-07-18 | Stop reason: HOSPADM

## 2023-07-10 RX ORDER — HYDROCODONE BITARTRATE AND ACETAMINOPHEN 5; 325 MG/1; MG/1
1 TABLET ORAL EVERY 8 HOURS PRN
COMMUNITY
End: 2023-07-21 | Stop reason: SDUPTHER

## 2023-07-10 RX ORDER — IPRATROPIUM BROMIDE AND ALBUTEROL SULFATE 2.5; .5 MG/3ML; MG/3ML
1 SOLUTION RESPIRATORY (INHALATION)
Status: DISCONTINUED | OUTPATIENT
Start: 2023-07-10 | End: 2023-07-10

## 2023-07-10 RX ORDER — IPRATROPIUM BROMIDE AND ALBUTEROL SULFATE 2.5; .5 MG/3ML; MG/3ML
1 SOLUTION RESPIRATORY (INHALATION) EVERY 4 HOURS PRN
Status: DISCONTINUED | OUTPATIENT
Start: 2023-07-10 | End: 2023-07-10

## 2023-07-10 RX ORDER — ENOXAPARIN SODIUM 100 MG/ML
30 INJECTION SUBCUTANEOUS DAILY
Status: DISCONTINUED | OUTPATIENT
Start: 2023-07-10 | End: 2023-07-18 | Stop reason: HOSPADM

## 2023-07-10 RX ORDER — ALBUTEROL SULFATE 2.5 MG/3ML
2.5 SOLUTION RESPIRATORY (INHALATION)
Status: COMPLETED | OUTPATIENT
Start: 2023-07-10 | End: 2023-07-10

## 2023-07-10 RX ORDER — ONDANSETRON 4 MG/1
4 TABLET, ORALLY DISINTEGRATING ORAL EVERY 8 HOURS PRN
Status: DISCONTINUED | OUTPATIENT
Start: 2023-07-10 | End: 2023-07-18 | Stop reason: HOSPADM

## 2023-07-10 RX ORDER — CLONAZEPAM 1 MG/1
1 TABLET ORAL ONCE
Status: COMPLETED | OUTPATIENT
Start: 2023-07-10 | End: 2023-07-10

## 2023-07-10 RX ORDER — HYDROCODONE BITARTRATE AND ACETAMINOPHEN 5; 325 MG/1; MG/1
1 TABLET ORAL EVERY 6 HOURS PRN
Status: DISCONTINUED | OUTPATIENT
Start: 2023-07-10 | End: 2023-07-18 | Stop reason: HOSPADM

## 2023-07-10 RX ORDER — IPRATROPIUM BROMIDE AND ALBUTEROL SULFATE 2.5; .5 MG/3ML; MG/3ML
SOLUTION RESPIRATORY (INHALATION)
Status: COMPLETED
Start: 2023-07-10 | End: 2023-07-10

## 2023-07-10 RX ORDER — ONDANSETRON 2 MG/ML
4 INJECTION INTRAMUSCULAR; INTRAVENOUS EVERY 6 HOURS PRN
Status: DISCONTINUED | OUTPATIENT
Start: 2023-07-10 | End: 2023-07-18 | Stop reason: HOSPADM

## 2023-07-10 RX ORDER — SODIUM CHLORIDE 0.9 % (FLUSH) 0.9 %
5-40 SYRINGE (ML) INJECTION PRN
Status: DISCONTINUED | OUTPATIENT
Start: 2023-07-10 | End: 2023-07-18 | Stop reason: HOSPADM

## 2023-07-10 RX ORDER — MIRTAZAPINE 30 MG/1
30 TABLET, FILM COATED ORAL NIGHTLY
Status: DISCONTINUED | OUTPATIENT
Start: 2023-07-11 | End: 2023-07-18 | Stop reason: HOSPADM

## 2023-07-10 RX ORDER — TIZANIDINE 4 MG/1
4 TABLET ORAL 4 TIMES DAILY PRN
Status: DISCONTINUED | OUTPATIENT
Start: 2023-07-10 | End: 2023-07-18 | Stop reason: HOSPADM

## 2023-07-10 RX ORDER — ALBUTEROL SULFATE 2.5 MG/3ML
2.5 SOLUTION RESPIRATORY (INHALATION)
Status: DISCONTINUED | OUTPATIENT
Start: 2023-07-10 | End: 2023-07-18 | Stop reason: HOSPADM

## 2023-07-10 RX ORDER — PANTOPRAZOLE SODIUM 40 MG/1
40 TABLET, DELAYED RELEASE ORAL
Status: DISCONTINUED | OUTPATIENT
Start: 2023-07-11 | End: 2023-07-18 | Stop reason: HOSPADM

## 2023-07-10 RX ADMIN — ALBUTEROL SULFATE 2.5 MG: 2.5 SOLUTION RESPIRATORY (INHALATION) at 19:09

## 2023-07-10 RX ADMIN — NYSTATIN 500000 UNITS: 100000 SUSPENSION ORAL at 21:53

## 2023-07-10 RX ADMIN — IPRATROPIUM BROMIDE AND ALBUTEROL SULFATE 3 ML: 2.5; .5 SOLUTION RESPIRATORY (INHALATION) at 16:27

## 2023-07-10 RX ADMIN — MONTELUKAST 10 MG: 10 TABLET, FILM COATED ORAL at 16:05

## 2023-07-10 RX ADMIN — AMLODIPINE BESYLATE 10 MG: 10 TABLET ORAL at 21:53

## 2023-07-10 RX ADMIN — HYDROCODONE BITARTRATE AND ACETAMINOPHEN 1 TABLET: 5; 325 TABLET ORAL at 23:13

## 2023-07-10 RX ADMIN — PRIMIDONE 50 MG: 50 TABLET ORAL at 21:53

## 2023-07-10 RX ADMIN — METHYLPREDNISOLONE SODIUM SUCCINATE 40 MG: 40 INJECTION, POWDER, LYOPHILIZED, FOR SOLUTION INTRAMUSCULAR; INTRAVENOUS at 21:53

## 2023-07-10 RX ADMIN — FUROSEMIDE 20 MG: 10 INJECTION, SOLUTION INTRAMUSCULAR; INTRAVENOUS at 16:17

## 2023-07-10 RX ADMIN — ALBUTEROL SULFATE 2.5 MG: 2.5 SOLUTION RESPIRATORY (INHALATION) at 16:35

## 2023-07-10 RX ADMIN — METHYLPREDNISOLONE SODIUM SUCCINATE 40 MG: 40 INJECTION, POWDER, LYOPHILIZED, FOR SOLUTION INTRAMUSCULAR; INTRAVENOUS at 16:04

## 2023-07-10 RX ADMIN — ALBUTEROL SULFATE 2.5 MG: 2.5 SOLUTION RESPIRATORY (INHALATION) at 12:01

## 2023-07-10 RX ADMIN — IPRATROPIUM BROMIDE AND ALBUTEROL SULFATE 1 DOSE: 2.5; .5 SOLUTION RESPIRATORY (INHALATION) at 10:48

## 2023-07-10 RX ADMIN — IPRATROPIUM BROMIDE AND ALBUTEROL SULFATE 1 DOSE: 2.5; .5 SOLUTION RESPIRATORY (INHALATION) at 10:40

## 2023-07-10 RX ADMIN — CARVEDILOL 12.5 MG: 12.5 TABLET, FILM COATED ORAL at 16:06

## 2023-07-10 RX ADMIN — WATER 125 MG: 1 INJECTION INTRAMUSCULAR; INTRAVENOUS; SUBCUTANEOUS at 12:37

## 2023-07-10 RX ADMIN — SODIUM CHLORIDE, PRESERVATIVE FREE 10 ML: 5 INJECTION INTRAVENOUS at 21:56

## 2023-07-10 RX ADMIN — LEVOTHYROXINE SODIUM 88 MCG: 0.09 TABLET ORAL at 16:05

## 2023-07-10 RX ADMIN — ALBUTEROL SULFATE 2.5 MG: 2.5 SOLUTION RESPIRATORY (INHALATION) at 12:10

## 2023-07-10 RX ADMIN — NYSTATIN 500000 UNITS: 100000 SUSPENSION ORAL at 16:17

## 2023-07-10 RX ADMIN — ENOXAPARIN SODIUM 30 MG: 100 INJECTION SUBCUTANEOUS at 16:04

## 2023-07-10 RX ADMIN — IPRATROPIUM BROMIDE AND ALBUTEROL SULFATE 1 DOSE: 2.5; .5 SOLUTION RESPIRATORY (INHALATION) at 10:42

## 2023-07-10 RX ADMIN — AZITHROMYCIN DIHYDRATE 250 MG: 250 TABLET ORAL at 16:17

## 2023-07-10 RX ADMIN — CLONAZEPAM 1 MG: 1 TABLET ORAL at 11:13

## 2023-07-10 RX ADMIN — SUCRALFATE 1 G: 1 TABLET ORAL at 17:51

## 2023-07-10 RX ADMIN — MAGNESIUM SULFATE HEPTAHYDRATE 1000 MG: 1 INJECTION, SOLUTION INTRAVENOUS at 12:37

## 2023-07-10 RX ADMIN — ALBUTEROL SULFATE 2.5 MG: 2.5 SOLUTION RESPIRATORY (INHALATION) at 11:54

## 2023-07-10 ASSESSMENT — PAIN SCALES - GENERAL
PAINLEVEL_OUTOF10: 6
PAINLEVEL_OUTOF10: 8
PAINLEVEL_OUTOF10: 4
PAINLEVEL_OUTOF10: 7

## 2023-07-10 ASSESSMENT — PAIN - FUNCTIONAL ASSESSMENT: PAIN_FUNCTIONAL_ASSESSMENT: 0-10

## 2023-07-10 ASSESSMENT — LIFESTYLE VARIABLES
HOW OFTEN DO YOU HAVE A DRINK CONTAINING ALCOHOL: NEVER
HOW MANY STANDARD DRINKS CONTAINING ALCOHOL DO YOU HAVE ON A TYPICAL DAY: PATIENT DOES NOT DRINK

## 2023-07-10 ASSESSMENT — PAIN DESCRIPTION - PAIN TYPE
TYPE: CHRONIC PAIN
TYPE: CHRONIC PAIN

## 2023-07-10 ASSESSMENT — ENCOUNTER SYMPTOMS
SHORTNESS OF BREATH: 1
WHEEZING: 1
COUGH: 1
VOMITING: 0
NAUSEA: 0

## 2023-07-10 ASSESSMENT — PAIN DESCRIPTION - ORIENTATION
ORIENTATION: LEFT;LOWER
ORIENTATION: RIGHT;LOWER

## 2023-07-10 ASSESSMENT — PAIN DESCRIPTION - LOCATION
LOCATION: BACK;HIP
LOCATION: BACK
LOCATION: HIP;BACK
LOCATION: BACK

## 2023-07-10 ASSESSMENT — PAIN DESCRIPTION - DESCRIPTORS: DESCRIPTORS: ACHING;DISCOMFORT

## 2023-07-11 ENCOUNTER — APPOINTMENT (OUTPATIENT)
Dept: NON INVASIVE DIAGNOSTICS | Age: 63
DRG: 190 | End: 2023-07-11
Payer: COMMERCIAL

## 2023-07-11 PROBLEM — E43 SEVERE MALNUTRITION (HCC): Chronic | Status: ACTIVE | Noted: 2023-07-11

## 2023-07-11 LAB
ANION GAP SERPL CALCULATED.3IONS-SCNC: 4 MMOL/L (ref 9–17)
BUN SERPL-MCNC: 14 MG/DL (ref 8–23)
CALCIUM SERPL-MCNC: 8.4 MG/DL (ref 8.6–10.4)
CHLORIDE SERPL-SCNC: 96 MMOL/L (ref 98–107)
CO2 SERPL-SCNC: 39 MMOL/L (ref 20–31)
CREAT SERPL-MCNC: 0.5 MG/DL (ref 0.5–0.9)
ERYTHROCYTE [DISTWIDTH] IN BLOOD BY AUTOMATED COUNT: 16.5 % (ref 11.5–14.9)
GFR SERPL CREATININE-BSD FRML MDRD: >60 ML/MIN/1.73M2
GLUCOSE SERPL-MCNC: 135 MG/DL (ref 70–99)
HCT VFR BLD AUTO: 29.5 % (ref 36–46)
HGB BLD-MCNC: 10 G/DL (ref 12–16)
LV EF: 55 %
LVEF MODALITY: NORMAL
MCH RBC QN AUTO: 32.2 PG (ref 26–34)
MCHC RBC AUTO-ENTMCNC: 33.8 G/DL (ref 31–37)
MCV RBC AUTO: 95.2 FL (ref 80–100)
PLATELET # BLD AUTO: 295 K/UL (ref 150–450)
PMV BLD AUTO: 7.4 FL (ref 6–12)
POTASSIUM SERPL-SCNC: 4 MMOL/L (ref 3.7–5.3)
RBC # BLD AUTO: 3.09 M/UL (ref 4–5.2)
REASON FOR REJECTION: NORMAL
SODIUM SERPL-SCNC: 139 MMOL/L (ref 135–144)
SPECIMEN SOURCE: NORMAL
WBC OTHER # BLD: 6.4 K/UL (ref 3.5–11)
ZZ NTE CLEAN UP: ORDERED TEST: NORMAL

## 2023-07-11 PROCEDURE — 6370000000 HC RX 637 (ALT 250 FOR IP): Performed by: NURSE PRACTITIONER

## 2023-07-11 PROCEDURE — 2700000000 HC OXYGEN THERAPY PER DAY

## 2023-07-11 PROCEDURE — 36415 COLL VENOUS BLD VENIPUNCTURE: CPT

## 2023-07-11 PROCEDURE — 2060000000 HC ICU INTERMEDIATE R&B

## 2023-07-11 PROCEDURE — 6370000000 HC RX 637 (ALT 250 FOR IP): Performed by: INTERNAL MEDICINE

## 2023-07-11 PROCEDURE — 85027 COMPLETE CBC AUTOMATED: CPT

## 2023-07-11 PROCEDURE — 93306 TTE W/DOPPLER COMPLETE: CPT

## 2023-07-11 PROCEDURE — 80048 BASIC METABOLIC PNL TOTAL CA: CPT

## 2023-07-11 PROCEDURE — 6360000002 HC RX W HCPCS: Performed by: INTERNAL MEDICINE

## 2023-07-11 PROCEDURE — 97162 PT EVAL MOD COMPLEX 30 MIN: CPT

## 2023-07-11 PROCEDURE — 97165 OT EVAL LOW COMPLEX 30 MIN: CPT

## 2023-07-11 PROCEDURE — 99232 SBSQ HOSP IP/OBS MODERATE 35: CPT | Performed by: INTERNAL MEDICINE

## 2023-07-11 PROCEDURE — 94664 DEMO&/EVAL PT USE INHALER: CPT

## 2023-07-11 PROCEDURE — 94640 AIRWAY INHALATION TREATMENT: CPT

## 2023-07-11 PROCEDURE — 94761 N-INVAS EAR/PLS OXIMETRY MLT: CPT

## 2023-07-11 PROCEDURE — 2580000003 HC RX 258: Performed by: INTERNAL MEDICINE

## 2023-07-11 RX ORDER — VENLAFAXINE HYDROCHLORIDE 150 MG/1
150 CAPSULE, EXTENDED RELEASE ORAL DAILY
Status: DISCONTINUED | OUTPATIENT
Start: 2023-07-11 | End: 2023-07-18 | Stop reason: HOSPADM

## 2023-07-11 RX ADMIN — CARVEDILOL 12.5 MG: 12.5 TABLET, FILM COATED ORAL at 19:27

## 2023-07-11 RX ADMIN — METHYLPREDNISOLONE SODIUM SUCCINATE 40 MG: 40 INJECTION, POWDER, LYOPHILIZED, FOR SOLUTION INTRAMUSCULAR; INTRAVENOUS at 22:27

## 2023-07-11 RX ADMIN — LEVOTHYROXINE SODIUM 88 MCG: 0.09 TABLET ORAL at 06:04

## 2023-07-11 RX ADMIN — PANTOPRAZOLE SODIUM 40 MG: 40 TABLET, DELAYED RELEASE ORAL at 06:04

## 2023-07-11 RX ADMIN — METHYLPREDNISOLONE SODIUM SUCCINATE 40 MG: 40 INJECTION, POWDER, LYOPHILIZED, FOR SOLUTION INTRAMUSCULAR; INTRAVENOUS at 09:41

## 2023-07-11 RX ADMIN — VENLAFAXINE HYDROCHLORIDE 150 MG: 150 CAPSULE, EXTENDED RELEASE ORAL at 17:00

## 2023-07-11 RX ADMIN — TIZANIDINE 4 MG: 4 TABLET ORAL at 22:27

## 2023-07-11 RX ADMIN — AMLODIPINE BESYLATE 10 MG: 10 TABLET ORAL at 20:30

## 2023-07-11 RX ADMIN — CLONAZEPAM 0.5 MG: 1 TABLET ORAL at 00:45

## 2023-07-11 RX ADMIN — ALBUTEROL SULFATE 2.5 MG: 2.5 SOLUTION RESPIRATORY (INHALATION) at 15:43

## 2023-07-11 RX ADMIN — MIRTAZAPINE 30 MG: 30 TABLET, FILM COATED ORAL at 20:30

## 2023-07-11 RX ADMIN — CLONAZEPAM 0.5 MG: 1 TABLET ORAL at 16:53

## 2023-07-11 RX ADMIN — NYSTATIN 500000 UNITS: 100000 SUSPENSION ORAL at 16:53

## 2023-07-11 RX ADMIN — CLONAZEPAM 0.5 MG: 1 TABLET ORAL at 09:41

## 2023-07-11 RX ADMIN — SUCRALFATE 1 G: 1 TABLET ORAL at 13:37

## 2023-07-11 RX ADMIN — NYSTATIN 500000 UNITS: 100000 SUSPENSION ORAL at 09:42

## 2023-07-11 RX ADMIN — SUCRALFATE 1 G: 1 TABLET ORAL at 16:53

## 2023-07-11 RX ADMIN — ALBUTEROL SULFATE 2.5 MG: 2.5 SOLUTION RESPIRATORY (INHALATION) at 11:50

## 2023-07-11 RX ADMIN — SUCRALFATE 1 G: 1 TABLET ORAL at 06:04

## 2023-07-11 RX ADMIN — NYSTATIN 500000 UNITS: 100000 SUSPENSION ORAL at 13:37

## 2023-07-11 RX ADMIN — NYSTATIN 500000 UNITS: 100000 SUSPENSION ORAL at 20:33

## 2023-07-11 RX ADMIN — ALBUTEROL SULFATE 2.5 MG: 2.5 SOLUTION RESPIRATORY (INHALATION) at 08:25

## 2023-07-11 RX ADMIN — HYDROCODONE BITARTRATE AND ACETAMINOPHEN 1 TABLET: 5; 325 TABLET ORAL at 09:41

## 2023-07-11 RX ADMIN — CARVEDILOL 12.5 MG: 12.5 TABLET, FILM COATED ORAL at 09:42

## 2023-07-11 RX ADMIN — SODIUM CHLORIDE, PRESERVATIVE FREE 10 ML: 5 INJECTION INTRAVENOUS at 09:43

## 2023-07-11 RX ADMIN — MONTELUKAST 10 MG: 10 TABLET, FILM COATED ORAL at 09:42

## 2023-07-11 RX ADMIN — Medication 2 PUFF: at 08:33

## 2023-07-11 RX ADMIN — MIRTAZAPINE 30 MG: 30 TABLET, FILM COATED ORAL at 00:45

## 2023-07-11 RX ADMIN — SUCRALFATE 1 G: 1 TABLET ORAL at 00:46

## 2023-07-11 RX ADMIN — SODIUM CHLORIDE, PRESERVATIVE FREE 10 ML: 5 INJECTION INTRAVENOUS at 20:37

## 2023-07-11 RX ADMIN — METHYLPREDNISOLONE SODIUM SUCCINATE 40 MG: 40 INJECTION, POWDER, LYOPHILIZED, FOR SOLUTION INTRAMUSCULAR; INTRAVENOUS at 16:53

## 2023-07-11 RX ADMIN — METHYLPREDNISOLONE SODIUM SUCCINATE 40 MG: 40 INJECTION, POWDER, LYOPHILIZED, FOR SOLUTION INTRAMUSCULAR; INTRAVENOUS at 04:10

## 2023-07-11 RX ADMIN — PRIMIDONE 50 MG: 50 TABLET ORAL at 20:30

## 2023-07-11 RX ADMIN — ENOXAPARIN SODIUM 30 MG: 100 INJECTION SUBCUTANEOUS at 09:42

## 2023-07-11 RX ADMIN — ALBUTEROL SULFATE 2.5 MG: 2.5 SOLUTION RESPIRATORY (INHALATION) at 20:07

## 2023-07-11 ASSESSMENT — PAIN DESCRIPTION - ORIENTATION
ORIENTATION: LEFT;LOWER
ORIENTATION: LOWER;LEFT
ORIENTATION: LOWER

## 2023-07-11 ASSESSMENT — PAIN SCALES - GENERAL
PAINLEVEL_OUTOF10: 4
PAINLEVEL_OUTOF10: 4
PAINLEVEL_OUTOF10: 6
PAINLEVEL_OUTOF10: 7
PAINLEVEL_OUTOF10: 6

## 2023-07-11 ASSESSMENT — PAIN DESCRIPTION - LOCATION
LOCATION: BACK
LOCATION: BACK;HIP
LOCATION: HIP;BACK
LOCATION: BACK;HIP
LOCATION: BACK;HIP
LOCATION: BACK

## 2023-07-11 ASSESSMENT — PAIN DESCRIPTION - DESCRIPTORS: DESCRIPTORS: ACHING

## 2023-07-11 NOTE — RESULT ENCOUNTER NOTE
Advise pt potassium high, lipase little elevated.    Recommend increase fluids, repeat lipase, potassium in one week

## 2023-07-12 LAB
ANION GAP SERPL CALCULATED.3IONS-SCNC: 4 MMOL/L (ref 9–17)
BUN SERPL-MCNC: 15 MG/DL (ref 8–23)
CALCIUM SERPL-MCNC: 8.2 MG/DL (ref 8.6–10.4)
CHLORIDE SERPL-SCNC: 98 MMOL/L (ref 98–107)
CO2 SERPL-SCNC: 37 MMOL/L (ref 20–31)
CREAT SERPL-MCNC: <0.4 MG/DL (ref 0.5–0.9)
ERYTHROCYTE [DISTWIDTH] IN BLOOD BY AUTOMATED COUNT: 16.8 % (ref 11.5–14.9)
GFR SERPL CREATININE-BSD FRML MDRD: ABNORMAL ML/MIN/1.73M2
GLUCOSE SERPL-MCNC: 136 MG/DL (ref 70–99)
HCT VFR BLD AUTO: 31.1 % (ref 36–46)
HGB BLD-MCNC: 10.3 G/DL (ref 12–16)
MCH RBC QN AUTO: 31.6 PG (ref 26–34)
MCHC RBC AUTO-ENTMCNC: 32.9 G/DL (ref 31–37)
MCV RBC AUTO: 95.8 FL (ref 80–100)
PLATELET # BLD AUTO: 295 K/UL (ref 150–450)
PMV BLD AUTO: 7.3 FL (ref 6–12)
POTASSIUM SERPL-SCNC: 3.7 MMOL/L (ref 3.7–5.3)
RBC # BLD AUTO: 3.25 M/UL (ref 4–5.2)
SODIUM SERPL-SCNC: 139 MMOL/L (ref 135–144)
WBC OTHER # BLD: 8.7 K/UL (ref 3.5–11)

## 2023-07-12 PROCEDURE — 85027 COMPLETE CBC AUTOMATED: CPT

## 2023-07-12 PROCEDURE — 36415 COLL VENOUS BLD VENIPUNCTURE: CPT

## 2023-07-12 PROCEDURE — 2700000000 HC OXYGEN THERAPY PER DAY

## 2023-07-12 PROCEDURE — 94761 N-INVAS EAR/PLS OXIMETRY MLT: CPT

## 2023-07-12 PROCEDURE — 6370000000 HC RX 637 (ALT 250 FOR IP): Performed by: NURSE PRACTITIONER

## 2023-07-12 PROCEDURE — 2580000003 HC RX 258: Performed by: INTERNAL MEDICINE

## 2023-07-12 PROCEDURE — 6360000002 HC RX W HCPCS: Performed by: INTERNAL MEDICINE

## 2023-07-12 PROCEDURE — 5A09357 ASSISTANCE WITH RESPIRATORY VENTILATION, LESS THAN 24 CONSECUTIVE HOURS, CONTINUOUS POSITIVE AIRWAY PRESSURE: ICD-10-PCS | Performed by: INTERNAL MEDICINE

## 2023-07-12 PROCEDURE — 99232 SBSQ HOSP IP/OBS MODERATE 35: CPT | Performed by: INTERNAL MEDICINE

## 2023-07-12 PROCEDURE — 2060000000 HC ICU INTERMEDIATE R&B

## 2023-07-12 PROCEDURE — 94664 DEMO&/EVAL PT USE INHALER: CPT

## 2023-07-12 PROCEDURE — 6370000000 HC RX 637 (ALT 250 FOR IP): Performed by: INTERNAL MEDICINE

## 2023-07-12 PROCEDURE — 80048 BASIC METABOLIC PNL TOTAL CA: CPT

## 2023-07-12 PROCEDURE — 94640 AIRWAY INHALATION TREATMENT: CPT

## 2023-07-12 PROCEDURE — 97116 GAIT TRAINING THERAPY: CPT

## 2023-07-12 RX ORDER — GUAIFENESIN 600 MG/1
1200 TABLET, EXTENDED RELEASE ORAL 2 TIMES DAILY
Status: DISCONTINUED | OUTPATIENT
Start: 2023-07-12 | End: 2023-07-18 | Stop reason: HOSPADM

## 2023-07-12 RX ORDER — NICOTINE 21 MG/24HR
1 PATCH, TRANSDERMAL 24 HOURS TRANSDERMAL DAILY
Status: DISCONTINUED | OUTPATIENT
Start: 2023-07-12 | End: 2023-07-18 | Stop reason: HOSPADM

## 2023-07-12 RX ADMIN — NYSTATIN 500000 UNITS: 100000 SUSPENSION ORAL at 11:54

## 2023-07-12 RX ADMIN — TIZANIDINE 4 MG: 4 TABLET ORAL at 11:41

## 2023-07-12 RX ADMIN — METHYLPREDNISOLONE SODIUM SUCCINATE 40 MG: 40 INJECTION, POWDER, LYOPHILIZED, FOR SOLUTION INTRAMUSCULAR; INTRAVENOUS at 11:53

## 2023-07-12 RX ADMIN — METHYLPREDNISOLONE SODIUM SUCCINATE 40 MG: 40 INJECTION, POWDER, LYOPHILIZED, FOR SOLUTION INTRAMUSCULAR; INTRAVENOUS at 20:14

## 2023-07-12 RX ADMIN — NYSTATIN 500000 UNITS: 100000 SUSPENSION ORAL at 16:10

## 2023-07-12 RX ADMIN — HYDROCODONE BITARTRATE AND ACETAMINOPHEN 1 TABLET: 5; 325 TABLET ORAL at 18:51

## 2023-07-12 RX ADMIN — MIRTAZAPINE 30 MG: 30 TABLET, FILM COATED ORAL at 20:14

## 2023-07-12 RX ADMIN — SUCRALFATE 1 G: 1 TABLET ORAL at 23:25

## 2023-07-12 RX ADMIN — SUCRALFATE 1 G: 1 TABLET ORAL at 00:06

## 2023-07-12 RX ADMIN — AMLODIPINE BESYLATE 10 MG: 10 TABLET ORAL at 20:14

## 2023-07-12 RX ADMIN — Medication 2 PUFF: at 07:24

## 2023-07-12 RX ADMIN — ALBUTEROL SULFATE 2.5 MG: 2.5 SOLUTION RESPIRATORY (INHALATION) at 10:52

## 2023-07-12 RX ADMIN — PRIMIDONE 50 MG: 50 TABLET ORAL at 20:13

## 2023-07-12 RX ADMIN — METHYLPREDNISOLONE SODIUM SUCCINATE 40 MG: 40 INJECTION, POWDER, LYOPHILIZED, FOR SOLUTION INTRAMUSCULAR; INTRAVENOUS at 04:55

## 2023-07-12 RX ADMIN — GUAIFENESIN 1200 MG: 600 TABLET, EXTENDED RELEASE ORAL at 20:14

## 2023-07-12 RX ADMIN — CLONAZEPAM 0.5 MG: 1 TABLET ORAL at 18:51

## 2023-07-12 RX ADMIN — LEVOTHYROXINE SODIUM 88 MCG: 0.09 TABLET ORAL at 05:59

## 2023-07-12 RX ADMIN — NYSTATIN 500000 UNITS: 100000 SUSPENSION ORAL at 08:26

## 2023-07-12 RX ADMIN — ALBUTEROL SULFATE 2.5 MG: 2.5 SOLUTION RESPIRATORY (INHALATION) at 20:55

## 2023-07-12 RX ADMIN — ALBUTEROL SULFATE 2.5 MG: 2.5 SOLUTION RESPIRATORY (INHALATION) at 15:04

## 2023-07-12 RX ADMIN — TIZANIDINE 4 MG: 4 TABLET ORAL at 20:15

## 2023-07-12 RX ADMIN — VENLAFAXINE HYDROCHLORIDE 150 MG: 150 CAPSULE, EXTENDED RELEASE ORAL at 08:26

## 2023-07-12 RX ADMIN — ALBUTEROL SULFATE 2.5 MG: 2.5 SOLUTION RESPIRATORY (INHALATION) at 07:24

## 2023-07-12 RX ADMIN — AZITHROMYCIN DIHYDRATE 250 MG: 250 TABLET ORAL at 16:10

## 2023-07-12 RX ADMIN — SODIUM CHLORIDE, PRESERVATIVE FREE 10 ML: 5 INJECTION INTRAVENOUS at 08:27

## 2023-07-12 RX ADMIN — SODIUM CHLORIDE, PRESERVATIVE FREE 10 ML: 5 INJECTION INTRAVENOUS at 20:14

## 2023-07-12 RX ADMIN — SUCRALFATE 1 G: 1 TABLET ORAL at 16:10

## 2023-07-12 RX ADMIN — SUCRALFATE 1 G: 1 TABLET ORAL at 11:41

## 2023-07-12 RX ADMIN — GUAIFENESIN 1200 MG: 600 TABLET, EXTENDED RELEASE ORAL at 11:54

## 2023-07-12 RX ADMIN — NYSTATIN 500000 UNITS: 100000 SUSPENSION ORAL at 20:14

## 2023-07-12 RX ADMIN — SUCRALFATE 1 G: 1 TABLET ORAL at 05:59

## 2023-07-12 RX ADMIN — CLONAZEPAM 0.5 MG: 1 TABLET ORAL at 08:24

## 2023-07-12 RX ADMIN — ENOXAPARIN SODIUM 30 MG: 100 INJECTION SUBCUTANEOUS at 08:26

## 2023-07-12 RX ADMIN — CARVEDILOL 12.5 MG: 12.5 TABLET, FILM COATED ORAL at 16:10

## 2023-07-12 RX ADMIN — CARVEDILOL 12.5 MG: 12.5 TABLET, FILM COATED ORAL at 08:26

## 2023-07-12 RX ADMIN — PANTOPRAZOLE SODIUM 40 MG: 40 TABLET, DELAYED RELEASE ORAL at 05:59

## 2023-07-12 RX ADMIN — MONTELUKAST 10 MG: 10 TABLET, FILM COATED ORAL at 08:26

## 2023-07-12 RX ADMIN — ALBUTEROL SULFATE 2.5 MG: 2.5 SOLUTION RESPIRATORY (INHALATION) at 19:23

## 2023-07-12 ASSESSMENT — PAIN SCALES - GENERAL
PAINLEVEL_OUTOF10: 5
PAINLEVEL_OUTOF10: 8
PAINLEVEL_OUTOF10: 8

## 2023-07-12 ASSESSMENT — PAIN DESCRIPTION - LOCATION: LOCATION: BACK

## 2023-07-13 LAB
ANION GAP SERPL CALCULATED.3IONS-SCNC: 2 MMOL/L (ref 9–17)
BUN SERPL-MCNC: 14 MG/DL (ref 8–23)
CALCIUM SERPL-MCNC: 8.1 MG/DL (ref 8.6–10.4)
CHLORIDE SERPL-SCNC: 96 MMOL/L (ref 98–107)
CO2 SERPL-SCNC: 39 MMOL/L (ref 20–31)
CREAT SERPL-MCNC: 0.4 MG/DL (ref 0.5–0.9)
ERYTHROCYTE [DISTWIDTH] IN BLOOD BY AUTOMATED COUNT: 16.3 % (ref 11.5–14.9)
GFR SERPL CREATININE-BSD FRML MDRD: >60 ML/MIN/1.73M2
GLUCOSE SERPL-MCNC: 110 MG/DL (ref 70–99)
HCT VFR BLD AUTO: 32.5 % (ref 36–46)
HGB BLD-MCNC: 10.7 G/DL (ref 12–16)
MCH RBC QN AUTO: 31.6 PG (ref 26–34)
MCHC RBC AUTO-ENTMCNC: 33 G/DL (ref 31–37)
MCV RBC AUTO: 95.8 FL (ref 80–100)
PLATELET # BLD AUTO: 305 K/UL (ref 150–450)
PMV BLD AUTO: 7 FL (ref 6–12)
POTASSIUM SERPL-SCNC: 4.1 MMOL/L (ref 3.7–5.3)
RBC # BLD AUTO: 3.39 M/UL (ref 4–5.2)
SODIUM SERPL-SCNC: 137 MMOL/L (ref 135–144)
WBC OTHER # BLD: 8.1 K/UL (ref 3.5–11)

## 2023-07-13 PROCEDURE — 2580000003 HC RX 258: Performed by: INTERNAL MEDICINE

## 2023-07-13 PROCEDURE — 6360000002 HC RX W HCPCS: Performed by: INTERNAL MEDICINE

## 2023-07-13 PROCEDURE — 80048 BASIC METABOLIC PNL TOTAL CA: CPT

## 2023-07-13 PROCEDURE — 2700000000 HC OXYGEN THERAPY PER DAY

## 2023-07-13 PROCEDURE — 85027 COMPLETE CBC AUTOMATED: CPT

## 2023-07-13 PROCEDURE — 36415 COLL VENOUS BLD VENIPUNCTURE: CPT

## 2023-07-13 PROCEDURE — 6370000000 HC RX 637 (ALT 250 FOR IP): Performed by: INTERNAL MEDICINE

## 2023-07-13 PROCEDURE — 6370000000 HC RX 637 (ALT 250 FOR IP): Performed by: NURSE PRACTITIONER

## 2023-07-13 PROCEDURE — 94761 N-INVAS EAR/PLS OXIMETRY MLT: CPT

## 2023-07-13 PROCEDURE — 99232 SBSQ HOSP IP/OBS MODERATE 35: CPT | Performed by: INTERNAL MEDICINE

## 2023-07-13 PROCEDURE — 94640 AIRWAY INHALATION TREATMENT: CPT

## 2023-07-13 PROCEDURE — 97116 GAIT TRAINING THERAPY: CPT

## 2023-07-13 PROCEDURE — 2060000000 HC ICU INTERMEDIATE R&B

## 2023-07-13 RX ORDER — MORPHINE SULFATE 2 MG/ML
2 INJECTION, SOLUTION INTRAMUSCULAR; INTRAVENOUS ONCE
Status: COMPLETED | OUTPATIENT
Start: 2023-07-13 | End: 2023-07-13

## 2023-07-13 RX ADMIN — SODIUM CHLORIDE, PRESERVATIVE FREE 10 ML: 5 INJECTION INTRAVENOUS at 08:19

## 2023-07-13 RX ADMIN — Medication 2 PUFF: at 07:13

## 2023-07-13 RX ADMIN — ALBUTEROL SULFATE 2.5 MG: 2.5 SOLUTION RESPIRATORY (INHALATION) at 10:44

## 2023-07-13 RX ADMIN — GUAIFENESIN 1200 MG: 600 TABLET, EXTENDED RELEASE ORAL at 21:18

## 2023-07-13 RX ADMIN — SUCRALFATE 1 G: 1 TABLET ORAL at 05:48

## 2023-07-13 RX ADMIN — CARVEDILOL 12.5 MG: 12.5 TABLET, FILM COATED ORAL at 16:38

## 2023-07-13 RX ADMIN — PANTOPRAZOLE SODIUM 40 MG: 40 TABLET, DELAYED RELEASE ORAL at 05:48

## 2023-07-13 RX ADMIN — MORPHINE SULFATE 2 MG: 2 INJECTION, SOLUTION INTRAMUSCULAR; INTRAVENOUS at 15:44

## 2023-07-13 RX ADMIN — SUCRALFATE 1 G: 1 TABLET ORAL at 16:39

## 2023-07-13 RX ADMIN — LEVOTHYROXINE SODIUM 88 MCG: 0.09 TABLET ORAL at 05:48

## 2023-07-13 RX ADMIN — NYSTATIN 500000 UNITS: 100000 SUSPENSION ORAL at 21:18

## 2023-07-13 RX ADMIN — ENOXAPARIN SODIUM 30 MG: 100 INJECTION SUBCUTANEOUS at 08:19

## 2023-07-13 RX ADMIN — NYSTATIN 500000 UNITS: 100000 SUSPENSION ORAL at 11:20

## 2023-07-13 RX ADMIN — TIZANIDINE 4 MG: 4 TABLET ORAL at 11:22

## 2023-07-13 RX ADMIN — MONTELUKAST 10 MG: 10 TABLET, FILM COATED ORAL at 08:19

## 2023-07-13 RX ADMIN — CARVEDILOL 12.5 MG: 12.5 TABLET, FILM COATED ORAL at 08:18

## 2023-07-13 RX ADMIN — ALBUTEROL SULFATE 2.5 MG: 2.5 SOLUTION RESPIRATORY (INHALATION) at 18:51

## 2023-07-13 RX ADMIN — GUAIFENESIN 1200 MG: 600 TABLET, EXTENDED RELEASE ORAL at 08:18

## 2023-07-13 RX ADMIN — PRIMIDONE 50 MG: 50 TABLET ORAL at 21:18

## 2023-07-13 RX ADMIN — CLONAZEPAM 0.5 MG: 1 TABLET ORAL at 15:21

## 2023-07-13 RX ADMIN — TIZANIDINE 4 MG: 4 TABLET ORAL at 21:18

## 2023-07-13 RX ADMIN — MIRTAZAPINE 30 MG: 30 TABLET, FILM COATED ORAL at 21:18

## 2023-07-13 RX ADMIN — METHYLPREDNISOLONE SODIUM SUCCINATE 40 MG: 40 INJECTION, POWDER, LYOPHILIZED, FOR SOLUTION INTRAMUSCULAR; INTRAVENOUS at 05:48

## 2023-07-13 RX ADMIN — METHYLPREDNISOLONE SODIUM SUCCINATE 40 MG: 40 INJECTION, POWDER, LYOPHILIZED, FOR SOLUTION INTRAMUSCULAR; INTRAVENOUS at 11:20

## 2023-07-13 RX ADMIN — AMLODIPINE BESYLATE 10 MG: 10 TABLET ORAL at 21:18

## 2023-07-13 RX ADMIN — VENLAFAXINE HYDROCHLORIDE 150 MG: 150 CAPSULE, EXTENDED RELEASE ORAL at 08:18

## 2023-07-13 RX ADMIN — ALBUTEROL SULFATE 2.5 MG: 2.5 SOLUTION RESPIRATORY (INHALATION) at 15:09

## 2023-07-13 RX ADMIN — ALBUTEROL SULFATE 2.5 MG: 2.5 SOLUTION RESPIRATORY (INHALATION) at 07:08

## 2023-07-13 RX ADMIN — HYDROCODONE BITARTRATE AND ACETAMINOPHEN 1 TABLET: 5; 325 TABLET ORAL at 15:21

## 2023-07-13 RX ADMIN — SUCRALFATE 1 G: 1 TABLET ORAL at 11:20

## 2023-07-13 RX ADMIN — METHYLPREDNISOLONE SODIUM SUCCINATE 40 MG: 40 INJECTION, POWDER, LYOPHILIZED, FOR SOLUTION INTRAMUSCULAR; INTRAVENOUS at 21:18

## 2023-07-13 RX ADMIN — NYSTATIN 500000 UNITS: 100000 SUSPENSION ORAL at 16:39

## 2023-07-13 RX ADMIN — CLONAZEPAM 0.5 MG: 1 TABLET ORAL at 05:52

## 2023-07-13 RX ADMIN — HYDROCODONE BITARTRATE AND ACETAMINOPHEN 1 TABLET: 5; 325 TABLET ORAL at 05:52

## 2023-07-13 RX ADMIN — ALBUTEROL SULFATE 2.5 MG: 2.5 SOLUTION RESPIRATORY (INHALATION) at 14:53

## 2023-07-13 RX ADMIN — SODIUM CHLORIDE, PRESERVATIVE FREE 10 ML: 5 INJECTION INTRAVENOUS at 21:19

## 2023-07-13 RX ADMIN — NYSTATIN 500000 UNITS: 100000 SUSPENSION ORAL at 08:19

## 2023-07-13 ASSESSMENT — PAIN SCALES - GENERAL: PAINLEVEL_OUTOF10: 7

## 2023-07-13 ASSESSMENT — PAIN DESCRIPTION - LOCATION: LOCATION: BACK

## 2023-07-14 PROCEDURE — 6360000002 HC RX W HCPCS: Performed by: INTERNAL MEDICINE

## 2023-07-14 PROCEDURE — 2060000000 HC ICU INTERMEDIATE R&B

## 2023-07-14 PROCEDURE — 6370000000 HC RX 637 (ALT 250 FOR IP): Performed by: INTERNAL MEDICINE

## 2023-07-14 PROCEDURE — 99254 IP/OBS CNSLTJ NEW/EST MOD 60: CPT | Performed by: INTERNAL MEDICINE

## 2023-07-14 PROCEDURE — 94664 DEMO&/EVAL PT USE INHALER: CPT

## 2023-07-14 PROCEDURE — 2580000003 HC RX 258: Performed by: INTERNAL MEDICINE

## 2023-07-14 PROCEDURE — 94761 N-INVAS EAR/PLS OXIMETRY MLT: CPT

## 2023-07-14 PROCEDURE — 6370000000 HC RX 637 (ALT 250 FOR IP): Performed by: NURSE PRACTITIONER

## 2023-07-14 PROCEDURE — 94640 AIRWAY INHALATION TREATMENT: CPT

## 2023-07-14 PROCEDURE — 2700000000 HC OXYGEN THERAPY PER DAY

## 2023-07-14 PROCEDURE — 99233 SBSQ HOSP IP/OBS HIGH 50: CPT | Performed by: INTERNAL MEDICINE

## 2023-07-14 RX ORDER — LANOLIN ALCOHOL/MO/W.PET/CERES
10 CREAM (GRAM) TOPICAL NIGHTLY
Status: COMPLETED | OUTPATIENT
Start: 2023-07-14 | End: 2023-07-14

## 2023-07-14 RX ORDER — FLUCONAZOLE 100 MG/1
150 TABLET ORAL DAILY
Status: DISCONTINUED | OUTPATIENT
Start: 2023-07-14 | End: 2023-07-18 | Stop reason: HOSPADM

## 2023-07-14 RX ADMIN — SODIUM CHLORIDE, PRESERVATIVE FREE 5 ML: 5 INJECTION INTRAVENOUS at 20:42

## 2023-07-14 RX ADMIN — METHYLPREDNISOLONE SODIUM SUCCINATE 40 MG: 40 INJECTION, POWDER, LYOPHILIZED, FOR SOLUTION INTRAMUSCULAR; INTRAVENOUS at 13:00

## 2023-07-14 RX ADMIN — CLONAZEPAM 0.5 MG: 1 TABLET ORAL at 15:18

## 2023-07-14 RX ADMIN — AMLODIPINE BESYLATE 10 MG: 10 TABLET ORAL at 20:42

## 2023-07-14 RX ADMIN — HYDROCODONE BITARTRATE AND ACETAMINOPHEN 1 TABLET: 5; 325 TABLET ORAL at 06:54

## 2023-07-14 RX ADMIN — TIZANIDINE 4 MG: 4 TABLET ORAL at 20:51

## 2023-07-14 RX ADMIN — GUAIFENESIN 1200 MG: 600 TABLET, EXTENDED RELEASE ORAL at 20:42

## 2023-07-14 RX ADMIN — METHYLPREDNISOLONE SODIUM SUCCINATE 40 MG: 40 INJECTION, POWDER, LYOPHILIZED, FOR SOLUTION INTRAMUSCULAR; INTRAVENOUS at 06:54

## 2023-07-14 RX ADMIN — Medication 10.5 MG: at 20:42

## 2023-07-14 RX ADMIN — METHYLPREDNISOLONE SODIUM SUCCINATE 40 MG: 40 INJECTION, POWDER, LYOPHILIZED, FOR SOLUTION INTRAMUSCULAR; INTRAVENOUS at 20:43

## 2023-07-14 RX ADMIN — LEVOTHYROXINE SODIUM 88 MCG: 0.09 TABLET ORAL at 06:54

## 2023-07-14 RX ADMIN — ENOXAPARIN SODIUM 30 MG: 100 INJECTION SUBCUTANEOUS at 09:14

## 2023-07-14 RX ADMIN — ALBUTEROL SULFATE 2.5 MG: 2.5 SOLUTION RESPIRATORY (INHALATION) at 19:41

## 2023-07-14 RX ADMIN — Medication 2 PUFF: at 06:57

## 2023-07-14 RX ADMIN — PRIMIDONE 50 MG: 50 TABLET ORAL at 20:43

## 2023-07-14 RX ADMIN — SUCRALFATE 1 G: 1 TABLET ORAL at 17:54

## 2023-07-14 RX ADMIN — FLUCONAZOLE 150 MG: 100 TABLET ORAL at 12:19

## 2023-07-14 RX ADMIN — CARVEDILOL 12.5 MG: 12.5 TABLET, FILM COATED ORAL at 17:54

## 2023-07-14 RX ADMIN — VENLAFAXINE HYDROCHLORIDE 150 MG: 150 CAPSULE, EXTENDED RELEASE ORAL at 09:09

## 2023-07-14 RX ADMIN — CLONAZEPAM 0.5 MG: 1 TABLET ORAL at 06:54

## 2023-07-14 RX ADMIN — TIZANIDINE 4 MG: 4 TABLET ORAL at 11:09

## 2023-07-14 RX ADMIN — ALBUTEROL SULFATE 2.5 MG: 2.5 SOLUTION RESPIRATORY (INHALATION) at 15:13

## 2023-07-14 RX ADMIN — NYSTATIN 500000 UNITS: 100000 SUSPENSION ORAL at 09:14

## 2023-07-14 RX ADMIN — AZITHROMYCIN DIHYDRATE 250 MG: 250 TABLET ORAL at 17:54

## 2023-07-14 RX ADMIN — NYSTATIN 500000 UNITS: 100000 SUSPENSION ORAL at 13:00

## 2023-07-14 RX ADMIN — CARVEDILOL 12.5 MG: 12.5 TABLET, FILM COATED ORAL at 09:09

## 2023-07-14 RX ADMIN — NYSTATIN 500000 UNITS: 100000 SUSPENSION ORAL at 20:41

## 2023-07-14 RX ADMIN — ALBUTEROL SULFATE 2.5 MG: 2.5 SOLUTION RESPIRATORY (INHALATION) at 06:50

## 2023-07-14 RX ADMIN — PANTOPRAZOLE SODIUM 40 MG: 40 TABLET, DELAYED RELEASE ORAL at 06:54

## 2023-07-14 RX ADMIN — SUCRALFATE 1 G: 1 TABLET ORAL at 06:54

## 2023-07-14 RX ADMIN — NYSTATIN 500000 UNITS: 100000 SUSPENSION ORAL at 17:55

## 2023-07-14 RX ADMIN — MONTELUKAST 10 MG: 10 TABLET, FILM COATED ORAL at 09:09

## 2023-07-14 RX ADMIN — SODIUM CHLORIDE, PRESERVATIVE FREE 10 ML: 5 INJECTION INTRAVENOUS at 09:17

## 2023-07-14 RX ADMIN — MIRTAZAPINE 30 MG: 30 TABLET, FILM COATED ORAL at 20:42

## 2023-07-14 RX ADMIN — SUCRALFATE 1 G: 1 TABLET ORAL at 12:19

## 2023-07-14 RX ADMIN — ALBUTEROL SULFATE 2.5 MG: 2.5 SOLUTION RESPIRATORY (INHALATION) at 10:49

## 2023-07-14 RX ADMIN — GUAIFENESIN 1200 MG: 600 TABLET, EXTENDED RELEASE ORAL at 09:08

## 2023-07-14 ASSESSMENT — ENCOUNTER SYMPTOMS
RECTAL PAIN: 0
SHORTNESS OF BREATH: 1
BACK PAIN: 0
COLOR CHANGE: 0
VOMITING: 0
EYES NEGATIVE: 1
WHEEZING: 0
VOICE CHANGE: 0
NAUSEA: 0
TROUBLE SWALLOWING: 1
DIARRHEA: 0
SORE THROAT: 0
COUGH: 1
CONSTIPATION: 0
CHOKING: 1
ABDOMINAL PAIN: 0

## 2023-07-14 ASSESSMENT — PAIN SCALES - GENERAL
PAINLEVEL_OUTOF10: 7
PAINLEVEL_OUTOF10: 6
PAINLEVEL_OUTOF10: 7

## 2023-07-14 ASSESSMENT — PAIN DESCRIPTION - LOCATION
LOCATION: BACK
LOCATION: NECK

## 2023-07-14 ASSESSMENT — PAIN DESCRIPTION - DESCRIPTORS: DESCRIPTORS: SPASM

## 2023-07-15 PROBLEM — R13.12 OROPHARYNGEAL DYSPHAGIA: Status: ACTIVE | Noted: 2023-07-15

## 2023-07-15 LAB
ANION GAP SERPL CALCULATED.3IONS-SCNC: 5 MMOL/L (ref 9–17)
BASOPHILS # BLD: 0 K/UL (ref 0–0.2)
BASOPHILS NFR BLD: 0 % (ref 0–2)
BUN SERPL-MCNC: 18 MG/DL (ref 8–23)
CALCIUM SERPL-MCNC: 8.5 MG/DL (ref 8.6–10.4)
CHLORIDE SERPL-SCNC: 97 MMOL/L (ref 98–107)
CO2 SERPL-SCNC: 35 MMOL/L (ref 20–31)
CREAT SERPL-MCNC: 0.4 MG/DL (ref 0.5–0.9)
EOSINOPHIL # BLD: 0 K/UL (ref 0–0.4)
EOSINOPHILS RELATIVE PERCENT: 0 % (ref 0–4)
ERYTHROCYTE [DISTWIDTH] IN BLOOD BY AUTOMATED COUNT: 16.5 % (ref 11.5–14.9)
GFR SERPL CREATININE-BSD FRML MDRD: >60 ML/MIN/1.73M2
GLUCOSE SERPL-MCNC: 148 MG/DL (ref 70–99)
HCT VFR BLD AUTO: 33.7 % (ref 36–46)
HGB BLD-MCNC: 11.3 G/DL (ref 12–16)
LYMPHOCYTES # BLD: 12 % (ref 24–44)
LYMPHOCYTES NFR BLD: 0.8 K/UL (ref 1–4.8)
MCH RBC QN AUTO: 32 PG (ref 26–34)
MCHC RBC AUTO-ENTMCNC: 33.6 G/DL (ref 31–37)
MCV RBC AUTO: 95.3 FL (ref 80–100)
MONOCYTES NFR BLD: 0.6 K/UL (ref 0.1–1.3)
MONOCYTES NFR BLD: 9 % (ref 1–7)
NEUTROPHILS NFR BLD: 79 % (ref 36–66)
NEUTS SEG NFR BLD: 5.4 K/UL (ref 1.3–9.1)
PLATELET # BLD AUTO: 318 K/UL (ref 150–450)
PMV BLD AUTO: 6.9 FL (ref 6–12)
POTASSIUM SERPL-SCNC: 4.1 MMOL/L (ref 3.7–5.3)
RBC # BLD AUTO: 3.54 M/UL (ref 4–5.2)
SODIUM SERPL-SCNC: 137 MMOL/L (ref 135–144)
WBC OTHER # BLD: 6.8 K/UL (ref 3.5–11)

## 2023-07-15 PROCEDURE — 99233 SBSQ HOSP IP/OBS HIGH 50: CPT | Performed by: INTERNAL MEDICINE

## 2023-07-15 PROCEDURE — 2700000000 HC OXYGEN THERAPY PER DAY

## 2023-07-15 PROCEDURE — 6360000002 HC RX W HCPCS: Performed by: INTERNAL MEDICINE

## 2023-07-15 PROCEDURE — APPSS30 APP SPLIT SHARED TIME 16-30 MINUTES: Performed by: NURSE PRACTITIONER

## 2023-07-15 PROCEDURE — 97116 GAIT TRAINING THERAPY: CPT

## 2023-07-15 PROCEDURE — 6370000000 HC RX 637 (ALT 250 FOR IP): Performed by: INTERNAL MEDICINE

## 2023-07-15 PROCEDURE — 99231 SBSQ HOSP IP/OBS SF/LOW 25: CPT | Performed by: INTERNAL MEDICINE

## 2023-07-15 PROCEDURE — 6370000000 HC RX 637 (ALT 250 FOR IP): Performed by: NURSE PRACTITIONER

## 2023-07-15 PROCEDURE — 1200000000 HC SEMI PRIVATE

## 2023-07-15 PROCEDURE — 85027 COMPLETE CBC AUTOMATED: CPT

## 2023-07-15 PROCEDURE — 94640 AIRWAY INHALATION TREATMENT: CPT

## 2023-07-15 PROCEDURE — 2580000003 HC RX 258: Performed by: INTERNAL MEDICINE

## 2023-07-15 PROCEDURE — 80048 BASIC METABOLIC PNL TOTAL CA: CPT

## 2023-07-15 PROCEDURE — 36415 COLL VENOUS BLD VENIPUNCTURE: CPT

## 2023-07-15 PROCEDURE — 94761 N-INVAS EAR/PLS OXIMETRY MLT: CPT

## 2023-07-15 PROCEDURE — 97110 THERAPEUTIC EXERCISES: CPT

## 2023-07-15 RX ORDER — CIPROFLOXACIN HYDROCHLORIDE 3.5 MG/ML
1 SOLUTION/ DROPS TOPICAL
Status: DISCONTINUED | OUTPATIENT
Start: 2023-07-15 | End: 2023-07-18 | Stop reason: HOSPADM

## 2023-07-15 RX ADMIN — SUCRALFATE 1 G: 1 TABLET ORAL at 18:10

## 2023-07-15 RX ADMIN — MONTELUKAST 10 MG: 10 TABLET, FILM COATED ORAL at 08:33

## 2023-07-15 RX ADMIN — LEVOTHYROXINE SODIUM 88 MCG: 0.09 TABLET ORAL at 05:55

## 2023-07-15 RX ADMIN — PRIMIDONE 50 MG: 50 TABLET ORAL at 21:20

## 2023-07-15 RX ADMIN — ENOXAPARIN SODIUM 30 MG: 100 INJECTION SUBCUTANEOUS at 08:32

## 2023-07-15 RX ADMIN — METHYLPREDNISOLONE SODIUM SUCCINATE 40 MG: 40 INJECTION, POWDER, LYOPHILIZED, FOR SOLUTION INTRAMUSCULAR; INTRAVENOUS at 05:55

## 2023-07-15 RX ADMIN — METHYLPREDNISOLONE SODIUM SUCCINATE 40 MG: 40 INJECTION, POWDER, LYOPHILIZED, FOR SOLUTION INTRAMUSCULAR; INTRAVENOUS at 18:10

## 2023-07-15 RX ADMIN — ALBUTEROL SULFATE 2.5 MG: 2.5 SOLUTION RESPIRATORY (INHALATION) at 19:54

## 2023-07-15 RX ADMIN — HYDROCODONE BITARTRATE AND ACETAMINOPHEN 1 TABLET: 5; 325 TABLET ORAL at 08:46

## 2023-07-15 RX ADMIN — CARVEDILOL 12.5 MG: 12.5 TABLET, FILM COATED ORAL at 18:10

## 2023-07-15 RX ADMIN — VENLAFAXINE HYDROCHLORIDE 150 MG: 150 CAPSULE, EXTENDED RELEASE ORAL at 08:33

## 2023-07-15 RX ADMIN — GUAIFENESIN 1200 MG: 600 TABLET, EXTENDED RELEASE ORAL at 08:33

## 2023-07-15 RX ADMIN — Medication 2 PUFF: at 07:27

## 2023-07-15 RX ADMIN — ALBUTEROL SULFATE 2.5 MG: 2.5 SOLUTION RESPIRATORY (INHALATION) at 07:27

## 2023-07-15 RX ADMIN — NYSTATIN 500000 UNITS: 100000 SUSPENSION ORAL at 08:32

## 2023-07-15 RX ADMIN — GUAIFENESIN 1200 MG: 600 TABLET, EXTENDED RELEASE ORAL at 21:18

## 2023-07-15 RX ADMIN — CLONAZEPAM 0.5 MG: 1 TABLET ORAL at 05:55

## 2023-07-15 RX ADMIN — CLONAZEPAM 0.5 MG: 1 TABLET ORAL at 21:19

## 2023-07-15 RX ADMIN — NYSTATIN 500000 UNITS: 100000 SUSPENSION ORAL at 13:18

## 2023-07-15 RX ADMIN — SODIUM CHLORIDE, PRESERVATIVE FREE 10 ML: 5 INJECTION INTRAVENOUS at 05:57

## 2023-07-15 RX ADMIN — NYSTATIN 500000 UNITS: 100000 SUSPENSION ORAL at 17:06

## 2023-07-15 RX ADMIN — FLUCONAZOLE 150 MG: 100 TABLET ORAL at 08:32

## 2023-07-15 RX ADMIN — CIPROFLOXACIN 1 DROP: 3 SOLUTION OPHTHALMIC at 15:32

## 2023-07-15 RX ADMIN — PANTOPRAZOLE SODIUM 40 MG: 40 TABLET, DELAYED RELEASE ORAL at 05:55

## 2023-07-15 RX ADMIN — MIRTAZAPINE 30 MG: 30 TABLET, FILM COATED ORAL at 21:18

## 2023-07-15 RX ADMIN — ALBUTEROL SULFATE 2.5 MG: 2.5 SOLUTION RESPIRATORY (INHALATION) at 16:02

## 2023-07-15 RX ADMIN — HYDROCODONE BITARTRATE AND ACETAMINOPHEN 1 TABLET: 5; 325 TABLET ORAL at 15:31

## 2023-07-15 RX ADMIN — ALBUTEROL SULFATE 2.5 MG: 2.5 SOLUTION RESPIRATORY (INHALATION) at 11:19

## 2023-07-15 RX ADMIN — CLONAZEPAM 0.5 MG: 1 TABLET ORAL at 14:02

## 2023-07-15 RX ADMIN — CIPROFLOXACIN 1 DROP: 3 SOLUTION OPHTHALMIC at 18:11

## 2023-07-15 RX ADMIN — CIPROFLOXACIN 1 DROP: 3 SOLUTION OPHTHALMIC at 21:20

## 2023-07-15 RX ADMIN — NYSTATIN 500000 UNITS: 100000 SUSPENSION ORAL at 21:19

## 2023-07-15 RX ADMIN — SODIUM CHLORIDE, PRESERVATIVE FREE 10 ML: 5 INJECTION INTRAVENOUS at 21:21

## 2023-07-15 RX ADMIN — CIPROFLOXACIN 1 DROP: 3 SOLUTION OPHTHALMIC at 13:21

## 2023-07-15 RX ADMIN — HYDROCODONE BITARTRATE AND ACETAMINOPHEN 1 TABLET: 5; 325 TABLET ORAL at 21:20

## 2023-07-15 RX ADMIN — SUCRALFATE 1 G: 1 TABLET ORAL at 05:55

## 2023-07-15 RX ADMIN — AMLODIPINE BESYLATE 10 MG: 10 TABLET ORAL at 21:18

## 2023-07-15 RX ADMIN — TIZANIDINE 4 MG: 4 TABLET ORAL at 21:19

## 2023-07-15 RX ADMIN — SODIUM CHLORIDE, PRESERVATIVE FREE 10 ML: 5 INJECTION INTRAVENOUS at 08:33

## 2023-07-15 RX ADMIN — SUCRALFATE 1 G: 1 TABLET ORAL at 11:15

## 2023-07-15 ASSESSMENT — PAIN DESCRIPTION - LOCATION
LOCATION: BACK
LOCATION: HIP;BACK
LOCATION: BACK
LOCATION: BACK

## 2023-07-15 ASSESSMENT — PAIN SCALES - GENERAL
PAINLEVEL_OUTOF10: 7
PAINLEVEL_OUTOF10: 0
PAINLEVEL_OUTOF10: 6
PAINLEVEL_OUTOF10: 7

## 2023-07-15 ASSESSMENT — PAIN DESCRIPTION - DESCRIPTORS
DESCRIPTORS: ACHING

## 2023-07-15 ASSESSMENT — PAIN - FUNCTIONAL ASSESSMENT: PAIN_FUNCTIONAL_ASSESSMENT: PREVENTS OR INTERFERES SOME ACTIVE ACTIVITIES AND ADLS

## 2023-07-15 ASSESSMENT — PAIN DESCRIPTION - ORIENTATION
ORIENTATION: LOWER
ORIENTATION: LEFT;LOWER
ORIENTATION: LOWER
ORIENTATION: LOWER

## 2023-07-16 PROBLEM — J39.2 THROAT IRRITATION: Status: ACTIVE | Noted: 2023-07-16

## 2023-07-16 PROCEDURE — 2700000000 HC OXYGEN THERAPY PER DAY

## 2023-07-16 PROCEDURE — 99233 SBSQ HOSP IP/OBS HIGH 50: CPT | Performed by: INTERNAL MEDICINE

## 2023-07-16 PROCEDURE — 1200000000 HC SEMI PRIVATE

## 2023-07-16 PROCEDURE — 94761 N-INVAS EAR/PLS OXIMETRY MLT: CPT

## 2023-07-16 PROCEDURE — 6360000002 HC RX W HCPCS: Performed by: INTERNAL MEDICINE

## 2023-07-16 PROCEDURE — 6370000000 HC RX 637 (ALT 250 FOR IP): Performed by: INTERNAL MEDICINE

## 2023-07-16 PROCEDURE — 6370000000 HC RX 637 (ALT 250 FOR IP): Performed by: NURSE PRACTITIONER

## 2023-07-16 PROCEDURE — 99232 SBSQ HOSP IP/OBS MODERATE 35: CPT | Performed by: INTERNAL MEDICINE

## 2023-07-16 PROCEDURE — 2580000003 HC RX 258: Performed by: INTERNAL MEDICINE

## 2023-07-16 PROCEDURE — 94640 AIRWAY INHALATION TREATMENT: CPT

## 2023-07-16 PROCEDURE — APPSS30 APP SPLIT SHARED TIME 16-30 MINUTES: Performed by: NURSE PRACTITIONER

## 2023-07-16 RX ORDER — LANOLIN ALCOHOL/MO/W.PET/CERES
10.5 CREAM (GRAM) TOPICAL NIGHTLY PRN
Status: DISCONTINUED | OUTPATIENT
Start: 2023-07-16 | End: 2023-07-18 | Stop reason: HOSPADM

## 2023-07-16 RX ADMIN — SODIUM CHLORIDE, PRESERVATIVE FREE 10 ML: 5 INJECTION INTRAVENOUS at 07:46

## 2023-07-16 RX ADMIN — ALBUTEROL SULFATE 2.5 MG: 2.5 SOLUTION RESPIRATORY (INHALATION) at 15:20

## 2023-07-16 RX ADMIN — SODIUM CHLORIDE, PRESERVATIVE FREE 10 ML: 5 INJECTION INTRAVENOUS at 20:32

## 2023-07-16 RX ADMIN — ALBUTEROL SULFATE 2.5 MG: 2.5 SOLUTION RESPIRATORY (INHALATION) at 19:37

## 2023-07-16 RX ADMIN — ALBUTEROL SULFATE 2.5 MG: 2.5 SOLUTION RESPIRATORY (INHALATION) at 11:09

## 2023-07-16 RX ADMIN — SUCRALFATE 1 G: 1 TABLET ORAL at 12:20

## 2023-07-16 RX ADMIN — HYDROCODONE BITARTRATE AND ACETAMINOPHEN 1 TABLET: 5; 325 TABLET ORAL at 22:25

## 2023-07-16 RX ADMIN — Medication 2 PUFF: at 07:13

## 2023-07-16 RX ADMIN — NYSTATIN 500000 UNITS: 100000 SUSPENSION ORAL at 20:29

## 2023-07-16 RX ADMIN — VENLAFAXINE HYDROCHLORIDE 150 MG: 150 CAPSULE, EXTENDED RELEASE ORAL at 07:46

## 2023-07-16 RX ADMIN — LEVOTHYROXINE SODIUM 88 MCG: 0.09 TABLET ORAL at 06:45

## 2023-07-16 RX ADMIN — CLONAZEPAM 0.5 MG: 1 TABLET ORAL at 13:43

## 2023-07-16 RX ADMIN — CLONAZEPAM 0.5 MG: 1 TABLET ORAL at 22:24

## 2023-07-16 RX ADMIN — CIPROFLOXACIN 1 DROP: 3 SOLUTION OPHTHALMIC at 20:29

## 2023-07-16 RX ADMIN — ENOXAPARIN SODIUM 30 MG: 100 INJECTION SUBCUTANEOUS at 07:46

## 2023-07-16 RX ADMIN — TIZANIDINE 4 MG: 4 TABLET ORAL at 07:46

## 2023-07-16 RX ADMIN — CIPROFLOXACIN 1 DROP: 3 SOLUTION OPHTHALMIC at 14:32

## 2023-07-16 RX ADMIN — CIPROFLOXACIN 1 DROP: 3 SOLUTION OPHTHALMIC at 22:25

## 2023-07-16 RX ADMIN — CIPROFLOXACIN 1 DROP: 3 SOLUTION OPHTHALMIC at 18:20

## 2023-07-16 RX ADMIN — PRIMIDONE 50 MG: 50 TABLET ORAL at 20:31

## 2023-07-16 RX ADMIN — CIPROFLOXACIN 1 DROP: 3 SOLUTION OPHTHALMIC at 06:44

## 2023-07-16 RX ADMIN — CIPROFLOXACIN 1 DROP: 3 SOLUTION OPHTHALMIC at 12:24

## 2023-07-16 RX ADMIN — FLUCONAZOLE 150 MG: 100 TABLET ORAL at 07:46

## 2023-07-16 RX ADMIN — NYSTATIN 500000 UNITS: 100000 SUSPENSION ORAL at 16:32

## 2023-07-16 RX ADMIN — NYSTATIN 500000 UNITS: 100000 SUSPENSION ORAL at 07:46

## 2023-07-16 RX ADMIN — MIRTAZAPINE 30 MG: 30 TABLET, FILM COATED ORAL at 20:29

## 2023-07-16 RX ADMIN — ALBUTEROL SULFATE 2.5 MG: 2.5 SOLUTION RESPIRATORY (INHALATION) at 07:05

## 2023-07-16 RX ADMIN — METHYLPREDNISOLONE SODIUM SUCCINATE 40 MG: 40 INJECTION, POWDER, LYOPHILIZED, FOR SOLUTION INTRAMUSCULAR; INTRAVENOUS at 06:45

## 2023-07-16 RX ADMIN — GUAIFENESIN 1200 MG: 600 TABLET, EXTENDED RELEASE ORAL at 07:46

## 2023-07-16 RX ADMIN — Medication 10.5 MG: at 22:25

## 2023-07-16 RX ADMIN — GUAIFENESIN 1200 MG: 600 TABLET, EXTENDED RELEASE ORAL at 20:29

## 2023-07-16 RX ADMIN — CARVEDILOL 12.5 MG: 12.5 TABLET, FILM COATED ORAL at 18:20

## 2023-07-16 RX ADMIN — HYDROCODONE BITARTRATE AND ACETAMINOPHEN 1 TABLET: 5; 325 TABLET ORAL at 13:43

## 2023-07-16 RX ADMIN — METHYLPREDNISOLONE SODIUM SUCCINATE 40 MG: 40 INJECTION, POWDER, LYOPHILIZED, FOR SOLUTION INTRAMUSCULAR; INTRAVENOUS at 18:20

## 2023-07-16 RX ADMIN — PANTOPRAZOLE SODIUM 40 MG: 40 TABLET, DELAYED RELEASE ORAL at 06:44

## 2023-07-16 RX ADMIN — SUCRALFATE 1 G: 1 TABLET ORAL at 06:45

## 2023-07-16 RX ADMIN — CARVEDILOL 12.5 MG: 12.5 TABLET, FILM COATED ORAL at 07:46

## 2023-07-16 RX ADMIN — CIPROFLOXACIN 1 DROP: 3 SOLUTION OPHTHALMIC at 10:14

## 2023-07-16 RX ADMIN — SUCRALFATE 1 G: 1 TABLET ORAL at 18:20

## 2023-07-16 RX ADMIN — NYSTATIN 500000 UNITS: 100000 SUSPENSION ORAL at 12:23

## 2023-07-16 RX ADMIN — TIZANIDINE 4 MG: 4 TABLET ORAL at 22:24

## 2023-07-16 RX ADMIN — SUCRALFATE 1 G: 1 TABLET ORAL at 22:25

## 2023-07-16 RX ADMIN — CIPROFLOXACIN 1 DROP: 3 SOLUTION OPHTHALMIC at 07:46

## 2023-07-16 RX ADMIN — AMLODIPINE BESYLATE 10 MG: 10 TABLET ORAL at 20:29

## 2023-07-16 RX ADMIN — CLONAZEPAM 0.5 MG: 1 TABLET ORAL at 06:44

## 2023-07-16 RX ADMIN — CIPROFLOXACIN 1 DROP: 3 SOLUTION OPHTHALMIC at 16:32

## 2023-07-16 RX ADMIN — MONTELUKAST 10 MG: 10 TABLET, FILM COATED ORAL at 07:46

## 2023-07-16 ASSESSMENT — PAIN - FUNCTIONAL ASSESSMENT: PAIN_FUNCTIONAL_ASSESSMENT: PREVENTS OR INTERFERES SOME ACTIVE ACTIVITIES AND ADLS

## 2023-07-16 ASSESSMENT — PAIN DESCRIPTION - ORIENTATION: ORIENTATION: LOWER;LEFT

## 2023-07-16 ASSESSMENT — PAIN SCALES - GENERAL
PAINLEVEL_OUTOF10: 5
PAINLEVEL_OUTOF10: 0
PAINLEVEL_OUTOF10: 6

## 2023-07-16 ASSESSMENT — PAIN DESCRIPTION - LOCATION: LOCATION: BACK;LEG

## 2023-07-16 ASSESSMENT — PAIN DESCRIPTION - DESCRIPTORS: DESCRIPTORS: ACHING

## 2023-07-17 ENCOUNTER — APPOINTMENT (OUTPATIENT)
Dept: GENERAL RADIOLOGY | Age: 63
DRG: 190 | End: 2023-07-17
Payer: COMMERCIAL

## 2023-07-17 LAB
ANION GAP SERPL CALCULATED.3IONS-SCNC: 3 MMOL/L (ref 9–17)
BASOPHILS # BLD: 0 K/UL (ref 0–0.2)
BASOPHILS NFR BLD: 0 % (ref 0–2)
BUN SERPL-MCNC: 20 MG/DL (ref 8–23)
CALCIUM SERPL-MCNC: 8.1 MG/DL (ref 8.6–10.4)
CHLORIDE SERPL-SCNC: 103 MMOL/L (ref 98–107)
CO2 SERPL-SCNC: 34 MMOL/L (ref 20–31)
CREAT SERPL-MCNC: <0.4 MG/DL (ref 0.5–0.9)
EOSINOPHIL # BLD: 0 K/UL (ref 0–0.4)
EOSINOPHILS RELATIVE PERCENT: 0 % (ref 0–4)
ERYTHROCYTE [DISTWIDTH] IN BLOOD BY AUTOMATED COUNT: 16.5 % (ref 11.5–14.9)
GFR SERPL CREATININE-BSD FRML MDRD: ABNORMAL ML/MIN/1.73M2
GLUCOSE SERPL-MCNC: 85 MG/DL (ref 70–99)
HCT VFR BLD AUTO: 30.5 % (ref 36–46)
HGB BLD-MCNC: 10.5 G/DL (ref 12–16)
LYMPHOCYTES # BLD: 18 % (ref 24–44)
LYMPHOCYTES NFR BLD: 1.2 K/UL (ref 1–4.8)
MCH RBC QN AUTO: 32.6 PG (ref 26–34)
MCHC RBC AUTO-ENTMCNC: 34.4 G/DL (ref 31–37)
MCV RBC AUTO: 94.9 FL (ref 80–100)
MONOCYTES NFR BLD: 0.8 K/UL (ref 0.1–1.3)
MONOCYTES NFR BLD: 11 % (ref 1–7)
NEUTROPHILS NFR BLD: 71 % (ref 36–66)
NEUTS SEG NFR BLD: 4.9 K/UL (ref 1.3–9.1)
PLATELET # BLD AUTO: 281 K/UL (ref 150–450)
PMV BLD AUTO: 6.8 FL (ref 6–12)
POTASSIUM SERPL-SCNC: 3.7 MMOL/L (ref 3.7–5.3)
RBC # BLD AUTO: 3.22 M/UL (ref 4–5.2)
SODIUM SERPL-SCNC: 140 MMOL/L (ref 135–144)
WBC OTHER # BLD: 6.9 K/UL (ref 3.5–11)

## 2023-07-17 PROCEDURE — 94640 AIRWAY INHALATION TREATMENT: CPT

## 2023-07-17 PROCEDURE — APPSS30 APP SPLIT SHARED TIME 16-30 MINUTES: Performed by: NURSE PRACTITIONER

## 2023-07-17 PROCEDURE — 6370000000 HC RX 637 (ALT 250 FOR IP): Performed by: NURSE PRACTITIONER

## 2023-07-17 PROCEDURE — 36415 COLL VENOUS BLD VENIPUNCTURE: CPT

## 2023-07-17 PROCEDURE — 2500000003 HC RX 250 WO HCPCS: Performed by: NURSE PRACTITIONER

## 2023-07-17 PROCEDURE — 80048 BASIC METABOLIC PNL TOTAL CA: CPT

## 2023-07-17 PROCEDURE — 85027 COMPLETE CBC AUTOMATED: CPT

## 2023-07-17 PROCEDURE — 92611 MOTION FLUOROSCOPY/SWALLOW: CPT

## 2023-07-17 PROCEDURE — 6370000000 HC RX 637 (ALT 250 FOR IP): Performed by: INTERNAL MEDICINE

## 2023-07-17 PROCEDURE — 99231 SBSQ HOSP IP/OBS SF/LOW 25: CPT | Performed by: INTERNAL MEDICINE

## 2023-07-17 PROCEDURE — 6360000002 HC RX W HCPCS: Performed by: INTERNAL MEDICINE

## 2023-07-17 PROCEDURE — 2580000003 HC RX 258: Performed by: INTERNAL MEDICINE

## 2023-07-17 PROCEDURE — 2700000000 HC OXYGEN THERAPY PER DAY

## 2023-07-17 PROCEDURE — 74220 X-RAY XM ESOPHAGUS 1CNTRST: CPT

## 2023-07-17 PROCEDURE — 99232 SBSQ HOSP IP/OBS MODERATE 35: CPT | Performed by: INTERNAL MEDICINE

## 2023-07-17 PROCEDURE — 94761 N-INVAS EAR/PLS OXIMETRY MLT: CPT

## 2023-07-17 PROCEDURE — 1200000000 HC SEMI PRIVATE

## 2023-07-17 PROCEDURE — 74230 X-RAY XM SWLNG FUNCJ C+: CPT

## 2023-07-17 RX ORDER — PREDNISONE 20 MG/1
20 TABLET ORAL DAILY
Status: DISCONTINUED | OUTPATIENT
Start: 2023-07-24 | End: 2023-07-18 | Stop reason: HOSPADM

## 2023-07-17 RX ORDER — PREDNISONE 20 MG/1
40 TABLET ORAL DAILY
Status: DISCONTINUED | OUTPATIENT
Start: 2023-07-18 | End: 2023-07-18 | Stop reason: HOSPADM

## 2023-07-17 RX ORDER — PREDNISONE 10 MG/1
10 TABLET ORAL DAILY
Status: DISCONTINUED | OUTPATIENT
Start: 2023-07-27 | End: 2023-07-18 | Stop reason: HOSPADM

## 2023-07-17 RX ADMIN — NYSTATIN 500000 UNITS: 100000 SUSPENSION ORAL at 16:39

## 2023-07-17 RX ADMIN — ALBUTEROL SULFATE 2.5 MG: 2.5 SOLUTION RESPIRATORY (INHALATION) at 07:09

## 2023-07-17 RX ADMIN — ALBUTEROL SULFATE 2.5 MG: 2.5 SOLUTION RESPIRATORY (INHALATION) at 10:50

## 2023-07-17 RX ADMIN — NYSTATIN 500000 UNITS: 100000 SUSPENSION ORAL at 21:22

## 2023-07-17 RX ADMIN — ALBUTEROL SULFATE 2.5 MG: 2.5 SOLUTION RESPIRATORY (INHALATION) at 19:10

## 2023-07-17 RX ADMIN — NYSTATIN 500000 UNITS: 100000 SUSPENSION ORAL at 09:21

## 2023-07-17 RX ADMIN — SUCRALFATE 1 G: 1 TABLET ORAL at 23:44

## 2023-07-17 RX ADMIN — PANTOPRAZOLE SODIUM 40 MG: 40 TABLET, DELAYED RELEASE ORAL at 06:53

## 2023-07-17 RX ADMIN — HYDROCODONE BITARTRATE AND ACETAMINOPHEN 1 TABLET: 5; 325 TABLET ORAL at 21:22

## 2023-07-17 RX ADMIN — SODIUM CHLORIDE, PRESERVATIVE FREE 10 ML: 5 INJECTION INTRAVENOUS at 09:27

## 2023-07-17 RX ADMIN — SUCRALFATE 1 G: 1 TABLET ORAL at 19:04

## 2023-07-17 RX ADMIN — CIPROFLOXACIN 1 DROP: 3 SOLUTION OPHTHALMIC at 16:39

## 2023-07-17 RX ADMIN — BARIUM SULFATE 140 ML: 980 POWDER, FOR SUSPENSION ORAL at 15:03

## 2023-07-17 RX ADMIN — CLONAZEPAM 0.5 MG: 1 TABLET ORAL at 21:22

## 2023-07-17 RX ADMIN — AZITHROMYCIN DIHYDRATE 250 MG: 250 TABLET ORAL at 19:05

## 2023-07-17 RX ADMIN — SUCRALFATE 1 G: 1 TABLET ORAL at 12:24

## 2023-07-17 RX ADMIN — Medication 10.5 MG: at 22:44

## 2023-07-17 RX ADMIN — VENLAFAXINE HYDROCHLORIDE 150 MG: 150 CAPSULE, EXTENDED RELEASE ORAL at 09:21

## 2023-07-17 RX ADMIN — TIZANIDINE 4 MG: 4 TABLET ORAL at 22:45

## 2023-07-17 RX ADMIN — GUAIFENESIN 1200 MG: 600 TABLET, EXTENDED RELEASE ORAL at 09:21

## 2023-07-17 RX ADMIN — SODIUM CHLORIDE, PRESERVATIVE FREE 10 ML: 5 INJECTION INTRAVENOUS at 21:25

## 2023-07-17 RX ADMIN — BARIUM SULFATE 176 G: 960 POWDER, FOR SUSPENSION ORAL at 15:03

## 2023-07-17 RX ADMIN — GUAIFENESIN 1200 MG: 600 TABLET, EXTENDED RELEASE ORAL at 21:21

## 2023-07-17 RX ADMIN — CIPROFLOXACIN 1 DROP: 3 SOLUTION OPHTHALMIC at 09:12

## 2023-07-17 RX ADMIN — TIZANIDINE 4 MG: 4 TABLET ORAL at 16:41

## 2023-07-17 RX ADMIN — CIPROFLOXACIN 1 DROP: 3 SOLUTION OPHTHALMIC at 12:24

## 2023-07-17 RX ADMIN — Medication 2 PUFF: at 07:19

## 2023-07-17 RX ADMIN — LEVOTHYROXINE SODIUM 88 MCG: 0.09 TABLET ORAL at 06:53

## 2023-07-17 RX ADMIN — MIRTAZAPINE 30 MG: 30 TABLET, FILM COATED ORAL at 21:22

## 2023-07-17 RX ADMIN — CIPROFLOXACIN 1 DROP: 3 SOLUTION OPHTHALMIC at 14:11

## 2023-07-17 RX ADMIN — CLONAZEPAM 0.5 MG: 1 TABLET ORAL at 14:10

## 2023-07-17 RX ADMIN — ALBUTEROL SULFATE 2.5 MG: 2.5 SOLUTION RESPIRATORY (INHALATION) at 15:31

## 2023-07-17 RX ADMIN — METHYLPREDNISOLONE SODIUM SUCCINATE 40 MG: 40 INJECTION, POWDER, LYOPHILIZED, FOR SOLUTION INTRAMUSCULAR; INTRAVENOUS at 06:53

## 2023-07-17 RX ADMIN — PRIMIDONE 50 MG: 50 TABLET ORAL at 21:21

## 2023-07-17 RX ADMIN — AMLODIPINE BESYLATE 10 MG: 10 TABLET ORAL at 21:22

## 2023-07-17 RX ADMIN — SUCRALFATE 1 G: 1 TABLET ORAL at 06:53

## 2023-07-17 RX ADMIN — CLONAZEPAM 0.5 MG: 1 TABLET ORAL at 06:53

## 2023-07-17 RX ADMIN — CIPROFLOXACIN 1 DROP: 3 SOLUTION OPHTHALMIC at 19:05

## 2023-07-17 RX ADMIN — CIPROFLOXACIN 1 DROP: 3 SOLUTION OPHTHALMIC at 21:24

## 2023-07-17 RX ADMIN — MONTELUKAST 10 MG: 10 TABLET, FILM COATED ORAL at 09:21

## 2023-07-17 RX ADMIN — CIPROFLOXACIN 1 DROP: 3 SOLUTION OPHTHALMIC at 06:53

## 2023-07-17 RX ADMIN — NYSTATIN 500000 UNITS: 100000 SUSPENSION ORAL at 12:24

## 2023-07-17 RX ADMIN — HYDROCODONE BITARTRATE AND ACETAMINOPHEN 1 TABLET: 5; 325 TABLET ORAL at 09:26

## 2023-07-17 RX ADMIN — FLUCONAZOLE 150 MG: 100 TABLET ORAL at 09:21

## 2023-07-17 ASSESSMENT — PAIN DESCRIPTION - PAIN TYPE: TYPE: CHRONIC PAIN

## 2023-07-17 ASSESSMENT — PAIN DESCRIPTION - ORIENTATION: ORIENTATION: LOWER

## 2023-07-17 ASSESSMENT — PAIN SCALES - GENERAL
PAINLEVEL_OUTOF10: 5
PAINLEVEL_OUTOF10: 8

## 2023-07-17 ASSESSMENT — PAIN DESCRIPTION - LOCATION: LOCATION: BACK

## 2023-07-17 ASSESSMENT — PAIN DESCRIPTION - DESCRIPTORS: DESCRIPTORS: ACHING

## 2023-07-17 NOTE — PLAN OF CARE
Problem: Pain  Goal: Verbalizes/displays adequate comfort level or baseline comfort level  Outcome: Progressing  Note: Assess the location, characteristics, onset, duration, frequency, quality, and severity of pain. Encourage immediate report of pain. Use appropriate pain scale to rate pain. Manage pain using nonpharmacologic/pharmacologic interventions. Problem: Skin/Tissue Integrity  Goal: Absence of new skin breakdown  Description: 1. Monitor for areas of redness and/or skin breakdown  2. Assess vascular access sites hourly  3. Every 4-6 hours minimum:  Change oxygen saturation probe site  4. Every 4-6 hours:  If on nasal continuous positive airway pressure, respiratory therapy assess nares and determine need for appliance change or resting period. Outcome: Progressing  Note: Assess the overall condition of the skin. Check on bony prominences such as the sacrum, trochanters, scapulae, elbows, heels, inner and outer malleolus, inner and outer knees, back of head). Reinforce the importance of turning, mobility, and ambulation.

## 2023-07-17 NOTE — PROCEDURES
INSTRUMENTAL SWALLOW REPORT  MODIFIED BARIUM SWALLOW    NAME: Naa Salmeron   : 1960  MRN: [id number]       Date of Eval: 2023              Referring Diagnosis(es):  dysphagia    Past Medical History:  has a past medical history of Acid reflux, Anxiety, Arthritis, Cancer ([address]), Cervical osteoarthritis, COPD (chronic obstructive pulmonary disease) ([address]), DDD (degenerative disc disease), cervical, Depression, Hiatal hernia, History of blood transfusion, On home O2, Spinal stenosis, Tachycardia, and Thyroid disease. Past Surgical History:  has a past surgical history that includes Upper gastrointestinal endoscopy; Thyroidectomy, partial; back surgery; other surgical history; Dilation and curettage of uterus; Colonoscopy (N/A, 2019); Colonoscopy (2019); Upper gastrointestinal endoscopy (N/A, 2020); Upper gastrointestinal endoscopy (N/A, 2022); and Pain management procedure. Type of Study: Initial MBS       Recent CXR/CT of Chest: Date 07/10- CXR- Hyperinflation and emphysematous changes. Subsegmental atelectasis or  scarring in the lung bases. Patient Complaints/Reason for Referral:  Naa Salmeron was referred for a MBS to assess the efficiency of his/her swallow function, assess for aspiration, and to make recommendations regarding safe dietary consistencies, effective compensatory strategies, and safe eating environment.        Onset of problem:      Per IM H&P: This patient is a 58 y.o.  Arby Meena presents with COPD exacerbation shortness of breath  History of hypertension hypothyroid COPD on 2 L nasal cannula oxygen  Reports her pulmonologist changed her inhaler 2 weeks ago subsequently she got oral thrush and stopped taking the inhaler breathing has been worsening ever since much worse over the last couple of days has been having shortness of breath worse in the mornings associated with lightheadedness chills chest tightness denies fevers or chest

## 2023-07-17 NOTE — PLAN OF CARE
Problem: Discharge Planning  Goal: Discharge to home or other facility with appropriate resources  7/17/2023 0412 by Hoa Olguin RN  Outcome: Progressing  Flowsheets (Taken 7/16/2023 2051)  Discharge to home or other facility with appropriate resources: Identify barriers to discharge with patient and caregiver     Problem: Pain  Goal: Verbalizes/displays adequate comfort level or baseline comfort level  7/17/2023 0412 by Hoa Olguin RN  Outcome: Progressing     Problem: Skin/Tissue Integrity  Goal: Absence of new skin breakdown  Description: 1. Monitor for areas of redness and/or skin breakdown  2. Assess vascular access sites hourly  3. Every 4-6 hours minimum:  Change oxygen saturation probe site  4. Every 4-6 hours:  If on nasal continuous positive airway pressure, respiratory therapy assess nares and determine need for appliance change or resting period.   7/17/2023 0412 by Hoa Olguin RN  Outcome: Progressing     Problem: Safety - Adult  Goal: Free from fall injury  7/17/2023 0412 by Hoa Olguin RN  Outcome: Progressing     Problem: ABCDS Injury Assessment  Goal: Absence of physical injury  7/17/2023 0412 by Hoa Olguin RN  Outcome: Progressing     Problem: Nutrition Deficit:  Goal: Optimize nutritional status  7/17/2023 0412 by Hoa Olguin RN  Outcome: Progressing     Problem: Respiratory - Adult  Goal: Achieves optimal ventilation and oxygenation  7/17/2023 0412 by Hoa Olguin RN  Outcome: Progressing  Flowsheets (Taken 7/16/2023 2051)  Achieves optimal ventilation and oxygenation:   Assess for changes in respiratory status   Position to facilitate oxygenation and minimize respiratory effort

## 2023-07-18 VITALS
TEMPERATURE: 97.7 F | BODY MASS INDEX: 18.24 KG/M2 | HEIGHT: 63 IN | OXYGEN SATURATION: 98 % | HEART RATE: 59 BPM | SYSTOLIC BLOOD PRESSURE: 116 MMHG | RESPIRATION RATE: 16 BRPM | DIASTOLIC BLOOD PRESSURE: 75 MMHG | WEIGHT: 102.95 LBS

## 2023-07-18 PROCEDURE — 6370000000 HC RX 637 (ALT 250 FOR IP): Performed by: NURSE PRACTITIONER

## 2023-07-18 PROCEDURE — 94640 AIRWAY INHALATION TREATMENT: CPT

## 2023-07-18 PROCEDURE — 99239 HOSP IP/OBS DSCHRG MGMT >30: CPT | Performed by: INTERNAL MEDICINE

## 2023-07-18 PROCEDURE — 94761 N-INVAS EAR/PLS OXIMETRY MLT: CPT

## 2023-07-18 PROCEDURE — 2700000000 HC OXYGEN THERAPY PER DAY

## 2023-07-18 PROCEDURE — 2580000003 HC RX 258: Performed by: INTERNAL MEDICINE

## 2023-07-18 PROCEDURE — 6370000000 HC RX 637 (ALT 250 FOR IP): Performed by: INTERNAL MEDICINE

## 2023-07-18 PROCEDURE — APPSS30 APP SPLIT SHARED TIME 16-30 MINUTES: Performed by: NURSE PRACTITIONER

## 2023-07-18 PROCEDURE — 97116 GAIT TRAINING THERAPY: CPT

## 2023-07-18 PROCEDURE — 6360000002 HC RX W HCPCS: Performed by: INTERNAL MEDICINE

## 2023-07-18 PROCEDURE — 99231 SBSQ HOSP IP/OBS SF/LOW 25: CPT | Performed by: INTERNAL MEDICINE

## 2023-07-18 RX ORDER — TIZANIDINE 4 MG/1
4 TABLET ORAL 4 TIMES DAILY PRN
Qty: 40 TABLET | Refills: 0 | Status: SHIPPED | OUTPATIENT
Start: 2023-07-18 | End: 2023-07-18 | Stop reason: SDUPTHER

## 2023-07-18 RX ORDER — PREDNISONE 20 MG/1
20 TABLET ORAL DAILY
Qty: 3 TABLET | Refills: 0 | Status: SHIPPED | OUTPATIENT
Start: 2023-07-24 | End: 2023-07-27

## 2023-07-18 RX ORDER — PREDNISONE 10 MG/1
30 TABLET ORAL DAILY
Qty: 9 TABLET | Refills: 0 | Status: SHIPPED | OUTPATIENT
Start: 2023-07-21 | End: 2023-07-24

## 2023-07-18 RX ORDER — PREDNISONE 10 MG/1
10 TABLET ORAL DAILY
Qty: 3 TABLET | Refills: 0 | Status: SHIPPED | OUTPATIENT
Start: 2023-07-27 | End: 2023-07-30

## 2023-07-18 RX ORDER — TIZANIDINE 4 MG/1
4 TABLET ORAL 4 TIMES DAILY PRN
Qty: 40 TABLET | Refills: 0 | Status: SHIPPED | OUTPATIENT
Start: 2023-07-18

## 2023-07-18 RX ORDER — PREDNISONE 20 MG/1
40 TABLET ORAL DAILY
Qty: 4 TABLET | Refills: 0 | Status: SHIPPED | OUTPATIENT
Start: 2023-07-19 | End: 2023-07-21

## 2023-07-18 RX ORDER — FLUCONAZOLE 150 MG/1
150 TABLET ORAL DAILY
Qty: 2 TABLET | Refills: 0 | Status: SHIPPED | OUTPATIENT
Start: 2023-07-19 | End: 2023-07-21

## 2023-07-18 RX ADMIN — GUAIFENESIN 1200 MG: 600 TABLET, EXTENDED RELEASE ORAL at 08:51

## 2023-07-18 RX ADMIN — TIZANIDINE 4 MG: 4 TABLET ORAL at 15:24

## 2023-07-18 RX ADMIN — TIZANIDINE 4 MG: 4 TABLET ORAL at 18:35

## 2023-07-18 RX ADMIN — PANTOPRAZOLE SODIUM 40 MG: 40 TABLET, DELAYED RELEASE ORAL at 06:10

## 2023-07-18 RX ADMIN — NYSTATIN 500000 UNITS: 100000 SUSPENSION ORAL at 13:08

## 2023-07-18 RX ADMIN — SUCRALFATE 1 G: 1 TABLET ORAL at 18:28

## 2023-07-18 RX ADMIN — CIPROFLOXACIN 1 DROP: 3 SOLUTION OPHTHALMIC at 08:52

## 2023-07-18 RX ADMIN — NYSTATIN 500000 UNITS: 100000 SUSPENSION ORAL at 08:51

## 2023-07-18 RX ADMIN — CARVEDILOL 12.5 MG: 12.5 TABLET, FILM COATED ORAL at 08:52

## 2023-07-18 RX ADMIN — VENLAFAXINE HYDROCHLORIDE 150 MG: 150 CAPSULE, EXTENDED RELEASE ORAL at 08:51

## 2023-07-18 RX ADMIN — SUCRALFATE 1 G: 1 TABLET ORAL at 13:08

## 2023-07-18 RX ADMIN — PREDNISONE 40 MG: 20 TABLET ORAL at 08:51

## 2023-07-18 RX ADMIN — MONTELUKAST 10 MG: 10 TABLET, FILM COATED ORAL at 08:51

## 2023-07-18 RX ADMIN — CLONAZEPAM 0.5 MG: 1 TABLET ORAL at 09:08

## 2023-07-18 RX ADMIN — TIZANIDINE 4 MG: 4 TABLET ORAL at 09:08

## 2023-07-18 RX ADMIN — CIPROFLOXACIN 1 DROP: 3 SOLUTION OPHTHALMIC at 18:28

## 2023-07-18 RX ADMIN — ALBUTEROL SULFATE 2.5 MG: 2.5 SOLUTION RESPIRATORY (INHALATION) at 07:13

## 2023-07-18 RX ADMIN — SUCRALFATE 1 G: 1 TABLET ORAL at 06:10

## 2023-07-18 RX ADMIN — ENOXAPARIN SODIUM 30 MG: 100 INJECTION SUBCUTANEOUS at 08:51

## 2023-07-18 RX ADMIN — ALBUTEROL SULFATE 2.5 MG: 2.5 SOLUTION RESPIRATORY (INHALATION) at 15:13

## 2023-07-18 RX ADMIN — ALBUTEROL SULFATE 2.5 MG: 2.5 SOLUTION RESPIRATORY (INHALATION) at 11:10

## 2023-07-18 RX ADMIN — SODIUM CHLORIDE, PRESERVATIVE FREE 10 ML: 5 INJECTION INTRAVENOUS at 08:52

## 2023-07-18 RX ADMIN — CIPROFLOXACIN 1 DROP: 3 SOLUTION OPHTHALMIC at 13:08

## 2023-07-18 RX ADMIN — LEVOTHYROXINE SODIUM 88 MCG: 0.09 TABLET ORAL at 06:10

## 2023-07-18 RX ADMIN — FLUCONAZOLE 150 MG: 100 TABLET ORAL at 08:51

## 2023-07-18 RX ADMIN — NYSTATIN 500000 UNITS: 100000 SUSPENSION ORAL at 18:35

## 2023-07-18 RX ADMIN — Medication 2 PUFF: at 07:23

## 2023-07-18 NOTE — CARE COORDINATION
ONGOING DISCHARGE PLAN:     Patient is alert and oriented x4. Spoke with patient regarding discharge plan and patient confirms that plan is still to go home with spouse . [facility]. DME: walker, O2 2lpm NC (port inogen and stationary conc), nebulizer, bipap      PO diflucan, po zithromax, PO Prednisone taper started today      esophogram showed delayed transit time and barium swallow study no penetration or aspiration - new order for soft bite sized food. Will continue to follow for additional discharge needs. If patient is discharged prior to next notation, then this note serves as note for discharge by case management.     Electronically signed by Florencia Perez RN on 7/18/2023 at 9:27 AM
ONGOING DISCHARGE PLAN:    Patient is alert and oriented x4. Spoke with patient regarding discharge plan and patient confirms that plan is still to go home with spouse . Pt is current with [facility], they have accepted for home care. DME: walker, O2 2lpm NC (port inogen and stationary conc), nebulizer, bipap     PO diflucan, po zithromax, IV solumedrol 40 mg Q12    Pt going for esophogram and barium swallow study today. Will continue to follow for additional discharge needs. If patient is discharged prior to next notation, then this note serves as note for discharge by case management.     Electronically signed by Louie Santiago RN on 7/17/2023 at 9:38 AM
and when to take each. * predniSONE 20 MG tablet  Commonly known as: DELTASONE  Take 1 tablet by mouth daily for 3 doses  Start taking on: July 24, 2023  What changed: You were already taking a medication with the same name, and this prescription was added. Make sure you understand how and when to take each. * predniSONE 10 MG tablet  Commonly known as: DELTASONE  Take 1 tablet by mouth daily for 3 doses  Start taking on: July 27, 2023  What changed: You were already taking a medication with the same name, and this prescription was added. Make sure you understand how and when to take each. primidone 50 MG tablet  Commonly known as: MYSOLINE  Take 1 tablet by mouth 2 times daily  What changed:   when to take this  additional instructions    very important  * This list has 4 medication(s) that are the same as other medications prescribed for you. Read the directions carefully, and ask your doctor or other care provider to review them with you.        CONTINUE taking these medications    CONTINUE taking these medications   * albuterol 0.63 MG/3ML nebulizer solution  Commonly known as: ACCUNEB  Take 3 mLs by nebulization every 6 hours as needed for Wheezing   * albuterol sulfate  (90 Base) MCG/ACT inhaler  Commonly known as: PROVENTIL;VENTOLIN;PROAIR  T inhale 2 puffs by mouth and INTO THE LUNGS every 6 hours if needed for wheezing   amLODIPine 10 MG tablet  Commonly known as: NORVASC  Take 1 tablet by mouth every evening   azithromycin 250 MG tablet  Commonly known as: ZITHROMAX  Take 1 tablet by mouth three times a week Indications: Monday wednesday Friday   Blood Pressure Cuff Misc  1 Units by Does not apply route daily   Breztri Aerosphere 160-9-4.8 MCG/ACT Aero  Generic drug: Budeson-Glycopyrrol-Formoterol  Inhale 2 puffs into the lungs 2 times daily   busPIRone 10 MG tablet  Commonly known as: BUSPAR  Take 1 tablet by mouth 3 times daily   calcium carbonate-vitamin D 600-400 MG-UNIT Tabs per

## 2023-07-18 NOTE — DISCHARGE SUMMARY
counseling as well as medication reconciliation, prescriptions for required medications, discharge plan and follow up. Electronically signed by   Raymundo Carrillo MD  7/18/2023  3:21 PM      Thank you Dr. Sarah Bernabe MD for the opportunity to be involved in this patient's care. Please note that this chart was generated using voice recognition Dragon dictation software. Although every effort was made to ensure the accuracy of this automated transcription, some errors in transcription may have occurred.

## 2023-07-19 ENCOUNTER — CARE COORDINATION (OUTPATIENT)
Dept: CASE MANAGEMENT | Age: 63
End: 2023-07-19

## 2023-07-19 NOTE — CARE COORDINATION
Care Transitions Outreach Attempt #1  Initial 24 hour call    Call within 2 business days of discharge: Yes   Attempted to reach patient for transitions of care follow up. Unable to reach patient. HIPAA compliant message left on  requesting a return call. Patient: Phil Smith Patient : 1960 MRN: [id number]    Last Discharge [address] [facility] [facility]       Date Complaint Diagnosis Description Type Department Provider    7/10/23 Shortness of Breath COPD exacerbation [facility]) ED to Hosp-Admission (Discharged) (ADMITTED) Rere Clay MD; Yamila Looney Pay. .. Was this an external facility discharge?  No Discharge Facility: [facility]    Noted following upcoming appointments from discharge chart review:   [facility] follow up appointment(s):   Future Appointments   Date Time Provider [address] [place] [address]   2023  2:00 PM Malcolm Miranda MD [facility] [address] [place] [address]  Care Transition Nurse  [phone]

## 2023-07-20 ENCOUNTER — CARE COORDINATION (OUTPATIENT)
Dept: CASE MANAGEMENT | Age: 63
End: 2023-07-20

## 2023-07-20 DIAGNOSIS — M47.817 LUMBOSACRAL SPONDYLOSIS WITHOUT MYELOPATHY: Primary | Chronic | ICD-10-CM

## 2023-07-20 NOTE — TELEPHONE ENCOUNTER
LAST VISIT:   5/30/2023     Future Appointments   Date Time Provider [address]   12/11/2023  2:00 PM Anna Mendoza MD [facility]

## 2023-07-20 NOTE — CARE COORDINATION
Care Transitions Outreach Attempt #2    Call within 2 business days of discharge: Yes   Attempt #2 to reach patient for transitions of care follow up. Unable to reach patient. Voicemail left requesting call back. [id number]ancock, informed resumption of care order received, will reach out to patient for visit today. Patient: Alka Teran Patient : 1960 MRN: [id number]    Last Discharge [address] [facility] [facility]       Date Complaint Diagnosis Description Type Department Provider    7/10/23 Shortness of Breath COPD exacerbation [facility]) ED to Hosp-Admission (Discharged) (ADMITTED) Jeimy Mcneal MD; [facility]. .. Was this an external facility discharge? No Discharge Facility: [facility]    Noted following upcoming appointments from discharge chart review:   [facility] follow up appointment(s):   Future Appointments   Date Time Provider [address]   2023  2:00 PM Ingrid Smith MD [facility] [address] Nurse  [phone]   [email or web address]

## 2023-07-21 RX ORDER — HYDROCODONE BITARTRATE AND ACETAMINOPHEN 5; 325 MG/1; MG/1
1 TABLET ORAL EVERY 8 HOURS PRN
Qty: 90 TABLET | Refills: 0 | Status: SHIPPED | OUTPATIENT
Start: 2023-07-21 | End: 2023-08-20

## 2023-07-24 ENCOUNTER — TELEPHONE (OUTPATIENT)
Dept: PRIMARY CARE CLINIC | Age: 63
End: 2023-07-24

## 2023-07-24 NOTE — TELEPHONE ENCOUNTER
Dahlia Marie with [facility] - asking for verbal to continue PT.     OK to leave a voicemail [phone]

## 2023-07-28 ENCOUNTER — TELEPHONE (OUTPATIENT)
Dept: OTHER | Facility: CLINIC | Age: 63
End: 2023-07-28

## 2023-07-28 ENCOUNTER — APPOINTMENT (OUTPATIENT)
Dept: CT IMAGING | Age: 63
End: 2023-07-28
Payer: COMMERCIAL

## 2023-07-28 ENCOUNTER — APPOINTMENT (OUTPATIENT)
Dept: GENERAL RADIOLOGY | Age: 63
End: 2023-07-28
Payer: COMMERCIAL

## 2023-07-28 ENCOUNTER — ANESTHESIA EVENT (OUTPATIENT)
Dept: OPERATING ROOM | Age: 63
End: 2023-07-28
Payer: COMMERCIAL

## 2023-07-28 ENCOUNTER — HOSPITAL ENCOUNTER (INPATIENT)
Age: 63
LOS: 14 days | Discharge: NURSING, REHAB OR LONG-TERM CARE | End: 2023-08-11
Attending: EMERGENCY MEDICINE | Admitting: ORTHOPAEDIC SURGERY
Payer: COMMERCIAL

## 2023-07-28 DIAGNOSIS — F33.2 MDD (MAJOR DEPRESSIVE DISORDER), RECURRENT SEVERE, WITHOUT PSYCHOSIS (HCC): ICD-10-CM

## 2023-07-28 DIAGNOSIS — S72.141A CLOSED DISPLACED INTERTROCHANTERIC FRACTURE OF RIGHT FEMUR, INITIAL ENCOUNTER (HCC): Primary | ICD-10-CM

## 2023-07-28 PROBLEM — S72.002A HIP FRACTURE REQUIRING OPERATIVE REPAIR, LEFT, CLOSED, INITIAL ENCOUNTER (HCC): Status: ACTIVE | Noted: 2023-07-28

## 2023-07-28 LAB
ALBUMIN SERPL-MCNC: 3.4 G/DL (ref 3.5–5.2)
ALP SERPL-CCNC: 50 U/L (ref 35–104)
ALT SERPL-CCNC: 29 U/L (ref 5–33)
ANION GAP SERPL CALCULATED.3IONS-SCNC: 15 MMOL/L (ref 9–17)
AST SERPL-CCNC: 22 U/L
BASOPHILS # BLD: 0 K/UL (ref 0–0.2)
BASOPHILS NFR BLD: 0 % (ref 0–2)
BILIRUB SERPL-MCNC: 0.3 MG/DL (ref 0.3–1.2)
BILIRUB UR QL STRIP: NEGATIVE
BILIRUB UR QL STRIP: NEGATIVE
BUN SERPL-MCNC: 16 MG/DL (ref 8–23)
CALCIUM SERPL-MCNC: 7.4 MG/DL (ref 8.6–10.4)
CHLORIDE SERPL-SCNC: 90 MMOL/L (ref 98–107)
CK SERPL-CCNC: 146 U/L (ref 26–192)
CLARITY UR: CLEAR
CLARITY UR: CLEAR
CO2 SERPL-SCNC: 24 MMOL/L (ref 20–31)
COLOR UR: YELLOW
COLOR UR: YELLOW
COMMENT: NORMAL
COMMENT: NORMAL
CREAT SERPL-MCNC: 0.5 MG/DL (ref 0.5–0.9)
EOSINOPHIL # BLD: 0 K/UL (ref 0–0.4)
EOSINOPHILS RELATIVE PERCENT: 0 % (ref 0–4)
ERYTHROCYTE [DISTWIDTH] IN BLOOD BY AUTOMATED COUNT: 15.7 % (ref 11.5–14.9)
ETHANOL PERCENT: 0.03 %
ETHANOLAMINE SERPL-MCNC: 29 MG/DL
GFR SERPL CREATININE-BSD FRML MDRD: >60 ML/MIN/1.73M2
GLUCOSE SERPL-MCNC: 80 MG/DL (ref 70–99)
GLUCOSE UR STRIP-MCNC: NEGATIVE MG/DL
GLUCOSE UR STRIP-MCNC: NEGATIVE MG/DL
HCT VFR BLD AUTO: 24.9 % (ref 36–46)
HGB BLD-MCNC: 8.3 G/DL (ref 12–16)
HGB UR QL STRIP.AUTO: NEGATIVE
HGB UR QL STRIP.AUTO: NEGATIVE
INR PPP: 1.1
KETONES UR STRIP-MCNC: NEGATIVE MG/DL
KETONES UR STRIP-MCNC: NEGATIVE MG/DL
LEUKOCYTE ESTERASE UR QL STRIP: NEGATIVE
LEUKOCYTE ESTERASE UR QL STRIP: NEGATIVE
LYMPHOCYTES NFR BLD: 1.4 K/UL (ref 1–4.8)
LYMPHOCYTES RELATIVE PERCENT: 11 % (ref 24–44)
MCH RBC QN AUTO: 31.7 PG (ref 26–34)
MCHC RBC AUTO-ENTMCNC: 33.5 G/DL (ref 31–37)
MCV RBC AUTO: 94.7 FL (ref 80–100)
MONOCYTES NFR BLD: 0.9 K/UL (ref 0.1–1.3)
MONOCYTES NFR BLD: 7 % (ref 1–7)
MYOGLOBIN SERPL-MCNC: 181 NG/ML (ref 25–58)
NEUTROPHILS NFR BLD: 82 % (ref 36–66)
NEUTS SEG NFR BLD: 10.9 K/UL (ref 1.3–9.1)
NITRITE UR QL STRIP: NEGATIVE
NITRITE UR QL STRIP: NEGATIVE
PH UR STRIP: 5 [PH] (ref 5–8)
PH UR STRIP: 5.5 [PH] (ref 5–8)
PLATELET # BLD AUTO: 219 K/UL (ref 150–450)
PMV BLD AUTO: 6.7 FL (ref 6–12)
POTASSIUM SERPL-SCNC: 4.1 MMOL/L (ref 3.7–5.3)
PROT SERPL-MCNC: 5 G/DL (ref 6.4–8.3)
PROT UR STRIP-MCNC: NEGATIVE MG/DL
PROT UR STRIP-MCNC: NEGATIVE MG/DL
PROTHROMBIN TIME: 14.3 SEC (ref 11.8–14.6)
RBC # BLD AUTO: 2.63 M/UL (ref 4–5.2)
SODIUM SERPL-SCNC: 129 MMOL/L (ref 135–144)
SP GR UR STRIP: 1.02 (ref 1–1.03)
SP GR UR STRIP: 1.02 (ref 1–1.03)
UROBILINOGEN UR STRIP-ACNC: NORMAL EU/DL (ref 0–1)
UROBILINOGEN UR STRIP-ACNC: NORMAL EU/DL (ref 0–1)
WBC OTHER # BLD: 13.2 K/UL (ref 3.5–11)

## 2023-07-28 PROCEDURE — 86334 IMMUNOFIX E-PHORESIS SERUM: CPT

## 2023-07-28 PROCEDURE — 86900 BLOOD TYPING SEROLOGIC ABO: CPT

## 2023-07-28 PROCEDURE — 2580000003 HC RX 258: Performed by: INTERNAL MEDICINE

## 2023-07-28 PROCEDURE — 94640 AIRWAY INHALATION TREATMENT: CPT

## 2023-07-28 PROCEDURE — 80053 COMPREHEN METABOLIC PANEL: CPT

## 2023-07-28 PROCEDURE — 99223 1ST HOSP IP/OBS HIGH 75: CPT | Performed by: INTERNAL MEDICINE

## 2023-07-28 PROCEDURE — 84155 ASSAY OF PROTEIN SERUM: CPT

## 2023-07-28 PROCEDURE — 86920 COMPATIBILITY TEST SPIN: CPT

## 2023-07-28 PROCEDURE — 94660 CPAP INITIATION&MGMT: CPT

## 2023-07-28 PROCEDURE — 6360000004 HC RX CONTRAST MEDICATION: Performed by: EMERGENCY MEDICINE

## 2023-07-28 PROCEDURE — 70450 CT HEAD/BRAIN W/O DYE: CPT

## 2023-07-28 PROCEDURE — 82550 ASSAY OF CK (CPK): CPT

## 2023-07-28 PROCEDURE — 73552 X-RAY EXAM OF FEMUR 2/>: CPT

## 2023-07-28 PROCEDURE — 72125 CT NECK SPINE W/O DYE: CPT

## 2023-07-28 PROCEDURE — 83874 ASSAY OF MYOGLOBIN: CPT

## 2023-07-28 PROCEDURE — 73502 X-RAY EXAM HIP UNI 2-3 VIEWS: CPT

## 2023-07-28 PROCEDURE — 6370000000 HC RX 637 (ALT 250 FOR IP): Performed by: EMERGENCY MEDICINE

## 2023-07-28 PROCEDURE — 84165 PROTEIN E-PHORESIS SERUM: CPT

## 2023-07-28 PROCEDURE — 6370000000 HC RX 637 (ALT 250 FOR IP): Performed by: NURSE PRACTITIONER

## 2023-07-28 PROCEDURE — 81003 URINALYSIS AUTO W/O SCOPE: CPT

## 2023-07-28 PROCEDURE — 2060000000 HC ICU INTERMEDIATE R&B

## 2023-07-28 PROCEDURE — 99285 EMERGENCY DEPT VISIT HI MDM: CPT

## 2023-07-28 PROCEDURE — 6360000002 HC RX W HCPCS: Performed by: ORTHOPAEDIC SURGERY

## 2023-07-28 PROCEDURE — 71260 CT THORAX DX C+: CPT

## 2023-07-28 PROCEDURE — G0480 DRUG TEST DEF 1-7 CLASSES: HCPCS

## 2023-07-28 PROCEDURE — 86850 RBC ANTIBODY SCREEN: CPT

## 2023-07-28 PROCEDURE — 51702 INSERT TEMP BLADDER CATH: CPT

## 2023-07-28 PROCEDURE — 96374 THER/PROPH/DIAG INJ IV PUSH: CPT

## 2023-07-28 PROCEDURE — 51798 US URINE CAPACITY MEASURE: CPT

## 2023-07-28 PROCEDURE — 5A09557 ASSISTANCE WITH RESPIRATORY VENTILATION, GREATER THAN 96 CONSECUTIVE HOURS, CONTINUOUS POSITIVE AIRWAY PRESSURE: ICD-10-PCS | Performed by: ORTHOPAEDIC SURGERY

## 2023-07-28 PROCEDURE — 85027 COMPLETE CBC AUTOMATED: CPT

## 2023-07-28 PROCEDURE — 86901 BLOOD TYPING SEROLOGIC RH(D): CPT

## 2023-07-28 PROCEDURE — 6360000002 HC RX W HCPCS: Performed by: EMERGENCY MEDICINE

## 2023-07-28 PROCEDURE — 2580000003 HC RX 258: Performed by: EMERGENCY MEDICINE

## 2023-07-28 PROCEDURE — 36415 COLL VENOUS BLD VENIPUNCTURE: CPT

## 2023-07-28 PROCEDURE — 85610 PROTHROMBIN TIME: CPT

## 2023-07-28 PROCEDURE — 2700000000 HC OXYGEN THERAPY PER DAY

## 2023-07-28 RX ORDER — MAGNESIUM SULFATE 1 G/100ML
1000 INJECTION INTRAVENOUS PRN
Status: DISCONTINUED | OUTPATIENT
Start: 2023-07-28 | End: 2023-08-11 | Stop reason: HOSPADM

## 2023-07-28 RX ORDER — POTASSIUM CHLORIDE 20 MEQ/1
40 TABLET, EXTENDED RELEASE ORAL PRN
Status: DISCONTINUED | OUTPATIENT
Start: 2023-07-28 | End: 2023-08-11 | Stop reason: HOSPADM

## 2023-07-28 RX ORDER — SODIUM CHLORIDE 9 MG/ML
INJECTION, SOLUTION INTRAVENOUS CONTINUOUS
Status: DISCONTINUED | OUTPATIENT
Start: 2023-07-28 | End: 2023-07-31

## 2023-07-28 RX ORDER — HYDROCODONE BITARTRATE AND ACETAMINOPHEN 5; 325 MG/1; MG/1
1 TABLET ORAL EVERY 4 HOURS PRN
Status: DISCONTINUED | OUTPATIENT
Start: 2023-07-28 | End: 2023-08-11 | Stop reason: HOSPADM

## 2023-07-28 RX ORDER — SODIUM CHLORIDE 0.9 % (FLUSH) 0.9 %
10 SYRINGE (ML) INJECTION AS NEEDED
Status: DISCONTINUED | OUTPATIENT
Start: 2023-07-28 | End: 2023-07-28

## 2023-07-28 RX ORDER — ACETAMINOPHEN 650 MG/1
650 SUPPOSITORY RECTAL EVERY 6 HOURS PRN
Status: DISCONTINUED | OUTPATIENT
Start: 2023-07-28 | End: 2023-08-11 | Stop reason: HOSPADM

## 2023-07-28 RX ORDER — FLUCONAZOLE 150 MG/1
150 TABLET ORAL DAILY
Status: ON HOLD | COMMUNITY
End: 2023-08-10 | Stop reason: HOSPADM

## 2023-07-28 RX ORDER — LANOLIN ALCOHOL/MO/W.PET/CERES
10 CREAM (GRAM) TOPICAL NIGHTLY
Status: DISCONTINUED | OUTPATIENT
Start: 2023-07-28 | End: 2023-08-11 | Stop reason: HOSPADM

## 2023-07-28 RX ORDER — 0.9 % SODIUM CHLORIDE 0.9 %
100 INTRAVENOUS SOLUTION INTRAVENOUS ONCE
Status: COMPLETED | OUTPATIENT
Start: 2023-07-28 | End: 2023-07-28

## 2023-07-28 RX ORDER — NICOTINE 21 MG/24HR
1 PATCH, TRANSDERMAL 24 HOURS TRANSDERMAL ONCE
Status: COMPLETED | OUTPATIENT
Start: 2023-07-28 | End: 2023-07-29

## 2023-07-28 RX ORDER — ONDANSETRON 2 MG/ML
4 INJECTION INTRAMUSCULAR; INTRAVENOUS EVERY 6 HOURS PRN
Status: DISCONTINUED | OUTPATIENT
Start: 2023-07-28 | End: 2023-08-11 | Stop reason: HOSPADM

## 2023-07-28 RX ORDER — SODIUM CHLORIDE 9 MG/ML
INJECTION, SOLUTION INTRAVENOUS PRN
Status: DISCONTINUED | OUTPATIENT
Start: 2023-07-28 | End: 2023-08-11 | Stop reason: HOSPADM

## 2023-07-28 RX ORDER — MONTELUKAST SODIUM 10 MG/1
10 TABLET ORAL DAILY
Status: DISCONTINUED | OUTPATIENT
Start: 2023-07-28 | End: 2023-08-11 | Stop reason: HOSPADM

## 2023-07-28 RX ORDER — POTASSIUM CHLORIDE 7.45 MG/ML
10 INJECTION INTRAVENOUS PRN
Status: DISCONTINUED | OUTPATIENT
Start: 2023-07-28 | End: 2023-08-11 | Stop reason: HOSPADM

## 2023-07-28 RX ORDER — ALBUTEROL SULFATE 2.5 MG/3ML
0.63 SOLUTION RESPIRATORY (INHALATION) EVERY 6 HOURS PRN
Status: DISCONTINUED | OUTPATIENT
Start: 2023-07-28 | End: 2023-08-11 | Stop reason: HOSPADM

## 2023-07-28 RX ORDER — MORPHINE SULFATE 2 MG/ML
2 INJECTION, SOLUTION INTRAMUSCULAR; INTRAVENOUS EVERY 4 HOURS PRN
Status: DISCONTINUED | OUTPATIENT
Start: 2023-07-28 | End: 2023-08-02

## 2023-07-28 RX ORDER — IBUPROFEN 200 MG
200 TABLET ORAL EVERY 6 HOURS PRN
COMMUNITY
End: 2023-07-28 | Stop reason: SINTOL

## 2023-07-28 RX ORDER — PRIMIDONE 50 MG/1
100 TABLET ORAL NIGHTLY
Status: DISCONTINUED | OUTPATIENT
Start: 2023-07-28 | End: 2023-08-11 | Stop reason: HOSPADM

## 2023-07-28 RX ORDER — FLUCONAZOLE 200 MG/1
200 TABLET ORAL DAILY
COMMUNITY
End: 2023-07-28 | Stop reason: DRUGHIGH

## 2023-07-28 RX ORDER — SODIUM CHLORIDE 0.9 % (FLUSH) 0.9 %
10 SYRINGE (ML) INJECTION PRN
Status: DISCONTINUED | OUTPATIENT
Start: 2023-07-28 | End: 2023-08-11 | Stop reason: HOSPADM

## 2023-07-28 RX ORDER — FLUTICASONE PROPIONATE 110 UG/1
1 AEROSOL, METERED RESPIRATORY (INHALATION) 2 TIMES DAILY
Status: DISCONTINUED | OUTPATIENT
Start: 2023-07-28 | End: 2023-07-29

## 2023-07-28 RX ORDER — SODIUM CHLORIDE 0.9 % (FLUSH) 0.9 %
5-40 SYRINGE (ML) INJECTION EVERY 12 HOURS SCHEDULED
Status: DISCONTINUED | OUTPATIENT
Start: 2023-07-28 | End: 2023-08-11 | Stop reason: HOSPADM

## 2023-07-28 RX ORDER — CARVEDILOL 6.25 MG/1
6.25 TABLET ORAL 2 TIMES DAILY
Status: DISCONTINUED | OUTPATIENT
Start: 2023-07-28 | End: 2023-08-04

## 2023-07-28 RX ORDER — VENLAFAXINE HYDROCHLORIDE 150 MG/1
150 CAPSULE, EXTENDED RELEASE ORAL
Status: DISCONTINUED | OUTPATIENT
Start: 2023-07-29 | End: 2023-08-11 | Stop reason: HOSPADM

## 2023-07-28 RX ORDER — POLYETHYLENE GLYCOL 3350 17 G/17G
17 POWDER, FOR SOLUTION ORAL DAILY PRN
Status: DISCONTINUED | OUTPATIENT
Start: 2023-07-28 | End: 2023-08-11 | Stop reason: HOSPADM

## 2023-07-28 RX ORDER — MIRTAZAPINE 30 MG/1
30 TABLET, FILM COATED ORAL NIGHTLY
Status: DISCONTINUED | OUTPATIENT
Start: 2023-07-28 | End: 2023-08-01

## 2023-07-28 RX ORDER — ACETAMINOPHEN 325 MG/1
650 TABLET ORAL EVERY 6 HOURS PRN
Status: DISCONTINUED | OUTPATIENT
Start: 2023-07-28 | End: 2023-08-11 | Stop reason: HOSPADM

## 2023-07-28 RX ORDER — ONDANSETRON 4 MG/1
4 TABLET, ORALLY DISINTEGRATING ORAL EVERY 8 HOURS PRN
Status: DISCONTINUED | OUTPATIENT
Start: 2023-07-28 | End: 2023-08-11 | Stop reason: HOSPADM

## 2023-07-28 RX ORDER — MORPHINE SULFATE 4 MG/ML
4 INJECTION, SOLUTION INTRAMUSCULAR; INTRAVENOUS ONCE
Status: COMPLETED | OUTPATIENT
Start: 2023-07-28 | End: 2023-07-28

## 2023-07-28 RX ORDER — LEVOTHYROXINE SODIUM 88 UG/1
88 TABLET ORAL DAILY
Status: DISCONTINUED | OUTPATIENT
Start: 2023-07-29 | End: 2023-08-11 | Stop reason: HOSPADM

## 2023-07-28 RX ORDER — HEPARIN SODIUM 5000 [USP'U]/ML
5000 INJECTION, SOLUTION INTRAVENOUS; SUBCUTANEOUS EVERY 8 HOURS SCHEDULED
Status: DISCONTINUED | OUTPATIENT
Start: 2023-07-28 | End: 2023-07-29

## 2023-07-28 RX ADMIN — TIOTROPIUM BROMIDE AND OLODATEROL 2 PUFF: 3.124; 2.736 SPRAY, METERED RESPIRATORY (INHALATION) at 21:00

## 2023-07-28 RX ADMIN — FLUTICASONE PROPIONATE 1 PUFF: 110 AEROSOL, METERED RESPIRATORY (INHALATION) at 20:59

## 2023-07-28 RX ADMIN — MORPHINE SULFATE 4 MG: 4 INJECTION, SOLUTION INTRAMUSCULAR; INTRAVENOUS at 14:23

## 2023-07-28 RX ADMIN — CARVEDILOL 6.25 MG: 6.25 TABLET, FILM COATED ORAL at 20:40

## 2023-07-28 RX ADMIN — SODIUM CHLORIDE 100 ML: 9 INJECTION, SOLUTION INTRAVENOUS at 14:11

## 2023-07-28 RX ADMIN — PRIMIDONE 100 MG: 50 TABLET ORAL at 20:40

## 2023-07-28 RX ADMIN — MIRTAZAPINE 30 MG: 30 TABLET, FILM COATED ORAL at 20:40

## 2023-07-28 RX ADMIN — SODIUM CHLORIDE: 9 INJECTION, SOLUTION INTRAVENOUS at 18:41

## 2023-07-28 RX ADMIN — IOPAMIDOL 100 ML: 755 INJECTION, SOLUTION INTRAVENOUS at 14:12

## 2023-07-28 RX ADMIN — SODIUM CHLORIDE, PRESERVATIVE FREE 10 ML: 5 INJECTION INTRAVENOUS at 14:12

## 2023-07-28 RX ADMIN — Medication 10.5 MG: at 20:40

## 2023-07-28 RX ADMIN — MORPHINE SULFATE 2 MG: 2 INJECTION, SOLUTION INTRAMUSCULAR; INTRAVENOUS at 20:39

## 2023-07-28 RX ADMIN — MONTELUKAST 10 MG: 10 TABLET, FILM COATED ORAL at 20:42

## 2023-07-28 RX ADMIN — SODIUM CHLORIDE, PRESERVATIVE FREE 10 ML: 5 INJECTION INTRAVENOUS at 20:43

## 2023-07-28 ASSESSMENT — PAIN DESCRIPTION - ORIENTATION
ORIENTATION: RIGHT
ORIENTATION: RIGHT

## 2023-07-28 ASSESSMENT — PAIN - FUNCTIONAL ASSESSMENT
PAIN_FUNCTIONAL_ASSESSMENT: PREVENTS OR INTERFERES WITH ALL ACTIVE AND SOME PASSIVE ACTIVITIES
PAIN_FUNCTIONAL_ASSESSMENT: 0-10

## 2023-07-28 ASSESSMENT — PAIN SCALES - GENERAL
PAINLEVEL_OUTOF10: 7
PAINLEVEL_OUTOF10: 5
PAINLEVEL_OUTOF10: 10
PAINLEVEL_OUTOF10: 10

## 2023-07-28 ASSESSMENT — ENCOUNTER SYMPTOMS
BACK PAIN: 0
DIARRHEA: 0
EYE DISCHARGE: 0
CHEST TIGHTNESS: 0
RHINORRHEA: 0
BLOOD IN STOOL: 0
TROUBLE SWALLOWING: 0
COLOR CHANGE: 0
WHEEZING: 0
SORE THROAT: 0
SHORTNESS OF BREATH: 0
NAUSEA: 0
EYE PAIN: 0
COUGH: 0
ABDOMINAL PAIN: 0
VOMITING: 0
CONSTIPATION: 0
FACIAL SWELLING: 0
SINUS PRESSURE: 0
EYE REDNESS: 0

## 2023-07-28 ASSESSMENT — PAIN DESCRIPTION - LOCATION
LOCATION: HIP
LOCATION: HIP

## 2023-07-28 ASSESSMENT — PAIN DESCRIPTION - ONSET: ONSET: ON-GOING

## 2023-07-28 ASSESSMENT — COPD QUESTIONNAIRES: CAT_SEVERITY: SEVERE

## 2023-07-28 ASSESSMENT — LIFESTYLE VARIABLES: SMOKING_STATUS: 1

## 2023-07-28 ASSESSMENT — PAIN SCALES - WONG BAKER: WONGBAKER_NUMERICALRESPONSE: 2

## 2023-07-28 ASSESSMENT — PAIN DESCRIPTION - PAIN TYPE: TYPE: ACUTE PAIN

## 2023-07-28 ASSESSMENT — PAIN DESCRIPTION - FREQUENCY: FREQUENCY: CONTINUOUS

## 2023-07-28 NOTE — PROGRESS NOTES
Pharmacy Medication History Note      List of current medications patient is taking is complete. Source of information: Patient, dispense history, OARRS    Changes made to medication list:  Medications flagged for removal (include reason, ex. noncompliance):  Breztri: patient reports severe oral thrush  Buspirone: patient reports not taking  Xopenex: patient reports only using albuterol in nebulizer    Medications removed (include reason, ex. therapy complete or physician discontinued):  NONE    Medications added/doses adjusted:  Carvedilol changed to 6.25mg twice daily  Fluconazole 150mg added per patient  Primidone changed to 100mg nightly  Changed estradiol cream to twice weekly on Monday and Friday    Other notes (ex. Recent course of antibiotics, Coumadin dosing):  Patient takes chronic azithromycin for COPD  Patient has been taking prednisone at different strengths consistently over the last month. Currently patient is taking 10mg daily for the next 3 days. Fluconazole 150mg: patient should be done with current regimen for oral thrush but states that she takes 1 tablet once daily. Patient requests rescue inhaler be present at bedside once admitted. Per OARRS: clonazepam 0.5mg #270 for 90 days supply filled on 7/11/23  Per OARRS: Norco 5-325mg #90 for 30 days supply filled on 7/21/23  Per OARRS: Lomotil 2.5-0.025mg #120 for 30 days supply filled on 4/3/23    Denies use of other OTC or herbal medications.       Allergies clarified    Medication list provided to the patient:NONE  Medication education provided to the patient:NONE      Electronically signed by Xochitl Vences on 7/28/2023 at 4:08 PM

## 2023-07-28 NOTE — ED TRIAGE NOTES
Mode of arrival (squad #, walk in, police, etc) : [facility]        Chief complaint(s): FALL, hip pain        Arrival Note (brief scenario, treatment PTA, etc). : pt fell last night at home and was unable to move her right leg so she slept on the floor all night. Around 0600 her  helped her to the bathroom and and got her into bed. Pt has been in extreme pain all day and decided to call 911 for transport. Pt has pain in right leg. Pt admits to ETOH last night. Pt received 100 mcg fentanyl PTA        C= \"Have you ever felt that you should Cut down on your drinking? \"  No  A= \"Have people Annoyed you by criticizing your drinking? \"  No  G= \"Have you ever felt bad or Guilty about your drinking? \"  No  E= \"Have you ever had a drink as an Eye-opener first thing in the morning to steady your nerves or to help a hangover? \"  No      Deferred []      Reason for deferring: N/A    *If yes to two or more: probable alcohol abuse. *  2

## 2023-07-28 NOTE — ED PROVIDER NOTES
[address] [facility] [facility] ED  eMERGENCY dEPARTMENT eNCOUnter      Pt Name: Lucy Tyson  MRN: [id number]  Birthdate 1960  Date of evaluation: 7/28/23      CHIEF COMPLAINT       Chief Complaint   Patient presents with    Fall    Hip Pain         HISTORY OF PRESENT ILLNESS    Lucy Tyson is a 58 y.o. female who presents complaining of fall. Patient states she fell last night tripped and fall at home. Patient ended up staying on the ground all night until her  found her in the morning. Patient does admit to alcohol use last night. Patient states that she has most of her pain in her right hip and femur area. Patient states she can feel some pressure in her head where she probably hit it. Patient states she did not pass out. Patient has no chest pain shortness of breath or abdominal pain. Patient has no numbness tingling or weakness. REVIEW OF SYSTEMS       Review of Systems   Constitutional:  Negative for activity change, appetite change, chills, diaphoresis and fever. HENT:  Negative for congestion, ear pain, facial swelling, nosebleeds, rhinorrhea, sinus pressure, sore throat and trouble swallowing. Eyes:  Negative for pain, discharge and redness. Respiratory:  Negative for cough, chest tightness, shortness of breath and wheezing. Cardiovascular:  Negative for chest pain, palpitations and leg swelling. Gastrointestinal:  Negative for abdominal pain, blood in stool, constipation, diarrhea, nausea and vomiting. Genitourinary:  Negative for difficulty urinating, dysuria, flank pain, frequency, genital sores and hematuria. Musculoskeletal:  Negative for arthralgias, back pain, gait problem, joint swelling, myalgias and neck pain. Fall leg pain   Skin:  Negative for color change, pallor, rash and wound. Neurological:  Negative for dizziness, tremors, seizures, syncope, speech difficulty, weakness, numbness and headaches.    Psychiatric/Behavioral:  Negative for confusion, Neutrophils Absolute 10.90 (*)     All other components within normal limits   COMPREHENSIVE METABOLIC PANEL - Abnormal; Notable for the following components:    Calcium 7.4 (*)     Sodium 129 (*)     Chloride 90 (*)     Total Protein 5.0 (*)     Albumin 3.4 (*)     All other components within normal limits   ETHANOL - Abnormal; Notable for the following components:    Ethanol 29 (*)     All other components within normal limits   MYOGLOBIN, BLOOD - Abnormal; Notable for the following components:    Myoglobin 181 (*)     All other components within normal limits   PROTIME-INR   CK   URINALYSIS WITH REFLEX TO CULTURE   TYPE AND SCREEN       Vitals Reviewed:    Vitals:    07/28/23 1419 07/28/23 1445 07/28/23 1530 07/28/23 1546   BP:  126/77 119/89 135/75   Pulse: 70 70 76 77   Resp:       Temp:       TempSrc:       SpO2: 95% 96% 97% 99%   Weight:       Height:         MEDICATIONS GIVEN TO PATIENT THIS ENCOUNTER:  Orders Placed This Encounter   Medications    sodium chloride 0.9 % bolus 100 mL    iopamidol (ISOVUE-370) 76 % injection 100 mL    sodium chloride flush 0.9 % injection 10 mL    morphine sulfate (PF) injection 4 mg     DISCHARGE PRESCRIPTIONS:  New Prescriptions    No medications on file     PHYSICIAN CONSULTS ORDERED THIS ENCOUNTER:  IP CONSULT TO ORTHOPEDIC SURGERY  IP CONSULT TO PRIMARY CARE PROVIDER    FINAL IMPRESSION      1. Closed displaced intertrochanteric fracture of right femur, initial encounter [facility])          DISPOSITION/PLAN   DISPOSITION Admitted 07/28/2023 03:54:15 PM      PATIENT REFERREDTO:  No follow-up provider specified.     DISCHARGEMEDICATIONS:  New Prescriptions    No medications on file       (Please note that portions of this note were completed with a voice recognition program.  Efforts were made to edit thedictations but occasionally words are mis-transcribed.)    Mandy Lee MD  Attending Emergency Physician                       Mandy Lee MD  07/28/23 9964

## 2023-07-28 NOTE — CONSULTS
[address] [facility] Internal Medicine  Ashley Gross MD; Raúl Goode MD; Chris Brown MD; MD Jennifer Hoover MD; Bibi Dietrich MD    [facility]   [address] [place] [address]     HISTORY AND PHYSICAL EXAMINATION            Date:   7/28/2023  Patient name:  Chicho Vera  Date of admission:  7/28/2023  1:04 PM  MRN:   [id number]  Account:  [de-identified]  YOB: 1960  PCP:    Tonya Suarez MD  Room:   02/02  Code Status:    Prior      Chief Complaint:     Chief Complaint   Patient presents with    Fall    Hip Pain   Fall right hip pain    History Obtained From:     Patient medical record nursing staff    History of Present Illness:     70-year-old lady with end-stage COPD on home oxygen 2 to 3 L    She has quit smoking she has a respiratory cachexia with severe protein calorie malnutrition BMI 18.29 history of cervical cancer status post hysterectomy  She just got discharged from [facility] after COPD exacerbation on oral steroid she tripped and fell over her oxygen tubing complains of right hip pain unable to mobilize severity 10 out of 10 with no radiation no chest pain no nausea vomiting chronic respiratory failure home oxygen    Past Medical History:     Past Medical History:   Diagnosis Date    Acid reflux     Anxiety     Arthritis     Cancer ([address])     cervical    Cervical osteoarthritis 11/30/2020    COPD (chronic obstructive pulmonary disease) ([address]) 02/23/2018    DDD (degenerative disc disease), cervical     Depression     Hiatal hernia     History of blood transfusion     Reaction fever went up to 107 . per Pt.      On home O2     Spinal stenosis     Tachycardia     Thyroid disease     hypo        Past Surgical History:     Past Surgical History:   Procedure Laterality Date    BACK SURGERY      diskectomy, L3-L5    COLONOSCOPY N/A 04/08/2019    COLONOSCOPY DIAGNOSTIC performed by Dejuan Kendall MD at [facility] fracture. Several scattered lucencies within the femur and visualized tibia. Multiple myeloma/metastatic disease should be excluded. XR FEMUR RIGHT (MIN 2 VIEWS)    Result Date: 7/28/2023  Intertrochanteric right hip fracture. Several scattered lucencies within the femur and visualized tibia. Multiple myeloma/metastatic disease should be excluded. CT HEAD WO CONTRAST    Result Date: 7/28/2023  No acute intracranial abnormality. CT CERVICAL SPINE WO CONTRAST    Result Date: 7/28/2023  No acute fracture or subluxation of the cervical spine. CT CHEST ABDOMEN PELVIS W CONTRAST Additional Contrast? None    Result Date: 7/28/2023  1. Comminuted intertrochanteric fracture of the proximal right femur with mild-to-moderate displacement. There is surrounding soft tissue swelling and edema. No evidence of pelvic hematoma. 2.  No acute traumatic injury to the viscera of the chest, abdomen, or pelvis. 3.  Diffuse osteopenia with multilevel chronic compression fractures and kyphoplasty cement. 4.  Moderate distention of the urinary bladder.        Assessment :          Plan:     55-year-old lady with end-stage COPD on home oxygen 2 to 3 L    She has quit smoking she has a respiratory cachexia with severe protein calorie malnutrition BMI 18.29 history of cervical cancer status post hysterectomy  She just got discharged from [facility] after COPD exacerbation on oral steroid she tripped and fell over her oxygen tubing complains of right hip pain unable to mobilize severity 10 out of 10 with no radiation no chest pain no nausea vomiting chronic respiratory failure home oxygen    Case discussed bedside with orthopedic surgeon Dr. Janeth Garcia  Patient is high risk from respiratory standpoint will need ICU admission postop consult pulmonary critical care patient is well-known to Dr. Isidro Shone need a prolonged intubation may end up with tracheostomy  Surgeon will evaluate and discuss with anesthesia for

## 2023-07-28 NOTE — TELEPHONE ENCOUNTER
Writer contacted ED provider to inform of 30 day readmission risk. ED provider informed writer of readmission.       Call Back: If you need to call back to inform of disposition you can contact me at [phone]

## 2023-07-28 NOTE — ANESTHESIA PRE PROCEDURE
Department of Anesthesiology  Preprocedure Note       Name:  Edwina Ji   Age:  58 y.o.  :  1960                                          MRN:  [id number]         Date:  2023      Surgeon: Mera Crews):  Paul Oden MD    Procedure: FEMUR IM NAIL HALIMA INSERTION (Right: Hip)    Medications prior to admission:   Prior to Admission medications    Medication Sig Start Date End Date Taking? Authorizing Provider   fluconazole (DIFLUCAN) 150 MG tablet Take 1 tablet by mouth daily    Historical Provider, MD   HYDROcodone-acetaminophen (NORCO) 5-325 MG per tablet Take 1 tablet by mouth every 8 hours as needed for Pain for up to 30 days. Max Daily Amount: 3 tablets 23  James Gray MD   predniSONE (DELTASONE) 10 MG tablet Take 1 tablet by mouth daily for 3 doses 23  Pillo Dan MD   tiZANidine (ZANAFLEX) 4 MG tablet Take 1 tablet by mouth 4 times daily as needed (muscle spasm) 23   Pillo Dan MD   tiotropium-olodaterol (STIOLTO) 2.5-2.5 MCG/ACT AERS Inhale 2 puffs into the lungs daily    Historical Provider, MD   busPIRone (BUSPAR) 10 MG tablet Take 1 tablet by mouth 3 times daily  Patient not taking: Reported on 2023    Historical Provider, MD   diphenoxylate-atropine (LOMOTIL) 2.5-0.025 MG per tablet Take 1 tablet by mouth 4 times daily as needed for Diarrhea. Historical Provider, MD   clonazePAM (KLONOPIN) 0.5 MG tablet Take 1 tablet by mouth 3 times daily as needed for Anxiety for up to 30 days.  23  James Gray MD   albuterol sulfate HFA (PROVENTIL;VENTOLIN;PROAIR) 108 (90 Base) MCG/ACT inhaler T inhale 2 puffs by mouth and INTO THE LUNGS every 6 hours if needed for wheezing 23   James Gray MD   nystatin (MYCOSTATIN) 105764 UNIT/ML suspension swish 5 milliliters by mouth / THROAT four times a day for 7 days 23   Historical Provider, MD   Budeson-Glycopyrrol-Formoterol (BREZTRI AEROSPHERE) 160-9-4.8 MCG/ACT AERO

## 2023-07-29 ENCOUNTER — APPOINTMENT (OUTPATIENT)
Dept: GENERAL RADIOLOGY | Age: 63
End: 2023-07-29
Payer: COMMERCIAL

## 2023-07-29 ENCOUNTER — ANESTHESIA (OUTPATIENT)
Dept: OPERATING ROOM | Age: 63
End: 2023-07-29
Payer: COMMERCIAL

## 2023-07-29 PROBLEM — S72.141A CLOSED DISPLACED INTERTROCHANTERIC FRACTURE OF RIGHT FEMUR (HCC): Status: ACTIVE | Noted: 2023-07-28

## 2023-07-29 PROBLEM — E43 SEVERE MALNUTRITION (HCC): Chronic | Status: ACTIVE | Noted: 2023-07-29

## 2023-07-29 LAB
25(OH)D3 SERPL-MCNC: 27.6 NG/ML
ANION GAP SERPL CALCULATED.3IONS-SCNC: 10 MMOL/L (ref 9–17)
BUN SERPL-MCNC: 10 MG/DL (ref 8–23)
CALCIUM SERPL-MCNC: 7.4 MG/DL (ref 8.6–10.4)
CHLORIDE SERPL-SCNC: 94 MMOL/L (ref 98–107)
CO2 SERPL-SCNC: 27 MMOL/L (ref 20–31)
CREAT SERPL-MCNC: 0.6 MG/DL (ref 0.5–0.9)
ERYTHROCYTE [DISTWIDTH] IN BLOOD BY AUTOMATED COUNT: 15.5 % (ref 11.5–14.9)
GFR SERPL CREATININE-BSD FRML MDRD: >60 ML/MIN/1.73M2
GLUCOSE SERPL-MCNC: 90 MG/DL (ref 70–99)
HCT VFR BLD AUTO: 24.2 % (ref 36–46)
HGB BLD-MCNC: 8.1 G/DL (ref 12–16)
INR PPP: 0.9
MAGNESIUM SERPL-MCNC: 1.7 MG/DL (ref 1.6–2.6)
MCH RBC QN AUTO: 31.9 PG (ref 26–34)
MCHC RBC AUTO-ENTMCNC: 33.4 G/DL (ref 31–37)
MCV RBC AUTO: 95.5 FL (ref 80–100)
PLATELET # BLD AUTO: 197 K/UL (ref 150–450)
PMV BLD AUTO: 7.3 FL (ref 6–12)
POTASSIUM SERPL-SCNC: 3.5 MMOL/L (ref 3.7–5.3)
PROTHROMBIN TIME: 12.7 SEC (ref 11.8–14.6)
RBC # BLD AUTO: 2.53 M/UL (ref 4–5.2)
SODIUM SERPL-SCNC: 131 MMOL/L (ref 135–144)
WBC OTHER # BLD: 11 K/UL (ref 3.5–11)

## 2023-07-29 PROCEDURE — C1769 GUIDE WIRE: HCPCS | Performed by: ORTHOPAEDIC SURGERY

## 2023-07-29 PROCEDURE — 7100000000 HC PACU RECOVERY - FIRST 15 MIN: Performed by: ORTHOPAEDIC SURGERY

## 2023-07-29 PROCEDURE — 6370000000 HC RX 637 (ALT 250 FOR IP): Performed by: ORTHOPAEDIC SURGERY

## 2023-07-29 PROCEDURE — 3600000014 HC SURGERY LEVEL 4 ADDTL 15MIN: Performed by: ORTHOPAEDIC SURGERY

## 2023-07-29 PROCEDURE — 2709999900 HC NON-CHARGEABLE SUPPLY: Performed by: ORTHOPAEDIC SURGERY

## 2023-07-29 PROCEDURE — 0QS606Z REPOSITION RIGHT UPPER FEMUR WITH INTRAMEDULLARY INTERNAL FIXATION DEVICE, OPEN APPROACH: ICD-10-PCS | Performed by: ORTHOPAEDIC SURGERY

## 2023-07-29 PROCEDURE — 2000000000 HC ICU R&B

## 2023-07-29 PROCEDURE — 2580000003 HC RX 258: Performed by: ORTHOPAEDIC SURGERY

## 2023-07-29 PROCEDURE — 27245 TREAT THIGH FRACTURE: CPT | Performed by: ORTHOPAEDIC SURGERY

## 2023-07-29 PROCEDURE — 36415 COLL VENOUS BLD VENIPUNCTURE: CPT

## 2023-07-29 PROCEDURE — 83735 ASSAY OF MAGNESIUM: CPT

## 2023-07-29 PROCEDURE — 2060000000 HC ICU INTERMEDIATE R&B

## 2023-07-29 PROCEDURE — 6360000002 HC RX W HCPCS: Performed by: ORTHOPAEDIC SURGERY

## 2023-07-29 PROCEDURE — 2580000003 HC RX 258: Performed by: INTERNAL MEDICINE

## 2023-07-29 PROCEDURE — C1713 ANCHOR/SCREW BN/BN,TIS/BN: HCPCS | Performed by: ORTHOPAEDIC SURGERY

## 2023-07-29 PROCEDURE — 2720000010 HC SURG SUPPLY STERILE: Performed by: ORTHOPAEDIC SURGERY

## 2023-07-29 PROCEDURE — 99232 SBSQ HOSP IP/OBS MODERATE 35: CPT | Performed by: INTERNAL MEDICINE

## 2023-07-29 PROCEDURE — 3700000000 HC ANESTHESIA ATTENDED CARE: Performed by: ORTHOPAEDIC SURGERY

## 2023-07-29 PROCEDURE — 94761 N-INVAS EAR/PLS OXIMETRY MLT: CPT

## 2023-07-29 PROCEDURE — 94640 AIRWAY INHALATION TREATMENT: CPT

## 2023-07-29 PROCEDURE — 7100000001 HC PACU RECOVERY - ADDTL 15 MIN: Performed by: ORTHOPAEDIC SURGERY

## 2023-07-29 PROCEDURE — 6370000000 HC RX 637 (ALT 250 FOR IP): Performed by: NURSE PRACTITIONER

## 2023-07-29 PROCEDURE — 3E0T3BZ INTRODUCTION OF ANESTHETIC AGENT INTO PERIPHERAL NERVES AND PLEXI, PERCUTANEOUS APPROACH: ICD-10-PCS | Performed by: ANESTHESIOLOGY

## 2023-07-29 PROCEDURE — 2500000003 HC RX 250 WO HCPCS: Performed by: ANESTHESIOLOGY

## 2023-07-29 PROCEDURE — 6360000002 HC RX W HCPCS: Performed by: ANESTHESIOLOGY

## 2023-07-29 PROCEDURE — 80048 BASIC METABOLIC PNL TOTAL CA: CPT

## 2023-07-29 PROCEDURE — 2580000003 HC RX 258: Performed by: ANESTHESIOLOGY

## 2023-07-29 PROCEDURE — 3600000004 HC SURGERY LEVEL 4 BASE: Performed by: ORTHOPAEDIC SURGERY

## 2023-07-29 PROCEDURE — 6360000002 HC RX W HCPCS: Performed by: INTERNAL MEDICINE

## 2023-07-29 PROCEDURE — 6360000002 HC RX W HCPCS: Performed by: NURSE PRACTITIONER

## 2023-07-29 PROCEDURE — 2700000000 HC OXYGEN THERAPY PER DAY

## 2023-07-29 PROCEDURE — 3700000001 HC ADD 15 MINUTES (ANESTHESIA): Performed by: ORTHOPAEDIC SURGERY

## 2023-07-29 PROCEDURE — 82306 VITAMIN D 25 HYDROXY: CPT

## 2023-07-29 PROCEDURE — 85610 PROTHROMBIN TIME: CPT

## 2023-07-29 PROCEDURE — 6370000000 HC RX 637 (ALT 250 FOR IP): Performed by: INTERNAL MEDICINE

## 2023-07-29 PROCEDURE — 85027 COMPLETE CBC AUTOMATED: CPT

## 2023-07-29 DEVICE — NAIL IM L170MM DIA11MM NK 125DEG SHT PROX FEM GRN TI-15MO: Type: IMPLANTABLE DEVICE | Site: HIP | Status: FUNCTIONAL

## 2023-07-29 DEVICE — BLADE IM HELCL 95 MM FEN NS TFNA: Type: IMPLANTABLE DEVICE | Site: HIP | Status: FUNCTIONAL

## 2023-07-29 DEVICE — SCREW BNE L36MM DIA5MM NONSTERILE TIB LT GRN TI ST CANN LOK: Type: IMPLANTABLE DEVICE | Site: HIP | Status: FUNCTIONAL

## 2023-07-29 RX ORDER — MIDAZOLAM HYDROCHLORIDE 1 MG/ML
INJECTION INTRAMUSCULAR; INTRAVENOUS PRN
Status: DISCONTINUED | OUTPATIENT
Start: 2023-07-29 | End: 2023-07-29 | Stop reason: SDUPTHER

## 2023-07-29 RX ORDER — PROPOFOL 10 MG/ML
INJECTION, EMULSION INTRAVENOUS CONTINUOUS PRN
Status: DISCONTINUED | OUTPATIENT
Start: 2023-07-29 | End: 2023-07-29 | Stop reason: SDUPTHER

## 2023-07-29 RX ORDER — DIPHENHYDRAMINE HYDROCHLORIDE 50 MG/ML
12.5 INJECTION INTRAMUSCULAR; INTRAVENOUS
Status: DISCONTINUED | OUTPATIENT
Start: 2023-07-29 | End: 2023-07-29

## 2023-07-29 RX ORDER — CEFAZOLIN SODIUM 1 G/3ML
INJECTION, POWDER, FOR SOLUTION INTRAMUSCULAR; INTRAVENOUS PRN
Status: DISCONTINUED | OUTPATIENT
Start: 2023-07-29 | End: 2023-07-29 | Stop reason: SDUPTHER

## 2023-07-29 RX ORDER — KETAMINE HYDROCHLORIDE 10 MG/ML
INJECTION INTRAMUSCULAR; INTRAVENOUS PRN
Status: DISCONTINUED | OUTPATIENT
Start: 2023-07-29 | End: 2023-07-29 | Stop reason: SDUPTHER

## 2023-07-29 RX ORDER — SODIUM CHLORIDE 9 MG/ML
INJECTION, SOLUTION INTRAVENOUS PRN
Status: DISCONTINUED | OUTPATIENT
Start: 2023-07-29 | End: 2023-07-29

## 2023-07-29 RX ORDER — ONDANSETRON 2 MG/ML
INJECTION INTRAMUSCULAR; INTRAVENOUS PRN
Status: DISCONTINUED | OUTPATIENT
Start: 2023-07-29 | End: 2023-07-29 | Stop reason: SDUPTHER

## 2023-07-29 RX ORDER — ALBUTEROL SULFATE 2.5 MG/3ML
2.5 SOLUTION RESPIRATORY (INHALATION)
Status: DISCONTINUED | OUTPATIENT
Start: 2023-07-29 | End: 2023-08-11 | Stop reason: HOSPADM

## 2023-07-29 RX ORDER — SODIUM CHLORIDE 9 MG/ML
INJECTION, SOLUTION INTRAVENOUS CONTINUOUS PRN
Status: DISCONTINUED | OUTPATIENT
Start: 2023-07-29 | End: 2023-07-29 | Stop reason: SDUPTHER

## 2023-07-29 RX ORDER — METOCLOPRAMIDE HYDROCHLORIDE 5 MG/ML
10 INJECTION INTRAMUSCULAR; INTRAVENOUS
Status: DISCONTINUED | OUTPATIENT
Start: 2023-07-29 | End: 2023-07-29

## 2023-07-29 RX ORDER — ONDANSETRON 2 MG/ML
4 INJECTION INTRAMUSCULAR; INTRAVENOUS
Status: DISCONTINUED | OUTPATIENT
Start: 2023-07-29 | End: 2023-07-29

## 2023-07-29 RX ORDER — FLUCONAZOLE 100 MG/1
150 TABLET ORAL DAILY
Status: COMPLETED | OUTPATIENT
Start: 2023-07-29 | End: 2023-07-31

## 2023-07-29 RX ORDER — 0.9 % SODIUM CHLORIDE 0.9 %
1000 INTRAVENOUS SOLUTION INTRAVENOUS ONCE
Status: COMPLETED | OUTPATIENT
Start: 2023-07-29 | End: 2023-07-29

## 2023-07-29 RX ORDER — FENTANYL CITRATE 0.05 MG/ML
25 INJECTION, SOLUTION INTRAMUSCULAR; INTRAVENOUS EVERY 5 MIN PRN
Status: DISCONTINUED | OUTPATIENT
Start: 2023-07-29 | End: 2023-07-29

## 2023-07-29 RX ORDER — SODIUM CHLORIDE 0.9 % (FLUSH) 0.9 %
5-40 SYRINGE (ML) INJECTION PRN
Status: DISCONTINUED | OUTPATIENT
Start: 2023-07-29 | End: 2023-07-29

## 2023-07-29 RX ORDER — TIZANIDINE 4 MG/1
4 TABLET ORAL 4 TIMES DAILY PRN
Status: DISCONTINUED | OUTPATIENT
Start: 2023-07-29 | End: 2023-08-11 | Stop reason: HOSPADM

## 2023-07-29 RX ORDER — SODIUM CHLORIDE 0.9 % (FLUSH) 0.9 %
5-40 SYRINGE (ML) INJECTION EVERY 12 HOURS SCHEDULED
Status: DISCONTINUED | OUTPATIENT
Start: 2023-07-29 | End: 2023-07-29

## 2023-07-29 RX ORDER — ENOXAPARIN SODIUM 100 MG/ML
30 INJECTION SUBCUTANEOUS DAILY
Status: DISCONTINUED | OUTPATIENT
Start: 2023-07-30 | End: 2023-08-11 | Stop reason: HOSPADM

## 2023-07-29 RX ORDER — BUPIVACAINE HYDROCHLORIDE 7.5 MG/ML
INJECTION, SOLUTION INTRASPINAL
Status: COMPLETED | OUTPATIENT
Start: 2023-07-29 | End: 2023-07-29

## 2023-07-29 RX ADMIN — PHENYLEPHRINE HYDROCHLORIDE 100 MCG: 10 INJECTION INTRAVENOUS at 11:31

## 2023-07-29 RX ADMIN — KETAMINE HYDROCHLORIDE 20 MG: 10 INJECTION, SOLUTION INTRAMUSCULAR; INTRAVENOUS at 11:17

## 2023-07-29 RX ADMIN — Medication 2000 MG: at 20:10

## 2023-07-29 RX ADMIN — TIOTROPIUM BROMIDE AND OLODATEROL 2 PUFF: 3.124; 2.736 SPRAY, METERED RESPIRATORY (INHALATION) at 07:50

## 2023-07-29 RX ADMIN — MORPHINE SULFATE 2 MG: 2 INJECTION, SOLUTION INTRAMUSCULAR; INTRAVENOUS at 21:05

## 2023-07-29 RX ADMIN — ONDANSETRON 4 MG: 2 INJECTION INTRAMUSCULAR; INTRAVENOUS at 12:20

## 2023-07-29 RX ADMIN — TIZANIDINE 4 MG: 4 TABLET ORAL at 17:50

## 2023-07-29 RX ADMIN — MORPHINE SULFATE 2 MG: 2 INJECTION, SOLUTION INTRAMUSCULAR; INTRAVENOUS at 02:58

## 2023-07-29 RX ADMIN — ALBUTEROL SULFATE 2.5 MG: 2.5 SOLUTION RESPIRATORY (INHALATION) at 10:33

## 2023-07-29 RX ADMIN — KETAMINE HYDROCHLORIDE 10 MG: 10 INJECTION, SOLUTION INTRAMUSCULAR; INTRAVENOUS at 11:24

## 2023-07-29 RX ADMIN — BUPIVACAINE HYDROCHLORIDE IN DEXTROSE 9 MG: 7.5 INJECTION, SOLUTION SUBARACHNOID at 11:19

## 2023-07-29 RX ADMIN — HYDROMORPHONE HYDROCHLORIDE 0.5 MG: 1 INJECTION, SOLUTION INTRAMUSCULAR; INTRAVENOUS; SUBCUTANEOUS at 14:23

## 2023-07-29 RX ADMIN — MORPHINE SULFATE 2 MG: 2 INJECTION, SOLUTION INTRAMUSCULAR; INTRAVENOUS at 08:30

## 2023-07-29 RX ADMIN — MIRTAZAPINE 30 MG: 30 TABLET, FILM COATED ORAL at 21:07

## 2023-07-29 RX ADMIN — KETAMINE HYDROCHLORIDE 20 MG: 10 INJECTION, SOLUTION INTRAMUSCULAR; INTRAVENOUS at 11:30

## 2023-07-29 RX ADMIN — HYDROCODONE BITARTRATE AND ACETAMINOPHEN 1 TABLET: 5; 325 TABLET ORAL at 22:54

## 2023-07-29 RX ADMIN — ALBUTEROL SULFATE 2.5 MG: 2.5 SOLUTION RESPIRATORY (INHALATION) at 23:22

## 2023-07-29 RX ADMIN — SODIUM CHLORIDE 1000 ML: 9 INJECTION, SOLUTION INTRAVENOUS at 20:06

## 2023-07-29 RX ADMIN — POTASSIUM BICARBONATE 40 MEQ: 782 TABLET, EFFERVESCENT ORAL at 06:08

## 2023-07-29 RX ADMIN — ALBUTEROL SULFATE 2.5 MG: 2.5 SOLUTION RESPIRATORY (INHALATION) at 19:51

## 2023-07-29 RX ADMIN — PROPOFOL 100 MCG/KG/MIN: 10 INJECTION, EMULSION INTRAVENOUS at 11:25

## 2023-07-29 RX ADMIN — MIDAZOLAM 1 MG: 1 INJECTION INTRAMUSCULAR; INTRAVENOUS at 11:31

## 2023-07-29 RX ADMIN — CEFAZOLIN 2 G: 1 INJECTION, POWDER, FOR SOLUTION INTRAMUSCULAR; INTRAVENOUS at 11:52

## 2023-07-29 RX ADMIN — CARVEDILOL 6.25 MG: 6.25 TABLET, FILM COATED ORAL at 06:08

## 2023-07-29 RX ADMIN — FLUCONAZOLE 150 MG: 100 TABLET ORAL at 17:50

## 2023-07-29 RX ADMIN — PRIMIDONE 100 MG: 50 TABLET ORAL at 21:31

## 2023-07-29 RX ADMIN — SODIUM CHLORIDE: 9 INJECTION, SOLUTION INTRAVENOUS at 05:19

## 2023-07-29 RX ADMIN — ALBUTEROL SULFATE 2.5 MG: 2.5 SOLUTION RESPIRATORY (INHALATION) at 15:47

## 2023-07-29 RX ADMIN — PHENYLEPHRINE HYDROCHLORIDE 100 MCG: 10 INJECTION INTRAVENOUS at 12:22

## 2023-07-29 RX ADMIN — PHENYLEPHRINE HYDROCHLORIDE 100 MCG: 10 INJECTION INTRAVENOUS at 12:31

## 2023-07-29 RX ADMIN — SODIUM CHLORIDE, PRESERVATIVE FREE 10 ML: 5 INJECTION INTRAVENOUS at 20:12

## 2023-07-29 RX ADMIN — SODIUM CHLORIDE: 9 INJECTION, SOLUTION INTRAVENOUS at 17:25

## 2023-07-29 RX ADMIN — FLUTICASONE PROPIONATE 1 PUFF: 110 AEROSOL, METERED RESPIRATORY (INHALATION) at 07:50

## 2023-07-29 RX ADMIN — PHENYLEPHRINE HYDROCHLORIDE 100 MCG: 10 INJECTION INTRAVENOUS at 11:39

## 2023-07-29 RX ADMIN — SODIUM CHLORIDE: 900 INJECTION, SOLUTION INTRAVENOUS at 11:11

## 2023-07-29 RX ADMIN — ALBUTEROL SULFATE 0.63 MG: 2.5 SOLUTION RESPIRATORY (INHALATION) at 05:33

## 2023-07-29 RX ADMIN — PHENYLEPHRINE HYDROCHLORIDE 100 MCG: 10 INJECTION INTRAVENOUS at 11:59

## 2023-07-29 RX ADMIN — MIDAZOLAM 1 MG: 1 INJECTION INTRAMUSCULAR; INTRAVENOUS at 11:18

## 2023-07-29 ASSESSMENT — PAIN DESCRIPTION - ORIENTATION
ORIENTATION: RIGHT
ORIENTATION: LEFT
ORIENTATION: RIGHT
ORIENTATION: RIGHT

## 2023-07-29 ASSESSMENT — PAIN SCALES - GENERAL
PAINLEVEL_OUTOF10: 8
PAINLEVEL_OUTOF10: 10
PAINLEVEL_OUTOF10: 0
PAINLEVEL_OUTOF10: 4
PAINLEVEL_OUTOF10: 7
PAINLEVEL_OUTOF10: 6
PAINLEVEL_OUTOF10: 4
PAINLEVEL_OUTOF10: 10
PAINLEVEL_OUTOF10: 7
PAINLEVEL_OUTOF10: 8

## 2023-07-29 ASSESSMENT — PAIN - FUNCTIONAL ASSESSMENT: PAIN_FUNCTIONAL_ASSESSMENT: PREVENTS OR INTERFERES WITH ALL ACTIVE AND SOME PASSIVE ACTIVITIES

## 2023-07-29 ASSESSMENT — PAIN DESCRIPTION - FREQUENCY
FREQUENCY: CONTINUOUS
FREQUENCY: INTERMITTENT
FREQUENCY: CONTINUOUS

## 2023-07-29 ASSESSMENT — PAIN DESCRIPTION - LOCATION
LOCATION: HIP

## 2023-07-29 ASSESSMENT — PAIN DESCRIPTION - ONSET: ONSET: AWAKENED FROM SLEEP

## 2023-07-29 ASSESSMENT — PAIN DESCRIPTION - DESCRIPTORS
DESCRIPTORS: BURNING
DESCRIPTORS: ACHING
DESCRIPTORS: DISCOMFORT;NAGGING
DESCRIPTORS: ACHING
DESCRIPTORS: ACHING;DISCOMFORT;SORE;THROBBING

## 2023-07-29 ASSESSMENT — PAIN DESCRIPTION - PAIN TYPE
TYPE: SURGICAL PAIN
TYPE: ACUTE PAIN

## 2023-07-29 NOTE — PROGRESS NOTES
Comprehensive Nutrition Assessment    Type and Reason for Visit:  Positive Nutrition Screen (weight loss and poor intake)    Nutrition Recommendations/Plan:   Regular diet to start, monitor for adequacy of intake. Provide Chocolate Ensure Enlive each meal.      Malnutrition Assessment:  Malnutrition Status:  Severe malnutrition (07/29/23 1554)    Context:  Acute Illness     Findings of the 6 clinical characteristics of malnutrition:  Energy Intake:  75% or less of estimated energy requirements for 7 or more days  Weight Loss:  No significant weight loss     Body Fat Loss: Moderate body fat loss Orbital, Buccal region   Muscle Mass Loss: Moderate muscle mass loss Temples (temporalis), Clavicles (pectoralis & deltoids)  Fluid Accumulation:  No significant fluid accumulation     Strength:  Not Performed    Nutrition Assessment:    Pt fell at home over her oxygen cord sustaining fractured right hip; went to surgery today. Pt known from prior admission which she indicated a 20# weight loss 2 years ago while she was ill unsuccesful in gaining weight back. Pt has stage IV COPD which can hinder from eating due to breathing difficulties. Diet can advance to regular at dinner tonight; provide chocolae Ensure Enlive at meals. 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 Labs & meds reviewed. Wound Type: Surgical Incision       Current Nutrition Intake & Therapies:    Average Meal Intake: Unable to assess (diet just ordered)     ADULT DIET; Regular  ADULT ORAL NUTRITION SUPPLEMENT; Breakfast, Lunch, Dinner; Standard High Calorie/High Protein Oral Supplement    Anthropometric Measures:  Height: 5' 2\" (157.5 cm)  Ideal Body Weight (IBW): 110 lbs (50 kg)    Admission Body Weight: 108 lb (49 kg)  Current Body Weight: 108 lb (49 kg), 98.2 % IBW.  Weight Source: Bed Scale  Current BMI (kg/m2): 19.7  Usual Body Weight: 100 lb (45.4 kg)  % Weight Change (Calculated): 8 BMI Categories: Normal Weight (BMI 18.5-24. 9)    Estimated Daily Nutrient Needs:  Energy Requirements Based On: Kcal/kg  Weight Used for Energy Requirements: Admission  Energy (kcal/day): 1730 kcals based on 35 kcals/kg  Weight Used for Protein Requirements: Admission  Protein (g/day):         Nutrition Diagnosis:   Severe malnutrition related to inadequate protein-energy intake, impaired respiratory function as evidenced by Criteria as identified in malnutrition assessment    Nutrition Interventions:   Food and/or Nutrient Delivery: Start Oral Diet, Start Oral Nutrition Supplement  Nutrition Education/Counseling: No recommendation at this time  Coordination of Nutrition Care: Continue to monitor while inpatient       Goals:     Goals: PO intake 75% or greater       Nutrition Monitoring and Evaluation:   Behavioral-Environmental Outcomes: None Identified  Food/Nutrient Intake Outcomes: Food and Nutrient Intake, Supplement Intake  Physical Signs/Symptoms Outcomes: Biochemical Data, Nutrition Focused Physical Findings, Skin, Weight    Discharge Planning:     Too soon to determine     Alta Morgan, [address] [facility], [place]  [phone]

## 2023-07-29 NOTE — ANESTHESIA PROCEDURE NOTES
Spinal Block    Patient location during procedure: OR  End time: 7/29/2023 11:23 AM  Reason for block: primary anesthetic  Staffing  Performed: anesthesiologist   Anesthesiologist: Rossy Elliott MD  Spinal Block  Patient position: right lateral decubitus  Prep: Betadine  Patient monitoring: cardiac monitor, continuous pulse ox, frequent blood pressure checks, continuous capnometry and oxygen  Approach: midline  Location: L4/L5  Guidance: paresthesia technique  Provider prep: mask and sterile gloves  Local infiltration: lidocaine  Needle  Needle type: Quincke   Needle gauge: 22 G  Needle length: 3.5 in  Assessment  Sensory level: T8  Swirl obtained: Yes  CSF: clear  Attempts: 1  Hemodynamics: stable  Preanesthetic Checklist  Completed: patient identified, IV checked, site marked, risks and benefits discussed, surgical/procedural consents, equipment checked, pre-op evaluation, timeout performed, anesthesia consent given, oxygen available, monitors applied/VS acknowledged, fire risk safety assessment completed and verbalized and blood product R/B/A discussed and consented

## 2023-07-29 NOTE — CONSULTS
[facility]   CONSULT NOTE:      DATE OF CONSULT 7/29/2023    REASON FOR CONSULTATION:  Pulmonary management      PCP Tonya Suarez MD     CHIEF COMPLAINT: Preop pulmonary clearance    HISTORY OF PRESENT ILLNESS:     Mark Miner is a very pleasant 55-year-old female well-known to me with stage IV COPD who continues to smoke. She was recently admitted and discharged in mid July. She went home, and said her breathing was fine. She said that she continued to smoke was down to \"2 cigarettes a day\". However, 2 days ago, she tripped and fell. She had gone up to take a breathing treatment and got tangled with her oxygen. She laid on the ground all night. Her  wanted to call the EMS but she told him not to. She fell on her right hip and femur area. She was found to have a comminuted intertrochanteric fracture of the right femur. She is scheduled for surgery this morning. She denies any fevers, chills, worsening cough or sputum production. She always has a sensation that she has mucus in her chest and cannot bring it out. She denies any hemoptysis or night sweats. She denies any pleuritic pain. A CT of the chest was done which showed no infiltrates. She has known severe COPD, with an FEV1 of 20% (0.49 L) in 2022. When she was admitted in July, she had failed 3 rounds of outpatient therapy. She is chronically on Zithromax Monday/Wednesday/Friday as an anti-inflammatory medication due to her severe COPD. She also has noninvasive ventilator that she uses at night and she is on 2 L nasal cannula at baseline. She had been maintained on Stiolto and Flovent, however the Flovent was discontinued because of her episodes of thrush. .  Previously she had been tried on Ashley but for some reason that gave her a lot of episodes of thrush.   She has been a full code but no compressions in the past.    She also has a history of esophageal stricture, but investigation aspiration  Full CODE STATUS    Plan:      High risk from pulmonary standpoint but okay to proceed with surgery-she fully understands the risks and benefits of my: She says that she does not want to be \"a cripple\" for the rest of her life  Would prefer spinal anesthesia if at all possible  Pulmonary medications have been adjusted we will continue on Stiolto  No need for preoperative steroids as the patient is not bronchospastic  She will need at the very least an ICU intermediate bed after surgery to be closely watched  Oxygen to keep saturations greater than 88%  She should use her noninvasive ventilation at nighttime  We will discuss with her goals of care after her surgery for now full code  She would benefit from acute rehab after surgery. Previously, we had wanted to go to rehab but physical therapy said she was \"too good \".

## 2023-07-29 NOTE — PROGRESS NOTES
Pt's home cpap unit's electrical connections have been checked and sticker applied for non-hospital equipment check off. Pt responsible for alerting staff if cpap is not operating at it's usual capacity.

## 2023-07-29 NOTE — BRIEF OP NOTE
Brief Postoperative Note      Patient: Ashleigh Wu  YOB: 1960  MRN: [id number]    Date of Procedure: 7/29/2023    Pre-Op Diagnosis Codes:     * Closed displaced intertrochanteric fracture of right femur, initial encounter ([address] [facility]) [S72.141A]    Post-Op Diagnosis: Same       Procedure(s): FEMUR IM NAIL HALIMA INSERTION    Surgeon(s):  Idalia Gamble MD    Assistant:  * No surgical staff found *    Anesthesia: General    Estimated Blood Loss (mL): 25    Complications: None    Specimens:   * No specimens in log *    Implants:  Implant Name Type Inv. Item Serial No.  Lot No. LRB No. Used Action   NAIL IM L170MM [id number] 125DEG SHT PROX FEM GRN TI-15MO - [id number]  NAIL IM L170MM [id number] NK 125DEG SHT PROX FEM GRN TI-15MO  DEPUY [employer]WD [id number] Right 1 Implanted   BLADE IM HELCL 95 MM FEN NS TFNA - [id number]  BLADE IM HELCL 95 MM FEN NS TFNA  DEPUY [employer]  Right 1 Implanted   SCREW BNE L36MM DIA5MM NONSTERILE TIB LT GRN TI ST [employer] MING - [id number]  SCREW BNE L36MM DIA5MM NONSTERILE TIB LT GRN TI ST ARAVIND MING  DEPUY [employer]  Right 1 Implanted         Drains:   Urinary Catheter 07/28/23 Nj (Active)   $ Urethral catheter insertion $ Not inserted for procedure 07/28/23 2030   Catheter Indications Urinary retention (acute or chronic), continuous bladder irrigation or bladder outlet obstruction 07/29/23 0745   Site Assessment No urethral drainage;Pink 07/29/23 0745   Urine Color Yellow 07/29/23 0745   Urine Appearance Clear 07/29/23 0745   Collection Container Standard 07/29/23 0745   Securement Method Securing device (Describe) 07/29/23 0745   Catheter Care  Soap and water 07/29/23 0447   Catheter Best Practices  Drainage tube clipped to bed;Catheter secured to thigh; Tamper seal intact; Bag below bladder;Bag not on floor; Lack of dependent loop in tubing;Drainage bag less than half full 07/29/23 0745   Status Draining;Patent 07/29/23 0745   Output (mL) 600 mL 07/29/23 1260 Findings: See operative report      Electronically signed by Brynn Bonilla MD on 7/29/2023 at 12:44 PM

## 2023-07-29 NOTE — PLAN OF CARE
Problem: Discharge Planning  Goal: Discharge to home or other facility with appropriate resources  Outcome: Progressing  Flowsheets (Taken 7/29/2023 0132)  Discharge to home or other facility with appropriate resources:   Identify barriers to discharge with patient and caregiver   Arrange for needed discharge resources and transportation as appropriate   Identify discharge learning needs (meds, wound care, etc)   Refer to discharge planning if patient needs post-hospital services based on physician order or complex needs related to functional status, cognitive ability or social support system     Problem: Pain  Goal: Verbalizes/displays adequate comfort level or baseline comfort level  Outcome: Progressing  Flowsheets (Taken 7/29/2023 0132)  Verbalizes/displays adequate comfort level or baseline comfort level:   Encourage patient to monitor pain and request assistance   Administer analgesics based on type and severity of pain and evaluate response   Assess pain using appropriate pain scale   Implement non-pharmacological measures as appropriate and evaluate response   Consider cultural and social influences on pain and pain management   Notify Licensed Independent Practitioner if interventions unsuccessful or patient reports new pain     Problem: Safety - Adult  Goal: Free from fall injury  Outcome: Progressing     Problem: Skin/Tissue Integrity  Goal: Absence of new skin breakdown  Description: 1. Monitor for areas of redness and/or skin breakdown  2. Assess vascular access sites hourly  3. Every 4-6 hours minimum:  Change oxygen saturation probe site  4. Every 4-6 hours:  If on nasal continuous positive airway pressure, respiratory therapy assess nares and determine need for appliance change or resting period.   Outcome: Progressing

## 2023-07-29 NOTE — PROGRESS NOTES
Patient arrived to ICU 2014 attached to monitor oriented to room call light within reach side rails up x2.  Assessment to follow

## 2023-07-29 NOTE — CONSULTS
[facility] Internal Medicine  Margarette Savage MD; Gualberto Alvarez MD; Pranav Shaffer MD; MD Mikael Perdue MD; Raymundo Carrillo MD    [facility] Internal Medicine   [address] [place] [address]     HISTORY AND PHYSICAL EXAMINATION            Date:   7/29/2023  Patient name:  Hernandez Mireles  Date of admission:  7/28/2023  1:04 PM  MRN:   [id number]  Account:  [de-identified]  YOB: 1960  PCP:    Sarah Bernabe MD  Room:   2096/2096-01  Code Status:    Full Code      Chief Complaint:     Chief Complaint   Patient presents with    Fall    Hip Pain   Fall right hip pain    History Obtained From:     Patient medical record nursing staff    History of Present Illness:     58-year-old lady with end-stage COPD on home oxygen 2 to 3 L    She has quit smoking she has a respiratory cachexia with severe protein calorie malnutrition BMI 18.29 history of cervical cancer status post hysterectomy  She just got discharged from [place] after COPD exacerbation on oral steroid she tripped and fell over her oxygen tubing complains of right hip pain unable to mobilize severity 10 out of 10 with no radiation no chest pain no nausea vomiting chronic respiratory failure home oxygen    Past Medical History:     Past Medical History:   Diagnosis Date    Acid reflux     Anxiety     Arthritis     Cancer ([address])     cervical    Cervical osteoarthritis 11/30/2020    COPD (chronic obstructive pulmonary disease) ([address]) 02/23/2018    DDD (degenerative disc disease), cervical     Depression     Hiatal hernia     History of blood transfusion     Reaction fever went up to 107 . per Pt.      On home O2     Spinal stenosis     Tachycardia     Thyroid disease     hypo        Past Surgical History:     Past Surgical History:   Procedure Laterality Date    BACK SURGERY      diskectomy, L3-L5    COLONOSCOPY N/A 04/08/2019    COLONOSCOPY DIAGNOSTIC performed by Kenzie Montague

## 2023-07-29 NOTE — OP NOTE
OPERATIVE REPORT    Date of Surgery: 7/29/2023     Pre-operative Diagnosis: Right femur intertrochanteric fracture (S72.141)    Post-operative Diagnosis: Right femur intertrochanteric fracture (Z69.690)    Procedure: Right femur intertrochanteric fracture surgical stabilization with intramedullary nail (67051)    Surgeon(s): Heber Marcus MD    Anesthesia: Spinal    Fluids: See anesthesia record    Urine output: See anesthesia record    Estimated blood loss: 25 mL    Findings: See note below    Specimen: None    Tourniquet time: Not applicable    Implants:   Implant Name Type Inv. Item Serial No.  Lot No. LRB No. Used Action   NAIL IM L170MM [id number] 125DEG SHT PROX FEM GRN TI-15MO - [id number]  NAIL IM L170MM [id number] NK 125DEG SHT PROX FEM GRN TI-15MO  [employer]- [id number] Right 1 Implanted   BLADE IM HELCL 95 MM FEN NS TFNA - [id number]  BLADE IM HELCL 95 MM FEN NS TFNA  [employer]-  Right 1 Implanted   SCREW BNE L36MM DIA5MM NONSTERILE TIB LT GRN TI ST [email or web address] - [id number]  SCREW BNE L36MM DIA5MM NONSTERILE TIB LT GRN TI ST ARAVIND MING  [employer]PU[employer]-WD  Right 1 Implanted       Surgical Indications:  Jeremiah Dyson is a 58 y.o. old female who presented with a closed right femur intertrochanteric fracture. Following a discussion with the patient regarding both non-operative and operative treatment options, she consented to proceed with surgical stabilization of his fracture using an intramedullary nail. she came to this decision after demonstrating an understanding of our discussion regarding details of the procedure, risks and benefits, expected outcome, and postoperative course. Operative technique: Following appropriate identification of the patient and her operative extremity, consent was reviewed with the patient and her operative extremity was signed.  she was wheeled to the operating room where she finished a course of pre-operative antibiotic prophylaxis by way of 2 G

## 2023-07-30 LAB
ANION GAP SERPL CALCULATED.3IONS-SCNC: 8 MMOL/L (ref 9–17)
BUN SERPL-MCNC: 8 MG/DL (ref 8–23)
CALCIUM SERPL-MCNC: 6.9 MG/DL (ref 8.6–10.4)
CHLORIDE SERPL-SCNC: 99 MMOL/L (ref 98–107)
CO2 SERPL-SCNC: 27 MMOL/L (ref 20–31)
CREAT SERPL-MCNC: 0.6 MG/DL (ref 0.5–0.9)
ERYTHROCYTE [DISTWIDTH] IN BLOOD BY AUTOMATED COUNT: 15.6 % (ref 11.5–14.9)
GFR SERPL CREATININE-BSD FRML MDRD: >60 ML/MIN/1.73M2
GLUCOSE SERPL-MCNC: 113 MG/DL (ref 70–99)
HCT VFR BLD AUTO: 17.4 % (ref 36–46)
HCT VFR BLD AUTO: 20.5 % (ref 36–46)
HCT VFR BLD AUTO: 26.2 % (ref 36–46)
HGB BLD-MCNC: 5.9 G/DL (ref 12–16)
HGB BLD-MCNC: 6.9 G/DL (ref 12–16)
HGB BLD-MCNC: 8.8 G/DL (ref 12–16)
MCH RBC QN AUTO: 32.9 PG (ref 26–34)
MCHC RBC AUTO-ENTMCNC: 34 G/DL (ref 31–37)
MCV RBC AUTO: 96.7 FL (ref 80–100)
PLATELET # BLD AUTO: 150 K/UL (ref 150–450)
PMV BLD AUTO: 7.3 FL (ref 6–12)
POTASSIUM SERPL-SCNC: 3.4 MMOL/L (ref 3.7–5.3)
RBC # BLD AUTO: 1.8 M/UL (ref 4–5.2)
RETICS # AUTO: 0.04 M/UL (ref 0.02–0.1)
RETICS/RBC NFR AUTO: 2.1 % (ref 0.5–2)
SODIUM SERPL-SCNC: 134 MMOL/L (ref 135–144)
WBC OTHER # BLD: 9.3 K/UL (ref 3.5–11)

## 2023-07-30 PROCEDURE — 85014 HEMATOCRIT: CPT

## 2023-07-30 PROCEDURE — 6370000000 HC RX 637 (ALT 250 FOR IP): Performed by: NURSE PRACTITIONER

## 2023-07-30 PROCEDURE — 6370000000 HC RX 637 (ALT 250 FOR IP): Performed by: ORTHOPAEDIC SURGERY

## 2023-07-30 PROCEDURE — 2700000000 HC OXYGEN THERAPY PER DAY

## 2023-07-30 PROCEDURE — 97110 THERAPEUTIC EXERCISES: CPT

## 2023-07-30 PROCEDURE — 6360000002 HC RX W HCPCS: Performed by: ORTHOPAEDIC SURGERY

## 2023-07-30 PROCEDURE — 6370000000 HC RX 637 (ALT 250 FOR IP): Performed by: INTERNAL MEDICINE

## 2023-07-30 PROCEDURE — 86900 BLOOD TYPING SEROLOGIC ABO: CPT

## 2023-07-30 PROCEDURE — 97530 THERAPEUTIC ACTIVITIES: CPT

## 2023-07-30 PROCEDURE — 80048 BASIC METABOLIC PNL TOTAL CA: CPT

## 2023-07-30 PROCEDURE — 97166 OT EVAL MOD COMPLEX 45 MIN: CPT

## 2023-07-30 PROCEDURE — 36430 TRANSFUSION BLD/BLD COMPNT: CPT

## 2023-07-30 PROCEDURE — 94640 AIRWAY INHALATION TREATMENT: CPT

## 2023-07-30 PROCEDURE — 99233 SBSQ HOSP IP/OBS HIGH 50: CPT | Performed by: INTERNAL MEDICINE

## 2023-07-30 PROCEDURE — 2000000000 HC ICU R&B

## 2023-07-30 PROCEDURE — 94761 N-INVAS EAR/PLS OXIMETRY MLT: CPT

## 2023-07-30 PROCEDURE — 85018 HEMOGLOBIN: CPT

## 2023-07-30 PROCEDURE — 36415 COLL VENOUS BLD VENIPUNCTURE: CPT

## 2023-07-30 PROCEDURE — 97162 PT EVAL MOD COMPLEX 30 MIN: CPT

## 2023-07-30 PROCEDURE — 85045 AUTOMATED RETICULOCYTE COUNT: CPT

## 2023-07-30 PROCEDURE — 2060000000 HC ICU INTERMEDIATE R&B

## 2023-07-30 PROCEDURE — 2580000003 HC RX 258: Performed by: ORTHOPAEDIC SURGERY

## 2023-07-30 PROCEDURE — P9016 RBC LEUKOCYTES REDUCED: HCPCS

## 2023-07-30 PROCEDURE — 85027 COMPLETE CBC AUTOMATED: CPT

## 2023-07-30 RX ORDER — SODIUM CHLORIDE 9 MG/ML
INJECTION, SOLUTION INTRAVENOUS PRN
Status: DISCONTINUED | OUTPATIENT
Start: 2023-07-30 | End: 2023-07-31

## 2023-07-30 RX ORDER — CLONAZEPAM 0.5 MG/1
0.5 TABLET ORAL 3 TIMES DAILY PRN
Status: DISCONTINUED | OUTPATIENT
Start: 2023-07-30 | End: 2023-08-11 | Stop reason: HOSPADM

## 2023-07-30 RX ORDER — SUCRALFATE 1 G/1
1 TABLET ORAL
Status: DISCONTINUED | OUTPATIENT
Start: 2023-07-30 | End: 2023-08-11 | Stop reason: HOSPADM

## 2023-07-30 RX ORDER — PANTOPRAZOLE SODIUM 40 MG/1
40 TABLET, DELAYED RELEASE ORAL
Status: DISCONTINUED | OUTPATIENT
Start: 2023-07-30 | End: 2023-08-11 | Stop reason: HOSPADM

## 2023-07-30 RX ORDER — ALBUTEROL SULFATE 90 UG/1
2 AEROSOL, METERED RESPIRATORY (INHALATION) EVERY 4 HOURS PRN
Status: DISCONTINUED | OUTPATIENT
Start: 2023-07-30 | End: 2023-08-11 | Stop reason: HOSPADM

## 2023-07-30 RX ADMIN — HYDROCODONE BITARTRATE AND ACETAMINOPHEN 1 TABLET: 5; 325 TABLET ORAL at 15:45

## 2023-07-30 RX ADMIN — MONTELUKAST 10 MG: 10 TABLET, FILM COATED ORAL at 07:53

## 2023-07-30 RX ADMIN — Medication 2000 MG: at 12:24

## 2023-07-30 RX ADMIN — SODIUM CHLORIDE, PRESERVATIVE FREE 10 ML: 5 INJECTION INTRAVENOUS at 22:50

## 2023-07-30 RX ADMIN — Medication 2000 MG: at 03:28

## 2023-07-30 RX ADMIN — TIOTROPIUM BROMIDE AND OLODATEROL 2 PUFF: 3.124; 2.736 SPRAY, METERED RESPIRATORY (INHALATION) at 07:24

## 2023-07-30 RX ADMIN — TIZANIDINE 4 MG: 4 TABLET ORAL at 16:35

## 2023-07-30 RX ADMIN — ALBUTEROL SULFATE 2.5 MG: 2.5 SOLUTION RESPIRATORY (INHALATION) at 07:24

## 2023-07-30 RX ADMIN — SODIUM CHLORIDE: 9 INJECTION, SOLUTION INTRAVENOUS at 05:37

## 2023-07-30 RX ADMIN — ALBUTEROL SULFATE 2.5 MG: 2.5 SOLUTION RESPIRATORY (INHALATION) at 19:37

## 2023-07-30 RX ADMIN — HYDROCODONE BITARTRATE AND ACETAMINOPHEN 1 TABLET: 5; 325 TABLET ORAL at 03:26

## 2023-07-30 RX ADMIN — PANTOPRAZOLE SODIUM 40 MG: 40 TABLET, DELAYED RELEASE ORAL at 22:57

## 2023-07-30 RX ADMIN — Medication 10.5 MG: at 22:56

## 2023-07-30 RX ADMIN — MORPHINE SULFATE 2 MG: 2 INJECTION, SOLUTION INTRAMUSCULAR; INTRAVENOUS at 13:34

## 2023-07-30 RX ADMIN — CLONAZEPAM 0.5 MG: 1 TABLET ORAL at 04:49

## 2023-07-30 RX ADMIN — VENLAFAXINE HYDROCHLORIDE 150 MG: 150 CAPSULE, EXTENDED RELEASE ORAL at 07:50

## 2023-07-30 RX ADMIN — TIZANIDINE 4 MG: 4 TABLET ORAL at 08:18

## 2023-07-30 RX ADMIN — HYDROCODONE BITARTRATE AND ACETAMINOPHEN 1 TABLET: 5; 325 TABLET ORAL at 07:50

## 2023-07-30 RX ADMIN — PRIMIDONE 100 MG: 50 TABLET ORAL at 22:48

## 2023-07-30 RX ADMIN — ALBUTEROL SULFATE 0.63 MG: 2.5 SOLUTION RESPIRATORY (INHALATION) at 22:22

## 2023-07-30 RX ADMIN — ALBUTEROL SULFATE 2.5 MG: 2.5 SOLUTION RESPIRATORY (INHALATION) at 14:42

## 2023-07-30 RX ADMIN — MORPHINE SULFATE 2 MG: 2 INJECTION, SOLUTION INTRAMUSCULAR; INTRAVENOUS at 20:03

## 2023-07-30 RX ADMIN — HYDROCODONE BITARTRATE AND ACETAMINOPHEN 1 TABLET: 5; 325 TABLET ORAL at 22:35

## 2023-07-30 RX ADMIN — FLUCONAZOLE 150 MG: 100 TABLET ORAL at 07:50

## 2023-07-30 RX ADMIN — MORPHINE SULFATE 2 MG: 2 INJECTION, SOLUTION INTRAMUSCULAR; INTRAVENOUS at 00:46

## 2023-07-30 RX ADMIN — CLONAZEPAM 0.5 MG: 1 TABLET ORAL at 19:37

## 2023-07-30 RX ADMIN — ALBUTEROL SULFATE 2.5 MG: 2.5 SOLUTION RESPIRATORY (INHALATION) at 11:20

## 2023-07-30 RX ADMIN — ALBUTEROL SULFATE 2.5 MG: 2.5 SOLUTION RESPIRATORY (INHALATION) at 03:17

## 2023-07-30 ASSESSMENT — PAIN - FUNCTIONAL ASSESSMENT
PAIN_FUNCTIONAL_ASSESSMENT: PREVENTS OR INTERFERES WITH MANY ACTIVE NOT PASSIVE ACTIVITIES
PAIN_FUNCTIONAL_ASSESSMENT: PREVENTS OR INTERFERES WITH MANY ACTIVE NOT PASSIVE ACTIVITIES
PAIN_FUNCTIONAL_ASSESSMENT: PREVENTS OR INTERFERES SOME ACTIVE ACTIVITIES AND ADLS
PAIN_FUNCTIONAL_ASSESSMENT: PREVENTS OR INTERFERES WITH MANY ACTIVE NOT PASSIVE ACTIVITIES
PAIN_FUNCTIONAL_ASSESSMENT: PREVENTS OR INTERFERES WITH MANY ACTIVE NOT PASSIVE ACTIVITIES

## 2023-07-30 ASSESSMENT — PAIN DESCRIPTION - ORIENTATION
ORIENTATION: RIGHT

## 2023-07-30 ASSESSMENT — PAIN SCALES - GENERAL
PAINLEVEL_OUTOF10: 7
PAINLEVEL_OUTOF10: 5
PAINLEVEL_OUTOF10: 4
PAINLEVEL_OUTOF10: 7
PAINLEVEL_OUTOF10: 6
PAINLEVEL_OUTOF10: 7
PAINLEVEL_OUTOF10: 9
PAINLEVEL_OUTOF10: 7

## 2023-07-30 ASSESSMENT — PAIN DESCRIPTION - DESCRIPTORS
DESCRIPTORS: ACHING

## 2023-07-30 ASSESSMENT — PAIN DESCRIPTION - ONSET
ONSET: ON-GOING

## 2023-07-30 ASSESSMENT — PAIN DESCRIPTION - LOCATION
LOCATION: HIP

## 2023-07-30 ASSESSMENT — PAIN DESCRIPTION - FREQUENCY
FREQUENCY: CONTINUOUS

## 2023-07-30 ASSESSMENT — PAIN DESCRIPTION - PAIN TYPE
TYPE: SURGICAL PAIN

## 2023-07-30 ASSESSMENT — PAIN SCALES - WONG BAKER: WONGBAKER_NUMERICALRESPONSE: 2

## 2023-07-30 NOTE — CONSULTS
[address] [facility] Internal Medicine  Nilay Gibbons MD; Danielle Banegas MD; Amina Nuno MD; MD Tootie Mesa MD; MD ESPERANZA [facility] Internal Medicine   [address] [place] [address]     HISTORY AND PHYSICAL EXAMINATION            Date:   7/30/2023  Patient name:  Rustam Posadas  Date of admission:  7/28/2023  1:04 PM  MRN:   [id number]  Account:  [de-identified]  YOB: 1960  PCP:    Inessa Rowell MD  Room:   2014/2014-01  Code Status:    Full Code      Chief Complaint:     Chief Complaint   Patient presents with    Fall    Hip Pain   Fall right hip pain    History Obtained From:     Patient medical record nursing staff    History of Present Illness:     58-year-old lady with end-stage COPD on home oxygen 2 to 3 L    She has quit smoking she has a respiratory cachexia with severe protein calorie malnutrition BMI 18.29 history of cervical cancer status post hysterectomy  She just got discharged from [facility] after COPD exacerbation on oral steroid she tripped and fell over her oxygen tubing complains of right hip pain unable to mobilize severity 10 out of 10 with no radiation no chest pain no nausea vomiting chronic respiratory failure home oxygen    Past Medical History:     Past Medical History:   Diagnosis Date    Acid reflux     Anxiety     Arthritis     Cancer ([address])     cervical    Cervical osteoarthritis 11/30/2020    COPD (chronic obstructive pulmonary disease) ([address]) 02/23/2018    DDD (degenerative disc disease), cervical     Depression     Hiatal hernia     History of blood transfusion     Reaction fever went up to 107 . per Pt.      On home O2     Spinal stenosis     Tachycardia     Thyroid disease     hypo        Past Surgical History:     Past Surgical History:   Procedure Laterality Date    BACK SURGERY      diskectomy, L3-L5    COLONOSCOPY N/A 04/08/2019    COLONOSCOPY DIAGNOSTIC performed by Rhea Wayne the cervical spine. CT CHEST ABDOMEN PELVIS W CONTRAST Additional Contrast? None    Result Date: 7/28/2023  1. Comminuted intertrochanteric fracture of the proximal right femur with mild-to-moderate displacement. There is surrounding soft tissue swelling and edema. No evidence of pelvic hematoma. 2.  No acute traumatic injury to the viscera of the chest, abdomen, or pelvis. 3.  Diffuse osteopenia with multilevel chronic compression fractures and kyphoplasty cement. 4.  Moderate distention of the urinary bladder. Assessment :      Hospital Problems             Last Modified POA    * (Principal) Closed displaced intertrochanteric fracture of right femur ([address]) 7/29/2023 Yes    Severe malnutrition (HCC) (Chronic) 7/29/2023 Yes     Plan:     58-year-old lady with end-stage COPD on home oxygen 2 to 3 L    She has quit smoking she has a respiratory cachexia with severe protein calorie malnutrition BMI 18.29 history of cervical cancer status post hysterectomy  She just got discharged from [place] after COPD exacerbation on oral steroid she tripped and fell over her oxygen tubing complains of right hip pain unable to mobilize severity 10 out of 10 with no radiation no chest pain no nausea vomiting chronic respiratory failure home oxygen    Case discussed bedside with orthopedic surgeon Dr. Escobar Quinn  Patient is high risk from respiratory standpoint will need ICU admission postop consult pulmonary critical care patient is well-known to Dr. Juanito Berger need a prolonged intubation may end up with tracheostomy  Surgeon will evaluate and discuss with anesthesia for spinal anesthesia if possible  Medically cleared for right hip ORIF   Pathological fracture hip likely secondary to osteoporosis rule out osteolytic lesion rule out myeloma  DVT prophylaxis heparin 5000 3 times daily    July 30  .   Admitted to ICU hypotension  Secondary to acute blood loss anemia given 1 unit of PRBC aggressive fluid

## 2023-07-30 NOTE — PROGRESS NOTES
[address] [facility]   Occupational Therapy Evaluation  Date: 23  Patient Name: Reji Walsh       Room:   MRN: [id number]  Account: [de-identified]   : 1960  (64 y.o.) Gender: female     Discharge Recommendations:  Further Occupational Therapy is recommended upon facility discharge. OT Equipment Recommendations  Other: TBD    Referring Practitioner: Candy Carpenter MD  Diagnosis: Closed displaced intertrochanteric fracture of R femur      Treatment Diagnosis: Impaired self-care status    Past Medical History:  has a past medical history of Acid reflux, Anxiety, Arthritis, Cancer (HCC), Cervical osteoarthritis, COPD (chronic obstructive pulmonary disease) ([address]), DDD (degenerative disc disease), cervical, Depression, Hiatal hernia, History of blood transfusion, On home O2, Spinal stenosis, Tachycardia, and Thyroid disease. Past Surgical History:   has a past surgical history that includes Upper gastrointestinal endoscopy; Thyroidectomy, partial; back surgery; other surgical history; Dilation and curettage of uterus; Colonoscopy (N/A, 2019); Colonoscopy (2019); Upper gastrointestinal endoscopy (N/A, 2020); Upper gastrointestinal endoscopy (N/A, 2022); Pain management procedure; and Femur fracture surgery (Right, 2023). Restrictions  Restrictions/Precautions  Restrictions/Precautions: Weight Bearing; Fall Risk;General Precautions  Required Braces or Orthoses?: No  Implants present? : Metal implants (R femur)  Lower Extremity Weight Bearing Restrictions  Right Lower Extremity Weight Bearing: Weight Bearing As Tolerated  Position Activity Restriction  Other position/activity restrictions: S/P R Hip IMN by Dr. Miner Pulse;  Work on gait and gentle right hip ROM/strength; RLE WBAT      Vitals  Vitals  Pulse: 88  Heart Rate Source: Monitor  BP: 110/84  BP Location: Left upper arm  BP Method: Automatic  Patient Position: Sitting  MAP

## 2023-07-30 NOTE — PLAN OF CARE
Problem: Discharge Planning  Goal: Discharge to home or other facility with appropriate resources  7/30/2023 1652 by Smiley Avendaño RN  Outcome: Progressing  7/30/2023 0427 by Corine Gallo RN  Flowsheets (Taken 7/30/2023 0721)  Discharge to home or other facility with appropriate resources: Identify barriers to discharge with patient and caregiver  Note: Too early to determine     Problem: Pain  Goal: Verbalizes/displays adequate comfort level or baseline comfort level  7/30/2023 1652 by Smiley Avendaño RN  Outcome: Progressing  7/30/2023 0427 by Corine Gallo RN  Flowsheets (Taken 7/30/2023 4326)  Verbalizes/displays adequate comfort level or baseline comfort level:   Assess pain using appropriate pain scale   Encourage patient to monitor pain and request assistance   Administer analgesics based on type and severity of pain and evaluate response   Implement non-pharmacological measures as appropriate and evaluate response  Note: Pt receiving morphine and norco for postop pain, pt states adequate pain relief     Problem: Safety - Adult  Goal: Free from fall injury  7/30/2023 1652 by Smiley Avendaño RN  Outcome: Progressing  7/30/2023 0427 by Corine Gallo RN  Note: Safety maintained this shift     Problem: Skin/Tissue Integrity  Goal: Absence of new skin breakdown  Description: 1. Monitor for areas of redness and/or skin breakdown  2. Assess vascular access sites hourly  3. Every 4-6 hours minimum:  Change oxygen saturation probe site  4. Every 4-6 hours:  If on nasal continuous positive airway pressure, respiratory therapy assess nares and determine need for appliance change or resting period.   7/30/2023 1652 by Smiley Avendaño RN  Outcome: Progressing  7/30/2023 0427 by Corine Gallo RN  Note: No new skin breakdown noted

## 2023-07-30 NOTE — PROGRESS NOTES
BRONCHOSPASM/BRONCHOCONSTRICTION     [x]         IMPROVE AERATION/BREATH SOUNDS  [x]   ADMINISTER BRONCHODILATOR THERAPY AS APPROPRIATE  [x]   ASSESS BREATH SOUNDS  []   IMPLEMENT AEROSOL/MDI PROTOCOL  [x]   PATIENT EDUCATION AS NEEDED   PROVIDE ADEQUATE OXYGENATION WITH ACCEPTABLE SP02/ABG'S    [x]  IDENTIFY APPROPRIATE OXYGEN THERAPY  [x]   MONITOR SP02/ABG'S AS NEEDED   [x]   PATIENT EDUCATION AS NEEDED   NONINVASIVE VENTILATION    PROVIDE OPTIMAL VENTILATION/ACCEPTABLE SPO2   IMPLEMENT NONINVASIVE VENTILATION PROTOCOL   MAINTAIN ACCEPTABLE SPO2   ASSESS SKIN INTEGRITY/BREAKDOWN SCORE   PATIENT EDUCATION AS NEEDED   BIPAP AS NEEDED    BS diminished throughout. Wore home unit all night.

## 2023-07-30 NOTE — PROGRESS NOTES
Pt awake, anxious, pulse ox stable, states feels like \" having a panic attack and cant' catch my breath\" RT at bedside for treatment and pt reassured at length, medicated for postop pain, message sent to NP per pt requests for Klonopin dose as pt takes at home prn

## 2023-07-30 NOTE — PROGRESS NOTES
BRONCHOSPASM/BRONCHOCONSTRICTION     [x]         IMPROVE AERATION/BREATH SOUNDS  [x]   ADMINISTER BRONCHODILATOR THERAPY AS APPROPRIATE  [x]   ASSESS BREATH SOUNDS  [x]   IMPLEMENT AEROSOL/MDI PROTOCOL  [x]   PATIENT EDUCATION AS NEEDED   PROVIDE ADEQUATE OXYGENATION WITH ACCEPTABLE SP02/ABG'S    [x]  IDENTIFY APPROPRIATE OXYGEN THERAPY  [x]   MONITOR SP02/ABG'S AS NEEDED   [x]   PATIENT EDUCATION AS NEEDED     Pt remains on 3lnc SpO2 98% diminished breath sounds throughout, pt in no distress at this time

## 2023-07-30 NOTE — PROGRESS NOTES
Physical Therapy  Facility/Department: [facility] ICU  Physical Therapy Initial Assessment    Name: Brian Hartman  : 1960  MRN: [id number]  Date of Service: 2023    Discharge Recommendations:  Patient would benefit from continued therapy after discharge, Therapy recommended at discharge          Patient Diagnosis(es): The encounter diagnosis was Closed displaced intertrochanteric fracture of right femur, initial encounter ([address]). Past Medical History:  has a past medical history of Acid reflux, Anxiety, Arthritis, Cancer ([address]), Cervical osteoarthritis, COPD (chronic obstructive pulmonary disease) ([address]), DDD (degenerative disc disease), cervical, Depression, Hiatal hernia, History of blood transfusion, On home O2, Spinal stenosis, Tachycardia, and Thyroid disease. Past Surgical History:  has a past surgical history that includes Upper gastrointestinal endoscopy; Thyroidectomy, partial; back surgery; other surgical history; Dilation and curettage of uterus; Colonoscopy (N/A, 2019); Colonoscopy (2019); Upper gastrointestinal endoscopy (N/A, 2020); Upper gastrointestinal endoscopy (N/A, 2022); Pain management procedure; and Femur fracture surgery (Right, 2023). Assessment   Assessment: Pt demos impaired functional mobiltiy 2* pain, and deficits in ROM, strength, and tolerance for mobility s/p R Femur IMN. Pt requires 1-2A to complete bed mobility and tolerates sitting EOB x10 minutes with SBA however is very SOB throughout. Pt is motivated to improve however at this time may not be safe to return to prior living arrangements. PT services warranted to progress function, safety, and independence with mobility. Treatment Diagnosis: Impaired mobility  Specific Instructions for Next Treatment: Progress to transfers with RW and ambulation; Provide Supine/seated HEP for Gentle strengthening & ROM. Continue to monitor SpO2 and BPs, if stable please progress mobility as tolerated.   Therapy

## 2023-07-30 NOTE — PLAN OF CARE
Problem: Discharge Planning  Goal: Discharge to home or other facility with appropriate resources  7/30/2023 0427 by Naty Mccurdy RN  Flowsheets (Taken 7/30/2023 2369)  Discharge to home or other facility with appropriate resources: Identify barriers to discharge with patient and caregiver  Note: Too early to determine     Problem: Pain  Goal: Verbalizes/displays adequate comfort level or baseline comfort level  7/30/2023 0427 by Naty Mccurdy RN  Flowsheets (Taken 7/30/2023 7748)  Verbalizes/displays adequate comfort level or baseline comfort level:   Assess pain using appropriate pain scale   Encourage patient to monitor pain and request assistance   Administer analgesics based on type and severity of pain and evaluate response   Implement non-pharmacological measures as appropriate and evaluate response  Note: Pt receiving morphine and norco for postop pain, pt states adequate pain relief     Problem: Safety - Adult  Goal: Free from fall injury  7/30/2023 0427 by Naty Mccurdy RN  Note: Safety maintained this shift     Problem: Skin/Tissue Integrity  Goal: Absence of new skin breakdown  Description: 1. Monitor for areas of redness and/or skin breakdown  2. Assess vascular access sites hourly  3. Every 4-6 hours minimum:  Change oxygen saturation probe site  4. Every 4-6 hours:  If on nasal continuous positive airway pressure, respiratory therapy assess nares and determine need for appliance change or resting period. 7/30/2023 0427 by Naty Mccurdy RN  Note: No new skin breakdown noted     Problem: Skin/Tissue Integrity  Goal: Absence of new skin breakdown  Description: 1. Monitor for areas of redness and/or skin breakdown  2. Assess vascular access sites hourly  3. Every 4-6 hours minimum:  Change oxygen saturation probe site  4. Every 4-6 hours:  If on nasal continuous positive airway pressure, respiratory therapy assess nares and determine need for appliance change or resting period.   7/30/2023

## 2023-07-31 LAB
ANION GAP SERPL CALCULATED.3IONS-SCNC: 5 MMOL/L (ref 9–17)
BUN SERPL-MCNC: 4 MG/DL (ref 8–23)
CALCIUM SERPL-MCNC: 6.8 MG/DL (ref 8.6–10.4)
CHLORIDE SERPL-SCNC: 102 MMOL/L (ref 98–107)
CO2 SERPL-SCNC: 30 MMOL/L (ref 20–31)
CREAT SERPL-MCNC: <0.4 MG/DL (ref 0.5–0.9)
GFR SERPL CREATININE-BSD FRML MDRD: ABNORMAL ML/MIN/1.73M2
GLUCOSE SERPL-MCNC: 108 MG/DL (ref 70–99)
HCT VFR BLD AUTO: 24.1 % (ref 36–46)
HCT VFR BLD AUTO: 25 % (ref 36–46)
HGB BLD-MCNC: 8.3 G/DL (ref 12–16)
HGB BLD-MCNC: 8.6 G/DL (ref 12–16)
MAGNESIUM SERPL-MCNC: 1.3 MG/DL (ref 1.6–2.6)
PATH REV BLD -IMP: NORMAL
POTASSIUM SERPL-SCNC: 2.7 MMOL/L (ref 3.7–5.3)
POTASSIUM SERPL-SCNC: 3.2 MMOL/L (ref 3.7–5.3)
SODIUM SERPL-SCNC: 137 MMOL/L (ref 135–144)
SURGICAL PATHOLOGY REPORT: NORMAL

## 2023-07-31 PROCEDURE — 80048 BASIC METABOLIC PNL TOTAL CA: CPT

## 2023-07-31 PROCEDURE — 97530 THERAPEUTIC ACTIVITIES: CPT

## 2023-07-31 PROCEDURE — 94669 MECHANICAL CHEST WALL OSCILL: CPT

## 2023-07-31 PROCEDURE — 6370000000 HC RX 637 (ALT 250 FOR IP): Performed by: ORTHOPAEDIC SURGERY

## 2023-07-31 PROCEDURE — 99254 IP/OBS CNSLTJ NEW/EST MOD 60: CPT | Performed by: STUDENT IN AN ORGANIZED HEALTH CARE EDUCATION/TRAINING PROGRAM

## 2023-07-31 PROCEDURE — 94761 N-INVAS EAR/PLS OXIMETRY MLT: CPT

## 2023-07-31 PROCEDURE — 2060000000 HC ICU INTERMEDIATE R&B

## 2023-07-31 PROCEDURE — 2580000003 HC RX 258: Performed by: ORTHOPAEDIC SURGERY

## 2023-07-31 PROCEDURE — 97535 SELF CARE MNGMENT TRAINING: CPT

## 2023-07-31 PROCEDURE — 6360000002 HC RX W HCPCS: Performed by: ORTHOPAEDIC SURGERY

## 2023-07-31 PROCEDURE — 6370000000 HC RX 637 (ALT 250 FOR IP): Performed by: INTERNAL MEDICINE

## 2023-07-31 PROCEDURE — 84132 ASSAY OF SERUM POTASSIUM: CPT

## 2023-07-31 PROCEDURE — 83735 ASSAY OF MAGNESIUM: CPT

## 2023-07-31 PROCEDURE — 85018 HEMOGLOBIN: CPT

## 2023-07-31 PROCEDURE — 6370000000 HC RX 637 (ALT 250 FOR IP): Performed by: NURSE PRACTITIONER

## 2023-07-31 PROCEDURE — 36415 COLL VENOUS BLD VENIPUNCTURE: CPT

## 2023-07-31 PROCEDURE — 2700000000 HC OXYGEN THERAPY PER DAY

## 2023-07-31 PROCEDURE — 97110 THERAPEUTIC EXERCISES: CPT

## 2023-07-31 PROCEDURE — 94640 AIRWAY INHALATION TREATMENT: CPT

## 2023-07-31 PROCEDURE — 99232 SBSQ HOSP IP/OBS MODERATE 35: CPT | Performed by: INTERNAL MEDICINE

## 2023-07-31 PROCEDURE — 85014 HEMATOCRIT: CPT

## 2023-07-31 PROCEDURE — 2580000003 HC RX 258: Performed by: INTERNAL MEDICINE

## 2023-07-31 RX ADMIN — CLONAZEPAM 0.5 MG: 1 TABLET ORAL at 04:06

## 2023-07-31 RX ADMIN — Medication 10.5 MG: at 22:17

## 2023-07-31 RX ADMIN — ALBUTEROL SULFATE 2.5 MG: 2.5 SOLUTION RESPIRATORY (INHALATION) at 00:35

## 2023-07-31 RX ADMIN — TIOTROPIUM BROMIDE AND OLODATEROL 2 PUFF: 3.124; 2.736 SPRAY, METERED RESPIRATORY (INHALATION) at 07:08

## 2023-07-31 RX ADMIN — POTASSIUM CHLORIDE 10 MEQ: 7.46 INJECTION, SOLUTION INTRAVENOUS at 11:23

## 2023-07-31 RX ADMIN — FLUCONAZOLE 150 MG: 100 TABLET ORAL at 07:48

## 2023-07-31 RX ADMIN — MORPHINE SULFATE 2 MG: 2 INJECTION, SOLUTION INTRAMUSCULAR; INTRAVENOUS at 09:58

## 2023-07-31 RX ADMIN — VENLAFAXINE HYDROCHLORIDE 150 MG: 150 CAPSULE, EXTENDED RELEASE ORAL at 07:47

## 2023-07-31 RX ADMIN — HYDROCODONE BITARTRATE AND ACETAMINOPHEN 1 TABLET: 5; 325 TABLET ORAL at 11:20

## 2023-07-31 RX ADMIN — POTASSIUM CHLORIDE 10 MEQ: 7.46 INJECTION, SOLUTION INTRAVENOUS at 10:03

## 2023-07-31 RX ADMIN — MAGNESIUM SULFATE HEPTAHYDRATE 1000 MG: 1 INJECTION, SOLUTION INTRAVENOUS at 10:02

## 2023-07-31 RX ADMIN — PANTOPRAZOLE SODIUM 40 MG: 40 TABLET, DELAYED RELEASE ORAL at 06:19

## 2023-07-31 RX ADMIN — CLONAZEPAM 0.5 MG: 1 TABLET ORAL at 19:58

## 2023-07-31 RX ADMIN — POTASSIUM CHLORIDE 10 MEQ: 7.46 INJECTION, SOLUTION INTRAVENOUS at 04:24

## 2023-07-31 RX ADMIN — ALBUTEROL SULFATE 2.5 MG: 2.5 SOLUTION RESPIRATORY (INHALATION) at 11:42

## 2023-07-31 RX ADMIN — SUCRALFATE 1 G: 1 TABLET ORAL at 16:36

## 2023-07-31 RX ADMIN — PRIMIDONE 100 MG: 50 TABLET ORAL at 19:38

## 2023-07-31 RX ADMIN — CLONAZEPAM 0.5 MG: 1 TABLET ORAL at 10:52

## 2023-07-31 RX ADMIN — SUCRALFATE 1 G: 1 TABLET ORAL at 19:38

## 2023-07-31 RX ADMIN — MORPHINE SULFATE 2 MG: 2 INJECTION, SOLUTION INTRAMUSCULAR; INTRAVENOUS at 18:24

## 2023-07-31 RX ADMIN — POTASSIUM CHLORIDE 10 MEQ: 7.46 INJECTION, SOLUTION INTRAVENOUS at 07:45

## 2023-07-31 RX ADMIN — HYDROCODONE BITARTRATE AND ACETAMINOPHEN 1 TABLET: 5; 325 TABLET ORAL at 16:36

## 2023-07-31 RX ADMIN — SODIUM CHLORIDE, PRESERVATIVE FREE 10 ML: 5 INJECTION INTRAVENOUS at 07:52

## 2023-07-31 RX ADMIN — SUCRALFATE 1 G: 1 TABLET ORAL at 11:19

## 2023-07-31 RX ADMIN — PANTOPRAZOLE SODIUM 40 MG: 40 TABLET, DELAYED RELEASE ORAL at 16:36

## 2023-07-31 RX ADMIN — ALBUTEROL SULFATE 2.5 MG: 2.5 SOLUTION RESPIRATORY (INHALATION) at 23:34

## 2023-07-31 RX ADMIN — MAGNESIUM SULFATE HEPTAHYDRATE 1000 MG: 1 INJECTION, SOLUTION INTRAVENOUS at 06:24

## 2023-07-31 RX ADMIN — ALBUTEROL SULFATE 2.5 MG: 2.5 SOLUTION RESPIRATORY (INHALATION) at 07:08

## 2023-07-31 RX ADMIN — MAGNESIUM SULFATE HEPTAHYDRATE 1000 MG: 1 INJECTION, SOLUTION INTRAVENOUS at 07:44

## 2023-07-31 RX ADMIN — HYDROCODONE BITARTRATE AND ACETAMINOPHEN 1 TABLET: 5; 325 TABLET ORAL at 20:40

## 2023-07-31 RX ADMIN — POTASSIUM CHLORIDE 10 MEQ: 7.46 INJECTION, SOLUTION INTRAVENOUS at 06:33

## 2023-07-31 RX ADMIN — MAGNESIUM SULFATE HEPTAHYDRATE 1000 MG: 1 INJECTION, SOLUTION INTRAVENOUS at 11:23

## 2023-07-31 RX ADMIN — POTASSIUM CHLORIDE 10 MEQ: 7.46 INJECTION, SOLUTION INTRAVENOUS at 13:25

## 2023-07-31 RX ADMIN — MORPHINE SULFATE 2 MG: 2 INJECTION, SOLUTION INTRAMUSCULAR; INTRAVENOUS at 14:39

## 2023-07-31 RX ADMIN — LEVOTHYROXINE SODIUM 88 MCG: 0.09 TABLET ORAL at 06:22

## 2023-07-31 RX ADMIN — HYDROCODONE BITARTRATE AND ACETAMINOPHEN 1 TABLET: 5; 325 TABLET ORAL at 04:06

## 2023-07-31 RX ADMIN — ALBUTEROL SULFATE 2.5 MG: 2.5 SOLUTION RESPIRATORY (INHALATION) at 15:26

## 2023-07-31 RX ADMIN — POTASSIUM BICARBONATE 40 MEQ: 782 TABLET, EFFERVESCENT ORAL at 18:24

## 2023-07-31 RX ADMIN — CARVEDILOL 6.25 MG: 6.25 TABLET, FILM COATED ORAL at 19:38

## 2023-07-31 RX ADMIN — MORPHINE SULFATE 2 MG: 2 INJECTION, SOLUTION INTRAMUSCULAR; INTRAVENOUS at 22:17

## 2023-07-31 RX ADMIN — HYDROCODONE BITARTRATE AND ACETAMINOPHEN 1 TABLET: 5; 325 TABLET ORAL at 07:47

## 2023-07-31 RX ADMIN — ALBUTEROL SULFATE 2.5 MG: 2.5 SOLUTION RESPIRATORY (INHALATION) at 04:17

## 2023-07-31 RX ADMIN — SODIUM CHLORIDE, PRESERVATIVE FREE 10 ML: 5 INJECTION INTRAVENOUS at 20:00

## 2023-07-31 RX ADMIN — SUCRALFATE 1 G: 1 TABLET ORAL at 06:19

## 2023-07-31 RX ADMIN — ALBUTEROL SULFATE 2.5 MG: 2.5 SOLUTION RESPIRATORY (INHALATION) at 19:41

## 2023-07-31 RX ADMIN — MONTELUKAST 10 MG: 10 TABLET, FILM COATED ORAL at 07:47

## 2023-07-31 RX ADMIN — SODIUM CHLORIDE: 9 INJECTION, SOLUTION INTRAVENOUS at 04:08

## 2023-07-31 RX ADMIN — TIZANIDINE 4 MG: 4 TABLET ORAL at 11:20

## 2023-07-31 ASSESSMENT — PAIN DESCRIPTION - LOCATION
LOCATION: HIP

## 2023-07-31 ASSESSMENT — PAIN SCALES - GENERAL
PAINLEVEL_OUTOF10: 9
PAINLEVEL_OUTOF10: 7
PAINLEVEL_OUTOF10: 5
PAINLEVEL_OUTOF10: 8
PAINLEVEL_OUTOF10: 6
PAINLEVEL_OUTOF10: 6
PAINLEVEL_OUTOF10: 9
PAINLEVEL_OUTOF10: 8
PAINLEVEL_OUTOF10: 6
PAINLEVEL_OUTOF10: 7
PAINLEVEL_OUTOF10: 8
PAINLEVEL_OUTOF10: 7
PAINLEVEL_OUTOF10: 6
PAINLEVEL_OUTOF10: 7

## 2023-07-31 ASSESSMENT — PAIN DESCRIPTION - ONSET
ONSET: ON-GOING

## 2023-07-31 ASSESSMENT — ENCOUNTER SYMPTOMS
SHORTNESS OF BREATH: 1
BACK PAIN: 1

## 2023-07-31 ASSESSMENT — PAIN DESCRIPTION - DESCRIPTORS
DESCRIPTORS: ACHING

## 2023-07-31 ASSESSMENT — PAIN SCALES - WONG BAKER
WONGBAKER_NUMERICALRESPONSE: 2

## 2023-07-31 ASSESSMENT — PAIN DESCRIPTION - PAIN TYPE
TYPE: SURGICAL PAIN

## 2023-07-31 ASSESSMENT — PAIN - FUNCTIONAL ASSESSMENT
PAIN_FUNCTIONAL_ASSESSMENT: PREVENTS OR INTERFERES WITH MANY ACTIVE NOT PASSIVE ACTIVITIES
PAIN_FUNCTIONAL_ASSESSMENT: PREVENTS OR INTERFERES SOME ACTIVE ACTIVITIES AND ADLS
PAIN_FUNCTIONAL_ASSESSMENT: PREVENTS OR INTERFERES WITH MANY ACTIVE NOT PASSIVE ACTIVITIES
PAIN_FUNCTIONAL_ASSESSMENT: PREVENTS OR INTERFERES WITH MANY ACTIVE NOT PASSIVE ACTIVITIES

## 2023-07-31 ASSESSMENT — PAIN DESCRIPTION - FREQUENCY
FREQUENCY: CONTINUOUS

## 2023-07-31 ASSESSMENT — PAIN DESCRIPTION - ORIENTATION
ORIENTATION: RIGHT

## 2023-07-31 NOTE — PROGRESS NOTES
the proximal right femur with mild-to-moderate displacement. There is surrounding soft tissue swelling and edema. No evidence of pelvic hematoma. 2.  No acute traumatic injury to the viscera of the chest, abdomen, or pelvis. 3.  Diffuse osteopenia with multilevel chronic compression fractures and kyphoplasty cement. 4.  Moderate distention of the urinary bladder.        Assessment :      Hospital Problems             Last Modified POA    * (Principal) Closed displaced intertrochanteric fracture of right femur ([address]) 7/29/2023 Yes    Severe malnutrition (HCC) (Chronic) 7/29/2023 Yes   Plan:     79-year-old lady with end-stage COPD on home oxygen 2 to 3 L    She has quit smoking she has a respiratory cachexia with severe protein calorie malnutrition BMI 18.29 history of cervical cancer status post hysterectomy  She just got discharged from [facility] after COPD exacerbation on oral steroid she tripped and fell over her oxygen tubing complains of right hip pain unable to mobilize severity 10 out of 10 with no radiation no chest pain no nausea vomiting chronic respiratory failure home oxygen    Case discussed bedside with orthopedic surgeon Dr. Raymon Kuhn  Patient is high risk from respiratory standpoint will need ICU admission postop consult pulmonary critical care patient is well-known to Dr. Oralia Caballero need a prolonged intubation may end up with tracheostomy  Surgeon will evaluate and discuss with anesthesia for spinal anesthesia if possible  Medically cleared for right hip ORIF   Pathological fracture hip likely secondary to osteoporosis rule out osteolytic lesion rule out myeloma  DVT prophylaxis heparin 5000 3 times daily    July 31  Seen and examined   Labs/meds/vitals reviewed   BP now 138/70,  Tachy  Start coreg back   Echo reviewed normal EF   PMNR consult for placement evaluation      Consultations:   IP CONSULT TO ORTHOPEDIC SURGERY  IP CONSULT TO PRIMARY CARE PROVIDER  IP CONSULT TO PULMONOLOGY  IP CONSULT TO CASE MANAGEMENT  IP CONSULT TO PHYSICAL MEDICINE REHAB      Jacob Bhandari MD  7/31/2023  3:14 PM    Copy sent to Dr. Amrit Farfan MD    Please note that this chart was generated using voice recognition Dragon dictation software. Although every effort was made to ensure the accuracy of this automated transcription, some errors in transcription may have occurred.

## 2023-07-31 NOTE — CONSULTS
gait and gentle right hip ROM/strength; RLE WBAT  Right Lower Extremity Weight Bearing: Weight Bearing As Tolerated    Bed mobility  Supine to Sit: 2 Person assistance, Maximum assistance (pt required increased time to complete due to pain/anxiety; cues for sequencing, pt able to advance BLEs but required assistance with upper trunk)  Sit to Supine: Moderate assistance (Partial scooting A at hips with Physical A to support positioning of Painful RLE)  Scootin Person assistance, Moderate assistance (to EOB)  Bed Mobility Comments: HOB elevated with use of hand rails. Increased time to complete due toanxiety and anticipation of pain. Transfers  Comment: Transfers not performed this date 2* very SOB demo'd in sitting as well as complaints of dizziness. Ambulation  Comments: JORGE A; unsafe to attempt this date. Occupational Therapy    ADL  Feeding: Setup  Grooming: Setup  Grooming Skilled Clinical Factors: pt washes face/neck, hands and brushes hair this date  UE Bathing: Stand by assistance  UE Bathing Skilled Clinical Factors: pt washes BUE  LE Bathing: Maximum assistance  UE Dressing: Stand by assistance  LE Dressing: Dependent/Total  Toileting: Dependent/Total  Toileting Skilled Clinical Factors: Nj catheter, brief  Additional Comments: ADL scores based on skilled observation and clinical reasoning, unless otherwise noted. Pt is limited due to SOB with minimal activity, significant anxiety, significant pain with wt bearing weight/movement in RLE,  and impaired balance impacting performance during self care tasks          Balance  Sitting Balance: Stand by assistance (pt tolerates sitting EOB c 1-2 UE support x 15 minutes.  no LOB noted)  Standing Balance: Minimal assistance  Standing Balance  Time: 20\", 1 minute  Activity: static  [facility]  Functional Mobility  Functional Mobility Comments: Not attempted due to significant SOB     Transfers  Sit to stand: 2 Person assistance, Moderate contrast.  This exam was not performed with double contrast technique, so evaluation for mucosal abnormalities is limited; however, no large filling defect or retained food is identified. The gastroesophageal junction is normal.  There is no evidence for reflux seen. Delayed esophageal transit time with mild incomplete clearing ingested contrast.     XR CHEST PORTABLE    Result Date: 7/10/2023  EXAMINATION: ONE XRAY VIEW OF THE CHEST 7/10/2023 11:22 am COMPARISON: 05/27/2023, 02/11/2022 HISTORY: ORDERING SYSTEM PROVIDED HISTORY: sob, wheezing' TECHNOLOGIST PROVIDED HISTORY: sob, wheezing' Reason for Exam: back pain, sob FINDINGS: The cardiomediastinal silhouette is unchanged in appearance. Hyperinflation and advanced emphysema again demonstrated. Subsegmental atelectasis or scarring in the lung bases and blunting of the costophrenic angles again demonstrated. There is no consolidation, pneumothorax, or evidence of edema. No effusion is appreciated. The osseous structures are unchanged in appearance. Hyperinflation and emphysematous changes. Subsegmental atelectasis or scarring in the lung bases. FLUORO FOR SURGICAL PROCEDURES    Result Date: 7/29/2023  Radiology exam is complete. No Radiologist dictation. Please follow up with ordering provider. CT CHEST ABDOMEN PELVIS W CONTRAST Additional Contrast? None    Result Date: 7/28/2023  EXAMINATION: CT OF THE CHEST, ABDOMEN, AND PELVIS WITH CONTRAST 7/28/2023 1:51 pm TECHNIQUE: CT of the chest, abdomen and pelvis was performed with the administration of intravenous contrast. Multiplanar reformatted images are provided for review. Automated exposure control, iterative reconstruction, and/or weight based adjustment of the mA/kV was utilized to reduce the radiation dose to as low as reasonably achievable. COMPARISON: CT chest dated 06/28/2022. CT abdomen and pelvis dated 08/30/2020.  HISTORY: ORDERING SYSTEM PROVIDED HISTORY: Fall, back pain TECHNOLOGIST

## 2023-07-31 NOTE — PROGRESS NOTES
[address] [facility]   INPATIENT OCCUPATIONAL THERAPY  PROGRESS NOTE  Date: 2023  Patient Name: Ashleigh Wu       Room:   MRN: [id number]    : 1960  (58 y.o.)  Gender: female   Referring Practitioner: Idalia Gamble MD  Diagnosis: Closed displaced intertrochanteric fracture of R femur      Discharge Recommendations:  Further Occupational Therapy is recommended upon facility discharge. OT Equipment Recommendations  Other: TBD    Restrictions/Precautions  Restrictions/Precautions  Restrictions/Precautions: Weight Bearing; Fall Risk;General Precautions  Required Braces or Orthoses?: No  Implants present? : Metal implants (R femur)  Lower Extremity Weight Bearing Restrictions  Right Lower Extremity Weight Bearing: Weight Bearing As Tolerated  Position Activity Restriction  Other position/activity restrictions: S/P R Hip IMN by Dr. Sherri Davis; Work on gait and gentle right hip ROM/strength; RLE WBAT    Vitals  Heart Rate Source: Monitor  BP: 135/79  BP Location: Left upper arm  BP Method: Automatic  Patient Position: Semi fowlers  MAP (Calculated): 98  SpO2: 96 %  O2 Device: Nasal cannula (3.5 L  O2)    Subjective  Subjective  Subjective: \"Don't touch it! . I can do it. \" pt referring to writer attempting to assist with RLE to EOB. pt limited by pain and significant anxiety this date. Subjective  Pain: Pt reported 7/10 R hip/surgical pain. ice offered at end of session, pt declines. Comments: RN oks for bed mobility and attempting stand as able. Objective  Orientation  Overall Orientation Status: Within Functional Limits  Orientation Level: Oriented X4  Cognition  Overall Cognitive Status: Exceptions  Arousal/Alertness: Appropriate responses to stimuli  Following Commands:  Follows multistep commands with repitition  Attention Span: Appears intact  Memory: Appears intact  Safety Judgement: Decreased awareness of need for assistance;Decreased awareness of need

## 2023-07-31 NOTE — PROGRESS NOTES
Physical Therapy  Facility/Department: [facility] ICU  Daily Treatment Note  NAME: Chicho Vera  : 1960  MRN: [id number]    Date of Service: 2023    Discharge Recommendations:  Patient would benefit from continued therapy after discharge, Therapy recommended at discharge        Patient Diagnosis(es): The encounter diagnosis was Closed displaced intertrochanteric fracture of right femur, initial encounter ([address]). Assessment   Activity Tolerance: Patient limited by endurance; Patient limited by pain     Plan    Physcial Therapy Plan  General Plan: 2 times a day 7 days a week  Specific Instructions for Next Treatment: Progress to transfers with RW and ambulation; Provide Supine/seated HEP for Gentle strengthening & ROM. Continue to monitor SpO2 and BPs, if stable please progress mobility as tolerated. Current Treatment Recommendations: Strengthening;ROM;Balance training;Functional mobility training;Transfer training; Endurance training;Gait training;Pain management;Home exercise program;Safety education & training;Patient/Caregiver education & training;Positioning; Therapeutic activities     Restrictions  Restrictions/Precautions  Restrictions/Precautions: Weight Bearing, Fall Risk, General Precautions  Required Braces or Orthoses?: No  Implants present? : Metal implants (R femur)  Lower Extremity Weight Bearing Restrictions  Right Lower Extremity Weight Bearing: Weight Bearing As Tolerated  Position Activity Restriction  Other position/activity restrictions: S/P R Hip IMN by Dr. Misael Potts; Work on gait and gentle right hip ROM/strength; RLE WBAT     Subjective    Subjective  Subjective: Pt still in chair upon arrival to room for PM session. Pt states she is ready to go back to bed; RN agreeable.   Pain: 7/10 surgical pain  Orientation  Overall Orientation Status: Within Functional Limits  Orientation Level: Oriented X4  Cognition  Overall Cognitive Status: Exceptions  Arousal/Alertness: Appropriate responses to

## 2023-07-31 NOTE — ACP (ADVANCE CARE PLANNING)
Advance Care Planning     Advance Care Planning Activator (Inpatient)  Conversation Note      Date of ACP Conversation: 7/31/2023     Conversation Conducted with: Patient with Decision Making Capacity    ACP Activator: Cathryn Holt RN        Health Care Decision Maker:     Current Designated Health Care Decision Maker:     Primary Decision Maker: [address] [place] [address] - [phone]    Today we documented Decision Maker(s) consistent with Legal Next of Kin hierarchy. Care Preferences    Ventilation: \"If you were in your present state of health and suddenly became very ill and were unable to breathe on your own, what would your preference be about the use of a ventilator (breathing machine) if it were available to you? \"      Would the patient desire the use of ventilator (breathing machine)?: yes    \"If your health worsens and it becomes clear that your chance of recovery is unlikely, what would your preference be about the use of a ventilator (breathing machine) if it were available to you? \"     Would the patient desire the use of ventilator (breathing machine)?: Yes      Resuscitation  \"CPR works best to restart the heart when there is a sudden event, like a heart attack, in someone who is otherwise healthy. Unfortunately, CPR does not typically restart the heart for people who have serious health conditions or who are very sick. \"    \"In the event your heart stopped as a result of an underlying serious health condition, would you want attempts to be made to restart your heart (answer \"yes\" for attempt to resuscitate) or would you prefer a natural death (answer \"no\" for do not attempt to resuscitate)? \" yes       [] Yes   [] No   Educated Patient / Elray Chaim regarding differences between Advance Directives and portable DNR orders.     Length of ACP Conversation in minutes:      Conversation Outcomes:  ACP discussion completed    Follow-up plan:    [] Schedule follow-up conversation to continue

## 2023-07-31 NOTE — PLAN OF CARE
Problem: Discharge Planning  Goal: Discharge to home or other facility with appropriate resources  7/31/2023 0448 by Isaiah Baca RN  Outcome: Progressing  Flowsheets  Taken 7/31/2023 0400  Discharge to home or other facility with appropriate resources: Identify barriers to discharge with patient and caregiver  Taken 7/31/2023 0000  Discharge to home or other facility with appropriate resources: Identify barriers to discharge with patient and caregiver  Taken 7/30/2023 2000  Discharge to home or other facility with appropriate resources: Identify barriers to discharge with patient and caregiver       Problem: Pain  Goal: Verbalizes/displays adequate comfort level or baseline comfort level  7/31/2023 0448 by Isaiah Baca RN  Outcome: Progressing  Flowsheets  Taken 7/31/2023 0400  Verbalizes/displays adequate comfort level or baseline comfort level: Assess pain using appropriate pain scale  Taken 7/31/2023 0000  Verbalizes/displays adequate comfort level or baseline comfort level:   Assess pain using appropriate pain scale   Encourage patient to monitor pain and request assistance  Taken 7/30/2023 2004  Verbalizes/displays adequate comfort level or baseline comfort level: Assess pain using appropriate pain scale       Problem: Safety - Adult  Goal: Free from fall injury  7/31/2023 0448 by Isaiah Baca RN  Outcome: Progressing  Flowsheets (Taken 7/30/2023 2102)  Free From Fall Injury:   Based on caregiver fall risk screen, instruct family/caregiver to ask for assistance with transferring infant if caregiver noted to have fall risk factors   Instruct family/caregiver on patient safety    Problem: Skin/Tissue Integrity  Goal: Absence of new skin breakdown  Description: 1. Monitor for areas of redness and/or skin breakdown  2. Assess vascular access sites hourly  3. Every 4-6 hours minimum:  Change oxygen saturation probe site  4.   Every 4-6 hours:  If on nasal continuous positive airway pressure, respiratory

## 2023-07-31 NOTE — CARE COORDINATION
Case Management Assessment  Initial Evaluation    Date/Time of Evaluation: 7/31/2023 2:03 PM  Assessment Completed by: Anjum Blas RN    If patient is discharged prior to next notation, then this note serves as note for discharge by case management. Patient Name: Garth Pratt                   YOB: 1960  Diagnosis: Closed displaced intertrochanteric fracture of right femur, initial encounter [facility]) Anastasiia Rodriguez  Hip fracture requiring operative repair, left, closed, initial encounter ([address] [facility]) Mary Ann Maritzalelo                   Date / Time: 7/28/2023  1:04 PM    Patient Admission Status: Inpatient   Readmission Risk (Low < 19, Mod (19-27), High > 27): Readmission Risk Score: 27.6    Current PCP: Bahman Jones MD  PCP verified by CM? Yes    Chart Reviewed: Yes      History Provided by: Patient  Patient Orientation: Alert and Oriented    Patient Cognition: Alert    Hospitalization in the last 30 days (Readmission):  No    If yes, Readmission Assessment in CM Navigator will be completed. Advance Directives:      Code Status: Full Code   Patient's Primary Decision Maker is: Named in [facility]      Discharge Planning:    Patient lives with: Spouse/Significant Other Type of Home: House  Primary Care Giver: Self  Patient Support Systems include: Spouse/Significant Other   Current Financial resources: Medicare  Current community resources: ECF/Home Care ([address] [place] [address])  Current services prior to admission: Home Bipap, Oxygen Therapy, Durable Medical Equipment, Home Care            Current DME: Walker, Oxygen Therapy (Comment), Shower Chair            Type of Home Care services:  PT, OT, Nursing Services    ADLS  Prior functional level: Independent in ADLs/IADLs  Current functional level: Assistance with the following:, Mobility, Bathing, Dressing, Toileting    PT AM-PAC: 12 /24  OT AM-PAC: 13 /24    Family can provide assistance at DC:  Yes  Would you like Case Management to discuss the discharge plan with any other family members/significant others, and if so, who? Yes (family)  Plans to Return to Present Housing: Yes  Other Identified Issues/Barriers to RETURNING to current housing: weakness  Potential Assistance needed at discharge: [address]            Potential DME:  none  Patient expects to discharge to: Acute rehab  Plan for transportation at discharge: [facility]: [address] / Plan: [address] [facility] [address] / Product Type: *No Product type* /     Does insurance require precert for SNF: Yes    Potential assistance Purchasing Medications: No  Meds-to-Beds request: no       [id number] [id number] [place], [place] - [address] [facility] [address]  Phone: [phone] Fax: [phone] [address] [facility], [address] [place] [address] Rd [phone] - [address]  [facility] [address]  Phone: [phone] Fax: [address] [facility] [address], [address] [facility] [address] [facility] [phone] - F [phone]  [address] [address]  Phone: [phone] Fax: [phone]      Notes:    Factors facilitating achievement of predicted outcomes: Family support, Motivated, Cooperative, Good insight into deficits, Has needed Durable Medical Equipment at home, and Knowledge about rehab    Barriers to discharge: Pain, Decreased endurance, and Lower extremity weakness    Additional Case Management Notes: 7/31/2023 [facility]/Medicare; from home w/ spouse; DME walker, sc, nebulizer, Bipap ([place]), oxygen 2L portable, stationary ([address]);  [facility] [address]; POD # 3 R femur nail/amanuel   PT/OT eval/tx;  Wants ARU at d/c; ref sent; PM&R consult; total 2 units PRBCs given Hgb 8.3    The Plan for Transition of Care is related to the following treatment goals of Closed displaced intertrochanteric fracture of right femur, initial

## 2023-07-31 NOTE — PROGRESS NOTES
Pt complained of shortness of breath. RT called for aerosol treatment. Lung sounds unchanged. Pt requests klonopin; med given.

## 2023-07-31 NOTE — PLAN OF CARE
Problem: Discharge Planning  Goal: Discharge to home or other facility with appropriate resources  7/31/2023 1814 by Jaspal Blevins RN  Outcome: Progressing  Flowsheets (Taken 7/31/2023 0800)  Discharge to home or other facility with appropriate resources:   Identify barriers to discharge with patient and caregiver   Arrange for needed discharge resources and transportation as appropriate   Refer to discharge planning if patient needs post-hospital services based on physician order or complex needs related to functional status, cognitive ability or social support system    Problem: Pain  Goal: Verbalizes/displays adequate comfort level or baseline comfort level  7/31/2023 1814 by Jaspal Blevins RN  Outcome: Progressing  Flowsheets (Taken 7/31/2023 0800)  Verbalizes/displays adequate comfort level or baseline comfort level:   Assess pain using appropriate pain scale   Encourage patient to monitor pain and request assistance   Administer analgesics based on type and severity of pain and evaluate response    Problem: Nutrition Deficit:  Goal: Optimize nutritional status  7/31/2023 1814 by Jaspal Blevins RN  Outcome: Progressing  Flowsheets (Taken 7/31/2023 1814)  Nutrient intake appropriate for improving, restoring, or maintaining nutritional needs:   Assess nutritional status and recommend course of action   Monitor oral intake, labs, and treatment plans

## 2023-07-31 NOTE — PROGRESS NOTES
Pt was receptive to visit and talked about her friend passing away several years ago. She seemed sad for a few minutes and then began to become calm.         07/31/23 1112   Encounter Summary   Encounter Overview/Reason  Spiritual/Emotional Needs   Service Provided For: Patient   Referral/Consult From: [facility]   Support System Spouse   Last Encounter  07/31/23   Complexity of Encounter Low   Spiritual/Emotional needs   Type Spiritual Support   Assessment/Intervention/Outcome   Assessment Calm;Coping   Intervention Active listening;Prayer (assurance of)/Banquete;Sustaining Presence/Ministry of presence   Outcome Comfort;Coping;Engaged in conversation;Expressed feelings, needs, and concerns;Expressed Gratitude;Receptive

## 2023-07-31 NOTE — CARE COORDINATION
Heraclio in Admissions for Acute Rehab will follow up with Dr Alden Garcia. Dr Alden Garcia note is incomplete and saw patient prior to therapy notes from today. Will follow up to see for ARU vs SNF.  Electronically signed by Horacio Capone RN on 7/31/2023 at 2:17 PM

## 2023-07-31 NOTE — PROGRESS NOTES
Physical Therapy  Facility/Department: [facility] ICU  Daily Treatment Note  NAME: José Miguel Maciel  : 1960  MRN: [id number]    Date of Service: 2023    Discharge Recommendations:  Patient would benefit from continued therapy after discharge, Therapy recommended at discharge        Patient Diagnosis(es): The encounter diagnosis was Closed displaced intertrochanteric fracture of right femur, initial encounter ([address]). Assessment   Activity Tolerance: Patient limited by endurance;Treatment limited secondary to medical complications; Patient limited by pain     Plan    Physcial Therapy Plan  General Plan: 2 times a day 7 days a week  Specific Instructions for Next Treatment: Progress to transfers with RW and ambulation; Provide Supine/seated HEP for Gentle strengthening & ROM. Continue to monitor SpO2 and BPs, if stable please progress mobility as tolerated. Current Treatment Recommendations: Strengthening;ROM;Balance training;Functional mobility training;Transfer training; Endurance training;Gait training;Pain management;Home exercise program;Safety education & training;Patient/Caregiver education & training;Positioning; Therapeutic activities     Restrictions  Restrictions/Precautions  Restrictions/Precautions: Weight Bearing, Fall Risk, General Precautions  Required Braces or Orthoses?: No  Implants present? : Metal implants (R femur)  Lower Extremity Weight Bearing Restrictions  Right Lower Extremity Weight Bearing: Weight Bearing As Tolerated  Position Activity Restriction  Other position/activity restrictions: S/P R Hip IMN by Dr. Niki Au; Work on gait and gentle right hip ROM/strength; RLE WBAT     Subjective    Subjective  Subjective: RN and pt agreeable to PT. Pt reports 7/10 pain in R hip; states she was given pain meds recently. Pt agreeable to get up to chair. Pt very anxious throughout session, repeatedly stating \"I cant' breathe\".  Pt given cues for pursed lip breathing, SPO2 92%, , and RR 25 after

## 2023-08-01 LAB
ABO/RH: NORMAL
ANION GAP SERPL CALCULATED.3IONS-SCNC: 3 MMOL/L (ref 9–17)
ANTIBODY SCREEN: NEGATIVE
ARM BAND NUMBER: NORMAL
BASOPHILS # BLD: 0 K/UL (ref 0–0.2)
BASOPHILS NFR BLD: 0 % (ref 0–2)
BLOOD BANK BLOOD PRODUCT EXPIRATION DATE: NORMAL
BLOOD BANK BLOOD PRODUCT EXPIRATION DATE: NORMAL
BLOOD BANK DISPENSE STATUS: NORMAL
BLOOD BANK DISPENSE STATUS: NORMAL
BLOOD BANK ISBT PRODUCT BLOOD TYPE: 5100
BLOOD BANK ISBT PRODUCT BLOOD TYPE: 5100
BLOOD BANK PRODUCT CODE: NORMAL
BLOOD BANK PRODUCT CODE: NORMAL
BLOOD BANK SAMPLE EXPIRATION: NORMAL
BLOOD BANK UNIT TYPE AND RH: NORMAL
BLOOD BANK UNIT TYPE AND RH: NORMAL
BPU ID: NORMAL
BPU ID: NORMAL
BUN SERPL-MCNC: 2 MG/DL (ref 8–23)
CALCIUM SERPL-MCNC: 7.3 MG/DL (ref 8.6–10.4)
CHLORIDE SERPL-SCNC: 98 MMOL/L (ref 98–107)
CO2 SERPL-SCNC: 34 MMOL/L (ref 20–31)
COMPONENT: NORMAL
COMPONENT: NORMAL
CREAT SERPL-MCNC: <0.4 MG/DL (ref 0.5–0.9)
CROSSMATCH RESULT: NORMAL
CROSSMATCH RESULT: NORMAL
EOSINOPHIL # BLD: 0 K/UL (ref 0–0.4)
EOSINOPHILS RELATIVE PERCENT: 0 % (ref 0–4)
ERYTHROCYTE [DISTWIDTH] IN BLOOD BY AUTOMATED COUNT: 17.5 % (ref 11.5–14.9)
GFR SERPL CREATININE-BSD FRML MDRD: ABNORMAL ML/MIN/1.73M2
GLUCOSE SERPL-MCNC: 100 MG/DL (ref 70–99)
HCT VFR BLD AUTO: 25.3 % (ref 36–46)
HGB BLD-MCNC: 8.4 G/DL (ref 12–16)
LYMPHOCYTES NFR BLD: 1.4 K/UL (ref 1–4.8)
LYMPHOCYTES RELATIVE PERCENT: 22 % (ref 24–44)
MCH RBC QN AUTO: 30.7 PG (ref 26–34)
MCHC RBC AUTO-ENTMCNC: 33.1 G/DL (ref 31–37)
MCV RBC AUTO: 93 FL (ref 80–100)
MONOCYTES NFR BLD: 0.6 K/UL (ref 0.1–1.3)
MONOCYTES NFR BLD: 10 % (ref 1–7)
NEUTROPHILS NFR BLD: 68 % (ref 36–66)
NEUTS SEG NFR BLD: 4.4 K/UL (ref 1.3–9.1)
PLATELET # BLD AUTO: 143 K/UL (ref 150–450)
PMV BLD AUTO: 7.6 FL (ref 6–12)
POTASSIUM SERPL-SCNC: 3.8 MMOL/L (ref 3.7–5.3)
RBC # BLD AUTO: 2.72 M/UL (ref 4–5.2)
SODIUM SERPL-SCNC: 135 MMOL/L (ref 135–144)
TRANSFUSION STATUS: NORMAL
TRANSFUSION STATUS: NORMAL
UNIT DIVISION: 0
UNIT DIVISION: 0
UNIT ISSUE DATE/TIME: NORMAL
UNIT ISSUE DATE/TIME: NORMAL
WBC OTHER # BLD: 6.5 K/UL (ref 3.5–11)

## 2023-08-01 PROCEDURE — 94640 AIRWAY INHALATION TREATMENT: CPT

## 2023-08-01 PROCEDURE — 6370000000 HC RX 637 (ALT 250 FOR IP): Performed by: ORTHOPAEDIC SURGERY

## 2023-08-01 PROCEDURE — 2580000003 HC RX 258: Performed by: ORTHOPAEDIC SURGERY

## 2023-08-01 PROCEDURE — 6370000000 HC RX 637 (ALT 250 FOR IP): Performed by: INTERNAL MEDICINE

## 2023-08-01 PROCEDURE — 97530 THERAPEUTIC ACTIVITIES: CPT

## 2023-08-01 PROCEDURE — 94761 N-INVAS EAR/PLS OXIMETRY MLT: CPT

## 2023-08-01 PROCEDURE — 99024 POSTOP FOLLOW-UP VISIT: CPT | Performed by: ORTHOPAEDIC SURGERY

## 2023-08-01 PROCEDURE — 6370000000 HC RX 637 (ALT 250 FOR IP): Performed by: NURSE PRACTITIONER

## 2023-08-01 PROCEDURE — 6360000002 HC RX W HCPCS: Performed by: ORTHOPAEDIC SURGERY

## 2023-08-01 PROCEDURE — 80048 BASIC METABOLIC PNL TOTAL CA: CPT

## 2023-08-01 PROCEDURE — 85025 COMPLETE CBC W/AUTO DIFF WBC: CPT

## 2023-08-01 PROCEDURE — 97116 GAIT TRAINING THERAPY: CPT

## 2023-08-01 PROCEDURE — 2060000000 HC ICU INTERMEDIATE R&B

## 2023-08-01 PROCEDURE — 99232 SBSQ HOSP IP/OBS MODERATE 35: CPT | Performed by: INTERNAL MEDICINE

## 2023-08-01 PROCEDURE — 2700000000 HC OXYGEN THERAPY PER DAY

## 2023-08-01 PROCEDURE — 97110 THERAPEUTIC EXERCISES: CPT

## 2023-08-01 PROCEDURE — 36415 COLL VENOUS BLD VENIPUNCTURE: CPT

## 2023-08-01 RX ORDER — MIRTAZAPINE 15 MG/1
15 TABLET, FILM COATED ORAL NIGHTLY
Status: DISCONTINUED | OUTPATIENT
Start: 2023-08-01 | End: 2023-08-11 | Stop reason: HOSPADM

## 2023-08-01 RX ADMIN — HYDROCODONE BITARTRATE AND ACETAMINOPHEN 1 TABLET: 5; 325 TABLET ORAL at 00:30

## 2023-08-01 RX ADMIN — SUCRALFATE 1 G: 1 TABLET ORAL at 20:47

## 2023-08-01 RX ADMIN — MONTELUKAST 10 MG: 10 TABLET, FILM COATED ORAL at 08:39

## 2023-08-01 RX ADMIN — HYDROCODONE BITARTRATE AND ACETAMINOPHEN 1 TABLET: 5; 325 TABLET ORAL at 14:15

## 2023-08-01 RX ADMIN — CARVEDILOL 6.25 MG: 6.25 TABLET, FILM COATED ORAL at 08:39

## 2023-08-01 RX ADMIN — PRIMIDONE 100 MG: 50 TABLET ORAL at 20:44

## 2023-08-01 RX ADMIN — PANTOPRAZOLE SODIUM 40 MG: 40 TABLET, DELAYED RELEASE ORAL at 05:21

## 2023-08-01 RX ADMIN — CLONAZEPAM 0.5 MG: 1 TABLET ORAL at 20:43

## 2023-08-01 RX ADMIN — CARVEDILOL 6.25 MG: 6.25 TABLET, FILM COATED ORAL at 20:43

## 2023-08-01 RX ADMIN — ALBUTEROL SULFATE 2.5 MG: 2.5 SOLUTION RESPIRATORY (INHALATION) at 19:55

## 2023-08-01 RX ADMIN — LEVOTHYROXINE SODIUM 88 MCG: 0.09 TABLET ORAL at 05:21

## 2023-08-01 RX ADMIN — MORPHINE SULFATE 2 MG: 2 INJECTION, SOLUTION INTRAMUSCULAR; INTRAVENOUS at 16:30

## 2023-08-01 RX ADMIN — VENLAFAXINE HYDROCHLORIDE 150 MG: 150 CAPSULE, EXTENDED RELEASE ORAL at 08:40

## 2023-08-01 RX ADMIN — ALBUTEROL SULFATE 2.5 MG: 2.5 SOLUTION RESPIRATORY (INHALATION) at 07:56

## 2023-08-01 RX ADMIN — HYDROCODONE BITARTRATE AND ACETAMINOPHEN 1 TABLET: 5; 325 TABLET ORAL at 18:11

## 2023-08-01 RX ADMIN — CLONAZEPAM 0.5 MG: 1 TABLET ORAL at 03:23

## 2023-08-01 RX ADMIN — SODIUM CHLORIDE, PRESERVATIVE FREE 10 ML: 5 INJECTION INTRAVENOUS at 20:44

## 2023-08-01 RX ADMIN — MORPHINE SULFATE 2 MG: 2 INJECTION, SOLUTION INTRAMUSCULAR; INTRAVENOUS at 02:26

## 2023-08-01 RX ADMIN — ALBUTEROL SULFATE 2.5 MG: 2.5 SOLUTION RESPIRATORY (INHALATION) at 11:52

## 2023-08-01 RX ADMIN — TIOTROPIUM BROMIDE AND OLODATEROL 2 PUFF: 3.124; 2.736 SPRAY, METERED RESPIRATORY (INHALATION) at 07:56

## 2023-08-01 RX ADMIN — ALBUTEROL SULFATE 2.5 MG: 2.5 SOLUTION RESPIRATORY (INHALATION) at 03:27

## 2023-08-01 RX ADMIN — ALBUTEROL SULFATE 2.5 MG: 2.5 SOLUTION RESPIRATORY (INHALATION) at 23:27

## 2023-08-01 RX ADMIN — Medication 10.5 MG: at 20:43

## 2023-08-01 RX ADMIN — ALBUTEROL SULFATE 2.5 MG: 2.5 SOLUTION RESPIRATORY (INHALATION) at 16:04

## 2023-08-01 RX ADMIN — MORPHINE SULFATE 2 MG: 2 INJECTION, SOLUTION INTRAMUSCULAR; INTRAVENOUS at 07:59

## 2023-08-01 RX ADMIN — TIZANIDINE 4 MG: 4 TABLET ORAL at 18:35

## 2023-08-01 RX ADMIN — HYDROCODONE BITARTRATE AND ACETAMINOPHEN 1 TABLET: 5; 325 TABLET ORAL at 22:05

## 2023-08-01 RX ADMIN — MORPHINE SULFATE 2 MG: 2 INJECTION, SOLUTION INTRAMUSCULAR; INTRAVENOUS at 11:51

## 2023-08-01 RX ADMIN — SUCRALFATE 1 G: 1 TABLET ORAL at 05:21

## 2023-08-01 RX ADMIN — HYDROCODONE BITARTRATE AND ACETAMINOPHEN 1 TABLET: 5; 325 TABLET ORAL at 08:40

## 2023-08-01 RX ADMIN — SODIUM CHLORIDE, PRESERVATIVE FREE 10 ML: 5 INJECTION INTRAVENOUS at 08:00

## 2023-08-01 RX ADMIN — HYDROCODONE BITARTRATE AND ACETAMINOPHEN 1 TABLET: 5; 325 TABLET ORAL at 04:37

## 2023-08-01 RX ADMIN — PANTOPRAZOLE SODIUM 40 MG: 40 TABLET, DELAYED RELEASE ORAL at 16:30

## 2023-08-01 RX ADMIN — MIRTAZAPINE 15 MG: 15 TABLET, FILM COATED ORAL at 20:43

## 2023-08-01 RX ADMIN — SUCRALFATE 1 G: 1 TABLET ORAL at 16:30

## 2023-08-01 RX ADMIN — SUCRALFATE 1 G: 1 TABLET ORAL at 13:00

## 2023-08-01 ASSESSMENT — PAIN DESCRIPTION - ORIENTATION
ORIENTATION: RIGHT

## 2023-08-01 ASSESSMENT — PAIN DESCRIPTION - ONSET
ONSET: ON-GOING

## 2023-08-01 ASSESSMENT — PAIN - FUNCTIONAL ASSESSMENT
PAIN_FUNCTIONAL_ASSESSMENT: PREVENTS OR INTERFERES WITH MANY ACTIVE NOT PASSIVE ACTIVITIES

## 2023-08-01 ASSESSMENT — PAIN DESCRIPTION - PAIN TYPE
TYPE: SURGICAL PAIN

## 2023-08-01 ASSESSMENT — PAIN SCALES - WONG BAKER
WONGBAKER_NUMERICALRESPONSE: 2

## 2023-08-01 ASSESSMENT — PAIN DESCRIPTION - DESCRIPTORS
DESCRIPTORS: ACHING

## 2023-08-01 ASSESSMENT — PAIN DESCRIPTION - LOCATION
LOCATION: HIP
LOCATION: LEG
LOCATION: LEG

## 2023-08-01 ASSESSMENT — PAIN SCALES - GENERAL
PAINLEVEL_OUTOF10: 9
PAINLEVEL_OUTOF10: 8
PAINLEVEL_OUTOF10: 8
PAINLEVEL_OUTOF10: 7
PAINLEVEL_OUTOF10: 8
PAINLEVEL_OUTOF10: 7
PAINLEVEL_OUTOF10: 7
PAINLEVEL_OUTOF10: 8
PAINLEVEL_OUTOF10: 0
PAINLEVEL_OUTOF10: 9
PAINLEVEL_OUTOF10: 8

## 2023-08-01 ASSESSMENT — PAIN DESCRIPTION - FREQUENCY
FREQUENCY: CONTINUOUS

## 2023-08-01 NOTE — CARE COORDINATION
ONGOING DISCHARGE PLAN:    Patient is alert and oriented x4. Spoke with patient regarding discharge plan and patient confirms that plan is to go to SNF. [facility] called back and they are out of network for this insurance. Per Vinay Herrera at [facility] pt would be covered days 1-20 with Medicare and then days 21- 100  pt would have 40 % copay up to $200/day out of pocket. Call to [facility] spoke to Margaret Cardenas  [phone] and he provided CM with list of in network SNF. [facility] is in network per our conversation. Call to Vinay Herrera at [facility], left VM. Will continue to follow for additional discharge needs. If patient is discharged prior to next notation, then this note serves as note for discharge by case management. Electronically signed by Margret Motta RN on 8/1/2023 at 2:29 PM    Call to Vinay Herrera at [facility] to have her double check benefits. She had her office run the benefits again and  [facility] called back and this patient is in network and they can accept. Keena to start precert today.    Electronically signed by Margret Motta RN on 8/1/2023 at 2:52 PM

## 2023-08-01 NOTE — PLAN OF CARE
Problem: Discharge Planning  Goal: Discharge to home or other facility with appropriate resources  Outcome: Progressing  Flowsheets (Taken 8/1/2023 0000 by Bri Álvarez RN)  Discharge to home or other facility with appropriate resources: Identify barriers to discharge with patient and caregiver     Problem: Pain  Goal: Verbalizes/displays adequate comfort level or baseline comfort level  Outcome: Progressing  Flowsheets (Taken 8/1/2023 0800)  Verbalizes/displays adequate comfort level or baseline comfort level:   Encourage patient to monitor pain and request assistance   Assess pain using appropriate pain scale   Administer analgesics based on type and severity of pain and evaluate response     Problem: ABCDS Injury Assessment  Goal: Absence of physical injury  Outcome: Progressing  Flowsheets (Taken 8/1/2023 1940)  Absence of Physical Injury: Implement safety measures based on patient assessment

## 2023-08-01 NOTE — CARE COORDINATION
DISCHARGE PLANNING NOTE:    Call from Heraclio in ARU. Dr Geovanna Prince looked at patient and she recommends SNF. Referral sent to [facility] per last CM notes.      Electronically signed by Hali Luis RN on 8/1/2023 at 8:37 AM

## 2023-08-01 NOTE — DISCHARGE INSTR - COC
Continuity of Care Form    Patient Name: Say Garrido   :  1960  MRN:  [id number]    Admit date:  2023  Discharge date:  23    Code Status Order: Full Code   Advance Directives:     Admitting Physician:  Kathryn Mosley MD  PCP: Roosevetl Villarreal MD    Discharging Nurse: LEXIS CAMERON [facility] Unit/Room#:   Discharging Unit Phone Number: [phone]    Emergency Contact:   Extended Emergency Contact Information  Primary Emergency Contact: Dionicio Rhodes  Address: [address] [place] [address] of [address] Phone: [phone]  Work Phone: [phone]  Mobile Phone: [phone]  Relation: Spouse   needed?  No    Past Surgical History:  Past Surgical History:   Procedure Laterality Date    BACK SURGERY      diskectomy, L3-L5    COLONOSCOPY N/A 2019    COLONOSCOPY DIAGNOSTIC performed by Nargis Miguel MD at [facility] [facility]  2019    COLONOSCOPY POLYPECTOMY SNARE/COLD BIOPSY performed by Nargis Miguel MD at [address] [facility] 2023    FEMUR IM NAIL HALIMA INSERTION performed by Kathryn Mosley MD at [address] [facility]      ectopic pregnancy with tube removal    PAIN MANAGEMENT PROCEDURE      Left sided Transforaminal Epidural Steriod Injection    THYROIDECTOMY, PARTIAL      UPPER GASTROINTESTINAL ENDOSCOPY      UPPER GASTROINTESTINAL ENDOSCOPY N/A 2020    EGD BIOPSY with dilatation performed by David Chatterjee MD at [facility] N/A 2022    EGD BIOPSY performed by David Lezama MD at [facility]       Immunization History:   Immunization History   Administered Date(s) Administered    Influenza Virus Vaccine 2018, 2020    Influenza, FLUARIX, FLULAVAL, Terry Michelle (age 10 mo+) AND AFLURIA, (age 1 y+), PF, 0.5mL 10/05/2019, 10/14/2020    Influenza, FLUCELVAX, (age 10 mo+), MDCK, PF, 0.5mL

## 2023-08-01 NOTE — PROGRESS NOTES
area  Extremities: Right hip pain on active and passive movement no neurovascular deficit peripheral pulses palpable, no pedal edema or calf pain with palpation    Investigations:      Laboratory Testing:  Recent Results (from the past 24 hour(s))   Hemoglobin and Hematocrit    Collection Time: 07/31/23  5:36 PM   Result Value Ref Range    Hemoglobin 8.6 (L) 12.0 - 16.0 g/dL    Hematocrit 25.0 (L) 36 - 46 %   Potassium    Collection Time: 07/31/23  5:36 PM   Result Value Ref Range    Potassium 3.2 (L) 3.7 - 5.3 mmol/L   Basic Metabolic Panel    Collection Time: 08/01/23  3:34 AM   Result Value Ref Range    Glucose 100 (H) 70 - 99 mg/dL    BUN 2 (L) 8 - 23 mg/dL    Creatinine <0.4 (L) 0.5 - 0.9 mg/dL    Est, Glom Filt Rate Can not be calculated >60 mL/min/1.73m2    Calcium 7.3 (L) 8.6 - 10.4 mg/dL    Sodium 135 135 - 144 mmol/L    Potassium 3.8 3.7 - 5.3 mmol/L    Chloride 98 98 - 107 mmol/L    CO2 34 (H) 20 - 31 mmol/L    Anion Gap 3 (L) 9 - 17 mmol/L   CBC with Auto Differential    Collection Time: 08/01/23  3:34 AM   Result Value Ref Range    WBC 6.5 3.5 - 11.0 k/uL    RBC 2.72 (L) 4.0 - 5.2 m/uL    Hemoglobin 8.4 (L) 12.0 - 16.0 g/dL    Hematocrit 25.3 (L) 36 - 46 %    MCV 93.0 80 - 100 fL    MCH 30.7 26 - 34 pg    MCHC 33.1 31 - 37 g/dL    RDW 17.5 (H) 11.5 - 14.9 %    Platelets 817 (L) 525 - 450 k/uL    MPV 7.6 6.0 - 12.0 fL    Neutrophils % 68 (H) 36 - 66 %    Lymphocytes % 22 (L) 24 - 44 %    Monocytes % 10 (H) 1 - 7 %    Eosinophils % 0 0 - 4 %    Basophils % 0 0 - 2 %    Neutrophils Absolute 4.40 1.3 - 9.1 k/uL    Lymphocytes Absolute 1.40 1.0 - 4.8 k/uL    Monocytes Absolute 0.60 0.1 - 1.3 k/uL    Eosinophils Absolute 0.00 0.0 - 0.4 k/uL    Basophils Absolute 0.00 0.0 - 0.2 k/uL       Imaging/[employer]:  XR HIP RIGHT (2-3 VIEWS)    Result Date: 7/28/2023  Intertrochanteric right hip fracture. Several scattered lucencies within the femur and visualized tibia.   Multiple myeloma/metastatic disease should be excluded. XR FEMUR RIGHT (MIN 2 VIEWS)    Result Date: 7/28/2023  Intertrochanteric right hip fracture. Several scattered lucencies within the femur and visualized tibia. Multiple myeloma/metastatic disease should be excluded. CT HEAD WO CONTRAST    Result Date: 7/28/2023  No acute intracranial abnormality. CT CERVICAL SPINE WO CONTRAST    Result Date: 7/28/2023  No acute fracture or subluxation of the cervical spine. CT CHEST ABDOMEN PELVIS W CONTRAST Additional Contrast? None    Result Date: 7/28/2023  1. Comminuted intertrochanteric fracture of the proximal right femur with mild-to-moderate displacement. There is surrounding soft tissue swelling and edema. No evidence of pelvic hematoma. 2.  No acute traumatic injury to the viscera of the chest, abdomen, or pelvis. 3.  Diffuse osteopenia with multilevel chronic compression fractures and kyphoplasty cement. 4.  Moderate distention of the urinary bladder.        Assessment :      Hospital Problems             Last Modified POA    * (Principal) Closed displaced intertrochanteric fracture of right femur ([address]) 7/29/2023 Yes    Severe malnutrition (HCC) (Chronic) 7/29/2023 Yes   Plan:     26-year-old lady with end-stage COPD on home oxygen 2 to 3 L    She has quit smoking she has a respiratory cachexia with severe protein calorie malnutrition BMI 18.29 history of cervical cancer status post hysterectomy  She just got discharged from [place] after COPD exacerbation on oral steroid she tripped and fell over her oxygen tubing complains of right hip pain unable to mobilize severity 10 out of 10 with no radiation no chest pain no nausea vomiting chronic respiratory failure home oxygen    Case discussed bedside with orthopedic surgeon Dr. Tirso Galdamez  Patient is high risk from respiratory standpoint will need ICU admission postop consult pulmonary critical care patient is well-known to Dr. Merrill Wilkins need a prolonged intubation may end up

## 2023-08-01 NOTE — PROGRESS NOTES
Physical Therapy  Facility/Department: [facility] ICU  Daily Treatment Note  NAME: Chelsie Lazcano  : 1960  MRN: [id number]    Date of Service: 2023    Discharge Recommendations:  Patient would benefit from continued therapy after discharge, Therapy recommended at discharge        Patient Diagnosis(es): The encounter diagnosis was Closed displaced intertrochanteric fracture of right femur, initial encounter ([address]). Assessment   Activity Tolerance: Patient tolerated treatment well     Plan    Physcial Therapy Plan  General Plan: 2 times a day 7 days a week  Specific Instructions for Next Treatment: Progress to transfers with RW and ambulation; Provide Supine/seated HEP for Gentle strengthening & ROM. Continue to monitor SpO2 and BPs, if stable please progress mobility as tolerated. Current Treatment Recommendations: Strengthening;ROM;Balance training;Functional mobility training;Transfer training; Endurance training;Gait training;Pain management;Home exercise program;Safety education & training;Patient/Caregiver education & training;Positioning; Therapeutic activities     Restrictions  Restrictions/Precautions  Restrictions/Precautions: Weight Bearing, Fall Risk, General Precautions  Required Braces or Orthoses?: No  Implants present? :  (R femur)  Lower Extremity Weight Bearing Restrictions  Right Lower Extremity Weight Bearing: Weight Bearing As Tolerated  Position Activity Restriction  Other position/activity restrictions: S/P R Hip IMN by Dr. Linnea Strickland; Work on gait and gentle right hip ROM/strength; RLE WBAT     Subjective    Subjective  Subjective: Pt in bed on PT/OT arrival. Pt hesitant to participate in AM session after just waking up and haven't eaten breakfast but agress and becomes cooperative with therapy. Co-tx with Erendira Srinivasan for AM session. In PM, pt is agreeable to bed exercises and defers standing d/t pain  Pain: Pt reports 9/10 in R hip.  Maintains same 9/10 pain after mobilizing  Orientation  Overall Orientation Status: Within Functional Limits  Orientation Level: Oriented X4  Cognition  Overall Cognitive Status: Exceptions  Arousal/Alertness: Appropriate responses to stimuli  Following Commands: Follows multistep commands with repitition  Attention Span: Appears intact  Memory: Appears intact  Safety Judgement: Decreased awareness of need for assistance;Decreased awareness of need for safety  Problem Solving: Assistance required to generate solutions;Assistance required to implement solutions;Assistance required to correct errors made;Assistance required to identify errors made  Insights: Fully aware of deficits  Initiation: Requires cues for some  Sequencing: Requires cues for some     Objective   Vitals     Bed Mobility Training  Bed Mobility Training: Yes  Overall Level of Assistance: Stand-by assistance; Additional time  Interventions: Verbal cues  Supine to Sit: Stand-by assistance; Additional time  Sit to Supine: Stand-by assistance; Additional time  Scooting: Stand-by assistance  Balance  Sitting: Intact  Standing: With support (Pt was up standing and ambulating for 12 minutes prior to sitting. CGA for standing balance, Minx1 and CGAx1 for ambulation)  Transfer Training  Transfer Training: Yes  Overall Level of Assistance: Minimum assistance;Assist X1  Interventions: Verbal cues; Safety awareness training (VCs for safe hand placement)  Sit to Stand: Minimum assistance;Assist X1 (RW)  Stand to Sit: Minimum assistance;Assist X1 (RW)  Gait Training  Gait Training: Yes  Gait  Overall Level of Assistance: Minimum assistance;Contact-guard assistance;Assist X2  Interventions: Verbal cues; Safety awareness training;Weight shifting training/pressure relief (Cues for weightshifting to L to advance RLE)  Base of Support: Narrowed  Speed/Dorita: Slow  Step Length: Left shortened;Right shortened (RLE steps lengthened as ambulation progressed)  Stance: Right decreased; Left increased  Gait

## 2023-08-01 NOTE — PLAN OF CARE
Problem: Discharge Planning  Goal: Discharge to home or other facility with appropriate resources  8/1/2023 0447 by Mars Blas RN  Outcome: Progressing  Flowsheets  Taken 8/1/2023 0000  Discharge to home or other facility with appropriate resources: Identify barriers to discharge with patient and caregiver  Taken 7/31/2023 2000  Discharge to home or other facility with appropriate resources: Identify discharge learning needs (meds, wound care, etc)    Problem: Pain  Goal: Verbalizes/displays adequate comfort level or baseline comfort level  8/1/2023 0447 by Mars lBas RN  Outcome: Progressing  Flowsheets  Taken 8/1/2023 0400  Verbalizes/displays adequate comfort level or baseline comfort level:   Encourage patient to monitor pain and request assistance   Assess pain using appropriate pain scale  Taken 8/1/2023 0000  Verbalizes/displays adequate comfort level or baseline comfort level: Assess pain using appropriate pain scale  Taken 7/31/2023 2000  Verbalizes/displays adequate comfort level or baseline comfort level: Assess pain using appropriate pain scale    Problem: Safety - Adult  Goal: Free from fall injury  Outcome: Progressing  Flowsheets (Taken 7/31/2023 2030)  Free From Fall Injury: Based on caregiver fall risk screen, instruct family/caregiver to ask for assistance with transferring infant if caregiver noted to have fall risk factors     Problem: Skin/Tissue Integrity  Goal: Absence of new skin breakdown  Description: 1. Monitor for areas of redness and/or skin breakdown  2. Assess vascular access sites hourly  3. Every 4-6 hours minimum:  Change oxygen saturation probe site  4. Every 4-6 hours:  If on nasal continuous positive airway pressure, respiratory therapy assess nares and determine need for appliance change or resting period.   Outcome: Progressing     Problem: Nutrition Deficit:  Goal: Optimize nutritional status  8/1/2023 0447 by Mars Blas RN  Outcome: Progressing     Problem: ABCDS Injury Assessment  Goal: Absence of physical injury  Outcome: Progressing  Flowsheets (Taken 7/31/2023 2030)  Absence of Physical Injury: Implement safety measures based on patient assessment

## 2023-08-01 NOTE — PROGRESS NOTES
[facility]   INPATIENT OCCUPATIONAL THERAPY  PROGRESS NOTE  Date: 2023  Patient Name: Rustam Posadas       Room:   MRN: [id number]    : 1960  (58 y.o.)  Gender: female   Referring Practitioner: Corey Taylor MD  Diagnosis: Closed displaced intertrochanteric fracture of R femur      Discharge Recommendations:  Further Occupational Therapy is recommended upon facility discharge. OT Equipment Recommendations  Other: TBD    Restrictions/Precautions  Restrictions/Precautions  Restrictions/Precautions: Weight Bearing; Fall Risk;General Precautions  Required Braces or Orthoses?: No  Implants present? :  (R femur)  Lower Extremity Weight Bearing Restrictions  Right Lower Extremity Weight Bearing: Weight Bearing As Tolerated  Position Activity Restriction  Other position/activity restrictions: S/P R Hip IMN by Dr. Tiburcio Orourke; Work on gait and gentle right hip ROM/strength; RLE WBAT    Vitals  O2 Device: Nasal cannula  Comment: 2.5L. Pt desats into low 80s with mobility however quick increased noted. Subjective  Subjective  Subjective: \"I want to build up tolerance to walk into bathroom\"  Subjective  Pain: Pt reports 9/10 R hip pain at start of session and following mobility  Comments: RN Ajit Ok'd treatment, co-tx with Bharat MALONE. Pt agreeable to participate with encouragement. Pt declined sitting in chair but motivated to stand/ambulate. Objective  Orientation  Overall Orientation Status: Within Functional Limits  Cognition  Overall Cognitive Status: Exceptions  Arousal/Alertness: Appropriate responses to stimuli  Following Commands:  Follows multistep commands with repitition  Attention Span: Appears intact  Memory: Appears intact  Safety Judgement: Decreased awareness of need for assistance;Decreased awareness of need for safety  Problem Solving: Assistance required to generate solutions;Assistance required to implement solutions;Assistance required self care with set-up and Good safety  Short Term Goal 2: perform LB self care with Astrid and Good safety  Short Term Goal 3: V/D use of adaptive techniques/equipment to increase IND during self care tasks  Short Term Goal 4: perform functional transfers/toilet transfers with Astrid  x 1 - 2, using least restrictive device and Good safety  Short Term Goal 5: V/D at least two nonpharamceutical pain management techniques to encourage active engagement in self care/functional  activities  Short Term Goal 6: actively participate in 15+ minutes of  therapeutic exercise/functional  activity to promote  safety and independence with self care and mobility  Occupational Therapy Plan  Times Per Week: 5-7 (1-2x/day)  Current Treatment Recommendations: Strengthening, Balance training, Functional mobility training, Endurance training, Safety education & training, Patient/Caregiver education & training, Equipment evaluation, education, & procurement, Pain management, Positioning, Self-Care / ADL    Assessment  Activity Tolerance  Activity Tolerance: Patient Tolerated treatment well, Patient limited by pain  Assessment  Performance deficits / Impairments: Decreased functional mobility , Decreased ADL status, Decreased strength, Decreased safe awareness, Decreased cognition, Decreased endurance, Decreased balance, Decreased high-level IADLs  Treatment Diagnosis: Impaired self-care status  Prognosis: Good  Decision Making: Medium Complexity  Discharge Recommendations: Patient would benefit from continued therapy after discharge  OT Equipment Recommendations  Other: TBD  Safety Devices  Type of Devices: Gait belt, Patient at risk for falls, Left in bed, Bed alarm in place, Call light within reach, Nurse notified    AM-PAC Daily Activities Inpatient  AM-PAC Daily Activity - Inpatient   How much help is needed for putting on and taking off regular lower body clothing?: A Lot  How much help is needed for bathing (which includes washing,

## 2023-08-01 NOTE — PROGRESS NOTES
08/01/23 1605   Encounter Summary   Encounter Overview/Reason  Attempted Encounter   Service Provided For: Patient not available  (patient with RT)

## 2023-08-02 LAB
ANION GAP SERPL CALCULATED.3IONS-SCNC: 5 MMOL/L (ref 9–17)
BASOPHILS # BLD: 0 K/UL (ref 0–0.2)
BASOPHILS NFR BLD: 1 % (ref 0–2)
BUN SERPL-MCNC: 4 MG/DL (ref 8–23)
CALCIUM SERPL-MCNC: 7.6 MG/DL (ref 8.6–10.4)
CHLORIDE SERPL-SCNC: 98 MMOL/L (ref 98–107)
CO2 SERPL-SCNC: 33 MMOL/L (ref 20–31)
CREAT SERPL-MCNC: <0.4 MG/DL (ref 0.5–0.9)
EOSINOPHIL # BLD: 0 K/UL (ref 0–0.4)
EOSINOPHILS RELATIVE PERCENT: 0 % (ref 0–4)
ERYTHROCYTE [DISTWIDTH] IN BLOOD BY AUTOMATED COUNT: 17.7 % (ref 11.5–14.9)
GFR SERPL CREATININE-BSD FRML MDRD: ABNORMAL ML/MIN/1.73M2
GLUCOSE SERPL-MCNC: 104 MG/DL (ref 70–99)
HCT VFR BLD AUTO: 25.7 % (ref 36–46)
HGB BLD-MCNC: 8.5 G/DL (ref 12–16)
LYMPHOCYTES NFR BLD: 1.3 K/UL (ref 1–4.8)
LYMPHOCYTES RELATIVE PERCENT: 29 % (ref 24–44)
MCH RBC QN AUTO: 31 PG (ref 26–34)
MCHC RBC AUTO-ENTMCNC: 33 G/DL (ref 31–37)
MCV RBC AUTO: 93.8 FL (ref 80–100)
MONOCYTES NFR BLD: 0.5 K/UL (ref 0.1–1.3)
MONOCYTES NFR BLD: 12 % (ref 1–7)
NEUTROPHILS NFR BLD: 58 % (ref 36–66)
NEUTS SEG NFR BLD: 2.5 K/UL (ref 1.3–9.1)
PLATELET # BLD AUTO: 153 K/UL (ref 150–450)
PMV BLD AUTO: 7.3 FL (ref 6–12)
POTASSIUM SERPL-SCNC: 3.8 MMOL/L (ref 3.7–5.3)
RBC # BLD AUTO: 2.74 M/UL (ref 4–5.2)
SODIUM SERPL-SCNC: 136 MMOL/L (ref 135–144)
WBC OTHER # BLD: 4.3 K/UL (ref 3.5–11)

## 2023-08-02 PROCEDURE — 6370000000 HC RX 637 (ALT 250 FOR IP): Performed by: NURSE PRACTITIONER

## 2023-08-02 PROCEDURE — 94664 DEMO&/EVAL PT USE INHALER: CPT

## 2023-08-02 PROCEDURE — 94669 MECHANICAL CHEST WALL OSCILL: CPT

## 2023-08-02 PROCEDURE — 6370000000 HC RX 637 (ALT 250 FOR IP): Performed by: INTERNAL MEDICINE

## 2023-08-02 PROCEDURE — 2580000003 HC RX 258: Performed by: ORTHOPAEDIC SURGERY

## 2023-08-02 PROCEDURE — 1200000000 HC SEMI PRIVATE

## 2023-08-02 PROCEDURE — 97116 GAIT TRAINING THERAPY: CPT

## 2023-08-02 PROCEDURE — 6370000000 HC RX 637 (ALT 250 FOR IP): Performed by: ORTHOPAEDIC SURGERY

## 2023-08-02 PROCEDURE — 94761 N-INVAS EAR/PLS OXIMETRY MLT: CPT

## 2023-08-02 PROCEDURE — 85025 COMPLETE CBC W/AUTO DIFF WBC: CPT

## 2023-08-02 PROCEDURE — 36415 COLL VENOUS BLD VENIPUNCTURE: CPT

## 2023-08-02 PROCEDURE — 6360000002 HC RX W HCPCS: Performed by: ORTHOPAEDIC SURGERY

## 2023-08-02 PROCEDURE — 99232 SBSQ HOSP IP/OBS MODERATE 35: CPT | Performed by: INTERNAL MEDICINE

## 2023-08-02 PROCEDURE — 97530 THERAPEUTIC ACTIVITIES: CPT

## 2023-08-02 PROCEDURE — 97110 THERAPEUTIC EXERCISES: CPT

## 2023-08-02 PROCEDURE — 80048 BASIC METABOLIC PNL TOTAL CA: CPT

## 2023-08-02 PROCEDURE — 94640 AIRWAY INHALATION TREATMENT: CPT

## 2023-08-02 PROCEDURE — 2700000000 HC OXYGEN THERAPY PER DAY

## 2023-08-02 RX ORDER — OXYCODONE HCL 10 MG/1
10 TABLET, FILM COATED, EXTENDED RELEASE ORAL EVERY 12 HOURS SCHEDULED
Status: DISCONTINUED | OUTPATIENT
Start: 2023-08-02 | End: 2023-08-11 | Stop reason: HOSPADM

## 2023-08-02 RX ADMIN — CLONAZEPAM 0.5 MG: 1 TABLET ORAL at 18:20

## 2023-08-02 RX ADMIN — HYDROCODONE BITARTRATE AND ACETAMINOPHEN 1 TABLET: 5; 325 TABLET ORAL at 08:19

## 2023-08-02 RX ADMIN — SODIUM CHLORIDE, PRESERVATIVE FREE 10 ML: 5 INJECTION INTRAVENOUS at 21:56

## 2023-08-02 RX ADMIN — ALBUTEROL SULFATE 2.5 MG: 2.5 SOLUTION RESPIRATORY (INHALATION) at 10:54

## 2023-08-02 RX ADMIN — ENOXAPARIN SODIUM 30 MG: 100 INJECTION SUBCUTANEOUS at 10:14

## 2023-08-02 RX ADMIN — SUCRALFATE 1 G: 1 TABLET ORAL at 21:55

## 2023-08-02 RX ADMIN — SUCRALFATE 1 G: 1 TABLET ORAL at 06:25

## 2023-08-02 RX ADMIN — ALBUTEROL SULFATE 2.5 MG: 2.5 SOLUTION RESPIRATORY (INHALATION) at 03:29

## 2023-08-02 RX ADMIN — HYDROCODONE BITARTRATE AND ACETAMINOPHEN 1 TABLET: 5; 325 TABLET ORAL at 12:53

## 2023-08-02 RX ADMIN — ALBUTEROL SULFATE 2.5 MG: 2.5 SOLUTION RESPIRATORY (INHALATION) at 07:04

## 2023-08-02 RX ADMIN — ALBUTEROL SULFATE 2.5 MG: 2.5 SOLUTION RESPIRATORY (INHALATION) at 15:14

## 2023-08-02 RX ADMIN — SODIUM CHLORIDE, PRESERVATIVE FREE 10 ML: 5 INJECTION INTRAVENOUS at 10:14

## 2023-08-02 RX ADMIN — TIZANIDINE 4 MG: 4 TABLET ORAL at 10:13

## 2023-08-02 RX ADMIN — SUCRALFATE 1 G: 1 TABLET ORAL at 10:14

## 2023-08-02 RX ADMIN — CARVEDILOL 6.25 MG: 6.25 TABLET, FILM COATED ORAL at 21:55

## 2023-08-02 RX ADMIN — PRIMIDONE 100 MG: 50 TABLET ORAL at 21:56

## 2023-08-02 RX ADMIN — OXYCODONE HYDROCHLORIDE 10 MG: 10 TABLET, FILM COATED, EXTENDED RELEASE ORAL at 10:13

## 2023-08-02 RX ADMIN — HYDROCODONE BITARTRATE AND ACETAMINOPHEN 1 TABLET: 5; 325 TABLET ORAL at 02:50

## 2023-08-02 RX ADMIN — VENLAFAXINE HYDROCHLORIDE 150 MG: 150 CAPSULE, EXTENDED RELEASE ORAL at 10:13

## 2023-08-02 RX ADMIN — TIOTROPIUM BROMIDE AND OLODATEROL 2 PUFF: 3.124; 2.736 SPRAY, METERED RESPIRATORY (INHALATION) at 07:04

## 2023-08-02 RX ADMIN — MORPHINE SULFATE 2 MG: 2 INJECTION, SOLUTION INTRAMUSCULAR; INTRAVENOUS at 06:26

## 2023-08-02 RX ADMIN — Medication 10.5 MG: at 21:55

## 2023-08-02 RX ADMIN — HYDROCODONE BITARTRATE AND ACETAMINOPHEN 1 TABLET: 5; 325 TABLET ORAL at 17:51

## 2023-08-02 RX ADMIN — MIRTAZAPINE 15 MG: 15 TABLET, FILM COATED ORAL at 21:55

## 2023-08-02 RX ADMIN — PANTOPRAZOLE SODIUM 40 MG: 40 TABLET, DELAYED RELEASE ORAL at 16:02

## 2023-08-02 RX ADMIN — CARVEDILOL 6.25 MG: 6.25 TABLET, FILM COATED ORAL at 10:14

## 2023-08-02 RX ADMIN — ALBUTEROL SULFATE 2.5 MG: 2.5 SOLUTION RESPIRATORY (INHALATION) at 19:48

## 2023-08-02 RX ADMIN — SUCRALFATE 1 G: 1 TABLET ORAL at 16:02

## 2023-08-02 RX ADMIN — LEVOTHYROXINE SODIUM 88 MCG: 0.09 TABLET ORAL at 06:25

## 2023-08-02 RX ADMIN — OXYCODONE HYDROCHLORIDE 10 MG: 10 TABLET, FILM COATED, EXTENDED RELEASE ORAL at 21:58

## 2023-08-02 RX ADMIN — MONTELUKAST 10 MG: 10 TABLET, FILM COATED ORAL at 10:14

## 2023-08-02 RX ADMIN — PANTOPRAZOLE SODIUM 40 MG: 40 TABLET, DELAYED RELEASE ORAL at 06:25

## 2023-08-02 ASSESSMENT — PAIN DESCRIPTION - LOCATION
LOCATION: HIP;LEG
LOCATION: HIP
LOCATION: HIP
LOCATION: HIP;LEG
LOCATION: HIP
LOCATION: HIP;LEG
LOCATION: HIP;LEG

## 2023-08-02 ASSESSMENT — PAIN DESCRIPTION - ORIENTATION
ORIENTATION: RIGHT

## 2023-08-02 ASSESSMENT — PAIN SCALES - GENERAL
PAINLEVEL_OUTOF10: 9
PAINLEVEL_OUTOF10: 0
PAINLEVEL_OUTOF10: 8
PAINLEVEL_OUTOF10: 8
PAINLEVEL_OUTOF10: 4
PAINLEVEL_OUTOF10: 9
PAINLEVEL_OUTOF10: 7
PAINLEVEL_OUTOF10: 10
PAINLEVEL_OUTOF10: 9
PAINLEVEL_OUTOF10: 9

## 2023-08-02 ASSESSMENT — PAIN DESCRIPTION - DESCRIPTORS
DESCRIPTORS: ACHING;BURNING

## 2023-08-02 NOTE — PROGRESS NOTES
[address] [facility]   INPATIENT OCCUPATIONAL THERAPY  PROGRESS NOTE  Date: 2023  Patient Name: Lucy Tyson       Room: /2109-01  MRN: [id number]    : 1960  (58 y.o.)  Gender: female   Referring Practitioner: Ashley Escalera MD  Diagnosis: Closed displaced intertrochanteric fracture of R femur      Discharge Recommendations:  Further Occupational Therapy is recommended upon facility discharge. OT Equipment Recommendations  Other: TBD    Restrictions/Precautions  Restrictions/Precautions  Restrictions/Precautions: Weight Bearing; Fall Risk;General Precautions  Required Braces or Orthoses?: No  Implants present? :  (R femur)  Lower Extremity Weight Bearing Restrictions  Right Lower Extremity Weight Bearing: Weight Bearing As Tolerated  Position Activity Restriction  Other position/activity restrictions: S/P R Hip IMN by Dr. Odonnell Route; Work on gait and gentle right hip ROM/strength; RLE WBAT    Subjective  Subjective  Subjective: \"I just got recovered from everything lese\"  Subjective  Pain: Pt reports 8/10 in R hip. Comments: LILY Álvarez ok's tx. Objective  Orientation  Overall Orientation Status: Within Functional Limits  Orientation Level: Oriented X4  Cognition  Overall Cognitive Status: Exceptions  Arousal/Alertness: Appropriate responses to stimuli  Following Commands:  Follows multistep commands with repitition  Attention Span: Appears intact  Memory: Appears intact  Safety Judgement: Decreased awareness of need for assistance;Decreased awareness of need for safety  Problem Solving: Assistance required to generate solutions;Assistance required to implement solutions;Assistance required to correct errors made;Assistance required to identify errors made  Insights: Fully aware of deficits  Initiation: Requires cues for some  Sequencing: Requires cues for some    Activities of Daily Living  ADL  Feeding: Setup  Grooming: Setup  UE Bathing: Stand by assistance  LE Bathing: Maximum

## 2023-08-02 NOTE — PLAN OF CARE
Problem: Discharge Planning  Goal: Discharge to home or other facility with appropriate resources  8/2/2023 0448 by Mary Beth Gaytan RN  Outcome: Progressing  8/1/2023 1940 by Jaspal Blevins RN  Outcome: Progressing  Flowsheets (Taken 8/1/2023 0000 by Anton Rendon RN)  Discharge to home or other facility with appropriate resources: Identify barriers to discharge with patient and caregiver     Problem: Pain  Goal: Verbalizes/displays adequate comfort level or baseline comfort level  8/2/2023 0448 by Mary Beth Gaytan RN  Outcome: Progressing  8/1/2023 1940 by Jaspal Blevins RN  Outcome: Progressing  Flowsheets (Taken 8/1/2023 0800)  Verbalizes/displays adequate comfort level or baseline comfort level:   Encourage patient to monitor pain and request assistance   Assess pain using appropriate pain scale   Administer analgesics based on type and severity of pain and evaluate response     Problem: Safety - Adult  Goal: Free from fall injury  Outcome: Progressing     Problem: Skin/Tissue Integrity  Goal: Absence of new skin breakdown  Description: 1. Monitor for areas of redness and/or skin breakdown  2. Assess vascular access sites hourly  3. Every 4-6 hours minimum:  Change oxygen saturation probe site  4. Every 4-6 hours:  If on nasal continuous positive airway pressure, respiratory therapy assess nares and determine need for appliance change or resting period.   Outcome: Progressing     Problem: Nutrition Deficit:  Goal: Optimize nutritional status  Outcome: Progressing     Problem: ABCDS Injury Assessment  Goal: Absence of physical injury  8/2/2023 0448 by Mary Beth Gaytan RN  Outcome: Progressing  Flowsheets (Taken 8/1/2023 1940 by Jaspal Blevins RN)  Absence of Physical Injury: Implement safety measures based on patient assessment  8/1/2023 1940 by Jaspal Blevins RN  Outcome: Progressing  Flowsheets (Taken 8/1/2023 1940)  Absence of Physical Injury: Implement safety measures based on patient

## 2023-08-02 NOTE — PROGRESS NOTES
[facility] Internal Medicine  Kemal Hall MD; Paolo Hammond MD; Shiva Law MD; MD Steve Rodriguez MD; MD ESPERANZA [facility]   [address]      Progress note            Date:   8/2/2023  Patient name:  Handy Lester  Date of admission:  7/28/2023  1:04 PM  MRN:   [id number]  Account:  [de-identified]  YOB: 1960  PCP:    Tom Moreau MD  Room:   2109/2109-01  Code Status:    Full Code      Chief Complaint:     Chief Complaint   Patient presents with    Fall    Hip Pain   Fall right hip pain    History Obtained From:     Patient medical record nursing staff    History of Present Illness:     69-year-old lady with end-stage COPD on home oxygen 2 to 3 L    She has quit smoking she has a respiratory cachexia with severe protein calorie malnutrition BMI 18.29 history of cervical cancer status post hysterectomy  She just got discharged from [place] after COPD exacerbation on oral steroid she tripped and fell over her oxygen tubing complains of right hip pain unable to mobilize severity 10 out of 10 with no radiation no chest pain no nausea vomiting chronic respiratory failure home oxygen    Past Medical History:     Past Medical History:   Diagnosis Date    Acid reflux     Anxiety     Arthritis     Cancer ([address])     cervical    Cervical osteoarthritis 11/30/2020    COPD (chronic obstructive pulmonary disease) ([address]) 02/23/2018    DDD (degenerative disc disease), cervical     Depression     Hiatal hernia     History of blood transfusion     Reaction fever went up to 107 . per Pt.      On home O2     Spinal stenosis     Tachycardia     Thyroid disease     hypo        Past Surgical History:     Past Surgical History:   Procedure Laterality Date    BACK SURGERY      diskectomy, L3-L5    COLONOSCOPY N/A 04/08/2019    COLONOSCOPY DIAGNOSTIC performed by Barb Boss MD at [address] [facility] IV morphine add OxyContin 10 extended release twice a day and as needed Norco  Patient was denied for ARU pending pre-CERT for general care        Consultations:   [address] TO PRIMARY CARE PROVIDER  IP CONSULT TO PULMONOLOGY  IP CONSULT TO CASE MANAGEMENT  IP CONSULT TO PHYSICAL MEDICINE REHAB      Elsy Frost MD  8/2/2023  9:18 AM    Copy sent to Dr. Belle Hackett MD    Please note that this chart was generated using voice recognition Dragon dictation software. Although every effort was made to ensure the accuracy of this automated transcription, some errors in transcription may have occurred.

## 2023-08-02 NOTE — CARE COORDINATION
Writer spoke to Vinay Herrera at [facility] regarding referral.  Vinay Herrera to start pre-cert today.   Electronically signed by YOLANDA Galindo on 8/2/2023 at 3:53 PM

## 2023-08-02 NOTE — PROGRESS NOTES
x 15  Static Sitting Balance Exercises: Pt dangles EOB ~20 minutes with SBA  Static Standing Balance Exercises: Standing with RW tolerance ~ 2min. Shaking and very unsteady  Postural Correction Exercises: Cues for upright Posture and position in RW     Safety Devices  Type of Devices: Left in bed;Bed alarm in place;Call light within reach;Nurse notified (LILY Moise updated)     AM-PAC Basic Mobility - Inpatient   How much help is needed turning from your back to your side while in a flat bed without using bedrails?: None  How much help is needed moving from lying on your back to sitting on the side of a flat bed without using bedrails?: A Little  How much help is needed moving to and from a bed to a chair?: A Little  How much help is needed standing up from a chair using your arms?: A Little  How much help is needed walking in hospital room?: A Little  How much help is needed climbing 3-5 steps with a railing?: Total  AM-PAC Inpatient Mobility Raw Score : 17  AM-PAC Inpatient T-Scale Score : 42.13  Mobility Inpatient CMS 0-100% Score: 50.57  Mobility Inpatient CMS G-Code Modifier : CK    Goals  Short Term Goals  Time Frame for Short Term Goals: 8 visits  Short Term Goal 1: pt to demo all bed mobility with Kirby  Short Term Goal 2: pt to demo safe functional transfers with RW and MinAx1  Short Term Goal 3: pt to progress to ambulation of 48' with turning using RW and MinAx1  Short Term Goal 4: pt to demo G technique for RLE ROM and gentle strengthening per reviewing handout without need for VCs  Short Term Goal 5: pt to tolerate standing x10 minutes to improve tolerance for mobility and further activity/ADL participation  Patient Goals   Patient Goals : to walk    Education  Patient Education  Education Given To: Patient  Education Provided: Role of Therapy;Plan of Care;Precautions;Transfer Training; Fall Prevention Strategies; Equipment;Home Exercise Program  Education Method: Demonstration;Verbal;Teach Back  Barriers to Learning: Cognition;Lack of Family Support (Pt easily confused and anxious, extra time to explain)  Education Outcome: Verbalized understanding;Continued education needed;Demonstrated understanding    Therapy Time   Individual Concurrent Group Co-treatment   Time In 0955         Time Out 1042         Minutes [address] [place], [place]

## 2023-08-02 NOTE — PROGRESS NOTES
Comprehensive Nutrition Assessment    Type and Reason for Visit:  Reassess    Nutrition Recommendations/Plan:   Continue diet and supplements as ordered. Malnutrition Assessment:  Malnutrition Status:  Severe malnutrition (07/29/23 1554)    Context:  Acute Illness     Findings of the 6 clinical characteristics of malnutrition:  Energy Intake:  75% or less of estimated energy requirements for 7 or more days  Weight Loss:  No significant weight loss     Body Fat Loss: Moderate body fat loss Orbital, Buccal region   Muscle Mass Loss: Moderate muscle mass loss Temples (temporalis), Clavicles (pectoralis & deltoids)  Fluid Accumulation:  No significant fluid accumulation     Strength:  Not Performed    Nutrition Assessment:    Pt states she is eating good and drinking the Ensure Enlive. Pt indicated she was trying to increase her protein intake to promote healing. 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 Labs & meds reviewed. Wound Type: Surgical Incision       Current Nutrition Intake & Therapies:    Average Meal Intake: %  Average Supplements Intake: %  ADULT DIET; Regular  ADULT ORAL NUTRITION SUPPLEMENT; Breakfast, Lunch, Dinner; Standard High Calorie/High Protein Oral Supplement    Anthropometric Measures:  Height: 5' 2\" (157.5 cm)  Ideal Body Weight (IBW): 110 lbs (50 kg)    Admission Body Weight: 108 lb (49 kg)  Current Body Weight: 121 lb (54.9 kg) (weight discrepancy noted), 98.2 % IBW. Weight Source: Bed Scale  Current BMI (kg/m2): 22.1  Usual Body Weight: 100 lb (45.4 kg)  % Weight Change (Calculated): 8                    BMI Categories: Normal Weight (BMI 18.5-24. 9)    Estimated Daily Nutrient Needs:  Energy Requirements Based On: Kcal/kg  Weight Used for Energy Requirements: Admission  Energy (kcal/day): 1730 kcals based on 35 kcals/kg  Weight Used for Protein Requirements: Admission  Protein (g/day):           Nutrition

## 2023-08-03 LAB
ALBUMIN PERCENT: 69 % (ref 45–65)
ALBUMIN SERPL-MCNC: 3.5 G/DL (ref 3.2–5.2)
ALPHA 2 PERCENT: 12 % (ref 6–13)
ALPHA1 GLOB SERPL ELPH-MCNC: 0.1 G/DL (ref 0.1–0.4)
ALPHA1 GLOB SERPL ELPH-MCNC: 2 % (ref 3–6)
ALPHA2 GLOB SERPL ELPH-MCNC: 0.6 G/DL (ref 0.5–0.9)
ANION GAP SERPL CALCULATED.3IONS-SCNC: 3 MMOL/L (ref 9–17)
B-GLOBULIN SERPL ELPH-MCNC: 0.5 G/DL (ref 0.5–1.1)
B-GLOBULIN SERPL ELPH-MCNC: 10 % (ref 11–19)
BASOPHILS # BLD: 0 K/UL (ref 0–0.2)
BASOPHILS NFR BLD: 1 % (ref 0–2)
BUN SERPL-MCNC: 6 MG/DL (ref 8–23)
CALCIUM SERPL-MCNC: 7.9 MG/DL (ref 8.6–10.4)
CHLORIDE SERPL-SCNC: 99 MMOL/L (ref 98–107)
CO2 SERPL-SCNC: 34 MMOL/L (ref 20–31)
CREAT SERPL-MCNC: <0.4 MG/DL (ref 0.5–0.9)
EOSINOPHIL # BLD: 0 K/UL (ref 0–0.4)
EOSINOPHILS RELATIVE PERCENT: 0 % (ref 0–4)
ERYTHROCYTE [DISTWIDTH] IN BLOOD BY AUTOMATED COUNT: 17.2 % (ref 11.5–14.9)
GAMMA GLOB SERPL ELPH-MCNC: 0.4 G/DL (ref 0.5–1.5)
GAMMA GLOBULIN %: 8 % (ref 9–20)
GFR SERPL CREATININE-BSD FRML MDRD: ABNORMAL ML/MIN/1.73M2
GLUCOSE SERPL-MCNC: 95 MG/DL (ref 70–99)
HCT VFR BLD AUTO: 26.5 % (ref 36–46)
HGB BLD-MCNC: 8.7 G/DL (ref 12–16)
INTERPRETATION SERPL IFE-IMP: NORMAL
LYMPHOCYTES NFR BLD: 1.4 K/UL (ref 1–4.8)
LYMPHOCYTES RELATIVE PERCENT: 35 % (ref 24–44)
MCH RBC QN AUTO: 31 PG (ref 26–34)
MCHC RBC AUTO-ENTMCNC: 33 G/DL (ref 31–37)
MCV RBC AUTO: 94 FL (ref 80–100)
MONOCYTES NFR BLD: 0.5 K/UL (ref 0.1–1.3)
MONOCYTES NFR BLD: 12 % (ref 1–7)
NEUTROPHILS NFR BLD: 52 % (ref 36–66)
NEUTS SEG NFR BLD: 2.2 K/UL (ref 1.3–9.1)
PATH REV: NORMAL
PATHOLOGIST: ABNORMAL
PLATELET # BLD AUTO: 204 K/UL (ref 150–450)
PMV BLD AUTO: 7 FL (ref 6–12)
POTASSIUM SERPL-SCNC: 4.1 MMOL/L (ref 3.7–5.3)
PROT PATTERN SERPL ELPH-IMP: ABNORMAL
PROT SERPL-MCNC: 5.1 G/DL (ref 6.4–8.3)
RBC # BLD AUTO: 2.81 M/UL (ref 4–5.2)
SODIUM SERPL-SCNC: 136 MMOL/L (ref 135–144)
TOTAL PROT. SUM,%: 101 % (ref 98–102)
TOTAL PROT. SUM: 5.1 G/DL (ref 6.3–8.2)
WBC OTHER # BLD: 4.1 K/UL (ref 3.5–11)

## 2023-08-03 PROCEDURE — 97530 THERAPEUTIC ACTIVITIES: CPT

## 2023-08-03 PROCEDURE — 6370000000 HC RX 637 (ALT 250 FOR IP): Performed by: ORTHOPAEDIC SURGERY

## 2023-08-03 PROCEDURE — 6370000000 HC RX 637 (ALT 250 FOR IP): Performed by: NURSE PRACTITIONER

## 2023-08-03 PROCEDURE — 6360000002 HC RX W HCPCS: Performed by: ORTHOPAEDIC SURGERY

## 2023-08-03 PROCEDURE — 97110 THERAPEUTIC EXERCISES: CPT

## 2023-08-03 PROCEDURE — 85025 COMPLETE CBC W/AUTO DIFF WBC: CPT

## 2023-08-03 PROCEDURE — 1200000000 HC SEMI PRIVATE

## 2023-08-03 PROCEDURE — 94640 AIRWAY INHALATION TREATMENT: CPT

## 2023-08-03 PROCEDURE — 6370000000 HC RX 637 (ALT 250 FOR IP): Performed by: INTERNAL MEDICINE

## 2023-08-03 PROCEDURE — 97535 SELF CARE MNGMENT TRAINING: CPT

## 2023-08-03 PROCEDURE — 99232 SBSQ HOSP IP/OBS MODERATE 35: CPT | Performed by: INTERNAL MEDICINE

## 2023-08-03 PROCEDURE — 2700000000 HC OXYGEN THERAPY PER DAY

## 2023-08-03 PROCEDURE — 94761 N-INVAS EAR/PLS OXIMETRY MLT: CPT

## 2023-08-03 PROCEDURE — 36415 COLL VENOUS BLD VENIPUNCTURE: CPT

## 2023-08-03 PROCEDURE — 80048 BASIC METABOLIC PNL TOTAL CA: CPT

## 2023-08-03 PROCEDURE — 97116 GAIT TRAINING THERAPY: CPT

## 2023-08-03 PROCEDURE — 2580000003 HC RX 258: Performed by: ORTHOPAEDIC SURGERY

## 2023-08-03 RX ORDER — HYDROCODONE BITARTRATE AND ACETAMINOPHEN 5; 325 MG/1; MG/1
1 TABLET ORAL EVERY 6 HOURS PRN
Qty: 28 TABLET | Refills: 0 | Status: SHIPPED | OUTPATIENT
Start: 2023-08-03 | End: 2023-08-10

## 2023-08-03 RX ORDER — ENOXAPARIN SODIUM 100 MG/ML
30 INJECTION SUBCUTANEOUS DAILY
Qty: 5.7 ML | Refills: 0 | Status: SHIPPED | OUTPATIENT
Start: 2023-08-04 | End: 2023-08-23

## 2023-08-03 RX ADMIN — ALBUTEROL SULFATE 2.5 MG: 2.5 SOLUTION RESPIRATORY (INHALATION) at 10:54

## 2023-08-03 RX ADMIN — TIZANIDINE 4 MG: 4 TABLET ORAL at 13:20

## 2023-08-03 RX ADMIN — SODIUM CHLORIDE, PRESERVATIVE FREE 10 ML: 5 INJECTION INTRAVENOUS at 10:50

## 2023-08-03 RX ADMIN — VENLAFAXINE HYDROCHLORIDE 150 MG: 150 CAPSULE, EXTENDED RELEASE ORAL at 09:21

## 2023-08-03 RX ADMIN — PANTOPRAZOLE SODIUM 40 MG: 40 TABLET, DELAYED RELEASE ORAL at 16:21

## 2023-08-03 RX ADMIN — CLONAZEPAM 0.5 MG: 1 TABLET ORAL at 06:34

## 2023-08-03 RX ADMIN — ALBUTEROL SULFATE 2.5 MG: 2.5 SOLUTION RESPIRATORY (INHALATION) at 23:14

## 2023-08-03 RX ADMIN — PANTOPRAZOLE SODIUM 40 MG: 40 TABLET, DELAYED RELEASE ORAL at 06:26

## 2023-08-03 RX ADMIN — PRIMIDONE 100 MG: 50 TABLET ORAL at 20:44

## 2023-08-03 RX ADMIN — HYDROCODONE BITARTRATE AND ACETAMINOPHEN 1 TABLET: 5; 325 TABLET ORAL at 03:25

## 2023-08-03 RX ADMIN — CLONAZEPAM 0.5 MG: 1 TABLET ORAL at 14:22

## 2023-08-03 RX ADMIN — ALBUTEROL SULFATE 2.5 MG: 2.5 SOLUTION RESPIRATORY (INHALATION) at 19:01

## 2023-08-03 RX ADMIN — HYDROCODONE BITARTRATE AND ACETAMINOPHEN 1 TABLET: 5; 325 TABLET ORAL at 11:57

## 2023-08-03 RX ADMIN — TIOTROPIUM BROMIDE AND OLODATEROL 2 PUFF: 3.124; 2.736 SPRAY, METERED RESPIRATORY (INHALATION) at 10:54

## 2023-08-03 RX ADMIN — SODIUM CHLORIDE, PRESERVATIVE FREE 10 ML: 5 INJECTION INTRAVENOUS at 10:57

## 2023-08-03 RX ADMIN — SUCRALFATE 1 G: 1 TABLET ORAL at 06:26

## 2023-08-03 RX ADMIN — ENOXAPARIN SODIUM 30 MG: 100 INJECTION SUBCUTANEOUS at 10:48

## 2023-08-03 RX ADMIN — LEVOTHYROXINE SODIUM 88 MCG: 0.09 TABLET ORAL at 06:26

## 2023-08-03 RX ADMIN — ALBUTEROL SULFATE 2.5 MG: 2.5 SOLUTION RESPIRATORY (INHALATION) at 14:43

## 2023-08-03 RX ADMIN — MIRTAZAPINE 15 MG: 15 TABLET, FILM COATED ORAL at 20:43

## 2023-08-03 RX ADMIN — ALBUTEROL SULFATE 2.5 MG: 2.5 SOLUTION RESPIRATORY (INHALATION) at 07:01

## 2023-08-03 RX ADMIN — SUCRALFATE 1 G: 1 TABLET ORAL at 16:21

## 2023-08-03 RX ADMIN — CARVEDILOL 6.25 MG: 6.25 TABLET, FILM COATED ORAL at 09:21

## 2023-08-03 RX ADMIN — OXYCODONE HYDROCHLORIDE 10 MG: 10 TABLET, FILM COATED, EXTENDED RELEASE ORAL at 09:21

## 2023-08-03 RX ADMIN — Medication 10.5 MG: at 20:42

## 2023-08-03 RX ADMIN — HYDROCODONE BITARTRATE AND ACETAMINOPHEN 1 TABLET: 5; 325 TABLET ORAL at 23:24

## 2023-08-03 RX ADMIN — SODIUM CHLORIDE, PRESERVATIVE FREE 10 ML: 5 INJECTION INTRAVENOUS at 21:00

## 2023-08-03 RX ADMIN — SUCRALFATE 1 G: 1 TABLET ORAL at 10:48

## 2023-08-03 RX ADMIN — HYDROCODONE BITARTRATE AND ACETAMINOPHEN 1 TABLET: 5; 325 TABLET ORAL at 16:21

## 2023-08-03 RX ADMIN — CARVEDILOL 6.25 MG: 6.25 TABLET, FILM COATED ORAL at 20:43

## 2023-08-03 RX ADMIN — ALBUTEROL SULFATE 2.5 MG: 2.5 SOLUTION RESPIRATORY (INHALATION) at 03:07

## 2023-08-03 RX ADMIN — TIZANIDINE 4 MG: 4 TABLET ORAL at 20:43

## 2023-08-03 RX ADMIN — TIZANIDINE 4 MG: 4 TABLET ORAL at 04:00

## 2023-08-03 RX ADMIN — MONTELUKAST 10 MG: 10 TABLET, FILM COATED ORAL at 09:21

## 2023-08-03 RX ADMIN — TIZANIDINE 4 MG: 4 TABLET ORAL at 18:30

## 2023-08-03 RX ADMIN — OXYCODONE HYDROCHLORIDE 10 MG: 10 TABLET, FILM COATED, EXTENDED RELEASE ORAL at 20:43

## 2023-08-03 RX ADMIN — SUCRALFATE 1 G: 1 TABLET ORAL at 20:43

## 2023-08-03 RX ADMIN — HYDROCODONE BITARTRATE AND ACETAMINOPHEN 1 TABLET: 5; 325 TABLET ORAL at 07:59

## 2023-08-03 ASSESSMENT — PAIN DESCRIPTION - LOCATION
LOCATION: HIP
LOCATION: LEG
LOCATION: HIP;BACK

## 2023-08-03 ASSESSMENT — PAIN DESCRIPTION - DESCRIPTORS
DESCRIPTORS: THROBBING
DESCRIPTORS: ACHING;BURNING;STABBING
DESCRIPTORS: ACHING;THROBBING
DESCRIPTORS: BURNING;ACHING;STABBING

## 2023-08-03 ASSESSMENT — PAIN SCALES - GENERAL
PAINLEVEL_OUTOF10: 7
PAINLEVEL_OUTOF10: 6
PAINLEVEL_OUTOF10: 8
PAINLEVEL_OUTOF10: 7
PAINLEVEL_OUTOF10: 8
PAINLEVEL_OUTOF10: 8
PAINLEVEL_OUTOF10: 9
PAINLEVEL_OUTOF10: 7
PAINLEVEL_OUTOF10: 9

## 2023-08-03 ASSESSMENT — PAIN DESCRIPTION - ORIENTATION
ORIENTATION: RIGHT

## 2023-08-03 ASSESSMENT — PAIN DESCRIPTION - ONSET: ONSET: ON-GOING

## 2023-08-03 ASSESSMENT — PAIN - FUNCTIONAL ASSESSMENT
PAIN_FUNCTIONAL_ASSESSMENT: PREVENTS OR INTERFERES WITH MANY ACTIVE NOT PASSIVE ACTIVITIES
PAIN_FUNCTIONAL_ASSESSMENT: PREVENTS OR INTERFERES SOME ACTIVE ACTIVITIES AND ADLS

## 2023-08-03 NOTE — PROGRESS NOTES
08/03/23 1615   Encounter Summary   Encounter Overview/Reason  Spiritual/Emotional Needs   Service Provided For: Patient   Referral/Consult From: Rounding   Support System Spouse   Last Encounter  08/03/23   Complexity of Encounter Low   Spiritual/Emotional needs   Type Spiritual Support   Assessment/Intervention/Outcome   Assessment Calm;Coping   Intervention Active listening;Prayer (assurance of)/[place];Sustaining Presence/Ministry of presence   Outcome Comfort;Coping;Encouraged;Engaged in conversation;Expressed Gratitude;Receptive

## 2023-08-03 NOTE — PROGRESS NOTES
[facility] Internal Medicine  Ashley Gross MD; Raúl Goode MD; Chris Brown MD; MD Jennifer Hoover MD; Bibi Dietrich MD    [facility] Internal Medicine   [address]     Progress note            Date:   8/3/2023  Patient name:  Chicho Vera  Date of admission:  7/28/2023  1:04 PM  MRN:   [id number]  Account:  [de-identified]  YOB: 1960  PCP:    Tonya Suarez MD  Room:   [address] [place] [address]  Code Status:    Full Code      Chief Complaint:     Chief Complaint   Patient presents with    Fall    Hip Pain   Fall right hip pain    History Obtained From:     Patient medical record nursing staff    History of Present Illness:     70-year-old lady with end-stage COPD on home oxygen 2 to 3 L    She has quit smoking she has a respiratory cachexia with severe protein calorie malnutrition BMI 18.29 history of cervical cancer status post hysterectomy  She just got discharged from [facility] after COPD exacerbation on oral steroid she tripped and fell over her oxygen tubing complains of right hip pain unable to mobilize severity 10 out of 10 with no radiation no chest pain no nausea vomiting chronic respiratory failure home oxygen    8/3  Patient seen and examined, did not have any bowel movement from last 1 week, on MiraLAX  Vitals have been stable, complaining of pain    Past Medical History:     Past Medical History:   Diagnosis Date    Acid reflux     Anxiety     Arthritis     Cancer ([address])     cervical    Cervical osteoarthritis 11/30/2020    COPD (chronic obstructive pulmonary disease) ([address]) 02/23/2018    DDD (degenerative disc disease), cervical     Depression     Hiatal hernia     History of blood transfusion     Reaction fever went up to 107 . per Pt.      On home O2     Spinal stenosis     Tachycardia     Thyroid disease     hypo        Past Surgical History:     Past Surgical History:   Procedure Laterality Date fracture hip likely secondary to osteoporosis rule out osteolytic lesion rule out myeloma  DVT prophylaxis heparin 5000 3 times daily  Pain control suboptimal discontinue IV morphine add OxyContin 10 extended release twice a day and as needed Norco  Patient was denied for ARU pending pre-CERT for general care    8/3  Give Linzess for constipation,  On OxyContin and as needed Norco  Her vitals has been stable  Labs reviewed, hemoglobin has been stable  DVT prophylaxis Lovenox. Consultations:   [address]  IP CONSULT TO PRIMARY CARE PROVIDER  IP CONSULT TO PULMONOLOGY  IP CONSULT TO CASE MANAGEMENT  IP CONSULT TO PHYSICAL MEDICINE REHAB      Caden Dasilva MD  8/3/2023  9:34 AM    Copy sent to Dr. José Miguel Montero MD    Please note that this chart was generated using voice recognition Dragon dictation software. Although every effort was made to ensure the accuracy of this automated transcription, some errors in transcription may have occurred.

## 2023-08-03 NOTE — PLAN OF CARE
Problem: Discharge Planning  Goal: Discharge to home or other facility with appropriate resources  8/3/2023 1642 by Rodger Nguyen RN  Outcome: Progressing  Note: Awaiting precert for [facility], continue to work with PT/OT  8/3/2023 0441 by Damaris Cummings RN  Outcome: Progressing     Problem: Pain  Goal: Verbalizes/displays adequate comfort level or baseline comfort level  8/3/2023 1642 by Rodger Nguyen RN  Note: Continue to monitor for pain and maintain adequate pain control  8/3/2023 0441 by Damaris Cummings RN  Outcome: Progressing     Problem: Safety - Adult  Goal: Free from fall injury  8/3/2023 0441 by Damaris Cummings RN  Outcome: Progressing     Problem: Skin/Tissue Integrity  Goal: Absence of new skin breakdown  Description: 1. Monitor for areas of redness and/or skin breakdown  2. Assess vascular access sites hourly  3. Every 4-6 hours minimum:  Change oxygen saturation probe site  4. Every 4-6 hours:  If on nasal continuous positive airway pressure, respiratory therapy assess nares and determine need for appliance change or resting period.   8/3/2023 0441 by Damaris Cummings RN  Outcome: Progressing     Problem: Nutrition Deficit:  Goal: Optimize nutritional status  8/3/2023 0441 by Damaris Cummings RN  Outcome: Progressing     Problem: ABCDS Injury Assessment  Goal: Absence of physical injury  8/3/2023 0441 by Damaris Cummings RN  Outcome: Progressing

## 2023-08-03 NOTE — PROGRESS NOTES
Physical Therapy  Facility/Department: [facility] MED SURG  Daily Treatment Note  NAME: Chelsie Lazcano  : 1960  MRN: [id number]    Date of Service: 8/3/2023    Discharge Recommendations:  Patient would benefit from continued therapy after discharge, Therapy recommended at discharge        Patient Diagnosis(es): The encounter diagnosis was Closed displaced intertrochanteric fracture of right femur, initial encounter ([address]). Assessment   Activity Tolerance: Poor tolerance this date with high anxiety. Pt with poor motivation and required extra time for the simplest task. Pt decline PM tx and reports she \"just wants to die\". LILY Cristina updated on this. Pt appears to be depressed and tearful. Plan    Physcial Therapy Plan  General Plan: 2 times a day 7 days a week  Specific Instructions for Next Treatment: Progress to transfers with RW and ambulation; Provide Supine/seated HEP for Gentle strengthening & ROM. Continue to monitor SpO2 and BPs, if stable please progress mobility as tolerated. Current Treatment Recommendations: Strengthening;ROM;Balance training;Functional mobility training;Transfer training; Endurance training;Gait training;Pain management;Home exercise program;Safety education & training;Patient/Caregiver education & training;Positioning; Therapeutic activities     Restrictions  Restrictions/Precautions  Restrictions/Precautions: Weight Bearing, Fall Risk, General Precautions  Required Braces or Orthoses?: No  Implants present? :  (R femur)  Lower Extremity Weight Bearing Restrictions  Right Lower Extremity Weight Bearing: Weight Bearing As Tolerated  Position Activity Restriction  Other position/activity restrictions: S/P R Hip IMN by Dr. Linnea Strickland; Work on gait and gentle right hip ROM/strength; RLE WBAT         Subjective  Subjective: LILY Cristina in pt's room upon arrival changing bandages again. Pt frequently found itching and scratching bandage off and causing bleeding around surgical incision.   Pt on 2L O2 upon arrival. Pt complains of \"I can't breathe! \" however Oxygen Saturation 95% on 2L. Pt given other techniques to help with anxiety with poor carryover. Pain: Pt reports 8/10 in R hip. Orientation  Overall Orientation Status: Within Functional Limits  Orientation Level: Oriented X4    Cognition  Overall Cognitive Status: Exceptions  Arousal/Alertness: Appropriate responses to stimuli  Following Commands: Follows multistep commands with repitition  Attention Span: Appears intact  Memory: Appears intact  Safety Judgement: Decreased awareness of need for assistance;Decreased awareness of need for safety  Problem Solving: Assistance required to generate solutions;Assistance required to implement solutions;Assistance required to correct errors made;Assistance required to identify errors made  Insights: Fully aware of deficits  Initiation: Requires cues for some  Sequencing: Requires cues for some        Bed Mobility Training  Bed Mobility Training: Yes  Overall Level of Assistance: Stand-by assistance; Additional time  Interventions: Verbal cues  Rolling: Independent  Supine to Sit: Stand-by assistance; Additional time  Sit to Supine: Stand-by assistance; Additional time  Scooting: Stand-by assistance    Balance  Sitting: Intact (pt is self limiting and not willing to participate with reaching out base of support)  Standing: Impaired (Pt is self limiting and will frequently ignore requests for improved balance, exercises, and safety)  Standing - Static: Good (Good -)  Standing - Dynamic: Fair    Transfer Training  Transfer Training: Yes  Overall Level of Assistance: Assist X1;Contact-guard assistance  Interventions: Verbal cues; Safety awareness training (VCs for safe hand placement)  Sit to Stand: Assist X1;Contact-guard assistance (RW)  Stand to Sit: Assist X1;Contact-guard assistance (RW)  Stand Pivot Transfers: Contact-guard assistance;Assist X1  Bed to Chair: Contact-guard assistance;Assist X1 (kae

## 2023-08-03 NOTE — CARE COORDINATION
Writer placed call to Yvonne Donovan at [facility] for update on referral.  No answer, voicemail left.   Electronically signed by YOLANDA De Jesus on 8/3/2023 at 11:01 AM

## 2023-08-03 NOTE — PROGRESS NOTES
[facility]   INPATIENT OCCUPATIONAL THERAPY  PROGRESS NOTE  Date: 8/3/2023  Patient Name: Kalyn Elizabeth       Room: 3066/5259-50  MRN: [id number]    : 1960  (58 y.o.)  Gender: female   Referring Practitioner: Nacho Lux MD  Diagnosis: Closed displaced intertrochanteric fracture of R femur      Discharge Recommendations:  Further Occupational Therapy is recommended upon facility discharge. OT Equipment Recommendations  Other: TBD    Restrictions/Precautions  Restrictions/Precautions  Restrictions/Precautions: Weight Bearing; Fall Risk;General Precautions  Required Braces or Orthoses?: No  Implants present? :  (R femur)  Lower Extremity Weight Bearing Restrictions  Right Lower Extremity Weight Bearing: Weight Bearing As Tolerated  Position Activity Restriction  Other position/activity restrictions: S/P R Hip IMN by Dr. Karen Perez; Work on gait and gentle right hip ROM/strength; RLE WBAT         Subjective  Subjective  Subjective: pt demo P safety awareness and anxiety throughout tx. pt also states \"I just want to die. \" RN notified of statement  Subjective  Pain: Pt reports 8/10 in R hip. Comments: Ibeth RN ok's tx. co-tx amelia Sanchez PTA    Objective  Orientation  Overall Orientation Status: Within Functional Limits  Orientation Level: Oriented X4  Cognition  Overall Cognitive Status: Exceptions  Arousal/Alertness: Appropriate responses to stimuli  Following Commands:  Follows multistep commands with repitition  Attention Span: Appears intact  Memory: Appears intact  Safety Judgement: Decreased awareness of need for assistance;Decreased awareness of need for safety  Problem Solving: Assistance required to generate solutions;Assistance required to implement solutions;Assistance required to correct errors made;Assistance required to identify errors made  Insights: Fully aware of deficits  Initiation: Requires cues for some  Sequencing: Requires cues for

## 2023-08-04 PROCEDURE — 1200000000 HC SEMI PRIVATE

## 2023-08-04 PROCEDURE — 97116 GAIT TRAINING THERAPY: CPT

## 2023-08-04 PROCEDURE — 6370000000 HC RX 637 (ALT 250 FOR IP): Performed by: INTERNAL MEDICINE

## 2023-08-04 PROCEDURE — 6370000000 HC RX 637 (ALT 250 FOR IP): Performed by: NURSE PRACTITIONER

## 2023-08-04 PROCEDURE — 94640 AIRWAY INHALATION TREATMENT: CPT

## 2023-08-04 PROCEDURE — 6360000002 HC RX W HCPCS: Performed by: ORTHOPAEDIC SURGERY

## 2023-08-04 PROCEDURE — 6370000000 HC RX 637 (ALT 250 FOR IP): Performed by: ORTHOPAEDIC SURGERY

## 2023-08-04 PROCEDURE — 97110 THERAPEUTIC EXERCISES: CPT

## 2023-08-04 PROCEDURE — 2700000000 HC OXYGEN THERAPY PER DAY

## 2023-08-04 PROCEDURE — 94761 N-INVAS EAR/PLS OXIMETRY MLT: CPT

## 2023-08-04 PROCEDURE — 99232 SBSQ HOSP IP/OBS MODERATE 35: CPT | Performed by: INTERNAL MEDICINE

## 2023-08-04 PROCEDURE — 2580000003 HC RX 258: Performed by: ORTHOPAEDIC SURGERY

## 2023-08-04 RX ORDER — CARVEDILOL 6.25 MG/1
6.25 TABLET ORAL 2 TIMES DAILY WITH MEALS
Status: DISCONTINUED | OUTPATIENT
Start: 2023-08-04 | End: 2023-08-11 | Stop reason: HOSPADM

## 2023-08-04 RX ADMIN — HYDROCODONE BITARTRATE AND ACETAMINOPHEN 1 TABLET: 5; 325 TABLET ORAL at 19:52

## 2023-08-04 RX ADMIN — SODIUM CHLORIDE, PRESERVATIVE FREE 10 ML: 5 INJECTION INTRAVENOUS at 21:45

## 2023-08-04 RX ADMIN — ALBUTEROL SULFATE 2.5 MG: 2.5 SOLUTION RESPIRATORY (INHALATION) at 07:48

## 2023-08-04 RX ADMIN — ALBUTEROL SULFATE 2.5 MG: 2.5 SOLUTION RESPIRATORY (INHALATION) at 23:24

## 2023-08-04 RX ADMIN — HYDROCODONE BITARTRATE AND ACETAMINOPHEN 1 TABLET: 5; 325 TABLET ORAL at 11:33

## 2023-08-04 RX ADMIN — ENOXAPARIN SODIUM 30 MG: 100 INJECTION SUBCUTANEOUS at 08:07

## 2023-08-04 RX ADMIN — TIZANIDINE 4 MG: 4 TABLET ORAL at 13:34

## 2023-08-04 RX ADMIN — OXYCODONE HYDROCHLORIDE 10 MG: 10 TABLET, FILM COATED, EXTENDED RELEASE ORAL at 08:06

## 2023-08-04 RX ADMIN — CLONAZEPAM 0.5 MG: 1 TABLET ORAL at 15:29

## 2023-08-04 RX ADMIN — CARVEDILOL 6.25 MG: 6.25 TABLET, FILM COATED ORAL at 17:02

## 2023-08-04 RX ADMIN — SUCRALFATE 1 G: 1 TABLET ORAL at 11:33

## 2023-08-04 RX ADMIN — ALBUTEROL SULFATE 2.5 MG: 2.5 SOLUTION RESPIRATORY (INHALATION) at 12:27

## 2023-08-04 RX ADMIN — SODIUM CHLORIDE, PRESERVATIVE FREE 10 ML: 5 INJECTION INTRAVENOUS at 08:07

## 2023-08-04 RX ADMIN — PRIMIDONE 100 MG: 50 TABLET ORAL at 19:51

## 2023-08-04 RX ADMIN — SUCRALFATE 1 G: 1 TABLET ORAL at 21:44

## 2023-08-04 RX ADMIN — LEVOTHYROXINE SODIUM 88 MCG: 0.09 TABLET ORAL at 05:39

## 2023-08-04 RX ADMIN — Medication 10.5 MG: at 21:44

## 2023-08-04 RX ADMIN — CARVEDILOL 6.25 MG: 6.25 TABLET, FILM COATED ORAL at 08:06

## 2023-08-04 RX ADMIN — ALBUTEROL SULFATE 2.5 MG: 2.5 SOLUTION RESPIRATORY (INHALATION) at 19:00

## 2023-08-04 RX ADMIN — VENLAFAXINE HYDROCHLORIDE 150 MG: 150 CAPSULE, EXTENDED RELEASE ORAL at 08:06

## 2023-08-04 RX ADMIN — TIOTROPIUM BROMIDE AND OLODATEROL 2 PUFF: 3.124; 2.736 SPRAY, METERED RESPIRATORY (INHALATION) at 07:53

## 2023-08-04 RX ADMIN — CLONAZEPAM 0.5 MG: 1 TABLET ORAL at 21:44

## 2023-08-04 RX ADMIN — CLONAZEPAM 0.5 MG: 1 TABLET ORAL at 09:15

## 2023-08-04 RX ADMIN — SUCRALFATE 1 G: 1 TABLET ORAL at 17:02

## 2023-08-04 RX ADMIN — MONTELUKAST 10 MG: 10 TABLET, FILM COATED ORAL at 08:06

## 2023-08-04 RX ADMIN — ALBUTEROL SULFATE 2.5 MG: 2.5 SOLUTION RESPIRATORY (INHALATION) at 03:22

## 2023-08-04 RX ADMIN — ALBUTEROL SULFATE 2.5 MG: 2.5 SOLUTION RESPIRATORY (INHALATION) at 16:04

## 2023-08-04 RX ADMIN — OXYCODONE HYDROCHLORIDE 10 MG: 10 TABLET, FILM COATED, EXTENDED RELEASE ORAL at 21:44

## 2023-08-04 RX ADMIN — PANTOPRAZOLE SODIUM 40 MG: 40 TABLET, DELAYED RELEASE ORAL at 05:39

## 2023-08-04 RX ADMIN — HYDROCODONE BITARTRATE AND ACETAMINOPHEN 1 TABLET: 5; 325 TABLET ORAL at 15:34

## 2023-08-04 RX ADMIN — PANTOPRAZOLE SODIUM 40 MG: 40 TABLET, DELAYED RELEASE ORAL at 15:29

## 2023-08-04 RX ADMIN — TIZANIDINE 4 MG: 4 TABLET ORAL at 19:55

## 2023-08-04 RX ADMIN — SUCRALFATE 1 G: 1 TABLET ORAL at 05:39

## 2023-08-04 RX ADMIN — MIRTAZAPINE 15 MG: 15 TABLET, FILM COATED ORAL at 19:51

## 2023-08-04 RX ADMIN — HYDROCODONE BITARTRATE AND ACETAMINOPHEN 1 TABLET: 5; 325 TABLET ORAL at 05:39

## 2023-08-04 ASSESSMENT — PAIN - FUNCTIONAL ASSESSMENT
PAIN_FUNCTIONAL_ASSESSMENT: PREVENTS OR INTERFERES SOME ACTIVE ACTIVITIES AND ADLS
PAIN_FUNCTIONAL_ASSESSMENT: PREVENTS OR INTERFERES SOME ACTIVE ACTIVITIES AND ADLS

## 2023-08-04 ASSESSMENT — PAIN SCALES - GENERAL
PAINLEVEL_OUTOF10: 9
PAINLEVEL_OUTOF10: 8
PAINLEVEL_OUTOF10: 9
PAINLEVEL_OUTOF10: 10

## 2023-08-04 ASSESSMENT — PAIN DESCRIPTION - ORIENTATION
ORIENTATION: RIGHT
ORIENTATION: LEFT
ORIENTATION: RIGHT

## 2023-08-04 ASSESSMENT — PAIN DESCRIPTION - LOCATION
LOCATION: HIP

## 2023-08-04 ASSESSMENT — PAIN DESCRIPTION - DESCRIPTORS
DESCRIPTORS: THROBBING
DESCRIPTORS: BURNING;THROBBING;ACHING

## 2023-08-04 ASSESSMENT — PAIN DESCRIPTION - PAIN TYPE: TYPE: SURGICAL PAIN

## 2023-08-04 NOTE — PLAN OF CARE
Problem: Discharge Planning  Goal: Discharge to home or other facility with appropriate resources  Outcome: Progressing     Problem: Pain  Goal: Verbalizes/displays adequate comfort level or baseline comfort level  Outcome: Progressing     Problem: Safety - Adult  Goal: Free from fall injury  Outcome: Progressing     Problem: Nutrition Deficit:  Goal: Optimize nutritional status  Outcome: Progressing     Problem: ABCDS Injury Assessment  Goal: Absence of physical injury  Outcome: Progressing

## 2023-08-04 NOTE — PROGRESS NOTES
Physical Therapy Cancel Note      DATE: 2023    NAME: Florentino Lane  MRN: [id number]   : 1960      Patient not seen this date for Physical Therapy due to:    Patient declined PT. Patient emotional as writer arrives. pt states \"I just want to die. \" Emma Huerta RN notified of refusal and statement from pt. Will continue to follow and encourage pt participation.       Electronically signed by Linda Chang PTA on 2023 at 4:52 PM

## 2023-08-04 NOTE — PROGRESS NOTES
Training: No (Pt agreeable to Supine exercises only)    Gait Training  Gait Training: Yes  Overall Level of Assistance: Stand-by assistance  Interventions: Verbal cues; Safety awareness training  Base of Support: Narrowed  Speed/Dorita: Slow  Step Length: Left shortened;Right shortened  Stance: Left increased;Right decreased  Gait Abnormalities: Antalgic  Distance (ft): 8 Feet (8ft to toilet, 8ft back to bed)  Assistive Device: Gait belt;Walker, rolling     PT Exercises  Exercise Treatment: Bridging with left LE flexed and right LE in extension x5reps  A/AROM Exercises: Supine BLE exercises x 15-20 reps  Disease-specific Exercises: Edu on positioning, Offered Ice pack cryotherapy however pt politely declines ice         AM-PAC Basic Mobility - Inpatient   How much help is needed turning from your back to your side while in a flat bed without using bedrails?: None  How much help is needed moving from lying on your back to sitting on the side of a flat bed without using bedrails?: None  How much help is needed moving to and from a bed to a chair?: A Little  How much help is needed standing up from a chair using your arms?: A Little  How much help is needed walking in hospital room?: A Little  How much help is needed climbing 3-5 steps with a railing?: Total  AM-PAC Inpatient Mobility Raw Score : 18  AM-PAC Inpatient T-Scale Score : 43.63  Mobility Inpatient CMS 0-100% Score: 46.58  Mobility Inpatient CMS G-Code Modifier : CK    Goals  Short Term Goals  Time Frame for Short Term Goals: 8 visits  Short Term Goal 1: pt to demo all bed mobility with Kirby  Short Term Goal 2: pt to demo safe functional transfers with RW and MinAx1  Short Term Goal 3: pt to progress to ambulation of 48' with turning using RW and MinAx1  Short Term Goal 4: pt to demo G technique for RLE ROM and gentle strengthening per reviewing handout without need for VCs  Short Term Goal 5: pt to tolerate standing x10 minutes to improve tolerance for mobility and further activity/ADL participation  Patient Goals   Patient Goals : to walk    Education  Patient Education  Education Given To: Patient  Education Provided: Role of Therapy;Precautions  Education Provided Comments: Education on ice however pt declined ice.   Education Method: Verbal  Barriers to Learning: Lack of Family Support (No family present)  Education Outcome: Verbalized understanding    Therapy Time   Individual Concurrent Group Co-treatment   Time In 1449         Time Out 1531         Minutes [address] [place], [place]

## 2023-08-04 NOTE — PROGRESS NOTES
[facility]   OCCUPATIONAL THERAPY MISSED TREATMENT NOTE   INPATIENT   Date: 23  Patient Name: Handy Lester       Room: 3976/9480-96  MRN: [id number]   Account #: [de-identified]    : 1960  (58 y.o.)  Gender: female   Referring Practitioner: Miguel Buckner MD  Diagnosis: Closed displaced intertrochanteric fracture of R femur             REASON FOR MISSED TREATMENT:  Patient declined   [phone]    pt emotional as writer arrives. After writer explaining POC and giving much encouragement and pt educated on the benefits of working c therapy pt states \"I just want to die. \" Leon Mccracken RN notified of refusal and statement from pt. Will continue to follow for OT needs.         Electronically signed by LADONNA Cuenca on 23 at 10:14 AM EDT

## 2023-08-04 NOTE — CARE COORDINATION
Writer spoke to Estee at [facility] regarding pre-cert. No new update.   Electronically signed by YOLANDA Da Silva on 8/4/2023 at 1:44 PM

## 2023-08-04 NOTE — PLAN OF CARE
Problem: Discharge Planning  Goal: Discharge to home or other facility with appropriate resources  8/4/2023 0022 by Edgard Driscoll RN  Outcome: Progressing  8/3/2023 1642 by King Reynolds RN  Outcome: Progressing  Note: Awaiting precert for [facility], continue to work with PT/OT     Problem: Pain  Goal: Verbalizes/displays adequate comfort level or baseline comfort level  8/4/2023 0022 by Edgard Driscoll RN  Outcome: Progressing  8/3/2023 1642 by King Reynolds RN  Note: Continue to monitor for pain and maintain adequate pain control     Problem: Safety - Adult  Goal: Free from fall injury  Outcome: Progressing     Problem: Skin/Tissue Integrity  Goal: Absence of new skin breakdown  Description: 1. Monitor for areas of redness and/or skin breakdown  2. Assess vascular access sites hourly  3. Every 4-6 hours minimum:  Change oxygen saturation probe site  4. Every 4-6 hours:  If on nasal continuous positive airway pressure, respiratory therapy assess nares and determine need for appliance change or resting period.   Outcome: Progressing     Problem: Nutrition Deficit:  Goal: Optimize nutritional status  Outcome: Progressing

## 2023-08-04 NOTE — PROGRESS NOTES
[facility] Internal Medicine  Rosy Neal MD; Phong Gee MD; Zacarias Haskins MD; MD Orlando Ott MD; Leopoldo Orantes MD    [facility] Internal Medicine   [address]     Progress note            Date:   8/4/2023  Patient name:  Juan Carlos Wilhelm  Date of admission:  7/28/2023  1:04 PM  MRN:   [id number]  Account:  [de-identified]  YOB: 1960  PCP:    Gayla Delgado MD  Room:   [address] [place] [address]  Code Status:    Full Code      Chief Complaint:     Chief Complaint   Patient presents with    Fall    Hip Pain   Fall right hip pain    History Obtained From:     Patient medical record nursing staff    History of Present Illness:     69-year-old lady with end-stage COPD on home oxygen 2 to 3 L    She has quit smoking she has a respiratory cachexia with severe protein calorie malnutrition BMI 18.29 history of cervical cancer status post hysterectomy  She just got discharged from [facility] after COPD exacerbation on oral steroid she tripped and fell over her oxygen tubing complains of right hip pain unable to mobilize severity 10 out of 10 with no radiation no chest pain no nausea vomiting chronic respiratory failure home oxygen    8/3  Patient seen and examined, did not have any bowel movement from last 1 week, on MiraLAX  Vitals have been stable, complaining of pain    Past Medical History:     Past Medical History:   Diagnosis Date    Acid reflux     Anxiety     Arthritis     Cancer ([address])     cervical    Cervical osteoarthritis 11/30/2020    COPD (chronic obstructive pulmonary disease) ([address]) 02/23/2018    DDD (degenerative disc disease), cervical     Depression     Hiatal hernia     History of blood transfusion     Reaction fever went up to 107 . per Pt.      On home O2     Spinal stenosis     Tachycardia     Thyroid disease     hypo        Past Surgical History:     Past Surgical History:   Procedure Laterality Date in the fracture site area  On examination cardiopulmonary unremarkable    Patient is awaiting placement to a skilled nursing facility          Kristan Nuno MD  8/4/2023  9:26 AM    Copy sent to Dr. Jose Manuel De La Vega MD    Please note that this chart was generated using voice recognition Dragon dictation software. Although every effort was made to ensure the accuracy of this automated transcription, some errors in transcription may have occurred.

## 2023-08-05 PROCEDURE — 6360000002 HC RX W HCPCS: Performed by: ORTHOPAEDIC SURGERY

## 2023-08-05 PROCEDURE — 6370000000 HC RX 637 (ALT 250 FOR IP): Performed by: ORTHOPAEDIC SURGERY

## 2023-08-05 PROCEDURE — 97116 GAIT TRAINING THERAPY: CPT

## 2023-08-05 PROCEDURE — 2580000003 HC RX 258: Performed by: ORTHOPAEDIC SURGERY

## 2023-08-05 PROCEDURE — 94761 N-INVAS EAR/PLS OXIMETRY MLT: CPT

## 2023-08-05 PROCEDURE — 94640 AIRWAY INHALATION TREATMENT: CPT

## 2023-08-05 PROCEDURE — 1200000000 HC SEMI PRIVATE

## 2023-08-05 PROCEDURE — 6370000000 HC RX 637 (ALT 250 FOR IP): Performed by: INTERNAL MEDICINE

## 2023-08-05 PROCEDURE — 6370000000 HC RX 637 (ALT 250 FOR IP): Performed by: NURSE PRACTITIONER

## 2023-08-05 PROCEDURE — 97530 THERAPEUTIC ACTIVITIES: CPT

## 2023-08-05 RX ADMIN — LEVOTHYROXINE SODIUM 88 MCG: 0.09 TABLET ORAL at 06:10

## 2023-08-05 RX ADMIN — CLONAZEPAM 0.5 MG: 1 TABLET ORAL at 15:15

## 2023-08-05 RX ADMIN — HYDROCODONE BITARTRATE AND ACETAMINOPHEN 1 TABLET: 5; 325 TABLET ORAL at 19:44

## 2023-08-05 RX ADMIN — TIZANIDINE 4 MG: 4 TABLET ORAL at 16:04

## 2023-08-05 RX ADMIN — CARVEDILOL 6.25 MG: 6.25 TABLET, FILM COATED ORAL at 07:18

## 2023-08-05 RX ADMIN — HYDROCODONE BITARTRATE AND ACETAMINOPHEN 1 TABLET: 5; 325 TABLET ORAL at 15:15

## 2023-08-05 RX ADMIN — CLONAZEPAM 0.5 MG: 1 TABLET ORAL at 06:10

## 2023-08-05 RX ADMIN — ALBUTEROL SULFATE 2.5 MG: 2.5 SOLUTION RESPIRATORY (INHALATION) at 03:13

## 2023-08-05 RX ADMIN — ALBUTEROL SULFATE 2.5 MG: 2.5 SOLUTION RESPIRATORY (INHALATION) at 14:52

## 2023-08-05 RX ADMIN — OXYCODONE HYDROCHLORIDE 10 MG: 10 TABLET, FILM COATED, EXTENDED RELEASE ORAL at 07:18

## 2023-08-05 RX ADMIN — ALBUTEROL SULFATE 2.5 MG: 2.5 SOLUTION RESPIRATORY (INHALATION) at 19:31

## 2023-08-05 RX ADMIN — TIOTROPIUM BROMIDE AND OLODATEROL 2 PUFF: 3.124; 2.736 SPRAY, METERED RESPIRATORY (INHALATION) at 07:18

## 2023-08-05 RX ADMIN — VENLAFAXINE HYDROCHLORIDE 150 MG: 150 CAPSULE, EXTENDED RELEASE ORAL at 07:18

## 2023-08-05 RX ADMIN — SUCRALFATE 1 G: 1 TABLET ORAL at 11:03

## 2023-08-05 RX ADMIN — ENOXAPARIN SODIUM 30 MG: 100 INJECTION SUBCUTANEOUS at 07:18

## 2023-08-05 RX ADMIN — SUCRALFATE 1 G: 1 TABLET ORAL at 17:12

## 2023-08-05 RX ADMIN — CLONAZEPAM 0.5 MG: 1 TABLET ORAL at 23:29

## 2023-08-05 RX ADMIN — SUCRALFATE 1 G: 1 TABLET ORAL at 06:10

## 2023-08-05 RX ADMIN — OXYCODONE HYDROCHLORIDE 10 MG: 10 TABLET, FILM COATED, EXTENDED RELEASE ORAL at 21:14

## 2023-08-05 RX ADMIN — TIZANIDINE 4 MG: 4 TABLET ORAL at 02:04

## 2023-08-05 RX ADMIN — HYDROCODONE BITARTRATE AND ACETAMINOPHEN 1 TABLET: 5; 325 TABLET ORAL at 11:03

## 2023-08-05 RX ADMIN — HYDROCODONE BITARTRATE AND ACETAMINOPHEN 1 TABLET: 5; 325 TABLET ORAL at 02:04

## 2023-08-05 RX ADMIN — MIRTAZAPINE 15 MG: 15 TABLET, FILM COATED ORAL at 19:44

## 2023-08-05 RX ADMIN — CARVEDILOL 6.25 MG: 6.25 TABLET, FILM COATED ORAL at 17:12

## 2023-08-05 RX ADMIN — TIZANIDINE 4 MG: 4 TABLET ORAL at 09:33

## 2023-08-05 RX ADMIN — MONTELUKAST 10 MG: 10 TABLET, FILM COATED ORAL at 07:18

## 2023-08-05 RX ADMIN — PRIMIDONE 100 MG: 50 TABLET ORAL at 19:46

## 2023-08-05 RX ADMIN — ALBUTEROL SULFATE 2.5 MG: 2.5 SOLUTION RESPIRATORY (INHALATION) at 07:08

## 2023-08-05 RX ADMIN — LINACLOTIDE 72 MCG: 72 CAPSULE, GELATIN COATED ORAL at 06:10

## 2023-08-05 RX ADMIN — SODIUM CHLORIDE, PRESERVATIVE FREE 10 ML: 5 INJECTION INTRAVENOUS at 07:19

## 2023-08-05 RX ADMIN — PANTOPRAZOLE SODIUM 40 MG: 40 TABLET, DELAYED RELEASE ORAL at 16:04

## 2023-08-05 RX ADMIN — Medication 10.5 MG: at 21:13

## 2023-08-05 RX ADMIN — ALBUTEROL SULFATE 2.5 MG: 2.5 SOLUTION RESPIRATORY (INHALATION) at 23:43

## 2023-08-05 RX ADMIN — SODIUM CHLORIDE, PRESERVATIVE FREE 10 ML: 5 INJECTION INTRAVENOUS at 19:45

## 2023-08-05 RX ADMIN — ALBUTEROL SULFATE 2.5 MG: 2.5 SOLUTION RESPIRATORY (INHALATION) at 10:40

## 2023-08-05 RX ADMIN — SUCRALFATE 1 G: 1 TABLET ORAL at 21:13

## 2023-08-05 RX ADMIN — HYDROCODONE BITARTRATE AND ACETAMINOPHEN 1 TABLET: 5; 325 TABLET ORAL at 06:10

## 2023-08-05 RX ADMIN — PANTOPRAZOLE SODIUM 40 MG: 40 TABLET, DELAYED RELEASE ORAL at 06:10

## 2023-08-05 RX ADMIN — TIZANIDINE 4 MG: 4 TABLET ORAL at 23:26

## 2023-08-05 ASSESSMENT — PAIN DESCRIPTION - ORIENTATION
ORIENTATION: RIGHT
ORIENTATION: MID
ORIENTATION: RIGHT

## 2023-08-05 ASSESSMENT — PAIN SCALES - GENERAL
PAINLEVEL_OUTOF10: 9
PAINLEVEL_OUTOF10: 8
PAINLEVEL_OUTOF10: 9
PAINLEVEL_OUTOF10: 8
PAINLEVEL_OUTOF10: 8
PAINLEVEL_OUTOF10: 7

## 2023-08-05 ASSESSMENT — PAIN DESCRIPTION - LOCATION
LOCATION: HIP
LOCATION: LEG;ANKLE
LOCATION: HIP
LOCATION: HIP;ANKLE
LOCATION: ANKLE;LEG
LOCATION: ANKLE;LEG
LOCATION: HIP

## 2023-08-05 ASSESSMENT — PAIN DESCRIPTION - DESCRIPTORS
DESCRIPTORS: ACHING

## 2023-08-05 NOTE — PROGRESS NOTES
Physical Therapy  Facility/Department: [facility] MED SURG  Daily Treatment Note  NAME: Ritu Soler  : 1960  MRN: [id number]    Date of Service: 2023    Discharge Recommendations:  Patient would benefit from continued therapy after discharge, Therapy recommended at discharge        Patient Diagnosis(es): The encounter diagnosis was Closed displaced intertrochanteric fracture of right femur, initial encounter ([address]). Assessment   Activity Tolerance: Patient tolerated treatment well     Plan    Physcial Therapy Plan  General Plan: 2 times a day 7 days a week  Specific Instructions for Next Treatment: Progress to transfers with RW and ambulation; Provide Supine/seated HEP for Gentle strengthening & ROM. Continue to monitor SpO2 and BPs, if stable please progress mobility as tolerated. Current Treatment Recommendations: Strengthening;ROM;Balance training;Functional mobility training;Transfer training; Endurance training;Gait training;Pain management;Home exercise program;Safety education & training;Patient/Caregiver education & training;Positioning; Therapeutic activities     Restrictions  Restrictions/Precautions  Restrictions/Precautions: Weight Bearing, Fall Risk, General Precautions  Required Braces or Orthoses?: No  Implants present? :  (R femur)  Lower Extremity Weight Bearing Restrictions  Right Lower Extremity Weight Bearing: Weight Bearing As Tolerated  Position Activity Restriction  Other position/activity restrictions: S/P R Hip IMN by Dr. Precious Jiménez; Work on gait and gentle right hip ROM/strength; RLE WBAT     Subjective    Subjective  Subjective: pt is agreeable to progression in walking mobility this date.   Orientation  Overall Orientation Status: Within Functional Limits  Orientation Level: Oriented X4  Cognition  Overall Cognitive Status: WFL     Objective   Vitals  Pulse: 81  SpO2: 96 %  O2 Device: Nasal cannula (3L O2)  Bed Mobility Training  Bed Mobility Training: Yes  Overall Level of Assistance: Stand-by assistance; Additional time  Interventions: Verbal cues (sequencing and positioning of scooting task)  Rolling: Independent  Supine to Sit: Additional time;Supervision  Sit to Supine: Additional time;Supervision  Scooting: Stand-by assistance  Transfer Training  Transfer Training: Yes  Overall Level of Assistance: Assist X1;Contact-guard assistance  Interventions: Verbal cues; Safety awareness training (VCs for safe hand placement)  Sit to Stand: Assist X1;Contact-guard assistance (RW)  Stand to Sit: Assist X1;Contact-guard assistance (RW)  Stand Pivot Transfers: Contact-guard assistance;Assist X1  Bed to Chair: Contact-guard assistance;Assist X1 (kae steady)  Toilet Transfer: Contact-guard assistance; Additional time;Setup;Assist X1  Gait Training  Gait Training: Yes  Right Side Weight Bearing: As tolerated  Gait  Overall Level of Assistance: Stand-by assistance  Interventions: Verbal cues; Safety awareness training  Base of Support: Narrowed  Speed/Dorita: Slow  Step Length: Left shortened;Right shortened  Gait Abnormalities: Antalgic (Flexed trunk/hips - VCs for posture and inc step length bilaterally)  Distance (ft):  (6'x2 Bed <> Toilet. 45'x2 hallway ambulation with standing rest break.)  Assistive Device: Gait belt;Walker, rolling     PT Exercises  Dynamic Standing Balance Exercises: Dynamic standing toe taps onto 4\" box step R/LLEs; BUE support on RW  Disease-specific Exercises: Edu on progressive activity and therapy POC;  Offered Ice pack cryotherapy however pt politely declines ice         Goals  Short Term Goals  Time Frame for Short Term Goals: 8 visits  Short Term Goal 1: pt to demo all bed mobility with Kirby  Short Term Goal 2: pt to demo safe functional transfers with RW and MinAx1  Short Term Goal 3: pt to progress to ambulation of 48' with turning using RW and MinAx1  Short Term Goal 4: pt to demo G technique for RLE ROM and gentle strengthening per reviewing handout without need for

## 2023-08-05 NOTE — PLAN OF CARE
Problem: Discharge Planning  Goal: Discharge to home or other facility with appropriate resources  8/5/2023 0421 by Cody Mcginnis RN  Outcome: Progressing  8/4/2023 1737 by Carlos Roldan RN  Outcome: Progressing     Problem: Pain  Goal: Verbalizes/displays adequate comfort level or baseline comfort level  8/5/2023 0421 by Cody Mcginnis RN  Outcome: Progressing  8/4/2023 1737 by Carlos Roldan RN  Outcome: Progressing     Problem: Safety - Adult  Goal: Free from fall injury  8/5/2023 0421 by Cody Mcginnis RN  Outcome: Progressing  8/4/2023 1737 by Carlos Roldan RN  Outcome: Progressing     Problem: Skin/Tissue Integrity  Goal: Absence of new skin breakdown  Description: 1. Monitor for areas of redness and/or skin breakdown  2. Assess vascular access sites hourly  3. Every 4-6 hours minimum:  Change oxygen saturation probe site  4. Every 4-6 hours:  If on nasal continuous positive airway pressure, respiratory therapy assess nares and determine need for appliance change or resting period.   8/5/2023 0421 by Cody Mcginnis RN  Outcome: Progressing  8/4/2023 1737 by Carlos Roldan RN  Outcome: Progressing     Problem: Nutrition Deficit:  Goal: Optimize nutritional status  8/5/2023 0421 by Cody Mcginnis RN  Outcome: Progressing  8/4/2023 1737 by Carlos Roldan RN  Outcome: Progressing     Problem: ABCDS Injury Assessment  Goal: Absence of physical injury  8/5/2023 0421 by Cody Mcginnis RN  Outcome: Progressing  8/4/2023 1737 by Carlos Roldan RN  Outcome: Progressing

## 2023-08-05 NOTE — PLAN OF CARE
Problem: Discharge Planning  Goal: Discharge to home or other facility with appropriate resources  8/5/2023 1737 by Verona Chapin RN  Outcome: Progressing     Problem: Pain  Goal: Verbalizes/displays adequate comfort level or baseline comfort level  8/5/2023 1737 by Verona Chapin RN  Outcome: Progressing     Problem: Safety - Adult  Goal: Free from fall injury  8/5/2023 1737 by Verona Chapin RN  Outcome: Progressing     Problem: Skin/Tissue Integrity  Goal: Absence of new skin breakdown  Description: 1. Monitor for areas of redness and/or skin breakdown  2. Assess vascular access sites hourly  3. Every 4-6 hours minimum:  Change oxygen saturation probe site  4. Every 4-6 hours:  If on nasal continuous positive airway pressure, respiratory therapy assess nares and determine need for appliance change or resting period.   8/5/2023 1737 by Verona Chapin RN  Outcome: Progressing     Problem: Nutrition Deficit:  Goal: Optimize nutritional status  8/5/2023 1737 by Verona Chapin RN  Outcome: Progressing     Problem: ABCDS Injury Assessment  Goal: Absence of physical injury  8/5/2023 1737 by Verona Chapin RN  Outcome: Progressing

## 2023-08-06 PROCEDURE — 1200000000 HC SEMI PRIVATE

## 2023-08-06 PROCEDURE — 6370000000 HC RX 637 (ALT 250 FOR IP): Performed by: ORTHOPAEDIC SURGERY

## 2023-08-06 PROCEDURE — 2580000003 HC RX 258: Performed by: ORTHOPAEDIC SURGERY

## 2023-08-06 PROCEDURE — 94761 N-INVAS EAR/PLS OXIMETRY MLT: CPT

## 2023-08-06 PROCEDURE — 6360000002 HC RX W HCPCS: Performed by: ORTHOPAEDIC SURGERY

## 2023-08-06 PROCEDURE — 6370000000 HC RX 637 (ALT 250 FOR IP): Performed by: INTERNAL MEDICINE

## 2023-08-06 PROCEDURE — 6370000000 HC RX 637 (ALT 250 FOR IP): Performed by: NURSE PRACTITIONER

## 2023-08-06 PROCEDURE — 2700000000 HC OXYGEN THERAPY PER DAY

## 2023-08-06 PROCEDURE — 99232 SBSQ HOSP IP/OBS MODERATE 35: CPT | Performed by: INTERNAL MEDICINE

## 2023-08-06 PROCEDURE — 94640 AIRWAY INHALATION TREATMENT: CPT

## 2023-08-06 RX ADMIN — OXYCODONE HYDROCHLORIDE 10 MG: 10 TABLET, FILM COATED, EXTENDED RELEASE ORAL at 20:47

## 2023-08-06 RX ADMIN — MONTELUKAST 10 MG: 10 TABLET, FILM COATED ORAL at 08:14

## 2023-08-06 RX ADMIN — LINACLOTIDE 72 MCG: 72 CAPSULE, GELATIN COATED ORAL at 06:39

## 2023-08-06 RX ADMIN — PRIMIDONE 100 MG: 50 TABLET ORAL at 20:48

## 2023-08-06 RX ADMIN — SUCRALFATE 1 G: 1 TABLET ORAL at 20:47

## 2023-08-06 RX ADMIN — LEVOTHYROXINE SODIUM 88 MCG: 0.09 TABLET ORAL at 06:44

## 2023-08-06 RX ADMIN — OXYCODONE HYDROCHLORIDE 10 MG: 10 TABLET, FILM COATED, EXTENDED RELEASE ORAL at 08:14

## 2023-08-06 RX ADMIN — CLONAZEPAM 0.5 MG: 1 TABLET ORAL at 08:14

## 2023-08-06 RX ADMIN — CLONAZEPAM 0.5 MG: 1 TABLET ORAL at 17:15

## 2023-08-06 RX ADMIN — PANTOPRAZOLE SODIUM 40 MG: 40 TABLET, DELAYED RELEASE ORAL at 14:56

## 2023-08-06 RX ADMIN — HYDROCODONE BITARTRATE AND ACETAMINOPHEN 1 TABLET: 5; 325 TABLET ORAL at 03:32

## 2023-08-06 RX ADMIN — ALBUTEROL SULFATE 2.5 MG: 2.5 SOLUTION RESPIRATORY (INHALATION) at 03:15

## 2023-08-06 RX ADMIN — SODIUM CHLORIDE, PRESERVATIVE FREE 10 ML: 5 INJECTION INTRAVENOUS at 20:47

## 2023-08-06 RX ADMIN — CARVEDILOL 6.25 MG: 6.25 TABLET, FILM COATED ORAL at 17:12

## 2023-08-06 RX ADMIN — ALBUTEROL SULFATE 2.5 MG: 2.5 SOLUTION RESPIRATORY (INHALATION) at 06:50

## 2023-08-06 RX ADMIN — SUCRALFATE 1 G: 1 TABLET ORAL at 06:39

## 2023-08-06 RX ADMIN — SUCRALFATE 1 G: 1 TABLET ORAL at 17:11

## 2023-08-06 RX ADMIN — HYDROCODONE BITARTRATE AND ACETAMINOPHEN 1 TABLET: 5; 325 TABLET ORAL at 18:55

## 2023-08-06 RX ADMIN — Medication 10.5 MG: at 20:47

## 2023-08-06 RX ADMIN — CARVEDILOL 6.25 MG: 6.25 TABLET, FILM COATED ORAL at 08:14

## 2023-08-06 RX ADMIN — HYDROCODONE BITARTRATE AND ACETAMINOPHEN 1 TABLET: 5; 325 TABLET ORAL at 23:28

## 2023-08-06 RX ADMIN — HYDROCODONE BITARTRATE AND ACETAMINOPHEN 1 TABLET: 5; 325 TABLET ORAL at 14:56

## 2023-08-06 RX ADMIN — MIRTAZAPINE 15 MG: 15 TABLET, FILM COATED ORAL at 20:50

## 2023-08-06 RX ADMIN — ALBUTEROL SULFATE 2.5 MG: 2.5 SOLUTION RESPIRATORY (INHALATION) at 19:04

## 2023-08-06 RX ADMIN — TIZANIDINE 4 MG: 4 TABLET ORAL at 20:47

## 2023-08-06 RX ADMIN — ALBUTEROL SULFATE 2.5 MG: 2.5 SOLUTION RESPIRATORY (INHALATION) at 23:06

## 2023-08-06 RX ADMIN — ALBUTEROL SULFATE 2.5 MG: 2.5 SOLUTION RESPIRATORY (INHALATION) at 14:45

## 2023-08-06 RX ADMIN — TIZANIDINE 4 MG: 4 TABLET ORAL at 06:44

## 2023-08-06 RX ADMIN — VENLAFAXINE HYDROCHLORIDE 150 MG: 150 CAPSULE, EXTENDED RELEASE ORAL at 08:14

## 2023-08-06 RX ADMIN — PANTOPRAZOLE SODIUM 40 MG: 40 TABLET, DELAYED RELEASE ORAL at 06:39

## 2023-08-06 RX ADMIN — ENOXAPARIN SODIUM 30 MG: 100 INJECTION SUBCUTANEOUS at 08:14

## 2023-08-06 RX ADMIN — TIOTROPIUM BROMIDE AND OLODATEROL 2 PUFF: 3.124; 2.736 SPRAY, METERED RESPIRATORY (INHALATION) at 07:00

## 2023-08-06 RX ADMIN — SUCRALFATE 1 G: 1 TABLET ORAL at 10:53

## 2023-08-06 RX ADMIN — SODIUM CHLORIDE, PRESERVATIVE FREE 10 ML: 5 INJECTION INTRAVENOUS at 08:15

## 2023-08-06 RX ADMIN — HYDROCODONE BITARTRATE AND ACETAMINOPHEN 1 TABLET: 5; 325 TABLET ORAL at 10:53

## 2023-08-06 RX ADMIN — ALBUTEROL SULFATE 2.5 MG: 2.5 SOLUTION RESPIRATORY (INHALATION) at 11:13

## 2023-08-06 RX ADMIN — TIZANIDINE 4 MG: 4 TABLET ORAL at 12:52

## 2023-08-06 ASSESSMENT — PAIN DESCRIPTION - LOCATION
LOCATION: LEG;ANKLE
LOCATION: HIP
LOCATION: ANKLE;LEG
LOCATION: HIP
LOCATION: ANKLE;HIP
LOCATION: HIP
LOCATION: ANKLE;LEG
LOCATION: HIP

## 2023-08-06 ASSESSMENT — PAIN DESCRIPTION - DESCRIPTORS
DESCRIPTORS: ACHING
DESCRIPTORS: ACHING
DESCRIPTORS: ACHING;BURNING
DESCRIPTORS: ACHING
DESCRIPTORS: ACHING
DESCRIPTORS: ACHING;BURNING
DESCRIPTORS: ACHING
DESCRIPTORS: ACHING;BURNING
DESCRIPTORS: ACHING
DESCRIPTORS: ACHING;BURNING

## 2023-08-06 ASSESSMENT — PAIN DESCRIPTION - FREQUENCY
FREQUENCY: CONTINUOUS

## 2023-08-06 ASSESSMENT — PAIN SCALES - WONG BAKER
WONGBAKER_NUMERICALRESPONSE: 2

## 2023-08-06 ASSESSMENT — PAIN - FUNCTIONAL ASSESSMENT
PAIN_FUNCTIONAL_ASSESSMENT: PREVENTS OR INTERFERES SOME ACTIVE ACTIVITIES AND ADLS

## 2023-08-06 ASSESSMENT — PAIN DESCRIPTION - ORIENTATION
ORIENTATION: RIGHT
ORIENTATION: RIGHT
ORIENTATION: LEFT
ORIENTATION: RIGHT
ORIENTATION: LEFT
ORIENTATION: LEFT

## 2023-08-06 ASSESSMENT — PAIN SCALES - GENERAL
PAINLEVEL_OUTOF10: 8
PAINLEVEL_OUTOF10: 6
PAINLEVEL_OUTOF10: 9
PAINLEVEL_OUTOF10: 8
PAINLEVEL_OUTOF10: 7
PAINLEVEL_OUTOF10: 6
PAINLEVEL_OUTOF10: 9
PAINLEVEL_OUTOF10: 8
PAINLEVEL_OUTOF10: 6
PAINLEVEL_OUTOF10: 8
PAINLEVEL_OUTOF10: 6

## 2023-08-06 ASSESSMENT — PAIN DESCRIPTION - ONSET
ONSET: ON-GOING

## 2023-08-06 ASSESSMENT — PAIN DESCRIPTION - PAIN TYPE
TYPE: SURGICAL PAIN

## 2023-08-06 NOTE — CARE COORDINATION
ONGOING DISCHARGE PLAN:    Plan remains for patient to be discharged to [facility] with pre-cert started 8/2. POD#8 right femur IM nailing. Will continue to follow for additional discharge needs. If patient is discharged prior to next notation, then this note serves as note for discharge by case management.     Electronically signed by Alli Fowler RN on 8/6/2023 at 8:37 AM

## 2023-08-06 NOTE — PROGRESS NOTES
BRONCHOSPASM/BRONCHOCONSTRICTION          [x]         IMPROVE AERATION/BREATH SOUNDS  [x]         ADMINISTER BRONCHODILATOR THERAPY AS APPROPRIATE  [x]         ASSESS BREATH SOUNDS  []         IMPLEMENT AEROSOL/MDI PROTOCOL  [x]         PATIENT EDUCATION AS NEEDED     Pt stable on 3LNC

## 2023-08-06 NOTE — PROGRESS NOTES
Physical Therapy        Physical Therapy Cancel Note      DATE: 2023    NAME: Chelsie Lazcano  MRN: [id number]   : 1960      Patient not seen this date for Physical Therapy due to:    Pt declined PT today due to difficulty breathing. Pt observed with labored breathig with SpO2 noted to be 89% on 2L. Pt reorts that she is usually on 3L but \"they\" turned it down today beacause she was \" doing better earlier\". Increased O2 to 3L with SpO2 increased to 94% after several minutes.  LILY Brice notified      Electronically signed by Fernando De Leon PT on 2023 at 1:57 PM

## 2023-08-06 NOTE — PROGRESS NOTES
08/06/23 1530   Encounter Summary   Encounter Overview/Reason  Spiritual/Emotional Needs   Service Provided For: Patient   [facility]   Support System Spouse   Last Encounter  08/06/23   Complexity of Encounter Low   Spiritual/Emotional needs   Type Spiritual Support   Assessment/Intervention/Outcome   Assessment Calm;Coping   Intervention Active listening;Explored/Affirmed feelings, thoughts, concerns;Prayer (assurance of)/Lapel;Sustaining Presence/Ministry of presence   Outcome Comfort;Coping;Engaged in conversation;Expressed feelings, needs, and concerns;Expressed Gratitude;Receptive

## 2023-08-06 NOTE — PLAN OF CARE
Problem: Discharge Planning  Goal: Discharge to home or other facility with appropriate resources  8/6/2023 0435 by Tay Gibbons RN  Outcome: Progressing     Problem: Pain  Goal: Verbalizes/displays adequate comfort level or baseline comfort level  8/6/2023 0435 by Tay Gibbons RN  Outcome: Progressing     Problem: Safety - Adult  Goal: Free from fall injury  8/6/2023 0435 by Tay Gibbons RN  Outcome: Progressing     Problem: Skin/Tissue Integrity  Goal: Absence of new skin breakdown  Description: 1. Monitor for areas of redness and/or skin breakdown  2. Assess vascular access sites hourly  3. Every 4-6 hours minimum:  Change oxygen saturation probe site  4. Every 4-6 hours:  If on nasal continuous positive airway pressure, respiratory therapy assess nares and determine need for appliance change or resting period.   8/6/2023 0435 by Tay Gibbons RN  Outcome: Progressing     Problem: Nutrition Deficit:  Goal: Optimize nutritional status  8/6/2023 0435 by Tay Gibbons RN  Outcome: Progressing     Problem: ABCDS Injury Assessment  Goal: Absence of physical injury  8/6/2023 0435 by Tay Gibbons RN  Outcome: Progressing

## 2023-08-06 NOTE — PROGRESS NOTES
[facility] Internal Medicine  Carla Harley MD; Almas Sepulveda MD; Mario Diamond MD; Marylen Dress, MD Cherisse Daft, MD; Margene Homans, MD [facility]   [address]     Progress note            Date:   8/6/2023  Patient name:  Keagan Palma  Date of admission:  7/28/2023  1:04 PM  MRN:   [id number]  Account:  [de-identified]  YOB: 1960  PCP:    Lorne Zheng MD  Room:   [facility]27[address]-  Code Status:    Full Code      Chief Complaint:     Chief Complaint   Patient presents with    Fall    Hip Pain   Fall right hip pain    History Obtained From:     Patient medical record nursing staff    History of Present Illness:     71-year-old lady with end-stage COPD on home oxygen 2 to 3 L    She has quit smoking she has a respiratory cachexia with severe protein calorie malnutrition BMI 18.29 history of cervical cancer status post hysterectomy  She just got discharged from [place] after COPD exacerbation on oral steroid she tripped and fell over her oxygen tubing complains of right hip pain unable to mobilize severity 10 out of 10 with no radiation no chest pain no nausea vomiting chronic respiratory failure home oxygen    8/3  Patient seen and examined, did not have any bowel movement from last 1 week, on MiraLAX  Vitals have been stable, complaining of pain    Past Medical History:     Past Medical History:   Diagnosis Date    Acid reflux     Anxiety     Arthritis     Cancer ([address])     cervical    Cervical osteoarthritis 11/30/2020    COPD (chronic obstructive pulmonary disease) ([address]) 02/23/2018    DDD (degenerative disc disease), cervical     Depression     Hiatal hernia     History of blood transfusion     Reaction fever went up to 107 . per Pt.      On home O2     Spinal stenosis     Tachycardia     Thyroid disease     hypo        Past Surgical History:     Past Surgical History:   Procedure Laterality Date lady with end-stage COPD on home oxygen 2 to 3 L    She has quit smoking she has a respiratory cachexia with severe protein calorie malnutrition BMI 18.29 history of cervical cancer status post hysterectomy  She just got discharged from [place] after COPD exacerbation on oral steroid she tripped and fell over her oxygen tubing complains of right hip pain unable to mobilize severity 10 out of 10 with no radiation no chest pain no nausea vomiting chronic respiratory failure home oxygen    Case discussed bedside with orthopedic surgeon Dr. Georgia Escamilla  Patient is high risk from respiratory standpoint will need ICU admission postop consult pulmonary critical care patient is well-known to Dr. Deshawn Garcia need a prolonged intubation may end up with tracheostomy  Surgeon will evaluate and discuss with anesthesia for spinal anesthesia if possible  Medically cleared for right hip ORIF   Pathological fracture hip likely secondary to osteoporosis rule out osteolytic lesion rule out myeloma  DVT prophylaxis heparin 5000 3 times daily  Pain control suboptimal discontinue IV morphine add OxyContin 10 extended release twice a day and as needed Norco  Patient was denied for ARU pending pre-CERT for general care    8/3  Give Linzess for constipation,  On OxyContin and as needed Norco  Her vitals has been stable  Labs reviewed, hemoglobin has been stable  DVT prophylaxis Lovenox.   Consultations:   IP CONSULT TO ORTHOPEDIC SURGERY  IP CONSULT TO PRIMARY CARE PROVIDER  IP CONSULT TO PULMONOLOGY  IP CONSULT TO CASE MANAGEMENT  IP CONSULT TO PHYSICAL MEDICINE REHAB      8/4/23      58-year-old female  With advanced COPD chronic hypoxic respiratory failure on home oxygen  Drip and Flagyl at home and sustained right femur fracture  Postoperatively patient is progressing satisfactory     She is emaciated  Low BMI  Respite status is stable     Vitals are stable  On 3 L oxygen  Oxygen saturation is 97  Patient complains of pain

## 2023-08-07 PROBLEM — J44.9 COPD (CHRONIC OBSTRUCTIVE PULMONARY DISEASE) (HCC): Status: ACTIVE | Noted: 2023-07-10

## 2023-08-07 PROBLEM — J96.11 CHRONIC RESPIRATORY FAILURE WITH HYPOXIA (HCC): Status: ACTIVE | Noted: 2023-08-07

## 2023-08-07 PROCEDURE — 6370000000 HC RX 637 (ALT 250 FOR IP): Performed by: ORTHOPAEDIC SURGERY

## 2023-08-07 PROCEDURE — 6370000000 HC RX 637 (ALT 250 FOR IP): Performed by: INTERNAL MEDICINE

## 2023-08-07 PROCEDURE — 6370000000 HC RX 637 (ALT 250 FOR IP): Performed by: NURSE PRACTITIONER

## 2023-08-07 PROCEDURE — 97110 THERAPEUTIC EXERCISES: CPT

## 2023-08-07 PROCEDURE — 1200000000 HC SEMI PRIVATE

## 2023-08-07 PROCEDURE — 6360000002 HC RX W HCPCS: Performed by: ORTHOPAEDIC SURGERY

## 2023-08-07 PROCEDURE — 2700000000 HC OXYGEN THERAPY PER DAY

## 2023-08-07 PROCEDURE — 2580000003 HC RX 258: Performed by: ORTHOPAEDIC SURGERY

## 2023-08-07 PROCEDURE — 94640 AIRWAY INHALATION TREATMENT: CPT

## 2023-08-07 PROCEDURE — 94761 N-INVAS EAR/PLS OXIMETRY MLT: CPT

## 2023-08-07 PROCEDURE — 99231 SBSQ HOSP IP/OBS SF/LOW 25: CPT | Performed by: INTERNAL MEDICINE

## 2023-08-07 PROCEDURE — 97116 GAIT TRAINING THERAPY: CPT

## 2023-08-07 PROCEDURE — 97530 THERAPEUTIC ACTIVITIES: CPT

## 2023-08-07 RX ADMIN — CARVEDILOL 6.25 MG: 6.25 TABLET, FILM COATED ORAL at 08:46

## 2023-08-07 RX ADMIN — ENOXAPARIN SODIUM 30 MG: 100 INJECTION SUBCUTANEOUS at 08:46

## 2023-08-07 RX ADMIN — CARVEDILOL 6.25 MG: 6.25 TABLET, FILM COATED ORAL at 17:37

## 2023-08-07 RX ADMIN — TIZANIDINE 4 MG: 4 TABLET ORAL at 13:41

## 2023-08-07 RX ADMIN — TIZANIDINE 4 MG: 4 TABLET ORAL at 19:50

## 2023-08-07 RX ADMIN — ALBUTEROL SULFATE 2.5 MG: 2.5 SOLUTION RESPIRATORY (INHALATION) at 15:02

## 2023-08-07 RX ADMIN — MIRTAZAPINE 15 MG: 15 TABLET, FILM COATED ORAL at 19:50

## 2023-08-07 RX ADMIN — TIOTROPIUM BROMIDE AND OLODATEROL 2 PUFF: 3.124; 2.736 SPRAY, METERED RESPIRATORY (INHALATION) at 08:19

## 2023-08-07 RX ADMIN — HYDROCODONE BITARTRATE AND ACETAMINOPHEN 1 TABLET: 5; 325 TABLET ORAL at 19:50

## 2023-08-07 RX ADMIN — PANTOPRAZOLE SODIUM 40 MG: 40 TABLET, DELAYED RELEASE ORAL at 06:12

## 2023-08-07 RX ADMIN — ALBUTEROL SULFATE 2.5 MG: 2.5 SOLUTION RESPIRATORY (INHALATION) at 03:05

## 2023-08-07 RX ADMIN — SUCRALFATE 1 G: 1 TABLET ORAL at 17:37

## 2023-08-07 RX ADMIN — PRIMIDONE 100 MG: 50 TABLET ORAL at 19:50

## 2023-08-07 RX ADMIN — LINACLOTIDE 72 MCG: 72 CAPSULE, GELATIN COATED ORAL at 06:11

## 2023-08-07 RX ADMIN — HYDROCODONE BITARTRATE AND ACETAMINOPHEN 1 TABLET: 5; 325 TABLET ORAL at 15:46

## 2023-08-07 RX ADMIN — CLONAZEPAM 0.5 MG: 1 TABLET ORAL at 06:12

## 2023-08-07 RX ADMIN — MONTELUKAST 10 MG: 10 TABLET, FILM COATED ORAL at 08:46

## 2023-08-07 RX ADMIN — TIZANIDINE 4 MG: 4 TABLET ORAL at 07:23

## 2023-08-07 RX ADMIN — PANTOPRAZOLE SODIUM 40 MG: 40 TABLET, DELAYED RELEASE ORAL at 15:46

## 2023-08-07 RX ADMIN — ALBUTEROL SULFATE 2.5 MG: 2.5 SOLUTION RESPIRATORY (INHALATION) at 19:10

## 2023-08-07 RX ADMIN — SODIUM CHLORIDE, PRESERVATIVE FREE 10 ML: 5 INJECTION INTRAVENOUS at 08:47

## 2023-08-07 RX ADMIN — HYDROCODONE BITARTRATE AND ACETAMINOPHEN 1 TABLET: 5; 325 TABLET ORAL at 06:11

## 2023-08-07 RX ADMIN — CLONAZEPAM 0.5 MG: 1 TABLET ORAL at 17:40

## 2023-08-07 RX ADMIN — LEVOTHYROXINE SODIUM 88 MCG: 0.09 TABLET ORAL at 06:12

## 2023-08-07 RX ADMIN — SUCRALFATE 1 G: 1 TABLET ORAL at 11:48

## 2023-08-07 RX ADMIN — SUCRALFATE 1 G: 1 TABLET ORAL at 06:11

## 2023-08-07 RX ADMIN — SUCRALFATE 1 G: 1 TABLET ORAL at 19:50

## 2023-08-07 RX ADMIN — ALBUTEROL SULFATE 2.5 MG: 2.5 SOLUTION RESPIRATORY (INHALATION) at 11:45

## 2023-08-07 RX ADMIN — HYDROCODONE BITARTRATE AND ACETAMINOPHEN 1 TABLET: 5; 325 TABLET ORAL at 11:48

## 2023-08-07 RX ADMIN — ALBUTEROL SULFATE 2.5 MG: 2.5 SOLUTION RESPIRATORY (INHALATION) at 07:01

## 2023-08-07 RX ADMIN — VENLAFAXINE HYDROCHLORIDE 150 MG: 150 CAPSULE, EXTENDED RELEASE ORAL at 08:46

## 2023-08-07 RX ADMIN — OXYCODONE HYDROCHLORIDE 10 MG: 10 TABLET, FILM COATED, EXTENDED RELEASE ORAL at 08:46

## 2023-08-07 RX ADMIN — SODIUM CHLORIDE, PRESERVATIVE FREE 10 ML: 5 INJECTION INTRAVENOUS at 19:51

## 2023-08-07 ASSESSMENT — PAIN SCALES - WONG BAKER
WONGBAKER_NUMERICALRESPONSE: 2

## 2023-08-07 ASSESSMENT — PAIN DESCRIPTION - DESCRIPTORS
DESCRIPTORS: ACHING

## 2023-08-07 ASSESSMENT — PAIN DESCRIPTION - ORIENTATION
ORIENTATION: RIGHT

## 2023-08-07 ASSESSMENT — PAIN SCALES - GENERAL
PAINLEVEL_OUTOF10: 9
PAINLEVEL_OUTOF10: 6
PAINLEVEL_OUTOF10: 9
PAINLEVEL_OUTOF10: 9
PAINLEVEL_OUTOF10: 6

## 2023-08-07 ASSESSMENT — PAIN DESCRIPTION - LOCATION
LOCATION: ANKLE;LEG

## 2023-08-07 NOTE — PLAN OF CARE
Problem: Pain  Goal: Verbalizes/displays adequate comfort level or baseline comfort level  Outcome: Progressing  Note: Assess the location, characteristics, onset, duration, frequency, quality, and severity of pain. Encourage immediate report of pain. Use appropriate pain scale to rate pain. Manage pain using nonpharmacologic/pharmacologic interventions. Problem: Safety - Adult  Goal: Free from fall injury  Outcome: Progressing  Note: Patient remains free of falls and injuries throughout shift. Bed remains in the lowest position, wheels locked, call light and bedside table are within reach.

## 2023-08-07 NOTE — PROGRESS NOTES
[facility] Internal Medicine  Sarah Suarez MD; Rainer He MD; Oanh Viera MD; MD Jimena Callahan MD; Yamileth Pollock MD    [facility] Internal Medicine   [address]     Progress note            Date:   8/7/2023  Patient name:  Ritu Soler  Date of admission:  7/28/2023  1:04 PM  MRN:   [id number]  Account:  [de-identified]  YOB: 1960  PCP:    Lupe Gonzales MD  Room:   [address] [place] [address]  Code Status:    Full Code      Chief Complaint:     Chief Complaint   Patient presents with    Fall    Hip Pain   Fall right hip pain    History Obtained From:     Patient medical record nursing staff    History of Present Illness:     71-year-old lady with end-stage COPD on home oxygen 2 to 3 L    She has quit smoking she has a respiratory cachexia with severe protein calorie malnutrition BMI 18.29 history of cervical cancer status post hysterectomy  She just got discharged from [place] after COPD exacerbation on oral steroid she tripped and fell over her oxygen tubing complains of right hip pain unable to mobilize severity 10 out of 10 with no radiation no chest pain no nausea vomiting chronic respiratory failure home oxygen    8/3  Patient seen and examined, did not have any bowel movement from last 1 week, on MiraLAX  Vitals have been stable, complaining of pain    Past Medical History:     Past Medical History:   Diagnosis Date    Acid reflux     Anxiety     Arthritis     Cancer ([address])     cervical    Cervical osteoarthritis 11/30/2020    COPD (chronic obstructive pulmonary disease) ([address]) 02/23/2018    DDD (degenerative disc disease), cervical     Depression     Hiatal hernia     History of blood transfusion     Reaction fever went up to 107 . per Pt.      On home O2     Spinal stenosis     Tachycardia     Thyroid disease     hypo        Past Surgical History:     Past Surgical History:   Procedure Laterality Date for Wheezing 12/27/22   Tom Moreau MD   levothyroxine (SYNTHROID) 88 MCG tablet Take 1 tablet by mouth Daily 12/20/22   Tom Moreau MD   fluticasone [facility]) 50 MCG/ACT nasal spray 2 sprays by Each Nostril route daily 9/15/22   Tom Moreau MD   estradiol (ESTRACE VAGINAL) 0.1 MG/GM vaginal cream Place 1 g vaginally daily  Patient taking differently: Place 1 g vaginally daily Indications: patient uses a few times per week Monday and Friday are the preferred days 8/26/22   Tom Moreau MD   montelukast (SINGULAIR) 10 MG tablet Take 1 tablet by mouth daily 8/26/22   Tom Moreau MD   omeprazole (PRILOSEC) 40 MG delayed release capsule Take 1 capsule by mouth in the morning and 1 capsule in the evening. 8/26/22   Tom Moreau MD   levalbuterol Artemio Mendez) 1.25 MG/3ML nebulizer solution  5/27/22   Historical Provider, MD   Melatonin 10 MG TABS Take 10 mg by mouth nightly 3/8/22   NATHAN Zapien CNP   mirtazapine (REMERON) 30 MG tablet Take 1 tablet by mouth nightly 1/18/22   Historical Provider, MD   venlafaxine (EFFEXOR XR) 150 MG extended release capsule Take 1 capsule by mouth daily 7/16/21   Historical Provider, MD   vitamin C (ASCORBIC ACID) 500 MG tablet Take 1 tablet by mouth daily 11/30/20   Emilio Morse MD   Multiple Vitamins-Minerals (THERAPEUTIC MULTIVITAMIN-MINERALS) tablet Take 1 tablet by mouth daily 11/30/20   Emilio Morse MD   calcium carbonate-vitamin D (CALTRATE) 600-400 MG-UNIT TABS per tab Take 1 tablet by mouth daily 11/30/20   Emilio Morse MD        Allergies:     Hydroxyzine hcl    Social History:     Tobacco:    reports that she has been smoking cigarettes. She has a 24.00 pack-year smoking history. She has never used smokeless tobacco.  Alcohol:      reports current alcohol use. Drug Use:  reports no history of drug use.     Family History:     Family History   Problem Relation Age of Onset    Diabetes Father        Review of Systems:

## 2023-08-07 NOTE — CARE COORDINATION
ONGOING DISCHARGE PLAN:     Plan remains for patient to be discharged to [facility] with pre-cert started 8/2. Spoke with [place] from [facility] and there is no update on pre-cert status at this time. POD#9 right femur IM nailing. Will continue to follow for additional discharge needs. If patient is discharged prior to next notation, then this note serves as note for discharge by case management.     Electronically signed by Perri Katz RN on 8/7/2023 at 12:04 PM

## 2023-08-07 NOTE — PROGRESS NOTES
[id number] [facility]   INPATIENT OCCUPATIONAL THERAPY  PROGRESS NOTE  Date: 2023  Patient Name: Aundrea Gold       Room: 5660/2016-95  MRN: [id number]    : 1960  (58 y.o.)  Gender: female   Referring Practitioner: Teodora Wilson MD  Diagnosis: Closed displaced intertrochanteric fracture of R femur      Discharge Recommendations:  Further Occupational Therapy is recommended upon facility discharge. OT Equipment Recommendations  Other: TBD    Restrictions/Precautions  Restrictions/Precautions  Restrictions/Precautions: Weight Bearing; Fall Risk;General Precautions  Required Braces or Orthoses?: No  Implants present? :  (R femur)  Lower Extremity Weight Bearing Restrictions  Right Lower Extremity Weight Bearing: Weight Bearing As Tolerated  Position Activity Restriction  Other position/activity restrictions: S/P R Hip IMN by Dr. Gatito Yepez; Work on gait and gentle right hip ROM/strength; RLE WBAT    Subjective  Subjective  Subjective: \"Yeah, let get it over with. \" Pt is pleasant and cooperative for therapy. Subjective  Pain: Pt does no rate pain but states \"I'm hurtin\". Comments: LILY Bustos ok's tx. Objective  Orientation  Overall Orientation Status: Within Functional Limits  Cognition  Overall Cognitive Status: WFL    Activities of Daily Living  ADL  Feeding: Modified independent   Grooming: Setup  UE Bathing: Stand by assistance  LE Bathing: Maximum assistance  UE Dressing: Stand by assistance  LE Dressing: Maximum assistance  LE Dressing Skilled Clinical Factors: TA to iraj B socks this date, clincial reasoning for remainder. Toileting: Moderate assistance  Additional Comments: ADL scores based on skilled observation and clinical reasoning, unless otherwise noted.  Pt is limited due to SOB with minimal activity, anixety, increased pain with wt bearing weight/movement in RLE,  and impaired balance impacting performance during self care tasks    Balance  Balance  Sitting Balance: Stand by

## 2023-08-07 NOTE — PROGRESS NOTES
Patient's vital signs remained stable. Remains on baseline 3 L nasal cannula. Awaiting skilled nursing placement. No new labs to review. No new chest x-ray. Continue current COPD regimen. We are following patient from a distance due to her severe COPD. However she is currently not have any exacerbation. Please notify her service if we can be of any assistance.

## 2023-08-07 NOTE — PROGRESS NOTES
Physical Therapy  Facility/Department: [facility] MED SURG  Daily Treatment Note  NAME: Dolores Storey  : 1960  MRN: [id number]    Date of Service: 2023    Discharge Recommendations:  Patient would benefit from continued therapy after discharge, Therapy recommended at discharge        Patient Diagnosis(es): The encounter diagnosis was Closed displaced intertrochanteric fracture of right femur, initial encounter ([address]). Assessment   Activity Tolerance: Patient tolerated treatment well     Plan    Physcial Therapy Plan  General Plan: 2 times a day 7 days a week  Specific Instructions for Next Treatment: Progress to transfers with RW and ambulation; Provide Supine/seated HEP for Gentle strengthening & ROM. Continue to monitor SpO2 and BPs, if stable please progress mobility as tolerated. Current Treatment Recommendations: Strengthening;ROM;Balance training;Functional mobility training;Transfer training; Endurance training;Gait training;Pain management;Home exercise program;Safety education & training;Patient/Caregiver education & training;Positioning; Therapeutic activities     Restrictions  Restrictions/Precautions  Restrictions/Precautions: Weight Bearing, Fall Risk, General Precautions  Required Braces or Orthoses?: No  Implants present? :  (R femur)  Lower Extremity Weight Bearing Restrictions  Right Lower Extremity Weight Bearing: Weight Bearing As Tolerated  Position Activity Restriction  Other position/activity restrictions: S/P R Hip IMN by Dr. Ray Varela; Work on gait and gentle right hip ROM/strength; RLE WBAT     Subjective  Subjective: Pt is agreeable to PT. Pain: 2/10 hip right    Orientation  Overall Orientation Status: Within Functional Limits  Orientation Level: Oriented X4       Bed Mobility Training  Bed Mobility Training: Yes  Overall Level of Assistance:  Additional time;Supervision  Interventions: Verbal cues (sequencing and positioning of scooting task)  Rolling: Independent  Supine to Sit:

## 2023-08-08 PROCEDURE — 94640 AIRWAY INHALATION TREATMENT: CPT

## 2023-08-08 PROCEDURE — 2700000000 HC OXYGEN THERAPY PER DAY

## 2023-08-08 PROCEDURE — 97110 THERAPEUTIC EXERCISES: CPT

## 2023-08-08 PROCEDURE — 1200000000 HC SEMI PRIVATE

## 2023-08-08 PROCEDURE — 6370000000 HC RX 637 (ALT 250 FOR IP): Performed by: ORTHOPAEDIC SURGERY

## 2023-08-08 PROCEDURE — 6370000000 HC RX 637 (ALT 250 FOR IP): Performed by: NURSE PRACTITIONER

## 2023-08-08 PROCEDURE — 2580000003 HC RX 258: Performed by: ORTHOPAEDIC SURGERY

## 2023-08-08 PROCEDURE — 6360000002 HC RX W HCPCS: Performed by: ORTHOPAEDIC SURGERY

## 2023-08-08 PROCEDURE — 97530 THERAPEUTIC ACTIVITIES: CPT

## 2023-08-08 PROCEDURE — 97535 SELF CARE MNGMENT TRAINING: CPT

## 2023-08-08 PROCEDURE — 94669 MECHANICAL CHEST WALL OSCILL: CPT

## 2023-08-08 PROCEDURE — 6370000000 HC RX 637 (ALT 250 FOR IP): Performed by: INTERNAL MEDICINE

## 2023-08-08 PROCEDURE — 94664 DEMO&/EVAL PT USE INHALER: CPT

## 2023-08-08 PROCEDURE — 94761 N-INVAS EAR/PLS OXIMETRY MLT: CPT

## 2023-08-08 PROCEDURE — 99231 SBSQ HOSP IP/OBS SF/LOW 25: CPT | Performed by: INTERNAL MEDICINE

## 2023-08-08 PROCEDURE — 97116 GAIT TRAINING THERAPY: CPT

## 2023-08-08 RX ADMIN — SUCRALFATE 1 G: 1 TABLET ORAL at 05:54

## 2023-08-08 RX ADMIN — PRIMIDONE 100 MG: 50 TABLET ORAL at 20:35

## 2023-08-08 RX ADMIN — HYDROCODONE BITARTRATE AND ACETAMINOPHEN 1 TABLET: 5; 325 TABLET ORAL at 10:53

## 2023-08-08 RX ADMIN — ALBUTEROL SULFATE 2.5 MG: 2.5 SOLUTION RESPIRATORY (INHALATION) at 03:18

## 2023-08-08 RX ADMIN — ALBUTEROL SULFATE 2.5 MG: 2.5 SOLUTION RESPIRATORY (INHALATION) at 00:23

## 2023-08-08 RX ADMIN — PANTOPRAZOLE SODIUM 40 MG: 40 TABLET, DELAYED RELEASE ORAL at 05:54

## 2023-08-08 RX ADMIN — SODIUM CHLORIDE, PRESERVATIVE FREE 10 ML: 5 INJECTION INTRAVENOUS at 20:33

## 2023-08-08 RX ADMIN — OXYCODONE HYDROCHLORIDE 10 MG: 10 TABLET, FILM COATED, EXTENDED RELEASE ORAL at 21:50

## 2023-08-08 RX ADMIN — SUCRALFATE 1 G: 1 TABLET ORAL at 10:53

## 2023-08-08 RX ADMIN — SUCRALFATE 1 G: 1 TABLET ORAL at 17:20

## 2023-08-08 RX ADMIN — HYDROCODONE BITARTRATE AND ACETAMINOPHEN 1 TABLET: 5; 325 TABLET ORAL at 15:18

## 2023-08-08 RX ADMIN — TIOTROPIUM BROMIDE AND OLODATEROL 2 PUFF: 3.124; 2.736 SPRAY, METERED RESPIRATORY (INHALATION) at 06:45

## 2023-08-08 RX ADMIN — TIZANIDINE 4 MG: 4 TABLET ORAL at 08:00

## 2023-08-08 RX ADMIN — LINACLOTIDE 72 MCG: 72 CAPSULE, GELATIN COATED ORAL at 05:53

## 2023-08-08 RX ADMIN — TIZANIDINE 4 MG: 4 TABLET ORAL at 14:10

## 2023-08-08 RX ADMIN — PANTOPRAZOLE SODIUM 40 MG: 40 TABLET, DELAYED RELEASE ORAL at 15:18

## 2023-08-08 RX ADMIN — Medication 10.5 MG: at 00:30

## 2023-08-08 RX ADMIN — Medication 10.5 MG: at 20:32

## 2023-08-08 RX ADMIN — TIZANIDINE 4 MG: 4 TABLET ORAL at 20:32

## 2023-08-08 RX ADMIN — CLONAZEPAM 0.5 MG: 1 TABLET ORAL at 18:02

## 2023-08-08 RX ADMIN — ALBUTEROL SULFATE 2.5 MG: 2.5 SOLUTION RESPIRATORY (INHALATION) at 06:45

## 2023-08-08 RX ADMIN — SUCRALFATE 1 G: 1 TABLET ORAL at 20:32

## 2023-08-08 RX ADMIN — CLONAZEPAM 0.5 MG: 1 TABLET ORAL at 05:53

## 2023-08-08 RX ADMIN — ALBUTEROL SULFATE 2.5 MG: 2.5 SOLUTION RESPIRATORY (INHALATION) at 14:59

## 2023-08-08 RX ADMIN — SODIUM CHLORIDE, PRESERVATIVE FREE 10 ML: 5 INJECTION INTRAVENOUS at 08:00

## 2023-08-08 RX ADMIN — HYDROCODONE BITARTRATE AND ACETAMINOPHEN 1 TABLET: 5; 325 TABLET ORAL at 19:39

## 2023-08-08 RX ADMIN — VENLAFAXINE HYDROCHLORIDE 150 MG: 150 CAPSULE, EXTENDED RELEASE ORAL at 08:00

## 2023-08-08 RX ADMIN — CARVEDILOL 6.25 MG: 6.25 TABLET, FILM COATED ORAL at 17:20

## 2023-08-08 RX ADMIN — ALBUTEROL SULFATE 2.5 MG: 2.5 SOLUTION RESPIRATORY (INHALATION) at 10:41

## 2023-08-08 RX ADMIN — OXYCODONE HYDROCHLORIDE 10 MG: 10 TABLET, FILM COATED, EXTENDED RELEASE ORAL at 08:50

## 2023-08-08 RX ADMIN — CARVEDILOL 6.25 MG: 6.25 TABLET, FILM COATED ORAL at 08:00

## 2023-08-08 RX ADMIN — MONTELUKAST 10 MG: 10 TABLET, FILM COATED ORAL at 08:00

## 2023-08-08 RX ADMIN — ALBUTEROL SULFATE 2.5 MG: 2.5 SOLUTION RESPIRATORY (INHALATION) at 20:22

## 2023-08-08 RX ADMIN — MIRTAZAPINE 15 MG: 15 TABLET, FILM COATED ORAL at 20:32

## 2023-08-08 RX ADMIN — OXYCODONE HYDROCHLORIDE 10 MG: 10 TABLET, FILM COATED, EXTENDED RELEASE ORAL at 00:30

## 2023-08-08 RX ADMIN — LEVOTHYROXINE SODIUM 88 MCG: 0.09 TABLET ORAL at 05:53

## 2023-08-08 RX ADMIN — ENOXAPARIN SODIUM 30 MG: 100 INJECTION SUBCUTANEOUS at 08:00

## 2023-08-08 RX ADMIN — HYDROCODONE BITARTRATE AND ACETAMINOPHEN 1 TABLET: 5; 325 TABLET ORAL at 05:53

## 2023-08-08 RX ADMIN — HYDROCODONE BITARTRATE AND ACETAMINOPHEN 1 TABLET: 5; 325 TABLET ORAL at 23:51

## 2023-08-08 ASSESSMENT — PAIN DESCRIPTION - LOCATION
LOCATION: LEG;ANKLE
LOCATION: HIP;ANKLE;BACK
LOCATION: LEG;HIP;ANKLE
LOCATION: LEG;HIP;ANKLE
LOCATION: LEG;ANKLE

## 2023-08-08 ASSESSMENT — PAIN DESCRIPTION - ORIENTATION
ORIENTATION: RIGHT

## 2023-08-08 ASSESSMENT — PAIN SCALES - WONG BAKER
WONGBAKER_NUMERICALRESPONSE: 2

## 2023-08-08 ASSESSMENT — PAIN DESCRIPTION - DESCRIPTORS
DESCRIPTORS: ACHING;BURNING
DESCRIPTORS: ACHING
DESCRIPTORS: ACHING;BURNING

## 2023-08-08 ASSESSMENT — PAIN SCALES - GENERAL
PAINLEVEL_OUTOF10: 9
PAINLEVEL_OUTOF10: 7
PAINLEVEL_OUTOF10: 10
PAINLEVEL_OUTOF10: 10
PAINLEVEL_OUTOF10: 8
PAINLEVEL_OUTOF10: 10
PAINLEVEL_OUTOF10: 10
PAINLEVEL_OUTOF10: 9
PAINLEVEL_OUTOF10: 10
PAINLEVEL_OUTOF10: 9

## 2023-08-08 NOTE — PROGRESS NOTES
Comprehensive Nutrition Assessment    Type and Reason for Visit:  Reassess    Nutrition Recommendations/Plan:   Continue current diet. Continue oral nutrition supplements. Malnutrition Assessment:  Malnutrition Status:  Severe malnutrition (07/29/23 7494)    Context:  Acute Illness     Findings of the 6 clinical characteristics of malnutrition:  Energy Intake:  75% or less of estimated energy requirements for 7 or more days  Weight Loss:  No significant weight loss     Body Fat Loss: Moderate body fat loss Orbital, Buccal region   Muscle Mass Loss: Moderate muscle mass loss Temples (temporalis), Clavicles (pectoralis & deltoids)  Fluid Accumulation:  No significant fluid accumulation     Strength:  Not Performed    Nutrition Assessment:    Pt working with therapy at time of attempted visit. Chart reviewed. Note probable discharge to SNF soon. Nutrition Related Findings:    Edema: +2 RLE. Labs and meds reviewed. Wound Type: Surgical Incision       Current Nutrition Intake & Therapies:    Average Meal Intake: 51-75% (Estimated intake. Bam Nutritions scores listed as \"adequate\")  Average Supplements Intake: 51-75%  ADULT DIET; Regular  ADULT ORAL NUTRITION SUPPLEMENT; Breakfast, Lunch, Dinner; Standard High Calorie/High Protein Oral Supplement    Anthropometric Measures:  Height: 5' 2\" (157.5 cm)  Ideal Body Weight (IBW): 110 lbs (50 kg)    Admission Body Weight: 108 lb (49 kg)  Current Body Weight: 110 lb 7.2 oz (50.1 kg), 98.2 % IBW. Weight Source: Bed Scale  Current BMI (kg/m2): 20.2  Usual Body Weight: 100 lb (45.4 kg)  % Weight Change (Calculated): 8                    BMI Categories: Normal Weight (BMI 18.5-24. 9)    Estimated Daily Nutrient Needs:  Energy Requirements Based On: Kcal/kg  Weight Used for Energy Requirements: Admission  Energy (kcal/day): 1730 kcals based on 35 kcals/kg  Weight Used for Protein Requirements: Admission  Protein (g/day): 74 based on 1.5 gm per kg      Nutrition

## 2023-08-08 NOTE — PLAN OF CARE
Problem: Discharge Planning  Goal: Discharge to home or other facility with appropriate resources  Outcome: Progressing     Problem: Pain  Goal: Verbalizes/displays adequate comfort level or baseline comfort level  8/8/2023 0447 by Julio Cesar Guevara RN  Outcome: Progressing     Problem: Safety - Adult  Goal: Free from fall injury  8/8/2023 0447 by Julio Cesar Guevara RN  Outcome: Progressing     Problem: Skin/Tissue Integrity  Goal: Absence of new skin breakdown  Description: 1. Monitor for areas of redness and/or skin breakdown  2. Assess vascular access sites hourly  3. Every 4-6 hours minimum:  Change oxygen saturation probe site  4. Every 4-6 hours:  If on nasal continuous positive airway pressure, respiratory therapy assess nares and determine need for appliance change or resting period.   Outcome: Progressing     Problem: Nutrition Deficit:  Goal: Optimize nutritional status  Outcome: Progressing     Problem: ABCDS Injury Assessment  Goal: Absence of physical injury  Outcome: Progressing

## 2023-08-08 NOTE — CARE COORDINATION
Writer left a message for Amaury Brad at [facility] to get an update regarding the pre cert that was submitted by them on 8/2/23. Waiting on call back. Electronically signed by Ranulfo Carrillo RN on 8/8/2023 at 5:08 PM     Writer called [address] at [phone] to get information regarding pre cert. Was transferred to [facility]. Moustapha Trejo the pending Auth # [id number]. Rosalia Middlteon is transferring Writer to an RN reviewing this pre cert,  Waiting for connection.     Electronically signed by Ranulfo Carrillo RN on 8/8/2023 at 5:11 PM

## 2023-08-08 NOTE — CARE COORDINATION
writer updated the pt on the precert. He also informed her that she may have to have a semiprivate room for a couple of days. she was fine with that.

## 2023-08-08 NOTE — CARE COORDINATION
ONGOING DISCHARGE PLAN:     Plan remains for patient to be discharged to [facility] with pre-cert started 8/2. Kathleen Decker following today. POD#10 right femur IM nailing. Will continue to follow for additional discharge needs. If patient is discharged prior to next notation, then this note serves as note for discharge by case management.     Electronically signed by Jerad Hernadez RN on 8/8/2023 at 10:02 AM

## 2023-08-08 NOTE — PROGRESS NOTES
[facility]   INPATIENT OCCUPATIONAL THERAPY  PROGRESS NOTE  Date: 2023  Patient Name: Florentino Lane       Room: 8409/0118-63  MRN: [id number]    : 1960  (58 y.o.)  Gender: female   Referring Practitioner: Francis Gutierrez MD  Diagnosis: Closed displaced intertrochanteric fracture of R femur      Discharge Recommendations:  Further Occupational Therapy is recommended upon facility discharge. OT Equipment Recommendations  Other: TBD    Restrictions/Precautions  Restrictions/Precautions  Restrictions/Precautions: Weight Bearing; Fall Risk;General Precautions  Required Braces or Orthoses?: No  Lower Extremity Weight Bearing Restrictions  Right Lower Extremity Weight Bearing: Weight Bearing As Tolerated  Position Activity Restriction  Other position/activity restrictions: S/P R Hip IMN by Dr. Janeth Garcia; Work on gait and gentle right hip ROM/strength; RLE WBAT         Subjective  Subjective  Subjective: \"Can you make sure to note that I really would like to continue with home health PT/OT when I get home. \"  Comments: LILY Mansfield for tx    Objective  Orientation  Overall Orientation Status: Within Functional Limits  Orientation Level: Oriented X4  Cognition  Overall Cognitive Status: WFL    Activities of Daily Living  ADL  Feeding: Modified independent   Grooming: Stand by assistance (SBA for hand washing this date)  UE Bathing: Stand by assistance  LE Bathing: Minimal assistance  UE Dressing: Stand by assistance  LE Dressing: Moderate assistance  LE Dressing Skilled Clinical Factors: TA to iraj R socks this date, set up for L sock  Toileting: Stand by assistance  Toileting Skilled Clinical Factors: pt able to manage LB clothing over hips and complete briseida care  Additional Comments: ADL scores based on skilled observation and clinical reasoning, unless otherwise noted.  Pt is limited due to SOB with minimal activity, anixety, increased pain with wt bearing weight/movement in RLE,  and impaired balance impacting performance during self care tasks    Balance  Balance  Sitting Balance: Stand by assistance  Standing Balance: Contact guard assistance  Standing Balance  Time: ~7 minutes  Activity: functional mobility  Comment: BUE support on RW for tolieting and grooming ADLs in bathroom    Transfers/Mobility  Bed mobility  Supine to Sit: Stand by assistance  Sit to Supine: Stand by assistance  Scooting: Stand by assistance  Bed Mobility Comments: HOB elevated with use of hand rails.  Extended time required for all parts  Transfers  Sit to stand: Stand by assistance  Stand to sit: Stand by assistance  Transfer Comments: G hand placement noted  Toilet Transfers  Toilet - Technique: Ambulating (c RW)  Equipment Used: Standard toilet  Toilet Transfer: Stand by assistance  Toilet Transfers Comments: G safety noted this date pt verbalizes steps as she cmpletes    Functional Mobility  Functional - Mobility Device: Rolling Walker  Activity: To/from bathroom  Assist Level: Stand by assistance  Functional Mobility Comments: G balance and safety noted           Patient Education  Patient Education  Education Given To: Patient  Education Provided: Role of Therapy, Plan of Care, Precautions, Transfer Training, Energy Conservation  Education Method: Verbal  Barriers to Learning: None  Education Outcome: Verbalized understanding, Demonstrated understanding, Continued education needed    Goals  Short Term Goals  Time Frame for Short Term Goals: By discharge, pt will  Short Term Goal 1: perform  UB self care with set-up and Good safety  Short Term Goal 2: perform LB self care with Astrid and Good safety  Short Term Goal 3: V/D use of adaptive techniques/equipment to increase IND during self care tasks  Short Term Goal 4: perform functional transfers/toilet transfers with Astrid  x 1 - 2, using least restrictive device and Good safety  Short Term Goal 5: V/D at least two nonpharamceutical pain management techniques to

## 2023-08-08 NOTE — PLAN OF CARE
Problem: Discharge Planning  Goal: Discharge to home or other facility with appropriate resources  8/8/2023 1311 by Isabella Mckeon RN  Outcome: Progressing  8/8/2023 0447 by Tay Gibbons RN  Outcome: Progressing     Problem: Pain  Goal: Verbalizes/displays adequate comfort level or baseline comfort level  8/8/2023 1311 by Isabella Mckeon RN  Outcome: Progressing  Note: Pt medicated with pain medication prn. Assessed all pain characteristics including level, type, location, frequency, and onset. Non-pharmacologic interventions offered to pt as well. Pt states pain is tolerable at this time. Will continue to monitor.     8/8/2023 0447 by Tay Gibbons RN  Outcome: Progressing     Problem: Safety - Adult  Goal: Free from fall injury  8/8/2023 1311 by Isabella Mckeon RN  Outcome: Progressing  8/8/2023 0447 by Tay Gibbons RN  Outcome: Progressing

## 2023-08-08 NOTE — PROGRESS NOTES
Diarrhea. Historical Provider, MD   clonazePAM (KLONOPIN) 0.5 MG tablet Take 1 tablet by mouth 3 times daily as needed for Anxiety for up to 30 days.  7/5/23 8/4/23  Adán Campo MD   albuterol sulfate HFA (PROVENTIL;VENTOLIN;PROAIR) 108 (90 Base) MCG/ACT inhaler T inhale 2 puffs by mouth and INTO THE LUNGS every 6 hours if needed for wheezing 6/19/23   Adán Campo MD   nystatin (MYCOSTATIN) 426462 UNIT/ML suspension swish 5 milliliters by mouth / THROAT four times a day for 7 days 5/26/23   Historical Provider, MD   Budeson-Glycopyrrol-Formoterol (BREZTRI AEROSPHERE) 160-9-4.8 MCG/ACT AERO Inhale 2 puffs into the lungs 2 times daily  Patient not taking: Reported on 7/28/2023 5/30/23   Adán Campo MD   furosemide (LASIX) 20 MG tablet Take 1 tablet by mouth daily as needed (swelling) 5/30/23   Adán Campo MD   amLODIPine (NORVASC) 10 MG tablet Take 1 tablet by mouth every evening 5/12/23   Barbara Ernst MD   azithromycin (ZITHROMAX) 250 MG tablet Take 1 tablet by mouth three times a week Indications: Monday wednesday Friday    Historical Provider, MD   carvedilol (COREG) 12.5 MG tablet Take 1 tablet by mouth 2 times daily  Patient taking differently: Take 0.5 tablets by mouth 2 times daily 4/18/23   Adán Campo MD   fluticasone (FLOVENT HFA) 110 MCG/ACT inhaler Inhale 1 puff into the lungs 2 times daily 4/18/23   Adán Campo MD   primidone (MYSOLINE) 50 MG tablet Take 1 tablet by mouth 2 times daily  Patient taking differently: Take 2 tablets by mouth nightly 4/5/23   Adán Campo MD   Blood Pressure Monitoring (BLOOD PRESSURE CUFF) MISC 1 Units by Does not apply route daily 1/16/23   Adán Campo MD   sucralfate (CARAFATE) 1 GM/10ML suspension Take 10 mLs by mouth 4 times daily (before meals and nightly) 1/16/23 7/28/23  Adán Campo MD   albuterol (ACCUNEB) 0.63 MG/3ML nebulizer solution Take 3 mLs by nebulization every 6 hours as needed and sustained right femur fracture  Postoperatively patient is progressing satisfactory     She is emaciated  Low BMI  Respite status is stable     Vitals are stable  On 3 L oxygen  Oxygen saturation is 97  Patient complains of pain in the fracture site area  On examination cardiopulmonary unremarkable    Patient is awaiting placement to a skilled nursing facility          8/6/23  Remains on 3L NC,  home dose is 2L history of advanced COPD, no wheeze on exam. Pt says she gets SOB with pain  S/p ORIF pathological right hip fracture on 7/29  Recovering well  On opioids for pain control, Linzess for constipation, having small BM  Awaiting placement to SNF  No acute issues overnight, hemoglobin stable      67/23    58year-old female  With advanced COPD chronic hypoxic respiratory failure on home oxygen  Drip and fall at home and sustained right femur fracture  Postoperatively patient is progressing satisfactory  She is emaciated . Low BMI  S/p ORIF pathological right hip fracture on 7/29   Patient is seen and examined at bedside. .  She is hemodynamically stable. Blood pressure within normal limits. Afebrile. No acute issues overnight. On nasal cannula at 3 L. She is recovering well. No acute issues no acute complaints overnight. Mentions that she had 2 bowel movements, soft formed stools yesterday. On opioids for pain control, Linzess for constipation  Awaiting placement to SNF.            68/23    69-year-old female  With advanced COPD chronic hypoxic respiratory failure on home oxygen  Drip and fall at home and sustained right femur fracture  Postoperatively patient is progressing satisfactory  She is emaciated . Low BMI  S/p ORIF pathological right hip fracture on 7/29   Patient is seen and examined at bedside. .  She is hemodynamically stable. Blood pressure within normal limits. Afebrile. No acute issues overnight. On nasal cannula at 3 L. Awaiting placement to SNF.             Maddie Hamilton,

## 2023-08-08 NOTE — PROGRESS NOTES
Physical Therapy  Facility/Department: [facility] MED SURG  Daily Treatment Note  NAME: Fish Salas  : 1960  MRN: [id number]    Date of Service: 2023    Discharge Recommendations:  Patient would benefit from continued therapy after discharge, Therapy recommended at discharge        Patient Diagnosis(es): The encounter diagnosis was Closed displaced intertrochanteric fracture of right femur, initial encounter ([address]). Assessment   Activity Tolerance: Patient tolerated treatment well     Plan    Physcial Therapy Plan  General Plan: 2 times a day 7 days a week  Specific Instructions for Next Treatment: Progress to transfers with RW and ambulation; Provide Supine/seated HEP for Gentle strengthening & ROM. Continue to monitor SpO2 and BPs, if stable please progress mobility as tolerated. Current Treatment Recommendations: Strengthening;ROM;Balance training;Functional mobility training;Transfer training; Endurance training;Gait training;Pain management;Home exercise program;Safety education & training;Patient/Caregiver education & training;Positioning; Therapeutic activities     Restrictions  Restrictions/Precautions  Restrictions/Precautions: Weight Bearing, Fall Risk, General Precautions  Required Braces or Orthoses?: No  Implants present? :  (R femur)  Lower Extremity Weight Bearing Restrictions  Right Lower Extremity Weight Bearing: Weight Bearing As Tolerated  Position Activity Restriction  Other position/activity restrictions: S/P R Hip IMN by Dr. Alec Billings; Work on gait and gentle right hip ROM/strength; RLE WBAT     Subjective    Subjective  Subjective: \"Lets get it over with\".  Pt is agreeable and pleasant throughout therapy session for both AM and PM  Pain: \"about a 10\" in R hip  Orientation  Overall Orientation Status: Within Functional Limits  Orientation Level: Oriented X4  Cognition  Overall Cognitive Status: WFL     Objective   Vitals     Bed Mobility Training  Bed Mobility Training: Yes  Overall Level of

## 2023-08-08 NOTE — PROGRESS NOTES
08/08/23 1700   Encounter Summary   Encounter Overview/Reason  Spiritual/Emotional Needs   Service Provided For: Patient   Referral/Consult From: Rounding   Support System Spouse   Last Encounter  08/08/23   Complexity of Encounter Low   Spiritual/Emotional needs   Type Spiritual Support   Assessment/Intervention/Outcome   Assessment Calm;Coping   Intervention Active listening;Prayer (assurance of)/[place];Sustaining Presence/Ministry of presence   Outcome Comfort;Coping;Engaged in conversation;Expressed Gratitude;Receptive

## 2023-08-09 PROCEDURE — 94640 AIRWAY INHALATION TREATMENT: CPT

## 2023-08-09 PROCEDURE — 6370000000 HC RX 637 (ALT 250 FOR IP): Performed by: NURSE PRACTITIONER

## 2023-08-09 PROCEDURE — 99231 SBSQ HOSP IP/OBS SF/LOW 25: CPT | Performed by: INTERNAL MEDICINE

## 2023-08-09 PROCEDURE — 6360000002 HC RX W HCPCS: Performed by: ORTHOPAEDIC SURGERY

## 2023-08-09 PROCEDURE — 94761 N-INVAS EAR/PLS OXIMETRY MLT: CPT

## 2023-08-09 PROCEDURE — 6370000000 HC RX 637 (ALT 250 FOR IP): Performed by: INTERNAL MEDICINE

## 2023-08-09 PROCEDURE — 6370000000 HC RX 637 (ALT 250 FOR IP): Performed by: ORTHOPAEDIC SURGERY

## 2023-08-09 PROCEDURE — 97535 SELF CARE MNGMENT TRAINING: CPT

## 2023-08-09 PROCEDURE — 2580000003 HC RX 258: Performed by: ORTHOPAEDIC SURGERY

## 2023-08-09 PROCEDURE — 1200000000 HC SEMI PRIVATE

## 2023-08-09 PROCEDURE — 97116 GAIT TRAINING THERAPY: CPT

## 2023-08-09 PROCEDURE — 97110 THERAPEUTIC EXERCISES: CPT

## 2023-08-09 PROCEDURE — 2700000000 HC OXYGEN THERAPY PER DAY

## 2023-08-09 PROCEDURE — 97530 THERAPEUTIC ACTIVITIES: CPT

## 2023-08-09 RX ORDER — AMLODIPINE BESYLATE 10 MG/1
10 TABLET ORAL EVERY EVENING
Status: DISCONTINUED | OUTPATIENT
Start: 2023-08-09 | End: 2023-08-11 | Stop reason: HOSPADM

## 2023-08-09 RX ADMIN — SUCRALFATE 1 G: 1 TABLET ORAL at 10:31

## 2023-08-09 RX ADMIN — ALBUTEROL SULFATE 2.5 MG: 2.5 SOLUTION RESPIRATORY (INHALATION) at 15:31

## 2023-08-09 RX ADMIN — VENLAFAXINE HYDROCHLORIDE 150 MG: 150 CAPSULE, EXTENDED RELEASE ORAL at 08:27

## 2023-08-09 RX ADMIN — HYDROCODONE BITARTRATE AND ACETAMINOPHEN 1 TABLET: 5; 325 TABLET ORAL at 15:37

## 2023-08-09 RX ADMIN — CARVEDILOL 6.25 MG: 6.25 TABLET, FILM COATED ORAL at 16:29

## 2023-08-09 RX ADMIN — CLONAZEPAM 0.5 MG: 1 TABLET ORAL at 06:27

## 2023-08-09 RX ADMIN — CLONAZEPAM 0.5 MG: 1 TABLET ORAL at 23:22

## 2023-08-09 RX ADMIN — HYDROCODONE BITARTRATE AND ACETAMINOPHEN 1 TABLET: 5; 325 TABLET ORAL at 04:31

## 2023-08-09 RX ADMIN — AMLODIPINE BESYLATE 10 MG: 10 TABLET ORAL at 20:32

## 2023-08-09 RX ADMIN — ALBUTEROL SULFATE 2.5 MG: 2.5 SOLUTION RESPIRATORY (INHALATION) at 00:22

## 2023-08-09 RX ADMIN — LINACLOTIDE 72 MCG: 72 CAPSULE, GELATIN COATED ORAL at 06:21

## 2023-08-09 RX ADMIN — CLONAZEPAM 0.5 MG: 1 TABLET ORAL at 14:39

## 2023-08-09 RX ADMIN — PANTOPRAZOLE SODIUM 40 MG: 40 TABLET, DELAYED RELEASE ORAL at 06:21

## 2023-08-09 RX ADMIN — CARVEDILOL 6.25 MG: 6.25 TABLET, FILM COATED ORAL at 08:22

## 2023-08-09 RX ADMIN — SODIUM CHLORIDE, PRESERVATIVE FREE 10 ML: 5 INJECTION INTRAVENOUS at 08:25

## 2023-08-09 RX ADMIN — ENOXAPARIN SODIUM 30 MG: 100 INJECTION SUBCUTANEOUS at 08:24

## 2023-08-09 RX ADMIN — TIOTROPIUM BROMIDE AND OLODATEROL 2 PUFF: 3.124; 2.736 SPRAY, METERED RESPIRATORY (INHALATION) at 06:52

## 2023-08-09 RX ADMIN — SUCRALFATE 1 G: 1 TABLET ORAL at 06:21

## 2023-08-09 RX ADMIN — MIRTAZAPINE 15 MG: 15 TABLET, FILM COATED ORAL at 20:32

## 2023-08-09 RX ADMIN — PANTOPRAZOLE SODIUM 40 MG: 40 TABLET, DELAYED RELEASE ORAL at 15:37

## 2023-08-09 RX ADMIN — ALBUTEROL SULFATE 2.5 MG: 2.5 SOLUTION RESPIRATORY (INHALATION) at 10:39

## 2023-08-09 RX ADMIN — OXYCODONE HYDROCHLORIDE 10 MG: 10 TABLET, FILM COATED, EXTENDED RELEASE ORAL at 08:23

## 2023-08-09 RX ADMIN — Medication 10.5 MG: at 20:32

## 2023-08-09 RX ADMIN — PRIMIDONE 100 MG: 50 TABLET ORAL at 20:36

## 2023-08-09 RX ADMIN — ALBUTEROL SULFATE 2.5 MG: 2.5 SOLUTION RESPIRATORY (INHALATION) at 19:09

## 2023-08-09 RX ADMIN — ALBUTEROL SULFATE 2.5 MG: 2.5 SOLUTION RESPIRATORY (INHALATION) at 23:15

## 2023-08-09 RX ADMIN — ALBUTEROL SULFATE 2.5 MG: 2.5 SOLUTION RESPIRATORY (INHALATION) at 06:42

## 2023-08-09 RX ADMIN — ALBUTEROL SULFATE 2.5 MG: 2.5 SOLUTION RESPIRATORY (INHALATION) at 03:44

## 2023-08-09 RX ADMIN — HYDROCODONE BITARTRATE AND ACETAMINOPHEN 1 TABLET: 5; 325 TABLET ORAL at 19:43

## 2023-08-09 RX ADMIN — SODIUM CHLORIDE, PRESERVATIVE FREE 10 ML: 5 INJECTION INTRAVENOUS at 20:37

## 2023-08-09 RX ADMIN — HYDROCODONE BITARTRATE AND ACETAMINOPHEN 1 TABLET: 5; 325 TABLET ORAL at 09:52

## 2023-08-09 RX ADMIN — SUCRALFATE 1 G: 1 TABLET ORAL at 20:33

## 2023-08-09 RX ADMIN — MONTELUKAST 10 MG: 10 TABLET, FILM COATED ORAL at 08:24

## 2023-08-09 RX ADMIN — TIZANIDINE 4 MG: 4 TABLET ORAL at 02:48

## 2023-08-09 RX ADMIN — TIZANIDINE 4 MG: 4 TABLET ORAL at 08:24

## 2023-08-09 RX ADMIN — SUCRALFATE 1 G: 1 TABLET ORAL at 16:29

## 2023-08-09 RX ADMIN — TIZANIDINE 4 MG: 4 TABLET ORAL at 16:29

## 2023-08-09 RX ADMIN — OXYCODONE HYDROCHLORIDE 10 MG: 10 TABLET, FILM COATED, EXTENDED RELEASE ORAL at 20:33

## 2023-08-09 RX ADMIN — LEVOTHYROXINE SODIUM 88 MCG: 0.09 TABLET ORAL at 06:21

## 2023-08-09 ASSESSMENT — PAIN DESCRIPTION - DESCRIPTORS
DESCRIPTORS: ACHING
DESCRIPTORS: ACHING;BURNING
DESCRIPTORS: ACHING

## 2023-08-09 ASSESSMENT — PAIN SCALES - GENERAL
PAINLEVEL_OUTOF10: 9
PAINLEVEL_OUTOF10: 9
PAINLEVEL_OUTOF10: 8
PAINLEVEL_OUTOF10: 9
PAINLEVEL_OUTOF10: 8
PAINLEVEL_OUTOF10: 8

## 2023-08-09 ASSESSMENT — PAIN - FUNCTIONAL ASSESSMENT
PAIN_FUNCTIONAL_ASSESSMENT: ACTIVITIES ARE NOT PREVENTED
PAIN_FUNCTIONAL_ASSESSMENT: PREVENTS OR INTERFERES SOME ACTIVE ACTIVITIES AND ADLS

## 2023-08-09 ASSESSMENT — PAIN DESCRIPTION - LOCATION
LOCATION: BACK;HIP;LEG
LOCATION: LEG
LOCATION: GENERALIZED
LOCATION: LEG;ANKLE;HIP
LOCATION: BACK;LEG;HIP
LOCATION: BACK;HIP;LEG

## 2023-08-09 ASSESSMENT — PAIN DESCRIPTION - FREQUENCY
FREQUENCY: CONTINUOUS
FREQUENCY: CONTINUOUS

## 2023-08-09 ASSESSMENT — PAIN DESCRIPTION - PAIN TYPE
TYPE: SURGICAL PAIN
TYPE: CHRONIC PAIN;SURGICAL PAIN
TYPE: CHRONIC PAIN;SURGICAL PAIN

## 2023-08-09 ASSESSMENT — PAIN SCALES - WONG BAKER
WONGBAKER_NUMERICALRESPONSE: 0

## 2023-08-09 ASSESSMENT — PAIN DESCRIPTION - ORIENTATION
ORIENTATION: RIGHT

## 2023-08-09 NOTE — CARE COORDINATION
Writer called [facility] again this morning and asked to speak with a RN to give clinicals via telephone. [facility] advised they never received clinicals from [facility] or from the [facility] team for her 7/28/23 admission. Writer faxed all clinicals to Merry Guerrero at [facility]  with Pending auth # [id number] and faxed clinicals to [phone]. Angella Booth she is assigning this Case now so it is expediated. Hopefully will get a response by the end of the day. Writer called Keena at [facility] and spoke to her about no pre cert beng started. She says they did fax clinicals to [address] [place] [address] and have  tracking number. She advised she is escalating this to her management team to see what happened. Electronically signed by Hudson Guerra RN on 8/9/2023 at 11:52 AM     Writer called [facility] to ensure clinicals were received. [facility] advised Clinicals were received and Samantha Zhu RN is reviewing Case. Let Voice mail Message for Samantha Zhu to call Writer back with outcome.     Electronically signed by Hudson Guerra RN on 8/9/2023 at 3:39 PM

## 2023-08-09 NOTE — PROGRESS NOTES
[facility] Internal Medicine  Kemal Hall MD; Paolo Hammond MD; Shiva Law MD; MD Steve Rodriguez MD; Charly Stewart MD    [facility] Internal Medicine   [address]     Progress note            Date:   8/9/2023  Patient name:  Handy Lester  Date of admission:  7/28/2023  1:04 PM  MRN:   [id number]  Account:  [de-identified]  YOB: 1960  PCP:    Tom Moreau MD  Room:   [address] [place] [address]  Code Status:    Full Code      Chief Complaint:     Chief Complaint   Patient presents with    Fall    Hip Pain   Fall right hip pain    History Obtained From:     Patient medical record nursing staff    History of Present Illness:     60-year-old lady with end-stage COPD on home oxygen 2 to 3 L    She has quit smoking she has a respiratory cachexia with severe protein calorie malnutrition BMI 18.29 history of cervical cancer status post hysterectomy  She just got discharged from [place] after COPD exacerbation on oral steroid she tripped and fell over her oxygen tubing complains of right hip pain unable to mobilize severity 10 out of 10 with no radiation no chest pain no nausea vomiting chronic respiratory failure home oxygen    8/3  Patient seen and examined, did not have any bowel movement from last 1 week, on MiraLAX  Vitals have been stable, complaining of pain    Past Medical History:     Past Medical History:   Diagnosis Date    Acid reflux     Anxiety     Arthritis     Cancer ([address])     cervical    Cervical osteoarthritis 11/30/2020    COPD (chronic obstructive pulmonary disease) ([address]) 02/23/2018    DDD (degenerative disc disease), cervical     Depression     Hiatal hernia     History of blood transfusion     Reaction fever went up to 107 . per Pt.      On home O2     Spinal stenosis     Tachycardia     Thyroid disease     hypo        Past Surgical History:     Past Surgical History:   Procedure Laterality Date emaciated . Low BMI  S/p ORIF pathological right hip fracture on 7/29   Patient is seen and examined at bedside. .  She is hemodynamically stable. Blood pressure within normal limits. Afebrile. No acute issues overnight. On nasal cannula at 3 L. Awaiting placement to SNF.            69/23    70-year-old female  With advanced COPD chronic hypoxic respiratory failure on home oxygen  Drip and fall at home and sustained right femur fracture  Postoperatively patient is progressing satisfactory  She is emaciated . Low BMI  S/p ORIF pathological right hip fracture on 7/29   Patient is seen and examined at bedside. .  She is hemodynamically stable. Blood pressure within normal limits. Afebrile. No acute issues overnight. Patient still reporting significant pain of right hip. On nasal cannula at 3 L. Awaiting placement to SNF. Ayesha Bonilla MD  8/9/2023  9:59 AM      Attestation and add on       I have discussed the care of Vivian Rodgers , including pertinent history and exam findings,      8/9/23    with the resident. I have seen and examined the patient and the key elements of all parts of the encounter have been performed by me . I agree with the assessment, plan and orders as documented by the resident. Brenda Lowery MD      [address], [address] [place] [address]. Phone [phone]   Fax: [phone]  Answering Service: [phone]                              Copy sent to Dr. Kandis Carias MD    Please note that this chart was generated using voice recognition Dragon dictation software. Although every effort was made to ensure the accuracy of this automated transcription, some errors in transcription may have occurred.

## 2023-08-09 NOTE — PROGRESS NOTES
Patient has been stable for several days from a pulmonary standpoint. She remains on her baseline oxygen requirements. Plans for skilled nursing facility. Awaiting precertification for the last several days. Continue her baseline COPD regimen at time of discharge. Outpatient follow-up in our office in 4 to 6 weeks. Will sign off. Please notify our service if we can be of assistance would be happy to see patient again if anything new arises or any new concerns.

## 2023-08-09 NOTE — PROGRESS NOTES
Physical Therapy  Facility/Department: [facility] MED SURG  Daily Treatment Note  NAME: Fish Salas  : 1960  MRN: [id number]    Date of Service: 2023    Discharge Recommendations:  Patient would benefit from continued therapy after discharge, Therapy recommended at discharge        Patient Diagnosis(es): The encounter diagnosis was Closed displaced intertrochanteric fracture of right femur, initial encounter ([address]). Assessment   Activity Tolerance: Patient tolerated treatment well     Plan    Physcial Therapy Plan  General Plan: 2 times a day 7 days a week  Specific Instructions for Next Treatment: Progress to transfers with RW and ambulation; Provide Supine/seated HEP for Gentle strengthening & ROM. Continue to monitor SpO2 and BPs, if stable please progress mobility as tolerated. Current Treatment Recommendations: Strengthening;ROM;Balance training;Functional mobility training;Transfer training; Endurance training;Gait training;Pain management;Home exercise program;Safety education & training;Patient/Caregiver education & training;Positioning; Therapeutic activities     Restrictions  Restrictions/Precautions  Restrictions/Precautions: Weight Bearing, Fall Risk, General Precautions  Required Braces or Orthoses?: No  Implants present? :  (R femur)  Lower Extremity Weight Bearing Restrictions  Right Lower Extremity Weight Bearing: Weight Bearing As Tolerated  Position Activity Restriction  Other position/activity restrictions: S/P R Hip IMN by Dr. Alec Billings; Work on gait and gentle right hip ROM/strength; RLE WBAT       Subjective  Subjective: Pt states pain has been bad this morning. Pt reports agreeable to work with PT however \"I need to go to the bathroom first\". Orientation  Overall Orientation Status: Within Functional Limits  Orientation Level: Oriented X4    Cognition  Overall Cognitive Status: WFL  Arousal/Alertness: Appropriate responses to stimuli  Following Commands:  Follows multistep commands with weight bearing thru UE)  Number of Stairs Trained: 5  Curbs/Ramps: Contact-guard assistance     PT Exercises  Exercise Treatment: Toilet Transfer AM and PM, car transfer training, stair training, home safety education  A/AROM Exercises: Seated bilat LE exercises, AAROM Marching on right LE, AROM LAQ, ankle pumps, abd/add  Static Sitting Balance Exercises: Pt dangles EOB 20-25 minutes with SBA  Postural Correction Exercises: Cues for upright Posture and position in RW     Safety Devices  Type of Devices: Left in bed;Call light within reach, bed alarm in place (RN Anisha Wilkerson updated).      [facility] Basic Mobility - Inpatient   How much help is needed turning from your back to your side while in a flat bed without using bedrails?: None  How much help is needed moving from lying on your back to sitting on the side of a flat bed without using bedrails?: None  How much help is needed moving to and from a bed to a chair?: A Little  How much help is needed standing up from a chair using your arms?: A Little  How much help is needed walking in hospital room?: A Little  How much help is needed climbing 3-5 steps with a railing?: A Little  [facility] Inpatient Mobility Raw Score : 20  AM-PAC Inpatient T-Scale Score : 47.67  Mobility Inpatient CMS 0-100% Score: 35.83  Mobility Inpatient CMS G-Code Modifier : CJ      Goals  Short Term Goals  Time Frame for Short Term Goals: 8 visits  Short Term Goal 1: pt to demo all bed mobility with [place]  Short Term Goal 2: pt to demo safe functional transfers with RW and MinAx1  Short Term Goal 3: pt to progress to ambulation of 48' with turning using RW and MinAx1  Short Term Goal 4: pt to demo G technique for RLE ROM and gentle strengthening per reviewing handout without need for VCs  Short Term Goal 5: pt to tolerate standing x10 minutes to improve tolerance for mobility and further activity/ADL participation  Patient Goals   Patient Goals : to walk    Education  Patient Education  Education Given To:

## 2023-08-09 NOTE — PROGRESS NOTES
08/09/23 1703   Encounter Summary   Encounter Overview/Reason  Spiritual/Emotional Needs   Service Provided For: Patient   Referral/Consult From: Rounding   Complexity of Encounter Low   Spiritual/Emotional needs   Type Spiritual Support   Assessment/Intervention/Outcome   Assessment Unable to assess  (patient sleeping)   Intervention Prayer (assurance of)/[place]

## 2023-08-09 NOTE — PLAN OF CARE
Problem: Discharge Planning  Goal: Discharge to home or other facility with appropriate resources  Outcome: Progressing  Flowsheets (Taken 8/9/2023 0410)  Discharge to home or other facility with appropriate resources:   Identify barriers to discharge with patient and caregiver   Arrange for needed discharge resources and transportation as appropriate   Identify discharge learning needs (meds, wound care, etc)   Refer to discharge planning if patient needs post-hospital services based on physician order or complex needs related to functional status, cognitive ability or social support system  Note: Inform pt. Of discharge teaching and planned. Instructed pt. To inform me if further teaching need to be done. Problem: Pain  Goal: Verbalizes/displays adequate comfort level or baseline comfort level  Outcome: Progressing  Flowsheets (Taken 8/9/2023 0410)  Verbalizes/displays adequate comfort level or baseline comfort level:   Encourage patient to monitor pain and request assistance   Assess pain using appropriate pain scale   Administer analgesics based on type and severity of pain and evaluate response   Implement non-pharmacological measures as appropriate and evaluate response   Consider cultural and social influences on pain and pain management   Notify Licensed Independent Practitioner if interventions unsuccessful or patient reports new pain  Note: PT. Received pain meds in timely manner. Teach pt. Non-pharm. Methods to handle pain. Problem: Safety - Adult  Goal: Free from fall injury  Outcome: Progressing  Flowsheets (Taken 8/9/2023 0410)  Free From Fall Injury:   Instruct family/caregiver on patient safety   Based on caregiver fall risk screen, instruct family/caregiver to ask for assistance with transferring infant if caregiver noted to have fall risk factors  Note: Made sure call light was in reach, a clear pathway and adequate lighting was provided. Also made sure that the pt.  Was wearing

## 2023-08-09 NOTE — PROGRESS NOTES
Pt requested that she be restarted on norvasc dosing from home. Pt states she takes at [facility] and would like to get it restarted. Laith served Garth Castillo CNP with this information. Awaiting response back.

## 2023-08-09 NOTE — CARE COORDINATION
Writer placed call to Est[place] at [facility]. No new update on pre-cert.   Electronically signed by YOLANDA Juan on 8/9/2023 at 3:29 PM

## 2023-08-09 NOTE — PROGRESS NOTES
[facility]   INPATIENT OCCUPATIONAL THERAPY  PROGRESS NOTE  Date: 2023  Patient Name: Hernandez Mireles       Room: 7360/7937-23  MRN: [id number]    : 1960  (58 y.o.)  Gender: female   Referring Practitioner: Silvino Reed MD  Diagnosis: Closed displaced intertrochanteric fracture of R femur      Discharge Recommendations:  Further Occupational Therapy is recommended upon facility discharge. OT Equipment Recommendations  Equipment Needed: Yes  Mobility Devices: ADL Assistive Devices  ADL Assistive Devices: Long-handled Sponge, Transfer Tub Bench, Reacher, Walker Tray  Other: TBD    Restrictions/Precautions  Restrictions/Precautions  Restrictions/Precautions: Weight Bearing; Fall Risk;General Precautions  Required Braces or Orthoses?: No  Lower Extremity Weight Bearing Restrictions  Right Lower Extremity Weight Bearing: Weight Bearing As Tolerated  Position Activity Restriction  Other position/activity restrictions: S/P R Hip IMN by Dr. Cornelia Rizzo; Work on gait and gentle right hip ROM/strength; RLE WBAT    Vitals  O2 Device: Nasal cannula (2.5L)    Subjective  Subjective  Subjective: \"I have a shower/tub get up and I'm not sure how I'm going to get in and out. \" pt educated on utilizing tub bench for safe transfers. tx focuses on education this date 2* pt being fatigued from working with PTA. Comments: Marika Godinez RN oks tx. pt alert and cooperative. Objective  Orientation  Overall Orientation Status: Within Functional Limits  Orientation Level: Oriented X4  Cognition  Overall Cognitive Status: WFL    Activities of Daily Living  ADL  Feeding: Modified independent   Grooming: Stand by assistance (SBA for hand washing this date)  UE Bathing: Stand by assistance  LE Bathing: Minimal assistance  UE Dressing: Stand by assistance  LE Dressing:  Moderate assistance  LE Dressing Skilled Clinical Factors: TA to iraj R socks this date, set up for L sock  Toileting: Stand by

## 2023-08-09 NOTE — PLAN OF CARE
Problem: Discharge Planning  Goal: Discharge to home or other facility with appropriate resources  8/9/2023 1306 by Hi Trujillo RN  Outcome: Progressing  Flowsheets (Taken 8/9/2023 5466)  Discharge to home or other facility with appropriate resources: Identify barriers to discharge with patient and caregiver   Pt to discharge to facility once medically cleared. Problem: Pain  Goal: Verbalizes/displays adequate comfort level or baseline comfort level  8/9/2023 1306 by Hi Trujillo RN  Outcome: Progressing   Pt continues to rate pain at a 9. PRN and scheduled pain medications helpful, but pt continues to have chronic back pain. Problem: Safety - Adult  Goal: Free from fall injury  8/9/2023 1306 by Hi Trujillo RN  Outcome: Progressing  Flowsheets (Taken 8/9/2023 0736)  Free From Fall Injury: Instruct family/caregiver on patient safety   No falls noted this shift. Problem: Skin/Tissue Integrity  Goal: Absence of new skin breakdown  Description: 1. Monitor for areas of redness and/or skin breakdown  2. Assess vascular access sites hourly  3. Every 4-6 hours minimum:  Change oxygen saturation probe site  4. Every 4-6 hours:  If on nasal continuous positive airway pressure, respiratory therapy assess nares and determine need for appliance change or resting period. Outcome: Progressing   No new skin issues noted this shift. Problem: Nutrition Deficit:  Goal: Optimize nutritional status  Outcome: Progressing   Pt tolerating po intake this shift. Problem: ABCDS Injury Assessment  Goal: Absence of physical injury  Outcome: Progressing   No injury noted this shift.

## 2023-08-10 LAB
ANION GAP SERPL CALCULATED.3IONS-SCNC: 6 MMOL/L (ref 9–17)
BASOPHILS # BLD: 0 K/UL (ref 0–0.2)
BASOPHILS NFR BLD: 1 % (ref 0–2)
BUN SERPL-MCNC: 7 MG/DL (ref 8–23)
CALCIUM SERPL-MCNC: 8.1 MG/DL (ref 8.6–10.4)
CHLORIDE SERPL-SCNC: 100 MMOL/L (ref 98–107)
CO2 SERPL-SCNC: 34 MMOL/L (ref 20–31)
CREAT SERPL-MCNC: 0.5 MG/DL (ref 0.5–0.9)
EOSINOPHIL # BLD: 0 K/UL (ref 0–0.4)
EOSINOPHILS RELATIVE PERCENT: 0 % (ref 0–4)
ERYTHROCYTE [DISTWIDTH] IN BLOOD BY AUTOMATED COUNT: 18.2 % (ref 11.5–14.9)
GFR SERPL CREATININE-BSD FRML MDRD: >60 ML/MIN/1.73M2
GLUCOSE SERPL-MCNC: 124 MG/DL (ref 70–99)
HCT VFR BLD AUTO: 28.5 % (ref 36–46)
HGB BLD-MCNC: 9 G/DL (ref 12–16)
LYMPHOCYTES NFR BLD: 1.2 K/UL (ref 1–4.8)
LYMPHOCYTES RELATIVE PERCENT: 26 % (ref 24–44)
MAGNESIUM SERPL-MCNC: 1.5 MG/DL (ref 1.6–2.6)
MCH RBC QN AUTO: 30.1 PG (ref 26–34)
MCHC RBC AUTO-ENTMCNC: 31.7 G/DL (ref 31–37)
MCV RBC AUTO: 94.9 FL (ref 80–100)
MONOCYTES NFR BLD: 0.5 K/UL (ref 0.1–1.3)
MONOCYTES NFR BLD: 11 % (ref 1–7)
NEUTROPHILS NFR BLD: 62 % (ref 36–66)
NEUTS SEG NFR BLD: 2.7 K/UL (ref 1.3–9.1)
PLATELET # BLD AUTO: 570 K/UL (ref 150–450)
PMV BLD AUTO: 6.5 FL (ref 6–12)
POTASSIUM SERPL-SCNC: 4.3 MMOL/L (ref 3.7–5.3)
RBC # BLD AUTO: 3 M/UL (ref 4–5.2)
SODIUM SERPL-SCNC: 140 MMOL/L (ref 135–144)
WBC OTHER # BLD: 4.4 K/UL (ref 3.5–11)

## 2023-08-10 PROCEDURE — 6370000000 HC RX 637 (ALT 250 FOR IP): Performed by: INTERNAL MEDICINE

## 2023-08-10 PROCEDURE — 6370000000 HC RX 637 (ALT 250 FOR IP): Performed by: NURSE PRACTITIONER

## 2023-08-10 PROCEDURE — 85025 COMPLETE CBC W/AUTO DIFF WBC: CPT

## 2023-08-10 PROCEDURE — 94761 N-INVAS EAR/PLS OXIMETRY MLT: CPT

## 2023-08-10 PROCEDURE — 6370000000 HC RX 637 (ALT 250 FOR IP)

## 2023-08-10 PROCEDURE — 36415 COLL VENOUS BLD VENIPUNCTURE: CPT

## 2023-08-10 PROCEDURE — 6360000002 HC RX W HCPCS: Performed by: ORTHOPAEDIC SURGERY

## 2023-08-10 PROCEDURE — 97110 THERAPEUTIC EXERCISES: CPT

## 2023-08-10 PROCEDURE — 2580000003 HC RX 258: Performed by: ORTHOPAEDIC SURGERY

## 2023-08-10 PROCEDURE — 6370000000 HC RX 637 (ALT 250 FOR IP): Performed by: ORTHOPAEDIC SURGERY

## 2023-08-10 PROCEDURE — 2700000000 HC OXYGEN THERAPY PER DAY

## 2023-08-10 PROCEDURE — 97116 GAIT TRAINING THERAPY: CPT

## 2023-08-10 PROCEDURE — 1200000000 HC SEMI PRIVATE

## 2023-08-10 PROCEDURE — 97530 THERAPEUTIC ACTIVITIES: CPT

## 2023-08-10 PROCEDURE — 83735 ASSAY OF MAGNESIUM: CPT

## 2023-08-10 PROCEDURE — 99232 SBSQ HOSP IP/OBS MODERATE 35: CPT | Performed by: INTERNAL MEDICINE

## 2023-08-10 PROCEDURE — 94640 AIRWAY INHALATION TREATMENT: CPT

## 2023-08-10 PROCEDURE — 80048 BASIC METABOLIC PNL TOTAL CA: CPT

## 2023-08-10 PROCEDURE — 97535 SELF CARE MNGMENT TRAINING: CPT

## 2023-08-10 RX ORDER — PRIMIDONE 50 MG/1
100 TABLET ORAL NIGHTLY
Qty: 30 TABLET | Refills: 0 | Status: SHIPPED | OUTPATIENT
Start: 2023-08-10

## 2023-08-10 RX ORDER — LIDOCAINE 4 G/G
1 PATCH TOPICAL DAILY
Qty: 30 PATCH | Refills: 0 | Status: SHIPPED | OUTPATIENT
Start: 2023-08-11 | End: 2023-09-10

## 2023-08-10 RX ORDER — LIDOCAINE 4 G/G
1 PATCH TOPICAL DAILY
Status: DISCONTINUED | OUTPATIENT
Start: 2023-08-10 | End: 2023-08-11 | Stop reason: HOSPADM

## 2023-08-10 RX ADMIN — OXYCODONE HYDROCHLORIDE 10 MG: 10 TABLET, FILM COATED, EXTENDED RELEASE ORAL at 08:56

## 2023-08-10 RX ADMIN — SUCRALFATE 1 G: 1 TABLET ORAL at 16:39

## 2023-08-10 RX ADMIN — TIZANIDINE 4 MG: 4 TABLET ORAL at 19:10

## 2023-08-10 RX ADMIN — AMLODIPINE BESYLATE 10 MG: 10 TABLET ORAL at 17:31

## 2023-08-10 RX ADMIN — Medication 10.5 MG: at 21:08

## 2023-08-10 RX ADMIN — TIZANIDINE 4 MG: 4 TABLET ORAL at 10:26

## 2023-08-10 RX ADMIN — ENOXAPARIN SODIUM 30 MG: 100 INJECTION SUBCUTANEOUS at 08:56

## 2023-08-10 RX ADMIN — TIZANIDINE 4 MG: 4 TABLET ORAL at 04:16

## 2023-08-10 RX ADMIN — ALBUTEROL SULFATE 2.5 MG: 2.5 SOLUTION RESPIRATORY (INHALATION) at 10:45

## 2023-08-10 RX ADMIN — TIOTROPIUM BROMIDE AND OLODATEROL 2 PUFF: 3.124; 2.736 SPRAY, METERED RESPIRATORY (INHALATION) at 06:52

## 2023-08-10 RX ADMIN — SODIUM CHLORIDE, PRESERVATIVE FREE 10 ML: 5 INJECTION INTRAVENOUS at 08:58

## 2023-08-10 RX ADMIN — ALBUTEROL SULFATE 2.5 MG: 2.5 SOLUTION RESPIRATORY (INHALATION) at 15:52

## 2023-08-10 RX ADMIN — OXYCODONE HYDROCHLORIDE 10 MG: 10 TABLET, FILM COATED, EXTENDED RELEASE ORAL at 21:09

## 2023-08-10 RX ADMIN — LEVOTHYROXINE SODIUM 88 MCG: 0.09 TABLET ORAL at 06:19

## 2023-08-10 RX ADMIN — CARVEDILOL 6.25 MG: 6.25 TABLET, FILM COATED ORAL at 17:31

## 2023-08-10 RX ADMIN — HYDROCODONE BITARTRATE AND ACETAMINOPHEN 1 TABLET: 5; 325 TABLET ORAL at 04:15

## 2023-08-10 RX ADMIN — MAGNESIUM SULFATE HEPTAHYDRATE 1000 MG: 1 INJECTION, SOLUTION INTRAVENOUS at 11:30

## 2023-08-10 RX ADMIN — ALBUTEROL SULFATE 2.5 MG: 2.5 SOLUTION RESPIRATORY (INHALATION) at 03:05

## 2023-08-10 RX ADMIN — CLONAZEPAM 0.5 MG: 1 TABLET ORAL at 16:39

## 2023-08-10 RX ADMIN — PRIMIDONE 100 MG: 50 TABLET ORAL at 21:10

## 2023-08-10 RX ADMIN — HYDROCODONE BITARTRATE AND ACETAMINOPHEN 1 TABLET: 5; 325 TABLET ORAL at 10:27

## 2023-08-10 RX ADMIN — SUCRALFATE 1 G: 1 TABLET ORAL at 21:08

## 2023-08-10 RX ADMIN — SUCRALFATE 1 G: 1 TABLET ORAL at 06:19

## 2023-08-10 RX ADMIN — LINACLOTIDE 72 MCG: 72 CAPSULE, GELATIN COATED ORAL at 06:18

## 2023-08-10 RX ADMIN — SODIUM CHLORIDE, PRESERVATIVE FREE 10 ML: 5 INJECTION INTRAVENOUS at 21:30

## 2023-08-10 RX ADMIN — CARVEDILOL 6.25 MG: 6.25 TABLET, FILM COATED ORAL at 08:56

## 2023-08-10 RX ADMIN — MAGNESIUM SULFATE HEPTAHYDRATE 1000 MG: 1 INJECTION, SOLUTION INTRAVENOUS at 14:12

## 2023-08-10 RX ADMIN — CLONAZEPAM 0.5 MG: 1 TABLET ORAL at 06:22

## 2023-08-10 RX ADMIN — MIRTAZAPINE 15 MG: 15 TABLET, FILM COATED ORAL at 21:09

## 2023-08-10 RX ADMIN — ALBUTEROL SULFATE 2.5 MG: 2.5 SOLUTION RESPIRATORY (INHALATION) at 19:46

## 2023-08-10 RX ADMIN — ALBUTEROL SULFATE 2.5 MG: 2.5 SOLUTION RESPIRATORY (INHALATION) at 23:54

## 2023-08-10 RX ADMIN — ALBUTEROL SULFATE 2.5 MG: 2.5 SOLUTION RESPIRATORY (INHALATION) at 06:42

## 2023-08-10 RX ADMIN — SUCRALFATE 1 G: 1 TABLET ORAL at 10:27

## 2023-08-10 RX ADMIN — PANTOPRAZOLE SODIUM 40 MG: 40 TABLET, DELAYED RELEASE ORAL at 16:39

## 2023-08-10 RX ADMIN — HYDROCODONE BITARTRATE AND ACETAMINOPHEN 1 TABLET: 5; 325 TABLET ORAL at 14:49

## 2023-08-10 RX ADMIN — HYDROCODONE BITARTRATE AND ACETAMINOPHEN 1 TABLET: 5; 325 TABLET ORAL at 19:10

## 2023-08-10 RX ADMIN — MONTELUKAST 10 MG: 10 TABLET, FILM COATED ORAL at 08:56

## 2023-08-10 RX ADMIN — VENLAFAXINE HYDROCHLORIDE 150 MG: 150 CAPSULE, EXTENDED RELEASE ORAL at 08:56

## 2023-08-10 RX ADMIN — PANTOPRAZOLE SODIUM 40 MG: 40 TABLET, DELAYED RELEASE ORAL at 06:18

## 2023-08-10 ASSESSMENT — PAIN SCALES - WONG BAKER
WONGBAKER_NUMERICALRESPONSE: 6

## 2023-08-10 ASSESSMENT — PAIN SCALES - GENERAL
PAINLEVEL_OUTOF10: 10
PAINLEVEL_OUTOF10: 8
PAINLEVEL_OUTOF10: 9
PAINLEVEL_OUTOF10: 8
PAINLEVEL_OUTOF10: 6
PAINLEVEL_OUTOF10: 10

## 2023-08-10 ASSESSMENT — PAIN DESCRIPTION - LOCATION
LOCATION: LEG;BACK
LOCATION: BACK;HIP

## 2023-08-10 ASSESSMENT — PAIN DESCRIPTION - ORIENTATION: ORIENTATION: RIGHT

## 2023-08-10 NOTE — PLAN OF CARE
Discharge medicine reconciliation done  Discharge orders placed  Patient has placement in skilled nursing facility

## 2023-08-10 NOTE — CARE COORDINATION
Writer placed call to [place] at [facility] regarding authorization. Writer was on hold for ten minutes, no answer. Will attempt to call back. Electronically signed by YOLANDA Miguel on 8/10/2023 at 10:59 AM    Writer placed call back to [facility]. No update on pre-cert. Electronically signed by YOLANDA Miguel on 8/10/2023 at 1:57 PM    Writer received fax from [facility] that pt was approved 8/10-8/17. Writer placed call to [place] regarding this, states she also received the fax. Writer sent perfectserve message to xochilt Alex regarding this.   She will reach out to primary team.  Electronically signed by YOLANDA Miguel on 8/10/2023 at 2:50 PM

## 2023-08-10 NOTE — CARE COORDINATION
Transportation arranged for patient to go to [facility] at [phone] on 8/11/23 via [address] by wheelchair. Facility informed. Bedside nurse Abi michaud @ [phone].      Number for Report: [phone]  Electronically signed by YOLANDA Serrano on 8/10/2023 at 4:30 PM

## 2023-08-10 NOTE — PLAN OF CARE
Problem: Discharge Planning  Goal: Discharge to home or other facility with appropriate resources  Outcome: Progressing     Problem: Pain  Goal: Verbalizes/displays adequate comfort level or baseline comfort level  Outcome: Progressing  Flowsheets (Taken 8/9/2023 1809 by Eric Gonzalez RN)  Verbalizes/displays adequate comfort level or baseline comfort level: Encourage patient to monitor pain and request assistance     Problem: Safety - Adult  Goal: Free from fall injury  Outcome: Progressing     Problem: Skin/Tissue Integrity  Goal: Absence of new skin breakdown  Description: 1. Monitor for areas of redness and/or skin breakdown  2. Assess vascular access sites hourly  3. Every 4-6 hours minimum:  Change oxygen saturation probe site  4. Every 4-6 hours:  If on nasal continuous positive airway pressure, respiratory therapy assess nares and determine need for appliance change or resting period.   Outcome: Progressing     Problem: Nutrition Deficit:  Goal: Optimize nutritional status  Outcome: Progressing     Problem: ABCDS Injury Assessment  Goal: Absence of physical injury  Outcome: Progressing

## 2023-08-10 NOTE — PROGRESS NOTES
Physical Therapy  Facility/Department: [facility] MED SURG  Daily Treatment Note  NAME: Chelsie Lazcano  : 1960  MRN: [id number]    Date of Service: 8/10/2023    Discharge Recommendations:  Patient would benefit from continued therapy after discharge, Therapy recommended at discharge        Patient Diagnosis(es): The encounter diagnosis was Closed displaced intertrochanteric fracture of right femur, initial encounter ([address]). Assessment   Activity Tolerance: Patient tolerated treatment well     Plan    Physcial Therapy Plan  General Plan: 2 times a day 7 days a week  Specific Instructions for Next Treatment: Progress to transfers with RW and ambulation; Provide Supine/seated HEP for Gentle strengthening & ROM. Continue to monitor SpO2 and BPs, if stable please progress mobility as tolerated. Current Treatment Recommendations: Strengthening;ROM;Balance training;Functional mobility training;Transfer training; Endurance training;Gait training;Pain management;Home exercise program;Safety education & training;Patient/Caregiver education & training;Positioning; Therapeutic activities     Restrictions  Restrictions/Precautions  Restrictions/Precautions: Weight Bearing, Fall Risk, General Precautions  Required Braces or Orthoses?: No  Implants present? :  (R femur)  Lower Extremity Weight Bearing Restrictions  Right Lower Extremity Weight Bearing: Weight Bearing As Tolerated  Position Activity Restriction  Other position/activity restrictions: S/P R Hip IMN by Dr. Linnea Strickland; Work on gait and gentle right hip ROM/strength; RLE WBAT       Subjective  Subjective: Pt reports having increased pain this morning and was not able to work with Ambrocio Parker. Pt reports feeling a little better if writer would let Ambrocio Parker know. Pt is agreeable to PT. Writer updated Ambrocio Parker and he reports willing to return to see patient this afternoon. Pt is pleased.     Pain: 9/10    Orientation  Overall Orientation Status: Within Functional

## 2023-08-10 NOTE — PROGRESS NOTES
PerfectServe message received from Shasha Jarvis, stating that patient is requesting home dose Norvasc be restarted. Recent vital sign readings reviewed; will resume home dose amlodipine 10 mg q. evening with first dose now.

## 2023-08-10 NOTE — PROGRESS NOTES
with the assessment, plan and orders as documented by the resident. Patient discharged to snf     Manuel Loomis MD      [address], [address] Phone [phone]   Fax: [phone]  Answering Service: [phone]                       Copy sent to Dr. Zoe Quiles MD    Please note that this chart was generated using voice recognition Dragon dictation software. Although every effort was made to ensure the accuracy of this automated transcription, some errors in transcription may have occurred.

## 2023-08-10 NOTE — PROGRESS NOTES
[facility]   OCCUPATIONAL THERAPY MISSED TREATMENT NOTE   INPATIENT   Date: 8/10/23  Patient Name: Edwina Ji       Room: 7795/5612-44  MRN: [id number]   Account #: [de-identified]    : 1960  (58 y.o.)  Gender: female   Referring Practitioner: Paul Oden MD  Diagnosis: Closed displaced intertrochanteric fracture of R femur             REASON FOR MISSED TREATMENT:  Patient declined   0431 19 97 94-    pt in bed upon arrival and states that her pain is a 10/10 at this time and doesn't want to get out of bed right now. Will continue to follow for OT needs.          Electronically signed by LADONNA Zuniga on 8/10/23 at 9:40 AM EDT

## 2023-08-10 NOTE — PROGRESS NOTES
help is needed for putting on and taking off regular lower body clothing?: A Lot  How much help is needed for bathing (which includes washing, rinsing, drying)?: A Little  How much help is needed for toileting (which includes using toilet, bedpan, or urinal)?: A Little  How much help is needed for putting on and taking off regular upper body clothing?: A Little  How much help is needed for taking care of personal grooming?: A Little  How much help for eating meals?: None  AM-[facility] Inpatient Daily Activity Raw Score: 18  AM-PAC Inpatient ADL T-Scale Score : 38.66  ADL Inpatient CMS 0-100% Score: 46.65  ADL Inpatient CMS G-Code Modifier : CK    OT Minutes  OT Individual Minutes  Time In: 1406  Time Out: 1430  Minutes: 24      Electronically signed by LADONNA Diamond on 8/10/23 at 2:50 PM EDT

## 2023-08-11 ENCOUNTER — OUTSIDE SERVICES (OUTPATIENT)
Dept: PRIMARY CARE CLINIC | Age: 63
End: 2023-08-11

## 2023-08-11 VITALS
HEART RATE: 64 BPM | WEIGHT: 113 LBS | DIASTOLIC BLOOD PRESSURE: 74 MMHG | RESPIRATION RATE: 16 BRPM | BODY MASS INDEX: 20.8 KG/M2 | TEMPERATURE: 97.9 F | HEIGHT: 62 IN | OXYGEN SATURATION: 96 % | SYSTOLIC BLOOD PRESSURE: 125 MMHG

## 2023-08-11 DIAGNOSIS — J44.9 CHRONIC OBSTRUCTIVE PULMONARY DISEASE, UNSPECIFIED COPD TYPE (HCC): ICD-10-CM

## 2023-08-11 DIAGNOSIS — S72.141D CLOSED DISPLACED INTERTROCHANTERIC FRACTURE OF RIGHT FEMUR WITH ROUTINE HEALING, SUBSEQUENT ENCOUNTER: Primary | ICD-10-CM

## 2023-08-11 DIAGNOSIS — M48.061 LUMBAR FORAMINAL STENOSIS: ICD-10-CM

## 2023-08-11 DIAGNOSIS — I10 ESSENTIAL HYPERTENSION: ICD-10-CM

## 2023-08-11 PROCEDURE — 6360000002 HC RX W HCPCS: Performed by: ORTHOPAEDIC SURGERY

## 2023-08-11 PROCEDURE — 2700000000 HC OXYGEN THERAPY PER DAY

## 2023-08-11 PROCEDURE — 6370000000 HC RX 637 (ALT 250 FOR IP): Performed by: ORTHOPAEDIC SURGERY

## 2023-08-11 PROCEDURE — 94640 AIRWAY INHALATION TREATMENT: CPT

## 2023-08-11 PROCEDURE — 6370000000 HC RX 637 (ALT 250 FOR IP): Performed by: INTERNAL MEDICINE

## 2023-08-11 PROCEDURE — 94761 N-INVAS EAR/PLS OXIMETRY MLT: CPT

## 2023-08-11 PROCEDURE — 6370000000 HC RX 637 (ALT 250 FOR IP): Performed by: NURSE PRACTITIONER

## 2023-08-11 PROCEDURE — 2580000003 HC RX 258: Performed by: ORTHOPAEDIC SURGERY

## 2023-08-11 PROCEDURE — 99232 SBSQ HOSP IP/OBS MODERATE 35: CPT | Performed by: INTERNAL MEDICINE

## 2023-08-11 PROCEDURE — 6370000000 HC RX 637 (ALT 250 FOR IP)

## 2023-08-11 RX ORDER — CLONAZEPAM 0.5 MG/1
0.5 TABLET ORAL 3 TIMES DAILY PRN
Qty: 9 TABLET | Refills: 0 | Status: SHIPPED | OUTPATIENT
Start: 2023-08-11 | End: 2023-08-14

## 2023-08-11 RX ORDER — CLONAZEPAM 0.5 MG/1
0.5 TABLET ORAL 3 TIMES DAILY PRN
Qty: 9 TABLET | Refills: 0 | Status: SHIPPED | OUTPATIENT
Start: 2023-08-11 | End: 2023-08-11 | Stop reason: SDUPTHER

## 2023-08-11 RX ADMIN — PANTOPRAZOLE SODIUM 40 MG: 40 TABLET, DELAYED RELEASE ORAL at 06:32

## 2023-08-11 RX ADMIN — ALBUTEROL SULFATE 2.5 MG: 2.5 SOLUTION RESPIRATORY (INHALATION) at 06:42

## 2023-08-11 RX ADMIN — HYDROCODONE BITARTRATE AND ACETAMINOPHEN 1 TABLET: 5; 325 TABLET ORAL at 06:32

## 2023-08-11 RX ADMIN — TIZANIDINE 4 MG: 4 TABLET ORAL at 00:08

## 2023-08-11 RX ADMIN — SODIUM CHLORIDE, PRESERVATIVE FREE 10 ML: 5 INJECTION INTRAVENOUS at 08:32

## 2023-08-11 RX ADMIN — TIOTROPIUM BROMIDE AND OLODATEROL 2 PUFF: 3.124; 2.736 SPRAY, METERED RESPIRATORY (INHALATION) at 06:47

## 2023-08-11 RX ADMIN — OXYCODONE HYDROCHLORIDE 10 MG: 10 TABLET, FILM COATED, EXTENDED RELEASE ORAL at 08:31

## 2023-08-11 RX ADMIN — SUCRALFATE 1 G: 1 TABLET ORAL at 06:32

## 2023-08-11 RX ADMIN — MONTELUKAST 10 MG: 10 TABLET, FILM COATED ORAL at 08:31

## 2023-08-11 RX ADMIN — HYDROCODONE BITARTRATE AND ACETAMINOPHEN 1 TABLET: 5; 325 TABLET ORAL at 00:08

## 2023-08-11 RX ADMIN — CLONAZEPAM 0.5 MG: 1 TABLET ORAL at 00:08

## 2023-08-11 RX ADMIN — CARVEDILOL 6.25 MG: 6.25 TABLET, FILM COATED ORAL at 08:31

## 2023-08-11 RX ADMIN — ENOXAPARIN SODIUM 30 MG: 100 INJECTION SUBCUTANEOUS at 08:31

## 2023-08-11 RX ADMIN — LEVOTHYROXINE SODIUM 88 MCG: 0.09 TABLET ORAL at 06:32

## 2023-08-11 RX ADMIN — CLONAZEPAM 0.5 MG: 1 TABLET ORAL at 08:42

## 2023-08-11 RX ADMIN — ALBUTEROL SULFATE 2.5 MG: 2.5 SOLUTION RESPIRATORY (INHALATION) at 03:04

## 2023-08-11 RX ADMIN — LINACLOTIDE 72 MCG: 72 CAPSULE, GELATIN COATED ORAL at 06:32

## 2023-08-11 RX ADMIN — VENLAFAXINE HYDROCHLORIDE 150 MG: 150 CAPSULE, EXTENDED RELEASE ORAL at 08:31

## 2023-08-11 RX ADMIN — TIZANIDINE 4 MG: 4 TABLET ORAL at 09:52

## 2023-08-11 ASSESSMENT — PAIN DESCRIPTION - LOCATION: LOCATION: LEG

## 2023-08-11 NOTE — PROGRESS NOTES
[facility] Internal Medicine  Nnamdi Dexter MD; Aliya Holbrook MD; Leonie Rapp MD; MD Helder Sheehan MD; MD [facility]   [address]     Progress note            Date:   8/11/2023  Patient name:  Joel Sanches  Date of admission:  7/28/2023  1:04 PM  MRN:   [id number]  Account:  [de-identified]  YOB: 1960  PCP:    Jaqueline Pinedo MD  Room:   0387/8200-43  Code Status:    Full Code      Chief Complaint:     Chief Complaint   Patient presents with    Fall    Hip Pain   Fall right hip pain    History Obtained From:     Patient medical record nursing staff    History of Present Illness:     80-year-old lady with end-stage COPD on home oxygen 2 to 3 L    She has quit smoking she has a respiratory cachexia with severe protein calorie malnutrition BMI 18.29 history of cervical cancer status post hysterectomy  She just got discharged from [facility] after COPD exacerbation on oral steroid she tripped and fell over her oxygen tubing complains of right hip pain unable to mobilize severity 10 out of 10 with no radiation no chest pain no nausea vomiting chronic respiratory failure home oxygen    8/3  Patient seen and examined, did not have any bowel movement from last 1 week, on MiraLAX  Vitals have been stable, complaining of pain    Past Medical History:     Past Medical History:   Diagnosis Date    Acid reflux     Anxiety     Arthritis     Cancer ([address])     cervical    Cervical osteoarthritis 11/30/2020    COPD (chronic obstructive pulmonary disease) ([address]) 02/23/2018    DDD (degenerative disc disease), cervical     Depression     Hiatal hernia     History of blood transfusion     Reaction fever went up to 107 . per Pt.      On home O2     Spinal stenosis     Tachycardia     Thyroid disease     hypo        Past Surgical History:     Past Surgical History:   Procedure Laterality Date pulses palpable, no pedal edema or calf pain with palpation    Investigations:      Laboratory Testing:  Recent Results (from the past 24 hour(s))   CBC with Auto Differential    Collection Time: 08/10/23 10:01 AM   Result Value Ref Range    WBC 4.4 3.5 - 11.0 k/uL    RBC 3.00 (L) 4.0 - 5.2 m/uL    Hemoglobin 9.0 (L) 12.0 - 16.0 g/dL    Hematocrit 28.5 (L) 36 - 46 %    MCV 94.9 80 - 100 fL    MCH 30.1 26 - 34 pg    MCHC 31.7 31 - 37 g/dL    RDW 18.2 (H) 11.5 - 14.9 %    Platelets 006 (H) 380 - 450 k/uL    MPV 6.5 6.0 - 12.0 fL    Neutrophils % 62 36 - 66 %    Lymphocytes % 26 24 - 44 %    Monocytes % 11 (H) 1 - 7 %    Eosinophils % 0 0 - 4 %    Basophils % 1 0 - 2 %    Neutrophils Absolute 2.70 1.3 - 9.1 k/uL    Lymphocytes Absolute 1.20 1.0 - 4.8 k/uL    Monocytes Absolute 0.50 0.1 - 1.3 k/uL    Eosinophils Absolute 0.00 0.0 - 0.4 k/uL    Basophils Absolute 0.00 0.0 - 0.2 k/uL   Magnesium    Collection Time: 08/10/23 10:01 AM   Result Value Ref Range    Magnesium 1.5 (L) 1.6 - 2.6 mg/dL   Basic Metabolic Panel    Collection Time: 08/10/23 10:01 AM   Result Value Ref Range    Glucose 124 (H) 70 - 99 mg/dL    BUN 7 (L) 8 - 23 mg/dL    Creatinine 0.5 0.5 - 0.9 mg/dL    Est, Glom Filt Rate >60 >60 mL/min/1.73m2    Calcium 8.1 (L) 8.6 - 10.4 mg/dL    Sodium 140 135 - 144 mmol/L    Potassium 4.3 3.7 - 5.3 mmol/L    Chloride 100 98 - 107 mmol/L    CO2 34 (H) 20 - 31 mmol/L    Anion Gap 6 (L) 9 - 17 mmol/L         Imaging/[employer]:  XR HIP RIGHT (2-3 VIEWS)    Result Date: 7/28/2023  Intertrochanteric right hip fracture. Several scattered lucencies within the femur and visualized tibia. Multiple myeloma/metastatic disease should be excluded. XR FEMUR RIGHT (MIN 2 VIEWS)    Result Date: 7/28/2023  Intertrochanteric right hip fracture. Several scattered lucencies within the femur and visualized tibia. Multiple myeloma/metastatic disease should be excluded.      CT HEAD WO CONTRAST    Result Date: 7/28/2023  No acute

## 2023-08-11 NOTE — CARE COORDINATION
HENS complete.   Document ID : [id number]  Electronically signed by YOLANDA Villanueva on 8/11/2023 at 8:28 AM

## 2023-08-13 ASSESSMENT — ENCOUNTER SYMPTOMS
DIARRHEA: 0
SHORTNESS OF BREATH: 1
CONSTIPATION: 0
BACK PAIN: 1
COUGH: 0
NAUSEA: 0

## 2023-08-18 ENCOUNTER — TELEPHONE (OUTPATIENT)
Dept: ORTHOPEDIC SURGERY | Age: 63
End: 2023-08-18

## 2023-08-18 NOTE — TELEPHONE ENCOUNTER
Dr. Precious Jiménez,    Patient is SP femur IM nail 7/29/23. She has yet to be seen in office since sx and according to facility still has sutures in. Due to transportation issues the patient is now rescheduled for 9/1 to be seen in office. Are you alright with giving orders to have the facility remove sutures? If so, are there any other orders that you would like to provide? Nurse requested any orders from Dr. Precious Jiménez to be included in a letter and faxed to the number listed below.     [phone]

## 2023-08-18 NOTE — TELEPHONE ENCOUNTER
Need to contact facility about getting patient seen asap for suture removal.First post op appt for patient has been rescheduled several times.  [address] [address] facility can be contacted at [phone]

## 2023-08-21 RX ORDER — OMEPRAZOLE 40 MG/1
CAPSULE, DELAYED RELEASE ORAL
Qty: 180 CAPSULE | Refills: 3 | Status: SHIPPED | OUTPATIENT
Start: 2023-08-21

## 2023-08-22 ENCOUNTER — OUTSIDE SERVICES (OUTPATIENT)
Dept: PRIMARY CARE CLINIC | Age: 63
End: 2023-08-22

## 2023-08-22 DIAGNOSIS — I10 ESSENTIAL HYPERTENSION: ICD-10-CM

## 2023-08-22 DIAGNOSIS — S72.141D CLOSED DISPLACED INTERTROCHANTERIC FRACTURE OF RIGHT FEMUR WITH ROUTINE HEALING, SUBSEQUENT ENCOUNTER: ICD-10-CM

## 2023-08-22 DIAGNOSIS — E43 SEVERE MALNUTRITION (HCC): Chronic | ICD-10-CM

## 2023-08-22 DIAGNOSIS — I49.9 IRREGULAR HEART BEAT: ICD-10-CM

## 2023-08-22 DIAGNOSIS — J44.9 CHRONIC OBSTRUCTIVE PULMONARY DISEASE, UNSPECIFIED COPD TYPE (HCC): ICD-10-CM

## 2023-08-22 ASSESSMENT — ENCOUNTER SYMPTOMS
NAUSEA: 0
SHORTNESS OF BREATH: 1
DIARRHEA: 0
VOMITING: 0
COUGH: 0

## 2023-08-22 NOTE — ASSESSMENT & PLAN NOTE
Pt states h/o a fib, but nothing found in chart- today sounds like sinus with frequent extrasystoles.

## 2023-08-22 NOTE — ASSESSMENT & PLAN NOTE
following with ortho- getting PT- has hematoma in area and is slightly tender, but just wanting reassurance that the lump in her groin is normal.

## 2023-08-22 NOTE — TELEPHONE ENCOUNTER
Facility called to get an update on Dr. Berkley Heimlich response. Letter created and routed via [employer] to fax number listed below.

## 2023-08-22 NOTE — PROGRESS NOTES
Precious Stevens is a 58 y.o. female being seen for her weekly follow up. Location of visit: [facility]    HPI:  New today:Pt c/o lump in her left groin. Not really tender. Therapy told her it was a hematoma from her fracture, but wants to make sure. Review of Systems   Constitutional:  Negative for activity change, appetite change, chills, diaphoresis and fever. HENT:  Negative for congestion. Respiratory:  Positive for shortness of breath. Negative for cough. Cardiovascular:  Negative for chest pain and palpitations. Gastrointestinal:  Negative for diarrhea, nausea and vomiting. Genitourinary:  Negative for hematuria. Musculoskeletal:  Negative for arthralgias and myalgias. Skin:  Negative for rash and wound. Neurological:  Negative for weakness and headaches. Psychiatric/Behavioral:  Negative for agitation, dysphoric mood and sleep disturbance. The patient is not nervous/anxious. Physical Exam  Vitals reviewed. Constitutional:       General: She is not in acute distress. HENT:      Head: Normocephalic and atraumatic. Nose: No congestion or rhinorrhea. Eyes:      General:         Right eye: No discharge. Left eye: No discharge. Cardiovascular:      Rate and Rhythm: Normal rate and regular rhythm. Extrasystoles are present. Pulmonary:      Effort: Pulmonary effort is normal. No respiratory distress. Breath sounds: Normal breath sounds. Abdominal:      Palpations: Abdomen is soft. Tenderness: There is no abdominal tenderness. Musculoskeletal:      Right lower leg: No edema. Left lower leg: No edema. Skin:     General: Skin is warm and dry. Neurological:      Mental Status: She is alert. Mental status is at baseline. ASSESSMENT:   Diagnosis Orders   1.  Chronic obstructive pulmonary disease, unspecified COPD type ([address])        2. Closed displaced intertrochanteric fracture of right femur with routine healing, subsequent

## 2023-08-29 ENCOUNTER — OUTSIDE SERVICES (OUTPATIENT)
Dept: PRIMARY CARE CLINIC | Age: 63
End: 2023-08-29

## 2023-08-29 DIAGNOSIS — S72.141D CLOSED DISPLACED INTERTROCHANTERIC FRACTURE OF RIGHT FEMUR WITH ROUTINE HEALING, SUBSEQUENT ENCOUNTER: ICD-10-CM

## 2023-08-29 DIAGNOSIS — I10 ESSENTIAL HYPERTENSION: ICD-10-CM

## 2023-08-29 DIAGNOSIS — J44.9 CHRONIC OBSTRUCTIVE PULMONARY DISEASE, UNSPECIFIED COPD TYPE (HCC): ICD-10-CM

## 2023-08-29 ASSESSMENT — ENCOUNTER SYMPTOMS
VOMITING: 0
COUGH: 0
SHORTNESS OF BREATH: 0
NAUSEA: 0
DIARRHEA: 0

## 2023-08-29 NOTE — ASSESSMENT & PLAN NOTE
on oxygen with poor air movement in bases on exam, but stable as far as symptoms currently.   Continue same meds/ oxygen

## 2023-08-29 NOTE — PROGRESS NOTES
Shama Bills is a 58 y.o. female being seen for her weekly follow up. Location of visit: [facility]    HPI:  New today:Pt doing okay except continued pain. No new issues. Review of Systems   Constitutional:  Negative for activity change, appetite change, chills, diaphoresis and fever. HENT:  Negative for congestion. Respiratory:  Negative for cough and shortness of breath. Cardiovascular:  Negative for chest pain and palpitations. Gastrointestinal:  Negative for diarrhea, nausea and vomiting. Genitourinary:  Negative for hematuria. Musculoskeletal:  Negative for arthralgias and myalgias. Skin:  Negative for rash and wound. Neurological:  Negative for weakness and headaches. Psychiatric/Behavioral:  Negative for agitation, dysphoric mood and sleep disturbance. The patient is not nervous/anxious. Physical Exam  Vitals reviewed. Constitutional:       General: She is not in acute distress. HENT:      Head: Normocephalic and atraumatic. Nose: No congestion or rhinorrhea. Eyes:      General:         Right eye: No discharge. Left eye: No discharge. Cardiovascular:      Rate and Rhythm: Normal rate and regular rhythm. Pulmonary:      Effort: Pulmonary effort is normal. No respiratory distress. Breath sounds: Normal breath sounds. Abdominal:      Palpations: Abdomen is soft. Tenderness: There is no abdominal tenderness. Musculoskeletal:      Right lower leg: No edema. Left lower leg: No edema. Skin:     General: Skin is warm and dry. Neurological:      Mental Status: She is alert. Mental status is at baseline. ASSESSMENT:   Diagnosis Orders   1. Chronic obstructive pulmonary disease, unspecified COPD type ([address])        2. Essential hypertension        3. Closed displaced intertrochanteric fracture of right femur with routine healing, subsequent encounter            PLAN:    1.  Chronic obstructive pulmonary disease, unspecified

## 2023-09-01 ENCOUNTER — OFFICE VISIT (OUTPATIENT)
Dept: ORTHOPEDIC SURGERY | Age: 63
End: 2023-09-01

## 2023-09-01 DIAGNOSIS — M25.551 RIGHT HIP PAIN: Primary | ICD-10-CM

## 2023-09-01 PROCEDURE — 99024 POSTOP FOLLOW-UP VISIT: CPT | Performed by: ORTHOPAEDIC SURGERY

## 2023-09-01 NOTE — PROGRESS NOTES
Procedure: Right hip intertrochanteric femur fracture surgical stabilization with intramedullary nail  Date of Procedure: 7/29/2023    HPI: Emma Sinha is a 58 y.o. old female who is approximately 1 month sp the aforementioned procedure. she reports that she is doing relatively well. her pain is adequately controlled. she denies having any numbness/tingling, fevers, chills, or sweats. Physical Exam:  There were no vitals taken for this visit. General Appearance: alert, well appearing, and in no distress  Mental Status: alert, oriented to person, place, and time  Evaluation of the right hip and lower extremity demonstrates her incisions to be appropriately healed. No active drainage or dehiscence is appreciated. Sensation is grossly intact to light touch in all dermatomes and she has 2+ pedal pulses distally. Imaging Studies: An AP of the pelvis as well as AP and lateral views of the right hip completed on 9/1/2023 were reviewed independently demonstrating the intertrochanteric fracture to be in acceptable alignment. No interval loss of alignment. Implants are in appropriate position without any obvious loosening or migration. Impression and plan: Emma Sinha is a 58 y.o. old female who is approximately 1 month sp a right hip intertrochanteric femur fracture surgical stabilization with intramedullary nail. she is doing relatively well clinically at this time. she may continue weight bearing as tolerated and is to continue to work in PT on gentle range of motion, gait, and strength. she is to follow up in 2 months for re-evaluation but was instructed to return or call earlier with any questions or concerns.

## 2023-09-05 ENCOUNTER — OUTSIDE SERVICES (OUTPATIENT)
Dept: PRIMARY CARE CLINIC | Age: 63
End: 2023-09-05

## 2023-09-05 DIAGNOSIS — J04.0 LARYNGITIS: ICD-10-CM

## 2023-09-05 DIAGNOSIS — S72.141D CLOSED DISPLACED INTERTROCHANTERIC FRACTURE OF RIGHT FEMUR WITH ROUTINE HEALING, SUBSEQUENT ENCOUNTER: ICD-10-CM

## 2023-09-05 DIAGNOSIS — J44.9 COPD, SEVERE (HCC): Chronic | ICD-10-CM

## 2023-09-05 ASSESSMENT — ENCOUNTER SYMPTOMS
SHORTNESS OF BREATH: 0
DIARRHEA: 0
VOMITING: 0
COUGH: 0
NAUSEA: 0
VOICE CHANGE: 1

## 2023-09-05 NOTE — PROGRESS NOTES
Phil Smith is a 58 y.o. female being seen for her weekly follow up. Location of visit: [facility]    HPI:  New today:Pt c/o pain. And also voice change/ phlegm constantly        Review of Systems   Constitutional:  Negative for activity change, appetite change, chills, diaphoresis and fever. HENT:  Positive for congestion and voice change. Respiratory:  Negative for cough and shortness of breath. Cardiovascular:  Negative for chest pain and palpitations. Gastrointestinal:  Negative for diarrhea, nausea and vomiting. Genitourinary:  Negative for hematuria. Musculoskeletal:  Negative for arthralgias and myalgias. Skin:  Negative for rash and wound. Neurological:  Negative for weakness and headaches. Psychiatric/Behavioral:  Negative for agitation, dysphoric mood and sleep disturbance. The patient is not nervous/anxious. Physical Exam  Vitals reviewed. Constitutional:       General: She is not in acute distress. HENT:      Head: Normocephalic and atraumatic. Nose: No congestion or rhinorrhea. Eyes:      General:         Right eye: No discharge. Left eye: No discharge. Cardiovascular:      Rate and Rhythm: Normal rate and regular rhythm. Pulmonary:      Effort: Pulmonary effort is normal. No respiratory distress. Breath sounds: Normal breath sounds. Abdominal:      Palpations: Abdomen is soft. Tenderness: There is no abdominal tenderness. Musculoskeletal:      Right lower leg: No edema. Left lower leg: No edema. Skin:     General: Skin is warm and dry. Neurological:      Mental Status: She is alert. Mental status is at baseline. ASSESSMENT:   Diagnosis Orders   1. Closed displaced intertrochanteric fracture of right femur with routine healing, subsequent encounter        2. COPD, severe ([address])            PLAN:    1.  Closed displaced intertrochanteric fracture of right femur with routine healing, subsequent encounter  Assessment

## 2023-09-05 NOTE — ASSESSMENT & PLAN NOTE
breathing is stable, but c/o some congestion/ change in voice- will add mucinex daily- could also be due to reflux so may need some pepcid as is already on sucralfate and protonix

## 2023-09-08 ENCOUNTER — OUTSIDE SERVICES (OUTPATIENT)
Dept: PRIMARY CARE CLINIC | Age: 63
End: 2023-09-08

## 2023-09-08 DIAGNOSIS — S72.141D CLOSED DISPLACED INTERTROCHANTERIC FRACTURE OF RIGHT FEMUR WITH ROUTINE HEALING, SUBSEQUENT ENCOUNTER: Primary | ICD-10-CM

## 2023-09-08 DIAGNOSIS — M47.817 LUMBOSACRAL SPONDYLOSIS WITHOUT MYELOPATHY: ICD-10-CM

## 2023-09-08 DIAGNOSIS — J44.9 COPD, SEVERE (HCC): ICD-10-CM

## 2023-09-08 RX ORDER — GUAIFENESIN 600 MG/1
600 TABLET, EXTENDED RELEASE ORAL 2 TIMES DAILY
Qty: 30 TABLET | Refills: 0 | Status: SHIPPED | OUTPATIENT
Start: 2023-09-08 | End: 2023-09-23

## 2023-09-08 RX ORDER — HYDROCODONE BITARTRATE AND ACETAMINOPHEN 7.5; 325 MG/1; MG/1
1 TABLET ORAL EVERY 4 HOURS PRN
Qty: 40 TABLET | Refills: 0 | Status: SHIPPED | OUTPATIENT
Start: 2023-09-08 | End: 2023-09-15

## 2023-09-09 ASSESSMENT — ENCOUNTER SYMPTOMS
CONSTIPATION: 1
SINUS PAIN: 0
BACK PAIN: 1
COUGH: 1
SHORTNESS OF BREATH: 1
DIARRHEA: 0
SINUS PRESSURE: 0

## 2023-09-11 RX ORDER — PRIMIDONE 50 MG/1
50 TABLET ORAL 2 TIMES DAILY
Qty: 180 TABLET | Refills: 3 | Status: SHIPPED | OUTPATIENT
Start: 2023-09-11

## 2023-09-11 NOTE — TELEPHONE ENCOUNTER
LAST VISIT:   5/30/2023     Future Appointments   Date Time Provider [address]   11/1/2023  1:15 PM Sourav Colin MD [facility]   12/11/2023  2:00 PM Magdalena Wagoner MD [facility]

## 2023-09-12 DIAGNOSIS — K52.9 CHRONIC DIARRHEA: ICD-10-CM

## 2023-09-13 ENCOUNTER — TELEPHONE (OUTPATIENT)
Dept: PAIN MANAGEMENT | Age: 63
End: 2023-09-13

## 2023-09-13 RX ORDER — DIPHENOXYLATE HYDROCHLORIDE AND ATROPINE SULFATE 2.5; .025 MG/1; MG/1
TABLET ORAL
Qty: 120 TABLET | Refills: 1 | Status: SHIPPED | OUTPATIENT
Start: 2023-09-13 | End: 2023-10-13

## 2023-09-13 NOTE — TELEPHONE ENCOUNTER
Called  pt Per Dr. Taylor Laguna to get her in for her RFA and follow up had to leave a message to call back [phone] to speak to Rehana or Thad Aranda

## 2023-09-13 NOTE — TELEPHONE ENCOUNTER
LAST VISIT:   5/30/2023     Future Appointments   Date Time Provider [address]   11/1/2023  1:15 PM Payam Reyes MD [facility]   12/11/2023  2:00 PM Malcolm Miranda MD [facility]

## 2023-09-14 ENCOUNTER — TELEPHONE (OUTPATIENT)
Dept: PRIMARY CARE CLINIC | Age: 63
End: 2023-09-14

## 2023-09-14 NOTE — TELEPHONE ENCOUNTER
Kevin Mcleod from [address] [place] [address] called asking if a Verbal Authorization to continue Kenrick Rodrigues PT can be completed for [facility]. Joann Ennis can be reached at [phone]. Please advise.

## 2023-09-18 ENCOUNTER — TELEPHONE (OUTPATIENT)
Dept: PRIMARY CARE CLINIC | Age: 63
End: 2023-09-18

## 2023-09-18 DIAGNOSIS — S72.141D CLOSED DISPLACED INTERTROCHANTERIC FRACTURE OF RIGHT FEMUR WITH ROUTINE HEALING, SUBSEQUENT ENCOUNTER: ICD-10-CM

## 2023-09-18 DIAGNOSIS — M47.817 LUMBOSACRAL SPONDYLOSIS WITHOUT MYELOPATHY: ICD-10-CM

## 2023-09-18 RX ORDER — HYDROCODONE BITARTRATE AND ACETAMINOPHEN 7.5; 325 MG/1; MG/1
1 TABLET ORAL EVERY 6 HOURS PRN
Qty: 28 TABLET | Refills: 0 | Status: SHIPPED | OUTPATIENT
Start: 2023-09-18 | End: 2023-09-25

## 2023-09-18 NOTE — TELEPHONE ENCOUNTER
Pt having congestion, constantly clearing her throat. Has been using mucinex with no help. Please advise.  Pharm [facility]

## 2023-09-18 NOTE — TELEPHONE ENCOUNTER
Pt asking for a refill of norco, states Nina Gila gave her a short term supply and to call back for more if needed.  She states she has a pubic bone fracture also and is in a lot of pain

## 2023-09-18 NOTE — TELEPHONE ENCOUNTER
One week script sent in   Needs to keep pain med use to a minimum secondary to risk of medication addiction

## 2023-09-28 ENCOUNTER — PATIENT MESSAGE (OUTPATIENT)
Dept: PRIMARY CARE CLINIC | Age: 63
End: 2023-09-28

## 2023-09-28 DIAGNOSIS — S72.141D CLOSED DISPLACED INTERTROCHANTERIC FRACTURE OF RIGHT FEMUR WITH ROUTINE HEALING, SUBSEQUENT ENCOUNTER: ICD-10-CM

## 2023-09-28 DIAGNOSIS — M47.817 LUMBOSACRAL SPONDYLOSIS WITHOUT MYELOPATHY: ICD-10-CM

## 2023-09-28 NOTE — TELEPHONE ENCOUNTER
From: Pascual Rhodes  To: Dr. Estrellita Tillman  Sent: 9/28/2023 11:19 AM EDT  Subject: Refill    I am doing my physical therapy but it is still extremely painful especially in the groin and lower back. Request refill of 7.5 Norco and the xanaflex.  Thank you

## 2023-10-02 RX ORDER — HYDROCODONE BITARTRATE AND ACETAMINOPHEN 7.5; 325 MG/1; MG/1
1 TABLET ORAL EVERY 6 HOURS PRN
Qty: 28 TABLET | Refills: 0 | Status: SHIPPED | OUTPATIENT
Start: 2023-10-02 | End: 2023-10-09

## 2023-10-02 RX ORDER — TIZANIDINE 4 MG/1
4 TABLET ORAL 3 TIMES DAILY PRN
Qty: 30 TABLET | Refills: 0 | Status: SHIPPED | OUTPATIENT
Start: 2023-10-02

## 2023-10-02 NOTE — TELEPHONE ENCOUNTER
Pt asking if you can address this? Pt out of pain med and having a lot of pain. Has appt with Dr. Darinel Huerta, but not until the 16th.

## 2023-10-05 ENCOUNTER — TELEPHONE (OUTPATIENT)
Dept: PRIMARY CARE CLINIC | Age: 63
End: 2023-10-05

## 2023-10-05 NOTE — TELEPHONE ENCOUNTER
Katerine Villatoro RN from [address] [place] [address] called to notify the office of patients significant weight gain this week and swelling to her Rt leg. Weights are as follows: 10/02/23 was 104lbs,   10/03/23 was 100lbs pt had diarrhea on this day. 10/04/23 was 98.4lbs pt had diarrhea on this day. 10/05/23 was 106lbs. Pt stated to nurse that she will take one of her PRN Lasix today.

## 2023-10-11 DIAGNOSIS — F33.2 MDD (MAJOR DEPRESSIVE DISORDER), RECURRENT SEVERE, WITHOUT PSYCHOSIS (HCC): ICD-10-CM

## 2023-10-12 NOTE — TELEPHONE ENCOUNTER
LAST VISIT:   5/30/2023     Future Appointments   Date Time Provider [address]   10/16/2023  3:10 PM Lupe Gonzales MD [facility]   10/25/2023 12:15 PM MD JOSÉ Armas PN [facility]   11/1/2023  1:15 PM Bridget Vann MD [facility]   11/20/2023  1:40 PM NATHAN Elliott - CNP [address] [place] [address]   12/11/2023  2:00 PM Luis Burden MD [facility]

## 2023-10-13 RX ORDER — CLONAZEPAM 0.5 MG/1
TABLET ORAL
Qty: 270 TABLET | Refills: 0 | Status: SHIPPED | OUTPATIENT
Start: 2023-10-13 | End: 2023-11-12

## 2023-10-16 ENCOUNTER — OFFICE VISIT (OUTPATIENT)
Dept: PRIMARY CARE CLINIC | Age: 63
End: 2023-10-16
Payer: COMMERCIAL

## 2023-10-16 VITALS
HEIGHT: 62 IN | DIASTOLIC BLOOD PRESSURE: 64 MMHG | SYSTOLIC BLOOD PRESSURE: 100 MMHG | HEART RATE: 74 BPM | BODY MASS INDEX: 19.17 KG/M2 | WEIGHT: 104.2 LBS

## 2023-10-16 DIAGNOSIS — S72.141D CLOSED DISPLACED INTERTROCHANTERIC FRACTURE OF RIGHT FEMUR WITH ROUTINE HEALING, SUBSEQUENT ENCOUNTER: ICD-10-CM

## 2023-10-16 DIAGNOSIS — Z79.899 HIGH RISK MEDICATION USE: ICD-10-CM

## 2023-10-16 DIAGNOSIS — M47.817 LUMBOSACRAL SPONDYLOSIS WITHOUT MYELOPATHY: Primary | ICD-10-CM

## 2023-10-16 DIAGNOSIS — Z23 NEED FOR VACCINATION: ICD-10-CM

## 2023-10-16 DIAGNOSIS — D64.9 ANEMIA, UNSPECIFIED TYPE: ICD-10-CM

## 2023-10-16 LAB
ALCOHOL URINE: ABNORMAL
AMPHETAMINE SCREEN, URINE: NEGATIVE
BARBITURATE SCREEN, URINE: POSITIVE
BENZODIAZEPINE SCREEN, URINE: NEGATIVE
BUPRENORPHINE URINE: NEGATIVE
COCAINE METABOLITE SCREEN URINE: NEGATIVE
FENTANYL SCREEN, URINE: ABNORMAL
GABAPENTIN SCREEN, URINE: ABNORMAL
MDMA URINE: NEGATIVE
METHADONE SCREEN, URINE: NEGATIVE
METHAMPHETAMINE, URINE: NEGATIVE
OPIATE SCREEN URINE: NEGATIVE
OXYCODONE SCREEN URINE: NEGATIVE
PHENCYCLIDINE SCREEN URINE: ABNORMAL
PROPOXYPHENE SCREEN, URINE: ABNORMAL
SYNTHETIC CANNABINOIDS(K2) SCREEN, URINE: ABNORMAL
THC SCREEN, URINE: NEGATIVE
TRAMADOL SCREEN URINE: ABNORMAL
TRICYCLIC ANTIDEPRESSANTS, UR: NEGATIVE

## 2023-10-16 PROCEDURE — 99214 OFFICE O/P EST MOD 30 MIN: CPT | Performed by: FAMILY MEDICINE

## 2023-10-16 PROCEDURE — 3017F COLORECTAL CA SCREEN DOC REV: CPT | Performed by: FAMILY MEDICINE

## 2023-10-16 PROCEDURE — G8420 CALC BMI NORM PARAMETERS: HCPCS | Performed by: FAMILY MEDICINE

## 2023-10-16 PROCEDURE — 4004F PT TOBACCO SCREEN RCVD TLK: CPT | Performed by: FAMILY MEDICINE

## 2023-10-16 PROCEDURE — 80305 DRUG TEST PRSMV DIR OPT OBS: CPT | Performed by: FAMILY MEDICINE

## 2023-10-16 PROCEDURE — 3074F SYST BP LT 130 MM HG: CPT | Performed by: FAMILY MEDICINE

## 2023-10-16 PROCEDURE — G8482 FLU IMMUNIZE ORDER/ADMIN: HCPCS | Performed by: FAMILY MEDICINE

## 2023-10-16 PROCEDURE — 90471 IMMUNIZATION ADMIN: CPT | Performed by: FAMILY MEDICINE

## 2023-10-16 PROCEDURE — G8427 DOCREV CUR MEDS BY ELIG CLIN: HCPCS | Performed by: FAMILY MEDICINE

## 2023-10-16 PROCEDURE — 3078F DIAST BP <80 MM HG: CPT | Performed by: FAMILY MEDICINE

## 2023-10-16 PROCEDURE — 90674 CCIIV4 VAC NO PRSV 0.5 ML IM: CPT | Performed by: FAMILY MEDICINE

## 2023-10-16 RX ORDER — DOXYCYCLINE HYCLATE 100 MG
100 TABLET ORAL 2 TIMES DAILY
Qty: 20 TABLET | Refills: 0 | Status: SHIPPED | OUTPATIENT
Start: 2023-10-16 | End: 2023-10-26

## 2023-10-16 RX ORDER — HYDROCODONE BITARTRATE AND ACETAMINOPHEN 7.5; 325 MG/1; MG/1
1 TABLET ORAL 2 TIMES DAILY PRN
Qty: 60 TABLET | Refills: 0 | Status: SHIPPED | OUTPATIENT
Start: 2023-10-16 | End: 2023-11-15

## 2023-10-16 RX ORDER — TIZANIDINE 4 MG/1
4 TABLET ORAL 3 TIMES DAILY PRN
Qty: 90 TABLET | Refills: 2 | Status: SHIPPED | OUTPATIENT
Start: 2023-10-16

## 2023-10-16 ASSESSMENT — ENCOUNTER SYMPTOMS
RHINORRHEA: 1
VOMITING: 0
ABDOMINAL PAIN: 0
NAUSEA: 0
DIARRHEA: 0
BACK PAIN: 1
COUGH: 1
WHEEZING: 0
SHORTNESS OF BREATH: 1

## 2023-10-16 NOTE — PROGRESS NOTES
[address] PRIMARY CARE  [address] [facility] [address]  Dept: [phone]    Emma Sinha is a 58 y.o. female Established patient, who presents today for her medical conditions/complaints as noted below. Chief Complaint   Patient presents with    Hip Injury     Right       HPI:     HPI    Pt had R hip intertrochanteric femur fracture on 7/28 after fall. Repaired on 7/29. Following with orthopedics, next appointment on 11/1/2023. Continuing with PT. Hx of low back pain. Pt endorses worsening in back pain over past 2 weeks. Follows with pain management. Scheduled for radiofrequency ablation on 10/25/2023. Pt would also like to talk about runny nose that has been going on 3 weeks. Also endorses PND, cough, and SOB. Cough is productive with clear phlegm. Does not wake patient up at night. Has tried Robitussin, without relief, and Mucinex, which helps slightly. Using nebulizer every 4 hours, and albuterol inhaler every 4-6 hours. Reviewed prior notes Orthopedics  Reviewed previous Labs    LDL Cholesterol (mg/dL)   Date Value   04/28/2022 81   07/13/2018 79     LDL Calculated (mg/dL)   Date Value   07/09/2023 63       (goal LDL is <100)   AST (U/L)   Date Value   07/28/2023 22     ALT (U/L)   Date Value   07/28/2023 29     BUN (mg/dL)   Date Value   08/10/2023 7 (L)     TSH (uIU/mL)   Date Value   05/27/2023 0.18 (L)     BP Readings from Last 3 Encounters:   10/16/23 100/64   08/11/23 125/74   07/18/23 116/75          (goal 120/80)    Past Medical History:   Diagnosis Date    Acid reflux     Anxiety     Arthritis     Cancer (HCC)     cervical    Cervical osteoarthritis 11/30/2020    COPD (chronic obstructive pulmonary disease) ([address]) 02/23/2018    DDD (degenerative disc disease), cervical     Depression     Hiatal hernia     History of blood transfusion     Reaction fever went up to 107 . per Pt.      On home O2     Spinal stenosis     Tachycardia

## 2023-10-27 ENCOUNTER — TELEPHONE (OUTPATIENT)
Dept: PRIMARY CARE CLINIC | Age: 63
End: 2023-10-27

## 2023-10-27 NOTE — TELEPHONE ENCOUNTER
Is she using her daily inhalers for the COPD---that steroid would be the most helpful? Change Robitussin to Mucinex to help thin the mucus.

## 2023-10-27 NOTE — TELEPHONE ENCOUNTER
Pt saw Dr. Hernandez Record on 10/16/23. Done with the abx. Was using  Robitussin cough & cold, mucinex and is only a wee better. Still has thick phlem that she can't cough up, feels like it is stuck in her throat. Lots of sinus drainage. Symptoms started 2 weeks prior to her appt. Asking if a steroid can be sent in? Has copd and worried. Uses [facility]

## 2023-11-07 ENCOUNTER — HOSPITAL ENCOUNTER (OUTPATIENT)
Age: 63
Setting detail: SPECIMEN
Discharge: HOME | End: 2023-11-07
Payer: COMMERCIAL

## 2023-11-07 DIAGNOSIS — D64.9 ANEMIA, UNSPECIFIED TYPE: ICD-10-CM

## 2023-11-07 LAB
FERRITIN SERPL-MCNC: 62 NG/ML (ref 13–150)
FOLATE SERPL-MCNC: >20 NG/ML
IRON SATN MFR SERPL: 29 % (ref 20–55)
IRON SERPL-MCNC: 75 UG/DL (ref 37–145)
TIBC SERPL-MCNC: 259 UG/DL (ref 250–450)
UNSATURATED IRON BINDING CAPACITY: 184 UG/DL (ref 112–347)
VIT B12 SERPL-MCNC: 370 PG/ML (ref 232–1245)

## 2023-11-07 PROCEDURE — 82728 ASSAY OF FERRITIN: CPT

## 2023-11-07 PROCEDURE — 82607 VITAMIN B-12: CPT

## 2023-11-07 PROCEDURE — 83550 IRON BINDING TEST: CPT

## 2023-11-07 PROCEDURE — 82746 ASSAY OF FOLIC ACID SERUM: CPT

## 2023-11-07 PROCEDURE — 83540 ASSAY OF IRON: CPT

## 2023-11-13 DIAGNOSIS — S72.141D CLOSED DISPLACED INTERTROCHANTERIC FRACTURE OF RIGHT FEMUR WITH ROUTINE HEALING, SUBSEQUENT ENCOUNTER: ICD-10-CM

## 2023-11-13 DIAGNOSIS — M47.817 LUMBOSACRAL SPONDYLOSIS WITHOUT MYELOPATHY: ICD-10-CM

## 2023-11-13 NOTE — TELEPHONE ENCOUNTER
States her nerve block was cancelled due to needing to be off abx for 2 weeks. Pt has rescheduled it for 11/20/23.  States she has severe back pain

## 2023-11-14 RX ORDER — HYDROCODONE BITARTRATE AND ACETAMINOPHEN 7.5; 325 MG/1; MG/1
1 TABLET ORAL 2 TIMES DAILY PRN
Qty: 14 TABLET | Refills: 0 | Status: SHIPPED | OUTPATIENT
Start: 2023-11-14 | End: 2023-11-21

## 2023-11-14 NOTE — TELEPHONE ENCOUNTER
Patient advised Robyn Booth sent in for one week, but she will need to request from pain management thereafter. Voices understanding.

## 2023-11-20 ENCOUNTER — HOSPITAL ENCOUNTER (OUTPATIENT)
Dept: PAIN MANAGEMENT | Facility: CLINIC | Age: 63
Discharge: HOME | End: 2023-11-20
Payer: COMMERCIAL

## 2023-11-20 VITALS
DIASTOLIC BLOOD PRESSURE: 106 MMHG | BODY MASS INDEX: 18.07 KG/M2 | SYSTOLIC BLOOD PRESSURE: 158 MMHG | TEMPERATURE: 97.7 F | HEIGHT: 63 IN | WEIGHT: 102 LBS | HEART RATE: 87 BPM | OXYGEN SATURATION: 97 % | RESPIRATION RATE: 23 BRPM

## 2023-11-20 DIAGNOSIS — M47.817 LUMBOSACRAL SPONDYLOSIS WITHOUT MYELOPATHY: Primary | Chronic | ICD-10-CM

## 2023-11-20 DIAGNOSIS — R52 PAIN MANAGEMENT: ICD-10-CM

## 2023-11-20 PROCEDURE — 64635 DESTROY LUMB/SAC FACET JNT: CPT

## 2023-11-20 PROCEDURE — 6360000002 HC RX W HCPCS: Performed by: ANESTHESIOLOGY

## 2023-11-20 PROCEDURE — 99152 MOD SED SAME PHYS/QHP 5/>YRS: CPT | Performed by: ANESTHESIOLOGY

## 2023-11-20 PROCEDURE — 64636 DESTROY L/S FACET JNT ADDL: CPT | Performed by: ANESTHESIOLOGY

## 2023-11-20 PROCEDURE — 64635 DESTROY LUMB/SAC FACET JNT: CPT | Performed by: ANESTHESIOLOGY

## 2023-11-20 PROCEDURE — 64636 DESTROY L/S FACET JNT ADDL: CPT

## 2023-11-20 PROCEDURE — 2500000003 HC RX 250 WO HCPCS: Performed by: ANESTHESIOLOGY

## 2023-11-20 RX ORDER — LIDOCAINE HYDROCHLORIDE 10 MG/ML
INJECTION, SOLUTION EPIDURAL; INFILTRATION; INTRACAUDAL; PERINEURAL
Status: COMPLETED | OUTPATIENT
Start: 2023-11-20 | End: 2023-11-20

## 2023-11-20 RX ORDER — MIDAZOLAM HYDROCHLORIDE 2 MG/2ML
INJECTION, SOLUTION INTRAMUSCULAR; INTRAVENOUS
Status: COMPLETED | OUTPATIENT
Start: 2023-11-20 | End: 2023-11-20

## 2023-11-20 RX ORDER — LIDOCAINE HYDROCHLORIDE 40 MG/ML
INJECTION, SOLUTION RETROBULBAR; TOPICAL
Status: COMPLETED | OUTPATIENT
Start: 2023-11-20 | End: 2023-11-20

## 2023-11-20 RX ADMIN — LIDOCAINE HYDROCHLORIDE 5 ML: 10 INJECTION, SOLUTION EPIDURAL; INFILTRATION; INTRACAUDAL at 14:17

## 2023-11-20 RX ADMIN — LIDOCAINE HYDROCHLORIDE 5 ML: 10 INJECTION, SOLUTION EPIDURAL; INFILTRATION; INTRACAUDAL at 14:09

## 2023-11-20 RX ADMIN — MIDAZOLAM HYDROCHLORIDE 1 MG: 1 INJECTION, SOLUTION INTRAMUSCULAR; INTRAVENOUS at 14:13

## 2023-11-20 RX ADMIN — LIDOCAINE HYDROCHLORIDE 5 ML: 40 SOLUTION RETROBULBAR; TOPICAL at 14:17

## 2023-11-20 RX ADMIN — MIDAZOLAM HYDROCHLORIDE 1 MG: 1 INJECTION, SOLUTION INTRAMUSCULAR; INTRAVENOUS at 14:09

## 2023-11-20 ASSESSMENT — PAIN SCALES - GENERAL: PAINLEVEL_OUTOF10: 9

## 2023-11-20 ASSESSMENT — PAIN DESCRIPTION - FREQUENCY: FREQUENCY: INTERMITTENT

## 2023-11-20 ASSESSMENT — PAIN DESCRIPTION - ORIENTATION: ORIENTATION: LOWER

## 2023-11-20 ASSESSMENT — PAIN - FUNCTIONAL ASSESSMENT
PAIN_FUNCTIONAL_ASSESSMENT: NONE - DENIES PAIN
PAIN_FUNCTIONAL_ASSESSMENT: PREVENTS OR INTERFERES SOME ACTIVE ACTIVITIES AND ADLS

## 2023-11-20 ASSESSMENT — PAIN DESCRIPTION - DESCRIPTORS: DESCRIPTORS: ACHING

## 2023-11-20 ASSESSMENT — PAIN DESCRIPTION - DIRECTION: RADIATING_TOWARDS: BILAT HIPS AND LEGS

## 2023-11-20 ASSESSMENT — PAIN DESCRIPTION - LOCATION: LOCATION: BACK

## 2023-11-20 ASSESSMENT — PAIN DESCRIPTION - PAIN TYPE: TYPE: CHRONIC PAIN

## 2023-11-20 NOTE — H&P
Pain Pre-Op H&P Note    Blank Santana MD    HPI: Lucie Johnson  presents with       She had chronic back pain located in the lower lumbar area as aching nagging stiffness  Had flareup several months ago without any particular injury  This pain is radiating down left leg over hip and outer thigh and into the groin  Describes it as sharp shooting aching throbbing burning sensation  Aggravated with prolonged sitting lifting bending walking     Past Medical History:   Diagnosis Date    Acid reflux     Anxiety     Arthritis     Cancer ([address])     cervical    Cervical osteoarthritis 11/30/2020    COPD (chronic obstructive pulmonary disease) ([address]) 02/23/2018    DDD (degenerative disc disease), cervical     Depression     Hiatal hernia     History of blood transfusion     Reaction fever went up to 107 . per Pt.      On home O2     Spinal stenosis     Tachycardia     Thyroid disease     hypo       Past Surgical History:   Procedure Laterality Date    BACK SURGERY      diskectomy, L3-L5    COLONOSCOPY N/A 04/08/2019    COLONOSCOPY DIAGNOSTIC performed by Timothy Stewart MD at [facility]  11/13/2019    COLONOSCOPY POLYPECTOMY SNARE/COLD BIOPSY performed by Timothy Stewart MD at [facility] 7/29/2023    FEMUR IM NAIL HALIMA INSERTION performed by Mao Perry MD at [address]      ectopic pregnancy with tube removal    PAIN MANAGEMENT PROCEDURE      Left sided Transforaminal Epidural Steriod Injection    THYROIDECTOMY, PARTIAL      UPPER GASTROINTESTINAL ENDOSCOPY      UPPER GASTROINTESTINAL ENDOSCOPY N/A 11/27/2020    EGD BIOPSY with dilatation performed by Radha Pires MD at [facility] [address] N/A 04/22/2022    EGD BIOPSY performed by Marjan Hahn MD at [facility]       Family History   Problem Relation Age of Onset    Diabetes Father        Allergies   Allergen Reactions

## 2023-11-20 NOTE — OP NOTE
Preoperative Diagnosis: Lumbar spondylosis w/o myelopathy, chronic low back pain  Postoperative Diagnosis: Lumbar spondylosis w/o myelopathy, chronic low back pain  SEDATION: SEE SEDATION NOTE  BLOOD LOSS: NONE    Procedure Performed:  :Radiofrequency ablation of median branches at the Transverse processes of L4, L5 and sacral ala  for L4/5 and L5/S1  facet joints on Bilateral under fluoroscopic guidance with IV sedation    Procedure:    After starting an IV, the patient was taken to procedure room. The patient was placed in prone position and skin over the back was prepped and draped in sterile manner. Standard monitors were connected and vitals were monitored during the case and they remained stable during the procedure. A meaningful communication was kept up with the patient throughout the procedure. Then using fluoroscopy the junction of the transverse process of the target vertebra with the superior process of the facet joint was observed and the view was optimized. The skin and deep tissues were infiltrated with 2 ml of  1 % lidocaine. The RF canula with the 10 mm active tip was introduced through the skin wheal under fluoroscopy guidance such that the tip of the needle lies in the groove of the transverse process with the superior processes of the facet joint. Then a lateral and AP view of the lumbar spine was obtained to make sure the tip of needle is not in the neural foramen. Then electric impedence was checked to make sure it is acceptable. Then a sensory stimulus was applied at 50 Hz up to 0.5 volt and concordant pain symptoms were reproduced. Then a motor stimulus was applied at 2 Hz up to 2 volts or 3 x times the sensory stimulus and no motor stimulation was seen in lower extremities. Some multifidus stimulus was seen. Then after negative aspiration 1 ml of 4% lidocaine was injected through the needle at each level. The radiofrequency lesion was done at 85 degrees centigrade for 110

## 2023-11-20 NOTE — DISCHARGE INSTRUCTIONS
Discharge Instructions following Sedation or Anesthesia:  You have  received  a sedative/anesthetic therefore, you should not consume any alcoholic beverages for minimum of 12 hours. Do not drive or operate machinery for 24 hours. Do not sign legal documents for 24 hours. Dizziness, drowsiness, and unsteadiness may occur. Rest when need to. Increase diet as tolerated. Keep up on fluids if diet allows. General Instructions:  Do not take a tub bath for 72 hours after procedure (this includes hot tubs and swimming pools). You may shower, but avoid hot water to injection site. Avoid strenuous activity TODAY especially if you experience dizziness. Remove band-aid the next day. Wash off any residual iodine   Do not use heat, heating pad, or any other heating device over the injection site for 3 days after the procedure. If you experience pain after your procedure, you may continue with your current pain medication as prescribed. (DO NOT INCREASE YOUR PAIN MEDICATION WITHOUT TALKING TO DOCTOR)  Soreness and pain at injection site is common, may use ice to reduce soreness.     Call Nga at [phone] if you experience:   Fever, chills or temperature over 100    Vomiting, Headache, persistent stiff neck, nausea, blurred vision   Difficulty in urinating or unable to urinate with 8 hours   Increase in weakness, numbness or loss of function   Increased redness, swelling or drainage at the injection site

## 2023-11-29 ENCOUNTER — OFFICE VISIT (OUTPATIENT)
Dept: ORTHOPEDIC SURGERY | Age: 63
End: 2023-11-29

## 2023-11-29 VITALS — HEIGHT: 63 IN | RESPIRATION RATE: 14 BRPM | BODY MASS INDEX: 18.07 KG/M2 | WEIGHT: 102 LBS

## 2023-11-29 DIAGNOSIS — M25.551 RIGHT HIP PAIN: Primary | ICD-10-CM

## 2023-12-02 ENCOUNTER — HOSPITAL ENCOUNTER (INPATIENT)
Age: 63
LOS: 10 days | Discharge: NURSING, REHAB OR LONG-TERM CARE | DRG: 190 | End: 2023-12-12
Attending: STUDENT IN AN ORGANIZED HEALTH CARE EDUCATION/TRAINING PROGRAM | Admitting: INTERNAL MEDICINE
Payer: COMMERCIAL

## 2023-12-02 ENCOUNTER — APPOINTMENT (OUTPATIENT)
Dept: GENERAL RADIOLOGY | Age: 63
DRG: 190 | End: 2023-12-02
Payer: COMMERCIAL

## 2023-12-02 DIAGNOSIS — J44.1 CHRONIC OBSTRUCTIVE PULMONARY DISEASE WITH ACUTE EXACERBATION (HCC): ICD-10-CM

## 2023-12-02 DIAGNOSIS — J39.2 THROAT IRRITATION: ICD-10-CM

## 2023-12-02 DIAGNOSIS — J44.1 COPD EXACERBATION (HCC): Primary | ICD-10-CM

## 2023-12-02 DIAGNOSIS — J44.9 CHRONIC OBSTRUCTIVE PULMONARY DISEASE, UNSPECIFIED COPD TYPE (HCC): ICD-10-CM

## 2023-12-02 DIAGNOSIS — J32.9 CHRONIC SINUSITIS, UNSPECIFIED LOCATION: ICD-10-CM

## 2023-12-02 LAB
ANION GAP SERPL CALCULATED.3IONS-SCNC: 6 MMOL/L (ref 9–17)
BASOPHILS # BLD: 0 K/UL (ref 0–0.2)
BASOPHILS NFR BLD: 0 % (ref 0–2)
BUN SERPL-MCNC: 4 MG/DL (ref 8–23)
CALCIUM SERPL-MCNC: 7.7 MG/DL (ref 8.6–10.4)
CHLORIDE SERPL-SCNC: 101 MMOL/L (ref 98–107)
CO2 SERPL-SCNC: 32 MMOL/L (ref 20–31)
CREAT SERPL-MCNC: 0.4 MG/DL (ref 0.5–0.9)
EOSINOPHIL # BLD: 0 K/UL (ref 0–0.4)
EOSINOPHILS RELATIVE PERCENT: 0 % (ref 0–4)
ERYTHROCYTE [DISTWIDTH] IN BLOOD BY AUTOMATED COUNT: 15.7 % (ref 11.5–14.9)
GFR SERPL CREATININE-BSD FRML MDRD: >60 ML/MIN/1.73M2
GLUCOSE SERPL-MCNC: 130 MG/DL (ref 70–99)
HCT VFR BLD AUTO: 37.7 % (ref 36–46)
HGB BLD-MCNC: 12 G/DL (ref 12–16)
LYMPHOCYTES NFR BLD: 1 K/UL (ref 1–4.8)
LYMPHOCYTES RELATIVE PERCENT: 19 % (ref 24–44)
MCH RBC QN AUTO: 30.6 PG (ref 26–34)
MCHC RBC AUTO-ENTMCNC: 32 G/DL (ref 31–37)
MCV RBC AUTO: 95.8 FL (ref 80–100)
MONOCYTES NFR BLD: 0.2 K/UL (ref 0.1–1.3)
MONOCYTES NFR BLD: 4 % (ref 1–7)
NEUTROPHILS NFR BLD: 77 % (ref 36–66)
NEUTS SEG NFR BLD: 4.1 K/UL (ref 1.3–9.1)
PLATELET # BLD AUTO: 271 K/UL (ref 150–450)
PMV BLD AUTO: 7.3 FL (ref 6–12)
POTASSIUM SERPL-SCNC: 4.1 MMOL/L (ref 3.7–5.3)
RBC # BLD AUTO: 3.93 M/UL (ref 4–5.2)
SODIUM SERPL-SCNC: 139 MMOL/L (ref 135–144)
TROPONIN I SERPL HS-MCNC: 16 NG/L (ref 0–14)
WBC OTHER # BLD: 5.3 K/UL (ref 3.5–11)

## 2023-12-02 PROCEDURE — 2060000000 HC ICU INTERMEDIATE R&B

## 2023-12-02 PROCEDURE — 94640 AIRWAY INHALATION TREATMENT: CPT

## 2023-12-02 PROCEDURE — 5A09557 ASSISTANCE WITH RESPIRATORY VENTILATION, GREATER THAN 96 CONSECUTIVE HOURS, CONTINUOUS POSITIVE AIRWAY PRESSURE: ICD-10-PCS | Performed by: INTERNAL MEDICINE

## 2023-12-02 PROCEDURE — 71045 X-RAY EXAM CHEST 1 VIEW: CPT

## 2023-12-02 PROCEDURE — 93005 ELECTROCARDIOGRAM TRACING: CPT | Performed by: STUDENT IN AN ORGANIZED HEALTH CARE EDUCATION/TRAINING PROGRAM

## 2023-12-02 PROCEDURE — 82805 BLOOD GASES W/O2 SATURATION: CPT

## 2023-12-02 PROCEDURE — 6360000002 HC RX W HCPCS: Performed by: NURSE PRACTITIONER

## 2023-12-02 PROCEDURE — 99285 EMERGENCY DEPT VISIT HI MDM: CPT

## 2023-12-02 PROCEDURE — 94660 CPAP INITIATION&MGMT: CPT

## 2023-12-02 PROCEDURE — 84484 ASSAY OF TROPONIN QUANT: CPT

## 2023-12-02 PROCEDURE — 6360000002 HC RX W HCPCS: Performed by: STUDENT IN AN ORGANIZED HEALTH CARE EDUCATION/TRAINING PROGRAM

## 2023-12-02 PROCEDURE — 2580000003 HC RX 258: Performed by: STUDENT IN AN ORGANIZED HEALTH CARE EDUCATION/TRAINING PROGRAM

## 2023-12-02 PROCEDURE — 36415 COLL VENOUS BLD VENIPUNCTURE: CPT

## 2023-12-02 PROCEDURE — 80048 BASIC METABOLIC PNL TOTAL CA: CPT

## 2023-12-02 PROCEDURE — 2580000003 HC RX 258: Performed by: NURSE PRACTITIONER

## 2023-12-02 PROCEDURE — 6370000000 HC RX 637 (ALT 250 FOR IP): Performed by: STUDENT IN AN ORGANIZED HEALTH CARE EDUCATION/TRAINING PROGRAM

## 2023-12-02 PROCEDURE — 6370000000 HC RX 637 (ALT 250 FOR IP): Performed by: NURSE PRACTITIONER

## 2023-12-02 PROCEDURE — 85025 COMPLETE CBC W/AUTO DIFF WBC: CPT

## 2023-12-02 RX ORDER — PREDNISONE 20 MG/1
40 TABLET ORAL DAILY
Status: DISCONTINUED | OUTPATIENT
Start: 2023-12-05 | End: 2023-12-04

## 2023-12-02 RX ORDER — ONDANSETRON 2 MG/ML
4 INJECTION INTRAMUSCULAR; INTRAVENOUS EVERY 6 HOURS PRN
Status: DISCONTINUED | OUTPATIENT
Start: 2023-12-02 | End: 2023-12-12 | Stop reason: HOSPADM

## 2023-12-02 RX ORDER — SODIUM CHLORIDE 0.9 % (FLUSH) 0.9 %
5-40 SYRINGE (ML) INJECTION EVERY 12 HOURS SCHEDULED
Status: DISCONTINUED | OUTPATIENT
Start: 2023-12-02 | End: 2023-12-12 | Stop reason: HOSPADM

## 2023-12-02 RX ORDER — SODIUM CHLORIDE 9 MG/ML
INJECTION, SOLUTION INTRAVENOUS CONTINUOUS
Status: DISCONTINUED | OUTPATIENT
Start: 2023-12-02 | End: 2023-12-03

## 2023-12-02 RX ORDER — ENOXAPARIN SODIUM 100 MG/ML
30 INJECTION SUBCUTANEOUS DAILY
Status: DISCONTINUED | OUTPATIENT
Start: 2023-12-03 | End: 2023-12-03

## 2023-12-02 RX ORDER — CARVEDILOL 6.25 MG/1
6.25 TABLET ORAL 2 TIMES DAILY
Status: DISCONTINUED | OUTPATIENT
Start: 2023-12-02 | End: 2023-12-09

## 2023-12-02 RX ORDER — IPRATROPIUM BROMIDE AND ALBUTEROL SULFATE 2.5; .5 MG/3ML; MG/3ML
1 SOLUTION RESPIRATORY (INHALATION)
Status: DISCONTINUED | OUTPATIENT
Start: 2023-12-02 | End: 2023-12-02

## 2023-12-02 RX ORDER — ACETAMINOPHEN 325 MG/1
650 TABLET ORAL EVERY 6 HOURS PRN
Status: DISCONTINUED | OUTPATIENT
Start: 2023-12-02 | End: 2023-12-12 | Stop reason: HOSPADM

## 2023-12-02 RX ORDER — ALBUTEROL SULFATE 2.5 MG/3ML
2.5 SOLUTION RESPIRATORY (INHALATION)
Status: DISCONTINUED | OUTPATIENT
Start: 2023-12-02 | End: 2023-12-02

## 2023-12-02 RX ORDER — BENZONATATE 100 MG/1
100 CAPSULE ORAL 3 TIMES DAILY PRN
Status: DISCONTINUED | OUTPATIENT
Start: 2023-12-02 | End: 2023-12-04

## 2023-12-02 RX ORDER — ACETAMINOPHEN 650 MG/1
650 SUPPOSITORY RECTAL EVERY 6 HOURS PRN
Status: DISCONTINUED | OUTPATIENT
Start: 2023-12-02 | End: 2023-12-12 | Stop reason: HOSPADM

## 2023-12-02 RX ORDER — GUAIFENESIN/DEXTROMETHORPHAN 100-10MG/5
5 SYRUP ORAL EVERY 4 HOURS PRN
Status: DISCONTINUED | OUTPATIENT
Start: 2023-12-02 | End: 2023-12-04

## 2023-12-02 RX ORDER — ALBUTEROL SULFATE 2.5 MG/3ML
2.5 SOLUTION RESPIRATORY (INHALATION) EVERY 4 HOURS PRN
Status: DISCONTINUED | OUTPATIENT
Start: 2023-12-02 | End: 2023-12-04

## 2023-12-02 RX ORDER — CLONAZEPAM 0.5 MG/1
0.5 TABLET ORAL EVERY 8 HOURS PRN
Status: DISCONTINUED | OUTPATIENT
Start: 2023-12-02 | End: 2023-12-08

## 2023-12-02 RX ORDER — AMLODIPINE BESYLATE 10 MG/1
10 TABLET ORAL EVERY EVENING
Status: DISCONTINUED | OUTPATIENT
Start: 2023-12-02 | End: 2023-12-09

## 2023-12-02 RX ORDER — PHENOL 1.4 %
10 AEROSOL, SPRAY (ML) MUCOUS MEMBRANE NIGHTLY
Status: DISCONTINUED | OUTPATIENT
Start: 2023-12-02 | End: 2023-12-02 | Stop reason: CLARIF

## 2023-12-02 RX ORDER — POLYETHYLENE GLYCOL 3350 17 G/17G
17 POWDER, FOR SOLUTION ORAL DAILY PRN
Status: DISCONTINUED | OUTPATIENT
Start: 2023-12-02 | End: 2023-12-12 | Stop reason: HOSPADM

## 2023-12-02 RX ORDER — LEVOTHYROXINE SODIUM 88 UG/1
88 TABLET ORAL DAILY
Status: DISCONTINUED | OUTPATIENT
Start: 2023-12-03 | End: 2023-12-12 | Stop reason: HOSPADM

## 2023-12-02 RX ORDER — MONTELUKAST SODIUM 10 MG/1
10 TABLET ORAL DAILY
Status: DISCONTINUED | OUTPATIENT
Start: 2023-12-03 | End: 2023-12-12 | Stop reason: HOSPADM

## 2023-12-02 RX ORDER — LANOLIN ALCOHOL/MO/W.PET/CERES
3 CREAM (GRAM) TOPICAL NIGHTLY
Status: DISCONTINUED | OUTPATIENT
Start: 2023-12-02 | End: 2023-12-12 | Stop reason: HOSPADM

## 2023-12-02 RX ORDER — SODIUM CHLORIDE 0.9 % (FLUSH) 0.9 %
5-40 SYRINGE (ML) INJECTION PRN
Status: DISCONTINUED | OUTPATIENT
Start: 2023-12-02 | End: 2023-12-12 | Stop reason: HOSPADM

## 2023-12-02 RX ORDER — PANTOPRAZOLE SODIUM 40 MG/1
40 TABLET, DELAYED RELEASE ORAL
Status: DISCONTINUED | OUTPATIENT
Start: 2023-12-03 | End: 2023-12-12 | Stop reason: HOSPADM

## 2023-12-02 RX ORDER — PRIMIDONE 50 MG/1
50 TABLET ORAL 2 TIMES DAILY
Status: DISCONTINUED | OUTPATIENT
Start: 2023-12-02 | End: 2023-12-06

## 2023-12-02 RX ORDER — VENLAFAXINE HYDROCHLORIDE 150 MG/1
150 CAPSULE, EXTENDED RELEASE ORAL DAILY
Status: DISCONTINUED | OUTPATIENT
Start: 2023-12-03 | End: 2023-12-12 | Stop reason: HOSPADM

## 2023-12-02 RX ORDER — ONDANSETRON 4 MG/1
4 TABLET, ORALLY DISINTEGRATING ORAL EVERY 8 HOURS PRN
Status: DISCONTINUED | OUTPATIENT
Start: 2023-12-02 | End: 2023-12-12 | Stop reason: HOSPADM

## 2023-12-02 RX ORDER — SODIUM CHLORIDE 9 MG/ML
INJECTION, SOLUTION INTRAVENOUS PRN
Status: DISCONTINUED | OUTPATIENT
Start: 2023-12-02 | End: 2023-12-12 | Stop reason: HOSPADM

## 2023-12-02 RX ORDER — TIZANIDINE 4 MG/1
4 TABLET ORAL 3 TIMES DAILY PRN
Status: DISCONTINUED | OUTPATIENT
Start: 2023-12-02 | End: 2023-12-12 | Stop reason: HOSPADM

## 2023-12-02 RX ORDER — ALBUTEROL SULFATE 2.5 MG/3ML
15 SOLUTION RESPIRATORY (INHALATION)
Status: DISCONTINUED | OUTPATIENT
Start: 2023-12-02 | End: 2023-12-02

## 2023-12-02 RX ORDER — MIRTAZAPINE 30 MG/1
30 TABLET, FILM COATED ORAL NIGHTLY
Status: DISCONTINUED | OUTPATIENT
Start: 2023-12-02 | End: 2023-12-12 | Stop reason: HOSPADM

## 2023-12-02 RX ORDER — IPRATROPIUM BROMIDE AND ALBUTEROL SULFATE 2.5; .5 MG/3ML; MG/3ML
1 SOLUTION RESPIRATORY (INHALATION)
Status: DISCONTINUED | OUTPATIENT
Start: 2023-12-03 | End: 2023-12-04

## 2023-12-02 RX ADMIN — IPRATROPIUM BROMIDE AND ALBUTEROL SULFATE 1 DOSE: 2.5; .5 SOLUTION RESPIRATORY (INHALATION) at 19:06

## 2023-12-02 RX ADMIN — MIRTAZAPINE 30 MG: 30 TABLET, FILM COATED ORAL at 23:28

## 2023-12-02 RX ADMIN — SODIUM CHLORIDE, PRESERVATIVE FREE 10 ML: 5 INJECTION INTRAVENOUS at 23:30

## 2023-12-02 RX ADMIN — SODIUM CHLORIDE: 9 INJECTION, SOLUTION INTRAVENOUS at 23:30

## 2023-12-02 RX ADMIN — SODIUM CHLORIDE 85 ML/HR: 9 INJECTION, SOLUTION INTRAVENOUS at 21:15

## 2023-12-02 RX ADMIN — Medication 3 MG: at 23:28

## 2023-12-02 RX ADMIN — CLONAZEPAM 0.5 MG: 0.5 TABLET ORAL at 23:37

## 2023-12-02 RX ADMIN — WATER 40 MG: 1 INJECTION INTRAMUSCULAR; INTRAVENOUS; SUBCUTANEOUS at 23:31

## 2023-12-02 RX ADMIN — SODIUM CHLORIDE 3000 MG: 900 INJECTION INTRAVENOUS at 21:19

## 2023-12-02 RX ADMIN — PRIMIDONE 50 MG: 50 TABLET ORAL at 23:28

## 2023-12-02 ASSESSMENT — PAIN - FUNCTIONAL ASSESSMENT
PAIN_FUNCTIONAL_ASSESSMENT: NONE - DENIES PAIN
PAIN_FUNCTIONAL_ASSESSMENT: ACTIVITIES ARE NOT PREVENTED

## 2023-12-02 ASSESSMENT — PAIN DESCRIPTION - DESCRIPTORS: DESCRIPTORS: ACHING

## 2023-12-02 ASSESSMENT — ENCOUNTER SYMPTOMS
SINUS PRESSURE: 1
COUGH: 1
CHEST TIGHTNESS: 1
ABDOMINAL PAIN: 0
SHORTNESS OF BREATH: 1
NAUSEA: 0
VOMITING: 0
RHINORRHEA: 0
SINUS PAIN: 1

## 2023-12-02 ASSESSMENT — PAIN DESCRIPTION - LOCATION: LOCATION: HIP

## 2023-12-02 ASSESSMENT — PAIN DESCRIPTION - PAIN TYPE: TYPE: CHRONIC PAIN

## 2023-12-02 ASSESSMENT — PAIN SCALES - GENERAL
PAINLEVEL_OUTOF10: 2
PAINLEVEL_OUTOF10: 0

## 2023-12-02 ASSESSMENT — PAIN DESCRIPTION - ORIENTATION: ORIENTATION: RIGHT

## 2023-12-03 ENCOUNTER — APPOINTMENT (OUTPATIENT)
Dept: GENERAL RADIOLOGY | Age: 63
DRG: 190 | End: 2023-12-03
Payer: COMMERCIAL

## 2023-12-03 LAB
25(OH)D3 SERPL-MCNC: 34.8 NG/ML
ANION GAP SERPL CALCULATED.3IONS-SCNC: 7 MMOL/L (ref 9–17)
B PARAP IS1001 DNA NPH QL NAA+NON-PROBE: NOT DETECTED
B PERT DNA SPEC QL NAA+PROBE: NOT DETECTED
BACTERIA URNS QL MICRO: ABNORMAL
BASOPHILS # BLD: 0 K/UL (ref 0–0.2)
BASOPHILS NFR BLD: 0 % (ref 0–2)
BILIRUB UR QL STRIP: NEGATIVE
BNP SERPL-MCNC: 194 PG/ML
BUN SERPL-MCNC: 5 MG/DL (ref 8–23)
C PNEUM DNA NPH QL NAA+NON-PROBE: NOT DETECTED
CALCIUM SERPL-MCNC: 7.8 MG/DL (ref 8.6–10.4)
CASTS #/AREA URNS LPF: ABNORMAL /LPF
CHLORIDE SERPL-SCNC: 105 MMOL/L (ref 98–107)
CLARITY UR: CLEAR
CO2 SERPL-SCNC: 29 MMOL/L (ref 20–31)
COHGB MFR BLD: 5.9 % (ref 0–5)
COHGB MFR BLD: 8.6 % (ref 0–5)
COLOR UR: YELLOW
CREAT SERPL-MCNC: <0.4 MG/DL (ref 0.5–0.9)
EOSINOPHIL # BLD: 0 K/UL (ref 0–0.4)
EOSINOPHILS RELATIVE PERCENT: 0 % (ref 0–4)
EPI CELLS #/AREA URNS HPF: ABNORMAL /HPF
ERYTHROCYTE [DISTWIDTH] IN BLOOD BY AUTOMATED COUNT: 16.5 % (ref 11.5–14.9)
FLUAV RNA NPH QL NAA+NON-PROBE: NOT DETECTED
FLUBV RNA NPH QL NAA+NON-PROBE: NOT DETECTED
GAS FLOW.O2 O2 DELIVERY SYS: ABNORMAL L/MIN
GFR SERPL CREATININE-BSD FRML MDRD: ABNORMAL ML/MIN/1.73M2
GLUCOSE SERPL-MCNC: 152 MG/DL (ref 70–99)
GLUCOSE UR STRIP-MCNC: NEGATIVE MG/DL
HADV DNA NPH QL NAA+NON-PROBE: NOT DETECTED
HCO3 VENOUS: 28.9 MMOL/L (ref 24–30)
HCO3 VENOUS: 32.6 MMOL/L (ref 24–30)
HCOV 229E RNA NPH QL NAA+NON-PROBE: NOT DETECTED
HCOV HKU1 RNA NPH QL NAA+NON-PROBE: NOT DETECTED
HCOV NL63 RNA NPH QL NAA+NON-PROBE: NOT DETECTED
HCOV OC43 RNA NPH QL NAA+NON-PROBE: NOT DETECTED
HCT VFR BLD AUTO: 36.2 % (ref 36–46)
HGB BLD-MCNC: 11.2 G/DL (ref 12–16)
HGB UR QL STRIP.AUTO: NEGATIVE
HMPV RNA NPH QL NAA+NON-PROBE: NOT DETECTED
HPIV1 RNA NPH QL NAA+NON-PROBE: NOT DETECTED
HPIV2 RNA NPH QL NAA+NON-PROBE: NOT DETECTED
HPIV3 RNA NPH QL NAA+NON-PROBE: NOT DETECTED
HPIV4 RNA NPH QL NAA+NON-PROBE: NOT DETECTED
INR PPP: 0.9
KETONES UR STRIP-MCNC: NEGATIVE MG/DL
LEUKOCYTE ESTERASE UR QL STRIP: ABNORMAL
LYMPHOCYTES NFR BLD: 0.48 K/UL (ref 1–4.8)
LYMPHOCYTES RELATIVE PERCENT: 21 % (ref 24–44)
M PNEUMO DNA NPH QL NAA+NON-PROBE: NOT DETECTED
MCH RBC QN AUTO: 30.8 PG (ref 26–34)
MCHC RBC AUTO-ENTMCNC: 31.1 G/DL (ref 31–37)
MCV RBC AUTO: 99.3 FL (ref 80–100)
METHEMOGLOBIN: 1.2 % (ref 0–1.9)
MONOCYTES NFR BLD: 0.02 K/UL (ref 0.1–1.3)
MONOCYTES NFR BLD: 1 % (ref 1–7)
MORPHOLOGY: ABNORMAL
NEUTROPHILS NFR BLD: 78 % (ref 36–66)
NEUTS SEG NFR BLD: 1.8 K/UL (ref 1.3–9.1)
NITRITE UR QL STRIP: NEGATIVE
O2 SAT, VEN: 89.1 % (ref 60–85)
O2 SAT, VEN: 92 % (ref 60–85)
PARTIAL THROMBOPLASTIN TIME: 26 SEC (ref 24–36)
PCO2, VEN: 43.8 MM HG (ref 39–55)
PCO2, VEN: 61.6 MM HG (ref 39–55)
PH UR STRIP: 5.5 [PH] (ref 5–8)
PH VENOUS: 7.33 (ref 7.32–7.42)
PH VENOUS: 7.43 (ref 7.32–7.42)
PLATELET # BLD AUTO: 270 K/UL (ref 150–450)
PMV BLD AUTO: 7.1 FL (ref 6–12)
PO2, VEN: 125 MM HG (ref 30–50)
PO2, VEN: 87.2 MM HG (ref 30–50)
POSITIVE BASE EXCESS, VEN: 4.5 MMOL/L (ref 0–2)
POSITIVE BASE EXCESS, VEN: 6.7 MMOL/L (ref 0–2)
POTASSIUM SERPL-SCNC: 4.2 MMOL/L (ref 3.7–5.3)
PROT UR STRIP-MCNC: NEGATIVE MG/DL
PROTHROMBIN TIME: 12.6 SEC (ref 11.8–14.6)
RBC # BLD AUTO: 3.65 M/UL (ref 4–5.2)
RBC #/AREA URNS HPF: ABNORMAL /HPF
RSV RNA NPH QL NAA+NON-PROBE: NOT DETECTED
RV+EV RNA NPH QL NAA+NON-PROBE: NOT DETECTED
SARS-COV-2 RNA NPH QL NAA+NON-PROBE: NOT DETECTED
SODIUM SERPL-SCNC: 141 MMOL/L (ref 135–144)
SP GR UR STRIP: 1.02 (ref 1–1.03)
SPECIMEN DESCRIPTION: NORMAL
TEXT FOR RESPIRATORY: ABNORMAL
TEXT FOR RESPIRATORY: ABNORMAL
TROPONIN I SERPL HS-MCNC: 12 NG/L (ref 0–14)
UROBILINOGEN UR STRIP-ACNC: NORMAL EU/DL (ref 0–1)
WBC #/AREA URNS HPF: ABNORMAL /HPF
WBC OTHER # BLD: 2.3 K/UL (ref 3.5–11)

## 2023-12-03 PROCEDURE — 36415 COLL VENOUS BLD VENIPUNCTURE: CPT

## 2023-12-03 PROCEDURE — 84484 ASSAY OF TROPONIN QUANT: CPT

## 2023-12-03 PROCEDURE — 85610 PROTHROMBIN TIME: CPT

## 2023-12-03 PROCEDURE — 81001 URINALYSIS AUTO W/SCOPE: CPT

## 2023-12-03 PROCEDURE — 83880 ASSAY OF NATRIURETIC PEPTIDE: CPT

## 2023-12-03 PROCEDURE — 82306 VITAMIN D 25 HYDROXY: CPT

## 2023-12-03 PROCEDURE — 94761 N-INVAS EAR/PLS OXIMETRY MLT: CPT

## 2023-12-03 PROCEDURE — 85025 COMPLETE CBC W/AUTO DIFF WBC: CPT

## 2023-12-03 PROCEDURE — 82800 BLOOD PH: CPT

## 2023-12-03 PROCEDURE — 0202U NFCT DS 22 TRGT SARS-COV-2: CPT

## 2023-12-03 PROCEDURE — 2700000000 HC OXYGEN THERAPY PER DAY

## 2023-12-03 PROCEDURE — 2580000003 HC RX 258: Performed by: NURSE PRACTITIONER

## 2023-12-03 PROCEDURE — 6360000002 HC RX W HCPCS: Performed by: NURSE PRACTITIONER

## 2023-12-03 PROCEDURE — 94640 AIRWAY INHALATION TREATMENT: CPT

## 2023-12-03 PROCEDURE — 6370000000 HC RX 637 (ALT 250 FOR IP): Performed by: NURSE PRACTITIONER

## 2023-12-03 PROCEDURE — 2060000000 HC ICU INTERMEDIATE R&B

## 2023-12-03 PROCEDURE — 85730 THROMBOPLASTIN TIME PARTIAL: CPT

## 2023-12-03 PROCEDURE — 6360000002 HC RX W HCPCS

## 2023-12-03 PROCEDURE — 80048 BASIC METABOLIC PNL TOTAL CA: CPT

## 2023-12-03 PROCEDURE — 87086 URINE CULTURE/COLONY COUNT: CPT

## 2023-12-03 PROCEDURE — 94660 CPAP INITIATION&MGMT: CPT

## 2023-12-03 PROCEDURE — 71045 X-RAY EXAM CHEST 1 VIEW: CPT

## 2023-12-03 PROCEDURE — 99223 1ST HOSP IP/OBS HIGH 75: CPT | Performed by: INTERNAL MEDICINE

## 2023-12-03 PROCEDURE — 2580000003 HC RX 258

## 2023-12-03 PROCEDURE — 2500000003 HC RX 250 WO HCPCS: Performed by: INTERNAL MEDICINE

## 2023-12-03 RX ORDER — DEXMEDETOMIDINE HYDROCHLORIDE 4 UG/ML
.1-1.5 INJECTION, SOLUTION INTRAVENOUS CONTINUOUS
Status: DISCONTINUED | OUTPATIENT
Start: 2023-12-03 | End: 2023-12-04

## 2023-12-03 RX ORDER — ENOXAPARIN SODIUM 100 MG/ML
40 INJECTION SUBCUTANEOUS DAILY
Status: DISCONTINUED | OUTPATIENT
Start: 2023-12-03 | End: 2023-12-12 | Stop reason: HOSPADM

## 2023-12-03 RX ADMIN — IPRATROPIUM BROMIDE AND ALBUTEROL SULFATE 1 DOSE: 2.5; .5 SOLUTION RESPIRATORY (INHALATION) at 13:42

## 2023-12-03 RX ADMIN — DEXMEDETOMIDINE HYDROCHLORIDE 0.5 MCG/KG/HR: 400 INJECTION INTRAVENOUS at 20:49

## 2023-12-03 RX ADMIN — IPRATROPIUM BROMIDE AND ALBUTEROL SULFATE 1 DOSE: 2.5; .5 SOLUTION RESPIRATORY (INHALATION) at 07:23

## 2023-12-03 RX ADMIN — MONTELUKAST 10 MG: 10 TABLET, FILM COATED ORAL at 09:27

## 2023-12-03 RX ADMIN — VENLAFAXINE HYDROCHLORIDE 150 MG: 150 CAPSULE, EXTENDED RELEASE ORAL at 09:31

## 2023-12-03 RX ADMIN — LEVOTHYROXINE SODIUM 88 MCG: 0.09 TABLET ORAL at 06:28

## 2023-12-03 RX ADMIN — PRIMIDONE 50 MG: 50 TABLET ORAL at 20:42

## 2023-12-03 RX ADMIN — SODIUM CHLORIDE, PRESERVATIVE FREE 10 ML: 5 INJECTION INTRAVENOUS at 20:48

## 2023-12-03 RX ADMIN — CEFEPIME 2000 MG: 2 INJECTION, POWDER, FOR SOLUTION INTRAVENOUS at 20:48

## 2023-12-03 RX ADMIN — GUAIFENESIN SYRUP AND DEXTROMETHORPHAN 5 ML: 100; 10 SYRUP ORAL at 20:43

## 2023-12-03 RX ADMIN — CLONAZEPAM 0.5 MG: 0.5 TABLET ORAL at 09:27

## 2023-12-03 RX ADMIN — AMPICILLIN AND SULBACTAM 3000 MG: 2; 1 INJECTION, POWDER, FOR SOLUTION INTRAVENOUS at 03:35

## 2023-12-03 RX ADMIN — MIRTAZAPINE 30 MG: 30 TABLET, FILM COATED ORAL at 20:42

## 2023-12-03 RX ADMIN — TIZANIDINE 4 MG: 4 TABLET ORAL at 13:41

## 2023-12-03 RX ADMIN — SODIUM CHLORIDE, PRESERVATIVE FREE 10 ML: 5 INJECTION INTRAVENOUS at 15:51

## 2023-12-03 RX ADMIN — GUAIFENESIN SYRUP AND DEXTROMETHORPHAN 5 ML: 100; 10 SYRUP ORAL at 09:24

## 2023-12-03 RX ADMIN — WATER 40 MG: 1 INJECTION INTRAMUSCULAR; INTRAVENOUS; SUBCUTANEOUS at 09:28

## 2023-12-03 RX ADMIN — WATER 40 MG: 1 INJECTION INTRAMUSCULAR; INTRAVENOUS; SUBCUTANEOUS at 03:34

## 2023-12-03 RX ADMIN — PANTOPRAZOLE SODIUM 40 MG: 40 TABLET, DELAYED RELEASE ORAL at 06:28

## 2023-12-03 RX ADMIN — WATER 40 MG: 1 INJECTION INTRAMUSCULAR; INTRAVENOUS; SUBCUTANEOUS at 15:46

## 2023-12-03 RX ADMIN — AMPICILLIN AND SULBACTAM 3000 MG: 2; 1 INJECTION, POWDER, FOR SOLUTION INTRAVENOUS at 09:27

## 2023-12-03 RX ADMIN — CEFEPIME 2000 MG: 2 INJECTION, POWDER, FOR SOLUTION INTRAVENOUS at 13:33

## 2023-12-03 RX ADMIN — ENOXAPARIN SODIUM 40 MG: 100 INJECTION SUBCUTANEOUS at 09:28

## 2023-12-03 RX ADMIN — CLONAZEPAM 0.5 MG: 0.5 TABLET ORAL at 20:42

## 2023-12-03 RX ADMIN — GUAIFENESIN SYRUP AND DEXTROMETHORPHAN 5 ML: 100; 10 SYRUP ORAL at 14:20

## 2023-12-03 RX ADMIN — WATER 40 MG: 1 INJECTION INTRAMUSCULAR; INTRAVENOUS; SUBCUTANEOUS at 20:43

## 2023-12-03 RX ADMIN — PANTOPRAZOLE SODIUM 40 MG: 40 TABLET, DELAYED RELEASE ORAL at 15:46

## 2023-12-03 RX ADMIN — SODIUM CHLORIDE, PRESERVATIVE FREE 10 ML: 5 INJECTION INTRAVENOUS at 09:32

## 2023-12-03 RX ADMIN — IPRATROPIUM BROMIDE AND ALBUTEROL SULFATE 1 DOSE: 2.5; .5 SOLUTION RESPIRATORY (INHALATION) at 19:42

## 2023-12-03 RX ADMIN — TIOTROPIUM BROMIDE AND OLODATEROL 2 PUFF: 3.124; 2.736 SPRAY, METERED RESPIRATORY (INHALATION) at 10:40

## 2023-12-03 RX ADMIN — DEXMEDETOMIDINE HYDROCHLORIDE 0.4 MCG/KG/HR: 400 INJECTION INTRAVENOUS at 14:18

## 2023-12-03 RX ADMIN — IPRATROPIUM BROMIDE AND ALBUTEROL SULFATE 1 DOSE: 2.5; .5 SOLUTION RESPIRATORY (INHALATION) at 10:40

## 2023-12-03 ASSESSMENT — PAIN SCALES - GENERAL
PAINLEVEL_OUTOF10: 0

## 2023-12-03 NOTE — ED NOTES
Spoke with Dr. Vickie Gruber regarding patient complaining of difficulty breathing and orders received for venous blood gas and possible bipap     Willy Dietz RN  12/02/23 2019

## 2023-12-03 NOTE — ED PROVIDER NOTES
their service. Will plan for admission to the intermediate ICU. Patient states she took a COVID test at home that was negative. MIPS:  [None]    Social determinants of health impacting treatment or disposition:  none    Code Status Discussion:  Full Code      PROCEDURES:  none    CONSULTS:  IP CONSULT TO INTERNAL MEDICINE    CRITICAL CARE:  There was a high probability of clinically significant/life threatening deterioration in this patient's condition which required my urgent intervention. Total critical care time was 15 minutes. This excludes any time for separately reportable procedures. FINAL IMPRESSION     1. COPD exacerbation ([address])    2. Chronic sinusitis, unspecified location          DISPOSITION / PLAN   DISPOSITION Decision To Admit 12/02/2023 08:54:11 PM        PATIENT REFERRED TO:  No follow-up provider specified.     DISCHARGE MEDICATIONS:  New Prescriptions    No medications on file       Bulmaro Grant DO  Emergency Medicine Attending    (Please note that portions of this note were completed with a voice recognition program.  Efforts were made to edit the dictations but occasionally words are mis-transcribed.)          Bulmaro Grant DO  12/02/23 2058

## 2023-12-03 NOTE — H&P
[address] [place] [address]     HISTORY AND PHYSICAL EXAMINATION            Date:   12/3/2023  Patient name:  Ashely Gan  Date of admission:  12/2/2023  6:28 PM  MRN:   [id number]  Account:  [de-identified]  YOB: 1960  PCP:    Brittni Farrell MD  Room:   2013/2013-01  Code Status:    Full Code    Chief Complaint:     Chief Complaint   Patient presents with    Shortness of Breath     SOB, wheezing in all fields; hx COPD (baseline 2 LPM 24/7)       History Obtained From:     patient, electronic medical record    History of Present Illness: The patient is a 58 y.o.   female with longstanding COPD on 2L O2 who presents with worsening shortness of breath and a sinus infection and she is admitted to the hospital for the management of COPD exacerbation. Current smoker. She has had worsening shortness of breath for the past 3 days, associated with lightheadedness. States she has a history of sinus infections around this time of year, has had a sinus infection for the past 3 weeks and has completed courses of zithromax and doxycycline with little improvement. Took a covid test at home which was negative. Describes sinus pain and pressure and has been blowing her nose, noticing blood clots and yellowish mucous. Symptoms are associated with headache and no change in her chronic cough. Denies subjective fevers, worsened cough, nausea, vomiting, diarrhea. Was brought in by EMS, was given 2 g of magnesium along with 120 mg of IV Solu-Medrol. Was tachypneic on arrival to the ED, using accessory muscles and in respiratory distress. Initiated breathing treatments and IV fluids as she was hypotensive, started on IV unasyn. VBG showed hypercarbia. On exam, difficulty speaking in full sentences. BL wheezing, decreased breath sounds.  Barrel chest. Vitals have

## 2023-12-03 NOTE — ED NOTES
Pt put the call light on d/t being short of breath. RN writing this note went into the room. Pulse ox was applied and pt SPO2 was 89% on room air. Pt had received a breathing treatment from respiratory, but had not been placed back on oxygen. RN writing this note placed pt on 3L O2 via NC after pt stated she is chronically on 3L. SPO2 96% after oxygen applied. Pt RN and physician notified.       Yakov Dominguez RN  12/02/23 1931

## 2023-12-03 NOTE — ED NOTES
Venous ph 7.33 and Dr. Vashti Renae aware and patient placed on Bipap with fio2 35% and readings 12/5     Walt Stratton RN  12/02/23 4798

## 2023-12-04 LAB
ANION GAP SERPL CALCULATED.3IONS-SCNC: 3 MMOL/L (ref 9–17)
ARTERIAL PATENCY WRIST A: ABNORMAL
BASOPHILS # BLD: 0 K/UL (ref 0–0.2)
BASOPHILS NFR BLD: 0 % (ref 0–2)
BDY SITE: ABNORMAL
BODY TEMPERATURE: 37
BUN SERPL-MCNC: 9 MG/DL (ref 8–23)
CALCIUM SERPL-MCNC: 7.9 MG/DL (ref 8.6–10.4)
CHLORIDE SERPL-SCNC: 104 MMOL/L (ref 98–107)
CO2 SERPL-SCNC: 33 MMOL/L (ref 20–31)
COHGB MFR BLD: 0.9 % (ref 0–5)
CREAT SERPL-MCNC: 0.5 MG/DL (ref 0.5–0.9)
EKG ATRIAL RATE: 71 BPM
EKG P AXIS: 89 DEGREES
EKG P-R INTERVAL: 148 MS
EKG Q-T INTERVAL: 422 MS
EKG QRS DURATION: 66 MS
EKG QTC CALCULATION (BAZETT): 458 MS
EKG R AXIS: 80 DEGREES
EKG T AXIS: 85 DEGREES
EKG VENTRICULAR RATE: 71 BPM
EOSINOPHIL # BLD: 0 K/UL (ref 0–0.4)
EOSINOPHILS RELATIVE PERCENT: 0 % (ref 0–4)
ERYTHROCYTE [DISTWIDTH] IN BLOOD BY AUTOMATED COUNT: 15.4 % (ref 11.5–14.9)
GAS FLOW.O2 O2 DELIVERY SYS: ABNORMAL L/MIN
GFR SERPL CREATININE-BSD FRML MDRD: >60 ML/MIN/1.73M2
GLUCOSE SERPL-MCNC: 143 MG/DL (ref 70–99)
HCO3 ARTERIAL: 32.2 MMOL/L (ref 22–26)
HCT VFR BLD AUTO: 32.5 % (ref 36–46)
HGB BLD-MCNC: 10.5 G/DL (ref 12–16)
LYMPHOCYTES NFR BLD: 1.1 K/UL (ref 1–4.8)
LYMPHOCYTES RELATIVE PERCENT: 17 % (ref 24–44)
MCH RBC QN AUTO: 31.2 PG (ref 26–34)
MCHC RBC AUTO-ENTMCNC: 32.4 G/DL (ref 31–37)
MCV RBC AUTO: 96.2 FL (ref 80–100)
METHEMOGLOBIN: 1 % (ref 0–1.9)
MICROORGANISM SPEC CULT: NO GROWTH
MONOCYTES NFR BLD: 0.5 K/UL (ref 0.1–1.3)
MONOCYTES NFR BLD: 8 % (ref 1–7)
NEUTROPHILS NFR BLD: 75 % (ref 36–66)
NEUTS SEG NFR BLD: 4.7 K/UL (ref 1.3–9.1)
O2 SAT, ARTERIAL: 96 % (ref 95–98)
PCO2 ARTERIAL: 53.2 MMHG (ref 35–45)
PH ARTERIAL: 7.39 (ref 7.35–7.45)
PLATELET # BLD AUTO: 251 K/UL (ref 150–450)
PMV BLD AUTO: 7 FL (ref 6–12)
PO2 ARTERIAL: 100 MMHG (ref 80–100)
POSITIVE BASE EXCESS, ART: 7.2 MMOL/L (ref 0–2)
POTASSIUM SERPL-SCNC: 4.4 MMOL/L (ref 3.7–5.3)
PT. POSITION: ABNORMAL
RBC # BLD AUTO: 3.37 M/UL (ref 4–5.2)
RESPIRATORY RATE: 40
SODIUM SERPL-SCNC: 140 MMOL/L (ref 135–144)
SPECIMEN DESCRIPTION: NORMAL
TEXT FOR RESPIRATORY: ABNORMAL
WBC OTHER # BLD: 6.3 K/UL (ref 3.5–11)

## 2023-12-04 PROCEDURE — 94761 N-INVAS EAR/PLS OXIMETRY MLT: CPT

## 2023-12-04 PROCEDURE — 6370000000 HC RX 637 (ALT 250 FOR IP): Performed by: NURSE PRACTITIONER

## 2023-12-04 PROCEDURE — 6370000000 HC RX 637 (ALT 250 FOR IP)

## 2023-12-04 PROCEDURE — 2580000003 HC RX 258: Performed by: INTERNAL MEDICINE

## 2023-12-04 PROCEDURE — 82805 BLOOD GASES W/O2 SATURATION: CPT

## 2023-12-04 PROCEDURE — 2060000000 HC ICU INTERMEDIATE R&B

## 2023-12-04 PROCEDURE — 36600 WITHDRAWAL OF ARTERIAL BLOOD: CPT

## 2023-12-04 PROCEDURE — 2700000000 HC OXYGEN THERAPY PER DAY

## 2023-12-04 PROCEDURE — 6360000002 HC RX W HCPCS: Performed by: INTERNAL MEDICINE

## 2023-12-04 PROCEDURE — 94660 CPAP INITIATION&MGMT: CPT

## 2023-12-04 PROCEDURE — 80048 BASIC METABOLIC PNL TOTAL CA: CPT

## 2023-12-04 PROCEDURE — 2580000003 HC RX 258

## 2023-12-04 PROCEDURE — 36415 COLL VENOUS BLD VENIPUNCTURE: CPT

## 2023-12-04 PROCEDURE — 6360000002 HC RX W HCPCS

## 2023-12-04 PROCEDURE — 97166 OT EVAL MOD COMPLEX 45 MIN: CPT

## 2023-12-04 PROCEDURE — 2580000003 HC RX 258: Performed by: NURSE PRACTITIONER

## 2023-12-04 PROCEDURE — 97530 THERAPEUTIC ACTIVITIES: CPT

## 2023-12-04 PROCEDURE — 6360000002 HC RX W HCPCS: Performed by: NURSE PRACTITIONER

## 2023-12-04 PROCEDURE — 6370000000 HC RX 637 (ALT 250 FOR IP): Performed by: INTERNAL MEDICINE

## 2023-12-04 PROCEDURE — 99233 SBSQ HOSP IP/OBS HIGH 50: CPT | Performed by: INTERNAL MEDICINE

## 2023-12-04 PROCEDURE — 85025 COMPLETE CBC W/AUTO DIFF WBC: CPT

## 2023-12-04 PROCEDURE — 94640 AIRWAY INHALATION TREATMENT: CPT

## 2023-12-04 RX ORDER — BENZONATATE 200 MG/1
200 CAPSULE ORAL 3 TIMES DAILY
Status: DISCONTINUED | OUTPATIENT
Start: 2023-12-04 | End: 2023-12-12 | Stop reason: HOSPADM

## 2023-12-04 RX ORDER — MORPHINE SULFATE 2 MG/ML
2 INJECTION, SOLUTION INTRAMUSCULAR; INTRAVENOUS EVERY 4 HOURS PRN
Status: DISCONTINUED | OUTPATIENT
Start: 2023-12-04 | End: 2023-12-04

## 2023-12-04 RX ORDER — HYDROCODONE BITARTRATE AND HOMATROPINE METHYLBROMIDE ORAL SOLUTION 5; 1.5 MG/5ML; MG/5ML
5 LIQUID ORAL EVERY 4 HOURS PRN
Status: DISCONTINUED | OUTPATIENT
Start: 2023-12-04 | End: 2023-12-12 | Stop reason: HOSPADM

## 2023-12-04 RX ORDER — ALBUTEROL SULFATE 2.5 MG/3ML
2.5 SOLUTION RESPIRATORY (INHALATION) EVERY 4 HOURS PRN
Status: DISCONTINUED | OUTPATIENT
Start: 2023-12-04 | End: 2023-12-12 | Stop reason: HOSPADM

## 2023-12-04 RX ORDER — MORPHINE SULFATE 2 MG/ML
2 INJECTION, SOLUTION INTRAMUSCULAR; INTRAVENOUS EVERY 4 HOURS PRN
Status: DISCONTINUED | OUTPATIENT
Start: 2023-12-04 | End: 2023-12-12 | Stop reason: HOSPADM

## 2023-12-04 RX ORDER — ALBUTEROL SULFATE 2.5 MG/3ML
2.5 SOLUTION RESPIRATORY (INHALATION)
Status: DISCONTINUED | OUTPATIENT
Start: 2023-12-04 | End: 2023-12-12 | Stop reason: HOSPADM

## 2023-12-04 RX ORDER — DEXMEDETOMIDINE HYDROCHLORIDE 4 UG/ML
.1-1.5 INJECTION, SOLUTION INTRAVENOUS CONTINUOUS PRN
Status: DISCONTINUED | OUTPATIENT
Start: 2023-12-04 | End: 2023-12-12 | Stop reason: HOSPADM

## 2023-12-04 RX ORDER — DEXTROMETHORPHAN POLISTIREX 30 MG/5ML
60 SUSPENSION ORAL EVERY 12 HOURS SCHEDULED
Status: DISCONTINUED | OUTPATIENT
Start: 2023-12-04 | End: 2023-12-11

## 2023-12-04 RX ADMIN — WATER 40 MG: 1 INJECTION INTRAMUSCULAR; INTRAVENOUS; SUBCUTANEOUS at 23:36

## 2023-12-04 RX ADMIN — CLONAZEPAM 0.5 MG: 0.5 TABLET ORAL at 08:11

## 2023-12-04 RX ADMIN — Medication 60 MG: at 20:11

## 2023-12-04 RX ADMIN — CEFEPIME 2000 MG: 2 INJECTION, POWDER, FOR SOLUTION INTRAVENOUS at 12:55

## 2023-12-04 RX ADMIN — PANTOPRAZOLE SODIUM 40 MG: 40 TABLET, DELAYED RELEASE ORAL at 16:22

## 2023-12-04 RX ADMIN — SODIUM CHLORIDE, PRESERVATIVE FREE 10 ML: 5 INJECTION INTRAVENOUS at 18:11

## 2023-12-04 RX ADMIN — VENLAFAXINE HYDROCHLORIDE 150 MG: 150 CAPSULE, EXTENDED RELEASE ORAL at 08:11

## 2023-12-04 RX ADMIN — PANTOPRAZOLE SODIUM 40 MG: 40 TABLET, DELAYED RELEASE ORAL at 06:46

## 2023-12-04 RX ADMIN — LEVOTHYROXINE SODIUM 88 MCG: 0.09 TABLET ORAL at 06:51

## 2023-12-04 RX ADMIN — Medication 3 MG: at 20:11

## 2023-12-04 RX ADMIN — IPRATROPIUM BROMIDE AND ALBUTEROL SULFATE 1 DOSE: 2.5; .5 SOLUTION RESPIRATORY (INHALATION) at 10:54

## 2023-12-04 RX ADMIN — ALBUTEROL SULFATE 2.5 MG: 2.5 SOLUTION RESPIRATORY (INHALATION) at 20:43

## 2023-12-04 RX ADMIN — CEFEPIME 2000 MG: 2 INJECTION, POWDER, FOR SOLUTION INTRAVENOUS at 20:15

## 2023-12-04 RX ADMIN — ALBUTEROL SULFATE 2.5 MG: 2.5 SOLUTION RESPIRATORY (INHALATION) at 15:04

## 2023-12-04 RX ADMIN — TIZANIDINE 4 MG: 4 TABLET ORAL at 12:53

## 2023-12-04 RX ADMIN — CARVEDILOL 6.25 MG: 6.25 TABLET, FILM COATED ORAL at 20:11

## 2023-12-04 RX ADMIN — GUAIFENESIN SYRUP AND DEXTROMETHORPHAN 5 ML: 100; 10 SYRUP ORAL at 15:10

## 2023-12-04 RX ADMIN — MORPHINE SULFATE 2 MG: 2 INJECTION, SOLUTION INTRAMUSCULAR; INTRAVENOUS at 16:22

## 2023-12-04 RX ADMIN — PRIMIDONE 100 MG: 50 TABLET ORAL at 20:20

## 2023-12-04 RX ADMIN — GUAIFENESIN SYRUP AND DEXTROMETHORPHAN 5 ML: 100; 10 SYRUP ORAL at 10:15

## 2023-12-04 RX ADMIN — MIRTAZAPINE 30 MG: 30 TABLET, FILM COATED ORAL at 20:11

## 2023-12-04 RX ADMIN — WATER 40 MG: 1 INJECTION INTRAMUSCULAR; INTRAVENOUS; SUBCUTANEOUS at 08:12

## 2023-12-04 RX ADMIN — WATER 40 MG: 1 INJECTION INTRAMUSCULAR; INTRAVENOUS; SUBCUTANEOUS at 18:11

## 2023-12-04 RX ADMIN — MONTELUKAST 10 MG: 10 TABLET, FILM COATED ORAL at 08:11

## 2023-12-04 RX ADMIN — HYDROCODONE BITARTRATE AND HOMATROPINE METHYLBROMIDE 5 ML: 5; 1.5 SOLUTION ORAL at 16:22

## 2023-12-04 RX ADMIN — BENZONATATE 100 MG: 100 CAPSULE ORAL at 08:11

## 2023-12-04 RX ADMIN — CLONAZEPAM 0.5 MG: 0.5 TABLET ORAL at 18:05

## 2023-12-04 RX ADMIN — IPRATROPIUM BROMIDE AND ALBUTEROL SULFATE 1 DOSE: 2.5; .5 SOLUTION RESPIRATORY (INHALATION) at 07:54

## 2023-12-04 RX ADMIN — BENZONATATE 200 MG: 200 CAPSULE ORAL at 18:04

## 2023-12-04 RX ADMIN — CARVEDILOL 6.25 MG: 6.25 TABLET, FILM COATED ORAL at 10:15

## 2023-12-04 RX ADMIN — AMLODIPINE BESYLATE 10 MG: 10 TABLET ORAL at 18:05

## 2023-12-04 RX ADMIN — BENZONATATE 200 MG: 200 CAPSULE ORAL at 20:11

## 2023-12-04 RX ADMIN — CEFEPIME 2000 MG: 2 INJECTION, POWDER, FOR SOLUTION INTRAVENOUS at 03:51

## 2023-12-04 RX ADMIN — BENZONATATE 100 MG: 100 CAPSULE ORAL at 15:10

## 2023-12-04 RX ADMIN — TIZANIDINE 4 MG: 4 TABLET ORAL at 19:25

## 2023-12-04 RX ADMIN — WATER 40 MG: 1 INJECTION INTRAMUSCULAR; INTRAVENOUS; SUBCUTANEOUS at 03:50

## 2023-12-04 RX ADMIN — ENOXAPARIN SODIUM 40 MG: 100 INJECTION SUBCUTANEOUS at 08:11

## 2023-12-04 ASSESSMENT — ENCOUNTER SYMPTOMS
TROUBLE SWALLOWING: 0
GASTROINTESTINAL NEGATIVE: 1
EYES NEGATIVE: 1
WHEEZING: 1
SHORTNESS OF BREATH: 1
CHEST TIGHTNESS: 1
SORE THROAT: 0
ALLERGIC/IMMUNOLOGIC NEGATIVE: 1
COUGH: 1
VOICE CHANGE: 0
PHOTOPHOBIA: 0

## 2023-12-04 ASSESSMENT — PAIN DESCRIPTION - LOCATION
LOCATION: HIP

## 2023-12-04 ASSESSMENT — PAIN DESCRIPTION - ORIENTATION
ORIENTATION: RIGHT

## 2023-12-04 ASSESSMENT — PAIN SCALES - GENERAL
PAINLEVEL_OUTOF10: 2
PAINLEVEL_OUTOF10: 0
PAINLEVEL_OUTOF10: 3
PAINLEVEL_OUTOF10: 0
PAINLEVEL_OUTOF10: 3
PAINLEVEL_OUTOF10: 3
PAINLEVEL_OUTOF10: 5
PAINLEVEL_OUTOF10: 0
PAINLEVEL_OUTOF10: 0

## 2023-12-04 ASSESSMENT — PAIN DESCRIPTION - DESCRIPTORS
DESCRIPTORS: ACHING;THROBBING
DESCRIPTORS: ACHING

## 2023-12-04 ASSESSMENT — PAIN DESCRIPTION - PAIN TYPE
TYPE: CHRONIC PAIN
TYPE: CHRONIC PAIN

## 2023-12-04 NOTE — SIGNIFICANT EVENT
Pulmonary/[facility] follow-up for severe respiratory distress after coughing episode. Evaluated bedside. She is tachypneic, tachycardic, and air hungry. She currently is on BiPAP but respiratory rate still in the mid 30s. She does not want to be intubated but wants to be full code. I think this is a valid decision based on the fact that she has stage IV COPD. I have ordered two milligrams of morphine IV and we can use that every 4 hours as needed. I will also place her on scheduled antitussives Delsym cough syrup and Tessalon Perles. We will use Hycodan cough syrup also as needed. We will not transfer her out of the intermediate ICU today.

## 2023-12-04 NOTE — ACP (ADVANCE CARE PLANNING)
Advance Care Planning     Advance Care Planning Inpatient Note  Spiritual Care Department    Today's Date: 12/4/2023  Unit: [facility]    Received request from Other: Nurse . Upon review of chart and communication with care team, Spiritual Care will defer advance care planning with patient at this time. . Patient was/were present in the room during visit. Goals of ACP Conversation:  Discuss advance care planning documents    Health Care Decision Makers:       Primary Decision Maker: Tom Manriquez - [facility] - [phone]  Summary:  No Decision Maker named by patient at this time    Advance Care Planning Documents (Patient Wishes):  None     Assessment:  The patient had questions in regard to the [facility], Denice Cook gave patient the document to look at. The patient wants to discuss the ACP documents with her family as well as the [facility] document. Patient stated that the ACP documents are on her table at home and she has not filled them out yet. She wants to discuss her wishes with her  and daughter this week-end and complete the documents. Interventions:  Provided education on documents for clarity and greater understanding    Care Preferences Communicated:        Outcomes/Plan:      Electronically signed by Awilda Peabody on 12/4/2023 at 12:02 PM

## 2023-12-04 NOTE — CARE COORDINATION
Case Management Assessment  Initial Evaluation    Date/Time of Evaluation: 12/4/2023 4:53 PM  Assessment Completed by: Marcie Yoon RN    If patient is discharged prior to next notation, then this note serves as note for discharge by case management. Patient Name: Keagan Palma                   YOB: 1960  Diagnosis: COPD exacerbation ([address]) [J44.1]  Chronic sinusitis, unspecified location [J32.9]                   Date / Time: 12/2/2023  6:28 PM    Patient Admission Status: Inpatient   Readmission Risk (Low < 19, Mod (19-27), High > 27): Readmission Risk Score: 16    Current PCP: Lorne Zheng MD  PCP verified by CM? Yes    Chart Reviewed: Yes      History Provided by: Patient, Medical Record  Patient Orientation: Alert and Oriented    Patient Cognition: Alert    Hospitalization in the last 30 days (Readmission):  No    If yes, Readmission Assessment in CM Navigator will be completed. Advance Directives:      Code Status: Full Code   Patient's Primary Decision Maker is: Legal Next of Kin    Primary Decision Maker: [address] [place] [address] - [phone]    Discharge Planning:    Patient lives with: Spouse/Significant Other Type of Home: House  Primary Care Giver: Self  Patient Support Systems include: Spouse/Significant Other   Current Financial resources: Medicare  Current community resources: ECF/Home Care ([facility]-recently discharged)  Current services prior to admission: Durable Medical Equipment, Home Care            Current DME: Shower Chair, Oxygen Therapy (Comment), Home Aerosol, Walker            Type of Home Care services:       ADLS  Prior functional level: Independent in ADLs/IADLs, Assistance with the following:, Housework, Shopping  Current functional level:      PT AM-PAC:   /24  OT AM-PAC: 14 /24    Family can provide assistance at DC:  Yes  Would you like Case Management to discuss the discharge plan with any other family members/significant others, and if so,

## 2023-12-05 LAB
ANION GAP SERPL CALCULATED.3IONS-SCNC: 6 MMOL/L (ref 9–17)
BASOPHILS # BLD: 0 K/UL (ref 0–0.2)
BASOPHILS NFR BLD: 0 % (ref 0–2)
BUN SERPL-MCNC: 11 MG/DL (ref 8–23)
CALCIUM SERPL-MCNC: 7.8 MG/DL (ref 8.6–10.4)
CHLORIDE SERPL-SCNC: 103 MMOL/L (ref 98–107)
CO2 SERPL-SCNC: 33 MMOL/L (ref 20–31)
CREAT SERPL-MCNC: <0.4 MG/DL (ref 0.5–0.9)
EOSINOPHIL # BLD: 0 K/UL (ref 0–0.4)
EOSINOPHILS RELATIVE PERCENT: 0 % (ref 0–4)
ERYTHROCYTE [DISTWIDTH] IN BLOOD BY AUTOMATED COUNT: 15.8 % (ref 11.5–14.9)
GFR SERPL CREATININE-BSD FRML MDRD: ABNORMAL ML/MIN/1.73M2
GLUCOSE SERPL-MCNC: 136 MG/DL (ref 70–99)
HCT VFR BLD AUTO: 32.7 % (ref 36–46)
HGB BLD-MCNC: 10.8 G/DL (ref 12–16)
LYMPHOCYTES NFR BLD: 0.6 K/UL (ref 1–4.8)
LYMPHOCYTES RELATIVE PERCENT: 9 % (ref 24–44)
MCH RBC QN AUTO: 31.4 PG (ref 26–34)
MCHC RBC AUTO-ENTMCNC: 32.9 G/DL (ref 31–37)
MCV RBC AUTO: 95.5 FL (ref 80–100)
MONOCYTES NFR BLD: 0.2 K/UL (ref 0.1–1.3)
MONOCYTES NFR BLD: 4 % (ref 1–7)
NEUTROPHILS NFR BLD: 87 % (ref 36–66)
NEUTS SEG NFR BLD: 5.6 K/UL (ref 1.3–9.1)
PLATELET # BLD AUTO: 253 K/UL (ref 150–450)
PMV BLD AUTO: 7.2 FL (ref 6–12)
POTASSIUM SERPL-SCNC: 4.1 MMOL/L (ref 3.7–5.3)
RBC # BLD AUTO: 3.42 M/UL (ref 4–5.2)
SODIUM SERPL-SCNC: 142 MMOL/L (ref 135–144)
WBC OTHER # BLD: 6.4 K/UL (ref 3.5–11)

## 2023-12-05 PROCEDURE — 80048 BASIC METABOLIC PNL TOTAL CA: CPT

## 2023-12-05 PROCEDURE — 6370000000 HC RX 637 (ALT 250 FOR IP): Performed by: INTERNAL MEDICINE

## 2023-12-05 PROCEDURE — 6360000002 HC RX W HCPCS: Performed by: NURSE PRACTITIONER

## 2023-12-05 PROCEDURE — 6360000002 HC RX W HCPCS: Performed by: INTERNAL MEDICINE

## 2023-12-05 PROCEDURE — 2060000000 HC ICU INTERMEDIATE R&B

## 2023-12-05 PROCEDURE — 2580000003 HC RX 258

## 2023-12-05 PROCEDURE — 6360000002 HC RX W HCPCS

## 2023-12-05 PROCEDURE — 36415 COLL VENOUS BLD VENIPUNCTURE: CPT

## 2023-12-05 PROCEDURE — 94664 DEMO&/EVAL PT USE INHALER: CPT

## 2023-12-05 PROCEDURE — 2700000000 HC OXYGEN THERAPY PER DAY

## 2023-12-05 PROCEDURE — 2500000003 HC RX 250 WO HCPCS: Performed by: INTERNAL MEDICINE

## 2023-12-05 PROCEDURE — 94761 N-INVAS EAR/PLS OXIMETRY MLT: CPT

## 2023-12-05 PROCEDURE — 2580000003 HC RX 258: Performed by: INTERNAL MEDICINE

## 2023-12-05 PROCEDURE — 94660 CPAP INITIATION&MGMT: CPT

## 2023-12-05 PROCEDURE — 85025 COMPLETE CBC W/AUTO DIFF WBC: CPT

## 2023-12-05 PROCEDURE — 6370000000 HC RX 637 (ALT 250 FOR IP): Performed by: NURSE PRACTITIONER

## 2023-12-05 PROCEDURE — 94640 AIRWAY INHALATION TREATMENT: CPT

## 2023-12-05 PROCEDURE — 99232 SBSQ HOSP IP/OBS MODERATE 35: CPT | Performed by: INTERNAL MEDICINE

## 2023-12-05 PROCEDURE — 2580000003 HC RX 258: Performed by: NURSE PRACTITIONER

## 2023-12-05 PROCEDURE — 6370000000 HC RX 637 (ALT 250 FOR IP)

## 2023-12-05 RX ORDER — HYDRALAZINE HYDROCHLORIDE 20 MG/ML
10 INJECTION INTRAMUSCULAR; INTRAVENOUS EVERY 4 HOURS PRN
Status: DISCONTINUED | OUTPATIENT
Start: 2023-12-05 | End: 2023-12-12 | Stop reason: HOSPADM

## 2023-12-05 RX ADMIN — AMLODIPINE BESYLATE 10 MG: 10 TABLET ORAL at 16:04

## 2023-12-05 RX ADMIN — WATER 40 MG: 1 INJECTION INTRAMUSCULAR; INTRAVENOUS; SUBCUTANEOUS at 07:49

## 2023-12-05 RX ADMIN — ALBUTEROL SULFATE 2.5 MG: 2.5 SOLUTION RESPIRATORY (INHALATION) at 21:05

## 2023-12-05 RX ADMIN — ALBUTEROL SULFATE 2.5 MG: 2.5 SOLUTION RESPIRATORY (INHALATION) at 11:22

## 2023-12-05 RX ADMIN — ALBUTEROL SULFATE 2.5 MG: 2.5 SOLUTION RESPIRATORY (INHALATION) at 18:08

## 2023-12-05 RX ADMIN — MORPHINE SULFATE 2 MG: 2 INJECTION, SOLUTION INTRAMUSCULAR; INTRAVENOUS at 07:46

## 2023-12-05 RX ADMIN — Medication 60 MG: at 07:49

## 2023-12-05 RX ADMIN — CARVEDILOL 6.25 MG: 6.25 TABLET, FILM COATED ORAL at 20:44

## 2023-12-05 RX ADMIN — MONTELUKAST 10 MG: 10 TABLET, FILM COATED ORAL at 07:50

## 2023-12-05 RX ADMIN — ALBUTEROL SULFATE 2.5 MG: 2.5 SOLUTION RESPIRATORY (INHALATION) at 15:01

## 2023-12-05 RX ADMIN — SODIUM CHLORIDE, PRESERVATIVE FREE 10 ML: 5 INJECTION INTRAVENOUS at 07:51

## 2023-12-05 RX ADMIN — BENZONATATE 200 MG: 200 CAPSULE ORAL at 16:07

## 2023-12-05 RX ADMIN — ALBUTEROL SULFATE 2.5 MG: 2.5 SOLUTION RESPIRATORY (INHALATION) at 08:54

## 2023-12-05 RX ADMIN — HYDROCODONE BITARTRATE AND HOMATROPINE METHYLBROMIDE 5 ML: 5; 1.5 SOLUTION ORAL at 08:04

## 2023-12-05 RX ADMIN — PANTOPRAZOLE SODIUM 40 MG: 40 TABLET, DELAYED RELEASE ORAL at 16:04

## 2023-12-05 RX ADMIN — CLONAZEPAM 0.5 MG: 0.5 TABLET ORAL at 11:53

## 2023-12-05 RX ADMIN — CEFEPIME 2000 MG: 2 INJECTION, POWDER, FOR SOLUTION INTRAVENOUS at 05:30

## 2023-12-05 RX ADMIN — SODIUM CHLORIDE, PRESERVATIVE FREE 10 ML: 5 INJECTION INTRAVENOUS at 20:48

## 2023-12-05 RX ADMIN — BENZONATATE 200 MG: 200 CAPSULE ORAL at 20:44

## 2023-12-05 RX ADMIN — DEXMEDETOMIDINE HYDROCHLORIDE 0.2 MCG/KG/HR: 400 INJECTION INTRAVENOUS at 18:56

## 2023-12-05 RX ADMIN — BENZONATATE 200 MG: 200 CAPSULE ORAL at 07:49

## 2023-12-05 RX ADMIN — HYDROCODONE BITARTRATE AND HOMATROPINE METHYLBROMIDE 5 ML: 5; 1.5 SOLUTION ORAL at 16:05

## 2023-12-05 RX ADMIN — HYDROCODONE BITARTRATE AND HOMATROPINE METHYLBROMIDE 5 ML: 5; 1.5 SOLUTION ORAL at 11:53

## 2023-12-05 RX ADMIN — TIOTROPIUM BROMIDE AND OLODATEROL 2 PUFF: 3.124; 2.736 SPRAY, METERED RESPIRATORY (INHALATION) at 11:29

## 2023-12-05 RX ADMIN — WATER 40 MG: 1 INJECTION INTRAMUSCULAR; INTRAVENOUS; SUBCUTANEOUS at 16:05

## 2023-12-05 RX ADMIN — CARVEDILOL 6.25 MG: 6.25 TABLET, FILM COATED ORAL at 07:50

## 2023-12-05 RX ADMIN — MORPHINE SULFATE 2 MG: 2 INJECTION, SOLUTION INTRAMUSCULAR; INTRAVENOUS at 17:32

## 2023-12-05 RX ADMIN — LEVOTHYROXINE SODIUM 88 MCG: 0.09 TABLET ORAL at 07:57

## 2023-12-05 RX ADMIN — Medication 60 MG: at 20:43

## 2023-12-05 RX ADMIN — MIRTAZAPINE 30 MG: 30 TABLET, FILM COATED ORAL at 20:44

## 2023-12-05 RX ADMIN — CEFEPIME 2000 MG: 2 INJECTION, POWDER, FOR SOLUTION INTRAVENOUS at 11:54

## 2023-12-05 RX ADMIN — PRIMIDONE 50 MG: 50 TABLET ORAL at 20:46

## 2023-12-05 RX ADMIN — CEFEPIME 2000 MG: 2 INJECTION, POWDER, FOR SOLUTION INTRAVENOUS at 20:48

## 2023-12-05 RX ADMIN — ENOXAPARIN SODIUM 40 MG: 100 INJECTION SUBCUTANEOUS at 07:49

## 2023-12-05 RX ADMIN — VENLAFAXINE HYDROCHLORIDE 150 MG: 150 CAPSULE, EXTENDED RELEASE ORAL at 07:50

## 2023-12-05 RX ADMIN — PANTOPRAZOLE SODIUM 40 MG: 40 TABLET, DELAYED RELEASE ORAL at 07:50

## 2023-12-05 RX ADMIN — Medication 3 MG: at 20:44

## 2023-12-05 ASSESSMENT — PAIN SCALES - GENERAL: PAINLEVEL_OUTOF10: 0

## 2023-12-06 LAB
ANION GAP SERPL CALCULATED.3IONS-SCNC: 5 MMOL/L (ref 9–17)
BASOPHILS # BLD: 0 K/UL (ref 0–0.2)
BASOPHILS NFR BLD: 0 % (ref 0–2)
BUN SERPL-MCNC: 14 MG/DL (ref 8–23)
CALCIUM SERPL-MCNC: 7.7 MG/DL (ref 8.6–10.4)
CHLORIDE SERPL-SCNC: 103 MMOL/L (ref 98–107)
CO2 SERPL-SCNC: 33 MMOL/L (ref 20–31)
CREAT SERPL-MCNC: <0.4 MG/DL (ref 0.5–0.9)
EOSINOPHIL # BLD: 0 K/UL (ref 0–0.4)
EOSINOPHILS RELATIVE PERCENT: 0 % (ref 0–4)
ERYTHROCYTE [DISTWIDTH] IN BLOOD BY AUTOMATED COUNT: 15.7 % (ref 11.5–14.9)
GFR SERPL CREATININE-BSD FRML MDRD: ABNORMAL ML/MIN/1.73M2
GLUCOSE SERPL-MCNC: 95 MG/DL (ref 70–99)
HCT VFR BLD AUTO: 34 % (ref 36–46)
HGB BLD-MCNC: 11.2 G/DL (ref 12–16)
LYMPHOCYTES NFR BLD: 0.8 K/UL (ref 1–4.8)
LYMPHOCYTES RELATIVE PERCENT: 12 % (ref 24–44)
MCH RBC QN AUTO: 31.3 PG (ref 26–34)
MCHC RBC AUTO-ENTMCNC: 33 G/DL (ref 31–37)
MCV RBC AUTO: 94.8 FL (ref 80–100)
MONOCYTES NFR BLD: 0.3 K/UL (ref 0.1–1.3)
MONOCYTES NFR BLD: 5 % (ref 1–7)
NEUTROPHILS NFR BLD: 83 % (ref 36–66)
NEUTS SEG NFR BLD: 5.2 K/UL (ref 1.3–9.1)
PLATELET # BLD AUTO: 252 K/UL (ref 150–450)
PMV BLD AUTO: 7 FL (ref 6–12)
POTASSIUM SERPL-SCNC: 3.8 MMOL/L (ref 3.7–5.3)
RBC # BLD AUTO: 3.58 M/UL (ref 4–5.2)
SODIUM SERPL-SCNC: 141 MMOL/L (ref 135–144)
WBC OTHER # BLD: 6.3 K/UL (ref 3.5–11)

## 2023-12-06 PROCEDURE — 36415 COLL VENOUS BLD VENIPUNCTURE: CPT

## 2023-12-06 PROCEDURE — 2700000000 HC OXYGEN THERAPY PER DAY

## 2023-12-06 PROCEDURE — 6360000002 HC RX W HCPCS

## 2023-12-06 PROCEDURE — 94660 CPAP INITIATION&MGMT: CPT

## 2023-12-06 PROCEDURE — 6360000002 HC RX W HCPCS: Performed by: INTERNAL MEDICINE

## 2023-12-06 PROCEDURE — 6370000000 HC RX 637 (ALT 250 FOR IP)

## 2023-12-06 PROCEDURE — 85025 COMPLETE CBC W/AUTO DIFF WBC: CPT

## 2023-12-06 PROCEDURE — 99223 1ST HOSP IP/OBS HIGH 75: CPT | Performed by: NURSE PRACTITIONER

## 2023-12-06 PROCEDURE — 2580000003 HC RX 258: Performed by: NURSE PRACTITIONER

## 2023-12-06 PROCEDURE — 6370000000 HC RX 637 (ALT 250 FOR IP): Performed by: INTERNAL MEDICINE

## 2023-12-06 PROCEDURE — 94640 AIRWAY INHALATION TREATMENT: CPT

## 2023-12-06 PROCEDURE — 80048 BASIC METABOLIC PNL TOTAL CA: CPT

## 2023-12-06 PROCEDURE — 6360000002 HC RX W HCPCS: Performed by: NURSE PRACTITIONER

## 2023-12-06 PROCEDURE — 6370000000 HC RX 637 (ALT 250 FOR IP): Performed by: NURSE PRACTITIONER

## 2023-12-06 PROCEDURE — 94761 N-INVAS EAR/PLS OXIMETRY MLT: CPT

## 2023-12-06 PROCEDURE — 97161 PT EVAL LOW COMPLEX 20 MIN: CPT

## 2023-12-06 PROCEDURE — 99233 SBSQ HOSP IP/OBS HIGH 50: CPT | Performed by: INTERNAL MEDICINE

## 2023-12-06 PROCEDURE — 2580000003 HC RX 258

## 2023-12-06 PROCEDURE — 97530 THERAPEUTIC ACTIVITIES: CPT

## 2023-12-06 PROCEDURE — 2060000000 HC ICU INTERMEDIATE R&B

## 2023-12-06 PROCEDURE — 2580000003 HC RX 258: Performed by: INTERNAL MEDICINE

## 2023-12-06 RX ORDER — PRIMIDONE 50 MG/1
100 TABLET ORAL NIGHTLY
Status: DISCONTINUED | OUTPATIENT
Start: 2023-12-06 | End: 2023-12-12 | Stop reason: HOSPADM

## 2023-12-06 RX ADMIN — Medication 3 MG: at 20:18

## 2023-12-06 RX ADMIN — CLONAZEPAM 0.5 MG: 0.5 TABLET ORAL at 20:18

## 2023-12-06 RX ADMIN — BENZONATATE 200 MG: 200 CAPSULE ORAL at 20:18

## 2023-12-06 RX ADMIN — PANTOPRAZOLE SODIUM 40 MG: 40 TABLET, DELAYED RELEASE ORAL at 06:30

## 2023-12-06 RX ADMIN — LEVOTHYROXINE SODIUM 88 MCG: 0.09 TABLET ORAL at 06:30

## 2023-12-06 RX ADMIN — MONTELUKAST 10 MG: 10 TABLET, FILM COATED ORAL at 09:49

## 2023-12-06 RX ADMIN — AMLODIPINE BESYLATE 10 MG: 10 TABLET ORAL at 18:03

## 2023-12-06 RX ADMIN — PRIMIDONE 100 MG: 50 TABLET ORAL at 20:19

## 2023-12-06 RX ADMIN — Medication 60 MG: at 09:36

## 2023-12-06 RX ADMIN — VENLAFAXINE HYDROCHLORIDE 150 MG: 150 CAPSULE, EXTENDED RELEASE ORAL at 09:36

## 2023-12-06 RX ADMIN — BENZONATATE 200 MG: 200 CAPSULE ORAL at 09:36

## 2023-12-06 RX ADMIN — CARVEDILOL 6.25 MG: 6.25 TABLET, FILM COATED ORAL at 20:18

## 2023-12-06 RX ADMIN — WATER 40 MG: 1 INJECTION INTRAMUSCULAR; INTRAVENOUS; SUBCUTANEOUS at 16:49

## 2023-12-06 RX ADMIN — ALBUTEROL SULFATE 2.5 MG: 2.5 SOLUTION RESPIRATORY (INHALATION) at 08:12

## 2023-12-06 RX ADMIN — CEFEPIME 2000 MG: 2 INJECTION, POWDER, FOR SOLUTION INTRAVENOUS at 20:23

## 2023-12-06 RX ADMIN — SODIUM CHLORIDE, PRESERVATIVE FREE 5 ML: 5 INJECTION INTRAVENOUS at 21:48

## 2023-12-06 RX ADMIN — PANTOPRAZOLE SODIUM 40 MG: 40 TABLET, DELAYED RELEASE ORAL at 16:49

## 2023-12-06 RX ADMIN — Medication 60 MG: at 20:24

## 2023-12-06 RX ADMIN — CLONAZEPAM 0.5 MG: 0.5 TABLET ORAL at 06:41

## 2023-12-06 RX ADMIN — ENOXAPARIN SODIUM 40 MG: 100 INJECTION SUBCUTANEOUS at 09:36

## 2023-12-06 RX ADMIN — ALBUTEROL SULFATE 2.5 MG: 2.5 SOLUTION RESPIRATORY (INHALATION) at 15:37

## 2023-12-06 RX ADMIN — WATER 40 MG: 1 INJECTION INTRAMUSCULAR; INTRAVENOUS; SUBCUTANEOUS at 00:15

## 2023-12-06 RX ADMIN — MORPHINE SULFATE 2 MG: 2 INJECTION, SOLUTION INTRAMUSCULAR; INTRAVENOUS at 11:08

## 2023-12-06 RX ADMIN — CEFEPIME 2000 MG: 2 INJECTION, POWDER, FOR SOLUTION INTRAVENOUS at 11:59

## 2023-12-06 RX ADMIN — CEFEPIME 2000 MG: 2 INJECTION, POWDER, FOR SOLUTION INTRAVENOUS at 04:34

## 2023-12-06 RX ADMIN — WATER 40 MG: 1 INJECTION INTRAMUSCULAR; INTRAVENOUS; SUBCUTANEOUS at 09:36

## 2023-12-06 RX ADMIN — TIOTROPIUM BROMIDE AND OLODATEROL 2 PUFF: 3.124; 2.736 SPRAY, METERED RESPIRATORY (INHALATION) at 08:12

## 2023-12-06 RX ADMIN — BENZONATATE 200 MG: 200 CAPSULE ORAL at 14:26

## 2023-12-06 RX ADMIN — MIRTAZAPINE 30 MG: 30 TABLET, FILM COATED ORAL at 20:18

## 2023-12-06 RX ADMIN — ALBUTEROL SULFATE 2.5 MG: 2.5 SOLUTION RESPIRATORY (INHALATION) at 10:27

## 2023-12-06 RX ADMIN — HYDROCODONE BITARTRATE AND HOMATROPINE METHYLBROMIDE 5 ML: 5; 1.5 SOLUTION ORAL at 16:49

## 2023-12-06 RX ADMIN — ALBUTEROL SULFATE 2.5 MG: 2.5 SOLUTION RESPIRATORY (INHALATION) at 20:04

## 2023-12-06 RX ADMIN — CARVEDILOL 6.25 MG: 6.25 TABLET, FILM COATED ORAL at 09:36

## 2023-12-06 ASSESSMENT — PAIN SCALES - GENERAL
PAINLEVEL_OUTOF10: 0

## 2023-12-06 NOTE — CARE COORDINATION
ONGOING DISCHARGE PLAN:    Patient is alert and oriented x4. Anxious and on Bipap. Spoke with patient regarding discharge plan and patient confirms that plan is still agreeable to [facility]. She is asking about [facility]. Referral sent. Palliative Care consult     91% on 2L/Bipap    IV cefepime    IV steroids 40 Q 8    IV hydralazine PRN    PT/OT    Will continue to follow for additional discharge needs. If patient is discharged prior to next notation, then this note serves as note for discharge by case management.     Electronically signed by Jimenez Sal RN on 12/6/2023 at 11:15 AM

## 2023-12-06 NOTE — CONSULTS
[facility]   CONSULT NOTE:      DATE OF CONSULT 12/3/2023    REASON FOR CONSULTATION:  Shortness of breath history of severe COPD      PCP Estrellita Tillman MD     CHIEF COMPLAINT: \"I have been sick for several days. \"    HISTORY OF PRESENT ILLNESS:     Patient is a very pleasant 70-year-old female. Patient has history of chronic respiratory failure with hypoxemia. She uses 3 L during the daytime and 3 L at night with her astral noninvasive ventilator. She admits to still smoke about 10 cigarettes a day. She takes her for oxygen to smoke. For about a week prior to Thanksgiving day, she mentions sinus congestion cough. She does not know the cough sputum quality as she swallows the phlegm. She had contacted her primary care physician and was given a round of doxycycline and followed by round of Zithromax. She was taking Robitussin as well. She told me that she had a COVID test done from a pharmacy about 4 days ago. She told me her test was negative. The test was not affiliated with the [facility]. Her symptoms worsened. She was seen in the ED where chest x-ray revealed hyperinflation but no acute process. Patient lab work included a BUN/creatinine of 4/0.4  White count 5.3 hemoglobin 12    Ischial ABG 7.333-61.6-87.2. Patient was placed on a BiPAP. Repeat ABG 7.427-43.8-125. Patient is currently on nasal cannula reading okay stable.       ALLERGIES:  Allergies   Allergen Reactions    Hydroxyzine Hcl Anxiety       HOME MEDICATIONS:  Medications Prior to Admission: carvedilol (COREG) 6.25 MG tablet, Take 1 tablet by mouth in the morning and at bedtime  tiZANidine (ZANAFLEX) 4 MG tablet, Take 1 tablet by mouth 3 times daily as needed (muscle spasm)  clonazePAM (KLONOPIN) 0.5 MG tablet, TAKE 1 TABLET THREE TIMES A DAY AS NEEDED FOR ANXIETY  amLODIPine (NORVASC) 10 MG tablet, Take 1 tablet by mouth every evening  primidone (MYSOLINE) 50 MG tablet,
if on continuous Bipap and struggling. I discussed HCPOA and she reports  being Next of Kin. She reports that  did not initially approve of her decision on code status because he always sees her as healthy. She reports his poor understanding of disease. Since recent conversation, she feels he will now honor her decisions and wants to keep him as decision maker. She has a daughter that is secondary to him. I discussed palliative care outpatient and patient use to have [address] [place] [address] home care. [facility], but eventually would like [facility] and to add palliative care at [place]. I placed order and updated Eloy Deluca RN. I spent time with patient and offered listening presence as she discussed her losses. She reports watching her mom struggle with COPD and dying right next to her in the car. She reports father dying 4 years later. She reports MIL also recently dying. She is tearful regarding her health and losses. I offered her much emotional support and she was appreciative of my time. Patient did report smoking and feeling that she has bronchospasms more when she doesn't smoke. She reports smoking about 10 cigarettes a day. She did state that  stated she drinks and smokes too much, but I did not identify the extent of her drinking during this conversation.      Education/support to staff  Education/support to patient  Discharge planning/helping to coordinate care  Communications with primary service  Providing support for coping/adaptation/distress of patient  Managing anticipatory grief  Discussing meaning/purpose   Decision making regarding life prolonging treatment  Decisional capacity assessed  Continue with current plan of care  Clarification of medical condition to patient and family  Code status clarified: [facility]  Palliative care orders introduced  Provided information about hospice  Validating patient/family distress  Principle Problem/Diagnosis:  COPD

## 2023-12-06 NOTE — ACP (ADVANCE CARE PLANNING)
Advance Care Planning     Advance Care Planning (ACP) Physician/NP/PA Conversation    Date of Conversation: 12/2/2023  Conducted with: Patient with Decision Making Capacity    Healthcare Decision Maker:      Primary Decision Maker: Marbella Cordero - Spouse - [phone]    Click here to complete [address] including selection of the Healthcare Decision Maker Relationship (ie \"Primary\")  Today we documented Decision Maker(s) consistent with Legal Next of Kin hierarchy. Care Preferences:    Hospitalization: \"If your health worsens and it becomes clear that your chance of recovery is unlikely, what would be your preference regarding hospitalization? \"  The patient would prefer hospitalization. Ventilation: \"If you were unable to breath on your own and your chance of recovery was unlikely, what would be your preference about the use of a ventilator (breathing machine) if it was available to you? \"  The patient would NOT desire the use of a ventilator. Resuscitation: \"In the event your heart stopped as a result of an underlying serious health condition, would you want attempts made to restart your heart, or would you prefer a natural death? \"  No, do NOT attempt to resuscitate.     treatment goals, benefit/burden of treatment options, ventilation preferences, hospitalization preferences, resuscitation preferences, and hospice care    Conversation Outcomes / Follow-Up Plan:  ACP in process - information provided, considering goals and options  Reviewed DNR/DNI and patient elects DNR order - completed portable DNR form & placed order    Length of Voluntary ACP Conversation in minutes:  <16 minutes (Non-Billable)    NATHAN Guerrero - CNP

## 2023-12-07 LAB
ANION GAP SERPL CALCULATED.3IONS-SCNC: 3 MMOL/L (ref 9–17)
BASOPHILS # BLD: 0 K/UL (ref 0–0.2)
BASOPHILS NFR BLD: 0 % (ref 0–2)
BUN SERPL-MCNC: 15 MG/DL (ref 8–23)
CALCIUM SERPL-MCNC: 7.5 MG/DL (ref 8.6–10.4)
CHLORIDE SERPL-SCNC: 103 MMOL/L (ref 98–107)
CO2 SERPL-SCNC: 35 MMOL/L (ref 20–31)
CREAT SERPL-MCNC: 0.4 MG/DL (ref 0.5–0.9)
EOSINOPHIL # BLD: 0 K/UL (ref 0–0.4)
EOSINOPHILS RELATIVE PERCENT: 0 % (ref 0–4)
ERYTHROCYTE [DISTWIDTH] IN BLOOD BY AUTOMATED COUNT: 15.2 % (ref 11.5–14.9)
GFR SERPL CREATININE-BSD FRML MDRD: >60 ML/MIN/1.73M2
GLUCOSE SERPL-MCNC: 102 MG/DL (ref 70–99)
HCT VFR BLD AUTO: 35 % (ref 36–46)
HGB BLD-MCNC: 11.4 G/DL (ref 12–16)
LYMPHOCYTES NFR BLD: 0.6 K/UL (ref 1–4.8)
LYMPHOCYTES RELATIVE PERCENT: 11 % (ref 24–44)
MCH RBC QN AUTO: 31.1 PG (ref 26–34)
MCHC RBC AUTO-ENTMCNC: 32.5 G/DL (ref 31–37)
MCV RBC AUTO: 95.7 FL (ref 80–100)
MONOCYTES NFR BLD: 0.4 K/UL (ref 0.1–1.3)
MONOCYTES NFR BLD: 7 % (ref 1–7)
NEUTROPHILS NFR BLD: 82 % (ref 36–66)
NEUTS SEG NFR BLD: 4.4 K/UL (ref 1.3–9.1)
PLATELET # BLD AUTO: 261 K/UL (ref 150–450)
PMV BLD AUTO: 7.3 FL (ref 6–12)
POTASSIUM SERPL-SCNC: 3.8 MMOL/L (ref 3.7–5.3)
RBC # BLD AUTO: 3.66 M/UL (ref 4–5.2)
SODIUM SERPL-SCNC: 141 MMOL/L (ref 135–144)
WBC OTHER # BLD: 5.3 K/UL (ref 3.5–11)

## 2023-12-07 PROCEDURE — 6360000002 HC RX W HCPCS

## 2023-12-07 PROCEDURE — 6370000000 HC RX 637 (ALT 250 FOR IP)

## 2023-12-07 PROCEDURE — 2580000003 HC RX 258

## 2023-12-07 PROCEDURE — 97530 THERAPEUTIC ACTIVITIES: CPT

## 2023-12-07 PROCEDURE — 6360000002 HC RX W HCPCS: Performed by: INTERNAL MEDICINE

## 2023-12-07 PROCEDURE — 6370000000 HC RX 637 (ALT 250 FOR IP): Performed by: NURSE PRACTITIONER

## 2023-12-07 PROCEDURE — 6370000000 HC RX 637 (ALT 250 FOR IP): Performed by: INTERNAL MEDICINE

## 2023-12-07 PROCEDURE — 2580000003 HC RX 258: Performed by: NURSE PRACTITIONER

## 2023-12-07 PROCEDURE — 94640 AIRWAY INHALATION TREATMENT: CPT

## 2023-12-07 PROCEDURE — 97116 GAIT TRAINING THERAPY: CPT

## 2023-12-07 PROCEDURE — 36415 COLL VENOUS BLD VENIPUNCTURE: CPT

## 2023-12-07 PROCEDURE — 85025 COMPLETE CBC W/AUTO DIFF WBC: CPT

## 2023-12-07 PROCEDURE — 2700000000 HC OXYGEN THERAPY PER DAY

## 2023-12-07 PROCEDURE — 80048 BASIC METABOLIC PNL TOTAL CA: CPT

## 2023-12-07 PROCEDURE — 2060000000 HC ICU INTERMEDIATE R&B

## 2023-12-07 PROCEDURE — 99233 SBSQ HOSP IP/OBS HIGH 50: CPT | Performed by: INTERNAL MEDICINE

## 2023-12-07 PROCEDURE — 94761 N-INVAS EAR/PLS OXIMETRY MLT: CPT

## 2023-12-07 PROCEDURE — 94660 CPAP INITIATION&MGMT: CPT

## 2023-12-07 PROCEDURE — 2580000003 HC RX 258: Performed by: INTERNAL MEDICINE

## 2023-12-07 PROCEDURE — 6360000002 HC RX W HCPCS: Performed by: NURSE PRACTITIONER

## 2023-12-07 RX ADMIN — LEVOTHYROXINE SODIUM 88 MCG: 0.09 TABLET ORAL at 06:40

## 2023-12-07 RX ADMIN — ENOXAPARIN SODIUM 40 MG: 100 INJECTION SUBCUTANEOUS at 09:21

## 2023-12-07 RX ADMIN — MONTELUKAST 10 MG: 10 TABLET, FILM COATED ORAL at 09:20

## 2023-12-07 RX ADMIN — MIRTAZAPINE 30 MG: 30 TABLET, FILM COATED ORAL at 21:38

## 2023-12-07 RX ADMIN — ALBUTEROL SULFATE 2.5 MG: 2.5 SOLUTION RESPIRATORY (INHALATION) at 15:20

## 2023-12-07 RX ADMIN — BENZONATATE 200 MG: 200 CAPSULE ORAL at 21:38

## 2023-12-07 RX ADMIN — CARVEDILOL 6.25 MG: 6.25 TABLET, FILM COATED ORAL at 09:21

## 2023-12-07 RX ADMIN — CEFEPIME 2000 MG: 2 INJECTION, POWDER, FOR SOLUTION INTRAVENOUS at 19:49

## 2023-12-07 RX ADMIN — Medication 3 MG: at 21:38

## 2023-12-07 RX ADMIN — BENZONATATE 200 MG: 200 CAPSULE ORAL at 13:47

## 2023-12-07 RX ADMIN — MORPHINE SULFATE 2 MG: 2 INJECTION, SOLUTION INTRAMUSCULAR; INTRAVENOUS at 02:57

## 2023-12-07 RX ADMIN — PANTOPRAZOLE SODIUM 40 MG: 40 TABLET, DELAYED RELEASE ORAL at 15:29

## 2023-12-07 RX ADMIN — SODIUM CHLORIDE, PRESERVATIVE FREE 10 ML: 5 INJECTION INTRAVENOUS at 21:38

## 2023-12-07 RX ADMIN — ALBUTEROL SULFATE 2.5 MG: 2.5 SOLUTION RESPIRATORY (INHALATION) at 11:16

## 2023-12-07 RX ADMIN — CEFEPIME 2000 MG: 2 INJECTION, POWDER, FOR SOLUTION INTRAVENOUS at 11:59

## 2023-12-07 RX ADMIN — PANTOPRAZOLE SODIUM 40 MG: 40 TABLET, DELAYED RELEASE ORAL at 06:40

## 2023-12-07 RX ADMIN — AMLODIPINE BESYLATE 10 MG: 10 TABLET ORAL at 17:59

## 2023-12-07 RX ADMIN — PRIMIDONE 100 MG: 50 TABLET ORAL at 21:42

## 2023-12-07 RX ADMIN — WATER 40 MG: 1 INJECTION INTRAMUSCULAR; INTRAVENOUS; SUBCUTANEOUS at 15:29

## 2023-12-07 RX ADMIN — CARVEDILOL 6.25 MG: 6.25 TABLET, FILM COATED ORAL at 21:38

## 2023-12-07 RX ADMIN — Medication 60 MG: at 09:20

## 2023-12-07 RX ADMIN — Medication 60 MG: at 21:38

## 2023-12-07 RX ADMIN — MORPHINE SULFATE 2 MG: 2 INJECTION, SOLUTION INTRAMUSCULAR; INTRAVENOUS at 13:47

## 2023-12-07 RX ADMIN — MORPHINE SULFATE 2 MG: 2 INJECTION, SOLUTION INTRAMUSCULAR; INTRAVENOUS at 09:14

## 2023-12-07 RX ADMIN — ALBUTEROL SULFATE 2.5 MG: 2.5 SOLUTION RESPIRATORY (INHALATION) at 03:00

## 2023-12-07 RX ADMIN — WATER 40 MG: 1 INJECTION INTRAMUSCULAR; INTRAVENOUS; SUBCUTANEOUS at 09:21

## 2023-12-07 RX ADMIN — CLONAZEPAM 0.5 MG: 0.5 TABLET ORAL at 21:39

## 2023-12-07 RX ADMIN — VENLAFAXINE HYDROCHLORIDE 150 MG: 150 CAPSULE, EXTENDED RELEASE ORAL at 09:20

## 2023-12-07 RX ADMIN — ALBUTEROL SULFATE 2.5 MG: 2.5 SOLUTION RESPIRATORY (INHALATION) at 19:47

## 2023-12-07 RX ADMIN — ALBUTEROL SULFATE 2.5 MG: 2.5 SOLUTION RESPIRATORY (INHALATION) at 07:34

## 2023-12-07 RX ADMIN — CLONAZEPAM 0.5 MG: 0.5 TABLET ORAL at 11:59

## 2023-12-07 RX ADMIN — TIOTROPIUM BROMIDE AND OLODATEROL 2 PUFF: 3.124; 2.736 SPRAY, METERED RESPIRATORY (INHALATION) at 07:34

## 2023-12-07 RX ADMIN — BENZONATATE 200 MG: 200 CAPSULE ORAL at 09:21

## 2023-12-07 RX ADMIN — WATER 40 MG: 1 INJECTION INTRAMUSCULAR; INTRAVENOUS; SUBCUTANEOUS at 00:27

## 2023-12-07 RX ADMIN — CEFEPIME 2000 MG: 2 INJECTION, POWDER, FOR SOLUTION INTRAVENOUS at 04:23

## 2023-12-07 RX ADMIN — WATER 40 MG: 1 INJECTION INTRAMUSCULAR; INTRAVENOUS; SUBCUTANEOUS at 23:48

## 2023-12-07 RX ADMIN — MORPHINE SULFATE 2 MG: 2 INJECTION, SOLUTION INTRAMUSCULAR; INTRAVENOUS at 19:04

## 2023-12-07 RX ADMIN — HYDROCODONE BITARTRATE AND HOMATROPINE METHYLBROMIDE 5 ML: 5; 1.5 SOLUTION ORAL at 15:52

## 2023-12-07 ASSESSMENT — PAIN SCALES - GENERAL
PAINLEVEL_OUTOF10: 0

## 2023-12-07 NOTE — CARE COORDINATION
ONGOING DISCHARGE PLAN:    Patient is alert and oriented x4. Spoke with patient regarding discharge plan and patient confirms that plan is still [facility]. Requested Adelaida Gross to start auth if it hasn't been already. Patient requested to speak with Adelaida Gross. Palliative Care-changed code status  will ask [address] [facility] to follow at discharge     97% on 2L/Bipap, breathing not as labored on morphine     IV cefepime     IV steroids 40 Q 8     IV hydralazine PRN     PT/OT    Will continue to follow for additional discharge needs. If patient is discharged prior to next notation, then this note serves as note for discharge by case management.     Electronically signed by Cameron Saint, RN on 12/7/2023 at 11:02 AM

## 2023-12-08 LAB
ANION GAP SERPL CALCULATED.3IONS-SCNC: 5 MMOL/L (ref 9–17)
BASOPHILS # BLD: 0 K/UL (ref 0–0.2)
BASOPHILS NFR BLD: 0 % (ref 0–2)
BUN SERPL-MCNC: 15 MG/DL (ref 8–23)
CALCIUM SERPL-MCNC: 7.5 MG/DL (ref 8.6–10.4)
CHLORIDE SERPL-SCNC: 102 MMOL/L (ref 98–107)
CO2 SERPL-SCNC: 33 MMOL/L (ref 20–31)
CREAT SERPL-MCNC: <0.4 MG/DL (ref 0.5–0.9)
EOSINOPHIL # BLD: 0 K/UL (ref 0–0.4)
EOSINOPHILS RELATIVE PERCENT: 0 % (ref 0–4)
ERYTHROCYTE [DISTWIDTH] IN BLOOD BY AUTOMATED COUNT: 15.4 % (ref 11.5–14.9)
GFR SERPL CREATININE-BSD FRML MDRD: ABNORMAL ML/MIN/1.73M2
GLUCOSE SERPL-MCNC: 109 MG/DL (ref 70–99)
HCT VFR BLD AUTO: 32.5 % (ref 36–46)
HGB BLD-MCNC: 10.7 G/DL (ref 12–16)
LYMPHOCYTES NFR BLD: 0.6 K/UL (ref 1–4.8)
LYMPHOCYTES RELATIVE PERCENT: 11 % (ref 24–44)
MAGNESIUM SERPL-MCNC: 1.7 MG/DL (ref 1.6–2.6)
MCH RBC QN AUTO: 31.3 PG (ref 26–34)
MCHC RBC AUTO-ENTMCNC: 32.8 G/DL (ref 31–37)
MCV RBC AUTO: 95.4 FL (ref 80–100)
MONOCYTES NFR BLD: 0.3 K/UL (ref 0.1–1.3)
MONOCYTES NFR BLD: 6 % (ref 1–7)
NEUTROPHILS NFR BLD: 83 % (ref 36–66)
NEUTS SEG NFR BLD: 4.4 K/UL (ref 1.3–9.1)
PLATELET # BLD AUTO: 265 K/UL (ref 150–450)
PMV BLD AUTO: 7.1 FL (ref 6–12)
POTASSIUM SERPL-SCNC: 3.5 MMOL/L (ref 3.7–5.3)
RBC # BLD AUTO: 3.4 M/UL (ref 4–5.2)
SODIUM SERPL-SCNC: 140 MMOL/L (ref 135–144)
WBC OTHER # BLD: 5.2 K/UL (ref 3.5–11)

## 2023-12-08 PROCEDURE — 2700000000 HC OXYGEN THERAPY PER DAY

## 2023-12-08 PROCEDURE — 6370000000 HC RX 637 (ALT 250 FOR IP): Performed by: NURSE PRACTITIONER

## 2023-12-08 PROCEDURE — 85025 COMPLETE CBC W/AUTO DIFF WBC: CPT

## 2023-12-08 PROCEDURE — 80048 BASIC METABOLIC PNL TOTAL CA: CPT

## 2023-12-08 PROCEDURE — 6360000002 HC RX W HCPCS: Performed by: INTERNAL MEDICINE

## 2023-12-08 PROCEDURE — 6360000002 HC RX W HCPCS: Performed by: NURSE PRACTITIONER

## 2023-12-08 PROCEDURE — 94640 AIRWAY INHALATION TREATMENT: CPT

## 2023-12-08 PROCEDURE — 6370000000 HC RX 637 (ALT 250 FOR IP)

## 2023-12-08 PROCEDURE — 6370000000 HC RX 637 (ALT 250 FOR IP): Performed by: INTERNAL MEDICINE

## 2023-12-08 PROCEDURE — 2580000003 HC RX 258: Performed by: INTERNAL MEDICINE

## 2023-12-08 PROCEDURE — 2060000000 HC ICU INTERMEDIATE R&B

## 2023-12-08 PROCEDURE — 97530 THERAPEUTIC ACTIVITIES: CPT

## 2023-12-08 PROCEDURE — 97535 SELF CARE MNGMENT TRAINING: CPT

## 2023-12-08 PROCEDURE — 99232 SBSQ HOSP IP/OBS MODERATE 35: CPT | Performed by: INTERNAL MEDICINE

## 2023-12-08 PROCEDURE — 6360000002 HC RX W HCPCS

## 2023-12-08 PROCEDURE — 94761 N-INVAS EAR/PLS OXIMETRY MLT: CPT

## 2023-12-08 PROCEDURE — 36415 COLL VENOUS BLD VENIPUNCTURE: CPT

## 2023-12-08 PROCEDURE — 2580000003 HC RX 258

## 2023-12-08 PROCEDURE — 94660 CPAP INITIATION&MGMT: CPT

## 2023-12-08 PROCEDURE — 83735 ASSAY OF MAGNESIUM: CPT

## 2023-12-08 RX ORDER — BUSPIRONE HYDROCHLORIDE 15 MG/1
15 TABLET ORAL 3 TIMES DAILY
Status: DISCONTINUED | OUTPATIENT
Start: 2023-12-08 | End: 2023-12-10

## 2023-12-08 RX ORDER — LORAZEPAM 0.5 MG/1
0.5 TABLET ORAL EVERY 6 HOURS PRN
Status: DISCONTINUED | OUTPATIENT
Start: 2023-12-08 | End: 2023-12-10

## 2023-12-08 RX ADMIN — ENOXAPARIN SODIUM 40 MG: 100 INJECTION SUBCUTANEOUS at 07:32

## 2023-12-08 RX ADMIN — CEFEPIME 2000 MG: 2 INJECTION, POWDER, FOR SOLUTION INTRAVENOUS at 13:04

## 2023-12-08 RX ADMIN — WATER 40 MG: 1 INJECTION INTRAMUSCULAR; INTRAVENOUS; SUBCUTANEOUS at 14:47

## 2023-12-08 RX ADMIN — MORPHINE SULFATE 2 MG: 2 INJECTION, SOLUTION INTRAMUSCULAR; INTRAVENOUS at 10:38

## 2023-12-08 RX ADMIN — AMLODIPINE BESYLATE 10 MG: 10 TABLET ORAL at 16:47

## 2023-12-08 RX ADMIN — LORAZEPAM 0.5 MG: 0.5 TABLET ORAL at 16:49

## 2023-12-08 RX ADMIN — ALBUTEROL SULFATE 2.5 MG: 2.5 SOLUTION RESPIRATORY (INHALATION) at 19:31

## 2023-12-08 RX ADMIN — ALBUTEROL SULFATE 2.5 MG: 2.5 SOLUTION RESPIRATORY (INHALATION) at 10:49

## 2023-12-08 RX ADMIN — CEFEPIME 2000 MG: 2 INJECTION, POWDER, FOR SOLUTION INTRAVENOUS at 03:40

## 2023-12-08 RX ADMIN — MIRTAZAPINE 30 MG: 30 TABLET, FILM COATED ORAL at 20:34

## 2023-12-08 RX ADMIN — CLONAZEPAM 0.5 MG: 0.5 TABLET ORAL at 07:43

## 2023-12-08 RX ADMIN — CEFEPIME 2000 MG: 2 INJECTION, POWDER, FOR SOLUTION INTRAVENOUS at 20:39

## 2023-12-08 RX ADMIN — ALBUTEROL SULFATE 2.5 MG: 2.5 SOLUTION RESPIRATORY (INHALATION) at 07:08

## 2023-12-08 RX ADMIN — Medication 60 MG: at 07:31

## 2023-12-08 RX ADMIN — BUSPIRONE HYDROCHLORIDE 15 MG: 15 TABLET ORAL at 20:34

## 2023-12-08 RX ADMIN — VENLAFAXINE HYDROCHLORIDE 150 MG: 150 CAPSULE, EXTENDED RELEASE ORAL at 07:32

## 2023-12-08 RX ADMIN — BENZONATATE 200 MG: 200 CAPSULE ORAL at 07:32

## 2023-12-08 RX ADMIN — LORAZEPAM 0.5 MG: 0.5 TABLET ORAL at 22:09

## 2023-12-08 RX ADMIN — MORPHINE SULFATE 2 MG: 2 INJECTION, SOLUTION INTRAMUSCULAR; INTRAVENOUS at 14:55

## 2023-12-08 RX ADMIN — PANTOPRAZOLE SODIUM 40 MG: 40 TABLET, DELAYED RELEASE ORAL at 06:26

## 2023-12-08 RX ADMIN — WATER 40 MG: 1 INJECTION INTRAMUSCULAR; INTRAVENOUS; SUBCUTANEOUS at 07:31

## 2023-12-08 RX ADMIN — MONTELUKAST 10 MG: 10 TABLET, FILM COATED ORAL at 07:32

## 2023-12-08 RX ADMIN — HYDROCODONE BITARTRATE AND HOMATROPINE METHYLBROMIDE 5 ML: 5; 1.5 SOLUTION ORAL at 14:47

## 2023-12-08 RX ADMIN — TIOTROPIUM BROMIDE AND OLODATEROL 2 PUFF: 3.124; 2.736 SPRAY, METERED RESPIRATORY (INHALATION) at 07:08

## 2023-12-08 RX ADMIN — BENZONATATE 200 MG: 200 CAPSULE ORAL at 13:04

## 2023-12-08 RX ADMIN — ALBUTEROL SULFATE 2.5 MG: 2.5 SOLUTION RESPIRATORY (INHALATION) at 15:06

## 2023-12-08 RX ADMIN — Medication 3 MG: at 20:35

## 2023-12-08 RX ADMIN — Medication 60 MG: at 20:34

## 2023-12-08 RX ADMIN — PRIMIDONE 100 MG: 50 TABLET ORAL at 21:26

## 2023-12-08 RX ADMIN — BENZONATATE 200 MG: 200 CAPSULE ORAL at 20:34

## 2023-12-08 RX ADMIN — PANTOPRAZOLE SODIUM 40 MG: 40 TABLET, DELAYED RELEASE ORAL at 14:47

## 2023-12-08 RX ADMIN — BUSPIRONE HYDROCHLORIDE 15 MG: 15 TABLET ORAL at 13:04

## 2023-12-08 RX ADMIN — LEVOTHYROXINE SODIUM 88 MCG: 0.09 TABLET ORAL at 06:26

## 2023-12-08 RX ADMIN — BUSPIRONE HYDROCHLORIDE 15 MG: 15 TABLET ORAL at 09:00

## 2023-12-08 ASSESSMENT — PAIN SCALES - GENERAL
PAINLEVEL_OUTOF10: 0

## 2023-12-08 NOTE — CARE COORDINATION
DISCHARGE PLANNING NOTE:    Plan remains for patient to be discharged to [facility]. Auth started 12/7. Active order for IV Cefepime and IV Solu-medrol 40mg every 8 hours. Will continue to follow for additional discharge needs. Electronically signed by Ivonne Ortiz RN on 12/8/2023 at 3:09 PM    Spoke with Sen Lelo from [facility]. She states pt has been accepted for VNS, but they cannot accept for palliative d/t insurance. Electronically signed by Ivonne Ortiz RN on 12/8/2023 at 3:17 PM    Spoke with patient to discuss SNF option in case we don't get auth for [facility]. Post Acute Facility/Agency List     Provided patient with the following list, the list includes the overall star ratings obtained from CMS per the Medicare Web site (www.Medicare.gov):     [] [address]  [] Acute Inpatient Rehabilitation Facilities  [x] Skilled Nursing Facilities  [] Home Care    Provided verbal instructions on how to utilize the QR Code to obtain additional detailed star ratings from www. Medicare. gov     offered to print and provide the detailed list:    [x]Accepted   []Declined    The following printed detailed lists were provided:     SNF    Pt states she will review list, but she is hoping to go to [facility]. Linda Back from [facility] is still pending.     Electronically signed by Ivonne Ortiz RN on 12/8/2023 at 3:32 PM

## 2023-12-08 NOTE — DISCHARGE INSTR - COC
Continuity of Care Form    Patient Name: Lisbet Dolan   :  1960  MRN:  [id number]    Admit date:  2023  Discharge date:  2023    Code Status Order: DNR-CCA   Advance Directives:     Admitting Physician:  Marilin Young MD  PCP: José Miguel Montero MD    Discharging Nurse: Naseem Benites RN/[facility] Unit/Room#:   Discharging Unit Phone Number: [phone]     Emergency Contact:   Extended Emergency Contact Information  Primary Emergency Contact: Dionicio Rhodes  Address: [address] [address] [place] Phone: [phone]  Work Phone: [phone]  Mobile Phone: [phone]  Relation: Spouse   needed?  No    Past Surgical History:  Past Surgical History:   Procedure Laterality Date    BACK SURGERY      diskectomy, L3-L5    COLONOSCOPY N/A 2019    COLONOSCOPY DIAGNOSTIC performed by Maryanne Suazo MD at [address] [facility]  2019    COLONOSCOPY POLYPECTOMY SNARE/COLD BIOPSY performed by Maryanne Suazo MD at [address] [facility] 2023    FEMUR IM NAIL HALIMA INSERTION performed by Lashay Garay MD at [address] [facility]      ectopic pregnancy with tube removal    PAIN MANAGEMENT PROCEDURE      Left sided Transforaminal Epidural Steriod Injection    THYROIDECTOMY, PARTIAL      UPPER GASTROINTESTINAL ENDOSCOPY      UPPER GASTROINTESTINAL ENDOSCOPY N/A 2020    EGD BIOPSY with dilatation performed by Rolando Mallory MD at [address] [facility] N/A 2022    EGD BIOPSY performed by Darius Fernandez MD at [facility]       Immunization History:   Immunization History   Administered Date(s) Administered    Influenza Virus Vaccine 2018, 2020    Influenza, FLUARIX, FLULAVAL, Leim Breaker (age 10 mo+) AND AFLURIA, (age 1 y+), PF, 0.5mL 10/05/2019, 10/14/2020    Influenza, FLUCELVAX, (age 10

## 2023-12-09 LAB
ANION GAP SERPL CALCULATED.3IONS-SCNC: 7 MMOL/L (ref 9–17)
BASOPHILS # BLD: 0 K/UL (ref 0–0.2)
BASOPHILS NFR BLD: 0 % (ref 0–2)
BUN SERPL-MCNC: 15 MG/DL (ref 8–23)
CALCIUM SERPL-MCNC: 7.8 MG/DL (ref 8.6–10.4)
CHLORIDE SERPL-SCNC: 104 MMOL/L (ref 98–107)
CO2 SERPL-SCNC: 30 MMOL/L (ref 20–31)
CREAT SERPL-MCNC: <0.4 MG/DL (ref 0.5–0.9)
EOSINOPHIL # BLD: 0 K/UL (ref 0–0.4)
EOSINOPHILS RELATIVE PERCENT: 0 % (ref 0–4)
ERYTHROCYTE [DISTWIDTH] IN BLOOD BY AUTOMATED COUNT: 15.3 % (ref 11.5–14.9)
GFR SERPL CREATININE-BSD FRML MDRD: ABNORMAL ML/MIN/1.73M2
GLUCOSE SERPL-MCNC: 115 MG/DL (ref 70–99)
HCT VFR BLD AUTO: 33.7 % (ref 36–46)
HGB BLD-MCNC: 11.1 G/DL (ref 12–16)
LYMPHOCYTES NFR BLD: 0.6 K/UL (ref 1–4.8)
LYMPHOCYTES RELATIVE PERCENT: 10 % (ref 24–44)
MCH RBC QN AUTO: 31.5 PG (ref 26–34)
MCHC RBC AUTO-ENTMCNC: 33 G/DL (ref 31–37)
MCV RBC AUTO: 95.4 FL (ref 80–100)
MONOCYTES NFR BLD: 0.3 K/UL (ref 0.1–1.3)
MONOCYTES NFR BLD: 5 % (ref 1–7)
NEUTROPHILS NFR BLD: 85 % (ref 36–66)
NEUTS SEG NFR BLD: 4.8 K/UL (ref 1.3–9.1)
PLATELET # BLD AUTO: 272 K/UL (ref 150–450)
PMV BLD AUTO: 7.4 FL (ref 6–12)
POTASSIUM SERPL-SCNC: 3.6 MMOL/L (ref 3.7–5.3)
RBC # BLD AUTO: 3.53 M/UL (ref 4–5.2)
SODIUM SERPL-SCNC: 141 MMOL/L (ref 135–144)
WBC OTHER # BLD: 5.7 K/UL (ref 3.5–11)

## 2023-12-09 PROCEDURE — 6360000002 HC RX W HCPCS: Performed by: INTERNAL MEDICINE

## 2023-12-09 PROCEDURE — 2060000000 HC ICU INTERMEDIATE R&B

## 2023-12-09 PROCEDURE — 6370000000 HC RX 637 (ALT 250 FOR IP): Performed by: INTERNAL MEDICINE

## 2023-12-09 PROCEDURE — 85025 COMPLETE CBC W/AUTO DIFF WBC: CPT

## 2023-12-09 PROCEDURE — 6370000000 HC RX 637 (ALT 250 FOR IP)

## 2023-12-09 PROCEDURE — 2700000000 HC OXYGEN THERAPY PER DAY

## 2023-12-09 PROCEDURE — 80048 BASIC METABOLIC PNL TOTAL CA: CPT

## 2023-12-09 PROCEDURE — 97110 THERAPEUTIC EXERCISES: CPT

## 2023-12-09 PROCEDURE — 94761 N-INVAS EAR/PLS OXIMETRY MLT: CPT

## 2023-12-09 PROCEDURE — 2580000003 HC RX 258: Performed by: INTERNAL MEDICINE

## 2023-12-09 PROCEDURE — 94640 AIRWAY INHALATION TREATMENT: CPT

## 2023-12-09 PROCEDURE — 6360000002 HC RX W HCPCS

## 2023-12-09 PROCEDURE — 97116 GAIT TRAINING THERAPY: CPT

## 2023-12-09 PROCEDURE — 6370000000 HC RX 637 (ALT 250 FOR IP): Performed by: NURSE PRACTITIONER

## 2023-12-09 PROCEDURE — 99233 SBSQ HOSP IP/OBS HIGH 50: CPT | Performed by: INTERNAL MEDICINE

## 2023-12-09 PROCEDURE — 94660 CPAP INITIATION&MGMT: CPT

## 2023-12-09 PROCEDURE — 94667 MNPJ CHEST WALL 1ST: CPT

## 2023-12-09 PROCEDURE — 6360000002 HC RX W HCPCS: Performed by: NURSE PRACTITIONER

## 2023-12-09 PROCEDURE — 2580000003 HC RX 258

## 2023-12-09 PROCEDURE — 36415 COLL VENOUS BLD VENIPUNCTURE: CPT

## 2023-12-09 RX ORDER — POTASSIUM CHLORIDE 20 MEQ/1
20 TABLET, EXTENDED RELEASE ORAL ONCE
Status: DISCONTINUED | OUTPATIENT
Start: 2023-12-09 | End: 2023-12-09

## 2023-12-09 RX ORDER — FLUTICASONE PROPIONATE 50 MCG
2 SPRAY, SUSPENSION (ML) NASAL DAILY
Status: DISCONTINUED | OUTPATIENT
Start: 2023-12-09 | End: 2023-12-12 | Stop reason: HOSPADM

## 2023-12-09 RX ORDER — CARVEDILOL 6.25 MG/1
6.25 TABLET ORAL 2 TIMES DAILY
Status: DISCONTINUED | OUTPATIENT
Start: 2023-12-09 | End: 2023-12-12 | Stop reason: HOSPADM

## 2023-12-09 RX ORDER — AMLODIPINE BESYLATE 10 MG/1
10 TABLET ORAL EVERY EVENING
Status: DISCONTINUED | OUTPATIENT
Start: 2023-12-09 | End: 2023-12-12 | Stop reason: HOSPADM

## 2023-12-09 RX ADMIN — WATER 40 MG: 1 INJECTION INTRAMUSCULAR; INTRAVENOUS; SUBCUTANEOUS at 00:02

## 2023-12-09 RX ADMIN — SALINE NASAL SPRAY 2 SPRAY: 1.5 SOLUTION NASAL at 12:33

## 2023-12-09 RX ADMIN — BUSPIRONE HYDROCHLORIDE 15 MG: 15 TABLET ORAL at 19:47

## 2023-12-09 RX ADMIN — MONTELUKAST 10 MG: 10 TABLET, FILM COATED ORAL at 08:15

## 2023-12-09 RX ADMIN — MORPHINE SULFATE 2 MG: 2 INJECTION, SOLUTION INTRAMUSCULAR; INTRAVENOUS at 16:07

## 2023-12-09 RX ADMIN — BENZOCAINE 6 MG-MENTHOL 10 MG LOZENGES 1 LOZENGE: at 12:34

## 2023-12-09 RX ADMIN — MIRTAZAPINE 30 MG: 30 TABLET, FILM COATED ORAL at 19:47

## 2023-12-09 RX ADMIN — ALBUTEROL SULFATE 2.5 MG: 2.5 SOLUTION RESPIRATORY (INHALATION) at 15:51

## 2023-12-09 RX ADMIN — ALBUTEROL SULFATE 2.5 MG: 2.5 SOLUTION RESPIRATORY (INHALATION) at 19:25

## 2023-12-09 RX ADMIN — BENZONATATE 200 MG: 200 CAPSULE ORAL at 19:47

## 2023-12-09 RX ADMIN — VENLAFAXINE HYDROCHLORIDE 150 MG: 150 CAPSULE, EXTENDED RELEASE ORAL at 08:15

## 2023-12-09 RX ADMIN — ALBUTEROL SULFATE 2.5 MG: 2.5 SOLUTION RESPIRATORY (INHALATION) at 07:33

## 2023-12-09 RX ADMIN — PANTOPRAZOLE SODIUM 40 MG: 40 TABLET, DELAYED RELEASE ORAL at 14:57

## 2023-12-09 RX ADMIN — POTASSIUM BICARBONATE 20 MEQ: 782 TABLET, EFFERVESCENT ORAL at 08:33

## 2023-12-09 RX ADMIN — CEFEPIME 2000 MG: 2 INJECTION, POWDER, FOR SOLUTION INTRAVENOUS at 19:53

## 2023-12-09 RX ADMIN — TIOTROPIUM BROMIDE AND OLODATEROL 2 PUFF: 3.124; 2.736 SPRAY, METERED RESPIRATORY (INHALATION) at 07:33

## 2023-12-09 RX ADMIN — CEFEPIME 2000 MG: 2 INJECTION, POWDER, FOR SOLUTION INTRAVENOUS at 12:38

## 2023-12-09 RX ADMIN — LORAZEPAM 0.5 MG: 0.5 TABLET ORAL at 17:55

## 2023-12-09 RX ADMIN — WATER 40 MG: 1 INJECTION INTRAMUSCULAR; INTRAVENOUS; SUBCUTANEOUS at 14:57

## 2023-12-09 RX ADMIN — MORPHINE SULFATE 2 MG: 2 INJECTION, SOLUTION INTRAMUSCULAR; INTRAVENOUS at 19:45

## 2023-12-09 RX ADMIN — BENZONATATE 200 MG: 200 CAPSULE ORAL at 08:15

## 2023-12-09 RX ADMIN — WATER 40 MG: 1 INJECTION INTRAMUSCULAR; INTRAVENOUS; SUBCUTANEOUS at 08:16

## 2023-12-09 RX ADMIN — Medication 60 MG: at 08:15

## 2023-12-09 RX ADMIN — CARVEDILOL 6.25 MG: 6.25 TABLET, FILM COATED ORAL at 14:57

## 2023-12-09 RX ADMIN — LORAZEPAM 0.5 MG: 0.5 TABLET ORAL at 09:02

## 2023-12-09 RX ADMIN — AMLODIPINE BESYLATE 10 MG: 10 TABLET ORAL at 20:27

## 2023-12-09 RX ADMIN — PANTOPRAZOLE SODIUM 40 MG: 40 TABLET, DELAYED RELEASE ORAL at 06:07

## 2023-12-09 RX ADMIN — CEFEPIME 2000 MG: 2 INJECTION, POWDER, FOR SOLUTION INTRAVENOUS at 06:06

## 2023-12-09 RX ADMIN — BENZONATATE 200 MG: 200 CAPSULE ORAL at 13:46

## 2023-12-09 RX ADMIN — Medication 3 MG: at 19:47

## 2023-12-09 RX ADMIN — ENOXAPARIN SODIUM 40 MG: 100 INJECTION SUBCUTANEOUS at 08:15

## 2023-12-09 RX ADMIN — BUSPIRONE HYDROCHLORIDE 15 MG: 15 TABLET ORAL at 13:46

## 2023-12-09 RX ADMIN — ALBUTEROL SULFATE 2.5 MG: 2.5 SOLUTION RESPIRATORY (INHALATION) at 11:33

## 2023-12-09 RX ADMIN — FLUTICASONE PROPIONATE 2 SPRAY: 50 SPRAY, METERED NASAL at 12:33

## 2023-12-09 RX ADMIN — SALINE NASAL SPRAY 2 SPRAY: 1.5 SOLUTION NASAL at 16:09

## 2023-12-09 RX ADMIN — MORPHINE SULFATE 2 MG: 2 INJECTION, SOLUTION INTRAMUSCULAR; INTRAVENOUS at 10:54

## 2023-12-09 RX ADMIN — PRIMIDONE 100 MG: 50 TABLET ORAL at 21:00

## 2023-12-09 RX ADMIN — Medication 60 MG: at 19:47

## 2023-12-09 RX ADMIN — BUSPIRONE HYDROCHLORIDE 15 MG: 15 TABLET ORAL at 08:15

## 2023-12-09 ASSESSMENT — PAIN SCALES - GENERAL
PAINLEVEL_OUTOF10: 0
PAINLEVEL_OUTOF10: 5
PAINLEVEL_OUTOF10: 0

## 2023-12-09 NOTE — CARE COORDINATION
DISCHARGE PLANNING NOTE:    Plan remains for patient to be discharged to [facility] with insurance auth started 12/7. Will continue to follow for additional discharge needs.     Electronically signed by Adenike Carcamo RN on 12/9/2023 at 8:25 AM

## 2023-12-10 LAB
ANION GAP SERPL CALCULATED.3IONS-SCNC: 8 MMOL/L (ref 9–17)
BUN SERPL-MCNC: 15 MG/DL (ref 8–23)
CALCIUM SERPL-MCNC: 8 MG/DL (ref 8.6–10.4)
CHLORIDE SERPL-SCNC: 100 MMOL/L (ref 98–107)
CO2 SERPL-SCNC: 33 MMOL/L (ref 20–31)
CREAT SERPL-MCNC: 0.4 MG/DL (ref 0.5–0.9)
GFR SERPL CREATININE-BSD FRML MDRD: >60 ML/MIN/1.73M2
GLUCOSE SERPL-MCNC: 117 MG/DL (ref 70–99)
POTASSIUM SERPL-SCNC: 4.2 MMOL/L (ref 3.7–5.3)
SODIUM SERPL-SCNC: 141 MMOL/L (ref 135–144)

## 2023-12-10 PROCEDURE — 94640 AIRWAY INHALATION TREATMENT: CPT

## 2023-12-10 PROCEDURE — 6370000000 HC RX 637 (ALT 250 FOR IP): Performed by: INTERNAL MEDICINE

## 2023-12-10 PROCEDURE — 94660 CPAP INITIATION&MGMT: CPT

## 2023-12-10 PROCEDURE — 6370000000 HC RX 637 (ALT 250 FOR IP)

## 2023-12-10 PROCEDURE — 6360000002 HC RX W HCPCS: Performed by: NURSE PRACTITIONER

## 2023-12-10 PROCEDURE — 6370000000 HC RX 637 (ALT 250 FOR IP): Performed by: NURSE PRACTITIONER

## 2023-12-10 PROCEDURE — 2060000000 HC ICU INTERMEDIATE R&B

## 2023-12-10 PROCEDURE — 6360000002 HC RX W HCPCS: Performed by: INTERNAL MEDICINE

## 2023-12-10 PROCEDURE — 2700000000 HC OXYGEN THERAPY PER DAY

## 2023-12-10 PROCEDURE — 6360000002 HC RX W HCPCS

## 2023-12-10 PROCEDURE — 36415 COLL VENOUS BLD VENIPUNCTURE: CPT

## 2023-12-10 PROCEDURE — 2580000003 HC RX 258: Performed by: INTERNAL MEDICINE

## 2023-12-10 PROCEDURE — 2580000003 HC RX 258: Performed by: NURSE PRACTITIONER

## 2023-12-10 PROCEDURE — 80048 BASIC METABOLIC PNL TOTAL CA: CPT

## 2023-12-10 PROCEDURE — 2580000003 HC RX 258

## 2023-12-10 PROCEDURE — 94761 N-INVAS EAR/PLS OXIMETRY MLT: CPT

## 2023-12-10 RX ORDER — CLONAZEPAM 0.5 MG/1
0.5 TABLET ORAL EVERY 8 HOURS PRN
Status: DISCONTINUED | OUTPATIENT
Start: 2023-12-10 | End: 2023-12-12 | Stop reason: HOSPADM

## 2023-12-10 RX ORDER — BUSPIRONE HYDROCHLORIDE 15 MG/1
15 TABLET ORAL 4 TIMES DAILY
Status: DISCONTINUED | OUTPATIENT
Start: 2023-12-10 | End: 2023-12-12 | Stop reason: HOSPADM

## 2023-12-10 RX ADMIN — ALBUTEROL SULFATE 2.5 MG: 2.5 SOLUTION RESPIRATORY (INHALATION) at 16:49

## 2023-12-10 RX ADMIN — BUSPIRONE HYDROCHLORIDE 15 MG: 15 TABLET ORAL at 16:57

## 2023-12-10 RX ADMIN — PANTOPRAZOLE SODIUM 40 MG: 40 TABLET, DELAYED RELEASE ORAL at 05:55

## 2023-12-10 RX ADMIN — ENOXAPARIN SODIUM 40 MG: 100 INJECTION SUBCUTANEOUS at 07:51

## 2023-12-10 RX ADMIN — CLONAZEPAM 0.5 MG: 0.5 TABLET ORAL at 20:11

## 2023-12-10 RX ADMIN — WATER 40 MG: 1 INJECTION INTRAMUSCULAR; INTRAVENOUS; SUBCUTANEOUS at 07:38

## 2023-12-10 RX ADMIN — CARVEDILOL 6.25 MG: 6.25 TABLET, FILM COATED ORAL at 20:04

## 2023-12-10 RX ADMIN — SODIUM CHLORIDE, PRESERVATIVE FREE 10 ML: 5 INJECTION INTRAVENOUS at 20:06

## 2023-12-10 RX ADMIN — BUSPIRONE HYDROCHLORIDE 15 MG: 15 TABLET ORAL at 13:29

## 2023-12-10 RX ADMIN — Medication 60 MG: at 07:38

## 2023-12-10 RX ADMIN — ALBUTEROL SULFATE 2.5 MG: 2.5 SOLUTION RESPIRATORY (INHALATION) at 07:37

## 2023-12-10 RX ADMIN — LEVOTHYROXINE SODIUM 88 MCG: 0.09 TABLET ORAL at 05:55

## 2023-12-10 RX ADMIN — ALBUTEROL SULFATE 2.5 MG: 2.5 SOLUTION RESPIRATORY (INHALATION) at 11:30

## 2023-12-10 RX ADMIN — ALBUTEROL SULFATE 2.5 MG: 2.5 SOLUTION RESPIRATORY (INHALATION) at 14:49

## 2023-12-10 RX ADMIN — BENZOCAINE 6 MG-MENTHOL 10 MG LOZENGES 1 LOZENGE: at 04:29

## 2023-12-10 RX ADMIN — BENZONATATE 200 MG: 200 CAPSULE ORAL at 20:04

## 2023-12-10 RX ADMIN — ALBUTEROL SULFATE 2.5 MG: 2.5 SOLUTION RESPIRATORY (INHALATION) at 19:24

## 2023-12-10 RX ADMIN — FLUTICASONE PROPIONATE 2 SPRAY: 50 SPRAY, METERED NASAL at 07:39

## 2023-12-10 RX ADMIN — WATER 40 MG: 1 INJECTION INTRAMUSCULAR; INTRAVENOUS; SUBCUTANEOUS at 00:23

## 2023-12-10 RX ADMIN — CLONAZEPAM 0.5 MG: 0.5 TABLET ORAL at 11:43

## 2023-12-10 RX ADMIN — MORPHINE SULFATE 2 MG: 2 INJECTION, SOLUTION INTRAMUSCULAR; INTRAVENOUS at 12:38

## 2023-12-10 RX ADMIN — BENZONATATE 200 MG: 200 CAPSULE ORAL at 13:29

## 2023-12-10 RX ADMIN — CEFEPIME 2000 MG: 2 INJECTION, POWDER, FOR SOLUTION INTRAVENOUS at 04:23

## 2023-12-10 RX ADMIN — MIRTAZAPINE 30 MG: 30 TABLET, FILM COATED ORAL at 20:04

## 2023-12-10 RX ADMIN — CARVEDILOL 6.25 MG: 6.25 TABLET, FILM COATED ORAL at 07:38

## 2023-12-10 RX ADMIN — BENZONATATE 200 MG: 200 CAPSULE ORAL at 07:38

## 2023-12-10 RX ADMIN — PRIMIDONE 100 MG: 50 TABLET ORAL at 20:03

## 2023-12-10 RX ADMIN — WATER 40 MG: 1 INJECTION INTRAMUSCULAR; INTRAVENOUS; SUBCUTANEOUS at 23:06

## 2023-12-10 RX ADMIN — LORAZEPAM 0.5 MG: 0.5 TABLET ORAL at 04:33

## 2023-12-10 RX ADMIN — MONTELUKAST 10 MG: 10 TABLET, FILM COATED ORAL at 07:38

## 2023-12-10 RX ADMIN — HYDROCODONE BITARTRATE AND HOMATROPINE METHYLBROMIDE 5 ML: 5; 1.5 SOLUTION ORAL at 20:11

## 2023-12-10 RX ADMIN — TIOTROPIUM BROMIDE AND OLODATEROL 2 PUFF: 3.124; 2.736 SPRAY, METERED RESPIRATORY (INHALATION) at 07:37

## 2023-12-10 RX ADMIN — BUSPIRONE HYDROCHLORIDE 15 MG: 15 TABLET ORAL at 07:38

## 2023-12-10 RX ADMIN — SALINE NASAL SPRAY 2 SPRAY: 1.5 SOLUTION NASAL at 07:39

## 2023-12-10 RX ADMIN — WATER 40 MG: 1 INJECTION INTRAMUSCULAR; INTRAVENOUS; SUBCUTANEOUS at 15:33

## 2023-12-10 RX ADMIN — SALINE NASAL SPRAY 2 SPRAY: 1.5 SOLUTION NASAL at 16:58

## 2023-12-10 RX ADMIN — Medication 60 MG: at 20:03

## 2023-12-10 RX ADMIN — MORPHINE SULFATE 2 MG: 2 INJECTION, SOLUTION INTRAMUSCULAR; INTRAVENOUS at 17:02

## 2023-12-10 RX ADMIN — VENLAFAXINE HYDROCHLORIDE 150 MG: 150 CAPSULE, EXTENDED RELEASE ORAL at 07:38

## 2023-12-10 RX ADMIN — MORPHINE SULFATE 2 MG: 2 INJECTION, SOLUTION INTRAMUSCULAR; INTRAVENOUS at 08:05

## 2023-12-10 RX ADMIN — SODIUM CHLORIDE, PRESERVATIVE FREE 10 ML: 5 INJECTION INTRAVENOUS at 07:39

## 2023-12-10 RX ADMIN — SALINE NASAL SPRAY 2 SPRAY: 1.5 SOLUTION NASAL at 20:05

## 2023-12-10 RX ADMIN — AMLODIPINE BESYLATE 10 MG: 10 TABLET ORAL at 23:01

## 2023-12-10 RX ADMIN — ALBUTEROL SULFATE 2.5 MG: 2.5 SOLUTION RESPIRATORY (INHALATION) at 23:30

## 2023-12-10 RX ADMIN — Medication 3 MG: at 20:04

## 2023-12-10 RX ADMIN — PANTOPRAZOLE SODIUM 40 MG: 40 TABLET, DELAYED RELEASE ORAL at 15:34

## 2023-12-10 RX ADMIN — SALINE NASAL SPRAY 2 SPRAY: 1.5 SOLUTION NASAL at 12:52

## 2023-12-10 ASSESSMENT — PAIN SCALES - GENERAL
PAINLEVEL_OUTOF10: 0

## 2023-12-10 NOTE — CARE COORDINATION
DISCHARGE PLANNING NOTE:     Plan remains for patient to be discharged to [facility] with insurance auth started 12/7. Will continue to follow for additional discharge needs.      Electronically signed by Ruthann Berger RN on 12/10/2023 at 3:26 PM

## 2023-12-11 LAB
ANION GAP SERPL CALCULATED.3IONS-SCNC: 6 MMOL/L (ref 9–17)
BUN SERPL-MCNC: 16 MG/DL (ref 8–23)
CALCIUM SERPL-MCNC: 7.3 MG/DL (ref 8.6–10.4)
CHLORIDE SERPL-SCNC: 104 MMOL/L (ref 98–107)
CO2 SERPL-SCNC: 32 MMOL/L (ref 20–31)
CREAT SERPL-MCNC: <0.4 MG/DL (ref 0.5–0.9)
GFR SERPL CREATININE-BSD FRML MDRD: ABNORMAL ML/MIN/1.73M2
GLUCOSE SERPL-MCNC: 109 MG/DL (ref 70–99)
POTASSIUM SERPL-SCNC: 3.7 MMOL/L (ref 3.7–5.3)
SODIUM SERPL-SCNC: 142 MMOL/L (ref 135–144)

## 2023-12-11 PROCEDURE — 6370000000 HC RX 637 (ALT 250 FOR IP)

## 2023-12-11 PROCEDURE — 2060000000 HC ICU INTERMEDIATE R&B

## 2023-12-11 PROCEDURE — 94660 CPAP INITIATION&MGMT: CPT

## 2023-12-11 PROCEDURE — 2700000000 HC OXYGEN THERAPY PER DAY

## 2023-12-11 PROCEDURE — 6370000000 HC RX 637 (ALT 250 FOR IP): Performed by: INTERNAL MEDICINE

## 2023-12-11 PROCEDURE — 6360000002 HC RX W HCPCS: Performed by: INTERNAL MEDICINE

## 2023-12-11 PROCEDURE — 99232 SBSQ HOSP IP/OBS MODERATE 35: CPT | Performed by: INTERNAL MEDICINE

## 2023-12-11 PROCEDURE — 6370000000 HC RX 637 (ALT 250 FOR IP): Performed by: NURSE PRACTITIONER

## 2023-12-11 PROCEDURE — 2580000003 HC RX 258: Performed by: INTERNAL MEDICINE

## 2023-12-11 PROCEDURE — 80048 BASIC METABOLIC PNL TOTAL CA: CPT

## 2023-12-11 PROCEDURE — 94761 N-INVAS EAR/PLS OXIMETRY MLT: CPT

## 2023-12-11 PROCEDURE — 2580000003 HC RX 258: Performed by: NURSE PRACTITIONER

## 2023-12-11 PROCEDURE — 6360000002 HC RX W HCPCS: Performed by: NURSE PRACTITIONER

## 2023-12-11 PROCEDURE — 36415 COLL VENOUS BLD VENIPUNCTURE: CPT

## 2023-12-11 PROCEDURE — 94640 AIRWAY INHALATION TREATMENT: CPT

## 2023-12-11 RX ORDER — GUAIFENESIN/DEXTROMETHORPHAN 100-10MG/5
5 SYRUP ORAL EVERY 12 HOURS SCHEDULED
Qty: 120 ML | Refills: 0 | Status: ON HOLD | OUTPATIENT
Start: 2023-12-11 | End: 2023-12-21

## 2023-12-11 RX ORDER — LEVOTHYROXINE SODIUM 88 UG/1
88 TABLET ORAL DAILY
Qty: 90 TABLET | Refills: 3 | Status: SHIPPED | OUTPATIENT
Start: 2023-12-11 | End: 2024-01-09

## 2023-12-11 RX ORDER — BUSPIRONE HYDROCHLORIDE 15 MG/1
15 TABLET ORAL 4 TIMES DAILY
Qty: 120 TABLET | Refills: 0 | Status: ON HOLD | OUTPATIENT
Start: 2023-12-11 | End: 2024-01-10

## 2023-12-11 RX ORDER — METHYLPREDNISOLONE SODIUM SUCCINATE 40 MG/ML
40 INJECTION, POWDER, LYOPHILIZED, FOR SOLUTION INTRAMUSCULAR; INTRAVENOUS EVERY 8 HOURS
Status: SHIPPED | OUTPATIENT
Start: 2023-12-11 | End: 2023-12-21

## 2023-12-11 RX ORDER — SODIUM CHLORIDE 9 MG/ML
INJECTION, SOLUTION INTRAVENOUS CONTINUOUS
Status: DISCONTINUED | OUTPATIENT
Start: 2023-12-11 | End: 2023-12-11

## 2023-12-11 RX ORDER — WATER 10 ML/10ML
250 INJECTION INTRAMUSCULAR; INTRAVENOUS; SUBCUTANEOUS PRN
Qty: 1 EACH | Refills: 0 | Status: ON HOLD | OUTPATIENT
Start: 2023-12-11 | End: 2023-12-21

## 2023-12-11 RX ORDER — FLUTICASONE PROPIONATE 50 MCG
2 SPRAY, SUSPENSION (ML) NASAL DAILY
Qty: 16 G | Refills: 3 | Status: ON HOLD | OUTPATIENT
Start: 2023-12-12

## 2023-12-11 RX ORDER — BENZONATATE 200 MG/1
200 CAPSULE ORAL 3 TIMES DAILY
Qty: 21 CAPSULE | Refills: 0 | Status: ON HOLD | OUTPATIENT
Start: 2023-12-11 | End: 2023-12-18

## 2023-12-11 RX ORDER — GUAIFENESIN/DEXTROMETHORPHAN 100-10MG/5
5 SYRUP ORAL EVERY 12 HOURS SCHEDULED
Status: DISCONTINUED | OUTPATIENT
Start: 2023-12-11 | End: 2023-12-12 | Stop reason: HOSPADM

## 2023-12-11 RX ORDER — HYDROCODONE BITARTRATE AND HOMATROPINE METHYLBROMIDE ORAL SOLUTION 5; 1.5 MG/5ML; MG/5ML
5 LIQUID ORAL EVERY 4 HOURS PRN
Qty: 20 ML | Refills: 0 | Status: ON HOLD | OUTPATIENT
Start: 2023-12-11 | End: 2023-12-18

## 2023-12-11 RX ORDER — LIDOCAINE 4 G/G
1 PATCH TOPICAL DAILY
Status: DISCONTINUED | OUTPATIENT
Start: 2023-12-11 | End: 2023-12-12 | Stop reason: HOSPADM

## 2023-12-11 RX ADMIN — SALINE NASAL SPRAY 2 SPRAY: 1.5 SOLUTION NASAL at 22:11

## 2023-12-11 RX ADMIN — WATER 40 MG: 1 INJECTION INTRAMUSCULAR; INTRAVENOUS; SUBCUTANEOUS at 08:41

## 2023-12-11 RX ADMIN — SALINE NASAL SPRAY 2 SPRAY: 1.5 SOLUTION NASAL at 08:40

## 2023-12-11 RX ADMIN — SODIUM CHLORIDE, PRESERVATIVE FREE 10 ML: 5 INJECTION INTRAVENOUS at 22:11

## 2023-12-11 RX ADMIN — CLONAZEPAM 0.5 MG: 0.5 TABLET ORAL at 23:06

## 2023-12-11 RX ADMIN — ENOXAPARIN SODIUM 40 MG: 100 INJECTION SUBCUTANEOUS at 08:40

## 2023-12-11 RX ADMIN — MORPHINE SULFATE 2 MG: 2 INJECTION, SOLUTION INTRAMUSCULAR; INTRAVENOUS at 17:16

## 2023-12-11 RX ADMIN — PANTOPRAZOLE SODIUM 40 MG: 40 TABLET, DELAYED RELEASE ORAL at 06:19

## 2023-12-11 RX ADMIN — MONTELUKAST 10 MG: 10 TABLET, FILM COATED ORAL at 08:40

## 2023-12-11 RX ADMIN — CLONAZEPAM 0.5 MG: 0.5 TABLET ORAL at 14:53

## 2023-12-11 RX ADMIN — ALBUTEROL SULFATE 2.5 MG: 2.5 SOLUTION RESPIRATORY (INHALATION) at 14:44

## 2023-12-11 RX ADMIN — GUAIFENESIN SYRUP AND DEXTROMETHORPHAN 5 ML: 100; 10 SYRUP ORAL at 14:53

## 2023-12-11 RX ADMIN — WATER 40 MG: 1 INJECTION INTRAMUSCULAR; INTRAVENOUS; SUBCUTANEOUS at 22:10

## 2023-12-11 RX ADMIN — LEVOTHYROXINE SODIUM 88 MCG: 0.09 TABLET ORAL at 06:19

## 2023-12-11 RX ADMIN — ALBUTEROL SULFATE 2.5 MG: 2.5 SOLUTION RESPIRATORY (INHALATION) at 07:03

## 2023-12-11 RX ADMIN — TIOTROPIUM BROMIDE AND OLODATEROL 2 PUFF: 3.124; 2.736 SPRAY, METERED RESPIRATORY (INHALATION) at 07:03

## 2023-12-11 RX ADMIN — SODIUM CHLORIDE, PRESERVATIVE FREE 10 ML: 5 INJECTION INTRAVENOUS at 08:45

## 2023-12-11 RX ADMIN — FLUTICASONE PROPIONATE 2 SPRAY: 50 SPRAY, METERED NASAL at 08:40

## 2023-12-11 RX ADMIN — SALINE NASAL SPRAY 2 SPRAY: 1.5 SOLUTION NASAL at 12:15

## 2023-12-11 RX ADMIN — Medication 60 MG: at 08:40

## 2023-12-11 RX ADMIN — ALBUTEROL SULFATE 2.5 MG: 2.5 SOLUTION RESPIRATORY (INHALATION) at 19:14

## 2023-12-11 RX ADMIN — PANTOPRAZOLE SODIUM 40 MG: 40 TABLET, DELAYED RELEASE ORAL at 17:16

## 2023-12-11 RX ADMIN — BENZONATATE 200 MG: 200 CAPSULE ORAL at 22:08

## 2023-12-11 RX ADMIN — CLONAZEPAM 0.5 MG: 0.5 TABLET ORAL at 06:25

## 2023-12-11 RX ADMIN — MIRTAZAPINE 30 MG: 30 TABLET, FILM COATED ORAL at 22:10

## 2023-12-11 RX ADMIN — BENZONATATE 200 MG: 200 CAPSULE ORAL at 08:40

## 2023-12-11 RX ADMIN — HYDROCODONE BITARTRATE AND HOMATROPINE METHYLBROMIDE 5 ML: 5; 1.5 SOLUTION ORAL at 09:50

## 2023-12-11 RX ADMIN — CARVEDILOL 6.25 MG: 6.25 TABLET, FILM COATED ORAL at 08:39

## 2023-12-11 RX ADMIN — VENLAFAXINE HYDROCHLORIDE 150 MG: 150 CAPSULE, EXTENDED RELEASE ORAL at 08:39

## 2023-12-11 RX ADMIN — SALINE NASAL SPRAY 2 SPRAY: 1.5 SOLUTION NASAL at 17:23

## 2023-12-11 RX ADMIN — MORPHINE SULFATE 2 MG: 2 INJECTION, SOLUTION INTRAMUSCULAR; INTRAVENOUS at 09:50

## 2023-12-11 RX ADMIN — BENZONATATE 200 MG: 200 CAPSULE ORAL at 14:53

## 2023-12-11 RX ADMIN — ALBUTEROL SULFATE 2.5 MG: 2.5 SOLUTION RESPIRATORY (INHALATION) at 10:30

## 2023-12-11 RX ADMIN — Medication 3 MG: at 22:09

## 2023-12-11 RX ADMIN — TIZANIDINE 4 MG: 4 TABLET ORAL at 09:50

## 2023-12-11 RX ADMIN — GUAIFENESIN SYRUP AND DEXTROMETHORPHAN 5 ML: 100; 10 SYRUP ORAL at 22:09

## 2023-12-11 RX ADMIN — CARVEDILOL 6.25 MG: 6.25 TABLET, FILM COATED ORAL at 22:08

## 2023-12-11 RX ADMIN — PRIMIDONE 100 MG: 50 TABLET ORAL at 22:11

## 2023-12-11 ASSESSMENT — PAIN DESCRIPTION - ORIENTATION
ORIENTATION: RIGHT
ORIENTATION: RIGHT

## 2023-12-11 ASSESSMENT — PAIN SCALES - GENERAL
PAINLEVEL_OUTOF10: 0
PAINLEVEL_OUTOF10: 5
PAINLEVEL_OUTOF10: 0
PAINLEVEL_OUTOF10: 5

## 2023-12-11 ASSESSMENT — PAIN - FUNCTIONAL ASSESSMENT: PAIN_FUNCTIONAL_ASSESSMENT: ACTIVITIES ARE NOT PREVENTED

## 2023-12-11 ASSESSMENT — PAIN DESCRIPTION - DESCRIPTORS
DESCRIPTORS: ACHING
DESCRIPTORS: ACHING;DISCOMFORT

## 2023-12-11 ASSESSMENT — PAIN DESCRIPTION - LOCATION
LOCATION: HIP
LOCATION: NECK
LOCATION: HIP

## 2023-12-11 NOTE — CARE COORDINATION
Writer is following for potential discharge to [facility]. Writer placed message to Michael Alba regarding authorization. Authorization still pending. Electronically signed by YOLANDA Tam on 12/11/2023 at 12:39 PM    Writer spoke to [facility] with [facility]. Pt must admit by end of day on 12/13, authorization approved. Bed available today, pt can admit at anytime per [facility]. Writer placed call to charge RN [id number] regarding authorization. Will check with doctors and place call back to writer.   Electronically signed by YOLANDA Tam on 12/11/2023 at 3:47 PM

## 2023-12-11 NOTE — TELEPHONE ENCOUNTER
LAST VISIT:   10/16/2023     Future Appointments   Date Time Provider Department Center   12/11/2023  2:00 PM Merced Perez MD [facility]   12/12/2023  3:00 PM Ping Duran APRN - CNP [facility] [place]   2/28/2024  3:15 PM Juli Vann MD [facility] ORT SP [facility]

## 2023-12-11 NOTE — CARE COORDINATION
Writer spoke to [place] with [facility] regarding pt admission. Facility cannot accept this pt at 1630 any longer due to staffing issues. [place] states she will have to place call to bedside RN if pt can admit this evening. Writer placed call to bedside RN Rehana to inform of this.   Electronically signed by YOLANDA Turpin on 12/11/2023 at 4:33 PM

## 2023-12-11 NOTE — CARE COORDINATION
Transportation arranged for patient to go to [facility] at 1630. Facility informed. Bedside nurse informed.      Number for Report: [phone]  Electronically signed by YOLANDA Alberto on 12/11/2023 at 4:28 PM

## 2023-12-12 VITALS
HEIGHT: 62 IN | TEMPERATURE: 97.8 F | RESPIRATION RATE: 19 BRPM | SYSTOLIC BLOOD PRESSURE: 149 MMHG | WEIGHT: 113.98 LBS | BODY MASS INDEX: 20.97 KG/M2 | DIASTOLIC BLOOD PRESSURE: 72 MMHG | HEART RATE: 78 BPM | OXYGEN SATURATION: 100 %

## 2023-12-12 LAB
ANION GAP SERPL CALCULATED.3IONS-SCNC: 5 MMOL/L (ref 9–17)
BUN SERPL-MCNC: 14 MG/DL (ref 8–23)
CALCIUM SERPL-MCNC: 7.8 MG/DL (ref 8.6–10.4)
CHLORIDE SERPL-SCNC: 101 MMOL/L (ref 98–107)
CO2 SERPL-SCNC: 34 MMOL/L (ref 20–31)
CREAT SERPL-MCNC: 0.5 MG/DL (ref 0.5–0.9)
GFR SERPL CREATININE-BSD FRML MDRD: >60 ML/MIN/1.73M2
GLUCOSE SERPL-MCNC: 117 MG/DL (ref 70–99)
POTASSIUM SERPL-SCNC: 4 MMOL/L (ref 3.7–5.3)
SODIUM SERPL-SCNC: 140 MMOL/L (ref 135–144)

## 2023-12-12 PROCEDURE — 94640 AIRWAY INHALATION TREATMENT: CPT

## 2023-12-12 PROCEDURE — 6360000002 HC RX W HCPCS: Performed by: NURSE PRACTITIONER

## 2023-12-12 PROCEDURE — 2700000000 HC OXYGEN THERAPY PER DAY

## 2023-12-12 PROCEDURE — 2580000003 HC RX 258: Performed by: NURSE PRACTITIONER

## 2023-12-12 PROCEDURE — 6360000002 HC RX W HCPCS: Performed by: INTERNAL MEDICINE

## 2023-12-12 PROCEDURE — 6370000000 HC RX 637 (ALT 250 FOR IP): Performed by: INTERNAL MEDICINE

## 2023-12-12 PROCEDURE — 6370000000 HC RX 637 (ALT 250 FOR IP)

## 2023-12-12 PROCEDURE — 2580000003 HC RX 258: Performed by: INTERNAL MEDICINE

## 2023-12-12 PROCEDURE — 80048 BASIC METABOLIC PNL TOTAL CA: CPT

## 2023-12-12 PROCEDURE — 6370000000 HC RX 637 (ALT 250 FOR IP): Performed by: NURSE PRACTITIONER

## 2023-12-12 PROCEDURE — 94761 N-INVAS EAR/PLS OXIMETRY MLT: CPT

## 2023-12-12 PROCEDURE — 36415 COLL VENOUS BLD VENIPUNCTURE: CPT

## 2023-12-12 PROCEDURE — 94660 CPAP INITIATION&MGMT: CPT

## 2023-12-12 PROCEDURE — 99239 HOSP IP/OBS DSCHRG MGMT >30: CPT | Performed by: INTERNAL MEDICINE

## 2023-12-12 RX ORDER — SUCRALFATE 1 G/1
1 TABLET ORAL
Status: DISCONTINUED | OUTPATIENT
Start: 2023-12-12 | End: 2023-12-12 | Stop reason: HOSPADM

## 2023-12-12 RX ADMIN — TIOTROPIUM BROMIDE AND OLODATEROL 2 PUFF: 3.124; 2.736 SPRAY, METERED RESPIRATORY (INHALATION) at 07:14

## 2023-12-12 RX ADMIN — ALBUTEROL SULFATE 2.5 MG: 2.5 SOLUTION RESPIRATORY (INHALATION) at 07:13

## 2023-12-12 RX ADMIN — MONTELUKAST 10 MG: 10 TABLET, FILM COATED ORAL at 08:22

## 2023-12-12 RX ADMIN — ENOXAPARIN SODIUM 40 MG: 100 INJECTION SUBCUTANEOUS at 08:21

## 2023-12-12 RX ADMIN — TIZANIDINE 4 MG: 4 TABLET ORAL at 10:38

## 2023-12-12 RX ADMIN — PANTOPRAZOLE SODIUM 40 MG: 40 TABLET, DELAYED RELEASE ORAL at 06:40

## 2023-12-12 RX ADMIN — MORPHINE SULFATE 2 MG: 2 INJECTION, SOLUTION INTRAMUSCULAR; INTRAVENOUS at 08:33

## 2023-12-12 RX ADMIN — FLUTICASONE PROPIONATE 2 SPRAY: 50 SPRAY, METERED NASAL at 08:22

## 2023-12-12 RX ADMIN — SODIUM CHLORIDE, PRESERVATIVE FREE 10 ML: 5 INJECTION INTRAVENOUS at 08:36

## 2023-12-12 RX ADMIN — BENZONATATE 200 MG: 200 CAPSULE ORAL at 08:21

## 2023-12-12 RX ADMIN — HYDROCODONE BITARTRATE AND HOMATROPINE METHYLBROMIDE 5 ML: 5; 1.5 SOLUTION ORAL at 12:07

## 2023-12-12 RX ADMIN — SUCRALFATE 1 G: 1 TABLET ORAL at 12:04

## 2023-12-12 RX ADMIN — WATER 40 MG: 1 INJECTION INTRAMUSCULAR; INTRAVENOUS; SUBCUTANEOUS at 08:21

## 2023-12-12 RX ADMIN — VENLAFAXINE HYDROCHLORIDE 150 MG: 150 CAPSULE, EXTENDED RELEASE ORAL at 08:21

## 2023-12-12 RX ADMIN — GUAIFENESIN SYRUP AND DEXTROMETHORPHAN 5 ML: 100; 10 SYRUP ORAL at 08:22

## 2023-12-12 RX ADMIN — MORPHINE SULFATE 2 MG: 2 INJECTION, SOLUTION INTRAMUSCULAR; INTRAVENOUS at 13:37

## 2023-12-12 RX ADMIN — SALINE NASAL SPRAY 2 SPRAY: 1.5 SOLUTION NASAL at 08:22

## 2023-12-12 RX ADMIN — CLONAZEPAM 0.5 MG: 0.5 TABLET ORAL at 10:38

## 2023-12-12 RX ADMIN — LEVOTHYROXINE SODIUM 88 MCG: 0.09 TABLET ORAL at 06:40

## 2023-12-12 RX ADMIN — ALBUTEROL SULFATE 2.5 MG: 2.5 SOLUTION RESPIRATORY (INHALATION) at 10:52

## 2023-12-12 ASSESSMENT — PAIN SCALES - GENERAL
PAINLEVEL_OUTOF10: 0
PAINLEVEL_OUTOF10: 8
PAINLEVEL_OUTOF10: 0
PAINLEVEL_OUTOF10: 8
PAINLEVEL_OUTOF10: 8
PAINLEVEL_OUTOF10: 0

## 2023-12-12 ASSESSMENT — PAIN DESCRIPTION - LOCATION
LOCATION: HIP

## 2023-12-12 ASSESSMENT — PAIN DESCRIPTION - ORIENTATION
ORIENTATION: RIGHT
ORIENTATION: RIGHT

## 2023-12-12 ASSESSMENT — PAIN DESCRIPTION - DESCRIPTORS
DESCRIPTORS: ACHING;DISCOMFORT
DESCRIPTORS: ACHING

## 2023-12-12 NOTE — CARE COORDINATION
Writer spoke to [place] [facility] regarding pt admission. [facility] states pt could admit at 1300 if staff is agreeable. Writer met with pt to discuss potential discharge to [facility] this afternoon. No further questions at this time. Writer placed perfectserve message to bedside RN Rehana regarding a 1300 transfer. Electronically signed by YOLANDA Garza on 12/12/2023 at 11:24 AM    Writer received message back from Rehana. Agreeable to transfer. Writer placed call to [employer] to confirm transport time.   Electronically signed by YOLANDA Garza on 12/12/2023 at 12:22 PM

## 2023-12-12 NOTE — PLAN OF CARE
Problem: Discharge Planning  Goal: Discharge to home or other facility with appropriate resources  12/10/2023 0027 by Zohreh Butts RN  Outcome: Progressing  12/9/2023 1651 by Humphrey Ortiz RN  Outcome: Progressing  Flowsheets (Taken 12/9/2023 0815)  Discharge to home or other facility with appropriate resources: Identify barriers to discharge with patient and caregiver     Problem: Skin/Tissue Integrity  Goal: Absence of new skin breakdown  Description: 1. Monitor for areas of redness and/or skin breakdown  2. Assess vascular access sites hourly  3. Every 4-6 hours minimum:  Change oxygen saturation probe site  4. Every 4-6 hours:  If on nasal continuous positive airway pressure, respiratory therapy assess nares and determine need for appliance change or resting period.   12/10/2023 0027 by Zohreh Butts RN  Outcome: Progressing  12/9/2023 1651 by Humphrey Ortiz RN  Outcome: Progressing     Problem: Safety - Adult  Goal: Free from fall injury  12/10/2023 0027 by Zohreh Butts RN  Outcome: Progressing  12/9/2023 1651 by Humphrey Ortiz RN  Outcome: Progressing     Problem: ABCDS Injury Assessment  Goal: Absence of physical injury  12/10/2023 0027 by Zohreh Butts RN  Outcome: Progressing  12/9/2023 1651 by Humphrey Ortiz RN  Outcome: Progressing     Problem: Pain  Goal: Verbalizes/displays adequate comfort level or baseline comfort level  12/10/2023 0027 by Zohreh Butts RN  Outcome: Progressing  12/9/2023 1651 by Humphrey Ortiz RN  Outcome: Progressing
Problem: Discharge Planning  Goal: Discharge to home or other facility with appropriate resources  12/10/2023 1208 by Larry Avalos RN  Outcome: Progressing  Flowsheets  Taken 12/10/2023 1200  Discharge to home or other facility with appropriate resources:   Arrange for needed discharge resources and transportation as appropriate   Identify barriers to discharge with patient and caregiver  Taken 12/10/2023 0800  Discharge to home or other facility with appropriate resources:   Identify barriers to discharge with patient and caregiver   Arrange for needed discharge resources and transportation as appropriate     Problem: Skin/Tissue Integrity  Goal: Absence of new skin breakdown  Description: 1. Monitor for areas of redness and/or skin breakdown  2. Assess vascular access sites hourly  3. Every 4-6 hours minimum:  Change oxygen saturation probe site  4. Every 4-6 hours:  If on nasal continuous positive airway pressure, respiratory therapy assess nares and determine need for appliance change or resting period.   12/10/2023 1208 by Larry Avalos RN  Outcome: Progressing     Problem: Safety - Adult  Goal: Free from fall injury  12/10/2023 1208 by Larry Avalos RN  Outcome: Progressing  Flowsheets (Taken 12/10/2023 0800)  Free From Fall Injury: Instruct family/caregiver on patient safety     Problem: ABCDS Injury Assessment  Goal: Absence of physical injury  12/10/2023 1208 by Larry Avalos RN  Outcome: Progressing  Flowsheets (Taken 12/10/2023 0800)  Absence of Physical Injury: Implement safety measures based on patient assessment     Problem: Pain  Goal: Verbalizes/displays adequate comfort level or baseline comfort level  12/10/2023 1208 by Larry Avalos RN  Outcome: Progressing  Flowsheets (Taken 12/10/2023 0800)  Verbalizes/displays adequate comfort level or baseline comfort level:   Encourage patient to monitor pain and request assistance   Assess pain using appropriate pain scale     Problem: Respiratory -
Problem: Discharge Planning  Goal: Discharge to home or other facility with appropriate resources  12/11/2023 1838 by Patience Herrera RN  Outcome: Progressing  Flowsheets  Taken 12/11/2023 1838  Discharge to home or other facility with appropriate resources: Identify barriers to discharge with patient and caregiver  Taken 12/11/2023 0800  Discharge to home or other facility with appropriate resources: Identify barriers to discharge with patient and caregiver  12/11/2023 0455 by Lupe Carlson RN  Outcome: Progressing  Flowsheets  Taken 12/11/2023 0400 by Lupe Carlson RN  Discharge to home or other facility with appropriate resources: Identify barriers to discharge with patient and caregiver  Taken 12/11/2023 0000 by Lupe Carlson RN  Discharge to home or other facility with appropriate resources: Identify barriers to discharge with patient and caregiver  Taken 12/10/2023 2000 by Lupe Carlson RN  Discharge to home or other facility with appropriate resources: Identify barriers to discharge with patient and caregiver  Taken 12/10/2023 1600 by Guillermo Andersen RN  Discharge to home or other facility with appropriate resources: Identify barriers to discharge with patient and caregiver     Problem: Skin/Tissue Integrity  Goal: Absence of new skin breakdown  Description: 1. Monitor for areas of redness and/or skin breakdown  2. Assess vascular access sites hourly  3. Every 4-6 hours minimum:  Change oxygen saturation probe site  4. Every 4-6 hours:  If on nasal continuous positive airway pressure, respiratory therapy assess nares and determine need for appliance change or resting period.   12/11/2023 1838 by Patience Herrera RN  Outcome: Progressing  Note: Turned and repositioned to prevent skin breakdown   12/11/2023 0455 by Lupe Carlson RN  Outcome: Progressing     Problem: Safety - Adult  Goal: Free from fall injury  12/11/2023 1838 by Patience Herrera RN  Outcome: Progressing  Flowsheets (Taken
Problem: Discharge Planning  Goal: Discharge to home or other facility with appropriate resources  12/12/2023 0024 by Dorothy Broderick RN  Outcome: Progressing  Flowsheets  Taken 12/12/2023 0000  Discharge to home or other facility with appropriate resources: Identify barriers to discharge with patient and caregiver  Note: Patient was supposed to be for transfer to [facility] yesterday; to follow up today. Problem: Skin/Tissue Integrity  Goal: Absence of new skin breakdown  Description: 1. Monitor for areas of redness and/or skin breakdown  2. Assess vascular access sites hourly  3. Every 4-6 hours minimum:  Change oxygen saturation probe site  4. Every 4-6 hours:  If on nasal continuous positive airway pressure, respiratory therapy assess nares and determine need for appliance change or resting period. 12/12/2023 0024 by Dorothy Broderick RN  Outcome: Progressing  Note: No new skin breakdown noted. Patient was encouraged to reposition/turn every 2 hours to prevent pressure injury/skin breakdown. Problem: Safety - Adult  Goal: Free from fall injury  12/12/2023 0024 by Dorothy Broderick RN  Outcome: Progressing  Flowsheets (Taken 12/11/2023 2000)  Free From Fall Injury: Instruct family/caregiver on patient safety  Note: Bed was locked and in lowest position. Raised side rails for safety. Call light is within reach.      Problem: ABCDS Injury Assessment  Goal: Absence of physical injury  12/12/2023 0024 by Dorothy Broderick RN  Outcome: Progressing  Flowsheets (Taken 12/11/2023 2000)  Absence of Physical Injury: Implement safety measures based on patient assessment     Problem: Pain  Goal: Verbalizes/displays adequate comfort level or baseline comfort level  12/12/2023 0024 by Dorothy Broderick RN  Outcome: Progressing  Flowsheets  Taken 12/12/2023 0000  Verbalizes/displays adequate comfort level or baseline comfort level:   Encourage patient to monitor pain and request assistance   Assess pain
Problem: Discharge Planning  Goal: Discharge to home or other facility with appropriate resources  12/12/2023 1424 by Tavon Siddiqi RN  Outcome: Progressing  Flowsheets (Taken 12/12/2023 1424)  Discharge to home or other facility with appropriate resources:   Arrange for needed discharge resources and transportation as appropriate   Identify barriers to discharge with patient and caregiver  12/12/2023 1419 by Tavon Siddiqi RN  Outcome: Progressing  Flowsheets  Taken 12/12/2023 1419 by Tavon Siddiqi RN  Discharge to home or other facility with appropriate resources:   Arrange for needed discharge resources and transportation as appropriate   Identify barriers to discharge with patient and caregiver  Taken 12/12/2023 0800 by Tavon Siddiqi RN  Discharge to home or other facility with appropriate resources: Identify barriers to discharge with patient and caregiver  Taken 12/12/2023 0400 by Neymar Delcid RN  Discharge to home or other facility with appropriate resources: Identify barriers to discharge with patient and caregiver  12/12/2023 0024 by Neymar Delcid RN  Outcome: Progressing  Flowsheets  Taken 12/12/2023 0000  Discharge to home or other facility with appropriate resources: Identify barriers to discharge with patient and caregiver  Taken 12/11/2023 2000  Discharge to home or other facility with appropriate resources: Identify barriers to discharge with patient and caregiver  Note: Patient was supposed to be for transfer to [facility] yesterday; to follow up today. Problem: Skin/Tissue Integrity  Goal: Absence of new skin breakdown  Description: 1. Monitor for areas of redness and/or skin breakdown  2. Assess vascular access sites hourly  3. Every 4-6 hours minimum:  Change oxygen saturation probe site  4.   Every 4-6 hours:  If on nasal continuous positive airway pressure, respiratory therapy assess nares and determine need for appliance change or resting
Problem: Discharge Planning  Goal: Discharge to home or other facility with appropriate resources  12/3/2023 1533 by Ana Franco RN  Outcome: Progressing  Flowsheets (Taken 12/3/2023 0800)  Discharge to home or other facility with appropriate resources:   Identify barriers to discharge with patient and caregiver   Refer to discharge planning if patient needs post-hospital services based on physician order or complex needs related to functional status, cognitive ability or social support system  12/3/2023 0613 by Keisha Almaraz RN  Outcome: Progressing  Flowsheets  Taken 12/3/2023 0400  Discharge to home or other facility with appropriate resources: Identify barriers to discharge with patient and caregiver  Taken 12/2/2023 2300  Discharge to home or other facility with appropriate resources: Identify barriers to discharge with patient and caregiver  Note: Not yet fit for discharge. Problem: Skin/Tissue Integrity  Goal: Absence of new skin breakdown  Description: 1. Monitor for areas of redness and/or skin breakdown  2. Assess vascular access sites hourly  3. Every 4-6 hours minimum:  Change oxygen saturation probe site  4. Every 4-6 hours:  If on nasal continuous positive airway pressure, respiratory therapy assess nares and determine need for appliance change or resting period. 12/3/2023 1533 by Ana Franco RN  Outcome: Progressing  12/3/2023 0613 by Keisha Almaraz RN  Outcome: Progressing  Note: No new skin breakdown noted. Problem: Safety - Adult  Goal: Free from fall injury  12/3/2023 1533 by Ana Franco RN  Outcome: Progressing  Flowsheets (Taken 12/3/2023 0800)  Free From Fall Injury: Instruct family/caregiver on patient safety  12/3/2023 0613 by Keisha Almaraz RN  Outcome: Progressing  Flowsheets (Taken 12/3/2023 3319)  Free From Fall Injury: Instruct family/caregiver on patient safety  Note: Bed was locked. Raised side rails for safety. Call light within reach.
Problem: Discharge Planning  Goal: Discharge to home or other facility with appropriate resources  12/8/2023 1023 by Annette Soto RN  Outcome: Progressing  Flowsheets (Taken 12/8/2023 0400 by Fatimah Esquivel RN)  Discharge to home or other facility with appropriate resources: Identify barriers to discharge with patient and caregiver  12/8/2023 0028 by Fatimah Esquivel RN  Outcome: Progressing  Flowsheets  Taken 12/8/2023 0000 by Fatimah Esquivel RN  Discharge to home or other facility with appropriate resources: Identify barriers to discharge with patient and caregiver  Taken 12/7/2023 2000 by Fatimah Esquivel RN  Discharge to home or other facility with appropriate resources: Identify barriers to discharge with patient and caregiver  Taken 12/7/2023 1200 by Radha García RN  Discharge to home or other facility with appropriate resources: Refer to discharge planning if patient needs post-hospital services based on physician order or complex needs related to functional status, cognitive ability or social support system  Note: Patient is not yet fit for discharge. Still on close monitoring. Problem: Skin/Tissue Integrity  Goal: Absence of new skin breakdown  Description: 1. Monitor for areas of redness and/or skin breakdown  2. Assess vascular access sites hourly  3. Every 4-6 hours minimum:  Change oxygen saturation probe site  4. Every 4-6 hours:  If on nasal continuous positive airway pressure, respiratory therapy assess nares and determine need for appliance change or resting period. 12/8/2023 1023 by Annette Soto RN  Outcome: Progressing  12/8/2023 0028 by Fatimah Esquivel RN  Outcome: Progressing  Note: Patient's skin integrity was assessed. Encouraged repositioning to prevent occurrence of pressure injury. Stretched linen for comfort.      Problem: Safety - Adult  Goal: Free from fall injury  12/8/2023 1023 by Annette Soto RN  Outcome: Progressing  12/8/2023 0028 by
Problem: Discharge Planning  Goal: Discharge to home or other facility with appropriate resources  12/9/2023 0007 by Lenin Matthews RN  Outcome: Progressing  12/8/2023 1023 by Regine Lou RN  Outcome: Progressing  Flowsheets (Taken 12/8/2023 0400 by Dawood Holbrook RN)  Discharge to home or other facility with appropriate resources: Identify barriers to discharge with patient and caregiver     Problem: Skin/Tissue Integrity  Goal: Absence of new skin breakdown  Description: 1. Monitor for areas of redness and/or skin breakdown  2. Assess vascular access sites hourly  3. Every 4-6 hours minimum:  Change oxygen saturation probe site  4. Every 4-6 hours:  If on nasal continuous positive airway pressure, respiratory therapy assess nares and determine need for appliance change or resting period.   12/9/2023 0007 by Lenin Matthews RN  Outcome: Progressing  12/8/2023 1023 by Regine Lou RN  Outcome: Progressing     Problem: Safety - Adult  Goal: Free from fall injury  12/9/2023 0007 by Lenin Matthews RN  Outcome: Progressing  12/8/2023 1023 by Regine Lou RN  Outcome: Progressing     Problem: ABCDS Injury Assessment  Goal: Absence of physical injury  12/9/2023 0007 by Lenin Matthews RN  Outcome: Progressing  12/8/2023 1023 by Regine Lou RN  Outcome: Progressing     Problem: Pain  Goal: Verbalizes/displays adequate comfort level or baseline comfort level  12/9/2023 0007 by Lenin Matthews RN  Outcome: Progressing  12/8/2023 1023 by Regine Lou RN  Outcome: Progressing  Flowsheets (Taken 12/8/2023 0400 by Dawood Holbrook RN)  Verbalizes/displays adequate comfort level or baseline comfort level:   Encourage patient to monitor pain and request assistance   Assess pain using appropriate pain scale
Problem: Discharge Planning  Goal: Discharge to home or other facility with appropriate resources  Outcome: Progressing     Problem: Skin/Tissue Integrity  Goal: Absence of new skin breakdown  Description: 1. Monitor for areas of redness and/or skin breakdown  2. Assess vascular access sites hourly  3. Every 4-6 hours minimum:  Change oxygen saturation probe site  4. Every 4-6 hours:  If on nasal continuous positive airway pressure, respiratory therapy assess nares and determine need for appliance change or resting period.   Outcome: Progressing     Problem: Safety - Adult  Goal: Free from fall injury  Outcome: Progressing     Problem: ABCDS Injury Assessment  Goal: Absence of physical injury  Outcome: Progressing     Problem: Pain  Goal: Verbalizes/displays adequate comfort level or baseline comfort level  Outcome: Progressing
Problem: Discharge Planning  Goal: Discharge to home or other facility with appropriate resources  Outcome: Progressing     Problem: Skin/Tissue Integrity  Goal: Absence of new skin breakdown  Description: 1. Monitor for areas of redness and/or skin breakdown  2. Assess vascular access sites hourly  3. Every 4-6 hours minimum:  Change oxygen saturation probe site  4. Every 4-6 hours:  If on nasal continuous positive airway pressure, respiratory therapy assess nares and determine need for appliance change or resting period.   Outcome: Progressing     Problem: Safety - Adult  Goal: Free from fall injury  Outcome: Progressing     Problem: ABCDS Injury Assessment  Goal: Absence of physical injury  Outcome: Progressing     Problem: Pain  Goal: Verbalizes/displays adequate comfort level or baseline comfort level  Outcome: Progressing
Problem: Discharge Planning  Goal: Discharge to home or other facility with appropriate resources  Outcome: Progressing    Problem: Skin/Tissue Integrity  Goal: Absence of new skin breakdown  Description: 1. Monitor for areas of redness and/or skin breakdown  2. Assess vascular access sites hourly  3. Every 4-6 hours minimum:  Change oxygen saturation probe site  4. Every 4-6 hours:  If on nasal continuous positive airway pressure, respiratory therapy assess nares and determine need for appliance change or resting period.   12/5/2023 1018 by Concetta Wilson RN  Outcome: Progressing     Problem: Safety - Adult  Goal: Free from fall injury  12/5/2023 1018 by Kinjal Diop RN  Outcome: Progressing     Problem: ABCDS Injury Assessment  Goal: Absence of physical injury  12/5/2023 1018 by Kinjal Diop RN  Outcome: Progressing     Problem: Pain  Goal: Verbalizes/displays adequate comfort level or baseline comfort level  12/5/2023 1018 by Kinjal Diop RN  Outcome: Progressing
Problem: Discharge Planning  Goal: Discharge to home or other facility with appropriate resources  Outcome: Progressing  Flowsheets  Taken 12/3/2023 0400  Discharge to home or other facility with appropriate resources: Identify barriers to discharge with patient and caregiver  Note: Not yet fit for discharge. Problem: Skin/Tissue Integrity  Goal: Absence of new skin breakdown  Description: 1. Monitor for areas of redness and/or skin breakdown  2. Assess vascular access sites hourly  3. Every 4-6 hours minimum:  Change oxygen saturation probe site  4. Every 4-6 hours:  If on nasal continuous positive airway pressure, respiratory therapy assess nares and determine need for appliance change or resting period. Outcome: Progressing  Note: No new skin breakdown noted. Problem: Safety - Adult  Goal: Free from fall injury  Outcome: Progressing  Flowsheets (Taken 12/3/2023 0613)  Free From Fall Injury: Instruct family/caregiver on patient safety  Note: Bed was locked. Raised side rails for safety. Call light within reach.      Problem: ABCDS Injury Assessment  Goal: Absence of physical injury  Outcome: Progressing  Flowsheets (Taken 12/3/2023 0613)  Absence of Physical Injury: Implement safety measures based on patient assessment     Problem: Pain  Goal: Verbalizes/displays adequate comfort level or baseline comfort level  Outcome: Progressing  Flowsheets  Taken 12/3/2023 0400  Verbalizes/displays adequate comfort level or baseline comfort level:   Encourage patient to monitor pain and request assistance   Assess pain using appropriate pain scale
Problem: Discharge Planning  Goal: Discharge to home or other facility with appropriate resources  Outcome: Progressing  Flowsheets  Taken 12/4/2023 0400  Discharge to home or other facility with appropriate resources: Identify barriers to discharge with patient and caregiver    Problem: Skin/Tissue Integrity  Goal: Absence of new skin breakdown  Description: 1. Monitor for areas of redness and/or skin breakdown  2. Assess vascular access sites hourly  3. Every 4-6 hours minimum:  Change oxygen saturation probe site  4. Every 4-6 hours:  If on nasal continuous positive airway pressure, respiratory therapy assess nares and determine need for appliance change or resting period. Outcome: Progressing  Note: No new skin breakdown noted. Problem: Safety - Adult  Goal: Free from fall injury  Outcome: Progressing  Flowsheets (Taken 12/3/2023 2000)  Free From Fall Injury: Instruct family/caregiver on patient safety  Note: Raised side rails for safety. Bed was locked. Call light within reach.      Problem: ABCDS Injury Assessment  Goal: Absence of physical injury  Outcome: Progressing  Flowsheets (Taken 12/3/2023 2000)  Absence of Physical Injury: Implement safety measures based on patient assessment     Problem: Pain  Goal: Verbalizes/displays adequate comfort level or baseline comfort level  Outcome: Progressing  Flowsheets  Taken 12/4/2023 0400  Verbalizes/displays adequate comfort level or baseline comfort level:   Encourage patient to monitor pain and request assistance   Assess pain using appropriate pain scale
Problem: Discharge Planning  Goal: Discharge to home or other facility with appropriate resources  Outcome: Progressing  Flowsheets  Taken 12/6/2023 1600  Discharge to home or other facility with appropriate resources: Refer to discharge planning if patient needs post-hospital services based on physician order or complex needs related to functional status, cognitive ability or social support system  Taken 12/6/2023 1200  Discharge to home or other facility with appropriate resources: Refer to discharge planning if patient needs post-hospital services based on physician order or complex needs related to functional status, cognitive ability or social support system  Taken 12/6/2023 0800  Discharge to home or other facility with appropriate resources: Refer to discharge planning if patient needs post-hospital services based on physician order or complex needs related to functional status, cognitive ability or social support system     Problem: Pain  Goal: Verbalizes/displays adequate comfort level or baseline comfort level  Outcome: Progressing  Flowsheets  Taken 12/6/2023 1600  Verbalizes/displays adequate comfort level or baseline comfort level:   Encourage patient to monitor pain and request assistance   Assess pain using appropriate pain scale   Administer analgesics based on type and severity of pain and evaluate response  Taken 12/6/2023 1200  Verbalizes/displays adequate comfort level or baseline comfort level:   Encourage patient to monitor pain and request assistance   Assess pain using appropriate pain scale   Administer analgesics based on type and severity of pain and evaluate response  Taken 12/6/2023 0800  Verbalizes/displays adequate comfort level or baseline comfort level:   Encourage patient to monitor pain and request assistance   Assess pain using appropriate pain scale
Problem: Discharge Planning  Goal: Discharge to home or other facility with appropriate resources  Outcome: Progressing  Flowsheets  Taken 12/8/2023 0000 by Susannah Lane RN  Discharge to home or other facility with appropriate resources: Identify barriers to discharge with patient and caregiver  Note: Patient is not yet fit for discharge. Still on close monitoring. Problem: Skin/Tissue Integrity  Goal: Absence of new skin breakdown  Description: 1. Monitor for areas of redness and/or skin breakdown  2. Assess vascular access sites hourly  3. Every 4-6 hours minimum:  Change oxygen saturation probe site  4. Every 4-6 hours:  If on nasal continuous positive airway pressure, respiratory therapy assess nares and determine need for appliance change or resting period. Outcome: Progressing  Note: Patient's skin integrity was assessed. Encouraged repositioning to prevent occurrence of pressure injury. Stretched linen for comfort. Problem: Safety - Adult  Goal: Free from fall injury  Outcome: Progressing  Flowsheets (Taken 12/7/2023 2000)  Free From Fall Injury: Instruct family/caregiver on patient safety  Note: Bed was locked and in lowest position. Raised side rails to ensure safety. Call light is within patient's reach.      Problem: ABCDS Injury Assessment  Goal: Absence of physical injury  Outcome: Progressing  Flowsheets (Taken 12/7/2023 2000)  Absence of Physical Injury: Implement safety measures based on patient assessment     Problem: Pain  Goal: Verbalizes/displays adequate comfort level or baseline comfort level  Outcome: Progressing  Flowsheets  Taken 12/8/2023 0000 by Susannah Lane RN  Verbalizes/displays adequate comfort level or baseline comfort level:   Encourage patient to monitor pain and request assistance   Assess pain using appropriate pain scale     Problem: Respiratory - Adult  Goal: Achieves optimal ventilation and oxygenation  Outcome: Progressing  Flowsheets (Taken
Problem: Discharge Planning  Goal: Discharge to home or other facility with appropriate resources  Outcome: Progressing  Flowsheets (Taken 12/9/2023 5115)  Discharge to home or other facility with appropriate resources: Identify barriers to discharge with patient and caregiver     Problem: Skin/Tissue Integrity  Goal: Absence of new skin breakdown  Description: 1. Monitor for areas of redness and/or skin breakdown  2. Assess vascular access sites hourly  3. Every 4-6 hours minimum:  Change oxygen saturation probe site  4. Every 4-6 hours:  If on nasal continuous positive airway pressure, respiratory therapy assess nares and determine need for appliance change or resting period.   Outcome: Progressing     Problem: Safety - Adult  Goal: Free from fall injury  Outcome: Progressing     Problem: ABCDS Injury Assessment  Goal: Absence of physical injury  Outcome: Progressing     Problem: Pain  Goal: Verbalizes/displays adequate comfort level or baseline comfort level  Outcome: Progressing     Problem: Respiratory - Adult  Goal: Achieves optimal ventilation and oxygenation  Outcome: Progressing
0000 by Radha Macias RN  Verbalizes/displays adequate comfort level or baseline comfort level: Encourage patient to monitor pain and request assistance  Taken 12/10/2023 2000 by Radha Macias RN  Verbalizes/displays adequate comfort level or baseline comfort level: Encourage patient to monitor pain and request assistance  Taken 12/10/2023 1600 by Negin Moore RN  Verbalizes/displays adequate comfort level or baseline comfort level:   Encourage patient to monitor pain and request assistance   Assess pain using appropriate pain scale     Problem: Respiratory - Adult  Goal: Achieves optimal ventilation and oxygenation  Outcome: Progressing  Flowsheets  Taken 12/10/2023 2000 by Radha Macias RN  Achieves optimal ventilation and oxygenation:   Assess for changes in respiratory status   Assess for changes in mentation and behavior  Taken 12/10/2023 1600 by Negin Moore RN  Achieves optimal ventilation and oxygenation:   Assess for changes in respiratory status   Assess for changes in mentation and behavior     Problem: Cardiovascular - Adult  Goal: Maintains optimal cardiac output and hemodynamic stability  Outcome: Progressing  Flowsheets  Taken 12/11/2023 0400 by Radha Macias RN  Maintains optimal cardiac output and hemodynamic stability: Monitor blood pressure and heart rate  Taken 12/11/2023 0000 by Radha Macias RN  Maintains optimal cardiac output and hemodynamic stability: Monitor blood pressure and heart rate  Taken 12/10/2023 2000 by Radha Macias RN  Maintains optimal cardiac output and hemodynamic stability: Monitor blood pressure and heart rate  Taken 12/10/2023 1600 by Negin Moore RN  Maintains optimal cardiac output and hemodynamic stability:   Monitor blood pressure and heart rate   Monitor urine output and notify Licensed Independent Practitioner for values outside of normal range     Problem: Skin/Tissue Integrity - Adult  Goal: Skin integrity remains intact  Outcome:
level  12/7/2023 0612 by Stephanie Farrell RN  Outcome: Progressing  12/6/2023 1735 by Alistair Sharif RN  Outcome: Progressing  Flowsheets  Taken 12/6/2023 1600  Verbalizes/displays adequate comfort level or baseline comfort level:   Encourage patient to monitor pain and request assistance   Assess pain using appropriate pain scale   Administer analgesics based on type and severity of pain and evaluate response  Taken 12/6/2023 1200  Verbalizes/displays adequate comfort level or baseline comfort level:   Encourage patient to monitor pain and request assistance   Assess pain using appropriate pain scale   Administer analgesics based on type and severity of pain and evaluate response  Taken 12/6/2023 0800  Verbalizes/displays adequate comfort level or baseline comfort level:   Encourage patient to monitor pain and request assistance   Assess pain using appropriate pain scale

## 2023-12-12 NOTE — CARE COORDINATION
IMM letter provided to patient. Patient offered four hours to make informed decision regarding appeal process; patient agreeable to discharge.      Electronically signed by YOLANDA Turpin on 12/12/2023 at 12:43 PM

## 2023-12-12 NOTE — CARE COORDINATION
Transportation arranged for patient to go to [facility] at 1300. Facility informed. Bedside nurse informed.       Number for Report: [phone]  Electronically signed by YOLANDA Flores on 12/12/2023 at 12:22 PM

## 2023-12-12 NOTE — DISCHARGE INSTR - DIET

## 2024-01-01 ENCOUNTER — TELEPHONE (OUTPATIENT)
Dept: INTERNAL MEDICINE CLINIC | Age: 64
End: 2024-01-01

## 2024-01-05 ENCOUNTER — TELEPHONE (OUTPATIENT)
Dept: PRIMARY CARE CLINIC | Age: 64
End: 2024-01-05

## 2024-01-05 NOTE — TELEPHONE ENCOUNTER
Kia () with [employer]. Pt D/C for [facility] yesterday. Calling to let Adela know. Stated she spoke you re this. Any questions, call her @ [phone] Ext: [phone]

## 2024-01-09 ENCOUNTER — OFFICE VISIT (OUTPATIENT)
Dept: PRIMARY CARE CLINIC | Age: 64
End: 2024-01-09
Payer: COMMERCIAL

## 2024-01-09 VITALS
SYSTOLIC BLOOD PRESSURE: 116 MMHG | HEART RATE: 82 BPM | BODY MASS INDEX: 18.74 KG/M2 | HEIGHT: 62 IN | DIASTOLIC BLOOD PRESSURE: 78 MMHG | WEIGHT: 101.8 LBS | OXYGEN SATURATION: 100 %

## 2024-01-09 DIAGNOSIS — E43 SEVERE MALNUTRITION (HCC): ICD-10-CM

## 2024-01-09 DIAGNOSIS — R10.13 EPIGASTRIC PAIN: ICD-10-CM

## 2024-01-09 DIAGNOSIS — J44.1 CHRONIC OBSTRUCTIVE PULMONARY DISEASE WITH (ACUTE) EXACERBATION (HCC): ICD-10-CM

## 2024-01-09 DIAGNOSIS — R19.7 DIARRHEA OF PRESUMED INFECTIOUS ORIGIN: ICD-10-CM

## 2024-01-09 DIAGNOSIS — F33.2 MDD (MAJOR DEPRESSIVE DISORDER), RECURRENT SEVERE, WITHOUT PSYCHOSIS (HCC): ICD-10-CM

## 2024-01-09 PROCEDURE — 3078F DIAST BP <80 MM HG: CPT | Performed by: FAMILY MEDICINE

## 2024-01-09 PROCEDURE — 1111F DSCHRG MED/CURRENT MED MERGE: CPT | Performed by: FAMILY MEDICINE

## 2024-01-09 PROCEDURE — G8482 FLU IMMUNIZE ORDER/ADMIN: HCPCS | Performed by: FAMILY MEDICINE

## 2024-01-09 PROCEDURE — G8427 DOCREV CUR MEDS BY ELIG CLIN: HCPCS | Performed by: FAMILY MEDICINE

## 2024-01-09 PROCEDURE — G8420 CALC BMI NORM PARAMETERS: HCPCS | Performed by: FAMILY MEDICINE

## 2024-01-09 PROCEDURE — 3023F SPIROM DOC REV: CPT | Performed by: FAMILY MEDICINE

## 2024-01-09 PROCEDURE — 4004F PT TOBACCO SCREEN RCVD TLK: CPT | Performed by: FAMILY MEDICINE

## 2024-01-09 PROCEDURE — 99214 OFFICE O/P EST MOD 30 MIN: CPT | Performed by: FAMILY MEDICINE

## 2024-01-09 PROCEDURE — 3017F COLORECTAL CA SCREEN DOC REV: CPT | Performed by: FAMILY MEDICINE

## 2024-01-09 PROCEDURE — 3074F SYST BP LT 130 MM HG: CPT | Performed by: FAMILY MEDICINE

## 2024-01-09 RX ORDER — PREDNISONE 10 MG/1
TABLET ORAL
COMMUNITY
Start: 2024-01-08

## 2024-01-09 RX ORDER — MAGNESIUM OXIDE 400 MG/1
400 TABLET ORAL 2 TIMES DAILY
COMMUNITY
Start: 2024-01-03

## 2024-01-09 RX ORDER — SUCRALFATE ORAL 1 G/10ML
1 SUSPENSION ORAL
Qty: 3600 ML | Refills: 3 | Status: SHIPPED | OUTPATIENT
Start: 2024-01-09 | End: 2025-01-03

## 2024-01-09 RX ORDER — SUCRALFATE ORAL 1 G/10ML
1 SUSPENSION ORAL
Qty: 560 ML | Refills: 0 | Status: SHIPPED | OUTPATIENT
Start: 2024-01-09 | End: 2024-01-09 | Stop reason: SDUPTHER

## 2024-01-09 RX ORDER — LEVOTHYROXINE SODIUM 0.07 MG/1
75 TABLET ORAL DAILY
Qty: 90 TABLET | Refills: 3 | Status: SHIPPED | OUTPATIENT
Start: 2024-01-09

## 2024-01-09 RX ORDER — OXYCODONE HYDROCHLORIDE 5 MG/1
TABLET ORAL
COMMUNITY
Start: 2024-01-03 | End: 2024-01-09 | Stop reason: ALTCHOICE

## 2024-01-09 RX ORDER — SUCRALFATE ORAL 1 G/10ML
1 SUSPENSION ORAL
Qty: 560 ML | Refills: 0 | Status: SHIPPED | OUTPATIENT
Start: 2024-01-09 | End: 2024-01-09

## 2024-01-09 RX ORDER — DIPHENOXYLATE HYDROCHLORIDE AND ATROPINE SULFATE 2.5; .025 MG/1; MG/1
TABLET ORAL
COMMUNITY
Start: 2023-11-13

## 2024-01-09 ASSESSMENT — PATIENT HEALTH QUESTIONNAIRE - PHQ9
SUM OF ALL RESPONSES TO PHQ QUESTIONS 1-9: 11
SUM OF ALL RESPONSES TO PHQ QUESTIONS 1-9: 11
2. FEELING DOWN, DEPRESSED OR HOPELESS: 1
SUM OF ALL RESPONSES TO PHQ QUESTIONS 1-9: 11
SUM OF ALL RESPONSES TO PHQ9 QUESTIONS 1 & 2: 2
6. FEELING BAD ABOUT YOURSELF - OR THAT YOU ARE A FAILURE OR HAVE LET YOURSELF OR YOUR FAMILY DOWN: 0
3. TROUBLE FALLING OR STAYING ASLEEP: 3
4. FEELING TIRED OR HAVING LITTLE ENERGY: 3
8. MOVING OR SPEAKING SO SLOWLY THAT OTHER PEOPLE COULD HAVE NOTICED. OR THE OPPOSITE, BEING SO FIGETY OR RESTLESS THAT YOU HAVE BEEN MOVING AROUND A LOT MORE THAN USUAL: 0
7. TROUBLE CONCENTRATING ON THINGS, SUCH AS READING THE NEWSPAPER OR WATCHING TELEVISION: 0
1. LITTLE INTEREST OR PLEASURE IN DOING THINGS: 1
SUM OF ALL RESPONSES TO PHQ QUESTIONS 1-9: 11
9. THOUGHTS THAT YOU WOULD BE BETTER OFF DEAD, OR OF HURTING YOURSELF: 0
10. IF YOU CHECKED OFF ANY PROBLEMS, HOW DIFFICULT HAVE THESE PROBLEMS MADE IT FOR YOU TO DO YOUR WORK, TAKE CARE OF THINGS AT HOME, OR GET ALONG WITH OTHER PEOPLE: 1
5. POOR APPETITE OR OVEREATING: 3

## 2024-01-09 ASSESSMENT — ENCOUNTER SYMPTOMS
EYE REDNESS: 0
EYE DISCHARGE: 0
SHORTNESS OF BREATH: 1
SORE THROAT: 0
COUGH: 0
NAUSEA: 0
DIARRHEA: 0
RHINORRHEA: 0
WHEEZING: 0
ABDOMINAL PAIN: 0
VOMITING: 0

## 2024-01-09 NOTE — PROGRESS NOTES
[facility]  [facility]  [address]  [facility] [place] [facility] [address]  Dept: [phone]    Laura Rhodes is a 63 y.o. female Established patient, who presents today for her medical conditions/complaints as noted below.      Chief Complaint   Patient presents with    Other     Pt here today for hospital f/u from [facility]/       HPI:     HPI  Pt had hospitalization for about 6 weeks. Started at [facility], then to [facility] for copd exacerbation. Pt had physical therapy, strengthening.  On oxygen continuous.  Pt states still not breathing well.  Still smoking.       Pt feels that lot of her respiratory issues were GI related.     Pt has been using klonopin three times a day.      Pt had nerve block. Sent home with oxycodone for 1 week.      Pt has been on klonopin as well. Pt again advised about risk of respiratory depression with use of benzodiazepenes and pain meds, even though she has been on the meds for 8 years.     OARRS reveiwed  Current MME 7.5    Reviewed prior notes None  Reviewed previous Labs    LDL Cholesterol (mg/dL)   Date Value   04/28/2022 81   07/13/2018 79     LDL Calculated (mg/dL)   Date Value   07/09/2023 63       (goal LDL is <100)   AST (U/L)   Date Value   01/01/2024 12     ALT (U/L)   Date Value   01/01/2024 26     BUN (mg/dL)   Date Value   01/01/2024 14     Hemoglobin A1C (%)   Date Value   12/13/2023 5.2     TSH (uIU/mL)   Date Value   12/13/2023 0.24 (L)     BP Readings from Last 3 Encounters:   01/09/24 116/78   12/12/23 (!) 149/72   11/20/23 (!) 158/106          (goal 120/80)    Past Medical History:   Diagnosis Date    Acid reflux     Anxiety     Arthritis     Cancer (HCC)     cervical    Cervical osteoarthritis 11/30/2020    COPD (chronic obstructive pulmonary disease) (HCC) 02/23/2018    DDD (degenerative disc disease), cervical     Depression     Hiatal hernia     History of blood transfusion     Reaction fever went up to 107 .

## 2024-01-15 NOTE — TELEPHONE ENCOUNTER
TCM call not done when call came in, past the 48 hour limit. Patient was seen for a FU appointment 01/09/2024.

## 2024-01-26 ENCOUNTER — TELEPHONE (OUTPATIENT)
Dept: PAIN MANAGEMENT | Age: 64
End: 2024-01-26

## 2024-01-31 ENCOUNTER — TELEPHONE (OUTPATIENT)
Dept: PRIMARY CARE CLINIC | Age: 64
End: 2024-01-31

## 2024-01-31 NOTE — TELEPHONE ENCOUNTER
Kelli from [facility] calling. Seeing pt this am and pt was c/o dizziness since yesterday. When sitting, she is fine. Standing, or any movement has the dizziness. Had diarrhea for 2 dys, none today. C/o back pain, 9 of of a 10. BP today is 174/94 and having dizzinees when she was taking her BP. Asking what you recommend?    [phone]

## 2024-02-01 ENCOUNTER — APPOINTMENT (OUTPATIENT)
Dept: GENERAL RADIOLOGY | Age: 64
End: 2024-02-01
Payer: COMMERCIAL

## 2024-02-01 ENCOUNTER — HOSPITAL ENCOUNTER (EMERGENCY)
Age: 64
Discharge: HOME | End: 2024-02-01
Attending: EMERGENCY MEDICINE
Payer: COMMERCIAL

## 2024-02-01 VITALS
TEMPERATURE: 99.3 F | HEIGHT: 64 IN | SYSTOLIC BLOOD PRESSURE: 145 MMHG | OXYGEN SATURATION: 98 % | RESPIRATION RATE: 14 BRPM | DIASTOLIC BLOOD PRESSURE: 88 MMHG | WEIGHT: 98 LBS | HEART RATE: 67 BPM | BODY MASS INDEX: 16.73 KG/M2

## 2024-02-01 DIAGNOSIS — M25.551 CHRONIC RIGHT HIP PAIN: ICD-10-CM

## 2024-02-01 DIAGNOSIS — G89.29 CHRONIC RIGHT HIP PAIN: ICD-10-CM

## 2024-02-01 DIAGNOSIS — J44.1 COPD EXACERBATION (HCC): Primary | ICD-10-CM

## 2024-02-01 LAB
ALBUMIN SERPL-MCNC: 4.3 G/DL (ref 3.5–5.2)
ALP SERPL-CCNC: 72 U/L (ref 35–104)
ALT SERPL-CCNC: 26 U/L (ref 5–33)
ANION GAP SERPL CALCULATED.3IONS-SCNC: 8 MMOL/L (ref 9–17)
AST SERPL-CCNC: 29 U/L
BASOPHILS # BLD: 0 K/UL (ref 0–0.2)
BASOPHILS NFR BLD: 0 % (ref 0–2)
BILIRUB SERPL-MCNC: 0.2 MG/DL (ref 0.3–1.2)
BUN SERPL-MCNC: 9 MG/DL (ref 8–23)
CALCIUM SERPL-MCNC: 8.6 MG/DL (ref 8.6–10.4)
CHLORIDE SERPL-SCNC: 96 MMOL/L (ref 98–107)
CO2 SERPL-SCNC: 33 MMOL/L (ref 20–31)
CREAT SERPL-MCNC: 0.7 MG/DL (ref 0.5–0.9)
EOSINOPHIL # BLD: 0 K/UL (ref 0–0.4)
EOSINOPHILS RELATIVE PERCENT: 0 % (ref 0–4)
ERYTHROCYTE [DISTWIDTH] IN BLOOD BY AUTOMATED COUNT: 15.1 % (ref 11.5–14.9)
FLUAV RNA RESP QL NAA+PROBE: NOT DETECTED
FLUBV RNA RESP QL NAA+PROBE: NOT DETECTED
GFR SERPL CREATININE-BSD FRML MDRD: >60 ML/MIN/1.73M2
GLUCOSE SERPL-MCNC: 109 MG/DL (ref 70–99)
HCT VFR BLD AUTO: 39.4 % (ref 36–46)
HGB BLD-MCNC: 13.1 G/DL (ref 12–16)
LYMPHOCYTES NFR BLD: 1.9 K/UL (ref 1–4.8)
LYMPHOCYTES RELATIVE PERCENT: 25 % (ref 24–44)
MAGNESIUM SERPL-MCNC: 1.9 MG/DL (ref 1.6–2.6)
MCH RBC QN AUTO: 32.3 PG (ref 26–34)
MCHC RBC AUTO-ENTMCNC: 33.2 G/DL (ref 31–37)
MCV RBC AUTO: 97.4 FL (ref 80–100)
MONOCYTES NFR BLD: 0.6 K/UL (ref 0.1–1.3)
MONOCYTES NFR BLD: 8 % (ref 1–7)
NEUTROPHILS NFR BLD: 67 % (ref 36–66)
NEUTS SEG NFR BLD: 5 K/UL (ref 1.3–9.1)
PLATELET # BLD AUTO: 326 K/UL (ref 150–450)
PMV BLD AUTO: 6.9 FL (ref 6–12)
POTASSIUM SERPL-SCNC: 5 MMOL/L (ref 3.7–5.3)
PROT SERPL-MCNC: 6.6 G/DL (ref 6.4–8.3)
RBC # BLD AUTO: 4.05 M/UL (ref 4–5.2)
SARS-COV-2 RNA RESP QL NAA+PROBE: NOT DETECTED
SODIUM SERPL-SCNC: 137 MMOL/L (ref 135–144)
SOURCE: NORMAL
SPECIMEN DESCRIPTION: NORMAL
TROPONIN I SERPL HS-MCNC: 17 NG/L (ref 0–14)
TROPONIN I SERPL HS-MCNC: 20 NG/L (ref 0–14)
TSH SERPL DL<=0.05 MIU/L-ACNC: 1.89 UIU/ML (ref 0.3–5)
WBC OTHER # BLD: 7.5 K/UL (ref 3.5–11)

## 2024-02-01 PROCEDURE — 96374 THER/PROPH/DIAG INJ IV PUSH: CPT

## 2024-02-01 PROCEDURE — 84443 ASSAY THYROID STIM HORMONE: CPT

## 2024-02-01 PROCEDURE — 85025 COMPLETE CBC W/AUTO DIFF WBC: CPT

## 2024-02-01 PROCEDURE — 6370000000 HC RX 637 (ALT 250 FOR IP)

## 2024-02-01 PROCEDURE — 71045 X-RAY EXAM CHEST 1 VIEW: CPT

## 2024-02-01 PROCEDURE — 99285 EMERGENCY DEPT VISIT HI MDM: CPT

## 2024-02-01 PROCEDURE — 6360000002 HC RX W HCPCS

## 2024-02-01 PROCEDURE — 96372 THER/PROPH/DIAG INJ SC/IM: CPT

## 2024-02-01 PROCEDURE — 87636 SARSCOV2 & INF A&B AMP PRB: CPT

## 2024-02-01 PROCEDURE — 2580000003 HC RX 258

## 2024-02-01 PROCEDURE — 83735 ASSAY OF MAGNESIUM: CPT

## 2024-02-01 PROCEDURE — 94640 AIRWAY INHALATION TREATMENT: CPT

## 2024-02-01 PROCEDURE — 36415 COLL VENOUS BLD VENIPUNCTURE: CPT

## 2024-02-01 PROCEDURE — 84484 ASSAY OF TROPONIN QUANT: CPT

## 2024-02-01 PROCEDURE — 80053 COMPREHEN METABOLIC PANEL: CPT

## 2024-02-01 RX ORDER — IPRATROPIUM BROMIDE AND ALBUTEROL SULFATE 2.5; .5 MG/3ML; MG/3ML
1 SOLUTION RESPIRATORY (INHALATION) ONCE
Status: COMPLETED | OUTPATIENT
Start: 2024-02-01 | End: 2024-02-01

## 2024-02-01 RX ORDER — OXYCODONE HYDROCHLORIDE 5 MG/1
5 TABLET ORAL ONCE
Status: COMPLETED | OUTPATIENT
Start: 2024-02-01 | End: 2024-02-01

## 2024-02-01 RX ORDER — OXYCODONE HYDROCHLORIDE AND ACETAMINOPHEN 5; 325 MG/1; MG/1
1 TABLET ORAL EVERY 8 HOURS PRN
Qty: 9 TABLET | Refills: 0 | Status: SHIPPED | OUTPATIENT
Start: 2024-02-01 | End: 2024-02-04

## 2024-02-01 RX ORDER — LIDOCAINE 4 G/G
1 PATCH TOPICAL DAILY
Qty: 10 EACH | Refills: 0 | Status: SHIPPED | OUTPATIENT
Start: 2024-02-01 | End: 2024-02-11

## 2024-02-01 RX ORDER — ORPHENADRINE CITRATE 30 MG/ML
60 INJECTION INTRAMUSCULAR; INTRAVENOUS ONCE
Status: COMPLETED | OUTPATIENT
Start: 2024-02-01 | End: 2024-02-01

## 2024-02-01 RX ORDER — LIDOCAINE 4 G/G
1 PATCH TOPICAL ONCE
Status: DISCONTINUED | OUTPATIENT
Start: 2024-02-01 | End: 2024-02-01

## 2024-02-01 RX ORDER — PREDNISONE 10 MG/1
TABLET ORAL
Qty: 20 TABLET | Refills: 0 | Status: SHIPPED | OUTPATIENT
Start: 2024-02-01 | End: 2024-02-11

## 2024-02-01 RX ADMIN — ORPHENADRINE CITRATE 60 MG: 30 INJECTION INTRAMUSCULAR; INTRAVENOUS at 18:47

## 2024-02-01 RX ADMIN — WATER 125 MG: 1 INJECTION INTRAMUSCULAR; INTRAVENOUS; SUBCUTANEOUS at 18:42

## 2024-02-01 RX ADMIN — IPRATROPIUM BROMIDE AND ALBUTEROL SULFATE 1 DOSE: 2.5; .5 SOLUTION RESPIRATORY (INHALATION) at 19:55

## 2024-02-01 RX ADMIN — OXYCODONE HYDROCHLORIDE 5 MG: 5 TABLET ORAL at 18:48

## 2024-02-01 ASSESSMENT — LIFESTYLE VARIABLES
HOW MANY STANDARD DRINKS CONTAINING ALCOHOL DO YOU HAVE ON A TYPICAL DAY: PATIENT DOES NOT DRINK
HOW OFTEN DO YOU HAVE A DRINK CONTAINING ALCOHOL: NEVER

## 2024-02-01 ASSESSMENT — PAIN SCALES - GENERAL
PAINLEVEL_OUTOF10: 10
PAINLEVEL_OUTOF10: 10

## 2024-02-01 ASSESSMENT — ENCOUNTER SYMPTOMS
ABDOMINAL PAIN: 0
SHORTNESS OF BREATH: 1

## 2024-02-01 NOTE — ED TRIAGE NOTES
Mode of arrival (squad #, walk in, police, etc) : walk-in        Chief complaint(s): shortness of breath and back pain        Arrival Note (brief scenario, treatment PTA, etc).: Pt reports she has been struggling with back pain since she was last in the hospital. Pt has had increased shortness of breath x 3 days        C= \"Have you ever felt that you should Cut down on your drinking?\"  No  A= \"Have people Annoyed you by criticizing your drinking?\"  No  G= \"Have you ever felt bad or Guilty about your drinking?\"  No  E= \"Have you ever had a drink as an Eye-opener first thing in the morning to steady your nerves or to help a hangover?\"  No      Deferred []      Reason for deferring: N/A    *If yes to two or more: probable alcohol abuse.*

## 2024-02-01 NOTE — ED PROVIDER NOTES
[facility] ED  eMERGENCY dEPARTMENT eNCOUnter   Attending Attestation     Pt Name: Laura Rhodes  MRN: [id number]  Birthdate 1960  Date of evaluation: 2/1/24       Laura Rhodes is a 63 y.o. female who presents with Shortness of Breath and Back Pain      History:   63-year-old female presenting to the ER complaining of shortness of breath and back pain.    Exam: Vitals:   Vitals:    02/01/24 1834 02/01/24 1848 02/01/24 1956 02/01/24 2027   BP: 109/86      Pulse: 82  73 74   Resp: 22 21 22 23   Temp:       TempSrc:       SpO2: 98%  99% 98%   Weight:       Height:         Cardiac workup in the ER did not display positive signs of acute cardiac ischemia.  EKG was reviewed and interpreted by myself, the ED physician.  The patient likely has a COPD exacerbation and breathing treatment was started in the ER.  The patient was also started on a steroid regimen for outpatient use.  The patient was instructed to follow-up with the primary care physician within 2 days and to return to the ER immediately if symptoms worsen or change.  Patient understands and agrees with the plan.    I performed a history and physical examination of the patient and discussed management with the resident. I reviewed the resident’s note and agree with the documented findings and plan of care. Any areas of disagreement are noted on the chart. I was personally present for the key portions of any procedures. I have documented in the chart those procedures where I was not present during the key portions. I have personally reviewed all images and agree with the resident's interpretation. I have reviewed the emergency nurses triage note. I agree with the chief complaint, past medical history, past surgical history, allergies, medications, social and family history as documented unless otherwise noted below. Documentation of the HPI, Physical Exam and Medical Decision Making performed by medical students or scribes is based on my personal 
accessory muscles for breathing, lungs significantly diminished bilaterally  Abdominal:      Palpations: Abdomen is soft.      Tenderness: There is no abdominal tenderness.   Musculoskeletal:      Comments: Tenderness over the right hip anteriorly and posteriorly, no palpable bony abnormality, neurovascular intact distally, able to stand and ambulate, no signs of traumatic injury including bruising, swelling   Skin:     General: Skin is warm and dry.   Neurological:      General: No focal deficit present.      Mental Status: She is alert and oriented to person, place, and time.           DDX/DIAGNOSTIC RESULTS / EMERGENCY DEPARTMENT COURSE / MDM     Medical Decision Making  63-year-old female presents for evaluation of acute on chronic right hip pain as well as increased shortness of breath that worsened today.  Patient ran out of her home Percocet that she has been taking for her hip pain and feels that she became very anxious and short of breath because of this.  Patient does wear oxygen at home.  Has associated chest pressure but no significant pain.  No new injury to her hip.  Patient is tachypneic upon arrival, vital signs otherwise within normal.  On exam she is alert, awake, anxious appearing and chronically ill but nontoxic.  Lungs are significantly diminished bilaterally.  Heart is regular rate and rhythm, MSK exam as noted above.  DDx: COPD exacerbation, ACS, anxiety, electrolyte derangement, COVID, flu, thyroid abnormality.  Plan for labs, chest x-ray, EKG, reevaluation    Amount and/or Complexity of Data Reviewed  Labs: ordered. Decision-making details documented in ED Course.  Radiology: ordered.  ECG/medicine tests: ordered.    Risk  OTC drugs.  Prescription drug management.            EMERGENCY DEPARTMENT COURSE:      ED Course as of 02/02/24 0103   Thu Feb 01, 2024   1855 Troponin, High Sensitivity(!): 20 [TD]   1943 Chest x-ray with emphysematous changes, no acute infiltrate [TD]   2013 Patient

## 2024-02-02 ENCOUNTER — TELEPHONE (OUTPATIENT)
Dept: PRIMARY CARE CLINIC | Age: 64
End: 2024-02-02

## 2024-02-02 DIAGNOSIS — R35.0 URINARY FREQUENCY: Primary | ICD-10-CM

## 2024-02-02 LAB
EKG ATRIAL RATE: 67 BPM
EKG P AXIS: 79 DEGREES
EKG P-R INTERVAL: 114 MS
EKG Q-T INTERVAL: 410 MS
EKG QRS DURATION: 64 MS
EKG QTC CALCULATION (BAZETT): 433 MS
EKG R AXIS: 81 DEGREES
EKG T AXIS: 82 DEGREES
EKG VENTRICULAR RATE: 67 BPM

## 2024-02-02 NOTE — ED NOTES
Report given to LILY Izquierdo from ED.   Report method in person   The following was reviewed with receiving RN:   Current vital signs:  /86   Pulse 82   Temp 99.3 °F (37.4 °C) (Oral)   Resp 21   Ht 1.626 m (5' 4\")   Wt 44.5 kg (98 lb)   SpO2 98%   BMI 16.82 kg/m²                MEWS Score: 1     Any medication or safety alerts were reviewed. Any pending diagnostics and notifications were also reviewed, as well as any safety concerns or issues, abnormal labs, abnormal imaging, and abnormal assessment findings. Questions were answered.

## 2024-02-02 NOTE — TELEPHONE ENCOUNTER
Pt went to er last night and they forgot to order a UA due to frequency urinating. Asking if an order can be faxed to [facility]?    Dr. Pate pt

## 2024-02-02 NOTE — DISCHARGE INSTRUCTIONS
You were seen for evaluation of shortness of breath, hip pain.  You likely have a COPD exacerbation.  You will be discharged home with prednisone which you should take over the next 5 days to help with your breathing.  You also be given a short course of Percocet to help with your chronic pain.  Try to take as little of this as possible and do not take this before driving or operating heavy machinery because it can make you tired.  Return to the emergency department immediately for any worsening shortness of breath, chest pain, fevers, chills, loss of consciousness, other new or concerning symptoms

## 2024-02-06 ENCOUNTER — HOSPITAL ENCOUNTER (OUTPATIENT)
Dept: PAIN MANAGEMENT | Age: 64
Discharge: HOME | End: 2024-02-06
Payer: COMMERCIAL

## 2024-02-06 VITALS
BODY MASS INDEX: 17.89 KG/M2 | HEART RATE: 72 BPM | SYSTOLIC BLOOD PRESSURE: 100 MMHG | OXYGEN SATURATION: 99 % | HEIGHT: 63 IN | DIASTOLIC BLOOD PRESSURE: 61 MMHG | WEIGHT: 101 LBS

## 2024-02-06 DIAGNOSIS — G89.29 CHRONIC BILATERAL LOW BACK PAIN WITHOUT SCIATICA: ICD-10-CM

## 2024-02-06 DIAGNOSIS — M48.061 LUMBAR FORAMINAL STENOSIS: ICD-10-CM

## 2024-02-06 DIAGNOSIS — M47.817 LUMBOSACRAL SPONDYLOSIS WITHOUT MYELOPATHY: Primary | Chronic | ICD-10-CM

## 2024-02-06 DIAGNOSIS — M54.50 CHRONIC BILATERAL LOW BACK PAIN WITHOUT SCIATICA: ICD-10-CM

## 2024-02-06 PROCEDURE — 99213 OFFICE O/P EST LOW 20 MIN: CPT | Performed by: NURSE PRACTITIONER

## 2024-02-06 PROCEDURE — 99213 OFFICE O/P EST LOW 20 MIN: CPT

## 2024-02-06 ASSESSMENT — ENCOUNTER SYMPTOMS
SHORTNESS OF BREATH: 1
BOWEL INCONTINENCE: 0
CONSTIPATION: 0
BACK PAIN: 1
COUGH: 0

## 2024-02-06 NOTE — PROGRESS NOTES
reviewed the chief complaint and history of present illness (including ROS and PFSH) and vital documentation by my staff and I agree with their documentation and have added where applicable.

## 2024-02-09 ENCOUNTER — HOSPITAL ENCOUNTER (OUTPATIENT)
Age: 64
Setting detail: SPECIMEN
Discharge: HOME | End: 2024-02-09
Payer: COMMERCIAL

## 2024-02-09 LAB
BACTERIA URNS QL MICRO: ABNORMAL
BILIRUB UR QL STRIP: NEGATIVE
CASTS #/AREA URNS LPF: ABNORMAL /LPF
CLARITY UR: CLEAR
COLOR UR: YELLOW
EPI CELLS #/AREA URNS HPF: ABNORMAL /HPF
GLUCOSE UR STRIP-MCNC: NEGATIVE MG/DL
HGB UR QL STRIP.AUTO: ABNORMAL
KETONES UR STRIP-MCNC: NEGATIVE MG/DL
LEUKOCYTE ESTERASE UR QL STRIP: NEGATIVE
NITRITE UR QL STRIP: NEGATIVE
PH UR STRIP: 6 [PH] (ref 5–8)
PROT UR STRIP-MCNC: NEGATIVE MG/DL
RBC #/AREA URNS HPF: ABNORMAL /HPF
SP GR UR STRIP: 1 (ref 1–1.03)
UROBILINOGEN UR STRIP-ACNC: NORMAL EU/DL (ref 0–1)
WBC #/AREA URNS HPF: ABNORMAL /HPF

## 2024-02-09 PROCEDURE — 81001 URINALYSIS AUTO W/SCOPE: CPT

## 2024-02-13 ENCOUNTER — HOSPITAL ENCOUNTER (EMERGENCY)
Age: 64
Discharge: HOME | End: 2024-02-13
Attending: EMERGENCY MEDICINE
Payer: COMMERCIAL

## 2024-02-13 VITALS
TEMPERATURE: 98.1 F | HEART RATE: 81 BPM | RESPIRATION RATE: 19 BRPM | OXYGEN SATURATION: 97 % | SYSTOLIC BLOOD PRESSURE: 108 MMHG | DIASTOLIC BLOOD PRESSURE: 81 MMHG

## 2024-02-13 DIAGNOSIS — M25.551 CHRONIC PAIN OF RIGHT HIP: Primary | ICD-10-CM

## 2024-02-13 DIAGNOSIS — G89.29 CHRONIC PAIN OF RIGHT HIP: Primary | ICD-10-CM

## 2024-02-13 PROCEDURE — 6360000002 HC RX W HCPCS: Performed by: EMERGENCY MEDICINE

## 2024-02-13 PROCEDURE — 99284 EMERGENCY DEPT VISIT MOD MDM: CPT

## 2024-02-13 PROCEDURE — 96372 THER/PROPH/DIAG INJ SC/IM: CPT

## 2024-02-13 RX ORDER — MELOXICAM 15 MG/1
15 TABLET ORAL DAILY
Qty: 30 TABLET | Refills: 0 | Status: SHIPPED | OUTPATIENT
Start: 2024-02-13

## 2024-02-13 RX ORDER — ORPHENADRINE CITRATE 30 MG/ML
60 INJECTION INTRAMUSCULAR; INTRAVENOUS ONCE
Status: COMPLETED | OUTPATIENT
Start: 2024-02-13 | End: 2024-02-13

## 2024-02-13 RX ORDER — KETOROLAC TROMETHAMINE 30 MG/ML
30 INJECTION, SOLUTION INTRAMUSCULAR; INTRAVENOUS ONCE
Status: COMPLETED | OUTPATIENT
Start: 2024-02-13 | End: 2024-02-13

## 2024-02-13 RX ADMIN — KETOROLAC TROMETHAMINE 30 MG: 30 INJECTION INTRAMUSCULAR; INTRAVENOUS at 11:43

## 2024-02-13 RX ADMIN — ORPHENADRINE CITRATE 60 MG: 60 INJECTION INTRAMUSCULAR; INTRAVENOUS at 11:43

## 2024-02-13 NOTE — ED PROVIDER NOTES
[facility]  eMERGENCY dEPARTMENT eNCOUnter      Pt Name: Laura Rhodes  MRN: [id number]  Birthdate 1960  Date of evaluation: 2/13/24      CHIEF COMPLAINT     No chief complaint on file.        HISTORY OF PRESENT ILLNESS    Laura Rhodes is a 63 y.o. female who presents complaining of hip pain.  Patient states has been having hip pain for well over a month.  Patient thinks that it was because being on a bed in the hospital for over a month.  Patient is seen her orthopedic doctor who states that they cannot do anything for her and will prescribe her anything for pain.  She is talk to her family doctor who is also stating that they cannot do it.  She is seeing a pain specialist who also refuses to give her anything for pain and states they cannot do injections because her insurance only allows it once every 6 months.  Patient also stated that her blood pressure was up this morning so she took a dose of Norvasc this morning even though she normally just takes it at night.  Patient also thought that drinking some alcohol might be helpful.  Patient had no new injury.      REVIEW OF SYSTEMS       Review of Systems   Constitutional:  Negative for activity change, appetite change, chills, diaphoresis and fever.   HENT:  Negative for congestion, ear pain, facial swelling, nosebleeds, rhinorrhea, sinus pressure, sore throat and trouble swallowing.    Eyes:  Negative for pain, discharge and redness.   Respiratory:  Negative for cough, chest tightness, shortness of breath and wheezing.    Cardiovascular:  Negative for chest pain, palpitations and leg swelling.   Gastrointestinal:  Negative for abdominal pain, blood in stool, constipation, diarrhea, nausea and vomiting.   Genitourinary:  Negative for difficulty urinating, dysuria, flank pain, frequency, genital sores and hematuria.   Musculoskeletal:  Negative for arthralgias, back pain, gait problem, joint swelling, myalgias and neck pain.        Hip pain   Skin:

## 2024-02-14 ENCOUNTER — TELEPHONE (OUTPATIENT)
Dept: PRIMARY CARE CLINIC | Age: 64
End: 2024-02-14

## 2024-02-14 NOTE — TELEPHONE ENCOUNTER
[facility] calling, asking for pt to have a referral to hospice of Loc[facility]. Dx-copd. Fax [phone]

## 2024-02-27 ENCOUNTER — TELEPHONE (OUTPATIENT)
Dept: PRIMARY CARE CLINIC | Age: 64
End: 2024-02-27

## 2024-02-27 ENCOUNTER — APPOINTMENT (OUTPATIENT)
Dept: GENERAL RADIOLOGY | Age: 64
DRG: 191 | End: 2024-02-27
Payer: COMMERCIAL

## 2024-02-27 ENCOUNTER — HOSPITAL ENCOUNTER (INPATIENT)
Age: 64
LOS: 6 days | Discharge: HOME HEALTH | DRG: 191 | End: 2024-03-04
Attending: EMERGENCY MEDICINE | Admitting: INTERNAL MEDICINE
Payer: COMMERCIAL

## 2024-02-27 DIAGNOSIS — J44.1 COPD EXACERBATION (HCC): ICD-10-CM

## 2024-02-27 DIAGNOSIS — J44.1 COPD WITH ACUTE EXACERBATION (HCC): Primary | ICD-10-CM

## 2024-02-27 LAB
ALBUMIN SERPL-MCNC: 3.4 G/DL (ref 3.5–5.2)
ALP SERPL-CCNC: 54 U/L (ref 35–104)
ALT SERPL-CCNC: 7 U/L (ref 5–33)
ANION GAP SERPL CALCULATED.3IONS-SCNC: 5 MMOL/L (ref 9–17)
BASOPHILS # BLD: 0 K/UL (ref 0–0.2)
BASOPHILS NFR BLD: 0 % (ref 0–2)
BDY SITE: ABNORMAL
BILIRUB SERPL-MCNC: 0.3 MG/DL (ref 0.3–1.2)
BNP SERPL-MCNC: 289 PG/ML
BODY TEMPERATURE: 37
BUN SERPL-MCNC: 7 MG/DL (ref 8–23)
CALCIUM SERPL-MCNC: 8.2 MG/DL (ref 8.6–10.4)
CHLORIDE SERPL-SCNC: 96 MMOL/L (ref 98–107)
CO2 SERPL-SCNC: 36 MMOL/L (ref 20–31)
COHGB MFR BLD: 3.5 % (ref 0–5)
CREAT SERPL-MCNC: <0.4 MG/DL (ref 0.5–0.9)
EKG ATRIAL RATE: 63 BPM
EKG P AXIS: 83 DEGREES
EKG P-R INTERVAL: 118 MS
EKG Q-T INTERVAL: 430 MS
EKG QRS DURATION: 74 MS
EKG QTC CALCULATION (BAZETT): 440 MS
EKG R AXIS: 73 DEGREES
EKG T AXIS: 79 DEGREES
EKG VENTRICULAR RATE: 63 BPM
EOSINOPHILS RELATIVE PERCENT: 0 % (ref 0–4)
ERYTHROCYTE [DISTWIDTH] IN BLOOD BY AUTOMATED COUNT: 15.1 % (ref 11.5–14.9)
FIO2 ON VENT: ABNORMAL %
FLUAV RNA RESP QL NAA+PROBE: NOT DETECTED
FLUBV RNA RESP QL NAA+PROBE: NOT DETECTED
GFR SERPL CREATININE-BSD FRML MDRD: ABNORMAL ML/MIN/1.73M2
HCO3 ARTERIAL: 39.4 MMOL/L (ref 22–26)
HCT VFR BLD AUTO: 32.4 % (ref 36–46)
HGB BLD-MCNC: 10.4 G/DL (ref 12–16)
INR PPP: 0.9
LIPASE SERPL-CCNC: 110 U/L (ref 13–60)
LYMPHOCYTES RELATIVE PERCENT: 33 % (ref 24–44)
MAGNESIUM SERPL-MCNC: 3.5 MG/DL (ref 1.6–2.6)
MCH RBC QN AUTO: 31.7 PG (ref 26–34)
MCHC RBC AUTO-ENTMCNC: 32 G/DL (ref 31–37)
MCV RBC AUTO: 99.1 FL (ref 80–100)
METHEMOGLOBIN: 0.8 % (ref 0–1.9)
MONOCYTES NFR BLD: 1 K/UL (ref 0.1–1.3)
MONOCYTES NFR BLD: 11 % (ref 1–7)
NEUTROPHILS NFR BLD: 56 % (ref 36–66)
PCO2 ARTERIAL: 69.1 MMHG (ref 35–45)
PH ARTERIAL: 7.37 (ref 7.35–7.45)
PLATELET # BLD AUTO: 275 K/UL (ref 150–450)
PMV BLD AUTO: 7 FL (ref 6–12)
PO2 ARTERIAL: 113 MMHG (ref 80–100)
POSITIVE BASE EXCESS, ART: 14 MMOL/L (ref 0–2)
PROT SERPL-MCNC: 5.2 G/DL (ref 6.4–8.3)
PROTHROMBIN TIME: 12.3 SEC (ref 11.8–14.6)
PT. POSITION: ABNORMAL
RBC # BLD AUTO: 3.27 M/UL (ref 4–5.2)
RESPIRATORY RATE: 20
SODIUM SERPL-SCNC: 137 MMOL/L (ref 135–144)
SOURCE: NORMAL
SPECIMEN DESCRIPTION: NORMAL
TROPONIN I SERPL HS-MCNC: 18 NG/L (ref 0–14)
TROPONIN I SERPL HS-MCNC: 18 NG/L (ref 0–14)
WBC OTHER # BLD: 8.4 K/UL (ref 3.5–11)

## 2024-02-27 PROCEDURE — 80053 COMPREHEN METABOLIC PANEL: CPT

## 2024-02-27 PROCEDURE — 85025 COMPLETE CBC W/AUTO DIFF WBC: CPT

## 2024-02-27 PROCEDURE — 36415 COLL VENOUS BLD VENIPUNCTURE: CPT

## 2024-02-27 PROCEDURE — 99285 EMERGENCY DEPT VISIT HI MDM: CPT

## 2024-02-27 PROCEDURE — 82805 BLOOD GASES W/O2 SATURATION: CPT

## 2024-02-27 PROCEDURE — 2060000000 HC ICU INTERMEDIATE R&B

## 2024-02-27 PROCEDURE — 6370000000 HC RX 637 (ALT 250 FOR IP): Performed by: INTERNAL MEDICINE

## 2024-02-27 PROCEDURE — 36600 WITHDRAWAL OF ARTERIAL BLOOD: CPT

## 2024-02-27 PROCEDURE — 2580000003 HC RX 258: Performed by: INTERNAL MEDICINE

## 2024-02-27 PROCEDURE — 94761 N-INVAS EAR/PLS OXIMETRY MLT: CPT

## 2024-02-27 PROCEDURE — 84484 ASSAY OF TROPONIN QUANT: CPT

## 2024-02-27 PROCEDURE — 83690 ASSAY OF LIPASE: CPT

## 2024-02-27 PROCEDURE — 6360000002 HC RX W HCPCS: Performed by: INTERNAL MEDICINE

## 2024-02-27 PROCEDURE — 2700000000 HC OXYGEN THERAPY PER DAY

## 2024-02-27 PROCEDURE — 93005 ELECTROCARDIOGRAM TRACING: CPT | Performed by: EMERGENCY MEDICINE

## 2024-02-27 PROCEDURE — 71045 X-RAY EXAM CHEST 1 VIEW: CPT

## 2024-02-27 PROCEDURE — 99223 1ST HOSP IP/OBS HIGH 75: CPT | Performed by: INTERNAL MEDICINE

## 2024-02-27 PROCEDURE — 93010 ELECTROCARDIOGRAM REPORT: CPT | Performed by: INTERNAL MEDICINE

## 2024-02-27 PROCEDURE — 83880 ASSAY OF NATRIURETIC PEPTIDE: CPT

## 2024-02-27 PROCEDURE — 6370000000 HC RX 637 (ALT 250 FOR IP): Performed by: STUDENT IN AN ORGANIZED HEALTH CARE EDUCATION/TRAINING PROGRAM

## 2024-02-27 PROCEDURE — 85610 PROTHROMBIN TIME: CPT

## 2024-02-27 PROCEDURE — 6370000000 HC RX 637 (ALT 250 FOR IP): Performed by: NURSE PRACTITIONER

## 2024-02-27 PROCEDURE — 87636 SARSCOV2 & INF A&B AMP PRB: CPT

## 2024-02-27 PROCEDURE — 94640 AIRWAY INHALATION TREATMENT: CPT

## 2024-02-27 PROCEDURE — 83735 ASSAY OF MAGNESIUM: CPT

## 2024-02-27 RX ORDER — SENNA AND DOCUSATE SODIUM 50; 8.6 MG/1; MG/1
2 TABLET, FILM COATED ORAL PRN
Status: DISCONTINUED | OUTPATIENT
Start: 2024-02-27 | End: 2024-03-04 | Stop reason: HOSPADM

## 2024-02-27 RX ORDER — HYDROMORPHONE HYDROCHLORIDE 2 MG/1
1 TABLET ORAL EVERY 6 HOURS PRN
Status: ON HOLD | COMMUNITY
End: 2024-03-04 | Stop reason: HOSPADM

## 2024-02-27 RX ORDER — PRIMIDONE 50 MG/1
100 TABLET ORAL NIGHTLY
COMMUNITY

## 2024-02-27 RX ORDER — OXYCODONE HYDROCHLORIDE AND ACETAMINOPHEN 5; 325 MG/1; MG/1
1 TABLET ORAL EVERY 4 HOURS PRN
Status: DISCONTINUED | OUTPATIENT
Start: 2024-02-27 | End: 2024-03-04 | Stop reason: HOSPADM

## 2024-02-27 RX ORDER — SODIUM CHLORIDE 0.9 % (FLUSH) 0.9 %
5-40 SYRINGE (ML) INJECTION PRN
Status: DISCONTINUED | OUTPATIENT
Start: 2024-02-27 | End: 2024-03-04 | Stop reason: HOSPADM

## 2024-02-27 RX ORDER — ACETAMINOPHEN 650 MG/1
650 SUPPOSITORY RECTAL EVERY 6 HOURS PRN
Status: DISCONTINUED | OUTPATIENT
Start: 2024-02-27 | End: 2024-03-04 | Stop reason: HOSPADM

## 2024-02-27 RX ORDER — POLYETHYLENE GLYCOL 3350 17 G/17G
17 POWDER, FOR SOLUTION ORAL DAILY PRN
Status: DISCONTINUED | OUTPATIENT
Start: 2024-02-27 | End: 2024-03-04 | Stop reason: HOSPADM

## 2024-02-27 RX ORDER — SUCRALFATE ORAL 1 G/10ML
5 SUSPENSION ORAL 4 TIMES DAILY
COMMUNITY

## 2024-02-27 RX ORDER — HYDROMORPHONE HYDROCHLORIDE 2 MG/1
1 TABLET ORAL
Status: DISCONTINUED | OUTPATIENT
Start: 2024-02-27 | End: 2024-02-27

## 2024-02-27 RX ORDER — HYDROMORPHONE HYDROCHLORIDE 2 MG/1
1 TABLET ORAL EVERY 6 HOURS PRN
Status: DISCONTINUED | OUTPATIENT
Start: 2024-02-27 | End: 2024-02-27

## 2024-02-27 RX ORDER — BISACODYL 10 MG
10 SUPPOSITORY, RECTAL RECTAL PRN
COMMUNITY

## 2024-02-27 RX ORDER — GUAIFENESIN/DEXTROMETHORPHAN 100-10MG/5
10 SYRUP ORAL EVERY 4 HOURS PRN
Status: DISCONTINUED | OUTPATIENT
Start: 2024-02-27 | End: 2024-03-04 | Stop reason: HOSPADM

## 2024-02-27 RX ORDER — BISACODYL 10 MG
10 SUPPOSITORY, RECTAL RECTAL PRN
Status: DISCONTINUED | OUTPATIENT
Start: 2024-02-27 | End: 2024-03-04 | Stop reason: HOSPADM

## 2024-02-27 RX ORDER — ALBUTEROL SULFATE 2.5 MG/3ML
2.5 SOLUTION RESPIRATORY (INHALATION)
Status: DISCONTINUED | OUTPATIENT
Start: 2024-02-27 | End: 2024-03-04 | Stop reason: HOSPADM

## 2024-02-27 RX ORDER — ALBUTEROL SULFATE 90 UG/1
2 AEROSOL, METERED RESPIRATORY (INHALATION) EVERY 4 HOURS PRN
COMMUNITY

## 2024-02-27 RX ORDER — ALBUTEROL SULFATE 2.5 MG/3ML
2.5 SOLUTION RESPIRATORY (INHALATION) EVERY 4 HOURS PRN
COMMUNITY

## 2024-02-27 RX ORDER — HYDROMORPHONE HYDROCHLORIDE 2 MG/1
1 TABLET ORAL
Status: ON HOLD | COMMUNITY
End: 2024-03-04 | Stop reason: HOSPADM

## 2024-02-27 RX ORDER — AZITHROMYCIN 250 MG/1
250 TABLET, FILM COATED ORAL
COMMUNITY

## 2024-02-27 RX ORDER — DEXAMETHASONE 4 MG/1
4 TABLET ORAL DAILY
Status: ON HOLD | COMMUNITY
End: 2024-03-04 | Stop reason: HOSPADM

## 2024-02-27 RX ORDER — PREDNISONE 20 MG/1
40 TABLET ORAL DAILY
Status: DISCONTINUED | OUTPATIENT
Start: 2024-02-29 | End: 2024-02-27

## 2024-02-27 RX ORDER — SODIUM CHLORIDE 0.9 % (FLUSH) 0.9 %
5-40 SYRINGE (ML) INJECTION EVERY 12 HOURS SCHEDULED
Status: DISCONTINUED | OUTPATIENT
Start: 2024-02-27 | End: 2024-03-04 | Stop reason: HOSPADM

## 2024-02-27 RX ORDER — SODIUM CHLORIDE 9 MG/ML
INJECTION, SOLUTION INTRAVENOUS CONTINUOUS
Status: DISCONTINUED | OUTPATIENT
Start: 2024-02-27 | End: 2024-02-28

## 2024-02-27 RX ORDER — FUROSEMIDE 20 MG/1
20 TABLET ORAL DAILY
COMMUNITY

## 2024-02-27 RX ORDER — BENZONATATE 200 MG/1
200 CAPSULE ORAL 3 TIMES DAILY
Status: ON HOLD | COMMUNITY
End: 2024-03-04

## 2024-02-27 RX ORDER — VENLAFAXINE HYDROCHLORIDE 150 MG/1
150 CAPSULE, EXTENDED RELEASE ORAL DAILY
Status: DISCONTINUED | OUTPATIENT
Start: 2024-02-27 | End: 2024-03-04 | Stop reason: HOSPADM

## 2024-02-27 RX ORDER — ACETAMINOPHEN 325 MG/1
650 TABLET ORAL EVERY 6 HOURS PRN
Status: DISCONTINUED | OUTPATIENT
Start: 2024-02-27 | End: 2024-03-04 | Stop reason: HOSPADM

## 2024-02-27 RX ORDER — ONDANSETRON 4 MG/1
4 TABLET, ORALLY DISINTEGRATING ORAL EVERY 8 HOURS PRN
Status: DISCONTINUED | OUTPATIENT
Start: 2024-02-27 | End: 2024-03-04 | Stop reason: HOSPADM

## 2024-02-27 RX ORDER — LEVOTHYROXINE SODIUM 0.07 MG/1
75 TABLET ORAL DAILY
Status: DISCONTINUED | OUTPATIENT
Start: 2024-02-28 | End: 2024-03-04 | Stop reason: HOSPADM

## 2024-02-27 RX ORDER — CLONAZEPAM 1 MG/1
0.5 TABLET ORAL EVERY 8 HOURS
Status: DISCONTINUED | OUTPATIENT
Start: 2024-02-27 | End: 2024-03-04 | Stop reason: HOSPADM

## 2024-02-27 RX ORDER — IPRATROPIUM BROMIDE AND ALBUTEROL SULFATE 2.5; .5 MG/3ML; MG/3ML
1 SOLUTION RESPIRATORY (INHALATION)
Status: DISCONTINUED | OUTPATIENT
Start: 2024-02-27 | End: 2024-02-28

## 2024-02-27 RX ORDER — AMLODIPINE BESYLATE 10 MG/1
10 TABLET ORAL EVERY EVENING
Status: DISCONTINUED | OUTPATIENT
Start: 2024-02-27 | End: 2024-02-28

## 2024-02-27 RX ORDER — TIZANIDINE 4 MG/1
4 TABLET ORAL 3 TIMES DAILY PRN
Status: DISCONTINUED | OUTPATIENT
Start: 2024-02-27 | End: 2024-03-04 | Stop reason: HOSPADM

## 2024-02-27 RX ORDER — CARVEDILOL 12.5 MG/1
12.5 TABLET ORAL 2 TIMES DAILY
Status: DISCONTINUED | OUTPATIENT
Start: 2024-02-27 | End: 2024-03-04 | Stop reason: HOSPADM

## 2024-02-27 RX ORDER — PANTOPRAZOLE SODIUM 40 MG/1
40 TABLET, DELAYED RELEASE ORAL
Status: DISCONTINUED | OUTPATIENT
Start: 2024-02-28 | End: 2024-02-28

## 2024-02-27 RX ORDER — IPRATROPIUM BROMIDE AND ALBUTEROL SULFATE 2.5; .5 MG/3ML; MG/3ML
1 SOLUTION RESPIRATORY (INHALATION) EVERY 4 HOURS PRN
Status: DISCONTINUED | OUTPATIENT
Start: 2024-02-27 | End: 2024-03-02

## 2024-02-27 RX ORDER — SENNA AND DOCUSATE SODIUM 50; 8.6 MG/1; MG/1
2 TABLET, FILM COATED ORAL PRN
COMMUNITY

## 2024-02-27 RX ORDER — GUAIFENESIN AND DEXTROMETHORPHAN HYDROBROMIDE 100; 10 MG/5ML; MG/5ML
10 SOLUTION ORAL EVERY 4 HOURS PRN
COMMUNITY

## 2024-02-27 RX ORDER — HALOPERIDOL 1 MG/1
1 TABLET ORAL PRN
COMMUNITY

## 2024-02-27 RX ORDER — ONDANSETRON 2 MG/ML
4 INJECTION INTRAMUSCULAR; INTRAVENOUS EVERY 6 HOURS PRN
Status: DISCONTINUED | OUTPATIENT
Start: 2024-02-27 | End: 2024-03-04 | Stop reason: HOSPADM

## 2024-02-27 RX ORDER — GUAIFENESIN 600 MG/1
600 TABLET, EXTENDED RELEASE ORAL 2 TIMES DAILY
Status: DISCONTINUED | OUTPATIENT
Start: 2024-02-27 | End: 2024-03-04 | Stop reason: HOSPADM

## 2024-02-27 RX ORDER — PRIMIDONE 50 MG/1
100 TABLET ORAL NIGHTLY
Status: DISCONTINUED | OUTPATIENT
Start: 2024-02-27 | End: 2024-03-04 | Stop reason: HOSPADM

## 2024-02-27 RX ORDER — SODIUM CHLORIDE 9 MG/ML
INJECTION, SOLUTION INTRAVENOUS PRN
Status: DISCONTINUED | OUTPATIENT
Start: 2024-02-27 | End: 2024-03-04 | Stop reason: HOSPADM

## 2024-02-27 RX ORDER — IPRATROPIUM BROMIDE AND ALBUTEROL SULFATE 2.5; .5 MG/3ML; MG/3ML
1 SOLUTION RESPIRATORY (INHALATION) EVERY 4 HOURS PRN
Status: DISCONTINUED | OUTPATIENT
Start: 2024-02-27 | End: 2024-02-27

## 2024-02-27 RX ORDER — ENOXAPARIN SODIUM 100 MG/ML
30 INJECTION SUBCUTANEOUS DAILY
Status: DISCONTINUED | OUTPATIENT
Start: 2024-02-27 | End: 2024-03-04 | Stop reason: HOSPADM

## 2024-02-27 RX ORDER — HYOSCYAMINE SULFATE 0.125 MG
125 TABLET ORAL EVERY 4 HOURS PRN
COMMUNITY

## 2024-02-27 RX ADMIN — IPRATROPIUM BROMIDE AND ALBUTEROL SULFATE 1 DOSE: 2.5; .5 SOLUTION RESPIRATORY (INHALATION) at 12:12

## 2024-02-27 RX ADMIN — GUAIFENESIN 600 MG: 600 TABLET, EXTENDED RELEASE ORAL at 20:06

## 2024-02-27 RX ADMIN — OXYCODONE HYDROCHLORIDE AND ACETAMINOPHEN 1 TABLET: 5; 325 TABLET ORAL at 22:38

## 2024-02-27 RX ADMIN — PRIMIDONE 100 MG: 50 TABLET ORAL at 22:30

## 2024-02-27 RX ADMIN — GUAIFENESIN 600 MG: 600 TABLET, EXTENDED RELEASE ORAL at 12:06

## 2024-02-27 RX ADMIN — WATER 40 MG: 1 INJECTION INTRAMUSCULAR; INTRAVENOUS; SUBCUTANEOUS at 23:55

## 2024-02-27 RX ADMIN — IPRATROPIUM BROMIDE AND ALBUTEROL SULFATE 1 DOSE: 2.5; .5 SOLUTION RESPIRATORY (INHALATION) at 20:17

## 2024-02-27 RX ADMIN — IPRATROPIUM BROMIDE AND ALBUTEROL SULFATE 1 DOSE: 2.5; .5 SOLUTION RESPIRATORY (INHALATION) at 07:33

## 2024-02-27 RX ADMIN — ENOXAPARIN SODIUM 30 MG: 100 INJECTION SUBCUTANEOUS at 12:08

## 2024-02-27 RX ADMIN — IPRATROPIUM BROMIDE AND ALBUTEROL SULFATE 1 DOSE: 2.5; .5 SOLUTION RESPIRATORY (INHALATION) at 09:57

## 2024-02-27 RX ADMIN — IPRATROPIUM BROMIDE AND ALBUTEROL SULFATE 1 DOSE: 2.5; .5 SOLUTION RESPIRATORY (INHALATION) at 07:23

## 2024-02-27 RX ADMIN — IPRATROPIUM BROMIDE AND ALBUTEROL SULFATE 1 DOSE: 2.5; .5 SOLUTION RESPIRATORY (INHALATION) at 15:22

## 2024-02-27 RX ADMIN — VENLAFAXINE HYDROCHLORIDE 150 MG: 150 CAPSULE, EXTENDED RELEASE ORAL at 22:38

## 2024-02-27 RX ADMIN — AMLODIPINE BESYLATE 10 MG: 10 TABLET ORAL at 22:31

## 2024-02-27 RX ADMIN — CLONAZEPAM 0.5 MG: 1 TABLET ORAL at 20:06

## 2024-02-27 RX ADMIN — WATER 40 MG: 1 INJECTION INTRAMUSCULAR; INTRAVENOUS; SUBCUTANEOUS at 18:15

## 2024-02-27 RX ADMIN — WATER 40 MG: 1 INJECTION INTRAMUSCULAR; INTRAVENOUS; SUBCUTANEOUS at 12:07

## 2024-02-27 RX ADMIN — SODIUM CHLORIDE: 9 INJECTION, SOLUTION INTRAVENOUS at 12:13

## 2024-02-27 RX ADMIN — MIRTAZAPINE 45 MG: 30 TABLET, FILM COATED ORAL at 22:30

## 2024-02-27 ASSESSMENT — PAIN DESCRIPTION - LOCATION: LOCATION: HIP;BACK;KNEE

## 2024-02-27 ASSESSMENT — PAIN - FUNCTIONAL ASSESSMENT
PAIN_FUNCTIONAL_ASSESSMENT: ACTIVITIES ARE NOT PREVENTED
PAIN_FUNCTIONAL_ASSESSMENT: NONE - DENIES PAIN

## 2024-02-27 ASSESSMENT — PAIN SCALES - GENERAL
PAINLEVEL_OUTOF10: 6
PAINLEVEL_OUTOF10: 8

## 2024-02-27 ASSESSMENT — PAIN SCALES - WONG BAKER: WONGBAKER_NUMERICALRESPONSE: 0

## 2024-02-27 ASSESSMENT — LIFESTYLE VARIABLES
HOW OFTEN DO YOU HAVE A DRINK CONTAINING ALCOHOL: MONTHLY OR LESS
HOW MANY STANDARD DRINKS CONTAINING ALCOHOL DO YOU HAVE ON A TYPICAL DAY: 1 OR 2

## 2024-02-27 ASSESSMENT — PAIN DESCRIPTION - ORIENTATION: ORIENTATION: RIGHT

## 2024-02-27 ASSESSMENT — PAIN DESCRIPTION - DESCRIPTORS: DESCRIPTORS: ACHING;OTHER (COMMENT)

## 2024-02-27 NOTE — ED NOTES
Report given to LILY Ivna from ED.   Report method in person   The following was reviewed with receiving RN:   Current vital signs:  /79   Pulse 70   Temp 98.5 °F (36.9 °C) (Oral)   Resp 21   Ht 1.6 m (5' 3\")   Wt 45.4 kg (100 lb)   SpO2 100%   BMI 17.71 kg/m²                MEWS Score: 2     Any medication or safety alerts were reviewed. Any pending diagnostics and notifications were also reviewed, as well as any safety concerns or issues, abnormal labs, abnormal imaging, and abnormal assessment findings. Questions were answered.

## 2024-02-27 NOTE — TELEPHONE ENCOUNTER
Vale from [facility] calling. Pt D/C off the Hospice program as of today, seeking further treatment. fyi    [phone]

## 2024-02-27 NOTE — ED TRIAGE NOTES
Mode of arrival (squad #, walk in, police, etc) : EMS        Chief complaint(s): Respiratory distress        Arrival Note (brief scenario, treatment PTA, etc).: Patient states worsening SOB for 3-4 days now. Patient wears O2 at 2L/min via NC at home, history of COPD. EMS established IV access at left AC gauge 20. Patient was given Solumedrol 125mg IV, Magnesium sulfate 1g IV and breathing treatment ( Albuterol and Combivent).         C= \"Have you ever felt that you should Cut down on your drinking?\"  No  A= \"Have people Annoyed you by criticizing your drinking?\"  No  G= \"Have you ever felt bad or Guilty about your drinking?\"  No  E= \"Have you ever had a drink as an Eye-opener first thing in the morning to steady your nerves or to help a hangover?\"  No      Deferred []      Reason for deferring: N/A    *If yes to two or more: probable alcohol abuse.*

## 2024-02-27 NOTE — ED NOTES
Report given to LILY Ron from ED.   Report method in person   The following was reviewed with receiving RN:   Current vital signs:  /75   Pulse 79   Temp 98.5 °F (36.9 °C) (Oral)   Resp 15   Ht 1.6 m (5' 3\")   Wt 45.4 kg (100 lb)   SpO2 98%   BMI 17.71 kg/m²                MEWS Score: 2     Any medication or safety alerts were reviewed. Any pending diagnostics and notifications were also reviewed, as well as any safety concerns or issues, abnormal labs, abnormal imaging, and abnormal assessment findings. Questions were answered.

## 2024-02-27 NOTE — ED PROVIDER NOTES
EMERGENCY DEPARTMENT ENCOUNTER    Pt Name: Laura Rhodes  MRN: [id number]  Birthdate 1960  Date of evaluation: 2/27/24  CHIEF COMPLAINT       Chief Complaint   Patient presents with    Respiratory Distress     Worsening shortness of breath for 4 days     HISTORY OF PRESENT ILLNESS   63-year-old female presents for complaints of shortness of breath.  Patient has a history of COPD, normally wears 2 L of home O2, she states that over the last approximately 3 to 4 days she has been feeling increased shortness of breath.  At rest and with exertion.  She denies any significant cough, denies any chest pain, denies any recent illness or known sick contacts.  She reports she has had a little swelling in her lower extremities.  Denies any abdominal pain nausea or vomiting fevers or chills.  She states that she has had increased to 3 L of home O2 in the last couple days.    The history is provided by the patient.           REVIEW OF SYSTEMS     Review of Systems   Constitutional:  Negative for fever.   HENT:  Negative for congestion and ear pain.    Eyes:  Negative for pain.   Respiratory:  Positive for shortness of breath.    Cardiovascular:  Negative for chest pain, palpitations and leg swelling.   Gastrointestinal:  Negative for abdominal pain.   Genitourinary:  Negative for dysuria and flank pain.   Musculoskeletal:  Negative for back pain.   Skin:  Negative for color change.   Neurological:  Negative for numbness and headaches.   Psychiatric/Behavioral:  Negative for confusion.    All other systems reviewed and are negative.    PASTMEDICAL HISTORY     Past Medical History:   Diagnosis Date    Acid reflux     Anxiety     Arthritis     Cancer (HCC)     cervical    Cervical osteoarthritis 11/30/2020    COPD (chronic obstructive pulmonary disease) (HCC) 02/23/2018    DDD (degenerative disc disease), cervical     Depression     Hiatal hernia     History of blood transfusion     Reaction fever went up to 107 . per Pt.

## 2024-02-28 ENCOUNTER — APPOINTMENT (OUTPATIENT)
Dept: GENERAL RADIOLOGY | Age: 64
DRG: 191 | End: 2024-02-28
Payer: COMMERCIAL

## 2024-02-28 PROBLEM — Z71.89 ACP (ADVANCE CARE PLANNING): Status: ACTIVE | Noted: 2024-02-28

## 2024-02-28 PROBLEM — Z51.5 PALLIATIVE CARE ENCOUNTER: Status: ACTIVE | Noted: 2024-02-28

## 2024-02-28 PROBLEM — Z71.89 DNR (DO NOT RESUSCITATE) DISCUSSION: Status: ACTIVE | Noted: 2024-02-28

## 2024-02-28 PROBLEM — R06.02 SHORTNESS OF BREATH: Status: ACTIVE | Noted: 2024-02-28

## 2024-02-28 PROBLEM — Z71.89 GOALS OF CARE, COUNSELING/DISCUSSION: Status: ACTIVE | Noted: 2024-02-28

## 2024-02-28 PROBLEM — F41.9 ANXIETY: Status: ACTIVE | Noted: 2024-02-28

## 2024-02-28 PROCEDURE — 94640 AIRWAY INHALATION TREATMENT: CPT

## 2024-02-28 PROCEDURE — 6360000002 HC RX W HCPCS: Performed by: INTERNAL MEDICINE

## 2024-02-28 PROCEDURE — 5A09557 ASSISTANCE WITH RESPIRATORY VENTILATION, GREATER THAN 96 CONSECUTIVE HOURS, CONTINUOUS POSITIVE AIRWAY PRESSURE: ICD-10-PCS | Performed by: INTERNAL MEDICINE

## 2024-02-28 PROCEDURE — 2580000003 HC RX 258: Performed by: NURSE PRACTITIONER

## 2024-02-28 PROCEDURE — 94761 N-INVAS EAR/PLS OXIMETRY MLT: CPT

## 2024-02-28 PROCEDURE — 6370000000 HC RX 637 (ALT 250 FOR IP): Performed by: INTERNAL MEDICINE

## 2024-02-28 PROCEDURE — 2060000000 HC ICU INTERMEDIATE R&B

## 2024-02-28 PROCEDURE — 97166 OT EVAL MOD COMPLEX 45 MIN: CPT

## 2024-02-28 PROCEDURE — 97535 SELF CARE MNGMENT TRAINING: CPT

## 2024-02-28 PROCEDURE — 6360000002 HC RX W HCPCS: Performed by: STUDENT IN AN ORGANIZED HEALTH CARE EDUCATION/TRAINING PROGRAM

## 2024-02-28 PROCEDURE — 6360000002 HC RX W HCPCS: Performed by: NURSE PRACTITIONER

## 2024-02-28 PROCEDURE — 2700000000 HC OXYGEN THERAPY PER DAY

## 2024-02-28 PROCEDURE — 94660 CPAP INITIATION&MGMT: CPT

## 2024-02-28 PROCEDURE — 6370000000 HC RX 637 (ALT 250 FOR IP): Performed by: NURSE PRACTITIONER

## 2024-02-28 PROCEDURE — 2580000003 HC RX 258: Performed by: INTERNAL MEDICINE

## 2024-02-28 PROCEDURE — 99222 1ST HOSP IP/OBS MODERATE 55: CPT | Performed by: NURSE PRACTITIONER

## 2024-02-28 PROCEDURE — 97110 THERAPEUTIC EXERCISES: CPT

## 2024-02-28 PROCEDURE — 99232 SBSQ HOSP IP/OBS MODERATE 35: CPT | Performed by: INTERNAL MEDICINE

## 2024-02-28 PROCEDURE — 97165 OT EVAL LOW COMPLEX 30 MIN: CPT

## 2024-02-28 PROCEDURE — 97162 PT EVAL MOD COMPLEX 30 MIN: CPT

## 2024-02-28 PROCEDURE — 71045 X-RAY EXAM CHEST 1 VIEW: CPT

## 2024-02-28 RX ORDER — NICOTINE POLACRILEX 4 MG/1
20 GUM, CHEWING ORAL 2 TIMES DAILY
Status: DISCONTINUED | OUTPATIENT
Start: 2024-02-28 | End: 2024-02-28

## 2024-02-28 RX ORDER — OMEPRAZOLE 40 MG/1
40 CAPSULE, DELAYED RELEASE ORAL 2 TIMES DAILY
Status: DISCONTINUED | OUTPATIENT
Start: 2024-02-28 | End: 2024-03-04 | Stop reason: HOSPADM

## 2024-02-28 RX ORDER — ALBUTEROL SULFATE 2.5 MG/3ML
2.5 SOLUTION RESPIRATORY (INHALATION)
Status: DISCONTINUED | OUTPATIENT
Start: 2024-02-28 | End: 2024-03-02

## 2024-02-28 RX ORDER — NICOTINE 21 MG/24HR
1 PATCH, TRANSDERMAL 24 HOURS TRANSDERMAL DAILY
Status: DISCONTINUED | OUTPATIENT
Start: 2024-02-28 | End: 2024-03-04 | Stop reason: HOSPADM

## 2024-02-28 RX ORDER — AMLODIPINE BESYLATE 10 MG/1
10 TABLET ORAL EVERY EVENING
Status: DISCONTINUED | OUTPATIENT
Start: 2024-02-28 | End: 2024-02-29

## 2024-02-28 RX ORDER — AZITHROMYCIN 250 MG/1
250 TABLET, FILM COATED ORAL
Status: DISCONTINUED | OUTPATIENT
Start: 2024-02-28 | End: 2024-03-04 | Stop reason: HOSPADM

## 2024-02-28 RX ADMIN — SODIUM CHLORIDE, PRESERVATIVE FREE 10 ML: 5 INJECTION INTRAVENOUS at 20:15

## 2024-02-28 RX ADMIN — TIZANIDINE 4 MG: 4 TABLET ORAL at 21:18

## 2024-02-28 RX ADMIN — GUAIFENESIN 600 MG: 600 TABLET, EXTENDED RELEASE ORAL at 08:50

## 2024-02-28 RX ADMIN — IPRATROPIUM BROMIDE AND ALBUTEROL SULFATE 1 DOSE: 2.5; .5 SOLUTION RESPIRATORY (INHALATION) at 07:57

## 2024-02-28 RX ADMIN — WATER 40 MG: 1 INJECTION INTRAMUSCULAR; INTRAVENOUS; SUBCUTANEOUS at 12:04

## 2024-02-28 RX ADMIN — CARVEDILOL 12.5 MG: 12.5 TABLET, FILM COATED ORAL at 18:54

## 2024-02-28 RX ADMIN — LEVOTHYROXINE SODIUM 75 MCG: 0.07 TABLET ORAL at 05:40

## 2024-02-28 RX ADMIN — ENOXAPARIN SODIUM 30 MG: 100 INJECTION SUBCUTANEOUS at 08:50

## 2024-02-28 RX ADMIN — ALBUTEROL SULFATE 2.5 MG: 2.5 SOLUTION RESPIRATORY (INHALATION) at 11:45

## 2024-02-28 RX ADMIN — SODIUM CHLORIDE, PRESERVATIVE FREE 10 ML: 5 INJECTION INTRAVENOUS at 12:04

## 2024-02-28 RX ADMIN — CARVEDILOL 12.5 MG: 12.5 TABLET, FILM COATED ORAL at 08:50

## 2024-02-28 RX ADMIN — AMLODIPINE BESYLATE 10 MG: 10 TABLET ORAL at 21:19

## 2024-02-28 RX ADMIN — CLONAZEPAM 0.5 MG: 1 TABLET ORAL at 20:10

## 2024-02-28 RX ADMIN — ALBUTEROL SULFATE 2.5 MG: 2.5 SOLUTION RESPIRATORY (INHALATION) at 16:04

## 2024-02-28 RX ADMIN — OXYCODONE HYDROCHLORIDE AND ACETAMINOPHEN 1 TABLET: 5; 325 TABLET ORAL at 15:52

## 2024-02-28 RX ADMIN — OXYCODONE HYDROCHLORIDE AND ACETAMINOPHEN 1 TABLET: 5; 325 TABLET ORAL at 21:18

## 2024-02-28 RX ADMIN — SODIUM CHLORIDE: 9 INJECTION, SOLUTION INTRAVENOUS at 01:14

## 2024-02-28 RX ADMIN — CLONAZEPAM 0.5 MG: 1 TABLET ORAL at 12:04

## 2024-02-28 RX ADMIN — PRIMIDONE 100 MG: 50 TABLET ORAL at 21:17

## 2024-02-28 RX ADMIN — GUAIFENESIN 600 MG: 600 TABLET, EXTENDED RELEASE ORAL at 21:18

## 2024-02-28 RX ADMIN — OMEPRAZOLE 40 MG: 40 CAPSULE, DELAYED RELEASE ORAL at 09:49

## 2024-02-28 RX ADMIN — VENLAFAXINE HYDROCHLORIDE 150 MG: 150 CAPSULE, EXTENDED RELEASE ORAL at 08:50

## 2024-02-28 RX ADMIN — CLONAZEPAM 0.5 MG: 1 TABLET ORAL at 05:48

## 2024-02-28 RX ADMIN — OMEPRAZOLE 40 MG: 40 CAPSULE, DELAYED RELEASE ORAL at 21:20

## 2024-02-28 RX ADMIN — OXYCODONE HYDROCHLORIDE AND ACETAMINOPHEN 1 TABLET: 5; 325 TABLET ORAL at 09:21

## 2024-02-28 RX ADMIN — SODIUM CHLORIDE, PRESERVATIVE FREE 10 ML: 5 INJECTION INTRAVENOUS at 08:50

## 2024-02-28 RX ADMIN — AZITHROMYCIN DIHYDRATE 250 MG: 250 TABLET ORAL at 21:30

## 2024-02-28 RX ADMIN — WATER 40 MG: 1 INJECTION INTRAMUSCULAR; INTRAVENOUS; SUBCUTANEOUS at 20:12

## 2024-02-28 RX ADMIN — ALBUTEROL SULFATE 2.5 MG: 2.5 SOLUTION RESPIRATORY (INHALATION) at 19:41

## 2024-02-28 RX ADMIN — MIRTAZAPINE 45 MG: 30 TABLET, FILM COATED ORAL at 21:17

## 2024-02-28 RX ADMIN — WATER 40 MG: 1 INJECTION INTRAMUSCULAR; INTRAVENOUS; SUBCUTANEOUS at 05:39

## 2024-02-28 ASSESSMENT — PAIN DESCRIPTION - ORIENTATION
ORIENTATION: RIGHT

## 2024-02-28 ASSESSMENT — PAIN DESCRIPTION - FREQUENCY
FREQUENCY: CONTINUOUS

## 2024-02-28 ASSESSMENT — PAIN DESCRIPTION - LOCATION
LOCATION: HIP;LEG
LOCATION: HIP;LEG
LOCATION: BACK;HIP
LOCATION: BACK;HIP;LEG

## 2024-02-28 ASSESSMENT — PAIN DESCRIPTION - ONSET
ONSET: ON-GOING

## 2024-02-28 ASSESSMENT — PAIN DESCRIPTION - PAIN TYPE
TYPE: CHRONIC PAIN
TYPE: CHRONIC PAIN

## 2024-02-28 ASSESSMENT — PAIN SCALES - GENERAL
PAINLEVEL_OUTOF10: 6
PAINLEVEL_OUTOF10: 8
PAINLEVEL_OUTOF10: 9
PAINLEVEL_OUTOF10: 4
PAINLEVEL_OUTOF10: 4

## 2024-02-28 ASSESSMENT — PAIN - FUNCTIONAL ASSESSMENT
PAIN_FUNCTIONAL_ASSESSMENT: ACTIVITIES ARE NOT PREVENTED
PAIN_FUNCTIONAL_ASSESSMENT: ACTIVITIES ARE NOT PREVENTED

## 2024-02-28 ASSESSMENT — PAIN DESCRIPTION - DESCRIPTORS
DESCRIPTORS: ACHING;THROBBING
DESCRIPTORS: ACHING;DISCOMFORT
DESCRIPTORS: ACHING
DESCRIPTORS: ACHING;DISCOMFORT

## 2024-02-28 NOTE — DISCHARGE INSTR - COC
Continuity of Care Form    Patient Name: Laura Rhodes   :  1960  MRN:  [id number]    Admit date:  2024  Discharge date:  2024    Code Status Order: Full Code   Advance Directives:     Admitting Physician:  Kartik Salas MD  PCP: Van Pate MD    Discharging Nurse: ELIDIA Vergara  Discharging Hospital Unit/Room#: /-01  Discharging Unit Phone Number: [phone]    Emergency Contact:   Extended Emergency Contact Information  Primary Emergency Contact: Dionicio Rhodes  Address: [address] [place] [address] of [facility]  Home Phone: [phone]  Work Phone: [phone]  Mobile Phone: [phone]  Relation: Spouse   needed? No    Past Surgical History:  Past Surgical History:   Procedure Laterality Date    BACK SURGERY      diskectomy, L3-L5    COLONOSCOPY N/A 2019    COLONOSCOPY DIAGNOSTIC performed by Rajwinder Aleman MD at [facility] OR    COLONOSCOPY  2019    COLONOSCOPY POLYPECTOMY SNARE/COLD BIOPSY performed by Rajwinder Aleman MD at [facility]    DILATION AND CURETTAGE OF UTERUS      FEMUR FRACTURE SURGERY Right 2023    FEMUR IM NAIL HALIMA INSERTION performed by Juli Vann MD at [facility] OR    OTHER SURGICAL HISTORY      ectopic pregnancy with tube removal    PAIN MANAGEMENT PROCEDURE      Left sided Transforaminal Epidural Steriod Injection    THYROIDECTOMY, PARTIAL      UPPER GASTROINTESTINAL ENDOSCOPY      UPPER GASTROINTESTINAL ENDOSCOPY N/A 2020    EGD BIOPSY with dilatation performed by Ralph Joy MD at [facility] ENDO    UPPER GASTROINTESTINAL ENDOSCOPY N/A 2022    EGD BIOPSY performed by Fritz Zacarias MD at [facility] ENDO       Immunization History:   Immunization History   Administered Date(s) Administered    Influenza Virus Vaccine 2018, 2020    Influenza, FLUARIX, FLULAVAL, FLUZONE (age 6 mo+) AND AFLURIA, (age 3 y+), PF, 0.5mL 10/05/2019, 10/14/2020    Influenza, FLUCELVAX, (age 6 mo+),

## 2024-02-28 NOTE — H&P
[facility] Internal Medicine  Jomar Jo MD; Enrique Motta MD; Kartik Salas MD; MD Alejandra Love MD; Aren Ashley MD    [facility] Internal Medicine   IN-PATIENT SERVICE   [facility]    HISTORY AND PHYSICAL EXAMINATION            Date:   2/27/2024  Patient name:  Laura Rhodes  Date of admission:  2/27/2024  6:52 AM  MRN:   [id number]  Account:  [id number]  YOB: 1960  PCP:    Van Pate MD  Room:   2081/2081-01  Code Status:    Full Code    Chief Complaint:     Chief Complaint   Patient presents with    Respiratory Distress     Worsening shortness of breath for 4 days       History Obtained From:     Patient medical record nursing staff    History of Present Illness:     Laura Rhodes is a 63 y.o. Non- / non  female who presents with Respiratory Distress (Worsening shortness of breath for 4 days)   and is admitted to the hospital for the management of COPD exacerbation ([facility]).    63-year-old lady with history of severe COPD on home oxygen BiPAP DuoNeb inhaler active smoking more than half pack a day has a prolonged hospital stay after Thanksgiving for a month at the [facility] for over 35 days  Patient is actively smoking admitted with increasing dyspnea cough and wheezing COPD exacerbation    Past Medical History:     Past Medical History:   Diagnosis Date    Acid reflux     Anxiety     Arthritis     Cancer (HCC)     cervical    Cervical osteoarthritis 11/30/2020    COPD (chronic obstructive pulmonary disease) ([facility]) 02/23/2018    DDD (degenerative disc disease), cervical     Depression     Hiatal hernia     History of blood transfusion     Reaction fever went up to 107 . per Pt.     On home O2     Spinal stenosis     Tachycardia     Thyroid disease     hypo        Past Surgical History:     Past Surgical History:   Procedure Laterality Date    BACK SURGERY      diskectomy, L3-L5

## 2024-02-28 NOTE — PLAN OF CARE
Problem: Respiratory - Adult  Goal: Achieves optimal ventilation and oxygenation  Outcome: Progressing    Pt is very anxious, pt using bipap

## 2024-02-28 NOTE — PLAN OF CARE
Problem: Respiratory - Adult  Goal: Achieves optimal ventilation and oxygenation  Outcome: Progressing  Note: O2 continues at 3L/NC. Pt normally wears 2L/NC. SOB with exertion continues. Congested cough continues and mucinex given as ordered. Pt able to get up to the bathroom with 1 standby assist without difficulty.     Problem: Safety - Adult  Goal: Free from fall injury  Outcome: Progressing  Note: No injuries this shift. Pt uses call light appropriately.     Problem: Pain  Goal: Verbalizes/displays adequate comfort level or baseline comfort level  Outcome: Progressing  Note: Pt has c/o chronic pain. Medicated with Percocet  x2 this shift. Pt able to reposition self for comfort without difficulty. Palliative care had seen the pt today also.

## 2024-02-29 PROCEDURE — 99232 SBSQ HOSP IP/OBS MODERATE 35: CPT | Performed by: INTERNAL MEDICINE

## 2024-02-29 PROCEDURE — 6370000000 HC RX 637 (ALT 250 FOR IP): Performed by: INTERNAL MEDICINE

## 2024-02-29 PROCEDURE — 6370000000 HC RX 637 (ALT 250 FOR IP): Performed by: NURSE PRACTITIONER

## 2024-02-29 PROCEDURE — 94660 CPAP INITIATION&MGMT: CPT

## 2024-02-29 PROCEDURE — 94761 N-INVAS EAR/PLS OXIMETRY MLT: CPT

## 2024-02-29 PROCEDURE — 94640 AIRWAY INHALATION TREATMENT: CPT

## 2024-02-29 PROCEDURE — 2060000000 HC ICU INTERMEDIATE R&B

## 2024-02-29 PROCEDURE — 6360000002 HC RX W HCPCS: Performed by: NURSE PRACTITIONER

## 2024-02-29 PROCEDURE — 6370000000 HC RX 637 (ALT 250 FOR IP): Performed by: STUDENT IN AN ORGANIZED HEALTH CARE EDUCATION/TRAINING PROGRAM

## 2024-02-29 PROCEDURE — 97110 THERAPEUTIC EXERCISES: CPT

## 2024-02-29 PROCEDURE — 6360000002 HC RX W HCPCS: Performed by: INTERNAL MEDICINE

## 2024-02-29 PROCEDURE — 6360000002 HC RX W HCPCS: Performed by: STUDENT IN AN ORGANIZED HEALTH CARE EDUCATION/TRAINING PROGRAM

## 2024-02-29 PROCEDURE — 2580000003 HC RX 258: Performed by: INTERNAL MEDICINE

## 2024-02-29 PROCEDURE — 2700000000 HC OXYGEN THERAPY PER DAY

## 2024-02-29 PROCEDURE — 2580000003 HC RX 258: Performed by: NURSE PRACTITIONER

## 2024-02-29 RX ORDER — FLUTICASONE PROPIONATE 50 MCG
1 SPRAY, SUSPENSION (ML) NASAL DAILY
Status: DISCONTINUED | OUTPATIENT
Start: 2024-02-29 | End: 2024-03-02

## 2024-02-29 RX ADMIN — OXYCODONE HYDROCHLORIDE AND ACETAMINOPHEN 1 TABLET: 5; 325 TABLET ORAL at 15:58

## 2024-02-29 RX ADMIN — ALBUTEROL SULFATE 2.5 MG: 2.5 SOLUTION RESPIRATORY (INHALATION) at 11:09

## 2024-02-29 RX ADMIN — ALBUTEROL SULFATE 2.5 MG: 2.5 SOLUTION RESPIRATORY (INHALATION) at 20:38

## 2024-02-29 RX ADMIN — WATER 40 MG: 1 INJECTION INTRAMUSCULAR; INTRAVENOUS; SUBCUTANEOUS at 20:02

## 2024-02-29 RX ADMIN — CLONAZEPAM 0.5 MG: 1 TABLET ORAL at 03:24

## 2024-02-29 RX ADMIN — SALINE NASAL SPRAY 1 SPRAY: 1.5 SOLUTION NASAL at 09:28

## 2024-02-29 RX ADMIN — OXYCODONE HYDROCHLORIDE AND ACETAMINOPHEN 1 TABLET: 5; 325 TABLET ORAL at 09:22

## 2024-02-29 RX ADMIN — CARVEDILOL 12.5 MG: 12.5 TABLET, FILM COATED ORAL at 18:06

## 2024-02-29 RX ADMIN — OMEPRAZOLE 40 MG: 40 CAPSULE, DELAYED RELEASE ORAL at 20:07

## 2024-02-29 RX ADMIN — WATER 40 MG: 1 INJECTION INTRAMUSCULAR; INTRAVENOUS; SUBCUTANEOUS at 03:24

## 2024-02-29 RX ADMIN — SODIUM CHLORIDE, PRESERVATIVE FREE 10 ML: 5 INJECTION INTRAVENOUS at 09:22

## 2024-02-29 RX ADMIN — ALBUTEROL SULFATE 2.5 MG: 2.5 SOLUTION RESPIRATORY (INHALATION) at 07:30

## 2024-02-29 RX ADMIN — TIZANIDINE 4 MG: 4 TABLET ORAL at 22:27

## 2024-02-29 RX ADMIN — FLUTICASONE PROPIONATE 1 SPRAY: 50 SPRAY, METERED NASAL at 15:58

## 2024-02-29 RX ADMIN — GUAIFENESIN 600 MG: 600 TABLET, EXTENDED RELEASE ORAL at 20:02

## 2024-02-29 RX ADMIN — CLONAZEPAM 0.5 MG: 1 TABLET ORAL at 20:02

## 2024-02-29 RX ADMIN — CARVEDILOL 12.5 MG: 12.5 TABLET, FILM COATED ORAL at 09:22

## 2024-02-29 RX ADMIN — ENOXAPARIN SODIUM 30 MG: 100 INJECTION SUBCUTANEOUS at 09:22

## 2024-02-29 RX ADMIN — LEVOTHYROXINE SODIUM 75 MCG: 0.07 TABLET ORAL at 06:08

## 2024-02-29 RX ADMIN — ALBUTEROL SULFATE 2.5 MG: 2.5 SOLUTION RESPIRATORY (INHALATION) at 15:51

## 2024-02-29 RX ADMIN — TIOTROPIUM BROMIDE AND OLODATEROL 2 PUFF: 3.124; 2.736 SPRAY, METERED RESPIRATORY (INHALATION) at 07:36

## 2024-02-29 RX ADMIN — PRIMIDONE 100 MG: 50 TABLET ORAL at 22:26

## 2024-02-29 RX ADMIN — VENLAFAXINE HYDROCHLORIDE 150 MG: 150 CAPSULE, EXTENDED RELEASE ORAL at 09:22

## 2024-02-29 RX ADMIN — GUAIFENESIN 600 MG: 600 TABLET, EXTENDED RELEASE ORAL at 09:22

## 2024-02-29 RX ADMIN — SALINE NASAL SPRAY 1 SPRAY: 1.5 SOLUTION NASAL at 22:29

## 2024-02-29 RX ADMIN — WATER 40 MG: 1 INJECTION INTRAMUSCULAR; INTRAVENOUS; SUBCUTANEOUS at 11:35

## 2024-02-29 RX ADMIN — MIRTAZAPINE 45 MG: 30 TABLET, FILM COATED ORAL at 20:01

## 2024-02-29 RX ADMIN — CLONAZEPAM 0.5 MG: 1 TABLET ORAL at 11:35

## 2024-02-29 RX ADMIN — OMEPRAZOLE 40 MG: 40 CAPSULE, DELAYED RELEASE ORAL at 09:24

## 2024-02-29 RX ADMIN — SODIUM CHLORIDE, PRESERVATIVE FREE 10 ML: 5 INJECTION INTRAVENOUS at 20:04

## 2024-02-29 ASSESSMENT — PAIN DESCRIPTION - DESCRIPTORS
DESCRIPTORS: ACHING
DESCRIPTORS: ACHING

## 2024-02-29 ASSESSMENT — PAIN DESCRIPTION - LOCATION
LOCATION: BACK;KNEE;HIP
LOCATION: KNEE;LEG
LOCATION: NECK

## 2024-02-29 ASSESSMENT — PAIN SCALES - GENERAL
PAINLEVEL_OUTOF10: 8
PAINLEVEL_OUTOF10: 8
PAINLEVEL_OUTOF10: 6
PAINLEVEL_OUTOF10: 8
PAINLEVEL_OUTOF10: 6

## 2024-02-29 ASSESSMENT — PAIN DESCRIPTION - ORIENTATION
ORIENTATION: RIGHT
ORIENTATION: RIGHT

## 2024-02-29 ASSESSMENT — PAIN SCALES - WONG BAKER: WONGBAKER_NUMERICALRESPONSE: 0

## 2024-02-29 ASSESSMENT — PAIN - FUNCTIONAL ASSESSMENT: PAIN_FUNCTIONAL_ASSESSMENT: ACTIVITIES ARE NOT PREVENTED

## 2024-02-29 NOTE — PLAN OF CARE
Problem: Respiratory - Adult  Goal: Achieves optimal ventilation and oxygenation  2/29/2024 0333 by Martha Monte RN  Outcome: Progressing  Achieves optimal ventilation and oxygenation:   Assess for changes in respiratory status   Position to facilitate oxygenation and minimize respiratory effort   Assess for changes in mentation and behavior  Note: Pt reports improved breathing. Pt denies SOB and no distress noted.      Problem: Safety - Adult  Goal: Free from fall injury  2/29/2024 0333 by Martha Monte RN  Outcome: Progressing  Note: No falls noted this shift. Patient ambulates with x1 staff assistance without difficulty.  Bed kept in low position. Safe environment maintained. Bedside table & call light in reach. Uses call light appropriately when needing assistance.       Problem: Pain  Goal: Verbalizes/displays adequate comfort level or baseline comfort level  2/29/2024 0333 by Martha Monte RN  Outcome: Progressing  Note: Pt medicated with pain medication prn.  Assessed all pain characteristics including level, type, location, frequency, and onset.  Non-pharmacologic interventions offered to pt as well.  Pt states pain is tolerable at this time.      Problem: Nutrition Deficit:  Goal: Optimize nutritional status  Outcome: Progressing  Note: Adequate intake and output     Problem: Skin/Tissue Integrity  Goal: Absence of new skin breakdown  Description: 1.  Monitor for areas of redness and/or skin breakdown  2.  Assess vascular access sites hourly  3.  Every 4-6 hours minimum:  Change oxygen saturation probe site  4.  Every 4-6 hours:  If on nasal continuous positive airway pressure, respiratory therapy assess nares and determine need for appliance change or resting period.  Outcome: Progressing  Note: Skin assessment complete. Proper nourishment and fluids encouraged, as appropriate. Will continue to monitor for additional needs and changes in skin breakdown.

## 2024-02-29 NOTE — PLAN OF CARE
Problem: Discharge Planning  Goal: Discharge to home or other facility with appropriate resources  Outcome: Progressing  Note: Plan to discharge home with . Patient requesting palliative to follow outpatient.      Problem: Respiratory - Adult  Goal: Achieves optimal ventilation and oxygenation  2/29/2024 1343 by Cindi Mullins RN  Outcome: Progressing  Note: Patient at baseline 02 needs. Patient still continuing to smoke.     Problem: Pain  Goal: Verbalizes/displays adequate comfort level or baseline comfort level  2/29/2024 1343 by Cindi Mullins RN  Outcome: Progressing  Note: Pain being monitored and controlled with PRN pain medications.

## 2024-03-01 PROBLEM — M25.551 RIGHT HIP PAIN: Status: ACTIVE | Noted: 2024-03-01

## 2024-03-01 PROCEDURE — 99233 SBSQ HOSP IP/OBS HIGH 50: CPT | Performed by: NURSE PRACTITIONER

## 2024-03-01 PROCEDURE — 6360000002 HC RX W HCPCS: Performed by: INTERNAL MEDICINE

## 2024-03-01 PROCEDURE — 6360000002 HC RX W HCPCS: Performed by: NURSE PRACTITIONER

## 2024-03-01 PROCEDURE — 97110 THERAPEUTIC EXERCISES: CPT

## 2024-03-01 PROCEDURE — 6370000000 HC RX 637 (ALT 250 FOR IP): Performed by: NURSE PRACTITIONER

## 2024-03-01 PROCEDURE — 6360000002 HC RX W HCPCS: Performed by: STUDENT IN AN ORGANIZED HEALTH CARE EDUCATION/TRAINING PROGRAM

## 2024-03-01 PROCEDURE — 6370000000 HC RX 637 (ALT 250 FOR IP): Performed by: STUDENT IN AN ORGANIZED HEALTH CARE EDUCATION/TRAINING PROGRAM

## 2024-03-01 PROCEDURE — 2060000000 HC ICU INTERMEDIATE R&B

## 2024-03-01 PROCEDURE — 2700000000 HC OXYGEN THERAPY PER DAY

## 2024-03-01 PROCEDURE — 2580000003 HC RX 258: Performed by: NURSE PRACTITIONER

## 2024-03-01 PROCEDURE — 99232 SBSQ HOSP IP/OBS MODERATE 35: CPT | Performed by: INTERNAL MEDICINE

## 2024-03-01 PROCEDURE — 94660 CPAP INITIATION&MGMT: CPT

## 2024-03-01 PROCEDURE — 94761 N-INVAS EAR/PLS OXIMETRY MLT: CPT

## 2024-03-01 PROCEDURE — 94664 DEMO&/EVAL PT USE INHALER: CPT

## 2024-03-01 PROCEDURE — 2580000003 HC RX 258: Performed by: INTERNAL MEDICINE

## 2024-03-01 PROCEDURE — 94640 AIRWAY INHALATION TREATMENT: CPT

## 2024-03-01 PROCEDURE — 2580000003 HC RX 258: Performed by: STUDENT IN AN ORGANIZED HEALTH CARE EDUCATION/TRAINING PROGRAM

## 2024-03-01 PROCEDURE — 97116 GAIT TRAINING THERAPY: CPT

## 2024-03-01 PROCEDURE — 94669 MECHANICAL CHEST WALL OSCILL: CPT

## 2024-03-01 PROCEDURE — 6370000000 HC RX 637 (ALT 250 FOR IP): Performed by: INTERNAL MEDICINE

## 2024-03-01 PROCEDURE — 97535 SELF CARE MNGMENT TRAINING: CPT

## 2024-03-01 RX ORDER — PREDNISONE 20 MG/1
40 TABLET ORAL DAILY
Status: DISCONTINUED | OUTPATIENT
Start: 2024-03-02 | End: 2024-03-02

## 2024-03-01 RX ORDER — PREDNISONE 20 MG/1
20 TABLET ORAL DAILY
Status: DISCONTINUED | OUTPATIENT
Start: 2024-03-06 | End: 2024-03-02

## 2024-03-01 RX ORDER — PREDNISONE 10 MG/1
10 TABLET ORAL DAILY
Status: DISCONTINUED | OUTPATIENT
Start: 2024-03-08 | End: 2024-03-02

## 2024-03-01 RX ADMIN — PRIMIDONE 100 MG: 50 TABLET ORAL at 20:39

## 2024-03-01 RX ADMIN — ALBUTEROL SULFATE 2.5 MG: 2.5 SOLUTION RESPIRATORY (INHALATION) at 10:59

## 2024-03-01 RX ADMIN — WATER 40 MG: 1 INJECTION INTRAMUSCULAR; INTRAVENOUS; SUBCUTANEOUS at 04:09

## 2024-03-01 RX ADMIN — SODIUM CHLORIDE, PRESERVATIVE FREE 10 ML: 5 INJECTION INTRAVENOUS at 08:43

## 2024-03-01 RX ADMIN — SODIUM CHLORIDE, PRESERVATIVE FREE 10 ML: 5 INJECTION INTRAVENOUS at 04:09

## 2024-03-01 RX ADMIN — OXYCODONE HYDROCHLORIDE AND ACETAMINOPHEN 1 TABLET: 5; 325 TABLET ORAL at 17:36

## 2024-03-01 RX ADMIN — FLUTICASONE PROPIONATE 1 SPRAY: 50 SPRAY, METERED NASAL at 08:42

## 2024-03-01 RX ADMIN — GUAIFENESIN 600 MG: 600 TABLET, EXTENDED RELEASE ORAL at 20:26

## 2024-03-01 RX ADMIN — LEVOTHYROXINE SODIUM 75 MCG: 0.07 TABLET ORAL at 08:35

## 2024-03-01 RX ADMIN — MIRTAZAPINE 45 MG: 30 TABLET, FILM COATED ORAL at 20:34

## 2024-03-01 RX ADMIN — TIOTROPIUM BROMIDE AND OLODATEROL 2 PUFF: 3.124; 2.736 SPRAY, METERED RESPIRATORY (INHALATION) at 07:27

## 2024-03-01 RX ADMIN — WATER 40 MG: 1 INJECTION INTRAMUSCULAR; INTRAVENOUS; SUBCUTANEOUS at 11:39

## 2024-03-01 RX ADMIN — GUAIFENESIN SYRUP AND DEXTROMETHORPHAN 10 ML: 100; 10 SYRUP ORAL at 11:44

## 2024-03-01 RX ADMIN — CLONAZEPAM 0.5 MG: 1 TABLET ORAL at 04:08

## 2024-03-01 RX ADMIN — TIZANIDINE 4 MG: 4 TABLET ORAL at 20:34

## 2024-03-01 RX ADMIN — CLONAZEPAM 0.5 MG: 1 TABLET ORAL at 11:39

## 2024-03-01 RX ADMIN — ENOXAPARIN SODIUM 30 MG: 100 INJECTION SUBCUTANEOUS at 08:43

## 2024-03-01 RX ADMIN — ALBUTEROL SULFATE 2.5 MG: 2.5 SOLUTION RESPIRATORY (INHALATION) at 20:54

## 2024-03-01 RX ADMIN — GUAIFENESIN SYRUP AND DEXTROMETHORPHAN 10 ML: 100; 10 SYRUP ORAL at 17:36

## 2024-03-01 RX ADMIN — GUAIFENESIN 600 MG: 600 TABLET, EXTENDED RELEASE ORAL at 08:35

## 2024-03-01 RX ADMIN — WATER 40 MG: 1 INJECTION INTRAMUSCULAR; INTRAVENOUS; SUBCUTANEOUS at 20:35

## 2024-03-01 RX ADMIN — OMEPRAZOLE 40 MG: 40 CAPSULE, DELAYED RELEASE ORAL at 08:41

## 2024-03-01 RX ADMIN — OXYCODONE HYDROCHLORIDE AND ACETAMINOPHEN 1 TABLET: 5; 325 TABLET ORAL at 11:39

## 2024-03-01 RX ADMIN — SALINE NASAL SPRAY 1 SPRAY: 1.5 SOLUTION NASAL at 08:42

## 2024-03-01 RX ADMIN — OMEPRAZOLE 40 MG: 40 CAPSULE, DELAYED RELEASE ORAL at 20:38

## 2024-03-01 RX ADMIN — AZITHROMYCIN DIHYDRATE 250 MG: 250 TABLET ORAL at 20:26

## 2024-03-01 RX ADMIN — ALBUTEROL SULFATE 2.5 MG: 2.5 SOLUTION RESPIRATORY (INHALATION) at 07:27

## 2024-03-01 RX ADMIN — VENLAFAXINE HYDROCHLORIDE 150 MG: 150 CAPSULE, EXTENDED RELEASE ORAL at 08:35

## 2024-03-01 RX ADMIN — SODIUM CHLORIDE, PRESERVATIVE FREE 10 ML: 5 INJECTION INTRAVENOUS at 20:39

## 2024-03-01 RX ADMIN — ALBUTEROL SULFATE 2.5 MG: 2.5 SOLUTION RESPIRATORY (INHALATION) at 16:12

## 2024-03-01 RX ADMIN — CLONAZEPAM 0.5 MG: 1 TABLET ORAL at 20:38

## 2024-03-01 ASSESSMENT — PAIN SCALES - GENERAL
PAINLEVEL_OUTOF10: 5
PAINLEVEL_OUTOF10: 8
PAINLEVEL_OUTOF10: 7
PAINLEVEL_OUTOF10: 8
PAINLEVEL_OUTOF10: 7

## 2024-03-01 ASSESSMENT — PAIN DESCRIPTION - ORIENTATION
ORIENTATION: RIGHT
ORIENTATION: RIGHT;LOWER

## 2024-03-01 ASSESSMENT — PAIN DESCRIPTION - LOCATION
LOCATION: LEG;KNEE;BACK
LOCATION: NECK

## 2024-03-01 ASSESSMENT — PAIN SCALES - WONG BAKER: WONGBAKER_NUMERICALRESPONSE: 0

## 2024-03-01 ASSESSMENT — PAIN DESCRIPTION - DESCRIPTORS
DESCRIPTORS: ACHING
DESCRIPTORS: ACHING;DISCOMFORT

## 2024-03-01 NOTE — PLAN OF CARE
Problem: Discharge Planning  Goal: Discharge to home or other facility with appropriate resources  Outcome: Progressing  Note: Per Palliative care Notes:    We discussed her goals and she reports working with therapy walking and hoping to go home with  palliative care and [facility]. She is aware that referral was sent and confirmed via Rehana RN yesterday.      Problem: Respiratory - Adult  Goal: Achieves optimal ventilation and oxygenation  Outcome: Progressing  Note: Patient at baseline 02 needs. Per Dr. Ramirez (Pulmonary)  Will transition to an oral prednisone taper starting tomorrow  Continue current bronchodilator regimen     Problem: Pain  Goal: Verbalizes/displays adequate comfort level or baseline comfort level  Outcome: Progressing  Note: Pain being managed with PRN pain medications.

## 2024-03-01 NOTE — PLAN OF CARE
BRONCHOSPASM/BRONCHOCONSTRICTION   [x]  IMPROVE AERATION/BREATH SOUNDS  [x]   ADMINISTER BRONCHODILATOR THERAPY AS APPROPRIATE  [x]   ASSESS BREATH SOUNDS  []   IMPLEMENT AEROSOL/MDI PROTOCOL  [x]   PATIENT EDUCATION AS NEEDED    PROVIDE ADEQUATE OXYGENATION WITH ACCEPTABLE SP02/ABG'S  [x]  IDENTIFY APPROPRIATE OXYGEN THERAPY  [x]   MONITOR SP02/ABG'S AS NEEDED   [x]   PATIENT EDUCATION AS NEEDED    NONINVASIVE VENTILATION    PROVIDE OPTIMAL VENTILATION/ACCEPTABLE SPO2   IMPLEMENT NONINVASIVE VENTILATION PROTOCOL   MAINTAIN ACCEPTABLE SPO2   ASSESS SKIN INTEGRITY/BREAKDOWN SCORE   PATIENT EDUCATION AS NEEDED   BIPAP AS NEEDED

## 2024-03-02 ENCOUNTER — APPOINTMENT (OUTPATIENT)
Dept: GENERAL RADIOLOGY | Age: 64
DRG: 191 | End: 2024-03-02
Payer: COMMERCIAL

## 2024-03-02 LAB
ANION GAP SERPL CALCULATED.3IONS-SCNC: 5 MMOL/L (ref 9–17)
BASOPHILS # BLD: 0 K/UL (ref 0–0.2)
BASOPHILS NFR BLD: 0 % (ref 0–2)
BUN SERPL-MCNC: 10 MG/DL (ref 8–23)
CALCIUM SERPL-MCNC: 8.5 MG/DL (ref 8.6–10.4)
CHLORIDE SERPL-SCNC: 99 MMOL/L (ref 98–107)
CO2 SERPL-SCNC: 37 MMOL/L (ref 20–31)
CREAT SERPL-MCNC: 0.5 MG/DL (ref 0.5–0.9)
EOSINOPHIL # BLD: 0 K/UL (ref 0–0.4)
EOSINOPHILS RELATIVE PERCENT: 0 % (ref 0–4)
ERYTHROCYTE [DISTWIDTH] IN BLOOD BY AUTOMATED COUNT: 14.3 % (ref 11.5–14.9)
GFR SERPL CREATININE-BSD FRML MDRD: >60 ML/MIN/1.73M2
GLUCOSE SERPL-MCNC: 113 MG/DL (ref 70–99)
HCT VFR BLD AUTO: 32.8 % (ref 36–46)
HGB BLD-MCNC: 10.8 G/DL (ref 12–16)
LYMPHOCYTES NFR BLD: 1.9 K/UL (ref 1–4.8)
LYMPHOCYTES RELATIVE PERCENT: 27 % (ref 24–44)
MAGNESIUM SERPL-MCNC: 1.8 MG/DL (ref 1.6–2.6)
MCH RBC QN AUTO: 32.2 PG (ref 26–34)
MCHC RBC AUTO-ENTMCNC: 33 G/DL (ref 31–37)
MCV RBC AUTO: 97.4 FL (ref 80–100)
MONOCYTES NFR BLD: 0.8 K/UL (ref 0.1–1.3)
MONOCYTES NFR BLD: 12 % (ref 1–7)
NEUTROPHILS NFR BLD: 61 % (ref 36–66)
NEUTS SEG NFR BLD: 4.3 K/UL (ref 1.3–9.1)
PLATELET # BLD AUTO: 345 K/UL (ref 150–450)
PMV BLD AUTO: 7 FL (ref 6–12)
POTASSIUM SERPL-SCNC: 4.7 MMOL/L (ref 3.7–5.3)
RBC # BLD AUTO: 3.36 M/UL (ref 4–5.2)
SODIUM SERPL-SCNC: 141 MMOL/L (ref 135–144)
WBC OTHER # BLD: 7 K/UL (ref 3.5–11)

## 2024-03-02 PROCEDURE — 6370000000 HC RX 637 (ALT 250 FOR IP): Performed by: NURSE PRACTITIONER

## 2024-03-02 PROCEDURE — 6360000002 HC RX W HCPCS: Performed by: INTERNAL MEDICINE

## 2024-03-02 PROCEDURE — 6370000000 HC RX 637 (ALT 250 FOR IP): Performed by: INTERNAL MEDICINE

## 2024-03-02 PROCEDURE — 85025 COMPLETE CBC W/AUTO DIFF WBC: CPT

## 2024-03-02 PROCEDURE — 36415 COLL VENOUS BLD VENIPUNCTURE: CPT

## 2024-03-02 PROCEDURE — 73552 X-RAY EXAM OF FEMUR 2/>: CPT

## 2024-03-02 PROCEDURE — 2580000003 HC RX 258: Performed by: INTERNAL MEDICINE

## 2024-03-02 PROCEDURE — 6370000000 HC RX 637 (ALT 250 FOR IP): Performed by: STUDENT IN AN ORGANIZED HEALTH CARE EDUCATION/TRAINING PROGRAM

## 2024-03-02 PROCEDURE — 94761 N-INVAS EAR/PLS OXIMETRY MLT: CPT

## 2024-03-02 PROCEDURE — 2700000000 HC OXYGEN THERAPY PER DAY

## 2024-03-02 PROCEDURE — 83735 ASSAY OF MAGNESIUM: CPT

## 2024-03-02 PROCEDURE — 94640 AIRWAY INHALATION TREATMENT: CPT

## 2024-03-02 PROCEDURE — 6360000002 HC RX W HCPCS: Performed by: STUDENT IN AN ORGANIZED HEALTH CARE EDUCATION/TRAINING PROGRAM

## 2024-03-02 PROCEDURE — 94660 CPAP INITIATION&MGMT: CPT

## 2024-03-02 PROCEDURE — 2060000000 HC ICU INTERMEDIATE R&B

## 2024-03-02 PROCEDURE — 80048 BASIC METABOLIC PNL TOTAL CA: CPT

## 2024-03-02 PROCEDURE — 99232 SBSQ HOSP IP/OBS MODERATE 35: CPT | Performed by: INTERNAL MEDICINE

## 2024-03-02 PROCEDURE — 72170 X-RAY EXAM OF PELVIS: CPT

## 2024-03-02 RX ORDER — FLUTICASONE PROPIONATE 50 MCG
2 SPRAY, SUSPENSION (ML) NASAL DAILY
Status: DISCONTINUED | OUTPATIENT
Start: 2024-03-03 | End: 2024-03-04 | Stop reason: HOSPADM

## 2024-03-02 RX ORDER — ALBUTEROL SULFATE 2.5 MG/3ML
2.5 SOLUTION RESPIRATORY (INHALATION)
Status: DISCONTINUED | OUTPATIENT
Start: 2024-03-02 | End: 2024-03-04 | Stop reason: HOSPADM

## 2024-03-02 RX ADMIN — GUAIFENESIN SYRUP AND DEXTROMETHORPHAN 10 ML: 100; 10 SYRUP ORAL at 11:54

## 2024-03-02 RX ADMIN — SALINE NASAL SPRAY 1 SPRAY: 1.5 SOLUTION NASAL at 07:52

## 2024-03-02 RX ADMIN — PRIMIDONE 100 MG: 50 TABLET ORAL at 21:50

## 2024-03-02 RX ADMIN — OXYCODONE HYDROCHLORIDE AND ACETAMINOPHEN 1 TABLET: 5; 325 TABLET ORAL at 07:42

## 2024-03-02 RX ADMIN — GUAIFENESIN 600 MG: 600 TABLET, EXTENDED RELEASE ORAL at 19:42

## 2024-03-02 RX ADMIN — LEVOTHYROXINE SODIUM 75 MCG: 0.07 TABLET ORAL at 07:45

## 2024-03-02 RX ADMIN — SODIUM CHLORIDE, PRESERVATIVE FREE 10 ML: 5 INJECTION INTRAVENOUS at 07:57

## 2024-03-02 RX ADMIN — OMEPRAZOLE 40 MG: 40 CAPSULE, DELAYED RELEASE ORAL at 19:41

## 2024-03-02 RX ADMIN — PREDNISONE 40 MG: 20 TABLET ORAL at 07:48

## 2024-03-02 RX ADMIN — OXYCODONE HYDROCHLORIDE AND ACETAMINOPHEN 1 TABLET: 5; 325 TABLET ORAL at 16:26

## 2024-03-02 RX ADMIN — OMEPRAZOLE 40 MG: 40 CAPSULE, DELAYED RELEASE ORAL at 07:50

## 2024-03-02 RX ADMIN — GUAIFENESIN SYRUP AND DEXTROMETHORPHAN 10 ML: 100; 10 SYRUP ORAL at 07:42

## 2024-03-02 RX ADMIN — GUAIFENESIN SYRUP AND DEXTROMETHORPHAN 10 ML: 100; 10 SYRUP ORAL at 16:26

## 2024-03-02 RX ADMIN — ALBUTEROL SULFATE 2.5 MG: 2.5 SOLUTION RESPIRATORY (INHALATION) at 10:55

## 2024-03-02 RX ADMIN — GUAIFENESIN 600 MG: 600 TABLET, EXTENDED RELEASE ORAL at 07:44

## 2024-03-02 RX ADMIN — ALBUTEROL SULFATE 2.5 MG: 2.5 SOLUTION RESPIRATORY (INHALATION) at 15:15

## 2024-03-02 RX ADMIN — CLONAZEPAM 0.5 MG: 1 TABLET ORAL at 19:43

## 2024-03-02 RX ADMIN — ENOXAPARIN SODIUM 30 MG: 100 INJECTION SUBCUTANEOUS at 07:43

## 2024-03-02 RX ADMIN — FLUTICASONE PROPIONATE 1 SPRAY: 50 SPRAY, METERED NASAL at 07:52

## 2024-03-02 RX ADMIN — CLONAZEPAM 0.5 MG: 1 TABLET ORAL at 11:55

## 2024-03-02 RX ADMIN — ALBUTEROL SULFATE 2.5 MG: 2.5 SOLUTION RESPIRATORY (INHALATION) at 19:02

## 2024-03-02 RX ADMIN — SODIUM CHLORIDE, PRESERVATIVE FREE 10 ML: 5 INJECTION INTRAVENOUS at 19:41

## 2024-03-02 RX ADMIN — TIZANIDINE 4 MG: 4 TABLET ORAL at 21:53

## 2024-03-02 RX ADMIN — MIRTAZAPINE 45 MG: 30 TABLET, FILM COATED ORAL at 21:50

## 2024-03-02 RX ADMIN — TIOTROPIUM BROMIDE AND OLODATEROL 2 PUFF: 3.124; 2.736 SPRAY, METERED RESPIRATORY (INHALATION) at 06:55

## 2024-03-02 RX ADMIN — VENLAFAXINE HYDROCHLORIDE 150 MG: 150 CAPSULE, EXTENDED RELEASE ORAL at 07:45

## 2024-03-02 RX ADMIN — CARVEDILOL 12.5 MG: 12.5 TABLET, FILM COATED ORAL at 18:09

## 2024-03-02 RX ADMIN — CLONAZEPAM 0.5 MG: 1 TABLET ORAL at 03:45

## 2024-03-02 RX ADMIN — GUAIFENESIN SYRUP AND DEXTROMETHORPHAN 10 ML: 100; 10 SYRUP ORAL at 21:50

## 2024-03-02 RX ADMIN — IPRATROPIUM BROMIDE AND ALBUTEROL SULFATE 1 DOSE: 2.5; .5 SOLUTION RESPIRATORY (INHALATION) at 06:47

## 2024-03-02 RX ADMIN — WATER 40 MG: 1 INJECTION INTRAMUSCULAR; INTRAVENOUS; SUBCUTANEOUS at 11:56

## 2024-03-02 ASSESSMENT — PAIN DESCRIPTION - LOCATION
LOCATION: BACK;HIP
LOCATION: BACK;HIP
LOCATION: HIP;BACK

## 2024-03-02 ASSESSMENT — PAIN DESCRIPTION - ORIENTATION
ORIENTATION: LOWER
ORIENTATION: RIGHT
ORIENTATION: LOWER

## 2024-03-02 ASSESSMENT — PAIN SCALES - GENERAL
PAINLEVEL_OUTOF10: 9
PAINLEVEL_OUTOF10: 8
PAINLEVEL_OUTOF10: 8
PAINLEVEL_OUTOF10: 0
PAINLEVEL_OUTOF10: 0
PAINLEVEL_OUTOF10: 8
PAINLEVEL_OUTOF10: 7
PAINLEVEL_OUTOF10: 0

## 2024-03-02 ASSESSMENT — PAIN DESCRIPTION - DESCRIPTORS
DESCRIPTORS: ACHING

## 2024-03-02 NOTE — PLAN OF CARE
Problem: Pain  Goal: Verbalizes/displays adequate comfort level or baseline comfort level  Outcome: Progressing  Pain medications given for 8/10 pain. Pain meds available, will continue to reassess     Problem: Safety - Adult  Goal: Free from fall injury  Outcome: Progressing  Pt assessed as a fall risk this shift. Remains free from falls and accidental injury at this time. Fall precautions in place, including falling star sign. Floor free from obstacles, and bed is locked and in lowest position. Adequate lighting provided.  Pt encouraged to call before getting OOB for any need.  Bed alarm activated. Will continue to monitor needs during hourly rounding, and reinforce education on use of call light.     Problem: Respiratory - Adult  Goal: Achieves optimal ventilation and oxygenation  Outcome: Progressing  Pt O2 sat remains between 97-98% on 2-3L nc.      Problem: Discharge Planning  Goal: Discharge to home or other facility with appropriate resources  Outcome: Progressing  D/C barrier: IV solumedrol

## 2024-03-02 NOTE — CONSULTS
..  Palliative Care Inpatient Consult    NAME:  Laura Rhodes  MEDICAL RECORD NUMBER:  [id number]  AGE: 63 y.o.   GENDER: female  : 1960  TODAY'S DATE:  2024    Reasons for Consultation:    Symptom and/or pain management  Provision of information regarding PC and/or hospice philosophies  Complex, time-intensive communication and interdisciplinary psychosocial support  Clarification of goals of care and/or assistance with difficult decision-making  Guidance in regards to resources and transition(s)    Members of PC team contributing to this consultation are : Kelli Gutierrez, Palliative Care APRN-CNP    Plan      Palliative Interaction: I went to see patient and she was getting breathing treatment. She was sitting up in bed A/O x4. I reminded her of my palliative role and that I had seen her in December when she was in the ICU.     I discussed patients chronic history and current hospitalized problems in detail. She tells me she had a fall last summer resulting in a fracture. She had surgery for repair. She was hospitalized in winter resulting in prolonged stay then to [facility]. She reports once DC home she was able to function independently again. She reports being on 2LNC . She does unfortunately continues to smoke. She tells me she signed on to hospice care Thursday and d/t symptoms worsening and not getting relief she came to the hospital. She acknowledges her end stage disease. Patient reports significant alcohol abuse history and went through rehab. She reports that  monitors this and she only has 2 beers a couple days a week. She reports that she use to drink a 12 pack daily several years ago.     I discussed goals and she reports wanting to be able to come to the hospital for treatment. She reports not wanting to lay in hospital bed at home and just received meds for symptoms. She reports after treatment she can usually go home and function on her own still. She reports wanting palliative 
Orthopedic Consult      CHIEF COMPLAINT: Right hip pain    HISTORY OF PRESENT ILLNESS:      The patient is a 63 y.o. female who presented to [facility] ED with complications related to her COPD.  During the admission, patient expressed pain to her right hip.  Patient is status post right hip cephalomedullary nail fixation performed by Dr. Vann on 7/29/2023 for a right intertrochanteric femur fracture.  Patient did see Dr. Napier in follow-up, but did have to miss several appointments due to her being intermittently in and out of the hospital related to complications of her COPD.  Patient denies any new injuries or falls.  Denies any numbness or tingling.  Patient is on 2 L home oxygen.  Patient states that she has right-sided low back pain, and some pain over a specific area in her medial proximal thigh.  Patient normally ambulates without any assistive devices.  Patient has had discussions with palliative care, and possibly will be placed on chronic narcotics.  Patient states that she was priorly prescribed Norco, but the doctor who is giving her chronic opioids, got in trouble for this practice.  Patient does have known history of lumbar pathology, and has received injections in her back every 6 months.  Last injection 3 months ago, and patient states her injections take the majority of her pain away.  Patient unable to be on NSAIDs secondary to stomach ulcer history.  Patient does have history of kyphoplasties performed for vertebral compression fractures.  Patient states she is no longer able to undergo general anesthesia secondary to her severe COPD.    Past Medical History:    Past Medical History:   Diagnosis Date    Acid reflux     Anxiety     Arthritis     Cancer (HCC)     cervical    Cervical osteoarthritis 11/30/2020    COPD (chronic obstructive pulmonary disease) ([facility]) 02/23/2018    DDD (degenerative disc disease), cervical     Depression     Hiatal hernia     History of blood transfusion     Reaction 
Please see Palliative Care consult note per Kelli Gutierrez R.N 2/28/2024. Palliative care will continue to follow.         ..[facility] Palliative Care  Rehana Chao RN,[facility]: [phone]  [facility]: [phone]  [facility]: [phone]   
midline.  No pneumothorax.  No free air.  No lobar airspace consolidation or pleural effusions.  Visualized osseous structures remain unchanged.     1. Bibasilar atelectasis and scarring as well as pleural thickening. Underlying COPD. 2. Prior thyroidectomy. 3. No lobar airspace consolidation.     XR CHEST PORTABLE    Result Date: 2/1/2024  EXAMINATION: ONE XRAY VIEW OF THE CHEST 2/1/2024 5:30 pm COMPARISON: None. HISTORY: ORDERING SYSTEM PROVIDED HISTORY: SOB TECHNOLOGIST PROVIDED HISTORY: SOB Reason for Exam: PT CO SOB and back pain X several weeks. NKI FINDINGS: Pulmonary emphysematous changes are seen.  There is no pleural effusion or pneumothorax.  There is some undulation.  No acute osseous abnormalities are seen.  Heart is normal in size.  Postsurgical changes are seen in the neck.     1. Pulmonary emphysematous changes. 2. No acute cardiopulmonary process.         My Imaging Interpretation:    COPD/emphysema, no acute          Assessment:    Acute COPD exacerbation  Chronic hypoxic respiratory failure, maintained on 2 to 3 L  Severe stage IV COPD, follows with Dr. Boone  Chronic hypercapnic respiratory failure, uses astral noninvasive ventilator at home  Pulmonary cachexia  Active tobacco use, 1/2 pack/day  DNR CCA, DO NOT INTUBATE       Plan:    Currently around baseline oxygen, keep pulse ox above 89%  Continue bronchodilators  Will decrease steroids to every 8 hours  BiPAP at night and with naps  Will restart her Zithromax Monday/Wednesday/Friday  Hopefully ready for discharge in the next 24 to 48 hours  Outpatient follow-up in our office  Will update Dr. Ramirez    Thank you for the kind consultation, we will follow along with you.          Chantel Mathews, AJIT-C, MSN  [facility] Pulmonary, Critical Care & Sleep    Electronically signed by NATHAN Rowell - SHANNAN on 02/28/24

## 2024-03-03 LAB
ANION GAP SERPL CALCULATED.3IONS-SCNC: 9 MMOL/L (ref 9–17)
BASOPHILS # BLD: 0 K/UL (ref 0–0.2)
BASOPHILS NFR BLD: 1 % (ref 0–2)
BUN SERPL-MCNC: 9 MG/DL (ref 8–23)
CALCIUM SERPL-MCNC: 8.2 MG/DL (ref 8.6–10.4)
CHLORIDE SERPL-SCNC: 101 MMOL/L (ref 98–107)
CO2 SERPL-SCNC: 33 MMOL/L (ref 20–31)
CREAT SERPL-MCNC: 0.5 MG/DL (ref 0.5–0.9)
EOSINOPHIL # BLD: 0 K/UL (ref 0–0.4)
EOSINOPHILS RELATIVE PERCENT: 0 % (ref 0–4)
ERYTHROCYTE [DISTWIDTH] IN BLOOD BY AUTOMATED COUNT: 14.2 % (ref 11.5–14.9)
GFR SERPL CREATININE-BSD FRML MDRD: >60 ML/MIN/1.73M2
GLUCOSE SERPL-MCNC: 105 MG/DL (ref 70–99)
HCT VFR BLD AUTO: 33.1 % (ref 36–46)
HGB BLD-MCNC: 10.8 G/DL (ref 12–16)
LYMPHOCYTES NFR BLD: 1.3 K/UL (ref 1–4.8)
LYMPHOCYTES RELATIVE PERCENT: 21 % (ref 24–44)
MAGNESIUM SERPL-MCNC: 1.8 MG/DL (ref 1.6–2.6)
MCH RBC QN AUTO: 31.7 PG (ref 26–34)
MCHC RBC AUTO-ENTMCNC: 32.5 G/DL (ref 31–37)
MCV RBC AUTO: 97.5 FL (ref 80–100)
MONOCYTES NFR BLD: 0.5 K/UL (ref 0.1–1.3)
MONOCYTES NFR BLD: 8 % (ref 1–7)
NEUTROPHILS NFR BLD: 70 % (ref 36–66)
NEUTS SEG NFR BLD: 4.4 K/UL (ref 1.3–9.1)
PLATELET # BLD AUTO: 346 K/UL (ref 150–450)
PMV BLD AUTO: 7.2 FL (ref 6–12)
POTASSIUM SERPL-SCNC: 3.9 MMOL/L (ref 3.7–5.3)
RBC # BLD AUTO: 3.4 M/UL (ref 4–5.2)
SODIUM SERPL-SCNC: 143 MMOL/L (ref 135–144)
WBC OTHER # BLD: 6.3 K/UL (ref 3.5–11)

## 2024-03-03 PROCEDURE — 6360000002 HC RX W HCPCS: Performed by: INTERNAL MEDICINE

## 2024-03-03 PROCEDURE — 99254 IP/OBS CNSLTJ NEW/EST MOD 60: CPT | Performed by: STUDENT IN AN ORGANIZED HEALTH CARE EDUCATION/TRAINING PROGRAM

## 2024-03-03 PROCEDURE — 99232 SBSQ HOSP IP/OBS MODERATE 35: CPT | Performed by: INTERNAL MEDICINE

## 2024-03-03 PROCEDURE — 6370000000 HC RX 637 (ALT 250 FOR IP): Performed by: INTERNAL MEDICINE

## 2024-03-03 PROCEDURE — 85025 COMPLETE CBC W/AUTO DIFF WBC: CPT

## 2024-03-03 PROCEDURE — 6370000000 HC RX 637 (ALT 250 FOR IP): Performed by: STUDENT IN AN ORGANIZED HEALTH CARE EDUCATION/TRAINING PROGRAM

## 2024-03-03 PROCEDURE — 2700000000 HC OXYGEN THERAPY PER DAY

## 2024-03-03 PROCEDURE — 6370000000 HC RX 637 (ALT 250 FOR IP): Performed by: NURSE PRACTITIONER

## 2024-03-03 PROCEDURE — 2580000003 HC RX 258: Performed by: INTERNAL MEDICINE

## 2024-03-03 PROCEDURE — 94660 CPAP INITIATION&MGMT: CPT

## 2024-03-03 PROCEDURE — 36415 COLL VENOUS BLD VENIPUNCTURE: CPT

## 2024-03-03 PROCEDURE — 94761 N-INVAS EAR/PLS OXIMETRY MLT: CPT

## 2024-03-03 PROCEDURE — 83735 ASSAY OF MAGNESIUM: CPT

## 2024-03-03 PROCEDURE — 94640 AIRWAY INHALATION TREATMENT: CPT

## 2024-03-03 PROCEDURE — 2060000000 HC ICU INTERMEDIATE R&B

## 2024-03-03 PROCEDURE — 80048 BASIC METABOLIC PNL TOTAL CA: CPT

## 2024-03-03 RX ORDER — PREDNISONE 20 MG/1
40 TABLET ORAL DAILY
Status: DISCONTINUED | OUTPATIENT
Start: 2024-03-04 | End: 2024-03-04 | Stop reason: HOSPADM

## 2024-03-03 RX ORDER — AMLODIPINE BESYLATE 5 MG/1
5 TABLET ORAL EVERY 24 HOURS
Status: DISCONTINUED | OUTPATIENT
Start: 2024-03-03 | End: 2024-03-04 | Stop reason: HOSPADM

## 2024-03-03 RX ADMIN — GUAIFENESIN 600 MG: 600 TABLET, EXTENDED RELEASE ORAL at 07:25

## 2024-03-03 RX ADMIN — PRIMIDONE 100 MG: 50 TABLET ORAL at 21:15

## 2024-03-03 RX ADMIN — FLUTICASONE PROPIONATE 2 SPRAY: 50 SPRAY, METERED NASAL at 07:26

## 2024-03-03 RX ADMIN — TIZANIDINE 4 MG: 4 TABLET ORAL at 21:18

## 2024-03-03 RX ADMIN — MIRTAZAPINE 45 MG: 30 TABLET, FILM COATED ORAL at 21:15

## 2024-03-03 RX ADMIN — CARVEDILOL 12.5 MG: 12.5 TABLET, FILM COATED ORAL at 17:25

## 2024-03-03 RX ADMIN — SALINE NASAL SPRAY 1 SPRAY: 1.5 SOLUTION NASAL at 07:26

## 2024-03-03 RX ADMIN — ALBUTEROL SULFATE 2.5 MG: 2.5 SOLUTION RESPIRATORY (INHALATION) at 10:59

## 2024-03-03 RX ADMIN — DOCUSATE SODIUM 50 MG AND SENNOSIDES 8.6 MG 2 TABLET: 8.6; 5 TABLET, FILM COATED ORAL at 09:08

## 2024-03-03 RX ADMIN — ALBUTEROL SULFATE 2.5 MG: 2.5 SOLUTION RESPIRATORY (INHALATION) at 15:05

## 2024-03-03 RX ADMIN — GUAIFENESIN SYRUP AND DEXTROMETHORPHAN 10 ML: 100; 10 SYRUP ORAL at 17:26

## 2024-03-03 RX ADMIN — ALBUTEROL SULFATE 2.5 MG: 2.5 SOLUTION RESPIRATORY (INHALATION) at 06:56

## 2024-03-03 RX ADMIN — GUAIFENESIN 600 MG: 600 TABLET, EXTENDED RELEASE ORAL at 19:23

## 2024-03-03 RX ADMIN — OXYCODONE HYDROCHLORIDE AND ACETAMINOPHEN 1 TABLET: 5; 325 TABLET ORAL at 13:10

## 2024-03-03 RX ADMIN — SODIUM CHLORIDE, PRESERVATIVE FREE 10 ML: 5 INJECTION INTRAVENOUS at 00:22

## 2024-03-03 RX ADMIN — GUAIFENESIN SYRUP AND DEXTROMETHORPHAN 10 ML: 100; 10 SYRUP ORAL at 13:10

## 2024-03-03 RX ADMIN — OXYCODONE HYDROCHLORIDE AND ACETAMINOPHEN 1 TABLET: 5; 325 TABLET ORAL at 17:26

## 2024-03-03 RX ADMIN — CLONAZEPAM 0.5 MG: 1 TABLET ORAL at 03:58

## 2024-03-03 RX ADMIN — CLONAZEPAM 0.5 MG: 1 TABLET ORAL at 11:32

## 2024-03-03 RX ADMIN — CLONAZEPAM 0.5 MG: 1 TABLET ORAL at 19:23

## 2024-03-03 RX ADMIN — VENLAFAXINE HYDROCHLORIDE 150 MG: 150 CAPSULE, EXTENDED RELEASE ORAL at 07:25

## 2024-03-03 RX ADMIN — WATER 40 MG: 1 INJECTION INTRAMUSCULAR; INTRAVENOUS; SUBCUTANEOUS at 11:30

## 2024-03-03 RX ADMIN — SODIUM CHLORIDE, PRESERVATIVE FREE 10 ML: 5 INJECTION INTRAVENOUS at 07:31

## 2024-03-03 RX ADMIN — ALBUTEROL SULFATE 2.5 MG: 2.5 SOLUTION RESPIRATORY (INHALATION) at 18:57

## 2024-03-03 RX ADMIN — SODIUM CHLORIDE, PRESERVATIVE FREE 10 ML: 5 INJECTION INTRAVENOUS at 19:23

## 2024-03-03 RX ADMIN — LEVOTHYROXINE SODIUM 75 MCG: 0.07 TABLET ORAL at 06:26

## 2024-03-03 RX ADMIN — GUAIFENESIN SYRUP AND DEXTROMETHORPHAN 10 ML: 100; 10 SYRUP ORAL at 07:28

## 2024-03-03 RX ADMIN — OMEPRAZOLE 40 MG: 40 CAPSULE, DELAYED RELEASE ORAL at 19:28

## 2024-03-03 RX ADMIN — WATER 40 MG: 1 INJECTION INTRAMUSCULAR; INTRAVENOUS; SUBCUTANEOUS at 00:21

## 2024-03-03 RX ADMIN — TIOTROPIUM BROMIDE AND OLODATEROL 2 PUFF: 3.124; 2.736 SPRAY, METERED RESPIRATORY (INHALATION) at 07:02

## 2024-03-03 RX ADMIN — ENOXAPARIN SODIUM 30 MG: 100 INJECTION SUBCUTANEOUS at 07:25

## 2024-03-03 RX ADMIN — AMLODIPINE BESYLATE 5 MG: 5 TABLET ORAL at 19:23

## 2024-03-03 RX ADMIN — OMEPRAZOLE 40 MG: 40 CAPSULE, DELAYED RELEASE ORAL at 07:26

## 2024-03-03 RX ADMIN — OXYCODONE HYDROCHLORIDE AND ACETAMINOPHEN 1 TABLET: 5; 325 TABLET ORAL at 07:28

## 2024-03-03 RX ADMIN — CARVEDILOL 12.5 MG: 12.5 TABLET, FILM COATED ORAL at 07:27

## 2024-03-03 ASSESSMENT — PAIN DESCRIPTION - LOCATION
LOCATION: BACK;HIP;LEG
LOCATION: BACK;HIP
LOCATION: HIP;BACK

## 2024-03-03 ASSESSMENT — PAIN DESCRIPTION - ORIENTATION
ORIENTATION: RIGHT;MID
ORIENTATION: RIGHT;MID
ORIENTATION: LOWER;RIGHT

## 2024-03-03 ASSESSMENT — PAIN SCALES - GENERAL
PAINLEVEL_OUTOF10: 7
PAINLEVEL_OUTOF10: 9
PAINLEVEL_OUTOF10: 6
PAINLEVEL_OUTOF10: 8
PAINLEVEL_OUTOF10: 0
PAINLEVEL_OUTOF10: 8
PAINLEVEL_OUTOF10: 7

## 2024-03-03 ASSESSMENT — PAIN - FUNCTIONAL ASSESSMENT
PAIN_FUNCTIONAL_ASSESSMENT: PREVENTS OR INTERFERES SOME ACTIVE ACTIVITIES AND ADLS
PAIN_FUNCTIONAL_ASSESSMENT: ACTIVITIES ARE NOT PREVENTED
PAIN_FUNCTIONAL_ASSESSMENT: ACTIVITIES ARE NOT PREVENTED

## 2024-03-03 ASSESSMENT — PAIN DESCRIPTION - DESCRIPTORS
DESCRIPTORS: ACHING

## 2024-03-03 NOTE — PLAN OF CARE
Problem: Respiratory - Adult  Goal: Achieves optimal ventilation and oxygenation  3/3/2024 0231 by Venus Mann, RN  Outcome: Progressing  Note: Patient compliant with oxygen per nasal cannula and does wear PAP machine while sleeping.  Patient is compliant with medications.  Patient states that wheezing has improved since IV solumedrol has resumed.  Patient does have some dyspnea with exertion, yet recovers quickly.       Problem: Safety - Adult  Goal: Free from fall injury  3/3/2024 0231 by Venus Mann, RN  Outcome: Progressing  Note: Bed in lowest and locked position.  2 bedrails used for pt safety.  Bed alarm in use in pt's room.  Hourly rounds done by staff and RN.  Phone and call light within pt's reach.

## 2024-03-03 NOTE — PLAN OF CARE
Problem: Discharge Planning  Goal: Discharge to home or other facility with appropriate resources  Outcome: Progressing  D/C barrier: currently on IV solumedrol. Transitioning to oral steroids tomorrow.      Problem: Respiratory - Adult  Goal: Achieves optimal ventilation and oxygenation  3/3/2024 1512 by Nuha Quintana  Outcome: Progressing  O2 sat remains between 98-99% on 3L nc. Weaning down to baseline 2L nc.      Problem: Safety - Adult  Goal: Free from fall injury  3/3/2024 1512 by Nuha Quintana  Outcome: Progressing  Pt assessed as a fall risk this shift. Remains free from falls and accidental injury at this time. Fall precautions in place, including falling star sign and fall risk band on pt. Floor free from obstacles, and bed is locked and in lowest position. Adequate lighting provided.  Pt encouraged to call before getting OOB for any need.  Bed alarm activated. Will continue to monitor needs during hourly rounding, and reinforce education on use of call light.    Problem: Pain  Goal: Verbalizes/displays adequate comfort level or baseline comfort level  Outcome: Progressing  Pt medicated with pain medication prn.  Assessed all pain characteristics including level, type, location, frequency, and onset.  Non-pharmacologic interventions offered to pt as well.  Pt states pain is tolerable at this time.

## 2024-03-04 VITALS
HEIGHT: 63 IN | BODY MASS INDEX: 18.36 KG/M2 | TEMPERATURE: 98.2 F | DIASTOLIC BLOOD PRESSURE: 71 MMHG | HEART RATE: 57 BPM | OXYGEN SATURATION: 100 % | SYSTOLIC BLOOD PRESSURE: 126 MMHG | RESPIRATION RATE: 20 BRPM | WEIGHT: 103.62 LBS

## 2024-03-04 PROCEDURE — 2580000003 HC RX 258: Performed by: INTERNAL MEDICINE

## 2024-03-04 PROCEDURE — 6360000002 HC RX W HCPCS: Performed by: INTERNAL MEDICINE

## 2024-03-04 PROCEDURE — 2580000003 HC RX 258: Performed by: NURSE PRACTITIONER

## 2024-03-04 PROCEDURE — 6370000000 HC RX 637 (ALT 250 FOR IP): Performed by: INTERNAL MEDICINE

## 2024-03-04 PROCEDURE — 94761 N-INVAS EAR/PLS OXIMETRY MLT: CPT

## 2024-03-04 PROCEDURE — 94640 AIRWAY INHALATION TREATMENT: CPT

## 2024-03-04 PROCEDURE — 6370000000 HC RX 637 (ALT 250 FOR IP): Performed by: NURSE PRACTITIONER

## 2024-03-04 PROCEDURE — 6370000000 HC RX 637 (ALT 250 FOR IP): Performed by: STUDENT IN AN ORGANIZED HEALTH CARE EDUCATION/TRAINING PROGRAM

## 2024-03-04 PROCEDURE — 94660 CPAP INITIATION&MGMT: CPT

## 2024-03-04 PROCEDURE — 2700000000 HC OXYGEN THERAPY PER DAY

## 2024-03-04 PROCEDURE — 6360000002 HC RX W HCPCS: Performed by: NURSE PRACTITIONER

## 2024-03-04 RX ORDER — PREDNISONE 10 MG/1
TABLET ORAL
Qty: 15 TABLET | Refills: 0 | Status: SHIPPED | OUTPATIENT
Start: 2024-03-04 | End: 2024-03-12

## 2024-03-04 RX ORDER — OXYCODONE HYDROCHLORIDE AND ACETAMINOPHEN 5; 325 MG/1; MG/1
1 TABLET ORAL EVERY 4 HOURS PRN
Qty: 30 TABLET | Refills: 0 | Status: SHIPPED | OUTPATIENT
Start: 2024-03-04 | End: 2024-03-09

## 2024-03-04 RX ORDER — BENZONATATE 200 MG/1
200 CAPSULE ORAL 3 TIMES DAILY
Qty: 40 CAPSULE | Refills: 0 | Status: SHIPPED | OUTPATIENT
Start: 2024-03-04

## 2024-03-04 RX ORDER — AMLODIPINE BESYLATE 5 MG/1
5 TABLET ORAL EVERY 24 HOURS
Qty: 30 TABLET | Refills: 3 | Status: SHIPPED | OUTPATIENT
Start: 2024-03-04

## 2024-03-04 RX ORDER — GUAIFENESIN 600 MG/1
600 TABLET, EXTENDED RELEASE ORAL 2 TIMES DAILY
Qty: 20 TABLET | Refills: 0 | Status: SHIPPED | OUTPATIENT
Start: 2024-03-04

## 2024-03-04 RX ADMIN — CARVEDILOL 12.5 MG: 12.5 TABLET, FILM COATED ORAL at 09:24

## 2024-03-04 RX ADMIN — CLONAZEPAM 0.5 MG: 1 TABLET ORAL at 11:59

## 2024-03-04 RX ADMIN — TIOTROPIUM BROMIDE AND OLODATEROL 2 PUFF: 3.124; 2.736 SPRAY, METERED RESPIRATORY (INHALATION) at 07:32

## 2024-03-04 RX ADMIN — ALBUTEROL SULFATE 2.5 MG: 2.5 SOLUTION RESPIRATORY (INHALATION) at 11:20

## 2024-03-04 RX ADMIN — CLONAZEPAM 0.5 MG: 1 TABLET ORAL at 03:50

## 2024-03-04 RX ADMIN — OXYCODONE HYDROCHLORIDE AND ACETAMINOPHEN 1 TABLET: 5; 325 TABLET ORAL at 09:13

## 2024-03-04 RX ADMIN — ENOXAPARIN SODIUM 30 MG: 100 INJECTION SUBCUTANEOUS at 09:16

## 2024-03-04 RX ADMIN — SODIUM CHLORIDE, PRESERVATIVE FREE 10 ML: 5 INJECTION INTRAVENOUS at 09:26

## 2024-03-04 RX ADMIN — GUAIFENESIN 600 MG: 600 TABLET, EXTENDED RELEASE ORAL at 09:14

## 2024-03-04 RX ADMIN — GUAIFENESIN SYRUP AND DEXTROMETHORPHAN 10 ML: 100; 10 SYRUP ORAL at 13:40

## 2024-03-04 RX ADMIN — VENLAFAXINE HYDROCHLORIDE 150 MG: 150 CAPSULE, EXTENDED RELEASE ORAL at 09:15

## 2024-03-04 RX ADMIN — PREDNISONE 40 MG: 20 TABLET ORAL at 09:15

## 2024-03-04 RX ADMIN — OMEPRAZOLE 40 MG: 40 CAPSULE, DELAYED RELEASE ORAL at 09:26

## 2024-03-04 RX ADMIN — LEVOTHYROXINE SODIUM 75 MCG: 0.07 TABLET ORAL at 06:10

## 2024-03-04 RX ADMIN — ALBUTEROL SULFATE 2.5 MG: 2.5 SOLUTION RESPIRATORY (INHALATION) at 07:32

## 2024-03-04 RX ADMIN — FLUTICASONE PROPIONATE 2 SPRAY: 50 SPRAY, METERED NASAL at 09:26

## 2024-03-04 RX ADMIN — WATER 40 MG: 1 INJECTION INTRAMUSCULAR; INTRAVENOUS; SUBCUTANEOUS at 00:09

## 2024-03-04 ASSESSMENT — PAIN DESCRIPTION - ORIENTATION: ORIENTATION: LOWER

## 2024-03-04 ASSESSMENT — PAIN - FUNCTIONAL ASSESSMENT: PAIN_FUNCTIONAL_ASSESSMENT: ACTIVITIES ARE NOT PREVENTED

## 2024-03-04 ASSESSMENT — PAIN DESCRIPTION - LOCATION: LOCATION: BACK

## 2024-03-04 ASSESSMENT — PAIN SCALES - GENERAL: PAINLEVEL_OUTOF10: 8

## 2024-03-04 ASSESSMENT — PAIN DESCRIPTION - DESCRIPTORS: DESCRIPTORS: ACHING

## 2024-03-04 NOTE — CARE COORDINATION
Case Management Assessment  Initial Evaluation    Date/Time of Evaluation: 2/28/2024 9:11 AM  Assessment Completed by: Luci De La Vega RN    If patient is discharged prior to next notation, then this note serves as note for discharge by case management.    Patient Name: Laura Rhodes                   YOB: 1960  Diagnosis: COPD exacerbation (HCC) [J44.1]  COPD with acute exacerbation (HCC) [J44.1]                   Date / Time: 2/27/2024  6:52 AM    Patient Admission Status: Inpatient   Readmission Risk (Low < 19, Mod (19-27), High > 27): Readmission Risk Score: 28.5    Current PCP: Van Pate MD  PCP verified by CM? Yes    Chart Reviewed: Yes      History Provided by: Patient  Patient Orientation: Alert and Oriented    Patient Cognition: Alert    Hospitalization in the last 30 days (Readmission):  No    If yes, Readmission Assessment in CM Navigator will be completed.    Advance Directives:      Code Status: Full Code   Patient's Primary Decision Maker is: Legal Next of Kin    Primary Decision Maker: Dionicio Rhodes - Spouse - [phone]    Discharge Planning:    Patient lives with: Spouse/Significant Other Type of Home: House  Primary Care Giver: Self  Patient Support Systems include: Spouse/Significant Other, Family Members, Friends/Neighbors   Current Financial resources: Medicare  Current community resources: ECF/Home Care  Current services prior to admission: Hospice Services            Current DME: Bipap, Oxygen Therapy (Comment), Home Aerosol, Walker, Shower Chair            Type of Home Care services:  Hospice    ADLS  Prior functional level: Assistance with the following:, Mobility  Current functional level: Assistance with the following:, Mobility    PT AM-PAC:   /24  OT AM-PAC:   /24    Family can provide assistance at DC: Yes  Would you like Case Management to discuss the discharge plan with any other family members/significant others, and if so, who? Yes (Spouse; 
ONGOING DISCHARGE PLAN:     Reviewed previous CM notes, and plan is for patient to be discharged home with [facility] and [facility] for Palliative Care.     Active order for PO Zithro and IV Solu-medrol 40mg every 12 hours.     Will continue to follow for additional discharge needs.     If patient is discharged prior to next notation, then this note serves as note for discharge by case management.    Electronically signed by Elizabet Car RN on 3/3/2024 at 10:43 AM       
ONGOING DISCHARGE PLAN:    Patient is alert and oriented x4.    Spoke with patient regarding discharge plan and patient confirms that plan is still to return home with spouse.     [facility]: [facility] for skilled, [facility] per palliative (referral faxed through [employer] to verify).     DME: Walker, SC, nebulizer, O2 at 2L NC 24/7 ([facility]), BiPAP (Velvet).    Pulmonology on board; PO prednisone. Currently on 2L nasal cannula, saturation 93%    LEXY needs completed by bedside RN, Perfect Serve message sent to LILY Gallardo, at 9:32 AM.    IMM letter provided to patient.  Patient offered four hours to make informed decision regarding appeal process; patient agreeable to discharge.     Will continue to follow for additional discharge needs.    If patient is discharged prior to next notation, then this note serves as note for discharge by case management.    Electronically signed by Luci De La Vega RN on 3/4/2024 at 8:55 AM    ADDENDUM:    This writer contacted LILY Gallardo, via telephone and requested that the LEXY be completed.     Electronically signed by Luci De La Vega RN on 3/4/2024 at 12:33 PM    ADDENDUM:    [facility]EX[facility].    Electronically signed by Luci De La Vega RN on 3/4/2024 at 1:33 PM      
ONGOING DISCHARGE PLAN:    Patient is alert and oriented x4.    Spoke with patient regarding discharge plan and patient confirms that plan is still to return to Home w/ Spouse.     [facility], from [place], [facility] is following. Pt. Would like them for Skilled/Therapy. Her Insurance does NOT cover for Palliative Services. Per Notes, Referral sent to [facility] for Palliative Services by Palliative Care Nurse.     Pt. States \"She is NOT ready for Hospice, but does need Pain Control\".     PT/OT rec home w/ Assist.     Pt. Currently sating 98% on 2LNC, Wears 2L, already at home.     Remains on IV Steroids, 40 q 8. Pulmonary on board.     Will continue to follow for additional discharge needs.    If patient is discharged prior to next notation, then this note serves as note for discharge by case management.    Electronically signed by Aga Rod RN on 2/29/2024 at 1:19 PM   
ONGOING DISCHARGE PLAN:    Patient is alert and oriented x4.    Spoke with patient regarding discharge plan and patient confirms that plan is still to return to home w/ Spouse.    Pt. Will have [facility] for Skilled/Therapy & [facility] for Palliative/Pain Control.     Pt. Currently sating 97% on 2LNC. Pt. Wears 2LNC, already at home.     Pt. Remains on IV Steroids, 40 Q8 & PO Prednisone will start nataliya. Remains on PO Zithromax. Pulmonary on board.     Palliative care on board.     PT/OT on board.     Will continue to follow for additional discharge needs.    If patient is discharged prior to next notation, then this note serves as note for discharge by case management.    Electronically signed by Aga Rod RN on 3/1/2024 at 2:34 PM   
ONGOING DISCHARGE PLAN:    Reviewed previous CM notes, and plan is for patient to be discharged home with [facility] and [facility] for Palliative Care.    Active order for PO Zithro and IV Solu-medrol 40mg every 12 hours.    Will continue to follow for additional discharge needs.    If patient is discharged prior to next notation, then this note serves as note for discharge by case management.    Electronically signed by Elizabet Car RN on 3/2/2024 at 2:34 PM    
mouth daily     STOP taking these medications    STOP taking these medications  dexAMETHasone 4 MG tablet  Commonly known as: DECADRON   fluticasone 110 MCG/ACT inhaler  Commonly known as: FLOVENT HFA   HYDROmorphone 2 MG tablet  Commonly known as: DILAUDID   magnesium oxide 400 MG tablet  Commonly known as: MAG-OX   Melatonin 10 MG Tabs   sterile water injection   Where to Get Your Medications      These medications were sent to [id number] - [place], [place] - [address] - P [phone] - F [phone]  [address] [address], [facility] [address]  Phone: [phone]       amLODIPine 5 MG tablet        benzonatate 200 MG capsule        guaiFENesin 600 MG extended release tablet        oxyCODONE-acetaminophen 5-325 MG per tablet        predniSONE 10 MG tablet

## 2024-03-04 NOTE — PROGRESS NOTES
Pulmonary Progress Note  [facility]      Patient - Laura Rhodes,  Age - 63 y.o.    - 1960      Room Number - /-01   N -  [id number]   [facility] # - [id number]  Date of Admission -  2024  6:52 AM        Consulting Service/Physician   Consulting - Kartik Salas MD  Primary Care Physician - Van Pate MD     SUBJECTIVE   Complaining of back pain and still having dyspnea with exertion    OBJECTIVE   VITALS    height is 1.6 m (5' 3\") and weight is 39 kg (85 lb 15.7 oz). Her oral temperature is 97.5 °F (36.4 °C). Her blood pressure is 120/63 and her pulse is 54. Her respiration is 18 and oxygen saturation is 100%.     Body mass index is 15.23 kg/m².  Temperature Range: Temp: 97.5 °F (36.4 °C) Temp  Av.8 °F (36.6 °C)  Min: 97.4 °F (36.3 °C)  Max: 98.3 °F (36.8 °C)  BP Range:  Systolic (24hrs), Av , Min:111 , Max:138     Diastolic (24hrs), Av, Min:63, Max:67    Pulse Range: Pulse  Av.3  Min: 54  Max: 68  Respiration Range: Resp  Av.7  Min: 15  Max: 25  Current Pulse Ox::  SpO2: 100 %  24HR Pulse Ox Range:  SpO2  Av.9 %  Min: 95 %  Max: 100 %  Oxygen Amount and Delivery: O2 Flow Rate (L/min): 2 L/min    Wt Readings from Last 3 Encounters:   24 39 kg (85 lb 15.7 oz)   24 45.8 kg (101 lb)   24 44.5 kg (98 lb)       I/O (24 Hours)    Intake/Output Summary (Last 24 hours) at 3/2/2024 1054  Last data filed at 3/1/2024 1730  Gross per 24 hour   Intake 200 ml   Output 1300 ml   Net -1100 ml       EXAM     General Appearance  Awake, alert, oriented, in no acute distress  HEENT - normocephalic, atraumatic. []  Mallampati  [] Crowded airway   [] Macroglossia  []  Retrognathia  [] Micrognathia  []  Normal tongue size []  Normal Bite  [] Lamont sign positive    Neck - Supple,  trachea midline   Lungs -markedly diminished poor air movement  Cardiovascular - Heart sounds are normal.  Regular 
     [facility]  Jomar Jo MD; Enrique Motta MD; Kartik Salas MD; MD Alejandra Love MD; Aren Ashley MD    [facility]   IN-PATIENT SERVICE   [facility]    HISTORY AND PHYSICAL EXAMINATION            Date:   2/28/2024  Patient name:  Laura Rhodes  Date of admission:  2/27/2024  6:52 AM  MRN:   [id number]  Account:  [id number]  YOB: 1960  PCP:    Van Pate MD  Room:   2081/2081-01  Code Status:    Full Code    Chief Complaint:     Chief Complaint   Patient presents with    Respiratory Distress     Worsening shortness of breath for 4 days       History Obtained From:     Patient medical record nursing staff    History of Present Illness:     Laura Rhodes is a 63 y.o. Non- / non  female who presents with Respiratory Distress (Worsening shortness of breath for 4 days)   and is admitted to the hospital for the management of COPD exacerbation ([facility]ly [facility]).    63-year-old lady with history of severe COPD on home oxygen BiPAP DuoNeb inhaler active smoking more than half pack a day has a prolonged hospital stay after Thanksgiving for a month at the [facility] for over 35 days  Patient is actively smoking admitted with increasing dyspnea cough and wheezing COPD exacerbation    Past Medical History:     Past Medical History:   Diagnosis Date    Acid reflux     Anxiety     Arthritis     Cancer (HCC)     cervical    Cervical osteoarthritis 11/30/2020    COPD (chronic obstructive pulmonary disease) (Formerly [facility]) 02/23/2018    DDD (degenerative disc disease), cervical     Depression     Hiatal hernia     History of blood transfusion     Reaction fever went up to 107 . per Pt.     On home O2     Spinal stenosis     Tachycardia     Thyroid disease     hypo        Past Surgical History:     Past Surgical History:   Procedure Laterality Date    BACK SURGERY      diskectomy, L3-L5 
     [facility]  Jomar Jo MD; Enrique Motta MD; Kartik Salas MD; MD Alejandra Love MD; Aren Ashley MD    [facility]   IN-PATIENT SERVICE   [facility]    HISTORY AND PHYSICAL EXAMINATION            Date:   2/29/2024  Patient name:  Laura Rhodes  Date of admission:  2/27/2024  6:52 AM  MRN:   [id number]  Account:  [id number]  YOB: 1960  PCP:    Van Pate MD  Room:   2081/2081-01  Code Status:    DNR-CCA    Chief Complaint:     Chief Complaint   Patient presents with    Respiratory Distress     Worsening shortness of breath for 4 days       History Obtained From:     Patient medical record nursing staff    History of Present Illness:     Laura Rhodes is a 63 y.o. Non- / non  female who presents with Respiratory Distress (Worsening shortness of breath for 4 days)   and is admitted to the hospital for the management of COPD exacerbation ([facility]).    63-year-old lady with history of severe COPD on home oxygen BiPAP DuoNeb inhaler active smoking more than half pack a day has a prolonged hospital stay after Thanksgiving for a month at the [facility] for over 35 days  Patient is actively smoking admitted with increasing dyspnea cough and wheezing COPD exacerbation    Past Medical History:     Past Medical History:   Diagnosis Date    Acid reflux     Anxiety     Arthritis     Cancer (HCC)     cervical    Cervical osteoarthritis 11/30/2020    COPD (chronic obstructive pulmonary disease) ([facility]) 02/23/2018    DDD (degenerative disc disease), cervical     Depression     Hiatal hernia     History of blood transfusion     Reaction fever went up to 107 . per Pt.     On home O2     Spinal stenosis     Tachycardia     Thyroid disease     hypo        Past Surgical History:     Past Surgical History:   Procedure Laterality Date    BACK SURGERY      diskectomy, L3-L5    
    [facility] PULMONARY,CRITICAL CARE & SLEEP   Saúl Cordero MD/Hieu Whitfield MD/ Christian Concepcion MD/Dr Lexie Castillo APRN AGACNP-BC, NP-C      Lizette Rivera APRN NP-C     Chantel Mathews APRN NP-C                                           Pulmonary Progress Note    Patient - Laura Rhodes   Age - 63 y.o.   - 1960  MRN - [id number]  [facility] # - [id number]  Date of Admission - 2024  6:52 AM    Consulting Service/Physician:       Primary Care Physician: Van Pate MD    SUBJECTIVE:     Chief Complaint:   Chief Complaint   Patient presents with    Respiratory Distress     Worsening shortness of breath for 4 days     Subjective:    Laura is seen lying in bed.  She states her breathing is feeling slightly better but not quite to baseline.  She was changed over to IV Solu-Medrol yesterday.  She states she feels like her breathing is improved with the IV Solu-Medrol.  She continues on her nebulizer treatments.  She has been afebrile.  She does not feel ready for discharge today.    VITALS  BP (!) 118/51   Pulse 61   Temp 97.4 °F (36.3 °C) (Axillary)   Resp 16   Ht 1.6 m (5' 3\")   Wt 47 kg (103 lb 9.9 oz)   SpO2 100%   BMI 18.35 kg/m²   Wt Readings from Last 3 Encounters:   24 47 kg (103 lb 9.9 oz)   24 45.8 kg (101 lb)   24 44.5 kg (98 lb)     I/O (24 Hours)    Intake/Output Summary (Last 24 hours) at 3/3/2024 1027  Last data filed at 3/2/2024 1628  Gross per 24 hour   Intake --   Output 1300 ml   Net -1300 ml     Ventilator:   Settings  FiO2 : 30 %  Insp Rise Time (%): 2 %  Exam:   Physical Exam   Constitutional: Elderly female lying in bed on 3 L nasal cannula in no acute distress HENT: Unremarkable  Head: Normocephalic and atraumatic.   Eyes: EOM are normal. Pupils are equal, round, and reactive to light.   Neck: Neck supple.   Cardiovascular:  Regular rate and rhythm.  Normal heart tones.  No JVD.    Pulmonary/Chest: Respirations even and 
    [facility] PULMONARY, CRITICAL CARE & SLEEP  MD Juan Krishna MD Ryan Griffith MD Karl Fernandes MD Dean Bernardo MD Cheryl Jeffers APRN  Lizette Rivera APRN                                      Critical Care Pulmonary Progress Note    Patient - Laura Rhodes   Age - 63 y.o.   - 1960  MRN - [id number]  [facility] # - [id number]  Date of Admission - 2024  6:52 AM    Consulting Service/Physician:   Consulting:    [unfilled]    Primary Care Physician: Van Pate MD    SUBJECTIVE:     Chief Complaint:   Chief Complaint   Patient presents with    Respiratory Distress     Worsening shortness of breath for 4 days     Subjective:    Patient seen and examined.  Clinically the patient seems to be doing a little better.  Currently on her baseline oxygen requirement.  She still feels very tight on exam and her air entry is pretty poor but I imagine this is close to her baseline.  This is my first time ever meeting the patient but she is very familiar to our practice and follows with Dr. Boone.  Patient feels that her symptoms really started once her chronic pain medications were discontinued.  Currently afebrile and hemodynamically stable.  No significant overnight events or acute issues require my attention.    VITALS  /61   Pulse 70   Temp 98 °F (36.7 °C) (Oral)   Resp 18   Ht 1.6 m (5' 3\")   Wt 49.4 kg (108 lb 14.5 oz)   SpO2 98%   BMI 19.29 kg/m²   Wt Readings from Last 3 Encounters:   24 49.4 kg (108 lb 14.5 oz)   24 45.8 kg (101 lb)   24 44.5 kg (98 lb)     I/O (24 Hours)    Intake/Output Summary (Last 24 hours) at 2024 1302  Last data filed at 2024 0910  Gross per 24 hour   Intake 960 ml   Output --   Net 960 ml     Ventilator:   Settings  FiO2 : 30 %  Insp Rise Time (%): 2 %  Exam:   Physical Exam  Constitutional:       General: She is awake. She is not in acute distress.     Appearance: Normal appearance. She is 
   03/01/24 1547   Encounter Summary   Encounter Overview/Reason  Attempted Encounter   Service Provided For: Patient not available  (with PT)       
..PALLIATIVE CARE TEAM    Patient: Laura Rhodes  Room: 2081/2081-01    Reason For Consult   Goals of care evaluation  Distress management  Symptom Management  Guidance and support  Facilitate communications  Assistance in coordinating care  Recommendations for the above    Impression: Laura Rhodes is a 63 y.o. year old female with  has a past medical history of Acid reflux, Anxiety, Arthritis, Cancer ([facility]), Cervical osteoarthritis, COPD (chronic obstructive pulmonary disease) ([facility]), DDD (degenerative disc disease), cervical, Depression, Hiatal hernia, History of blood transfusion, On home O2, Spinal stenosis, Tachycardia, and Thyroid disease..  Currently hospitalized for the management of COPD.  The Palliative Care Team is following to assist with     Code Status  DNR-CCA  PLAN:   - the patient is alert and very comfortable and the plan is discharge to home with home care and palliative care  -the patient is encouraged to have a better quality of life and is motivated to do so   - will follow for goals of care and support.  Vital Signs:  /71   Pulse 57   Temp 98.2 °F (36.8 °C) (Oral)   Resp 20   Ht 1.6 m (5' 3\")   Wt 47 kg (103 lb 9.9 oz)   SpO2 100%   BMI 18.35 kg/m²     Patient Active Problem List   Diagnosis    Abdominal bloating    MDD (major depressive disorder), recurrent severe, without psychosis ([facility])    Panic disorder    Obsessive compulsive disorder    Irritable bowel syndrome with constipation    Chronic constipation    Esophageal dysphagia    Cervical osteoarthritis    Chronic bilateral low back pain without sciatica    COPD with acute exacerbation (HCC)    Lumbar foraminal stenosis    Acquired hypothyroidism    Compression fracture of L2 lumbar vertebra (HCC)    Essential hypertension    Essential tremor    Other osteoporosis with current pathological fracture, vertebra(e), initial encounter for fracture (HCC)    Fibromyalgia    Disorder of thyroid    Degeneration of intervertebral 
..PALLIATIVE CARE TEAM    Patient: Laura Rhodes  Room: 2081/2081-01    Reason For Consult   Goals of care evaluation  Distress management  Symptom Management  Guidance and support  Facilitate communications  Assistance in coordinating care  Recommendations for the above    Impression: Laura Rhodes is a 63 y.o. year old female with  has a past medical history of Acid reflux, Anxiety, Arthritis, Cancer ([facility]), Cervical osteoarthritis, COPD (chronic obstructive pulmonary disease) ([facility]), DDD (degenerative disc disease), cervical, Depression, Hiatal hernia, History of blood transfusion, On home O2, Spinal stenosis, Tachycardia, and Thyroid disease..  Currently hospitalized for the management of COPD.  The Palliative Care Team is following to assist with goals of care and support.     Code Status  DNR-CCA  PLAN:   - the patient wants to return home and get stronger and she is highly encouraged to stop actively smoking by pulmonology   - she is maintained on oxygen per nasal cannula   - will follow for goals of care and support.   Vital Signs:  /61   Pulse 70   Temp 98 °F (36.7 °C) (Oral)   Resp 18   Ht 1.6 m (5' 3\")   Wt 49.4 kg (108 lb 14.5 oz)   SpO2 98%   BMI 19.29 kg/m²     Patient Active Problem List   Diagnosis    Abdominal bloating    MDD (major depressive disorder), recurrent severe, without psychosis ([facility])    Panic disorder    Obsessive compulsive disorder    Irritable bowel syndrome with constipation    Chronic constipation    Esophageal dysphagia    Cervical osteoarthritis    Chronic bilateral low back pain without sciatica    COPD with acute exacerbation (HCC)    Lumbar foraminal stenosis    Acquired hypothyroidism    Compression fracture of L2 lumbar vertebra (HCC)    Essential hypertension    Essential tremor    Other osteoporosis with current pathological fracture, vertebra(e), initial encounter for fracture (HCC)    Fibromyalgia    Disorder of thyroid    Degeneration of intervertebral 
Agree with SN Judith Adkins's charting.  
BRONCHOSPASM/BRONCHOCONSTRICTION     [x]         IMPROVE AERATION/BREATH SOUNDS  [x]   ADMINISTER BRONCHODILATOR THERAPY AS APPROPRIATE  [x]   ASSESS BREATH SOUNDS  []   IMPLEMENT AEROSOL/MDI PROTOCOL  [x]   PATIENT EDUCATION AS NEEDED  NONINVASIVE VENTILATION    PROVIDE OPTIMAL VENTILATION/ACCEPTABLE SPO2   IMPLEMENT NONINVASIVE VENTILATION PROTOCOL   MAINTAIN ACCEPTABLE SPO2   ASSESS SKIN INTEGRITY/BREAKDOWN SCORE   PATIENT EDUCATION AS NEEDED   BIPAP AS NEEDED        
BRONCHOSPASM/BRONCHOCONSTRICTION     [x]         IMPROVE AERATION/BREATH SOUNDS  [x]   ADMINISTER BRONCHODILATOR THERAPY AS APPROPRIATE  [x]   ASSESS BREATH SOUNDS  []   IMPLEMENT AEROSOL/MDI PROTOCOL  [x]   PATIENT EDUCATION AS NEEDED  PROVIDE ADEQUATE OXYGENATION WITH ACCEPTABLE SP02/ABG'S    [x]  IDENTIFY APPROPRIATE OXYGEN THERAPY  [x]   MONITOR SP02/ABG'S AS NEEDED   [x]   PATIENT EDUCATION AS NEEDED    
DATE: 3/4/2024    NAME: Laura Rhodes  MRN: [id number]   : 1960    Patient not seen this date for Physical Therapy due to:      [x] Cancel by RN or physician due to: Nursing deferred treatment, patient being discharged home.    [] Hemodialysis    [] Critical Lab Value Level     [] Blood transfusion in progress    [] Acute or unstable cardiovascular status   _MAP < 55 or more than >115  _HR < 40 or > 130    [] Acute or unstable pulmonary status   -FiO2 > 60%   _RR < 5 or >40    _O2 sats < 85%    [] Strict Bedrest    [] Off Unit for surgery or procedure    [] Off Unit for testing       [] Pending imaging to R/O fracture    [] Refusal by Patient      [] Other      [] PT being discontinued at this time. Patient independent. No further needs.     [] PT being discontinued at this time as the patient has been transferred to hospice care. No further needs.      Yadi Cortez, PTA    
Facts: Pt is a palliative care pt with a poor prognosis and is known to writer from previous admission. Pt remembered writer from last admission.     Feelings: Pt is upset about pain control; she feels her admission caused by anxiety over not being able to get new rx for pain pills. She has asked for ortho consult as she's missed her ortho follow up appts due to being in the hospital although feels hip has healed well. Pt said she was able to \"do normal things around the house\" and was feeling good after discharge from [facility]. Breathing began to get worse when pain was not managed well under hospice care.    Family: She notes her  is very supportive and had been happy to see her \"be normal\" again. Her daughter is planning a trip here to see her parents.     Lynsey: Prayer is always comforting to the patient. She noted she is not afraid to die and knows \"my days are numbered.\" Pt doesn't feel she is ready for hospice as still wants treatment, although not resuscitation attempts. She noted she would not want to have trach and PEG.      Follow-up: Chaplains will continue to support patient as needed.      02/29/24 1922   Encounter Summary   Encounter Overview/Reason  Spiritual/Emotional Needs   Service Provided For: Patient   Referral/Consult From: Palliative Care   Support System Spouse;Children   Last Encounter  02/29/24   Complexity of Encounter Moderate   Begin Time 1705   End Time  1725   Total Time Calculated 20 min   Spiritual/Emotional needs   Type Spiritual Support   Assessment/Intervention/Outcome   Assessment Calm;Compromised coping   Intervention Active listening;Discussed illness injury and it’s impact;Explored/Affirmed feelings, thoughts, concerns;Prayer (assurance of)/[place];Sustaining Presence/Ministry of presence   Outcome Engaged in conversation;Expressed feelings, needs, and concerns;Expressed Gratitude;Receptive       
Juan Whitfield MD (pulm) notified of consult.   
Kelli Gutierrez, NP (palliative care) notified of consult.   
PT sated that she knows that she would be in better place but she wanted to stay longer for the family. Welcomed prayer.   03/02/24 1126   Encounter Summary   Encounter Overview/Reason  Spiritual/Emotional Needs   Service Provided For: Patient   Referral/Consult From: Palliative Care   Support System Children;Spouse   Last Encounter  03/02/24   Complexity of Encounter Moderate   Spiritual/Emotional needs   Type Spiritual Support   Assessment/Intervention/Outcome   Assessment Calm;Coping;Hopeful;Peaceful   Intervention Active listening;Explored/Affirmed feelings, thoughts, concerns;Prayer (assurance of)/[place];Sustaining Presence/Ministry of presence;Discussed illness injury and it’s impact;Discussed relationship with God   Outcome Acceptance;Comfort;Coping;Engaged in conversation;Expressed feelings, needs, and concerns;Peace;Receptive        
Page sent to Dr. Conn for new consult.   
Patient arrived to floor. Oriented to room. Skin assessment completed. Call light within reach.   
Physical Therapy  Facility/Department: [facility] PROGRESSIVE CARE  Physical Therapy Initial Assessment    Name: Laura Rhodes  : 1960  MRN: [id number]  Date of Service: 2024    Discharge Recommendations:  Continue to assess pending progress   PT Equipment Recommendations  Equipment Needed: No      Patient Diagnosis(es): The encounter diagnosis was COPD with acute exacerbation (HCC).  Past Medical History:  has a past medical history of Acid reflux, Anxiety, Arthritis, Cancer (HCC), Cervical osteoarthritis, COPD (chronic obstructive pulmonary disease) (HCC), DDD (degenerative disc disease), cervical, Depression, Hiatal hernia, History of blood transfusion, On home O2, Spinal stenosis, Tachycardia, and Thyroid disease.  Past Surgical History:  has a past surgical history that includes Upper gastrointestinal endoscopy; Thyroidectomy, partial; back surgery; other surgical history; Dilation and curettage of uterus; Colonoscopy (N/A, 2019); Colonoscopy (2019); Upper gastrointestinal endoscopy (N/A, 2020); Upper gastrointestinal endoscopy (N/A, 2022); Pain management procedure; and Femur fracture surgery (Right, 2023).    Assessment   Body Structures, Functions, Activity Limitations Requiring Skilled Therapeutic Intervention: Decreased functional mobility ;Decreased strength;Decreased endurance;Decreased balance  Assessment: Pt presents with COPD exacerbation. Pt demo's ability to ambulate in room with slight balance and endurance deficits. Pt requires monitoring of SpO2 during session and only jennifer's drop in O2 sats after use of bathroom. Pt reports she wants to use counter in room to perform HEP. Writer encourages pt to have supervision with OOB activity d/t slight balance deficits. Pt is agreeable. Pt will benefit from skilled PT interventions to maximize mobility and progress IND prior to d/c home  Treatment Diagnosis: impaired mobiltiy and decreased IND  Therapy Prognosis: 
Pt requesting for Nasal Spray. Glo العلي, NP notified. See orders.   
Referral faxed to [facility] for home palliative services. Pt states her insurance does not cover [facility] Palliative Care services.         
Ruthy Barros (palliative care) notified of consult.   
[facility]   Occupational Therapy Evaluation  Date: 24  Patient Name: Laura Rhodes       Room: -  MRN: [id number]  Account: [id number]   : 1960  (63 y.o.) Gender: female     Discharge Recommendations:  The patient may need non-skilled ADL assistance after discharge.     OT Equipment Recommendations  Other: TBD    Referring Practitioner: Kartik Salas MD  Diagnosis: COPD exacerbation   Additional Pertinent Hx: Laura Rhodes is a 63 y.o. Non- / non  female who presents with Respiratory Distress (Worsening shortness of breath for 4 days)   and is admitted to the hospital for the management of COPD exacerbation (HCC).     63-year-old lady with history of severe COPD on home oxygen BiPAP DuoNeb inhaler active smoking more than half pack a day has a prolonged hospital stay after Thanksgiving for a month at the [facility] for over 35 days  Patient is actively smoking admitted with increasing dyspnea cough and wheezing COPD exacerbation    Past Medical History:  has a past medical history of Acid reflux, Anxiety, Arthritis, Cancer ([facility]), Cervical osteoarthritis, COPD (chronic obstructive pulmonary disease) ([facility]), DDD (degenerative disc disease), cervical, Depression, Hiatal hernia, History of blood transfusion, On home O2, Spinal stenosis, Tachycardia, and Thyroid disease.    Past Surgical History:   has a past surgical history that includes Upper gastrointestinal endoscopy; Thyroidectomy, partial; back surgery; other surgical history; Dilation and curettage of uterus; Colonoscopy (N/A, 2019); Colonoscopy (2019); Upper gastrointestinal endoscopy (N/A, 2020); Upper gastrointestinal endoscopy (N/A, 2022); Pain management procedure; and Femur fracture surgery (Right, 2023).    Restrictions  Restrictions/Precautions  Restrictions/Precautions: Fall Risk, General Precautions, Up as Tolerated  Required Braces 
Writer agrees with SN Judith Adkins's charting  
Independent  Stand to sit: Independent  Toilet Transfers  Toilet - Technique: Ambulating  Equipment Used: Standard toilet (with GB)  Toilet Transfer: Supervision  Toilet Transfers Comments: SUP for safety, good use of GB    Functional Mobility  Functional - Mobility Device: No device  Activity: To/from bathroom (around pt's room)  Assist Level: Supervision  Functional Mobility Comments: Pt completes mobility around room to gather items from closet. No LOB noted.      OT Exercises  Resistive Exercises: VELA facilitated pt's engagement in BUE resistive exercises utilizing lime green theraband and AROM shoulder movements for improved overall strength, endurance and activity tolerance for improved independence with self-care and mobility. Pt tolerated well, completed 1 set x10 reps each in all available planes. Completed standing with no UE support, no LOB noted.    Patient Education  Patient Education  Education Given To: Patient  Education Provided: Role of Therapy, Plan of Care, Home Exercise Program, Transfer Training, Energy Conservation  Education Provided Comments: Pursed lip breathing and implementation of RB.  Education Method: Verbal  Barriers to Learning: None  Education Outcome: Verbalized understanding, Continued education needed    Goals  Short Term Goals  Time Frame for Short Term Goals: By discharge  Short Term Goal 1: Pt will complete BADLs with Mod I, good safety, and use of AE/DME/Modified techniques as needed while maintaining SpO2 above 90%  Short Term Goal 2: Pt will complete functional transfers/mobility with Mod I, good safety, and use of least restrictive device while maintaining SpO2 above 90%  Short Term Goal 3: Pt will actively participate in 15+ minutes of therapeutic exercise/functional activity to promote safety and independence with self-care while maintaining SpO2 above 90%  Short Term Goal 4: Pt will verbalize/demonstrate good understanding of EC/WS strategies to promote safety and 
provided to the patient: no  Medication education provided to the patient: none      Electronically signed by Ana Pepper on 2/27/2024 at 1:48 PM     
(VENTOLIN HFA) 108 (90 Base) MCG/ACT inhaler Inhale 2 puffs into the lungs every 4 hours as needed for Shortness of Breath   Yes Carlene Maldonado MD   furosemide (LASIX) 20 MG tablet Take 1 tablet by mouth daily   Yes Carlene Maldonado MD   benzonatate (TESSALON) 200 MG capsule Take 1 capsule by mouth in the morning, at noon, and at bedtime   Yes Carlene Maldonado MD   azithromycin (ZITHROMAX) 250 MG tablet Take 1 tablet by mouth three times a week   Yes Carlene Maldonado MD   HYDROmorphone (DILAUDID) 2 MG tablet Take 0.5 tablets by mouth every hour as needed (mod-sev pain/SOB). Max Daily Amount: 24 mg   Yes Carlene Maldonado MD   dexAMETHasone (DECADRON) 4 MG tablet Take 1 tablet by mouth daily   Yes Carlene Maldonado MD   HYDROmorphone (DILAUDID) 2 MG tablet Take 0.5 tablets by mouth every 6 hours as needed (pain/SOB). Max Daily Amount: 4 mg   Yes Carlene Maldonado MD   Dextromethorphan-guaiFENesin  MG/5ML SYRP Take 10 mLs by mouth every 4 hours as needed for Cough   Yes Carlene Maldonado MD   albuterol (PROVENTIL) (2.5 MG/3ML) 0.083% nebulizer solution Take 3 mLs by nebulization every 4 hours as needed for Wheezing or Shortness of Breath   Yes Carlene Maldonado MD   primidone (MYSOLINE) 50 MG tablet Take 2 tablets by mouth nightly   Yes Carlene Maldonado MD   sucralfate (CARAFATE) 1 GM/10ML suspension Take 5 mLs by mouth 4 times daily   Yes Carlene Maldonado MD   carvedilol (COREG) 12.5 MG tablet Take 1 tablet by mouth 2 times daily 2/21/24   Merced Perez MD   meloxicam (MOBIC) 15 MG tablet Take 1 tablet by mouth daily 2/13/24   Malachi Tovar MD   OXYGEN 2-4 L by Other route continuous 11/14/23   Carlene Maldonado MD   predniSONE (DELTASONE) 10 MG tablet  1/8/24   Carlene Maldonado MD   magnesium oxide (MAG-OX) 400 MG tablet Take 1 tablet by mouth 2 times daily  Patient not taking: Reported on 2/27/2024 1/3/24   Carlene Maldonado MD 
IntraVENous, PRN, Kartik Salas MD    ondansetron (ZOFRAN-ODT) disintegrating tablet 4 mg, 4 mg, Oral, Q8H PRN **OR** ondansetron (ZOFRAN) injection 4 mg, 4 mg, IntraVENous, Q6H PRN, Kartik Salas MD    polyethylene glycol (GLYCOLAX) packet 17 g, 17 g, Oral, Daily PRN, Kartik Salas MD    enoxaparin Sodium (LOVENOX) injection 30 mg, 30 mg, SubCUTAneous, Daily, Kartik Salas MD, 30 mg at 03/04/24 0916    acetaminophen (TYLENOL) tablet 650 mg, 650 mg, Oral, Q6H PRN **OR** acetaminophen (TYLENOL) suppository 650 mg, 650 mg, Rectal, Q6H PRN, Kartik Salas MD    albuterol (PROVENTIL) (2.5 MG/3ML) 0.083% nebulizer solution 2.5 mg, 2.5 mg, Nebulization, Q2H PRN, Kartik Salas MD    guaiFENesin (MUCINEX) extended release tablet 600 mg, 600 mg, Oral, BID, Kartik Salas MD, 600 mg at 03/04/24 0914    clonazePAM (KLONOPIN) tablet 0.5 mg, 0.5 mg, Oral, Q8H, Luz Pendleton, APRMAXX - CNP, 0.5 mg at 03/04/24 1159    bisacodyl (DULCOLAX) suppository 10 mg, 10 mg, Rectal, PRN, Kartik Salas MD    carvedilol (COREG) tablet 12.5 mg, 12.5 mg, Oral, BID, Brigida Perez MD, 12.5 mg at 03/04/24 0924    guaiFENesin-dextromethorphan (ROBITUSSIN DM) 100-10 MG/5ML syrup 10 mL, 10 mL, Oral, Q4H PRN, Kartik Salas MD, 10 mL at 03/03/24 1726    levothyroxine (SYNTHROID) tablet 75 mcg, 75 mcg, Oral, Daily, Kartik Salas MD, 75 mcg at 03/04/24 0610    mirtazapine (REMERON) tablet 45 mg, 45 mg, Oral, Nightly, Kartik Salas MD, 45 mg at 03/03/24 2115    primidone (MYSOLINE) tablet 100 mg, 100 mg, Oral, Nightly, Kartik Salas MD, 100 mg at 03/03/24 2115    sennosides-docusate sodium (SENOKOT-S) 8.6-50 MG tablet 2 tablet, 2 tablet, Oral, PRN, Kartik Salas MD, 2 tablet at 03/03/24 0908    tiZANidine (ZANAFLEX) tablet 4 mg, 4 mg, Oral, TID PRN, Kartik Salas MD, 4 mg at 03/03/24 2118    venlafaxine (EFFEXOR XR) extended release capsule 150 mg, 150 mg, Oral, Daily, Kartik Salas 
safety and independence with self-care/mobility  Short Term Goal 5: Pt will tolerate standing for 6+ minutes during self-care/functional activity, Mod I, to improve balance/activity tolerance while maintaining SpO2 above 90%  Occupational Therapy Plan  Times Per Week: 3-5  Current Treatment Recommendations: ROM, Strengthening, Balance training, Functional mobility training, Endurance training, Pain management, Safety education & training, Patient/Caregiver education & training, Equipment evaluation, education, & procurement, Positioning, Self-Care / ADL, Home management training    Assessment  Activity Tolerance  Activity Tolerance: Patient Tolerated treatment well  Assessment  Performance deficits / Impairments: Decreased functional mobility , Decreased ADL status, Decreased ROM, Decreased strength, Decreased safe awareness, Decreased endurance, Decreased sensation, Decreased balance, Decreased high-level IADLs  Decision Making: Low Complexity  OT Equipment Recommendations  Other: TBD  Safety Devices  Type of Devices: Bed alarm in place, All fall risk precautions in place, Call light within reach, Heels elevated for pressure relief, Patient at risk for falls, Left in bed, Nurse notified    AM-[facility] Daily Activities Inpatient  AM-[facility] Daily Activity - Inpatient   How much help is needed for putting on and taking off regular lower body clothing?: A Little  How much help is needed for bathing (which includes washing, rinsing, drying)?: A Little  How much help is needed for toileting (which includes using toilet, bedpan, or urinal)?: A Little  How much help is needed for putting on and taking off regular upper body clothing?: A Little  How much help is needed for taking care of personal grooming?: A Little  How much help for eating meals?: A Little  AM-[facility] Inpatient Daily Activity Raw Score: 18  AM-[facility] Inpatient ADL T-Scale Score : 38.66  ADL Inpatient CMS 0-100% Score: 46.65  ADL Inpatient CMS G-Code Modifier : CK    OT 
per OT  Strength RLE  Comment: grossly 4/5 to 4+/5  Strength LLE  Comment: grossly 4+/5 to 5/5  Strength RUE  Comment: per OT  Strength LUE  Comment: per OT     Sensation  Overall Sensation Status: Impaired (N/T in R foot/toes (feel like they fell asleep))     Bed mobility  Rolling to Right: Independent  Supine to Sit: Independent  Sit to Supine: Independent  Bed Mobility Comments: HOB lowered  Transfers  Sit to Stand: Independent  Stand to Sit: Independent  Ambulation  Surface: Level tile  Device: No Device  Other Apparatus: O2 (3L)  Assistance: Supervision  Distance: 100 ft x2 with ~2 min seated break between ambulation trials while SpO2 assessed  Comments: slight antalgia noted due to Hip pain.  More Ambulation?: No  Stairs/Curb  Stairs?: No     Balance  Posture: Fair  Sitting - Static: Good  Sitting - Dynamic: Good  Standing - Static: Fair;+  Standing - Dynamic: Fair;+  Comments: no device           OutComes Score                                                  AM-PAC - Mobility    AM-PAC Basic Mobility - Inpatient   How much help is needed turning from your back to your side while in a flat bed without using bedrails?: None  How much help is needed moving from lying on your back to sitting on the side of a flat bed without using bedrails?: None  How much help is needed moving to and from a bed to a chair?: None  How much help is needed standing up from a chair using your arms?: None  How much help is needed walking in hospital room?: A Little  How much help is needed climbing 3-5 steps with a railing?: A Little  AM-[facility] Inpatient Mobility Raw Score : 22  AM-[facility] Inpatient T-Scale Score : 53.28  Mobility Inpatient CMS 0-100% Score: 20.91  Mobility Inpatient CMS G-Code Modifier : CJ         Tinneti Score       Goals  Short Term Goals  Time Frame for Short Term Goals: 7 days  Short Term Goal 1: Pt will increase Bilat LE strength to 5/5  Short Term Goal 2: Pt will improve standing balance to good to increase 
feel free to contact me directly.    Joshua Ramirez MD, MS  Pulmonary/Critical Care  [facility]  [phone] --(Cell)  [phone] (office/answering service)  [phone] (fax)      Electronically signed by Joshua Ramirez MD on 03/01/24     This progress note was completed using a voice transcription system. Every effort was made to ensure accuracy. However, inadvertent computerized transcription errors may be present.    [phone] (office/answering service)                     
   CO2 37 (H) 20 - 31 mmol/L    Anion Gap 5 (L) 9 - 17 mmol/L    Glucose 113 (H) 70 - 99 mg/dL    BUN 10 8 - 23 mg/dL    Creatinine 0.5 0.5 - 0.9 mg/dL    Est, Glom Filt Rate >60 >60 mL/min/1.73m2    Calcium 8.5 (L) 8.6 - 10.4 mg/dL   CBC with Auto Differential    Collection Time: 03/02/24  5:33 AM   Result Value Ref Range    WBC 7.0 3.5 - 11.0 k/uL    RBC 3.36 (L) 4.0 - 5.2 m/uL    Hemoglobin 10.8 (L) 12.0 - 16.0 g/dL    Hematocrit 32.8 (L) 36 - 46 %    MCV 97.4 80 - 100 fL    MCH 32.2 26 - 34 pg    MCHC 33.0 31 - 37 g/dL    RDW 14.3 11.5 - 14.9 %    Platelets 345 150 - 450 k/uL    MPV 7.0 6.0 - 12.0 fL    Neutrophils % 61 36 - 66 %    Lymphocytes % 27 24 - 44 %    Monocytes % 12 (H) 1 - 7 %    Eosinophils % 0 0 - 4 %    Basophils % 0 0 - 2 %    Neutrophils Absolute 4.30 1.3 - 9.1 k/uL    Lymphocytes Absolute 1.90 1.0 - 4.8 k/uL    Monocytes Absolute 0.80 0.1 - 1.3 k/uL    Eosinophils Absolute 0.00 0.0 - 0.4 k/uL    Basophils Absolute 0.00 0.0 - 0.2 k/uL         Imaging/Diagnostics:  XR CHEST PORTABLE    Result Date: 2/27/2024  1. Bibasilar atelectasis and scarring as well as pleural thickening. Underlying COPD. 2. Prior thyroidectomy. 3. No lobar airspace consolidation.       Assessment :      Hospital Problems             Last Modified POA    * (Principal) COPD exacerbation (HCC) 2/27/2024 Yes    Goals of care, counseling/discussion 2/28/2024 Yes    DNR (do not resuscitate) discussion 2/28/2024 Yes    ACP (advance care planning) 2/28/2024 Yes    Palliative care encounter 2/28/2024 Yes    Shortness of breath 2/28/2024 Yes    Anxiety 2/28/2024 Yes    Right hip pain 3/1/2024 Yes   Plan:     Patient status inpatient in the Progressive Unit/Step down  63-year-old lady with history of severe COPD on home oxygen BiPAP DuoNeb inhaler active smoking more than half pack a day has a prolonged hospital stay after Thanksgiving for a month at the [facility] for over 35 days  Patient is actively smoking 
BiPAP to be started again  Admit to progressive pulm critical care consult  Dependent on benzodiazepine for anxiety chronic will restart  Dependent on opioids will start Percocet home dose  Patient was on hospice briefly was started on Dilaudid patient declined hospice claims Dilaudid does not work for her  Overall prognosis is very poor high risk of mortality morbidity worsening respiratory failure    Pt missed apt with ortho out pt ,requesting consult  Dc plan Friday am if ok with pulm  Sever copd active smoking ,poor prognosis  Consult palliative    3/3/2024  Chronic hypoxemic respiratory failure, on 2 L  COPD exacerbation, improving.  Likely secondary to continued tobacco use.  Continue azithromycin 3 times weekly, continue scheduled bronchodilators, now on iv steroids, continue BiPAP at nighttime.  Severe stage IV COPD  Right hip pain,  X-ray does not show fracture, shows prior fixation with no concerns for Hardware complication.  Hypothyroidism  Benzodiazepine dependence  Continued tobacco use  Lower back pain, s/p nerve blocks in November  Pulmonary cachexia    Overall prognosis guarded on patient  Palliative care consulted  Await pulmonary recs on discharge timing      Consultations:   IP CONSULT TO INTERNAL MEDICINE  IP CONSULT TO PULMONOLOGY  IP CONSULT TO PALLIATIVE CARE  IP CONSULT TO PALLIATIVE CARE  IP CONSULT TO ORTHOPEDIC SURGERY     Patient is admitted as inpatient status because of co-morbidities listed above, severity of signs and symptoms as outlined, requirement for current medical therapies and most importantly because of direct risk to patient if care not provided in a hospital setting.  Expected length of stay > 48 hours.    Brigida Perez MD  3/3/2024  10:35 AM    Copy sent to Van Sesay MD    Please note that this chart was generated using voice recognition Dragon dictation software.  Although every effort was made to ensure the accuracy of this automated transcription, some 
present:      Extremity:  [x] normal skin color/temp      [] clubbing/cyanosis      []  No edema      [] Other:     Palliative Performance Scale:  ___60%  Ambulation reduced; Significant disease; Can't do hobbies/housework; intake normal or reduced; occasional assist; LOC full/confusion  ___50%  Mainly sit/lie; Extensive disease; Can't do any work; Considerable assist; intake normal or reduced; LOC full/confusion  _x__40%  Mainly in bed; Extensive disease; Mainly assist; intake normal or reduced; LOC full/confusion   ___30%  Bed Bound; Extensive disease; Total care; intake reduced; LOCfull/confusion  ___20%  Bed Bound; Extensive disease; Total care; intake minimal; Drowsy/coma  ___10%  Bed Bound; Extensive disease; Total care; Mouth care only; Drowsy/coma  ___0       Death        Please call with any palliative questions or concerns.  Palliative Care Team is available via perfect serve or via phone.       Palliative Care will continue to follow Ms. Rhodes's care as needed.    The note has been dictated by dragon, typing errors may be a possibility     Thank you for allowing Palliative Care to participate in the care of Ms. Rhodes .    The total time I spent in seeing the patient, discussing goals of care, advanced directives, code status and other major issues was more than 50 minutes     Electronically signed by   NATHAN GONZALEZ CNP  Palliative Care Team  on 3/1/2024 at 10:52 AM    Palliative care office: [phone]

## 2024-03-04 NOTE — DISCHARGE INSTRUCTIONS
Continue CPAP at nighttime, nasal cannula when up  Complete course of steroids  Discuss with PCP switching to trelegy

## 2024-03-04 NOTE — PLAN OF CARE
Problem: Discharge Planning  Goal: Discharge to home or other facility with appropriate resources  3/3/2024 2132 by Joanne Gordno RN  Outcome: Progressing  Flowsheets (Taken 3/3/2024 2132)  Discharge to home or other facility with appropriate resources:   Identify barriers to discharge with patient and caregiver   Arrange for needed discharge resources and transportation as appropriate   Identify discharge learning needs (meds, wound care, etc)     Problem: Respiratory - Adult  Goal: Achieves optimal ventilation and oxygenation  3/3/2024 2132 by Joanne Gordon RN  Outcome: Progressing  Flowsheets (Taken 3/3/2024 2132)  Achieves optimal ventilation and oxygenation:   Assess for changes in respiratory status   Assess for changes in mentation and behavior   Position to facilitate oxygenation and minimize respiratory effort   Oxygen supplementation based on oxygen saturation or arterial blood gases   Respiratory therapy support as indicated     Problem: Safety - Adult  Goal: Free from fall injury  3/3/2024 2132 by Joanne Gordon RN  Outcome: Progressing     Problem: Pain  Goal: Verbalizes/displays adequate comfort level or baseline comfort level  3/3/2024 2132 by Joanne Gordon RN  Outcome: Progressing  Flowsheets (Taken 3/3/2024 2132)  Verbalizes/displays adequate comfort level or baseline comfort level:   Assess pain using appropriate pain scale   Encourage patient to monitor pain and request assistance   Administer analgesics based on type and severity of pain and evaluate response   Implement non-pharmacological measures as appropriate and evaluate response     Problem: Skin/Tissue Integrity  Goal: Absence of new skin breakdown  Description: 1.  Monitor for areas of redness and/or skin breakdown  2.  Assess vascular access sites hourly  3.  Every 4-6 hours minimum:  Change oxygen saturation probe site  4.  Every 4-6 hours:  If on nasal continuous positive airway pressure, respiratory therapy

## 2024-03-05 ENCOUNTER — CARE COORDINATION (OUTPATIENT)
Dept: CASE MANAGEMENT | Age: 64
End: 2024-03-05

## 2024-03-05 DIAGNOSIS — F33.2 MDD (MAJOR DEPRESSIVE DISORDER), RECURRENT SEVERE, WITHOUT PSYCHOSIS (HCC): ICD-10-CM

## 2024-03-05 DIAGNOSIS — J44.9 COPD, SEVERE (HCC): Primary | Chronic | ICD-10-CM

## 2024-03-05 RX ORDER — CLONAZEPAM 0.5 MG/1
TABLET ORAL
Qty: 270 TABLET | Refills: 0 | Status: SHIPPED | OUTPATIENT
Start: 2024-03-05 | End: 2024-06-04

## 2024-03-05 NOTE — CARE COORDINATION
911  [employer]. The patient agrees to contact the PCP office for questions related to their healthcare.     Advance Care Planning:   Does patient have an Advance Directive: reviewed and current.    Medication reconciliation was performed with patient, who verbalizes understanding of administration of home medications. Medications reviewed, 1111F entered: yes    Was patient discharged with a pulse oximeter? no    Non-face-to-face services provided:  Obtained and reviewed discharge summary and/or continuity of care documents  Assessment and support for treatment adherence and medication management-[id number]    Offered patient enrollment in the Remote Patient Monitoring (RPM) program for in-home monitoring:  not discussed this call .    Care Transitions 24 Hour Call    Do you have any ongoing symptoms?: No  Were you discharged with any Home Care or Post Acute Services: Yes  Post Acute Services: Home Health (Comment: [facility])  Care Transitions Interventions         Discussed follow-up appointments. If no appointment was previously scheduled, appointment scheduling offered: Yes.   Is follow up appointment scheduled within 7 days of discharge? Yes.    Follow Up  Future Appointments   Date Time Provider Department Center   3/18/2024  2:45 PM Merced Perez MD [facility]   4/9/2024  2:00 PM Van Pate MD [facility]   5/2/2024  3:00 PM Humble Crook MD [facility]       Care Transition Nurse provided contact information.  Plan for follow-up call in 3-5 days based on severity of symptoms and risk factors.  Plan for next call: symptom management-PCP appt? Pulm appt? RPM? Any new/worsening symptoms?     Deirdre Adames RN

## 2024-03-08 ENCOUNTER — TELEPHONE (OUTPATIENT)
Dept: PRIMARY CARE CLINIC | Age: 64
End: 2024-03-08

## 2024-03-08 ENCOUNTER — CARE COORDINATION (OUTPATIENT)
Dept: CASE MANAGEMENT | Age: 64
End: 2024-03-08

## 2024-03-08 NOTE — CARE COORDINATION
Care Transitions Outreach Attempt #1    Attempt #1 to reach patient for transitions of care follow up. Unable to reach patient. Left  requesting call back.    Patient: Laura Rhodes Patient : 1960 MRN: [id number]    Last Discharge Facility       Date Complaint Diagnosis Description Type Department Provider    24 Respiratory Distress COPD with acute exacerbation (HCC) ... ED to Hosp-Admission (Discharged) (ADMITTED) Kartik Ramirez MD; Da, ...              Was this an external facility discharge? No     Noted following upcoming appointments from discharge chart review:   [facility] follow up appointment(s):   Future Appointments   Date Time Provider Department Center   3/18/2024  2:45 PM Merced Perez MD [facility]   20[facility]  [facility]   2024  3:00 PM Humble Crook MD [facility]     Deirdre Adames RN BSN [facility]  Care Transitions Nurse  [phone]   [email or web address]

## 2024-03-11 ENCOUNTER — TELEPHONE (OUTPATIENT)
Dept: PRIMARY CARE CLINIC | Age: 64
End: 2024-03-11

## 2024-03-11 DIAGNOSIS — F33.2 MDD (MAJOR DEPRESSIVE DISORDER), RECURRENT SEVERE, WITHOUT PSYCHOSIS (HCC): ICD-10-CM

## 2024-03-11 RX ORDER — CLONAZEPAM 0.5 MG/1
TABLET ORAL
Qty: 21 TABLET | Refills: 0 | Status: SHIPPED | OUTPATIENT
Start: 2024-03-11 | End: 2024-06-10

## 2024-03-11 NOTE — TELEPHONE ENCOUNTER
Pt states she has not received her shipment of klonopin and is out, she is asking for a few days to be sent to [facility] [place]

## 2024-03-13 ENCOUNTER — CARE COORDINATION (OUTPATIENT)
Dept: CASE MANAGEMENT | Age: 64
End: 2024-03-13

## 2024-03-13 NOTE — CARE COORDINATION
Care Transitions Outreach Attempt # 2     Call within 2 business days of discharge: Yes   Attempted to reach patient for transitions of care follow up. Unable to reach patient. Left HIPAA compliant VM requesting a return call. Will resolve episode if no return call.     Patient: Laura Rhodes Patient : 1960 MRN: [id number]    Last Discharge Facility       Date Complaint Diagnosis Description Type Department Provider    24 Respiratory Distress COPD with acute exacerbation (HCC) ... ED to Hosp-Admission (Discharged) (ADMITTED) Kartik Ramirez MD; Da, ...              Was this an external facility discharge? No Discharge Facility Name: [facility]    Future Appointments   Date Time Provider Department Center   2024  2:00 PM [facility]   2024  3:00 PM Humble Crook MD [facility]

## 2024-03-15 ENCOUNTER — TELEPHONE (OUTPATIENT)
Dept: PRIMARY CARE CLINIC | Age: 64
End: 2024-03-15

## 2024-03-15 DIAGNOSIS — J44.1 COPD EXACERBATION (HCC): ICD-10-CM

## 2024-03-15 DIAGNOSIS — M48.061 SPINAL STENOSIS OF LUMBAR REGION, UNSPECIFIED WHETHER NEUROGENIC CLAUDICATION PRESENT: Primary | ICD-10-CM

## 2024-03-15 RX ORDER — OXYCODONE HYDROCHLORIDE AND ACETAMINOPHEN 5; 325 MG/1; MG/1
1 TABLET ORAL EVERY 6 HOURS PRN
Qty: 28 TABLET | Refills: 0 | Status: SHIPPED | OUTPATIENT
Start: 2024-03-15 | End: 2024-03-22

## 2024-03-15 NOTE — TELEPHONE ENCOUNTER
Pt wants a referral to  [facility] in [place] due to need to take over her pain medication. Pt wants to know if she can get a short prescription of pain meds just till help her till she gets the referral to the Dr Briceño for pain management.

## 2024-03-18 DIAGNOSIS — S72.141D CLOSED DISPLACED INTERTROCHANTERIC FRACTURE OF RIGHT FEMUR WITH ROUTINE HEALING, SUBSEQUENT ENCOUNTER: ICD-10-CM

## 2024-03-18 DIAGNOSIS — M47.817 LUMBOSACRAL SPONDYLOSIS WITHOUT MYELOPATHY: ICD-10-CM

## 2024-03-18 RX ORDER — TIZANIDINE 4 MG/1
4 TABLET ORAL 3 TIMES DAILY PRN
Qty: 90 TABLET | Refills: 2 | Status: SHIPPED | OUTPATIENT
Start: 2024-03-18

## 2024-03-19 DIAGNOSIS — R19.7 DIARRHEA OF PRESUMED INFECTIOUS ORIGIN: Primary | ICD-10-CM

## 2024-03-19 RX ORDER — DIPHENOXYLATE HYDROCHLORIDE AND ATROPINE SULFATE 2.5; .025 MG/1; MG/1
TABLET ORAL
Qty: 120 TABLET | Refills: 1 | Status: SHIPPED | OUTPATIENT
Start: 2024-03-19 | End: 2024-04-18

## 2024-03-31 ENCOUNTER — HOSPITAL ENCOUNTER (INPATIENT)
Age: 64
LOS: 5 days | Discharge: HOME | DRG: 190 | End: 2024-04-05
Attending: EMERGENCY MEDICINE | Admitting: INTERNAL MEDICINE
Payer: COMMERCIAL

## 2024-03-31 ENCOUNTER — APPOINTMENT (OUTPATIENT)
Dept: GENERAL RADIOLOGY | Age: 64
DRG: 190 | End: 2024-03-31
Payer: COMMERCIAL

## 2024-03-31 DIAGNOSIS — J44.1 COPD EXACERBATION (HCC): Primary | ICD-10-CM

## 2024-03-31 DIAGNOSIS — J96.11 CHRONIC RESPIRATORY FAILURE WITH HYPOXIA (HCC): ICD-10-CM

## 2024-03-31 DIAGNOSIS — E87.6 HYPOKALEMIA: ICD-10-CM

## 2024-03-31 DIAGNOSIS — J06.9 VIRAL URI WITH COUGH: ICD-10-CM

## 2024-03-31 DIAGNOSIS — J44.9 CHRONIC OBSTRUCTIVE PULMONARY DISEASE, UNSPECIFIED COPD TYPE (HCC): ICD-10-CM

## 2024-03-31 DIAGNOSIS — E83.42 HYPOMAGNESEMIA: ICD-10-CM

## 2024-03-31 LAB
ALBUMIN SERPL-MCNC: 3.6 G/DL (ref 3.5–5.2)
ALP SERPL-CCNC: 59 U/L (ref 35–104)
ALT SERPL-CCNC: 5 U/L (ref 5–33)
ANION GAP SERPL CALCULATED.3IONS-SCNC: 9 MMOL/L (ref 9–17)
ARTERIAL PATENCY WRIST A: ABNORMAL
AST SERPL-CCNC: 12 U/L
B PARAP IS1001 DNA NPH QL NAA+NON-PROBE: NOT DETECTED
B PERT DNA SPEC QL NAA+PROBE: NOT DETECTED
BASOPHILS # BLD: 0 K/UL (ref 0–0.2)
BASOPHILS NFR BLD: 0 % (ref 0–2)
BDY SITE: ABNORMAL
BILIRUB SERPL-MCNC: 0.3 MG/DL (ref 0.3–1.2)
BODY TEMPERATURE: 37
BUN SERPL-MCNC: 8 MG/DL (ref 8–23)
C PNEUM DNA NPH QL NAA+NON-PROBE: NOT DETECTED
CALCIUM SERPL-MCNC: 7.9 MG/DL (ref 8.6–10.4)
CHLORIDE SERPL-SCNC: 93 MMOL/L (ref 98–107)
CO2 SERPL-SCNC: 34 MMOL/L (ref 20–31)
COHGB MFR BLD: 1.9 % (ref 0–5)
CREAT SERPL-MCNC: 0.6 MG/DL (ref 0.5–0.9)
EKG ATRIAL RATE: 0 BPM
EKG Q-T INTERVAL: 0 MS
EKG QRS DURATION: 0 MS
EKG QTC CALCULATION (BAZETT): 0 MS
EKG R AXIS: 0 DEGREES
EKG T AXIS: 0 DEGREES
EKG VENTRICULAR RATE: 0 BPM
EOSINOPHIL # BLD: 0 K/UL (ref 0–0.4)
EOSINOPHILS RELATIVE PERCENT: 0 % (ref 0–4)
ERYTHROCYTE [DISTWIDTH] IN BLOOD BY AUTOMATED COUNT: 14.4 % (ref 11.5–14.9)
FLUAV RNA NPH QL NAA+NON-PROBE: NOT DETECTED
FLUAV RNA RESP QL NAA+PROBE: NOT DETECTED
FLUBV RNA NPH QL NAA+NON-PROBE: NOT DETECTED
FLUBV RNA RESP QL NAA+PROBE: NOT DETECTED
GAS FLOW.O2 O2 DELIVERY SYS: ABNORMAL L/MIN
GFR SERPL CREATININE-BSD FRML MDRD: >90 ML/MIN/1.73M2
GLUCOSE SERPL-MCNC: 92 MG/DL (ref 70–99)
HADV DNA NPH QL NAA+NON-PROBE: NOT DETECTED
HCO3 ARTERIAL: 38.4 MMOL/L (ref 22–26)
HCOV 229E RNA NPH QL NAA+NON-PROBE: NOT DETECTED
HCOV HKU1 RNA NPH QL NAA+NON-PROBE: NOT DETECTED
HCOV NL63 RNA NPH QL NAA+NON-PROBE: NOT DETECTED
HCOV OC43 RNA NPH QL NAA+NON-PROBE: NOT DETECTED
HCT VFR BLD AUTO: 36.6 % (ref 36–46)
HGB BLD-MCNC: 12.1 G/DL (ref 12–16)
HMPV RNA NPH QL NAA+NON-PROBE: NOT DETECTED
HPIV1 RNA NPH QL NAA+NON-PROBE: NOT DETECTED
HPIV2 RNA NPH QL NAA+NON-PROBE: NOT DETECTED
HPIV3 RNA NPH QL NAA+NON-PROBE: NOT DETECTED
HPIV4 RNA NPH QL NAA+NON-PROBE: NOT DETECTED
L PNEUMO1 AG UR QL IA.RAPID: NEGATIVE
LYMPHOCYTES NFR BLD: 0.9 K/UL (ref 1–4.8)
LYMPHOCYTES RELATIVE PERCENT: 12 % (ref 24–44)
M PNEUMO DNA NPH QL NAA+NON-PROBE: NOT DETECTED
MAGNESIUM SERPL-MCNC: 1.5 MG/DL (ref 1.6–2.6)
MCH RBC QN AUTO: 32.5 PG (ref 26–34)
MCHC RBC AUTO-ENTMCNC: 33.1 G/DL (ref 31–37)
MCV RBC AUTO: 98 FL (ref 80–100)
METHEMOGLOBIN: 1.1 % (ref 0–1.9)
MONOCYTES NFR BLD: 0.9 K/UL (ref 0.1–1.3)
MONOCYTES NFR BLD: 12 % (ref 1–7)
NEUTROPHILS NFR BLD: 76 % (ref 36–66)
NEUTS SEG NFR BLD: 5.7 K/UL (ref 1.3–9.1)
O2 SAT, ARTERIAL: 93.3 % (ref 95–98)
PCO2 ARTERIAL: 59.2 MMHG (ref 35–45)
PH ARTERIAL: 7.42 (ref 7.35–7.45)
PLATELET # BLD AUTO: 359 K/UL (ref 150–450)
PMV BLD AUTO: 7.3 FL (ref 6–12)
PO2 ARTERIAL: 76.6 MMHG (ref 80–100)
POSITIVE BASE EXCESS, ART: 14 MMOL/L (ref 0–2)
POTASSIUM SERPL-SCNC: 3.6 MMOL/L (ref 3.7–5.3)
PROCALCITONIN SERPL-MCNC: 0.09 NG/ML
PROT SERPL-MCNC: 5.6 G/DL (ref 6.4–8.3)
PT. POSITION: ABNORMAL
RBC # BLD AUTO: 3.73 M/UL (ref 4–5.2)
RESPIRATORY RATE: 16
RSV RNA NPH QL NAA+NON-PROBE: NOT DETECTED
RV+EV RNA NPH QL NAA+NON-PROBE: NOT DETECTED
SARS-COV-2 RNA NPH QL NAA+NON-PROBE: NOT DETECTED
SARS-COV-2 RNA RESP QL NAA+PROBE: NOT DETECTED
SODIUM SERPL-SCNC: 136 MMOL/L (ref 135–144)
SOURCE: NORMAL
SPECIMEN DESCRIPTION: NORMAL
SPECIMEN DESCRIPTION: NORMAL
TROPONIN I SERPL HS-MCNC: 21 NG/L (ref 0–14)
TROPONIN I SERPL HS-MCNC: 23 NG/L (ref 0–14)
WBC OTHER # BLD: 7.5 K/UL (ref 3.5–11)

## 2024-03-31 PROCEDURE — 87070 CULTURE OTHR SPECIMN AEROBIC: CPT

## 2024-03-31 PROCEDURE — 87636 SARSCOV2 & INF A&B AMP PRB: CPT

## 2024-03-31 PROCEDURE — 36415 COLL VENOUS BLD VENIPUNCTURE: CPT

## 2024-03-31 PROCEDURE — 6370000000 HC RX 637 (ALT 250 FOR IP)

## 2024-03-31 PROCEDURE — 85025 COMPLETE CBC W/AUTO DIFF WBC: CPT

## 2024-03-31 PROCEDURE — 87899 AGENT NOS ASSAY W/OPTIC: CPT

## 2024-03-31 PROCEDURE — 93005 ELECTROCARDIOGRAM TRACING: CPT

## 2024-03-31 PROCEDURE — 84484 ASSAY OF TROPONIN QUANT: CPT

## 2024-03-31 PROCEDURE — 6360000002 HC RX W HCPCS

## 2024-03-31 PROCEDURE — 82805 BLOOD GASES W/O2 SATURATION: CPT

## 2024-03-31 PROCEDURE — 84145 PROCALCITONIN (PCT): CPT

## 2024-03-31 PROCEDURE — 2580000003 HC RX 258: Performed by: EMERGENCY MEDICINE

## 2024-03-31 PROCEDURE — 94640 AIRWAY INHALATION TREATMENT: CPT

## 2024-03-31 PROCEDURE — 0202U NFCT DS 22 TRGT SARS-COV-2: CPT

## 2024-03-31 PROCEDURE — 2060000000 HC ICU INTERMEDIATE R&B

## 2024-03-31 PROCEDURE — 6370000000 HC RX 637 (ALT 250 FOR IP): Performed by: EMERGENCY MEDICINE

## 2024-03-31 PROCEDURE — 87641 MR-STAPH DNA AMP PROBE: CPT

## 2024-03-31 PROCEDURE — 96365 THER/PROPH/DIAG IV INF INIT: CPT

## 2024-03-31 PROCEDURE — 87205 SMEAR GRAM STAIN: CPT

## 2024-03-31 PROCEDURE — 2700000000 HC OXYGEN THERAPY PER DAY

## 2024-03-31 PROCEDURE — 80053 COMPREHEN METABOLIC PANEL: CPT

## 2024-03-31 PROCEDURE — 87449 NOS EACH ORGANISM AG IA: CPT

## 2024-03-31 PROCEDURE — 94664 DEMO&/EVAL PT USE INHALER: CPT

## 2024-03-31 PROCEDURE — 6370000000 HC RX 637 (ALT 250 FOR IP): Performed by: INTERNAL MEDICINE

## 2024-03-31 PROCEDURE — 71045 X-RAY EXAM CHEST 1 VIEW: CPT

## 2024-03-31 PROCEDURE — 6360000002 HC RX W HCPCS: Performed by: INTERNAL MEDICINE

## 2024-03-31 PROCEDURE — 6360000002 HC RX W HCPCS: Performed by: EMERGENCY MEDICINE

## 2024-03-31 PROCEDURE — 96375 TX/PRO/DX INJ NEW DRUG ADDON: CPT

## 2024-03-31 PROCEDURE — 99223 1ST HOSP IP/OBS HIGH 75: CPT | Performed by: INTERNAL MEDICINE

## 2024-03-31 PROCEDURE — 94669 MECHANICAL CHEST WALL OSCILL: CPT

## 2024-03-31 PROCEDURE — 36600 WITHDRAWAL OF ARTERIAL BLOOD: CPT

## 2024-03-31 PROCEDURE — 86738 MYCOPLASMA ANTIBODY: CPT

## 2024-03-31 PROCEDURE — 2500000003 HC RX 250 WO HCPCS: Performed by: EMERGENCY MEDICINE

## 2024-03-31 PROCEDURE — 99285 EMERGENCY DEPT VISIT HI MDM: CPT

## 2024-03-31 PROCEDURE — 2580000003 HC RX 258

## 2024-03-31 PROCEDURE — 94761 N-INVAS EAR/PLS OXIMETRY MLT: CPT

## 2024-03-31 PROCEDURE — 83735 ASSAY OF MAGNESIUM: CPT

## 2024-03-31 RX ORDER — MAGNESIUM SULFATE HEPTAHYDRATE 40 MG/ML
2000 INJECTION, SOLUTION INTRAVENOUS ONCE
Status: COMPLETED | OUTPATIENT
Start: 2024-03-31 | End: 2024-03-31

## 2024-03-31 RX ORDER — ONDANSETRON 4 MG/1
4 TABLET, ORALLY DISINTEGRATING ORAL EVERY 8 HOURS PRN
Status: DISCONTINUED | OUTPATIENT
Start: 2024-03-31 | End: 2024-04-05 | Stop reason: HOSPADM

## 2024-03-31 RX ORDER — ALBUTEROL SULFATE 2.5 MG/3ML
2.5 SOLUTION RESPIRATORY (INHALATION)
Status: DISCONTINUED | OUTPATIENT
Start: 2024-03-31 | End: 2024-04-05 | Stop reason: HOSPADM

## 2024-03-31 RX ORDER — CLONAZEPAM 0.5 MG/1
0.5 TABLET ORAL 3 TIMES DAILY PRN
Status: DISCONTINUED | OUTPATIENT
Start: 2024-03-31 | End: 2024-04-05 | Stop reason: HOSPADM

## 2024-03-31 RX ORDER — SODIUM CHLORIDE 0.9 % (FLUSH) 0.9 %
5-40 SYRINGE (ML) INJECTION EVERY 12 HOURS SCHEDULED
Status: DISCONTINUED | OUTPATIENT
Start: 2024-03-31 | End: 2024-04-05 | Stop reason: HOSPADM

## 2024-03-31 RX ORDER — PANTOPRAZOLE SODIUM 40 MG/1
40 TABLET, DELAYED RELEASE ORAL
Status: DISCONTINUED | OUTPATIENT
Start: 2024-04-01 | End: 2024-04-01 | Stop reason: CLARIF

## 2024-03-31 RX ORDER — LANOLIN ALCOHOL/MO/W.PET/CERES
400 CREAM (GRAM) TOPICAL DAILY
COMMUNITY

## 2024-03-31 RX ORDER — IPRATROPIUM BROMIDE AND ALBUTEROL SULFATE 2.5; .5 MG/3ML; MG/3ML
1 SOLUTION RESPIRATORY (INHALATION) EVERY 4 HOURS PRN
Status: DISCONTINUED | OUTPATIENT
Start: 2024-03-31 | End: 2024-03-31

## 2024-03-31 RX ORDER — MIDODRINE HYDROCHLORIDE 5 MG/1
5 TABLET ORAL 3 TIMES DAILY PRN
Status: DISCONTINUED | OUTPATIENT
Start: 2024-03-31 | End: 2024-04-05 | Stop reason: HOSPADM

## 2024-03-31 RX ORDER — MAGNESIUM SULFATE HEPTAHYDRATE 40 MG/ML
2000 INJECTION, SOLUTION INTRAVENOUS PRN
Status: DISCONTINUED | OUTPATIENT
Start: 2024-03-31 | End: 2024-04-05 | Stop reason: HOSPADM

## 2024-03-31 RX ORDER — CARVEDILOL 12.5 MG/1
12.5 TABLET ORAL 2 TIMES DAILY
Status: DISCONTINUED | OUTPATIENT
Start: 2024-03-31 | End: 2024-04-05 | Stop reason: HOSPADM

## 2024-03-31 RX ORDER — PRIMIDONE 50 MG/1
100 TABLET ORAL NIGHTLY
Status: DISCONTINUED | OUTPATIENT
Start: 2024-03-31 | End: 2024-04-05 | Stop reason: HOSPADM

## 2024-03-31 RX ORDER — LEVOTHYROXINE SODIUM 0.07 MG/1
75 TABLET ORAL DAILY
Status: DISCONTINUED | OUTPATIENT
Start: 2024-03-31 | End: 2024-04-05 | Stop reason: HOSPADM

## 2024-03-31 RX ORDER — SODIUM CHLORIDE 0.9 % (FLUSH) 0.9 %
5-40 SYRINGE (ML) INJECTION PRN
Status: DISCONTINUED | OUTPATIENT
Start: 2024-03-31 | End: 2024-04-05 | Stop reason: HOSPADM

## 2024-03-31 RX ORDER — SODIUM CHLORIDE 9 MG/ML
INJECTION, SOLUTION INTRAVENOUS PRN
Status: DISCONTINUED | OUTPATIENT
Start: 2024-03-31 | End: 2024-04-05 | Stop reason: HOSPADM

## 2024-03-31 RX ORDER — ACETAMINOPHEN 650 MG/1
650 SUPPOSITORY RECTAL EVERY 6 HOURS PRN
Status: DISCONTINUED | OUTPATIENT
Start: 2024-03-31 | End: 2024-04-05 | Stop reason: HOSPADM

## 2024-03-31 RX ORDER — ONDANSETRON 2 MG/ML
4 INJECTION INTRAMUSCULAR; INTRAVENOUS EVERY 6 HOURS PRN
Status: DISCONTINUED | OUTPATIENT
Start: 2024-03-31 | End: 2024-04-05 | Stop reason: HOSPADM

## 2024-03-31 RX ORDER — HYDROCODONE BITARTRATE AND ACETAMINOPHEN 7.5; 325 MG/1; MG/1
1 TABLET ORAL 2 TIMES DAILY PRN
COMMUNITY
Start: 2024-03-26

## 2024-03-31 RX ORDER — CALCIUM CARBONATE 500 MG/1
1000 TABLET, CHEWABLE ORAL EVERY 6 HOURS PRN
Status: DISCONTINUED | OUTPATIENT
Start: 2024-03-31 | End: 2024-04-05 | Stop reason: HOSPADM

## 2024-03-31 RX ORDER — MULTIVIT WITH MINERALS/LUTEIN
250 TABLET ORAL DAILY
COMMUNITY

## 2024-03-31 RX ORDER — BENZONATATE 200 MG/1
200 CAPSULE ORAL 3 TIMES DAILY
Status: DISCONTINUED | OUTPATIENT
Start: 2024-03-31 | End: 2024-04-05 | Stop reason: HOSPADM

## 2024-03-31 RX ORDER — FUROSEMIDE 20 MG/1
20 TABLET ORAL DAILY
Status: DISCONTINUED | OUTPATIENT
Start: 2024-03-31 | End: 2024-04-05 | Stop reason: HOSPADM

## 2024-03-31 RX ORDER — LANOLIN ALCOHOL/MO/W.PET/CERES
3 CREAM (GRAM) TOPICAL NIGHTLY PRN
COMMUNITY

## 2024-03-31 RX ORDER — ACETAMINOPHEN 325 MG/1
650 TABLET ORAL EVERY 6 HOURS PRN
Status: DISCONTINUED | OUTPATIENT
Start: 2024-03-31 | End: 2024-04-05 | Stop reason: HOSPADM

## 2024-03-31 RX ORDER — SODIUM CHLORIDE 9 MG/ML
INJECTION, SOLUTION INTRAVENOUS CONTINUOUS
Status: DISCONTINUED | OUTPATIENT
Start: 2024-03-31 | End: 2024-03-31

## 2024-03-31 RX ORDER — MONTELUKAST SODIUM 10 MG/1
10 TABLET ORAL DAILY
Status: DISCONTINUED | OUTPATIENT
Start: 2024-03-31 | End: 2024-04-05 | Stop reason: HOSPADM

## 2024-03-31 RX ORDER — DEXTROMETHORPHAN POLISTIREX 30 MG/5ML
60 SUSPENSION ORAL EVERY 12 HOURS SCHEDULED
Status: DISCONTINUED | OUTPATIENT
Start: 2024-03-31 | End: 2024-04-05 | Stop reason: HOSPADM

## 2024-03-31 RX ORDER — AMLODIPINE BESYLATE 5 MG/1
5 TABLET ORAL EVERY 24 HOURS
Status: DISCONTINUED | OUTPATIENT
Start: 2024-03-31 | End: 2024-04-05 | Stop reason: HOSPADM

## 2024-03-31 RX ORDER — POTASSIUM CHLORIDE 20 MEQ/1
40 TABLET, EXTENDED RELEASE ORAL ONCE
Status: DISCONTINUED | OUTPATIENT
Start: 2024-03-31 | End: 2024-03-31

## 2024-03-31 RX ORDER — VENLAFAXINE HYDROCHLORIDE 150 MG/1
150 CAPSULE, EXTENDED RELEASE ORAL
Status: DISCONTINUED | OUTPATIENT
Start: 2024-04-01 | End: 2024-04-05 | Stop reason: HOSPADM

## 2024-03-31 RX ORDER — POLYETHYLENE GLYCOL 3350 17 G/17G
17 POWDER, FOR SOLUTION ORAL DAILY PRN
Status: DISCONTINUED | OUTPATIENT
Start: 2024-03-31 | End: 2024-04-05 | Stop reason: HOSPADM

## 2024-03-31 RX ORDER — FLUTICASONE PROPIONATE 50 MCG
2 SPRAY, SUSPENSION (ML) NASAL DAILY
Status: DISCONTINUED | OUTPATIENT
Start: 2024-03-31 | End: 2024-04-05 | Stop reason: HOSPADM

## 2024-03-31 RX ORDER — POTASSIUM CHLORIDE 7.45 MG/ML
10 INJECTION INTRAVENOUS PRN
Status: DISCONTINUED | OUTPATIENT
Start: 2024-03-31 | End: 2024-04-05 | Stop reason: HOSPADM

## 2024-03-31 RX ORDER — DEXAMETHASONE 4 MG/1
4 TABLET ORAL DAILY
Status: ON HOLD | COMMUNITY
Start: 2024-03-28 | End: 2024-04-04 | Stop reason: HOSPADM

## 2024-03-31 RX ORDER — ENOXAPARIN SODIUM 100 MG/ML
30 INJECTION SUBCUTANEOUS DAILY
Status: DISCONTINUED | OUTPATIENT
Start: 2024-03-31 | End: 2024-04-02

## 2024-03-31 RX ORDER — ALBUTEROL SULFATE 2.5 MG/3ML
2.5 SOLUTION RESPIRATORY (INHALATION) EVERY 4 HOURS PRN
Status: DISCONTINUED | OUTPATIENT
Start: 2024-03-31 | End: 2024-04-05 | Stop reason: HOSPADM

## 2024-03-31 RX ORDER — POTASSIUM CHLORIDE 20 MEQ/1
40 TABLET, EXTENDED RELEASE ORAL PRN
Status: DISCONTINUED | OUTPATIENT
Start: 2024-03-31 | End: 2024-04-05 | Stop reason: HOSPADM

## 2024-03-31 RX ADMIN — BENZONATATE 200 MG: 200 CAPSULE ORAL at 14:00

## 2024-03-31 RX ADMIN — IPRATROPIUM BROMIDE AND ALBUTEROL SULFATE 1 DOSE: 2.5; .5 SOLUTION RESPIRATORY (INHALATION) at 11:31

## 2024-03-31 RX ADMIN — CLONAZEPAM 0.5 MG: 0.5 TABLET ORAL at 21:24

## 2024-03-31 RX ADMIN — POTASSIUM BICARBONATE 40 MEQ: 782 TABLET, EFFERVESCENT ORAL at 09:49

## 2024-03-31 RX ADMIN — ANTACID TABLETS 1000 MG: 500 TABLET, CHEWABLE ORAL at 20:40

## 2024-03-31 RX ADMIN — SALINE NASAL SPRAY 2 SPRAY: 1.5 SOLUTION NASAL at 16:36

## 2024-03-31 RX ADMIN — ALBUTEROL SULFATE 2.5 MG: 2.5 SOLUTION RESPIRATORY (INHALATION) at 15:28

## 2024-03-31 RX ADMIN — LEVOTHYROXINE SODIUM 75 MCG: 0.07 TABLET ORAL at 14:00

## 2024-03-31 RX ADMIN — MAGNESIUM SULFATE HEPTAHYDRATE 2000 MG: 40 INJECTION, SOLUTION INTRAVENOUS at 09:46

## 2024-03-31 RX ADMIN — FLUTICASONE PROPIONATE 2 SPRAY: 50 SPRAY, METERED NASAL at 14:01

## 2024-03-31 RX ADMIN — Medication 60 MG: at 20:30

## 2024-03-31 RX ADMIN — SODIUM CHLORIDE: 9 INJECTION, SOLUTION INTRAVENOUS at 12:20

## 2024-03-31 RX ADMIN — SALINE NASAL SPRAY 2 SPRAY: 1.5 SOLUTION NASAL at 14:01

## 2024-03-31 RX ADMIN — PRIMIDONE 100 MG: 50 TABLET ORAL at 20:30

## 2024-03-31 RX ADMIN — MONTELUKAST 10 MG: 10 TABLET, FILM COATED ORAL at 14:00

## 2024-03-31 RX ADMIN — ALBUTEROL SULFATE 2.5 MG: 2.5 SOLUTION RESPIRATORY (INHALATION) at 19:12

## 2024-03-31 RX ADMIN — SALINE NASAL SPRAY 2 SPRAY: 1.5 SOLUTION NASAL at 20:31

## 2024-03-31 RX ADMIN — WATER 125 MG: 1 INJECTION INTRAMUSCULAR; INTRAVENOUS; SUBCUTANEOUS at 08:33

## 2024-03-31 RX ADMIN — WATER 40 MG: 1 INJECTION INTRAMUSCULAR; INTRAVENOUS; SUBCUTANEOUS at 20:30

## 2024-03-31 RX ADMIN — BENZONATATE 200 MG: 200 CAPSULE ORAL at 20:30

## 2024-03-31 RX ADMIN — ENOXAPARIN SODIUM 30 MG: 100 INJECTION SUBCUTANEOUS at 14:02

## 2024-03-31 RX ADMIN — SODIUM CHLORIDE, PRESERVATIVE FREE 10 ML: 5 INJECTION INTRAVENOUS at 12:42

## 2024-03-31 RX ADMIN — MIDODRINE HYDROCHLORIDE 5 MG: 5 TABLET ORAL at 14:00

## 2024-03-31 RX ADMIN — IPRATROPIUM BROMIDE AND ALBUTEROL SULFATE 1 DOSE: 2.5; .5 SOLUTION RESPIRATORY (INHALATION) at 09:40

## 2024-03-31 RX ADMIN — MIRTAZAPINE 45 MG: 30 TABLET, FILM COATED ORAL at 20:30

## 2024-03-31 RX ADMIN — WATER 40 MG: 1 INJECTION INTRAMUSCULAR; INTRAVENOUS; SUBCUTANEOUS at 14:01

## 2024-03-31 ASSESSMENT — PAIN DESCRIPTION - ONSET: ONSET: ON-GOING

## 2024-03-31 ASSESSMENT — PAIN DESCRIPTION - FREQUENCY: FREQUENCY: CONTINUOUS

## 2024-03-31 ASSESSMENT — PAIN SCALES - GENERAL: PAINLEVEL_OUTOF10: 10

## 2024-03-31 ASSESSMENT — PAIN DESCRIPTION - DESCRIPTORS: DESCRIPTORS: ACHING

## 2024-03-31 ASSESSMENT — PAIN DESCRIPTION - PAIN TYPE: TYPE: ACUTE PAIN;CHRONIC PAIN

## 2024-03-31 ASSESSMENT — PAIN - FUNCTIONAL ASSESSMENT: PAIN_FUNCTIONAL_ASSESSMENT: 0-10

## 2024-03-31 ASSESSMENT — PAIN DESCRIPTION - LOCATION: LOCATION: GENERALIZED

## 2024-03-31 NOTE — PROGRESS NOTES
Pt arrived to floor via wheelchair from ED and was transfered to bed.  Vitals taken, telemetry applied. Admission and assessment complete. No distress noted. See doc flowsheet and admission navigator for details. POC and education initiated and reviewed with patient. Call light within reach, and pt educated on its use. Bed in lowest position, and locked. Side rails up x 2. Denied further questions or needs at this time. Will continue to monitor.

## 2024-03-31 NOTE — CONSULTS
[facility]   CONSULT NOTE:      DATE OF CONSULT 3/31/2024    REASON FOR CONSULTATION:  Pulmonary management      PCP Van Pate MD     CHIEF COMPLAINT: Dyspnea nasal drainage    HISTORY OF PRESENT ILLNESS:       Laura is a 63-year-old female with stage IV COPD and chronic hypoxic respiratory failure on 2 to 3 L 24 hours a day.  She is well-known to our service in ICU in the outpatient setting.  She was recently admitted at the end of February and discharged March 4.  She says she was doing well at the time of discharge, and had finished her prednisone taper.  About a week ago, she started having symptoms of rhinorrhea accompanied by cough.  There were no fevers or chills.  Her chest began to feel tight.  She did not call our office, rather she had some \"leftover Decadron\" that was given to her previously by hospice she said she took a tablet a day starting Wednesday but it did not improve her symptoms.  She then came into the emergency room this morning.  She says that \"something is not right and this may be the last time I come to [facility] because I am not sure if I will survive\".  Unfortunately she says that she is still smoking but is down to \"2 cigarettes a day\".  On her last admission, she told her she was smoking half a pack a day.  Overall, she has smoked 2 packs a day for 40 years.      At home, she is maintained on Stiolto, albuterol, and and in the past Zithromax: Monday, Wednesday, Friday.  She has noninvasive astral ventilator at home but is unclear if she is using it every night.  Her last set of pulmonary function testing was in March 2022 that showed an FEV1 of 20% of predicted (0.49).  The ratio of FEV1 to FVC was 43.  In the past she has been tried on Trelegy and Breztri, but she did get thrush.        ALLERGIES:  Allergies   Allergen Reactions    Buspar [Buspirone] Anxiety    Hydroxyzine Hcl Anxiety       HOME MEDICATIONS:  Not in a hospital  enlarged, symmetric, no tenderness/mass/nodules  Lungs diminished but wheezes  Heart: regular rate and rhythm, S1, S2 normal, no murmur, click, rub or gallop  Abdomen  soft, non-tender; bowel sounds normal; no masses,  no organomegaly  Extremities  No edema  Skin  Skin color, texture, turgor normal. No rashes or lesions  Neurologic: Alert and oriented X 3, normal strength and tone.         Imaging    Chest x-ray shows clear lung fields with hyperinflation and flattened diaphragms, no acute change      Lab Review  CBC     Lab Results   Component Value Date/Time    WBC 7.5 03/31/2024 08:05 AM    RBC 3.73 03/31/2024 08:05 AM    HGB 12.1 03/31/2024 08:05 AM    HCT 36.6 03/31/2024 08:05 AM     03/31/2024 08:05 AM    MCV 98.0 03/31/2024 08:05 AM    MCH 32.5 03/31/2024 08:05 AM    MCHC 33.1 03/31/2024 08:05 AM    RDW 14.4 03/31/2024 08:05 AM    LYMPHOPCT 12 03/31/2024 08:05 AM    MONOPCT 12 03/31/2024 08:05 AM    BASOPCT 0 03/31/2024 08:05 AM    MONOSABS 0.90 03/31/2024 08:05 AM    LYMPHSABS 0.90 03/31/2024 08:05 AM    EOSABS 0.00 03/31/2024 08:05 AM    BASOSABS 0.00 03/31/2024 08:05 AM    DIFFTYPE NOT REPORTED 04/28/2022 01:42 PM       BMP   Lab Results   Component Value Date/Time     03/31/2024 08:05 AM    K 3.6 03/31/2024 08:05 AM    CL 93 03/31/2024 08:05 AM    CO2 34 03/31/2024 08:05 AM    BUN 8 03/31/2024 08:05 AM    CREATININE 0.6 03/31/2024 08:05 AM    GLUCOSE 92 03/31/2024 08:05 AM    CALCIUM 7.9 03/31/2024 08:05 AM    MG 1.5 03/31/2024 08:05 AM    PHOS 3.6 01/02/2024 08:37 PM       LFTS  Lab Results   Component Value Date/Time    ALKPHOS 59 03/31/2024 08:05 AM    ALT 5 03/31/2024 08:05 AM    AST 12 03/31/2024 08:05 AM    PROT 5.6 03/31/2024 08:05 AM    BILITOT 0.3 03/31/2024 08:05 AM    LABALBU 3.6 03/31/2024 08:05 AM       INR  No results for input(s): \"PROTIME\", \"INR\" in the last 72 hours.    APTT  No results for input(s): \"APTT\" in the last 72 hours.    Lactic Acid  Lab Results   Component Value

## 2024-03-31 NOTE — ED PROVIDER NOTES
EMERGENCY DEPARTMENT ENCOUNTER    Pt Name: Laura Rhodes  MRN: [id number]  Birthdate 1960  Date of evaluation: 3/31/24  CHIEF COMPLAINT       Chief Complaint   Patient presents with    Shortness of Breath     HISTORY OF PRESENT ILLNESS   63-year-old female with past medical history of COPD who is a daily smoker although she says that she is cutting down on the amount of cigarettes she is smoking per day is presenting with chief complaint of shortness of breath and wheezing.  Her symptoms have been getting worse over the last week.  She has been using dexamethasone 4 mg tablets and her nebulizer treatment at home without relief.  Patient admits to a worsening cough as well as myalgias.  She also admits to nasal and chest congestion.  She says that her whole body aches including her chest.  She says that the pain in her chest gets worse after she coughs.    The history is provided by the patient.           REVIEW OF SYSTEMS     Review of Systems   Constitutional:  Positive for chills. Negative for fever.   HENT:  Positive for congestion.    Eyes:  Negative for visual disturbance.   Respiratory:  Positive for cough, shortness of breath and wheezing.    Cardiovascular:  Positive for chest pain.   Gastrointestinal:  Negative for abdominal pain, nausea and vomiting.   Genitourinary:  Negative for dysuria, flank pain, frequency and urgency.   Musculoskeletal:  Positive for myalgias.   Neurological:  Negative for dizziness, light-headedness and headaches.   Psychiatric/Behavioral:  Negative for behavioral problems.      PASTMEDICAL HISTORY     Past Medical History:   Diagnosis Date    Acid reflux     Anxiety     Arthritis     Cancer (HCC)     cervical    Cervical osteoarthritis 11/30/2020    COPD (chronic obstructive pulmonary disease) (HCC) 02/23/2018    DDD (degenerative disc disease), cervical     Depression     Hiatal hernia     History of blood transfusion     Reaction fever went up to 107 . per Pt.     On home O2

## 2024-03-31 NOTE — ED NOTES
TRANSFER - OUT REPORT:    Verbal report given to LILY Levy on Laura Rhodes  being transferred to U 2114 for routine progression of patient care       Report consisted of patient's Situation, Background, Assessment and   Recommendations(SBAR).     Information from the following report(s) Nurse Handoff Report, ED Encounter Summary, Adult Overview, MAR, Recent Results, and Event Log was reviewed with the receiving nurse.    [place] Fall Assessment:    Presents to emergency department  because of falls (Syncope, seizure, or loss of consciousness): No  Age > 70: No  Altered Mental Status, Intoxication with alcohol or substance confusion (Disorientation, impaired judgment, poor safety awaremess, or inability to follow instructions): No  Impaired Mobility: Ambulates or transfers with assistive devices or assistance; Unable to ambulate or transer.: Yes  Nursing Judgement: Yes          Lines:   Peripheral IV 03/31/24 Right Forearm (Active)   Site Assessment Clean, dry & intact 03/31/24 0810   Line Status Blood return noted;Flushed;Specimen collected 03/31/24 0810   Phlebitis Assessment No symptoms 03/31/24 0810   Infiltration Assessment 0 03/31/24 0810   Dressing Status New dressing applied;Clean, dry & intact 03/31/24 0810   Dressing Type Transparent 03/31/24 0810   Dressing Intervention New 03/31/24 0810        Opportunity for questions and clarification was provided.      Patient transported with:  Tech

## 2024-03-31 NOTE — H&P
[facility]  IN-PATIENT SERVICE  [facility]  IN-PATIENT SERVICE   [facility]     HISTORY AND PHYSICAL EXAMINATION            Date:   3/31/2024  Patient name:  Laura Rhodes  Date of admission:  3/31/2024  8:06 AM  MRN:   [id number]  Account:  [id number]  YOB: 1960  PCP:    Van Pate MD  Room:   11/11  Code Status:    Prior    Chief Complaint:     Chief Complaint   Patient presents with    Shortness of Breath       History Obtained From:     patient, electronic medical record    History of Present Illness:     63-year-old female with stage IV COPD and chronic hypoxic respiratory failure on home O2 of 2 to 3 L 24 hours a day, hypertension and hypothyroidism presented with complaints of shortness of breath and wheezing.  Patient states that last week she started having symptoms of rhinorrhea, postnasal discharge and a mild cough.  She did not have fevers or chills at the time but her chest began to feel congested so she decided to have some left over Decadron that she had.  Patient states that she was using her breathing treatments but they were not making things better.  She reports having feelings of fevers and chills last night.  States that this morning she did not feel any better so decided to come to the ED.  Patient is still currently smoking, states that she smokes 2 to 8 cigarettes a day.  She denies any change in bowel or urinary habits, denies any chest pain, productive cough, headaches, lightheadedness or dizziness.  Patient was recently admitted at the end of February and discharged on March 4 for similar symptoms.  Patient was found to be hypokalemic and have hypomagnesemia on admission, both of which were replaced in the ED.  Patient also given IV Solu-Medrol and breathing treatments.  Patient being admitted for the management of acute exacerbation of COPD.      Past Medical  soft tissue abnormality.     1.  No evidence of acute fracture involving the pelvis or right femur. 2.  Prior intramedullary amanuel and screw fixation of the right proximal femur with interval healing changes and associated heterotopic ossification.  No visualized hardware complication.       Assessment :      Primary Problem  COPD exacerbation (HCC)    Active Hospital Problems    Diagnosis Date Noted    COPD exacerbation (HCC) [J44.1] 12/02/2023       Plan:     Patient status Admit as inpatient in the  Progressive Unit/Step down    SOB likely 2/2 to COPD exacerbation  - Acute on chronic hypoxic respiratory failure.  - 1 week of flu sx, + c/o wheezing  - Stage IV COPD with chronic hypoxic respiratory failure on home O2 2 to 3 L  - Covid. Influenza A neg  - Resp Panel pending, MRSA, Atypicals pending  - Resp Cultures pending  - Solu-Medrol 40 Mg Q6H  - Continue Stiolto, continue DuoNebs  - Pulmonary cachexia: Will get a nutrition consult.  - Nicotine patch, patient still smoking  - Continue BiPAP nightly  - Continue Flonase, continue Singulair  - Pulmonology consulted in the ED      - Patient previously hospice, pulm recommends palliative consult      - Scheduled antitussives  Hypertension  - Home dose Coreg/amlodipine on hold due to current hypotension  - Restart as necessary  - Home dose Lasix with parameters  - Midodrine 5 Mg Q3H PRN if SBP<90    Hypothyroidism  - Continue Synthroid 75 mcg      DVT prophylaxis: Lovenox  GI prophylaxis: Protonix 40 mg  Diet: Normal Adult  Disposition: Pending    OT/PT/SW    Code Status: DNR CC-A, No Intubation    Consultations:   IP CONSULT TO INTERNAL MEDICINE  IP CONSULT TO PULMONOLOGY  IP CONSULT TO SOCIAL WORK    Patient is admitted as inpatient status because of co-morbiditieslisted above, severity of signs and symptoms as outlined, requirement for current medical therapies and most importantly because of direct risk to patient if care not provided in a hospital setting.    Stefanie

## 2024-03-31 NOTE — PLAN OF CARE
Problem: Discharge Planning  Goal: Discharge to home or other facility with appropriate resources  Outcome: Progressing  Flowsheets (Taken 3/31/2024 1215)  Discharge to home or other facility with appropriate resources:   Identify barriers to discharge with patient and caregiver   Arrange for needed discharge resources and transportation as appropriate   Identify discharge learning needs (meds, wound care, etc)   Refer to discharge planning if patient needs post-hospital services based on physician order or complex needs related to functional status, cognitive ability or social support system     Problem: Pain  Goal: Verbalizes/displays adequate comfort level or baseline comfort level  Outcome: Progressing     Problem: Safety - Adult  Goal: Free from fall injury  Outcome: Progressing     Problem: ABCDS Injury Assessment  Goal: Absence of physical injury  Outcome: Progressing  Flowsheets (Taken 3/31/2024 1228)  Absence of Physical Injury: Implement safety measures based on patient assessment     Problem: Anxiety  Goal: Will report anxiety at manageable levels  Description: INTERVENTIONS:  1. Administer medication as ordered  2. Teach and rehearse alternative coping skills  3. Provide emotional support with 1:1 interaction with staff  Outcome: Progressing  Flowsheets (Taken 3/31/2024 1215)  Will report anxiety at manageable levels: Administer medication as ordered     Problem: Coping  Goal: Pt/Family able to verbalize concerns and demonstrate effective coping strategies  Description: INTERVENTIONS:  1. Assist patient/family to identify coping skills, available support systems and cultural and spiritual values  2. Provide emotional support, including active listening and acknowledgement of concerns of patient and caregivers  3. Reduce environmental stimuli, as able  4. Instruct patient/family in relaxation techniques, as appropriate  5. Assess for spiritual pain/suffering and initiate Spiritual Care, Psychosocial  renal function maintained:   Monitor labs and assess for signs and symptoms of volume excess or deficit   Monitor intake, output and patient weight

## 2024-03-31 NOTE — ED TRIAGE NOTES
Mode of arrival (squad #, walk in, police, etc) : [place]        Chief complaint(s): Shortness of breath         Arrival Note (brief scenario, treatment PTA, etc).: Patient received a prescription through Hospice care provider for Dexamethasone 4 mg daily and has taken this for past three days (Thursday-Saturday) with patient unable to rest well and increased generalized body aches. Patient has expiatory wheezing to bilateral posterior bases and a productive cough with yellow expectorant, per patient report. Sterile container provided for patient to submit a sample of expectorant. Patient is on 1.5 l/m nasal cannula with SpO2 maintained at 97-98%. Patient placed in high-fowlers, oriented to room and nurse call-light.         C= \"Have you ever felt that you should Cut down on your drinking?\"  No  A= \"Have people Annoyed you by criticizing your drinking?\"  No  G= \"Have you ever felt bad or Guilty about your drinking?\"  No  E= \"Have you ever had a drink as an Eye-opener first thing in the morning to steady your nerves or to help a hangover?\"  No      Deferred []      Reason for deferring: N/A    *If yes to two or more: probable alcohol abuse.*

## 2024-04-01 PROBLEM — J96.20 ACUTE ON CHRONIC RESPIRATORY FAILURE (HCC): Status: ACTIVE | Noted: 2024-04-01

## 2024-04-01 LAB
ABSOLUTE BANDS: 3.05 K/UL (ref 0–1)
ANION GAP SERPL CALCULATED.3IONS-SCNC: 7 MMOL/L (ref 9–17)
BANDS: 43 % (ref 0–10)
BASOPHILS # BLD: 0 K/UL (ref 0–0.2)
BASOPHILS NFR BLD: 0 % (ref 0–2)
BUN SERPL-MCNC: 10 MG/DL (ref 8–23)
CALCIUM SERPL-MCNC: 8.4 MG/DL (ref 8.6–10.4)
CHLORIDE SERPL-SCNC: 98 MMOL/L (ref 98–107)
CO2 SERPL-SCNC: 35 MMOL/L (ref 20–31)
CREAT SERPL-MCNC: <0.4 MG/DL (ref 0.5–0.9)
EOSINOPHIL # BLD: 0 K/UL (ref 0–0.4)
EOSINOPHILS RELATIVE PERCENT: 0 % (ref 0–4)
ERYTHROCYTE [DISTWIDTH] IN BLOOD BY AUTOMATED COUNT: 14.8 % (ref 11.5–14.9)
GFR SERPL CREATININE-BSD FRML MDRD: ABNORMAL ML/MIN/1.73M2
GLUCOSE SERPL-MCNC: 135 MG/DL (ref 70–99)
HCT VFR BLD AUTO: 33.9 % (ref 36–46)
HGB BLD-MCNC: 11 G/DL (ref 12–16)
LYMPHOCYTES NFR BLD: 0.36 K/UL (ref 1–4.8)
LYMPHOCYTES RELATIVE PERCENT: 5 % (ref 24–44)
M PNEUMO IGM SER QL IA: 0.52
MCH RBC QN AUTO: 31.8 PG (ref 26–34)
MCHC RBC AUTO-ENTMCNC: 32.6 G/DL (ref 31–37)
MCV RBC AUTO: 97.5 FL (ref 80–100)
METAMYELOCYTES ABSOLUTE COUNT: 0.36 K/UL
METAMYELOCYTES: 5 %
MONOCYTES NFR BLD: 0.21 K/UL (ref 0.1–1.3)
MONOCYTES NFR BLD: 3 % (ref 1–7)
MORPHOLOGY: ABNORMAL
MRSA, DNA, NASAL: NEGATIVE
MYELOCYTES ABSOLUTE COUNT: 0.14 K/UL
MYELOCYTES: 2 %
NEUTROPHILS NFR BLD: 42 % (ref 36–66)
NEUTS SEG NFR BLD: 2.98 K/UL (ref 1.3–9.1)
PLATELET # BLD AUTO: 343 K/UL (ref 150–450)
PMV BLD AUTO: 7.3 FL (ref 6–12)
POTASSIUM SERPL-SCNC: 3.8 MMOL/L (ref 3.7–5.3)
RBC # BLD AUTO: 3.47 M/UL (ref 4–5.2)
S PNEUM AG SPEC QL: NEGATIVE
SODIUM SERPL-SCNC: 140 MMOL/L (ref 135–144)
SPECIMEN DESCRIPTION: NORMAL
SPECIMEN SOURCE: NORMAL
WBC OTHER # BLD: 7.1 K/UL (ref 3.5–11)

## 2024-04-01 PROCEDURE — 99222 1ST HOSP IP/OBS MODERATE 55: CPT | Performed by: NURSE PRACTITIONER

## 2024-04-01 PROCEDURE — 6370000000 HC RX 637 (ALT 250 FOR IP)

## 2024-04-01 PROCEDURE — 6370000000 HC RX 637 (ALT 250 FOR IP): Performed by: INTERNAL MEDICINE

## 2024-04-01 PROCEDURE — 36415 COLL VENOUS BLD VENIPUNCTURE: CPT

## 2024-04-01 PROCEDURE — 6360000002 HC RX W HCPCS

## 2024-04-01 PROCEDURE — 85025 COMPLETE CBC W/AUTO DIFF WBC: CPT

## 2024-04-01 PROCEDURE — 94669 MECHANICAL CHEST WALL OSCILL: CPT

## 2024-04-01 PROCEDURE — 80048 BASIC METABOLIC PNL TOTAL CA: CPT

## 2024-04-01 PROCEDURE — 2060000000 HC ICU INTERMEDIATE R&B

## 2024-04-01 PROCEDURE — 2700000000 HC OXYGEN THERAPY PER DAY

## 2024-04-01 PROCEDURE — 94761 N-INVAS EAR/PLS OXIMETRY MLT: CPT

## 2024-04-01 PROCEDURE — 2580000003 HC RX 258

## 2024-04-01 PROCEDURE — 6370000000 HC RX 637 (ALT 250 FOR IP): Performed by: NURSE PRACTITIONER

## 2024-04-01 PROCEDURE — 94660 CPAP INITIATION&MGMT: CPT

## 2024-04-01 PROCEDURE — 94640 AIRWAY INHALATION TREATMENT: CPT

## 2024-04-01 PROCEDURE — 6360000002 HC RX W HCPCS: Performed by: INTERNAL MEDICINE

## 2024-04-01 PROCEDURE — 99232 SBSQ HOSP IP/OBS MODERATE 35: CPT | Performed by: INTERNAL MEDICINE

## 2024-04-01 RX ORDER — GUAIFENESIN 600 MG/1
600 TABLET, EXTENDED RELEASE ORAL 2 TIMES DAILY
Status: DISCONTINUED | OUTPATIENT
Start: 2024-04-01 | End: 2024-04-05 | Stop reason: HOSPADM

## 2024-04-01 RX ORDER — TIZANIDINE 4 MG/1
4 TABLET ORAL 3 TIMES DAILY PRN
Status: DISCONTINUED | OUTPATIENT
Start: 2024-04-01 | End: 2024-04-05 | Stop reason: HOSPADM

## 2024-04-01 RX ORDER — OMEPRAZOLE 40 MG/1
40 CAPSULE, DELAYED RELEASE ORAL
Status: DISCONTINUED | OUTPATIENT
Start: 2024-04-01 | End: 2024-04-05 | Stop reason: HOSPADM

## 2024-04-01 RX ORDER — HYDROCODONE BITARTRATE AND ACETAMINOPHEN 7.5; 325 MG/1; MG/1
1 TABLET ORAL 2 TIMES DAILY PRN
Status: DISCONTINUED | OUTPATIENT
Start: 2024-04-01 | End: 2024-04-05 | Stop reason: HOSPADM

## 2024-04-01 RX ADMIN — WATER 40 MG: 1 INJECTION INTRAMUSCULAR; INTRAVENOUS; SUBCUTANEOUS at 20:17

## 2024-04-01 RX ADMIN — CLONAZEPAM 0.5 MG: 0.5 TABLET ORAL at 08:51

## 2024-04-01 RX ADMIN — MONTELUKAST 10 MG: 10 TABLET, FILM COATED ORAL at 08:47

## 2024-04-01 RX ADMIN — LEVOTHYROXINE SODIUM 75 MCG: 0.07 TABLET ORAL at 08:47

## 2024-04-01 RX ADMIN — ONDANSETRON 4 MG: 2 INJECTION INTRAMUSCULAR; INTRAVENOUS at 08:48

## 2024-04-01 RX ADMIN — WATER 40 MG: 1 INJECTION INTRAMUSCULAR; INTRAVENOUS; SUBCUTANEOUS at 15:26

## 2024-04-01 RX ADMIN — TIZANIDINE 4 MG: 4 TABLET ORAL at 22:43

## 2024-04-01 RX ADMIN — BENZONATATE 200 MG: 200 CAPSULE ORAL at 21:38

## 2024-04-01 RX ADMIN — FUROSEMIDE 20 MG: 20 TABLET ORAL at 08:46

## 2024-04-01 RX ADMIN — ENOXAPARIN SODIUM 30 MG: 100 INJECTION SUBCUTANEOUS at 08:47

## 2024-04-01 RX ADMIN — VENLAFAXINE HYDROCHLORIDE 150 MG: 150 CAPSULE, EXTENDED RELEASE ORAL at 08:47

## 2024-04-01 RX ADMIN — PRIMIDONE 100 MG: 50 TABLET ORAL at 21:38

## 2024-04-01 RX ADMIN — ANTACID TABLETS 1000 MG: 500 TABLET, CHEWABLE ORAL at 15:27

## 2024-04-01 RX ADMIN — SALINE NASAL SPRAY 2 SPRAY: 1.5 SOLUTION NASAL at 21:44

## 2024-04-01 RX ADMIN — ALBUTEROL SULFATE 2.5 MG: 2.5 SOLUTION RESPIRATORY (INHALATION) at 07:31

## 2024-04-01 RX ADMIN — HYDROCODONE BITARTRATE AND ACETAMINOPHEN 1 TABLET: 7.5; 325 TABLET ORAL at 15:27

## 2024-04-01 RX ADMIN — ANTACID TABLETS 1000 MG: 500 TABLET, CHEWABLE ORAL at 21:47

## 2024-04-01 RX ADMIN — FLUTICASONE PROPIONATE 2 SPRAY: 50 SPRAY, METERED NASAL at 08:48

## 2024-04-01 RX ADMIN — ALBUTEROL SULFATE 2.5 MG: 2.5 SOLUTION RESPIRATORY (INHALATION) at 20:26

## 2024-04-01 RX ADMIN — ONDANSETRON 4 MG: 2 INJECTION INTRAMUSCULAR; INTRAVENOUS at 15:26

## 2024-04-01 RX ADMIN — WATER 40 MG: 1 INJECTION INTRAMUSCULAR; INTRAVENOUS; SUBCUTANEOUS at 08:48

## 2024-04-01 RX ADMIN — CLONAZEPAM 0.5 MG: 0.5 TABLET ORAL at 15:26

## 2024-04-01 RX ADMIN — PANTOPRAZOLE SODIUM 40 MG: 40 TABLET, DELAYED RELEASE ORAL at 08:46

## 2024-04-01 RX ADMIN — TIOTROPIUM BROMIDE AND OLODATEROL 2 PUFF: 3.124; 2.736 SPRAY, METERED RESPIRATORY (INHALATION) at 07:33

## 2024-04-01 RX ADMIN — BENZONATATE 200 MG: 200 CAPSULE ORAL at 15:27

## 2024-04-01 RX ADMIN — Medication 60 MG: at 08:48

## 2024-04-01 RX ADMIN — SALINE NASAL SPRAY 2 SPRAY: 1.5 SOLUTION NASAL at 08:48

## 2024-04-01 RX ADMIN — Medication 60 MG: at 21:38

## 2024-04-01 RX ADMIN — BENZONATATE 200 MG: 200 CAPSULE ORAL at 08:46

## 2024-04-01 RX ADMIN — GUAIFENESIN 600 MG: 600 TABLET, EXTENDED RELEASE ORAL at 13:04

## 2024-04-01 RX ADMIN — ALBUTEROL SULFATE 2.5 MG: 2.5 SOLUTION RESPIRATORY (INHALATION) at 10:48

## 2024-04-01 RX ADMIN — SODIUM CHLORIDE, PRESERVATIVE FREE 10 ML: 5 INJECTION INTRAVENOUS at 20:17

## 2024-04-01 RX ADMIN — SALINE NASAL SPRAY 2 SPRAY: 1.5 SOLUTION NASAL at 13:04

## 2024-04-01 RX ADMIN — SALINE NASAL SPRAY 2 SPRAY: 1.5 SOLUTION NASAL at 16:48

## 2024-04-01 RX ADMIN — OMEPRAZOLE 40 MG: 40 CAPSULE, DELAYED RELEASE ORAL at 21:39

## 2024-04-01 RX ADMIN — MIRTAZAPINE 45 MG: 30 TABLET, FILM COATED ORAL at 21:38

## 2024-04-01 RX ADMIN — GUAIFENESIN 600 MG: 600 TABLET, EXTENDED RELEASE ORAL at 21:38

## 2024-04-01 RX ADMIN — WATER 40 MG: 1 INJECTION INTRAMUSCULAR; INTRAVENOUS; SUBCUTANEOUS at 02:06

## 2024-04-01 RX ADMIN — ALBUTEROL SULFATE 2.5 MG: 2.5 SOLUTION RESPIRATORY (INHALATION) at 14:30

## 2024-04-01 ASSESSMENT — ENCOUNTER SYMPTOMS
COUGH: 1
VOMITING: 0
CONSTIPATION: 0
DIARRHEA: 1
SHORTNESS OF BREATH: 1
BACK PAIN: 1
RHINORRHEA: 1
WHEEZING: 1
NAUSEA: 1

## 2024-04-01 ASSESSMENT — PAIN DESCRIPTION - DESCRIPTORS
DESCRIPTORS: DISCOMFORT
DESCRIPTORS: SPASM

## 2024-04-01 ASSESSMENT — PAIN DESCRIPTION - LOCATION
LOCATION: NECK
LOCATION: BACK

## 2024-04-01 ASSESSMENT — PAIN SCALES - GENERAL
PAINLEVEL_OUTOF10: 7
PAINLEVEL_OUTOF10: 0
PAINLEVEL_OUTOF10: 5
PAINLEVEL_OUTOF10: 8

## 2024-04-01 NOTE — PROGRESS NOTES
[facility]  IN-PATIENT SERVICE  [facility]    PROGRESS NOTE             4/1/2024    9:15 AM    Name:   Laura Rhodes  MRN:     [id number]     Acct:      [id number]   Room:   2114/2114-01   Day:  1  Admit Date:  3/31/2024  8:06 AM    PCP:  Van Pate MD  Code Status:  DNR-CCA    Subjective:     C/C:   Chief Complaint   Patient presents with    Shortness of Breath     Interval History Status: unchanged    Patient seen and examined at bedside this morning. No acute events overnight. Patient continues to endorse wheezing, nonproductive cough, feels that her shortness of breath is mild.  Feels that her breathing is slightly improved with breathing treatments and Solu-Medrol, however not significantly different.  States that she also has nausea, dry heaving but no vomiting.  Also endorses diarrhea, but believes that it is from her chronic azithromycin which she takes Monday Wednesday Friday.  She denies any fever, chills, chest pain, abdominal pain.    Patient states she was previously on hospice about a month ago for a few days, states that it was mainly for chronic hip and back pain which she was unable to get pain medications for.  States that she is now established with pain management, on Norco.    Brief History:     63-year-old female with stage IV COPD and chronic hypoxic respiratory failure on home O2 of 2 to 3 L 24 hours a day, hypertension and hypothyroidism presented with complaints of shortness of breath and wheezing.  Patient states that last week she started having symptoms of rhinorrhea, postnasal discharge and a mild cough.  She did not have fevers or chills at the time but her chest began to feel congested so she decided to have some left over Decadron that she had.  Patient states that she was using her breathing treatments but they were not making things better.  She reports having feelings of fevers and chills last night.  States that this  Status: She is alert and oriented to person, place, and time.   Psychiatric:         Mood and Affect: Mood normal.           Assessment:        Primary Problem  COPD exacerbation (HCC)    Active Hospital Problems    Diagnosis Date Noted    COPD exacerbation (HCC) [J44.1] 12/02/2023       Plan:      Patient status Admit as inpatient in the  Progressive Unit/Step down     Acute on chronic hypoxic respiratory failure 2/2 COPD exacerbation  -3/31 ABG: pH 7.422, pCO2 59.2, pO2 76.6, HCO3 38.4  -Stage IV COPD with chronic hypoxic respiratory failure on home O2 2 to 3 L  -Respiratory culture positive for gram-positive cocci in clusters, MRSA pending  -Pulmonology consulted: Continue Solu-Medrol 40 mg every 6, continue Stiolto, DuoNebs, albuterol every 4, can consider chronic prednisone at discharge and PCP prophylaxis with Bactrim  -Negative respiratory panel, COVID and influenza, Legionella  -Nicotine patch, patient still smoking  -Continue BiPAP nightly  -Continue Flonase, continue Singulair  -Palliative care consult, DNR CCA  -Nutrition consult    Hypertension  -Currently holding home dose Coreg/amlodipine due to concern for hypotension 80s systolic on admission  -Home dose Lasix with parameters  -Midodrine 5 Mg Q3H PRN if SBP<90     Hypothyroidism  -Continue Synthroid 75 mcg     DVT prophylaxis: Lovenox  GI prophylaxis: Protonix 40 mg  Diet: Normal Adult  Disposition: Pending     OT/PT/SW     Code Status: DNR CC-A, No Intubation    Checo Benson MD  PGY I Transitional Year Resident  4/1/2024 9:15 AM     Attending Physician Statement  I have discussed the care of Laura Rhodes with the resident team. I have examined the patient myself and taken ros and hpi , including pertinent history and exam findings,  with the resident. I have reviewed the key elements of all parts of the encounter with the resident.  I agree with the assessment, plan and orders as documented by the resident.    Principal Problem:    COPD

## 2024-04-01 NOTE — PLAN OF CARE
Problem: Discharge Planning  Goal: Discharge to home or other facility with appropriate resources  4/1/2024 0649 by Pablito Maldonado, RN  Outcome: Progressing     Problem: Pain  Goal: Verbalizes/displays adequate comfort level or baseline comfort level  4/1/2024 0649 by Pablito Maldonado, RN  Outcome: Progressing     Problem: Safety - Adult  Goal: Free from fall injury  4/1/2024 0649 by Pablito Maldonado, RN  Outcome: Progressing

## 2024-04-01 NOTE — ACP (ADVANCE CARE PLANNING)
Advance Care Planning     Advance Care Planning (ACP) Physician/NP/PA Conversation    Date of Conversation: 3/31/2024  Conducted with: Patient with Decision Making Capacity    Healthcare Decision Maker:      Primary Decision Maker: Dionicio Rhodes - Spouse - [phone]    Click here to complete Healthcare Decision Makers including selection of the Healthcare Decision Maker Relationship (ie \"Primary\")  Today we documented Decision Maker(s) consistent with Legal Next of Kin hierarchy.    Care Preferences:    Hospitalization:  \"If your health worsens and it becomes clear that your chance of recovery is unlikely, what would be your preference regarding hospitalization?\"  The patient would prefer hospitalization.    Ventilation:  \"If you were unable to breath on your own and your chance of recovery was unlikely, what would be your preference about the use of a ventilator (breathing machine) if it was available to you?\"  The patient would NOT desire the use of a ventilator.    Resuscitation:  \"In the event your heart stopped as a result of an underlying serious health condition, would you want attempts made to restart your heart, or would you prefer a natural death?\"  No, do NOT attempt to resuscitate.    treatment goals, benefit/burden of treatment options, ventilation preferences, hospitalization preferences, resuscitation preferences, and hospice care    Conversation Outcomes / Follow-Up Plan:  ACP complete - no further action today  Reviewed DNR/DNI and patient confirms current DNR status - completed forms on file (place new order if needed)    Length of Voluntary ACP Conversation in minutes:  <16 minutes (Non-Billable)    NA JUÁREZ, APRN - CNP

## 2024-04-01 NOTE — PROGRESS NOTES
Comprehensive Nutrition Assessment    Type and Reason for Visit:  Consult (Pulmonary Cachexia)    Nutrition Recommendations/Plan:   Continue diet and supplements as ordered.     Malnutrition Assessment:  Malnutrition Status:  At risk for malnutrition (Comment) (04/01/24 9619)    Context:  Acute Illness (acute on chronic)     Findings of the 6 clinical characteristics of malnutrition:  Energy Intake:  Unable to assess  Weight Loss:   (pt very frail)     Body Fat Loss:  Unable to assess     Muscle Mass Loss:  Unable to assess    Fluid Accumulation:  No significant fluid accumulation     Strength:  Not Performed    Nutrition Assessment:    Pt admitted due to COPD exacerbation known to writer from prior admissions. Pt was unavailable to interview but oral intake is quite variable generally less than 50%.    Nutrition Related Findings:    No edema. Severe COPD, active smoker. Labs & meds reviewed.         Current Nutrition Intake & Therapies:    Average Meal Intake: 26-50%  Average Supplements Intake: Unable to assess  ADULT DIET; Regular  ADULT ORAL NUTRITION SUPPLEMENT; Breakfast, Lunch, Dinner; Standard High Calorie/High Protein Oral Supplement    Anthropometric Measures:  Height: 160 cm (5' 3\")  Ideal Body Weight (IBW): 115 lbs (52 kg)    Admission Body Weight: 45.4 kg (100 lb)  Current Body Weight: 45.4 kg (100 lb), 87 % IBW. Weight Source: Bed Scale  Current BMI (kg/m2): 17.7  Usual Body Weight: 49 kg (108 lb) (2-28-24)  % Weight Change (Calculated): -7.4                    BMI Categories: Underweight (BMI less than 18.5)    Estimated Daily Nutrient Needs:  Energy Requirements Based On: Kcal/kg  Weight Used for Energy Requirements: Admission  Energy (kcal/day): 0007-3604 kcals based on 35-37 kcals/kg  Weight Used for Protein Requirements: Admission  Protein (g/day): 54-59 gm protein based on 1.2-1.3 gm/kg  Method Used for Fluid Requirements: 1 ml/kcal      Nutrition Diagnosis:   Underweight related to other

## 2024-04-01 NOTE — CONSULTS
of an acute infarct.  There is  no evidence of hydrocephalus. Subtle asymmetry of the extra-axial spaces is  unchanged.  There is mild diffuse cortical atrophy.    ORBITS: The visualized portion of the orbits demonstrate no acute abnormality.    SINUSES: The visualized paranasal sinuses and mastoid air cells demonstrate  no acute abnormality.    SOFT TISSUES/SKULL:  No acute abnormality of the visualized skull or soft  tissues.    Impression  No acute intracranial abnormality.       Xray Result (most recent):  XR CHEST PORTABLE 03/31/2024    Narrative  EXAMINATION:  ONE XRAY VIEW OF THE CHEST    3/31/2024 8:50 am    COMPARISON:  Chest radiograph performed 02/28/2024.    HISTORY:  ORDERING SYSTEM PROVIDED HISTORY: cough  TECHNOLOGIST PROVIDED HISTORY:  cough  Reason for Exam: cough    FINDINGS:  There is hyperinflation with chronic pulmonary change.  There is no acute  consolidation or effusion.  There is no pneumothorax.  The mediastinal  structures are stable.  The upper abdomen is unremarkable.  The extrathoracic  soft tissues are unremarkable.    Impression  Chronic pulmonary change without acute cardiopulmonary process.       MRI Result (most recent):  MRI LUMBAR SPINE WO CONTRAST 12/30/2022    Narrative  EXAMINATION:  MRI OF THE LUMBAR SPINE WITHOUT CONTRAST, 12/30/2022 4:34 pm    TECHNIQUE:  Multiplanar multisequence MRI of the lumbar spine was performed without the  administration of intravenous contrast.    COMPARISON:  918    HISTORY:  ORDERING SYSTEM PROVIDED HISTORY: Spinal stenosis of lumbar region,  unspecified whether neurogenic claudication present  TECHNOLOGIST PROVIDED HISTORY:  severe low back pain  What is the sedation requirement?->None  Reason for Exam: severe low back pain, Spinal stenosis of lumbar region,  unspecified whether neurogenic claudication present    FINDINGS:  BONES/ALIGNMENT: There are 5 lumbar vertebral bodies.  There is minimal  anterolisthesis at L1-2 with retrolisthesis at  L2-3.  There is chronic  appearing compression deformity at L2, which is new from prior examination.  There is greater than 50% height loss.  The remaining lumbar vertebral body  heights are maintained.  No aggressive marrow signal abnormality.    SPINAL CORD: The conus terminates normally.  There is some clumping,  thickening, and peripheralization of nerve roots in the lower cauda equina.    SOFT TISSUES: No paraspinal mass identified.    L1-L2: Disc protrusion and facet DJD without significant spinal canal  stenosis.  Mild bilateral neural foraminal stenosis.    L2-L3: Disc protrusion, facet DJD, and ligamentum flavum hypertrophy with  mild spinal canal stenosis.  There is mild narrowing of both lateral  recesses.  Mild to moderate bilateral neural foraminal stenosis.    L3-L4: Disc extrusion, facet DJD, and ligamentum flavum hypertrophy with  moderate spinal canal stenosis and narrowing of the left lateral recess.  Moderate bilateral neural foraminal stenosis.    L4-L5: Prior posterior decompression without significant spinal canal  stenosis.  Moderate bilateral neural foraminal stenosis.    L5-S1: Disc protrusion and facet DJD without significant spinal canal  stenosis.  Mild right neural foraminal stenosis.    Impression  1. Multilevel degenerative change with moderate spinal canal stenosis at L3-4  and mild spinal canal stenosis at L2-3.  2. Multilevel lateral recess and neural foraminal stenosis as above.  3. Findings suggestive of arachnoiditis in the lower cauda equina.  4. Chronic compression deformity at L2 with greater than 50% height loss.    RECOMMENDATIONS:  Unavailable      No results found for this or any previous visit.     Code Status: DNR-CCA no intubation     ADVANCED CARE PLANNING:  Patient has capacity for medical decisions: yes  Health Care Power of : no  Living Will: no     Personal, Social, and Family History  Marital Status:   Living situation:with family:

## 2024-04-01 NOTE — PLAN OF CARE
Problem: Discharge Planning  Goal: Discharge to home or other facility with appropriate resources  4/1/2024 1800 by Mildred Chawla RN  Outcome: Progressing  Flowsheets (Taken 4/1/2024 0820)  Discharge to home or other facility with appropriate resources:   Identify barriers to discharge with patient and caregiver   Arrange for needed discharge resources and transportation as appropriate   Identify discharge learning needs (meds, wound care, etc)   Refer to discharge planning if patient needs post-hospital services based on physician order or complex needs related to functional status, cognitive ability or social support system     Problem: Pain  Goal: Verbalizes/displays adequate comfort level or baseline comfort level  4/1/2024 1800 by Mildred Chawla RN  Outcome: Progressing     Problem: Safety - Adult  Goal: Free from fall injury  4/1/2024 1800 by Mildred Chawla RN  Outcome: Progressing     Problem: ABCDS Injury Assessment  Goal: Absence of physical injury  Outcome: Progressing     Problem: Anxiety  Goal: Will report anxiety at manageable levels  Description: INTERVENTIONS:  1. Administer medication as ordered  2. Teach and rehearse alternative coping skills  3. Provide emotional support with 1:1 interaction with staff  Outcome: Progressing  Flowsheets (Taken 4/1/2024 0820)  Will report anxiety at manageable levels:   Administer medication as ordered   Provide emotional support with 1:1 interaction with staff     Problem: Coping  Goal: Pt/Family able to verbalize concerns and demonstrate effective coping strategies  Description: INTERVENTIONS:  1. Assist patient/family to identify coping skills, available support systems and cultural and spiritual values  2. Provide emotional support, including active listening and acknowledgement of concerns of patient and caregivers  3. Reduce environmental stimuli, as able  4. Instruct patient/family in relaxation techniques, as appropriate  5. Assess for spiritual

## 2024-04-01 NOTE — DISCHARGE INSTR - COC
Continuity of Care Form    Patient Name: Laura Rhodes   :  1960  MRN:  [id number]    Admit date:  3/31/2024  Discharge date:  2024    Code Status Order: DNR-CCA   Advance Directives:     Admitting Physician:  Aren Ashley MD  PCP: Van Pate MD    Discharging Nurse: Stefani Sigala  Discharging Hospital Unit/Room#: 4/4-01  Discharging Unit Phone Number: [phone]    Emergency Contact:   Extended Emergency Contact Information  Primary Emergency Contact: Dionicio Rhodes  Address: [address] [place] [address] of [facility]  Home Phone: [phone]  Work Phone: [phone]  Mobile Phone: [phone]  Relation: Spouse   needed? No    Past Surgical History:  Past Surgical History:   Procedure Laterality Date    BACK SURGERY      diskectomy, L3-L5    COLONOSCOPY N/A 2019    COLONOSCOPY DIAGNOSTIC performed by Rajwinder Aleman MD at [facility] OR    COLONOSCOPY  2019    COLONOSCOPY POLYPECTOMY SNARE/COLD BIOPSY performed by Rajwinder Aleman MD at [facility]    DILATION AND CURETTAGE OF UTERUS      FEMUR FRACTURE SURGERY Right 2023    FEMUR IM NAIL HALIMA INSERTION performed by Juli Vann MD at [facility] OR    OTHER SURGICAL HISTORY      ectopic pregnancy with tube removal    PAIN MANAGEMENT PROCEDURE      Left sided Transforaminal Epidural Steriod Injection    THYROIDECTOMY, PARTIAL      UPPER GASTROINTESTINAL ENDOSCOPY      UPPER GASTROINTESTINAL ENDOSCOPY N/A 2020    EGD BIOPSY with dilatation performed by Ralph Joy MD at [facility] ENDO    UPPER GASTROINTESTINAL ENDOSCOPY N/A 2022    EGD BIOPSY performed by Fritz Zacarias MD at [facility] ENDO       Immunization History:   Immunization History   Administered Date(s) Administered    Influenza Virus Vaccine 2018, 2020    Influenza, FLUARIX, FLULAVAL, FLUZONE (age 6 mo+) AND AFLURIA, (age 3 y+), PF, 0.5mL 10/05/2019, 10/14/2020    Influenza, FLUCELVAX, (age 6 mo+), MDCK, PF,  0.5mL 11/15/2021, 09/29/2022, 10/16/2023    Pneumococcal, PCV20, PREVNAR 20, (age 6w+), IM, 0.5mL 06/23/2022    Pneumococcal, PPSV23, PNEUMOVAX 23, (age 2y+), SC/IM, 0.5mL 10/05/2019, 09/23/2020    Zoster Recombinant (Shingrix) 09/29/2022, 01/18/2023       Active Problems:  Patient Active Problem List   Diagnosis Code    Abdominal bloating R14.0    MDD (major depressive disorder), recurrent severe, without psychosis ([facility]) F33.2    Panic disorder F41.0    Obsessive compulsive disorder F42.9    Irritable bowel syndrome with constipation K58.1    Chronic constipation K59.09    Esophageal dysphagia R13.19    Cervical osteoarthritis M47.812    Chronic bilateral low back pain without sciatica M54.50, G89.29    COPD with acute exacerbation ([facility]) J44.1    Lumbar foraminal stenosis M48.061    Acquired hypothyroidism E03.9    Compression fracture of L2 lumbar vertebra ([facility]) S32.020A    Essential hypertension I10    Essential tremor G25.0    Other osteoporosis with current pathological fracture, vertebra(e), initial encounter for fracture ([facility]) M80.88XA    Fibromyalgia M79.7    Disorder of thyroid E07.9    Degeneration of intervertebral disc of lumbar region M51.36    COPD, severe ([facility]) J44.9    On home oxygen therapy Z99.81    History of psychiatric disorder Z86.59    Left lumbar radiculitis M54.16    Lumbosacral spondylosis without myelopathy M47.817    COPD (chronic obstructive pulmonary disease) ([facility]) J44.9    Severe malnutrition ([facility]) E43    Oropharyngeal dysphagia R13.12    Throat irritation J39.2    Closed displaced intertrochanteric fracture of right femur ([facility]) S72.141A    Chronic respiratory failure with hypoxia ([facility]) J96.11    Irregular heart beat I49.9    Laryngitis J04.0    COPD exacerbation ([facility]) J44.1    Goals of care, counseling/discussion Z71.89    DNR (do not resuscitate) discussion Z71.89    ACP (advance care planning) Z71.89    Palliative care encounter Z51.5    Shortness of breath R06.02    Anxiety F41.9

## 2024-04-01 NOTE — PROGRESS NOTES
Facts: Pt is known to writer from several previous admissions. Pt is frustrated with continual recent needs for hospitalization.    Feelings: Pt expresses the need for pain control and the  management of her COPD to keep her at home and able to perform AOL. However, she also notes she has not given up smoking and that she waits too long to get help with SOB which means she's readmitted. Pt said Dr Tyler told her she could have virtual visits with him when she feels herself getting worse and needs to be evaluated so she thought she could work with that. She doesn't see that palliative care is helpful to her.    Family:  and daughter (who lives out of town) appear supportive.    Lynsey: Pt always asks for and appreciates prayer.     Follow-up: Chaplains remain available as needed.      04/01/24 1742   Encounter Summary   Encounter Overview/Reason  Spiritual/Emotional Needs   Service Provided For: Patient   Referral/Consult From: Palliative Care   Support System Spouse;Children   Last Encounter  04/01/24   Complexity of Encounter Moderate   Begin Time 1535   End Time  1555   Total Time Calculated 20 min   Spiritual/Emotional needs   Type Spiritual Support;Emotional Distress   Palliative Care   Type Palliative Care, Initial/Spiritual Assessment   Assessment/Intervention/Outcome   Assessment Calm;Impaired resilience;Sad   Intervention Active listening;Discussed illness injury and it’s impact;Explored/Affirmed feelings, thoughts, concerns;Prayer (assurance of)/[place];Sustaining Presence/Ministry of presence   Outcome Engaged in conversation;Expressed feelings, needs, and concerns;Expressed Gratitude;Receptive;Venting emotion

## 2024-04-01 NOTE — CARE COORDINATION
Case Management Assessment  Initial Evaluation    Date/Time of Evaluation: 4/1/2024 9:06 AM  Assessment Completed by: Aga Rod RN    If patient is discharged prior to next notation, then this note serves as note for discharge by case management.    Patient Name: Laura Rhodes                   YOB: 1960  Diagnosis: Hypokalemia [E87.6]  Hypomagnesemia [E83.42]  COPD exacerbation (HCC) [J44.1]  Viral URI with cough [J06.9]                   Date / Time: 3/31/2024  8:06 AM    Patient Admission Status: Inpatient   Readmission Risk (Low < 19, Mod (19-27), High > 27): Readmission Risk Score: 31.1    Current PCP: Van Pate MD  PCP verified by CM? Yes    Chart Reviewed: Yes      History Provided by: Patient  Patient Orientation: Alert and Oriented    Patient Cognition: Alert    Hospitalization in the last 30 days (Readmission):  Yes    If yes, Readmission Assessment in  Navigator will be completed.    Advance Directives:      Code Status: DNR-CCA   Patient's Primary Decision Maker is: Legal Next of Kin    Primary Decision Maker: Dionicio Rhodes - Spouse - [phone]    Discharge Planning:    Patient lives with: Spouse/Significant Other Type of Home: House  Primary Care Giver: Self  Patient Support Systems include: Spouse/Significant Other   Current Financial resources: Medicare  Current community resources: ECF/Home Care, Other (Comment) (Current [facility] [place] [facility]. Current w/ Pain Management, [facility].)  Current services prior to admission: Durable Medical Equipment, Home Care, Oxygen Therapy, Home Bipap (Current [employer] [place] [facility], BIPAP)            Current DME: Bipap, Oxygen Therapy (Comment), Shower Chair, Walker, Home Aerosol            Type of Home Care services:  OT, PT (Current [facility]/ [facility])    ADLS  Prior functional level: Independent in ADLs/IADLs  Current functional level: Independent in ADLs/IADLs    PT AM-PAC:   /24  OT AM-PAC:   /24    Family can provide

## 2024-04-01 NOTE — PROGRESS NOTES
[facility] PULMONARY,CRITICAL CARE & SLEEP   Saúl Cordero MD/Hieu Whitfield MD/ Christian Concepcion MD/Dr Lexie Castillo APRN AGACNP-BC, NP-C      Lizette Rivera APRN NP-C     Chantel Mathews APRN NP-C                                           Pulmonary Progress Note    Patient - Laura Rhodes   Age - 63 y.o.   - 1960  MRN - [id number]  [facility] # - [id number]  Date of Admission - 3/31/2024  8:06 AM    Consulting Service/Physician:       Primary Care Physician: Van Pate MD    SUBJECTIVE:     Chief Complaint:   Chief Complaint   Patient presents with    Shortness of Breath     Subjective:    Laura is seen lying in bed.  She states she is feeling slightly better than yesterday.  She continues with diffuse wheezing.  She is on 2 L with pulse ox 100%.  She has been afebrile.    VITALS  /69   Pulse 73   Temp 98.6 °F (37 °C)   Resp 18   Ht 1.6 m (5' 3\")   Wt 45.4 kg (100 lb 1.4 oz)   SpO2 99%   BMI 17.73 kg/m²   Wt Readings from Last 3 Encounters:   24 45.4 kg (100 lb 1.4 oz)   24 47 kg (103 lb 9.9 oz)   24 45.8 kg (101 lb)     I/O (24 Hours)  No intake or output data in the 24 hours ending 24 1304  Ventilator:   Settings  FiO2 : 40 %  Insp Rise Time (%): 2 %  Exam:   Physical Exam   Constitutional: Cachectic appearing elderly female lying in bed on 2 L  HENT: Unremarkable  Head: Normocephalic and atraumatic.   Eyes: EOM are normal. Pupils are equal, round, and reactive to light.   Neck: Neck supple.   Cardiovascular:  Regular rate and rhythm.  Normal heart tones.  No JVD.    Pulmonary/Chest: Respirations even and unlabored, diffuse expiratory wheezing, diminished, on 2 L with pulse ox 100%  Abdominal: Soft. Bowel sounds are normal.  Musculoskeletal: Normal range of motion.   Neurological: Patient is alert and oriented to person, place, and time.   Skin: Skin is warm and dry. No rash noted.   Extremities: No edema or discoloration  Infusions:

## 2024-04-01 NOTE — PROGRESS NOTES
Writer responded to consult to see pt for prayer and conversation  Pt welcomed writer, but asked to be seen later today due to \"not feeling so well right now\"       04/01/24 1043   Encounter Summary   Encounter Overview/Reason  Initial Encounter   Service Provided For: Patient   Referral/Consult From: Nurse   Support System Unknown   Last Encounter  04/01/24   Complexity of Encounter Low   Spiritual/Emotional needs   Type Spiritual Support   Assessment/Intervention/Outcome   Assessment Loneliness;Sad   Intervention Sustaining Presence/Ministry of presence   Outcome Receptive   Plan and Referrals   Plan/Referrals Continue to visit, (comment)  (Pt would like daily prayers and conversations)

## 2024-04-02 LAB
ANION GAP SERPL CALCULATED.3IONS-SCNC: 6 MMOL/L (ref 9–17)
BASOPHILS # BLD: 0 K/UL (ref 0–0.2)
BASOPHILS NFR BLD: 0 % (ref 0–2)
BUN SERPL-MCNC: 10 MG/DL (ref 8–23)
CALCIUM SERPL-MCNC: 7.7 MG/DL (ref 8.6–10.4)
CHLORIDE SERPL-SCNC: 98 MMOL/L (ref 98–107)
CO2 SERPL-SCNC: 35 MMOL/L (ref 20–31)
CREAT SERPL-MCNC: 0.4 MG/DL (ref 0.5–0.9)
EOSINOPHIL # BLD: 0 K/UL (ref 0–0.4)
EOSINOPHILS RELATIVE PERCENT: 0 % (ref 0–4)
ERYTHROCYTE [DISTWIDTH] IN BLOOD BY AUTOMATED COUNT: 14.3 % (ref 11.5–14.9)
GFR SERPL CREATININE-BSD FRML MDRD: >90 ML/MIN/1.73M2
GLUCOSE SERPL-MCNC: 106 MG/DL (ref 70–99)
HCT VFR BLD AUTO: 33.1 % (ref 36–46)
HGB BLD-MCNC: 11.1 G/DL (ref 12–16)
LYMPHOCYTES NFR BLD: 0.4 K/UL (ref 1–4.8)
LYMPHOCYTES RELATIVE PERCENT: 7 % (ref 24–44)
MCH RBC QN AUTO: 32.3 PG (ref 26–34)
MCHC RBC AUTO-ENTMCNC: 33.4 G/DL (ref 31–37)
MCV RBC AUTO: 96.7 FL (ref 80–100)
MICROORGANISM SPEC CULT: ABNORMAL
MICROORGANISM/AGENT SPEC: ABNORMAL
MONOCYTES NFR BLD: 0.6 K/UL (ref 0.1–1.3)
MONOCYTES NFR BLD: 10 % (ref 1–7)
NEUTROPHILS NFR BLD: 83 % (ref 36–66)
NEUTS SEG NFR BLD: 4.9 K/UL (ref 1.3–9.1)
PLATELET # BLD AUTO: 346 K/UL (ref 150–450)
PMV BLD AUTO: 7.1 FL (ref 6–12)
POTASSIUM SERPL-SCNC: 4.5 MMOL/L (ref 3.7–5.3)
RBC # BLD AUTO: 3.42 M/UL (ref 4–5.2)
SODIUM SERPL-SCNC: 139 MMOL/L (ref 135–144)
SPECIMEN DESCRIPTION: ABNORMAL
WBC OTHER # BLD: 6 K/UL (ref 3.5–11)

## 2024-04-02 PROCEDURE — 97165 OT EVAL LOW COMPLEX 30 MIN: CPT

## 2024-04-02 PROCEDURE — 6370000000 HC RX 637 (ALT 250 FOR IP): Performed by: INTERNAL MEDICINE

## 2024-04-02 PROCEDURE — 97530 THERAPEUTIC ACTIVITIES: CPT

## 2024-04-02 PROCEDURE — 94761 N-INVAS EAR/PLS OXIMETRY MLT: CPT

## 2024-04-02 PROCEDURE — 94669 MECHANICAL CHEST WALL OSCILL: CPT

## 2024-04-02 PROCEDURE — 6370000000 HC RX 637 (ALT 250 FOR IP)

## 2024-04-02 PROCEDURE — 85025 COMPLETE CBC W/AUTO DIFF WBC: CPT

## 2024-04-02 PROCEDURE — 94640 AIRWAY INHALATION TREATMENT: CPT

## 2024-04-02 PROCEDURE — 6370000000 HC RX 637 (ALT 250 FOR IP): Performed by: NURSE PRACTITIONER

## 2024-04-02 PROCEDURE — 2580000003 HC RX 258

## 2024-04-02 PROCEDURE — 80048 BASIC METABOLIC PNL TOTAL CA: CPT

## 2024-04-02 PROCEDURE — 6360000002 HC RX W HCPCS: Performed by: INTERNAL MEDICINE

## 2024-04-02 PROCEDURE — 99232 SBSQ HOSP IP/OBS MODERATE 35: CPT | Performed by: INTERNAL MEDICINE

## 2024-04-02 PROCEDURE — 2060000000 HC ICU INTERMEDIATE R&B

## 2024-04-02 PROCEDURE — 36415 COLL VENOUS BLD VENIPUNCTURE: CPT

## 2024-04-02 PROCEDURE — 6360000002 HC RX W HCPCS

## 2024-04-02 PROCEDURE — 6360000002 HC RX W HCPCS: Performed by: NURSE PRACTITIONER

## 2024-04-02 PROCEDURE — 2700000000 HC OXYGEN THERAPY PER DAY

## 2024-04-02 PROCEDURE — 2580000003 HC RX 258: Performed by: NURSE PRACTITIONER

## 2024-04-02 PROCEDURE — 97161 PT EVAL LOW COMPLEX 20 MIN: CPT

## 2024-04-02 PROCEDURE — 94664 DEMO&/EVAL PT USE INHALER: CPT

## 2024-04-02 RX ORDER — ENOXAPARIN SODIUM 100 MG/ML
40 INJECTION SUBCUTANEOUS DAILY
Status: DISCONTINUED | OUTPATIENT
Start: 2024-04-03 | End: 2024-04-05 | Stop reason: HOSPADM

## 2024-04-02 RX ORDER — OXYMETAZOLINE HYDROCHLORIDE 0.05 G/100ML
2 SPRAY NASAL 2 TIMES DAILY PRN
Status: DISPENSED | OUTPATIENT
Start: 2024-04-02 | End: 2024-04-05

## 2024-04-02 RX ADMIN — SALINE NASAL SPRAY 2 SPRAY: 1.5 SOLUTION NASAL at 21:16

## 2024-04-02 RX ADMIN — ENOXAPARIN SODIUM 30 MG: 100 INJECTION SUBCUTANEOUS at 09:48

## 2024-04-02 RX ADMIN — FLUTICASONE PROPIONATE 2 SPRAY: 50 SPRAY, METERED NASAL at 09:50

## 2024-04-02 RX ADMIN — OMEPRAZOLE 40 MG: 40 CAPSULE, DELAYED RELEASE ORAL at 16:49

## 2024-04-02 RX ADMIN — TIZANIDINE 4 MG: 4 TABLET ORAL at 21:21

## 2024-04-02 RX ADMIN — ALBUTEROL SULFATE 2.5 MG: 2.5 SOLUTION RESPIRATORY (INHALATION) at 20:00

## 2024-04-02 RX ADMIN — Medication 60 MG: at 09:48

## 2024-04-02 RX ADMIN — VENLAFAXINE HYDROCHLORIDE 150 MG: 150 CAPSULE, EXTENDED RELEASE ORAL at 09:48

## 2024-04-02 RX ADMIN — GUAIFENESIN 600 MG: 600 TABLET, EXTENDED RELEASE ORAL at 09:48

## 2024-04-02 RX ADMIN — SODIUM CHLORIDE, PRESERVATIVE FREE 10 ML: 5 INJECTION INTRAVENOUS at 21:23

## 2024-04-02 RX ADMIN — BENZONATATE 200 MG: 200 CAPSULE ORAL at 09:48

## 2024-04-02 RX ADMIN — ALBUTEROL SULFATE 2.5 MG: 2.5 SOLUTION RESPIRATORY (INHALATION) at 11:29

## 2024-04-02 RX ADMIN — FUROSEMIDE 20 MG: 20 TABLET ORAL at 09:48

## 2024-04-02 RX ADMIN — SODIUM CHLORIDE, PRESERVATIVE FREE 10 ML: 5 INJECTION INTRAVENOUS at 09:49

## 2024-04-02 RX ADMIN — WATER 40 MG: 1 INJECTION INTRAMUSCULAR; INTRAVENOUS; SUBCUTANEOUS at 02:35

## 2024-04-02 RX ADMIN — AMLODIPINE BESYLATE 5 MG: 5 TABLET ORAL at 21:21

## 2024-04-02 RX ADMIN — WATER 40 MG: 1 INJECTION INTRAMUSCULAR; INTRAVENOUS; SUBCUTANEOUS at 09:48

## 2024-04-02 RX ADMIN — ANTACID TABLETS 1000 MG: 500 TABLET, CHEWABLE ORAL at 21:21

## 2024-04-02 RX ADMIN — LEVOTHYROXINE SODIUM 75 MCG: 0.07 TABLET ORAL at 05:30

## 2024-04-02 RX ADMIN — HYDROCODONE BITARTRATE AND ACETAMINOPHEN 1 TABLET: 7.5; 325 TABLET ORAL at 12:18

## 2024-04-02 RX ADMIN — MONTELUKAST 10 MG: 10 TABLET, FILM COATED ORAL at 09:48

## 2024-04-02 RX ADMIN — SALINE NASAL SPRAY 2 SPRAY: 1.5 SOLUTION NASAL at 09:50

## 2024-04-02 RX ADMIN — CLONAZEPAM 0.5 MG: 0.5 TABLET ORAL at 05:35

## 2024-04-02 RX ADMIN — Medication 60 MG: at 21:12

## 2024-04-02 RX ADMIN — CLONAZEPAM 0.5 MG: 0.5 TABLET ORAL at 17:34

## 2024-04-02 RX ADMIN — SALINE NASAL SPRAY 2 SPRAY: 1.5 SOLUTION NASAL at 17:34

## 2024-04-02 RX ADMIN — ALBUTEROL SULFATE 2.5 MG: 2.5 SOLUTION RESPIRATORY (INHALATION) at 07:46

## 2024-04-02 RX ADMIN — OMEPRAZOLE 40 MG: 40 CAPSULE, DELAYED RELEASE ORAL at 05:30

## 2024-04-02 RX ADMIN — CARVEDILOL 12.5 MG: 12.5 TABLET, FILM COATED ORAL at 12:18

## 2024-04-02 RX ADMIN — MIRTAZAPINE 45 MG: 30 TABLET, FILM COATED ORAL at 21:12

## 2024-04-02 RX ADMIN — TIOTROPIUM BROMIDE AND OLODATEROL 2 PUFF: 3.124; 2.736 SPRAY, METERED RESPIRATORY (INHALATION) at 07:46

## 2024-04-02 RX ADMIN — ALBUTEROL SULFATE 2.5 MG: 2.5 SOLUTION RESPIRATORY (INHALATION) at 15:03

## 2024-04-02 RX ADMIN — BENZONATATE 200 MG: 200 CAPSULE ORAL at 21:13

## 2024-04-02 RX ADMIN — BENZONATATE 200 MG: 200 CAPSULE ORAL at 14:20

## 2024-04-02 RX ADMIN — ANTACID TABLETS 1000 MG: 500 TABLET, CHEWABLE ORAL at 12:18

## 2024-04-02 RX ADMIN — WATER 40 MG: 1 INJECTION INTRAMUSCULAR; INTRAVENOUS; SUBCUTANEOUS at 17:34

## 2024-04-02 RX ADMIN — PRIMIDONE 100 MG: 50 TABLET ORAL at 21:12

## 2024-04-02 RX ADMIN — SALINE NASAL SPRAY 2 SPRAY: 1.5 SOLUTION NASAL at 14:20

## 2024-04-02 ASSESSMENT — PAIN SCALES - GENERAL
PAINLEVEL_OUTOF10: 8
PAINLEVEL_OUTOF10: 0
PAINLEVEL_OUTOF10: 8

## 2024-04-02 ASSESSMENT — ENCOUNTER SYMPTOMS
CHEST TIGHTNESS: 1
COUGH: 1
CONSTIPATION: 0
BACK PAIN: 1
ABDOMINAL PAIN: 0
VOMITING: 0
NAUSEA: 0
DIARRHEA: 0
RHINORRHEA: 1

## 2024-04-02 ASSESSMENT — PAIN DESCRIPTION - LOCATION
LOCATION: NECK
LOCATION: BACK

## 2024-04-02 ASSESSMENT — PAIN DESCRIPTION - DESCRIPTORS: DESCRIPTORS: DISCOMFORT

## 2024-04-02 ASSESSMENT — PAIN DESCRIPTION - ORIENTATION: ORIENTATION: LOWER

## 2024-04-02 NOTE — PROGRESS NOTES
04/02/24 1845   Encounter Summary   Encounter Overview/Reason  Spiritual/Emotional Needs   Service Provided For: Patient   Referral/Consult From: Palliative Care   Last Encounter  04/02/24   Complexity of Encounter Low   Spiritual/Emotional needs   Type Spiritual Support   Palliative Care   Type Palliative Care, Follow-up   Assessment/Intervention/Outcome   Assessment Unable to assess  (patient sleeping)   Intervention Prayer (assurance of)/[place]

## 2024-04-02 NOTE — PLAN OF CARE
Monitor intake, output and patient weight     Problem: Nutrition Deficit:  Goal: Optimize nutritional status  4/2/2024 0317 by Jo Norman, RN  Outcome: Progressing  4/1/2024 1800 by Mildred Chawla, RN  Outcome: Progressing

## 2024-04-02 NOTE — PLAN OF CARE
Problem: Discharge Planning  Goal: Discharge to home or other facility with appropriate resources  4/2/2024 1426 by Melita Anderson RN  Outcome: Progressing     Problem: Pain  Goal: Verbalizes/displays adequate comfort level or baseline comfort level  4/2/2024 1426 by Melita Anderson RN  Outcome: Progressing     Problem: Safety - Adult  Goal: Free from fall injury  4/2/2024 1426 by Melita Anderson RN  Outcome: Progressing     Problem: ABCDS Injury Assessment  Goal: Absence of physical injury  4/2/2024 1426 by Melita Anderson RN  Outcome: Progressing

## 2024-04-02 NOTE — CARE COORDINATION
ONGOING DISCHARGE PLAN:    Patient is alert and oriented x4.    Spoke with patient regarding discharge plan and patient confirms that plan is still to return to home w/ .     Pt. Will have [facility] [place] [facility] at home.     PT/OT on board. Will follow for any rec.     Pt. Remains on IV Steroids, 40MG, Q6, Sating 96%on 2LNC, Wears 2LNC, already at home. Using BIPAP at HS. Pt. Has BIPAP already at home from [facility]. Pulmonary following.     Denies needs.     Will continue to follow for additional discharge needs.    If patient is discharged prior to next notation, then this note serves as note for discharge by case management.    Electronically signed by Aga Rod RN on 4/2/2024 at 11:42 AM

## 2024-04-02 NOTE — CARDIO/PULMONARY
Patients home BIPAP/CPAP checked for integrity and cleanliness.  Equipment sticker placed on home unit after check completed.

## 2024-04-02 NOTE — PROGRESS NOTES
[facility]  IN-PATIENT SERVICE  [facility]    PROGRESS NOTE             4/2/2024    8:57 AM    Name:   Laura Rhodes  MRN:     [id number]     Acct:      [id number]   Room:   2114/2114-01   Day:  2  Admit Date:  3/31/2024  8:06 AM    PCP:  Van Pate MD  Code Status:  DNR-CCA    Subjective:     C/C:   Chief Complaint   Patient presents with    Shortness of Breath     Interval History Status: improved    Patient seen and examined at bedside this morning. No acute events overnight.  She states that her diarrhea and nausea have improved, she was started on her home Norco and received Zofran for nausea yesterday.  States that her breathing is slightly improved, however continues to have congestion, cough, and chest tightness.  States that her congestion has been present since last October, is interested in getting imaging of her sinuses.  Continues to be on Solu-Medrol 40 mg every 6 patient. She denies any fever, chills, chest pain, abdominal pain.    Brief History:     63-year-old female with stage IV COPD and chronic hypoxic respiratory failure on home O2 of 2 to 3 L 24 hours a day, hypertension and hypothyroidism presented with complaints of shortness of breath and wheezing.  Patient states that last week she started having symptoms of rhinorrhea, postnasal discharge and a mild cough.  She did not have fevers or chills at the time but her chest began to feel congested so she decided to have some left over Decadron that she had.  Patient states that she was using her breathing treatments but they were not making things better.  She reports having feelings of fevers and chills last night.  States that this morning she did not feel any better so decided to come to the ED.  Patient is still currently smoking, states that she smokes 2 to 8 cigarettes a day.  She denies any change in bowel or urinary habits, denies any chest pain, productive cough, headaches,      Continuous Infusions:    sodium chloride 25 mL/hr at 24 1220     PRN Meds: HYDROcodone-acetaminophen, tiZANidine, sodium chloride flush, sodium chloride, potassium chloride **OR** potassium alternative oral replacement **OR** potassium chloride, magnesium sulfate, ondansetron **OR** ondansetron, polyethylene glycol, acetaminophen **OR** acetaminophen, albuterol, midodrine, calcium carbonate, clonazePAM    Data:     Past Medical History:   has a past medical history of Acid reflux, Anxiety, Arthritis, Cancer (HCC), Cervical osteoarthritis, COPD (chronic obstructive pulmonary disease) (HCC), DDD (degenerative disc disease), cervical, Depression, Hiatal hernia, History of blood transfusion, On home O2, Spinal stenosis, Tachycardia, and Thyroid disease.    Social History:   reports that she has been smoking cigarettes. She has a 24.0 pack-year smoking history. She has never used smokeless tobacco. She reports current alcohol use. She reports that she does not use drugs.     Family History:   Family History   Problem Relation Age of Onset    Diabetes Father        Vitals:  /71   Pulse 78   Temp 98.8 °F (37.1 °C)   Resp 18   Ht 1.6 m (5' 3\")   Wt 52 kg (114 lb 10.2 oz)   SpO2 94%   BMI 20.31 kg/m²   Temp (24hrs), Av.6 °F (37 °C), Min:98 °F (36.7 °C), Max:99.3 °F (37.4 °C)    No results for input(s): \"POCGLU\" in the last 72 hours.    I/O(24Hr):  No intake or output data in the 24 hours ending 24 0857    Labs:    [unfilled]    Lab Results   Component Value Date/Time    SPECIAL NOT REPORTED 2020 02:47 PM     Lab Results   Component Value Date/Time    CULTURE NORMAL RESPIRATORY VINCE LIGHT GROWTH 2024 09:50 AM       [unfilled]    Radiology:    XR CHEST PORTABLE    Result Date: 3/31/2024  EXAMINATION: ONE XRAY VIEW OF THE CHEST 3/31/2024 8:50 am COMPARISON: Chest radiograph performed 2024. HISTORY: ORDERING SYSTEM PROVIDED HISTORY: cough TECHNOLOGIST PROVIDED HISTORY: cough

## 2024-04-02 NOTE — PROGRESS NOTES
..PALLIATIVE CARE TEAM    Patient: Laura Rhodes  Room: 2114/2114-01    Reason For Consult   Goals of care evaluation  Distress management  Symptom Management  Guidance and support  Facilitate communications  Assistance in coordinating care  Recommendations for the above    Impression: Laura Rhodes is a 63 y.o. year old female with  has a past medical history of Acid reflux, Anxiety, Arthritis, Cancer ([facility]), Cervical osteoarthritis, COPD (chronic obstructive pulmonary disease) ([facility]), DDD (degenerative disc disease), cervical, Depression, Hiatal hernia, History of blood transfusion, On home O2, Spinal stenosis, Tachycardia, and Thyroid disease..  Currently hospitalized for the management of COPD exacerbation.  The Palliative Care Team is following to assist with goals of care and support.     Code Status  DNR-CCA  PLAN:   - the patient is alert and talkative.   - she is winded as she was up in the shower, and is on oxygen per nasal cannula   - her plan is home with home care with [facility]   - she sees pain management for chronic pain   - will follow for goals of care and support  Vital Signs:  /73   Pulse 73   Temp 98.8 °F (37.1 °C)   Resp 18   Ht 1.6 m (5' 3\")   Wt 52 kg (114 lb 10.2 oz)   SpO2 96%   BMI 20.31 kg/m²     Patient Active Problem List   Diagnosis    Abdominal bloating    MDD (major depressive disorder), recurrent severe, without psychosis ([facility])    Panic disorder    Obsessive compulsive disorder    Irritable bowel syndrome with constipation    Chronic constipation    Esophageal dysphagia    Cervical osteoarthritis    Chronic bilateral low back pain without sciatica    COPD with acute exacerbation ([facility])    Lumbar foraminal stenosis    Acquired hypothyroidism    Compression fracture of L2 lumbar vertebra (HCC)    Essential hypertension    Essential tremor    Other osteoporosis with current pathological fracture, vertebra(e), initial encounter for fracture ([facility])    Fibromyalgia     Disorder of thyroid    Degeneration of intervertebral disc of lumbar region    COPD, severe (HCC)    On home oxygen therapy    History of psychiatric disorder    Left lumbar radiculitis    Lumbosacral spondylosis without myelopathy    COPD (chronic obstructive pulmonary disease) ([facility])    Severe malnutrition (HCC)    Oropharyngeal dysphagia    Throat irritation    Closed displaced intertrochanteric fracture of right femur ([facility])    Chronic respiratory failure with hypoxia ([facility])    Irregular heart beat    Laryngitis    COPD exacerbation ([facility])    Goals of care, counseling/discussion    DNR (do not resuscitate) discussion    ACP (advance care planning)    Palliative care encounter    Shortness of breath    Anxiety    Right hip pain    Acute on chronic respiratory failure ([facility])       Palliative Interaction:The patient is in bed and she has oxygen per nasal cannula. She is known to pallaitive care.     She tells me that she is going home with home care, and that she is seeing Dr. Briceño for her pain control. She states that she is happy with his care and that she is on Norco for her chronic pain. She states that she takes enough Norco to be able to move and function and that she feels a lot better.     She denies any needs at home. She has oxygen and Bipap at home.     She is a DNRCC-A no intubation.     I offer much emotional support and she is appreciative.     Will follow for goals of care and support.             Electronically signed by   Rehana Chao RN  Palliative Care Team  on 4/2/2024 at 4:03 PM

## 2024-04-02 NOTE — PROGRESS NOTES
[facility]   Occupational Therapy Evaluation  Date: 24  Patient Name: Laura Rhodes       Room: 4/2114-01  MRN: [id number]  Account: [id number]   : 1960  (63 y.o.) Gender: female     Discharge Recommendations:  The patient's needs are being met with no further Occupational Therapy recommended at discharge.     OT Equipment Recommendations  Equipment Needed: No    Referring Practitioner: Dr. Ashley  Diagnosis: Hypokalemia; hypomagnesia; COPD exacerbation; viral URI with cough     Past Medical History:  has a past medical history of Acid reflux, Anxiety, Arthritis, Cancer (HCC), Cervical osteoarthritis, COPD (chronic obstructive pulmonary disease) (HCC), DDD (degenerative disc disease), cervical, Depression, Hiatal hernia, History of blood transfusion, On home O2, Spinal stenosis, Tachycardia, and Thyroid disease.    Past Surgical History:   has a past surgical history that includes Upper gastrointestinal endoscopy; Thyroidectomy, partial; back surgery; other surgical history; Dilation and curettage of uterus; Colonoscopy (N/A, 2019); Colonoscopy (2019); Upper gastrointestinal endoscopy (N/A, 2020); Upper gastrointestinal endoscopy (N/A, 2022); Pain management procedure; and Femur fracture surgery (Right, 2023).    Restrictions  Restrictions/Precautions  Restrictions/Precautions: Fall Risk, General Precautions, Up as Tolerated  Required Braces or Orthoses?: No  Implants present? : Metal implants (R YAS)      Subjective  Subjective: \"That surprises me, usually my O2 sats don't go down like that, even when I am short of breath\"  Comments: Pt has SpO2 reading of 88% after walking length of room several times.  Pt took seated rest break at EOB and SpO2 returned to 97% after ~1 minute.  Pt also turned pulse-ox to other side of finger stating that she thinks her fingernail polish was causing the reading to be too low.  Subjective  Pain: Pt reports pain in

## 2024-04-02 NOTE — PROGRESS NOTES
Physical Therapy  Facility/Department: [facility] PROGRESSIVE CARE  Physical Therapy Initial Assessment    Name: Laura Rhodes  : 1960  MRN: [id number]  Date of Service: 2024    Discharge Recommendations:  Home with assist PRN, Home with Home health PT   PT Equipment Recommendations  Equipment Needed: No      Patient Diagnosis(es): The primary encounter diagnosis was COPD exacerbation (HCC). Diagnoses of Hypomagnesemia, Hypokalemia, and Viral URI with cough were also pertinent to this visit.  Past Medical History:  has a past medical history of Acid reflux, Anxiety, Arthritis, Cancer (HCC), Cervical osteoarthritis, COPD (chronic obstructive pulmonary disease) (HCC), DDD (degenerative disc disease), cervical, Depression, Hiatal hernia, History of blood transfusion, On home O2, Spinal stenosis, Tachycardia, and Thyroid disease.  Past Surgical History:  has a past surgical history that includes Upper gastrointestinal endoscopy; Thyroidectomy, partial; back surgery; other surgical history; Dilation and curettage of uterus; Colonoscopy (N/A, 2019); Colonoscopy (2019); Upper gastrointestinal endoscopy (N/A, 2020); Upper gastrointestinal endoscopy (N/A, 2022); Pain management procedure; and Femur fracture surgery (Right, 2023).    Assessment   Body Structures, Functions, Activity Limitations Requiring Skilled Therapeutic Intervention: Decreased functional mobility ;Decreased endurance  Assessment: Impaired mobility due to decreased tolerance to activity  Decision Making: Low Complexity  History: COPD  Exam: decreased endurance, mobility  Clinical Presentation: evolving  Barriers to Learning: none  Requires PT Follow-Up: Yes  Activity Tolerance  Activity Tolerance: Patient tolerated evaluation without incident;Patient limited by endurance  Activity Tolerance Comments: Initially pt on 2L O2 with SpO2 of 90% and ; Increased to 3L O2 for ambulation and initially SpO2 95% and ,

## 2024-04-02 NOTE — PROGRESS NOTES
[facility] PULMONARY,CRITICAL CARE & SLEEP   Saúl Cordero MD/Hieu Whitfield MD/ Christian Concepcion MD/Dr Lexie Castillo APRN AGACNP-BC, NP-C      Lizette Rivera APRN NP-C     Chantel Mathews APRN NP-C                                           Pulmonary Progress Note    Patient - Laura Rhodes   Age - 63 y.o.   - 1960  MRN - [id number]  [id number]  Date of Admission - 3/31/2024  8:06 AM    Consulting Service/Physician:       Primary Care Physician: Van Pate MD    SUBJECTIVE:     Chief Complaint:   Chief Complaint   Patient presents with    Shortness of Breath     Subjective:    Laura is seen lying in bed.  She states she is feeling about the same as yesterday.  She reported desaturating into the high 80s with activity today.  She states she is having trouble bringing up any secretions with her cough.  She has been afebrile.    VITALS  /73   Pulse 73   Temp 98.8 °F (37.1 °C)   Resp 18   Ht 1.6 m (5' 3\")   Wt 52 kg (114 lb 10.2 oz)   SpO2 97%   BMI 20.31 kg/m²   Wt Readings from Last 3 Encounters:   24 52 kg (114 lb 10.2 oz)   24 47 kg (103 lb 9.9 oz)   24 45.8 kg (101 lb)     I/O (24 Hours)  No intake or output data in the 24 hours ending 24 1122  Ventilator:   Settings  FiO2 : 40 %  Insp Rise Time (%): 2 %  Exam:   Physical Exam   Constitutional: Cachectic appearing elderly female lying in bed on 2 L  HENT: Unremarkable  Head: Normocephalic and atraumatic.   Eyes: EOM are normal. Pupils are equal, round, and reactive to light.   Neck: Neck supple.   Cardiovascular:  Regular rate and rhythm.  Normal heart tones.  No JVD.    Pulmonary/Chest: Respirations even and unlabored, severely diminished posteriorly, slightly less wheezing, on 2 L with pulse ox 96   Abdominal: Soft. Bowel sounds are normal.  Musculoskeletal: Normal range of motion.   Neurological: Patient is alert and oriented to person, place, and time.   Skin: Skin is warm  transcription system. Every effort was made to ensure accuracy. However, inadvertent computerized transcription errors may be present.    Chantel Mathews, NP-C, MSN  [facility] Pul[facility], Critical Care & Sleep

## 2024-04-03 LAB
ANION GAP SERPL CALCULATED.3IONS-SCNC: 7 MMOL/L (ref 9–17)
BASOPHILS # BLD: 0 K/UL (ref 0–0.2)
BASOPHILS NFR BLD: 0 % (ref 0–2)
BUN SERPL-MCNC: 10 MG/DL (ref 8–23)
CALCIUM SERPL-MCNC: 7.5 MG/DL (ref 8.6–10.4)
CHLORIDE SERPL-SCNC: 98 MMOL/L (ref 98–107)
CO2 SERPL-SCNC: 35 MMOL/L (ref 20–31)
CREAT SERPL-MCNC: <0.4 MG/DL (ref 0.5–0.9)
EOSINOPHIL # BLD: 0 K/UL (ref 0–0.4)
EOSINOPHILS RELATIVE PERCENT: 0 % (ref 0–4)
ERYTHROCYTE [DISTWIDTH] IN BLOOD BY AUTOMATED COUNT: 14.3 % (ref 11.5–14.9)
GFR SERPL CREATININE-BSD FRML MDRD: ABNORMAL ML/MIN/1.73M2
GLUCOSE SERPL-MCNC: 101 MG/DL (ref 70–99)
HCT VFR BLD AUTO: 34 % (ref 36–46)
HGB BLD-MCNC: 11.2 G/DL (ref 12–16)
LYMPHOCYTES NFR BLD: 0.6 K/UL (ref 1–4.8)
LYMPHOCYTES RELATIVE PERCENT: 10 % (ref 24–44)
MAGNESIUM SERPL-MCNC: 1.3 MG/DL (ref 1.6–2.6)
MAGNESIUM SERPL-MCNC: 2.7 MG/DL (ref 1.6–2.6)
MCH RBC QN AUTO: 31.9 PG (ref 26–34)
MCHC RBC AUTO-ENTMCNC: 33 G/DL (ref 31–37)
MCV RBC AUTO: 96.6 FL (ref 80–100)
MONOCYTES NFR BLD: 0.8 K/UL (ref 0.1–1.3)
MONOCYTES NFR BLD: 15 % (ref 1–7)
NEUTROPHILS NFR BLD: 75 % (ref 36–66)
NEUTS SEG NFR BLD: 4.2 K/UL (ref 1.3–9.1)
PLATELET # BLD AUTO: 358 K/UL (ref 150–450)
PMV BLD AUTO: 6.9 FL (ref 6–12)
POTASSIUM SERPL-SCNC: 3.2 MMOL/L (ref 3.7–5.3)
POTASSIUM SERPL-SCNC: 5 MMOL/L (ref 3.7–5.3)
RBC # BLD AUTO: 3.53 M/UL (ref 4–5.2)
SODIUM SERPL-SCNC: 140 MMOL/L (ref 135–144)
WBC OTHER # BLD: 5.6 K/UL (ref 3.5–11)

## 2024-04-03 PROCEDURE — 6360000002 HC RX W HCPCS

## 2024-04-03 PROCEDURE — 6370000000 HC RX 637 (ALT 250 FOR IP)

## 2024-04-03 PROCEDURE — 2580000003 HC RX 258: Performed by: NURSE PRACTITIONER

## 2024-04-03 PROCEDURE — 94761 N-INVAS EAR/PLS OXIMETRY MLT: CPT

## 2024-04-03 PROCEDURE — 6360000002 HC RX W HCPCS: Performed by: INTERNAL MEDICINE

## 2024-04-03 PROCEDURE — 94669 MECHANICAL CHEST WALL OSCILL: CPT

## 2024-04-03 PROCEDURE — 2700000000 HC OXYGEN THERAPY PER DAY

## 2024-04-03 PROCEDURE — 99232 SBSQ HOSP IP/OBS MODERATE 35: CPT | Performed by: INTERNAL MEDICINE

## 2024-04-03 PROCEDURE — 36415 COLL VENOUS BLD VENIPUNCTURE: CPT

## 2024-04-03 PROCEDURE — 6370000000 HC RX 637 (ALT 250 FOR IP): Performed by: NURSE PRACTITIONER

## 2024-04-03 PROCEDURE — 84132 ASSAY OF SERUM POTASSIUM: CPT

## 2024-04-03 PROCEDURE — 6370000000 HC RX 637 (ALT 250 FOR IP): Performed by: INTERNAL MEDICINE

## 2024-04-03 PROCEDURE — 2580000003 HC RX 258

## 2024-04-03 PROCEDURE — 6360000002 HC RX W HCPCS: Performed by: NURSE PRACTITIONER

## 2024-04-03 PROCEDURE — 83735 ASSAY OF MAGNESIUM: CPT

## 2024-04-03 PROCEDURE — 85025 COMPLETE CBC W/AUTO DIFF WBC: CPT

## 2024-04-03 PROCEDURE — 2060000000 HC ICU INTERMEDIATE R&B

## 2024-04-03 PROCEDURE — 80048 BASIC METABOLIC PNL TOTAL CA: CPT

## 2024-04-03 PROCEDURE — 94640 AIRWAY INHALATION TREATMENT: CPT

## 2024-04-03 PROCEDURE — 2500000003 HC RX 250 WO HCPCS

## 2024-04-03 RX ADMIN — MAGNESIUM SULFATE HEPTAHYDRATE 2000 MG: 40 INJECTION, SOLUTION INTRAVENOUS at 08:02

## 2024-04-03 RX ADMIN — SALINE NASAL SPRAY 2 SPRAY: 1.5 SOLUTION NASAL at 15:50

## 2024-04-03 RX ADMIN — Medication 60 MG: at 20:57

## 2024-04-03 RX ADMIN — WATER 40 MG: 1 INJECTION INTRAMUSCULAR; INTRAVENOUS; SUBCUTANEOUS at 10:01

## 2024-04-03 RX ADMIN — OMEPRAZOLE 40 MG: 40 CAPSULE, DELAYED RELEASE ORAL at 15:49

## 2024-04-03 RX ADMIN — TIOTROPIUM BROMIDE AND OLODATEROL 2 PUFF: 3.124; 2.736 SPRAY, METERED RESPIRATORY (INHALATION) at 07:06

## 2024-04-03 RX ADMIN — MONTELUKAST 10 MG: 10 TABLET, FILM COATED ORAL at 07:55

## 2024-04-03 RX ADMIN — CLONAZEPAM 0.5 MG: 0.5 TABLET ORAL at 18:02

## 2024-04-03 RX ADMIN — OMEPRAZOLE 40 MG: 40 CAPSULE, DELAYED RELEASE ORAL at 06:13

## 2024-04-03 RX ADMIN — VENLAFAXINE HYDROCHLORIDE 150 MG: 150 CAPSULE, EXTENDED RELEASE ORAL at 07:55

## 2024-04-03 RX ADMIN — FLUTICASONE PROPIONATE 2 SPRAY: 50 SPRAY, METERED NASAL at 07:51

## 2024-04-03 RX ADMIN — TIZANIDINE 4 MG: 4 TABLET ORAL at 20:56

## 2024-04-03 RX ADMIN — ALBUTEROL SULFATE 2.5 MG: 2.5 SOLUTION RESPIRATORY (INHALATION) at 10:45

## 2024-04-03 RX ADMIN — MAGNESIUM SULFATE HEPTAHYDRATE 2000 MG: 40 INJECTION, SOLUTION INTRAVENOUS at 10:05

## 2024-04-03 RX ADMIN — HYDROCODONE BITARTRATE AND ACETAMINOPHEN 1 TABLET: 7.5; 325 TABLET ORAL at 13:58

## 2024-04-03 RX ADMIN — POTASSIUM BICARBONATE 40 MEQ: 782 TABLET, EFFERVESCENT ORAL at 08:09

## 2024-04-03 RX ADMIN — SALINE NASAL SPRAY 2 SPRAY: 1.5 SOLUTION NASAL at 20:58

## 2024-04-03 RX ADMIN — SALINE NASAL SPRAY 2 SPRAY: 1.5 SOLUTION NASAL at 12:29

## 2024-04-03 RX ADMIN — ALBUTEROL SULFATE 2.5 MG: 2.5 SOLUTION RESPIRATORY (INHALATION) at 19:47

## 2024-04-03 RX ADMIN — ONDANSETRON 4 MG: 2 INJECTION INTRAMUSCULAR; INTRAVENOUS at 09:15

## 2024-04-03 RX ADMIN — BENZONATATE 200 MG: 200 CAPSULE ORAL at 15:49

## 2024-04-03 RX ADMIN — SODIUM CHLORIDE, PRESERVATIVE FREE 10 ML: 5 INJECTION INTRAVENOUS at 20:57

## 2024-04-03 RX ADMIN — POTASSIUM BICARBONATE 20 MEQ: 782 TABLET, EFFERVESCENT ORAL at 09:15

## 2024-04-03 RX ADMIN — CARVEDILOL 12.5 MG: 12.5 TABLET, FILM COATED ORAL at 06:19

## 2024-04-03 RX ADMIN — PRIMIDONE 100 MG: 50 TABLET ORAL at 20:56

## 2024-04-03 RX ADMIN — ENOXAPARIN SODIUM 40 MG: 100 INJECTION SUBCUTANEOUS at 07:54

## 2024-04-03 RX ADMIN — WATER 40 MG: 1 INJECTION INTRAMUSCULAR; INTRAVENOUS; SUBCUTANEOUS at 17:58

## 2024-04-03 RX ADMIN — BENZONATATE 200 MG: 200 CAPSULE ORAL at 07:55

## 2024-04-03 RX ADMIN — Medication 60 MG: at 07:50

## 2024-04-03 RX ADMIN — ALBUTEROL SULFATE 2.5 MG: 2.5 SOLUTION RESPIRATORY (INHALATION) at 15:10

## 2024-04-03 RX ADMIN — MIRTAZAPINE 45 MG: 30 TABLET, FILM COATED ORAL at 20:56

## 2024-04-03 RX ADMIN — ANTACID TABLETS 1000 MG: 500 TABLET, CHEWABLE ORAL at 18:02

## 2024-04-03 RX ADMIN — CLONAZEPAM 0.5 MG: 0.5 TABLET ORAL at 06:07

## 2024-04-03 RX ADMIN — AMLODIPINE BESYLATE 5 MG: 5 TABLET ORAL at 20:57

## 2024-04-03 RX ADMIN — WATER 40 MG: 1 INJECTION INTRAMUSCULAR; INTRAVENOUS; SUBCUTANEOUS at 01:44

## 2024-04-03 RX ADMIN — ALBUTEROL SULFATE 2.5 MG: 2.5 SOLUTION RESPIRATORY (INHALATION) at 06:59

## 2024-04-03 RX ADMIN — BENZONATATE 200 MG: 200 CAPSULE ORAL at 20:57

## 2024-04-03 RX ADMIN — LEVOTHYROXINE SODIUM 75 MCG: 0.07 TABLET ORAL at 06:08

## 2024-04-03 RX ADMIN — SODIUM CHLORIDE: 9 INJECTION, SOLUTION INTRAVENOUS at 10:10

## 2024-04-03 RX ADMIN — SALINE NASAL SPRAY 2 SPRAY: 1.5 SOLUTION NASAL at 07:51

## 2024-04-03 RX ADMIN — SODIUM CHLORIDE, PRESERVATIVE FREE 10 ML: 5 INJECTION INTRAVENOUS at 07:50

## 2024-04-03 ASSESSMENT — ENCOUNTER SYMPTOMS
NAUSEA: 0
BACK PAIN: 1
DIARRHEA: 0
WHEEZING: 1
CHEST TIGHTNESS: 1
SHORTNESS OF BREATH: 1
ABDOMINAL PAIN: 0
VOMITING: 0
CONSTIPATION: 0
RHINORRHEA: 1
SINUS PRESSURE: 1

## 2024-04-03 ASSESSMENT — PAIN DESCRIPTION - ONSET: ONSET: ON-GOING

## 2024-04-03 ASSESSMENT — PAIN SCALES - GENERAL
PAINLEVEL_OUTOF10: 6
PAINLEVEL_OUTOF10: 8

## 2024-04-03 ASSESSMENT — PAIN DESCRIPTION - LOCATION: LOCATION: BACK;HIP

## 2024-04-03 ASSESSMENT — PAIN DESCRIPTION - FREQUENCY: FREQUENCY: CONTINUOUS

## 2024-04-03 ASSESSMENT — PAIN DESCRIPTION - PAIN TYPE: TYPE: ACUTE PAIN;CHRONIC PAIN

## 2024-04-03 ASSESSMENT — PAIN DESCRIPTION - ORIENTATION: ORIENTATION: LOWER

## 2024-04-03 ASSESSMENT — PAIN - FUNCTIONAL ASSESSMENT: PAIN_FUNCTIONAL_ASSESSMENT: ACTIVITIES ARE NOT PREVENTED

## 2024-04-03 ASSESSMENT — PAIN DESCRIPTION - DESCRIPTORS: DESCRIPTORS: DISCOMFORT

## 2024-04-03 NOTE — CARE COORDINATION
ONGOING DISCHARGE PLAN:    DNRCCA no intubation     Patient is alert and oriented x4.    Spoke with patient regarding discharge plan and patient confirms that plan is still to go home with spouse.     DME: SC, GB, bipap thru [employer], Oxygen 2lpm NC 24/7 (conc) thru [facility], neb, walker    VNS: [facility]     IV solumedrol 40 mg Q8    Mag 1.3 K 3.2     Will continue to follow for additional discharge needs.    If patient is discharged prior to next notation, then this note serves as note for discharge by case management.    Electronically signed by Sara Shannon RN on 4/3/2024 at 1:56 PM

## 2024-04-03 NOTE — PROGRESS NOTES
[facility] PULMONARY,CRITICAL CARE & SLEEP   Saúl Cordero MD/Hieu Whitfield MD/ Christian Concepcion MD/Dr Lexie Castillo APRN AGACNP-BC, NP-C      Lizette Rivera APRN NP-C     Chantel Mathews APRN NP-C                                           Pulmonary Progress Note    Patient - Laura Rohdes   Age - 63 y.o.   - 1960  MRN - [id number]  [facility] # - [id number]  Date of Admission - 3/31/2024  8:06 AM    Consulting Service/Physician:       Primary Care Physician: Van Pate MD    SUBJECTIVE:     Chief Complaint:   Chief Complaint   Patient presents with    Shortness of Breath     Subjective:    Laura is seen lying in bed.  She tells me she is feeling slightly better but still not at her baseline.  She still has dyspnea with any exertion.  She has been afebrile.  Sputum culture negative.    VITALS  /66   Pulse 57   Temp 98 °F (36.7 °C) (Oral)   Resp 16   Ht 1.6 m (5' 3\")   Wt 52.4 kg (115 lb 8.3 oz)   SpO2 98%   BMI 20.46 kg/m²   Wt Readings from Last 3 Encounters:   24 52.4 kg (115 lb 8.3 oz)   24 47 kg (103 lb 9.9 oz)   24 45.8 kg (101 lb)     I/O (24 Hours)  No intake or output data in the 24 hours ending 24 1337  Ventilator:   Settings  FiO2 : 40 %  Insp Rise Time (%): 2 %  Exam:   Physical Exam   Constitutional: Cachectic appearing elderly female lying in bed on 3 L  HENT: Unremarkable  Head: Normocephalic and atraumatic.   Eyes: EOM are normal. Pupils are equal, round, and reactive to light.   Neck: Neck supple.   Cardiovascular:  Regular rate and rhythm.  Normal heart tones.  No JVD.    Pulmonary/Chest: Respirations even and unlabored, severely diminished posteriorly, on 3 L with pulse ox 96   Abdominal: Soft. Bowel sounds are normal.  Musculoskeletal: Normal range of motion.   Neurological: Patient is alert and oriented to person, place, and time.   Skin: Skin is warm and dry. No rash noted.   Extremities: No edema or  smoking cessation        Electronically signed by NATHAN Rowell CNP on 04/03/24     This progress note was completed using a voice transcription system. Every effort was made to ensure accuracy. However, inadvertent computerized transcription errors may be present.    Chantel Mathews, NP-C, MSN  [facility] Pulmonary, Critical Care & Sleep

## 2024-04-03 NOTE — PROGRESS NOTES
Patient uncomfortable and restless; patient also realistic about her medical circumstances; patient's tess is a major part of her coping with this reality; grateful for prayer support and listening presence;     04/03/24 1957   Encounter Summary   Encounter Overview/Reason  Spiritual/Emotional Needs   Service Provided For: Patient   Referral/Consult From: Palliative Care   Last Encounter  04/03/24   Complexity of Encounter Moderate   Spiritual/Emotional needs   Type Spiritual Support   Grief, Loss, and Adjustments   Type Adjustment to illness   Palliative Care   Type Palliative Care, Follow-up   Assessment/Intervention/Outcome   Assessment Coping;Hopeful;Impaired resilience;Powerlessness   Intervention Discussed illness injury and it’s impact;Explored/Affirmed feelings, thoughts, concerns;Prayer (assurance of)/[place];Sustaining Presence/Ministry of presence   Outcome Coping;Engaged in conversation;Expressed feelings, needs, and concerns;Expressed Gratitude;Receptive

## 2024-04-03 NOTE — PROGRESS NOTES
[facility]  IN-PATIENT SERVICE  [facility]    PROGRESS NOTE             4/3/2024    8:26 AM    Name:   Laura Rhodes  MRN:     [id number]     Acct:      [id number]   Room:   2114/2114-01   Day:  3  Admit Date:  3/31/2024  8:06 AM    PCP:  Van Pate MD  Code Status:  DNR-CCA    Subjective:     C/C:   Chief Complaint   Patient presents with    Shortness of Breath     Interval History Status: unchanged    Patient seen and examined at bedside this morning. No acute events overnight.  Patient continues to endorse congestion, chest tightness, shortness of breath with exertion.  States she does not want her Lasix because she only takes it as needed.  States that she was desaturating into the 80s during physical therapy yesterday.  Interested in having imaging of her sinuses.  States that she had some nausea yesterday while eating, no vomiting.  Solu-Medrol 40 mg reduced to every 8 hours now. She denies any fever, chills, chest pain, abdominal pain.    Magnesium and potassium low this morning, will plan to replace IV.    Brief History:     63-year-old female with stage IV COPD and chronic hypoxic respiratory failure on home O2 of 2 to 3 L 24 hours a day, hypertension and hypothyroidism presented with complaints of shortness of breath and wheezing.  Patient states that last week she started having symptoms of rhinorrhea, postnasal discharge and a mild cough.  She did not have fevers or chills at the time but her chest began to feel congested so she decided to have some left over Decadron that she had.  Patient states that she was using her breathing treatments but they were not making things better.  She reports having feelings of fevers and chills last night.  States that this morning she did not feel any better so decided to come to the ED.  Patient is still currently smoking, states that she smokes 2 to 8 cigarettes a day.  She denies any change in bowel or  AM       Attending Physician Statement  I have discussed the care of Laura Rhodes with the resident team. I have examined the patient myself and taken ros and hpi , including pertinent history and exam findings,  with the resident. I have reviewed the key elements of all parts of the encounter with the resident.  I agree with the assessment, plan and orders as documented by the resident.    Principal Problem:    COPD exacerbation (HCC)  Active Problems:    Acute on chronic respiratory failure (HCC)  Resolved Problems:    * No resolved hospital problems. *    Patient appears somewhat better today but reports that she is not at baseline yet  She remains on 2 L nasal cannula oxygen  No wheeze heard on lung exam today  Patient subjectively reports significant shortness of breath.  Has not worked with therapy today  Will await pulmonology recommendations  Magnesium and potassium replacement today  Discharge planning.      Electronically signed by Alejandra Lees MD

## 2024-04-03 NOTE — PROGRESS NOTES
Physician Progress Note      PATIENT:               NABOR SOLIS  CSN #:                  [id number]  :                       1960  ADMIT DATE:       3/31/2024 8:06 AM  DISCH DATE:  RESPONDING  PROVIDER #:        Alejandra Lees MD          QUERY TEXT:    Patient admitted with COPD exacerbation.   Noted documentation of Acute   Respiratory Failure in IM PN - and Chronic hypoxic respiratory failure in   Pulmonology PN -.  If possible, please document in progress notes and discharge summary if you   are evaluating and /or treating any of the following:    The medical record reflects the following:  Risk Factors: COPD with exacerbation, Chronic hypoxic respiratory failure,   Smoker  Clinical Indicators: Per H&P-SOB likely 2/2 to COPD exacerbation- Acute on   chronic hypoxic respiratory failure, IM PN - -Acute on chronic hypoxic   respiratory failure 2/2 COPD exacerbation, No respiratory distress, Shortness   of Breath,  Pulmonology PN - -Chronic hypoxic respiratory failure on 2 to 3 L nasal   cannula,  SpO2-92%, 93%,  RR 20, 22  ABG: pH 7.422, pCO2 59.2, pO2 76.6, HCO3 38.4  Treatment: 2 L NC, ABG.      Thank you,  Hiwot Gomez [facility] CDS  Options provided:  -- Acute Respiratory Failure confirmed, as evidenced by, Please document   evidence.  -- Acute Respiratory Failure ruled out after study and Chronic Respiratory   Failure confirmed  -- Other - I will add my own diagnosis  -- Disagree - Not applicable / Not valid  -- Disagree - Clinically unable to determine / Unknown  -- Refer to Clinical Documentation Reviewer    PROVIDER RESPONSE TEXT:    Acute Respiratory Failure ruled out after study and Chronic Respiratory   Failure confirmed.    Query created by: Hiwot Gomez on 4/3/2024 4:12 AM      Electronically signed by:  Alejandra Lees MD 4/3/2024 3:37 PM

## 2024-04-03 NOTE — PLAN OF CARE
Problem: Discharge Planning  Goal: Discharge to home or other facility with appropriate resources  4/3/2024 1604 by Gregoria Caceres RN  Outcome: Progressing  Note: Planning on going home in the next 24-48 hours     Problem: Pain  Goal: Verbalizes/displays adequate comfort level or baseline comfort level  4/3/2024 1604 by Gregoria Caceres RN  Outcome: Progressing  Flowsheets (Taken 4/3/2024 1604)  Verbalizes/displays adequate comfort level or baseline comfort level:   Encourage patient to monitor pain and request assistance   Assess pain using appropriate pain scale   Administer analgesics based on type and severity of pain and evaluate response   Notify Licensed Independent Practitioner if interventions unsuccessful or patient reports new pain   Implement non-pharmacological measures as appropriate and evaluate response     Problem: Safety - Adult  Goal: Free from fall injury  4/3/2024 1604 by Gregoria Caceres RN  Outcome: Progressing     Problem: ABCDS Injury Assessment  Goal: Absence of physical injury  4/3/2024 1604 by Gregoria Caceres RN  Outcome: Progressing     Problem: Anxiety  Goal: Will report anxiety at manageable levels  Description: INTERVENTIONS:  1. Administer medication as ordered  2. Teach and rehearse alternative coping skills  3. Provide emotional support with 1:1 interaction with staff  4/3/2024 1604 by Gregoria Caceres RN  Outcome: Progressing     Problem: Coping  Goal: Pt/Family able to verbalize concerns and demonstrate effective coping strategies  Description: INTERVENTIONS:  1. Assist patient/family to identify coping skills, available support systems and cultural and spiritual values  2. Provide emotional support, including active listening and acknowledgement of concerns of patient and caregivers  3. Reduce environmental stimuli, as able  4. Instruct patient/family in relaxation techniques, as appropriate  5. Assess for spiritual pain/suffering and initiate Spiritual Care, Psychosocial

## 2024-04-03 NOTE — PLAN OF CARE
oxygenation  4/3/2024 0130 by Monica Wang  Outcome: Progressing  Note: Oxygen NC at 2L.   4/2/2024 1426 by Melita Anderson RN  Outcome: Progressing     Problem: Infection - Adult  Goal: Absence of infection at discharge  Outcome: Progressing  Goal: Absence of infection during hospitalization  Outcome: Progressing  Goal: Absence of fever/infection during anticipated neutropenic period  Outcome: Progressing     Problem: Metabolic/Fluid and Electrolytes - Adult  Goal: Electrolytes maintained within normal limits  Outcome: Progressing  Goal: Hemodynamic stability and optimal renal function maintained  Outcome: Progressing     Problem: Nutrition Deficit:  Goal: Optimize nutritional status  Outcome: Progressing

## 2024-04-03 NOTE — PLAN OF CARE
Problem: Discharge Planning  Goal: Discharge to home or other facility with appropriate resources  4/3/2024 0407 by Monica Wang  Outcome: Progressing     Problem: Pain  Goal: Verbalizes/displays adequate comfort level or baseline comfort level  4/3/2024 0407 by Monica Wang  Outcome: Progressing  Note: Patient complained of neck and back pain around 8pm. Medication with Zanaflex which provided relief.    Problem: Safety - Adult  Goal: Free from fall injury  4/3/2024 0407 by Monica Wang  Outcome: Progressing  Note: Bed alarm on. Using call light appropriately. Standby assist to bathroom.     Problem: ABCDS Injury Assessment  Goal: Absence of physical injury  4/3/2024 0407 by Monica Wang  Outcome: Progressing     Problem: Anxiety  Goal: Will report anxiety at manageable levels  Description: INTERVENTIONS:  1. Administer medication as ordered  2. Teach and rehearse alternative coping skills  3. Provide emotional support with 1:1 interaction with staff  4/3/2024 0407 by Monica Wang  Outcome: Progressing     Problem: Coping  Goal: Pt/Family able to verbalize concerns and demonstrate effective coping strategies  Description: INTERVENTIONS:  1. Assist patient/family to identify coping skills, available support systems and cultural and spiritual values  2. Provide emotional support, including active listening and acknowledgement of concerns of patient and caregivers  3. Reduce environmental stimuli, as able  4. Instruct patient/family in relaxation techniques, as appropriate  5. Assess for spiritual pain/suffering and initiate Spiritual Care, Psychosocial Clinical Specialist consults as needed  4/3/2024 0407 by Monica Wang  Outcome: Progressing     Problem: Respiratory - Adult  Goal: Achieves optimal ventilation and oxygenation  4/3/2024 0130 by Monica Wang  Outcome: Progressing  Note: Oxygen NC at 2L.     Problem: Infection - Adult  Goal: Absence of infection at discharge  4/3/2024 0407 by

## 2024-04-04 LAB
ANION GAP SERPL CALCULATED.3IONS-SCNC: 7 MMOL/L (ref 9–17)
BASOPHILS # BLD: 0 K/UL (ref 0–0.2)
BASOPHILS NFR BLD: 0 % (ref 0–2)
BUN SERPL-MCNC: 9 MG/DL (ref 8–23)
CALCIUM SERPL-MCNC: 7.5 MG/DL (ref 8.6–10.4)
CHLORIDE SERPL-SCNC: 99 MMOL/L (ref 98–107)
CO2 SERPL-SCNC: 34 MMOL/L (ref 20–31)
CREAT SERPL-MCNC: 0.5 MG/DL (ref 0.5–0.9)
EOSINOPHIL # BLD: 0 K/UL (ref 0–0.4)
EOSINOPHILS RELATIVE PERCENT: 0 % (ref 0–4)
ERYTHROCYTE [DISTWIDTH] IN BLOOD BY AUTOMATED COUNT: 14.1 % (ref 11.5–14.9)
GFR SERPL CREATININE-BSD FRML MDRD: >90 ML/MIN/1.73M2
GLUCOSE SERPL-MCNC: 102 MG/DL (ref 70–99)
HCT VFR BLD AUTO: 35.5 % (ref 36–46)
HGB BLD-MCNC: 11.6 G/DL (ref 12–16)
LYMPHOCYTES NFR BLD: 0.7 K/UL (ref 1–4.8)
LYMPHOCYTES RELATIVE PERCENT: 10 % (ref 24–44)
MCH RBC QN AUTO: 31.7 PG (ref 26–34)
MCHC RBC AUTO-ENTMCNC: 32.8 G/DL (ref 31–37)
MCV RBC AUTO: 96.9 FL (ref 80–100)
MONOCYTES NFR BLD: 0.9 K/UL (ref 0.1–1.3)
MONOCYTES NFR BLD: 12 % (ref 1–7)
NEUTROPHILS NFR BLD: 78 % (ref 36–66)
NEUTS SEG NFR BLD: 5.6 K/UL (ref 1.3–9.1)
PLATELET # BLD AUTO: 398 K/UL (ref 150–450)
PMV BLD AUTO: 6.8 FL (ref 6–12)
POTASSIUM SERPL-SCNC: 4.7 MMOL/L (ref 3.7–5.3)
RBC # BLD AUTO: 3.66 M/UL (ref 4–5.2)
SODIUM SERPL-SCNC: 140 MMOL/L (ref 135–144)
WBC OTHER # BLD: 7.1 K/UL (ref 3.5–11)

## 2024-04-04 PROCEDURE — 6370000000 HC RX 637 (ALT 250 FOR IP): Performed by: INTERNAL MEDICINE

## 2024-04-04 PROCEDURE — 36415 COLL VENOUS BLD VENIPUNCTURE: CPT

## 2024-04-04 PROCEDURE — 97530 THERAPEUTIC ACTIVITIES: CPT

## 2024-04-04 PROCEDURE — 2060000000 HC ICU INTERMEDIATE R&B

## 2024-04-04 PROCEDURE — 6370000000 HC RX 637 (ALT 250 FOR IP)

## 2024-04-04 PROCEDURE — 2580000003 HC RX 258

## 2024-04-04 PROCEDURE — 80048 BASIC METABOLIC PNL TOTAL CA: CPT

## 2024-04-04 PROCEDURE — 6360000002 HC RX W HCPCS: Performed by: NURSE PRACTITIONER

## 2024-04-04 PROCEDURE — 2700000000 HC OXYGEN THERAPY PER DAY

## 2024-04-04 PROCEDURE — 6360000002 HC RX W HCPCS: Performed by: INTERNAL MEDICINE

## 2024-04-04 PROCEDURE — 94761 N-INVAS EAR/PLS OXIMETRY MLT: CPT

## 2024-04-04 PROCEDURE — 85025 COMPLETE CBC W/AUTO DIFF WBC: CPT

## 2024-04-04 PROCEDURE — 97116 GAIT TRAINING THERAPY: CPT

## 2024-04-04 PROCEDURE — 99233 SBSQ HOSP IP/OBS HIGH 50: CPT | Performed by: INTERNAL MEDICINE

## 2024-04-04 PROCEDURE — 2580000003 HC RX 258: Performed by: NURSE PRACTITIONER

## 2024-04-04 PROCEDURE — 6370000000 HC RX 637 (ALT 250 FOR IP): Performed by: NURSE PRACTITIONER

## 2024-04-04 PROCEDURE — 94640 AIRWAY INHALATION TREATMENT: CPT

## 2024-04-04 RX ORDER — PREDNISONE 10 MG/1
TABLET ORAL
Qty: 75 TABLET | Refills: 0 | Status: SHIPPED | OUTPATIENT
Start: 2024-04-05 | End: 2024-05-20

## 2024-04-04 RX ORDER — PREDNISONE 20 MG/1
40 TABLET ORAL DAILY
Status: DISCONTINUED | OUTPATIENT
Start: 2024-04-05 | End: 2024-04-05 | Stop reason: HOSPADM

## 2024-04-04 RX ADMIN — GUAIFENESIN 600 MG: 600 TABLET, EXTENDED RELEASE ORAL at 08:48

## 2024-04-04 RX ADMIN — HYDROCODONE BITARTRATE AND ACETAMINOPHEN 1 TABLET: 7.5; 325 TABLET ORAL at 12:45

## 2024-04-04 RX ADMIN — SODIUM CHLORIDE, PRESERVATIVE FREE 10 ML: 5 INJECTION INTRAVENOUS at 08:48

## 2024-04-04 RX ADMIN — ANTACID TABLETS 1000 MG: 500 TABLET, CHEWABLE ORAL at 08:57

## 2024-04-04 RX ADMIN — SALINE NASAL SPRAY 2 SPRAY: 1.5 SOLUTION NASAL at 16:06

## 2024-04-04 RX ADMIN — VENLAFAXINE HYDROCHLORIDE 150 MG: 150 CAPSULE, EXTENDED RELEASE ORAL at 08:48

## 2024-04-04 RX ADMIN — MIRTAZAPINE 45 MG: 30 TABLET, FILM COATED ORAL at 20:33

## 2024-04-04 RX ADMIN — CARVEDILOL 12.5 MG: 12.5 TABLET, FILM COATED ORAL at 18:25

## 2024-04-04 RX ADMIN — SALINE NASAL SPRAY 2 SPRAY: 1.5 SOLUTION NASAL at 08:47

## 2024-04-04 RX ADMIN — SODIUM CHLORIDE, PRESERVATIVE FREE 10 ML: 5 INJECTION INTRAVENOUS at 20:36

## 2024-04-04 RX ADMIN — TIZANIDINE 4 MG: 4 TABLET ORAL at 20:33

## 2024-04-04 RX ADMIN — CLONAZEPAM 0.5 MG: 0.5 TABLET ORAL at 06:56

## 2024-04-04 RX ADMIN — SALINE NASAL SPRAY 2 SPRAY: 1.5 SOLUTION NASAL at 12:44

## 2024-04-04 RX ADMIN — BENZONATATE 200 MG: 200 CAPSULE ORAL at 20:33

## 2024-04-04 RX ADMIN — PRIMIDONE 100 MG: 50 TABLET ORAL at 20:34

## 2024-04-04 RX ADMIN — ALBUTEROL SULFATE 2.5 MG: 2.5 SOLUTION RESPIRATORY (INHALATION) at 07:47

## 2024-04-04 RX ADMIN — OMEPRAZOLE 40 MG: 40 CAPSULE, DELAYED RELEASE ORAL at 06:56

## 2024-04-04 RX ADMIN — Medication 60 MG: at 08:47

## 2024-04-04 RX ADMIN — WATER 40 MG: 1 INJECTION INTRAMUSCULAR; INTRAVENOUS; SUBCUTANEOUS at 16:07

## 2024-04-04 RX ADMIN — BENZONATATE 200 MG: 200 CAPSULE ORAL at 08:47

## 2024-04-04 RX ADMIN — FLUTICASONE PROPIONATE 2 SPRAY: 50 SPRAY, METERED NASAL at 08:47

## 2024-04-04 RX ADMIN — CARVEDILOL 12.5 MG: 12.5 TABLET, FILM COATED ORAL at 06:59

## 2024-04-04 RX ADMIN — LEVOTHYROXINE SODIUM 75 MCG: 0.07 TABLET ORAL at 06:56

## 2024-04-04 RX ADMIN — MONTELUKAST 10 MG: 10 TABLET, FILM COATED ORAL at 08:48

## 2024-04-04 RX ADMIN — BENZONATATE 200 MG: 200 CAPSULE ORAL at 16:06

## 2024-04-04 RX ADMIN — ALBUTEROL SULFATE 2.5 MG: 2.5 SOLUTION RESPIRATORY (INHALATION) at 19:57

## 2024-04-04 RX ADMIN — SALINE NASAL SPRAY 2 SPRAY: 1.5 SOLUTION NASAL at 20:36

## 2024-04-04 RX ADMIN — CLONAZEPAM 0.5 MG: 0.5 TABLET ORAL at 16:06

## 2024-04-04 RX ADMIN — WATER 40 MG: 1 INJECTION INTRAMUSCULAR; INTRAVENOUS; SUBCUTANEOUS at 08:48

## 2024-04-04 RX ADMIN — AMLODIPINE BESYLATE 5 MG: 5 TABLET ORAL at 20:34

## 2024-04-04 RX ADMIN — OMEPRAZOLE 40 MG: 40 CAPSULE, DELAYED RELEASE ORAL at 16:06

## 2024-04-04 RX ADMIN — ENOXAPARIN SODIUM 40 MG: 100 INJECTION SUBCUTANEOUS at 08:48

## 2024-04-04 RX ADMIN — ANTACID TABLETS 1000 MG: 500 TABLET, CHEWABLE ORAL at 20:43

## 2024-04-04 RX ADMIN — WATER 40 MG: 1 INJECTION INTRAMUSCULAR; INTRAVENOUS; SUBCUTANEOUS at 02:43

## 2024-04-04 RX ADMIN — ALBUTEROL SULFATE 2.5 MG: 2.5 SOLUTION RESPIRATORY (INHALATION) at 11:26

## 2024-04-04 RX ADMIN — GUAIFENESIN 600 MG: 600 TABLET, EXTENDED RELEASE ORAL at 20:34

## 2024-04-04 RX ADMIN — HYDROCODONE BITARTRATE AND ACETAMINOPHEN 1 TABLET: 7.5; 325 TABLET ORAL at 20:34

## 2024-04-04 RX ADMIN — Medication 60 MG: at 20:34

## 2024-04-04 RX ADMIN — ALBUTEROL SULFATE 2.5 MG: 2.5 SOLUTION RESPIRATORY (INHALATION) at 15:16

## 2024-04-04 RX ADMIN — TIOTROPIUM BROMIDE AND OLODATEROL 2 PUFF: 3.124; 2.736 SPRAY, METERED RESPIRATORY (INHALATION) at 07:57

## 2024-04-04 ASSESSMENT — ENCOUNTER SYMPTOMS
VOMITING: 0
SHORTNESS OF BREATH: 1
CONSTIPATION: 0
NAUSEA: 0
ABDOMINAL PAIN: 0
BACK PAIN: 1
RHINORRHEA: 1

## 2024-04-04 ASSESSMENT — PAIN SCALES - GENERAL
PAINLEVEL_OUTOF10: 7
PAINLEVEL_OUTOF10: 6
PAINLEVEL_OUTOF10: 8
PAINLEVEL_OUTOF10: 8
PAINLEVEL_OUTOF10: 6

## 2024-04-04 ASSESSMENT — PAIN - FUNCTIONAL ASSESSMENT: PAIN_FUNCTIONAL_ASSESSMENT: ACTIVITIES ARE NOT PREVENTED

## 2024-04-04 ASSESSMENT — PAIN DESCRIPTION - LOCATION
LOCATION: BACK;HIP
LOCATION: BACK
LOCATION: BACK;ABDOMEN;HIP

## 2024-04-04 ASSESSMENT — PAIN DESCRIPTION - DESCRIPTORS: DESCRIPTORS: DISCOMFORT

## 2024-04-04 ASSESSMENT — PAIN DESCRIPTION - ORIENTATION
ORIENTATION: RIGHT;LOWER
ORIENTATION: LOWER

## 2024-04-04 ASSESSMENT — PAIN DESCRIPTION - PAIN TYPE
TYPE: CHRONIC PAIN;ACUTE PAIN
TYPE: ACUTE PAIN;CHRONIC PAIN

## 2024-04-04 ASSESSMENT — PAIN DESCRIPTION - ONSET: ONSET: ON-GOING

## 2024-04-04 ASSESSMENT — PAIN DESCRIPTION - FREQUENCY: FREQUENCY: CONTINUOUS

## 2024-04-04 NOTE — PROGRESS NOTES
Comprehensive Nutrition Assessment    Type and Reason for Visit:  Reassess    Nutrition Recommendations/Plan:   Continue diet and supplements as ordered.     Malnutrition Assessment:  Malnutrition Status:  At risk for malnutrition (Comment) (04/01/24 4273)    Context:  Acute Illness (acute on chronic)     Findings of the 6 clinical characteristics of malnutrition:  Energy Intake:  Unable to assess  Weight Loss:   (pt very frail)     Body Fat Loss:  Unable to assess     Muscle Mass Loss:  Unable to assess    Fluid Accumulation:  No significant fluid accumulation     Strength:  Not Performed    Nutrition Assessment:    Pt continues to eat less than 50% with plan to discharge home tomorrow.    Nutrition Related Findings:    No edema. Severe COPD, active smoker. Labs & meds reviewed.         Current Nutrition Intake & Therapies:    Average Meal Intake: 1-25%, 26-50%  Average Supplements Intake: Unable to assess  ADULT DIET; Regular  ADULT ORAL NUTRITION SUPPLEMENT; Breakfast, Lunch, Dinner; Standard High Calorie/High Protein Oral Supplement    Anthropometric Measures:  Height: 160 cm (5' 3\")  Ideal Body Weight (IBW): 115 lbs (52 kg)    Admission Body Weight: 45.4 kg (100 lb)  Current Body Weight: 52.2 kg (115 lb), 87 % IBW. Weight Source: Bed Scale  Current BMI (kg/m2): 20.4  Usual Body Weight: 49 kg (108 lb) (2-28-24)  % Weight Change (Calculated): -7.4                    BMI Categories: Normal Weight (BMI 18.5-24.9)    Estimated Daily Nutrient Needs:  Energy Requirements Based On: Kcal/kg  Weight Used for Energy Requirements: Admission  Energy (kcal/day): 2503-6911 kcals based on 35-37 kcals/kg  Weight Used for Protein Requirements: Admission  Protein (g/day): 54-59 gm protein based on 1.2-1.3 gm/kg  Method Used for Fluid Requirements: 1 ml/kcal      Nutrition Diagnosis:   Underweight related to other (comment) (Pulmonary Cachexia) as evidenced by BMI, intake 0-25%, intake 26-50%    Nutrition Interventions:   Food

## 2024-04-04 NOTE — PROGRESS NOTES
Patient not wanting to discharge due to not having portable oxygen. She understands this is appealing her DC.   Notified the residents

## 2024-04-04 NOTE — PROGRESS NOTES
[facility]  IN-PATIENT SERVICE  [facility]    PROGRESS NOTE             4/4/2024    10:36 AM    Name:   Laura Rhodes  MRN:     [id number]     Acct:      [id number]   Room:   2114/2114-01   Day:  4  Admit Date:  3/31/2024  8:06 AM    PCP:  Van Pate MD  Code Status:  DNR-CCA    Subjective:     C/C:   Chief Complaint   Patient presents with    Shortness of Breath     Interval History Status: improved    Patient seen and examined at bedside this morning. No acute events overnight.  Patient states that she feels much better today in terms of her breathing, chest tightness, also states that her diarrhea and nausea has resolved.  She denies any fever, chills, chest pain, abdominal pain. However, states that she does not feel ready for discharge today, states that she would like to wait 1 more day and that her  is working until late this evening and therefore she does not have transportation.     Will plan to transition to oral prednisone today, will await recommendations from pulmonology.    Brief History:     63-year-old female with stage IV COPD and chronic hypoxic respiratory failure on home O2 of 2 to 3 L 24 hours a day, hypertension and hypothyroidism presented with complaints of shortness of breath and wheezing.  Patient states that last week she started having symptoms of rhinorrhea, postnasal discharge and a mild cough.  She did not have fevers or chills at the time but her chest began to feel congested so she decided to have some left over Decadron that she had.  Patient states that she was using her breathing treatments but they were not making things better.  She reports having feelings of fevers and chills last night.  States that this morning she did not feel any better so decided to come to the ED.  Patient is still currently smoking, states that she smokes 2 to 8 cigarettes a day.  She denies any change in bowel or urinary habits, denies  Transitional Year Resident  4/4/2024 10:36 AM

## 2024-04-04 NOTE — PROGRESS NOTES
Home Oxygen Evaluation    Home Oxygen Evaluation completed.    Patient is on 2 liters per minute via nasal cannula.  Resting SpO2 = 99%  Resting SpO2 on room air = 92%    SpO2 on room air with exercise = 85%  SpO2 on oxygen as above with exercise = 90%

## 2024-04-04 NOTE — PLAN OF CARE
Problem: Discharge Planning  Goal: Discharge to home or other facility with appropriate resources  4/4/2024 1516 by Gregoria Caceres RN  Outcome: Progressing  Note: Discharge tomorrow home     Problem: Pain  Goal: Verbalizes/displays adequate comfort level or baseline comfort level  4/4/2024 1516 by Gregoria Caceres RN  Outcome: Progressing  Flowsheets (Taken 4/4/2024 1516)  Verbalizes/displays adequate comfort level or baseline comfort level:   Encourage patient to monitor pain and request assistance   Assess pain using appropriate pain scale   Administer analgesics based on type and severity of pain and evaluate response   Notify Licensed Independent Practitioner if interventions unsuccessful or patient reports new pain   Implement non-pharmacological measures as appropriate and evaluate response     Problem: Safety - Adult  Goal: Free from fall injury  4/4/2024 1516 by Gregoria Caceres RN  Outcome: Progressing     Problem: ABCDS Injury Assessment  Goal: Absence of physical injury  4/4/2024 1516 by Gregoria Caceres RN  Outcome: Progressing     Problem: Anxiety  Goal: Will report anxiety at manageable levels  Description: INTERVENTIONS:  1. Administer medication as ordered  2. Teach and rehearse alternative coping skills  3. Provide emotional support with 1:1 interaction with staff  4/4/2024 1516 by Gregoria Caceres RN  Outcome: Progressing     Problem: Coping  Goal: Pt/Family able to verbalize concerns and demonstrate effective coping strategies  Description: INTERVENTIONS:  1. Assist patient/family to identify coping skills, available support systems and cultural and spiritual values  2. Provide emotional support, including active listening and acknowledgement of concerns of patient and caregivers  3. Reduce environmental stimuli, as able  4. Instruct patient/family in relaxation techniques, as appropriate  5. Assess for spiritual pain/suffering and initiate Spiritual Care, Psychosocial Clinical Specialist  consults as needed  Outcome: Progressing     Problem: Respiratory - Adult  Goal: Achieves optimal ventilation and oxygenation  Outcome: Progressing     Problem: Infection - Adult  Goal: Absence of infection at discharge  Outcome: Progressing  Goal: Absence of infection during hospitalization  Outcome: Progressing  Goal: Absence of fever/infection during anticipated neutropenic period  Outcome: Progressing     Problem: Metabolic/Fluid and Electrolytes - Adult  Goal: Electrolytes maintained within normal limits  Outcome: Progressing  Goal: Hemodynamic stability and optimal renal function maintained  Outcome: Progressing     Problem: Nutrition Deficit:  Goal: Optimize nutritional status  Outcome: Progressing

## 2024-04-04 NOTE — PROGRESS NOTES
CLINICAL PHARMACY NOTE: MEDS TO BEDS    Total # of Prescriptions Filled: 1   The following medications were delivered to the patient:  Prednisone 10mg    Additional Documentation:  Delivered medications to Jolie at nurses station

## 2024-04-04 NOTE — PROGRESS NOTES
fluticasone (FLONASE) 50 MCG/ACT nasal spray 2 spray, 2 spray, Each Nostril, Daily, Hieu Boone MD, 2 spray at 04/04/24 0847    sodium chloride (OCEAN, BABY AYR) 0.65 % nasal spray 2 spray, 2 spray, Each Nostril, 4x Daily, Hieu Boone MD, 2 spray at 04/04/24 1244    midodrine (PROAMATINE) tablet 5 mg, 5 mg, Oral, TID PRN, Anahi Mazariegos MD, 5 mg at 03/31/24 1400    calcium carbonate (TUMS) chewable tablet 1,000 mg, 1,000 mg, Oral, Q6H PRN, Davonte Williamson MD, 1,000 mg at 04/04/24 0857    venlafaxine (EFFEXOR XR) extended release capsule 150 mg, 150 mg, Oral, Daily with breakfast, Davonte Williamson MD, 150 mg at 04/04/24 0848    clonazePAM (KLONOPIN) tablet 0.5 mg, 0.5 mg, Oral, TID PRN, Gayla Vega APRN - NP, 0.5 mg at 04/04/24 0656    Lab Results:     Lab Results   Component Value Date    WBC 7.1 04/04/2024    HGB 11.6 (L) 04/04/2024    HCT 35.5 (L) 04/04/2024    MCV 96.9 04/04/2024     04/04/2024     Lab Results   Component Value Date    CALCIUM 7.5 (L) 04/04/2024     04/04/2024    K 4.7 04/04/2024    CO2 34 (H) 04/04/2024    CL 99 04/04/2024    BUN 9 04/04/2024    CREATININE 0.5 04/04/2024       Lab Results   Component Value Date    INR 0.9 02/27/2024    PROTIME 12.3 02/27/2024       Radiology:       ASSESSMENT:       Severe stage IV COPD with exacerbation (FEV1 is 0.49, 20% of predicted 3/22)  Chronic hypoxic respiratory failure on 2 to 3 L nasal cannula  Chronic hypercapnic respiratory failure on noninvasive ventilator at home (astral)-baseline pCO2 is around 60-65  Severe protein calorie malnutrition secondary to pulmonary cachexia  80+ pack-year history of smoking, still smoking half a pack a day  COVID/influenza infection negative  DNR CCA no intubation  PLAN:   Will start prolonged prednisone taper tomorrow  No objection to discharge later today or tomorrow from pulmonary standpoint  BiPAP at night and with naps, has home NIV  Titrate oxygen to keep pulse ox above

## 2024-04-04 NOTE — CARE COORDINATION
ONGOING DISCHARGE PLAN:     DNRCCA no intubation      Patient is alert and oriented x4.     Spoke with patient regarding discharge plan and patient confirms that plan is still to go home with spouse.      DME: SC, GB, bipap thru [employer], Oxygen 2lpm NC 24/7 (conc) thru [facility], neb, walker     VNS: [facility]      IV solumedrol 40 mg  change to PO prednisone at dc.      Will continue to follow for additional discharge needs.     If patient is discharged prior to next notation, then this note serves as note for discharge by case management    Electronically signed by Sara Shannon RN on 4/4/2024 at 9:50 AM     Spoke to patient due to her frustration re: o2 provider [facility]  Call to Onelia at [facility]. She confirms they have all the documentation for O2 and they can deliver O2 tank to the hospital . Once patient is discharged to home she knows to call [facility] for O2 tanks to be delivered.   Electronically signed by Sara Shannon RN on 4/4/2024 at 12:27 PM

## 2024-04-04 NOTE — CARE COORDINATION
DISCHARGE PLANNING NOTE:    Call from Onelia at [facility] company. Onelia states they need home O2 eval and F2F notes and progress notes stating they reviewed home o2 eval from Pulmonary. Message sent to Pilar Mathews NP to request new home O2 eval before dc tomorrow.  Electronically signed by Sara Shannon RN on 4/4/2024 at 4:10 PM     OK per Pilar Mathews NP to order home O2 eval for tomorrow.   Electronically signed by Sara Shannon RN on 4/4/2024 at 4:12 PM

## 2024-04-04 NOTE — PROGRESS NOTES
Patient, has advanced COPD, on nocturnal ventilator, home oxygen  Unfortunately still continue to smoke  Admitted worsening shortness of breath, chest x-ray negative for pneumonia  Treated with IV steroid  Pulmonary medicine following  Patient is adamant to stay here for 1 more day, will discuss with pulmonary medicine team  Patient is close to baseline home oxygen, no wheezing appreciated on clinical exam  If okay per pulm, likely discharge home  Patient will need long-term prednisone  Overall prognosis guarded with low BMI, advanced COPD and ongoing smoking

## 2024-04-04 NOTE — PROGRESS NOTES
Physical Therapy  [facility]    Date: 24  Patient Name: Laura Rhodes       Room: 4/4-01  MRN: [id number]   Account: [id number]   : 1960  (63 y.o.) Gender: female     Referring Practitioner: Dr. Ashley  Diagnosis: Hypokalemia; hypomagnesia; COPD exacerbation; viral URI with cough  Past Medical History:  has a past medical history of Acid reflux, Anxiety, Arthritis, Cancer (HCC), Cervical osteoarthritis, COPD (chronic obstructive pulmonary disease) (HCC), DDD (degenerative disc disease), cervical, Depression, Hiatal hernia, History of blood transfusion, On home O2, Spinal stenosis, Tachycardia, and Thyroid disease.   Past Surgical History:   has a past surgical history that includes Upper gastrointestinal endoscopy; Thyroidectomy, partial; back surgery; other surgical history; Dilation and curettage of uterus; Colonoscopy (N/A, 2019); Colonoscopy (2019); Upper gastrointestinal endoscopy (N/A, 2020); Upper gastrointestinal endoscopy (N/A, 2022); Pain management procedure; and Femur fracture surgery (Right, 2023).       Overall Orientation Status: Within Functional Limits  Restrictions/Precautions  Restrictions/Precautions: Fall Risk;General Precautions;Up as Tolerated  Required Braces or Orthoses?: No  Implants present? : Metal implants (R YAS)    Subjective: Pt lying in bed upon arrival, agreeable to go for a walk  Comments: LILY Vicente approved therapy       Pain Assessment: 0-10  Pain Level: 6  Pain Location: Back     Oxygen Therapy  SpO2: 98 %  Pulse Oximetry Type: Intermittent  Pulse Oximeter Device Mode: Intermittent  Pulse Oximeter Device Location: Finger  O2 Device: Nasal cannula  O2 Flow Rate (L/min): 2 L/min    Bed Mobility  Rolling: Independent  Supine to Sit: Independent  Sit to Supine: Independent  Scooting: Independent  Bed mobility  Scooting: Independent    Transfers:  Sit to Stand: Supervision  Stand to Sit: Supervision                  Ambulation  Surface: Level tile  Device: Rolling Walker  Other Apparatus: O2 (2L)  Assistance: Supervision  Distance: >200ft  Comments: multiple turns without LOB  More Ambulation?: No        Stairs/Curb  Stairs?: No (pt refusing stairs stating \"I will be fine. I know I can do them\")    EXERCISES    Other exercises?: Yes  Other exercises 1: bed mobility x2  Other exercises 2: STS x1 from bed  Other exercises 3: seated EOB ~ 10 minutes SBA  Other exercises 4: educated in pursed lip breathing and to pace herself when ambulating           Activity Tolerance: Patient tolerated treatment well, Patient limited by endurance  PT Equipment Recommendations  Equipment Needed: No    Current Treatment Recommendations: Gait training, Stair training, Functional mobility training, Therapeutic activities, Endurance training    Conditions Requiring Skilled Therapeutic Intervention  History: COPD  Discharge Recommendations: Home with assist PRN;Home with Home health PT    [facility]   How much help is needed turning from your back to your side while in a flat bed without using bedrails?: None  How much help is needed moving from lying on your back to sitting on the side of a flat bed without using bedrails?: None  How much help is needed moving to and from a bed to a chair?: A Little  How much help is needed standing up from a chair using your arms?: A Little  How much help is needed walking in hospital room?: A Little  How much help is needed climbing 3-5 steps with a railing?: A Little  [facility] Inpatient Mobility Raw Score : 20  AM-PAC Inpatient T-Scale Score : 47.67  Mobility Inpatient CMS 0-100% Score: 35.83  Mobility Inpatient CMS G-Code Modifier : CJ     Goals  Short Term Goals  Time Frame for Short Term Goals: 2-3 days  Short Term Goal 1: pt able to ambulate with O2 mod-I for 3-4min with SpO2 at or above 93% for functional mobility  Short Term Goal 2: pt able to tolerate 20min of therapeutic

## 2024-04-04 NOTE — PROGRESS NOTES
04/04/24 1457   Encounter Summary   Encounter Overview/Reason  Spiritual/Emotional Needs   Service Provided For: Patient   Referral/Consult From: Palliative Care   Last Encounter  04/04/24   Complexity of Encounter Moderate   Spiritual/Emotional needs   Type Spiritual Support   Palliative Care   Type Palliative Care, Follow-up   Assessment/Intervention/Outcome   Assessment Coping;Calm   Intervention Active listening;Discussed illness injury and it’s impact;Explored/Affirmed feelings, thoughts, concerns;Prayer (assurance of)/[place];Sustaining Presence/Ministry of presence   Outcome Encouraged;Engaged in conversation;Expressed Gratitude;Receptive

## 2024-04-04 NOTE — PLAN OF CARE
Problem: Discharge Planning  Goal: Discharge to home or other facility with appropriate resources  4/4/2024 0412 by Margret Wills RN  Outcome: Progressing     Problem: Pain  Goal: Verbalizes/displays adequate comfort level or baseline comfort level  4/4/2024 0412 by Margret Wills RN  Outcome: Progressing  Flowsheets (Taken 4/4/2024 0412)  Verbalizes/displays adequate comfort level or baseline comfort level:   Encourage patient to monitor pain and request assistance   Assess pain using appropriate pain scale   Administer analgesics based on type and severity of pain and evaluate response     Problem: Safety - Adult  Goal: Free from fall injury  4/4/2024 0412 by Margret Wills RN  Outcome: Progressing  Flowsheets (Taken 4/4/2024 0412)  Free From Fall Injury: Based on caregiver fall risk screen, instruct family/caregiver to ask for assistance with transferring infant if caregiver noted to have fall risk factors     Problem: ABCDS Injury Assessment  Goal: Absence of physical injury  4/4/2024 0412 by Margret Wills RN  Outcome: Progressing     Problem: Anxiety  Goal: Will report anxiety at manageable levels  Description: INTERVENTIONS:  1. Administer medication as ordered  2. Teach and rehearse alternative coping skills  3. Provide emotional support with 1:1 interaction with staff  4/4/2024 0412 by Margret Wills RN  Outcome: Progressing     Problem: Infection - Adult  Goal: Absence of infection at discharge  4/3/2024 1604 by Gregoria Caceres RN  Outcome: Not Progressing  Note: Resp culture showed gram + cocci in clusters

## 2024-04-05 VITALS
DIASTOLIC BLOOD PRESSURE: 64 MMHG | RESPIRATION RATE: 16 BRPM | HEART RATE: 66 BPM | HEIGHT: 63 IN | TEMPERATURE: 97.5 F | SYSTOLIC BLOOD PRESSURE: 115 MMHG | WEIGHT: 115.52 LBS | OXYGEN SATURATION: 98 % | BODY MASS INDEX: 20.47 KG/M2

## 2024-04-05 LAB
ABSOLUTE BANDS: 0.33 K/UL (ref 0–1)
ANION GAP SERPL CALCULATED.3IONS-SCNC: 7 MMOL/L (ref 9–17)
BANDS: 4 % (ref 0–10)
BASOPHILS # BLD: 0 K/UL (ref 0–0.2)
BASOPHILS NFR BLD: 0 % (ref 0–2)
BUN SERPL-MCNC: 8 MG/DL (ref 8–23)
CALCIUM SERPL-MCNC: 7.8 MG/DL (ref 8.6–10.4)
CHLORIDE SERPL-SCNC: 99 MMOL/L (ref 98–107)
CO2 SERPL-SCNC: 32 MMOL/L (ref 20–31)
CREAT SERPL-MCNC: 0.4 MG/DL (ref 0.5–0.9)
EOSINOPHIL # BLD: 0 K/UL (ref 0–0.4)
EOSINOPHILS RELATIVE PERCENT: 0 % (ref 0–4)
ERYTHROCYTE [DISTWIDTH] IN BLOOD BY AUTOMATED COUNT: 14.5 % (ref 11.5–14.9)
GFR SERPL CREATININE-BSD FRML MDRD: >90 ML/MIN/1.73M2
GLUCOSE SERPL-MCNC: 81 MG/DL (ref 70–99)
HCT VFR BLD AUTO: 35.7 % (ref 36–46)
HGB BLD-MCNC: 11.7 G/DL (ref 12–16)
LYMPHOCYTES NFR BLD: 2.83 K/UL (ref 1–4.8)
LYMPHOCYTES RELATIVE PERCENT: 34 % (ref 24–44)
MCH RBC QN AUTO: 31.8 PG (ref 26–34)
MCHC RBC AUTO-ENTMCNC: 32.9 G/DL (ref 31–37)
MCV RBC AUTO: 96.9 FL (ref 80–100)
MONOCYTES NFR BLD: 0.83 K/UL (ref 0.1–1.3)
MONOCYTES NFR BLD: 10 % (ref 1–7)
MORPHOLOGY: NORMAL
NEUTROPHILS NFR BLD: 52 % (ref 36–66)
NEUTS SEG NFR BLD: 4.33 K/UL (ref 1.3–9.1)
NUCLEATED RED BLOOD CELLS: 1 PER 100 WBC
PLATELET # BLD AUTO: 410 K/UL (ref 150–450)
PMV BLD AUTO: 7 FL (ref 6–12)
POTASSIUM SERPL-SCNC: 3.6 MMOL/L (ref 3.7–5.3)
RBC # BLD AUTO: 3.69 M/UL (ref 4–5.2)
SODIUM SERPL-SCNC: 138 MMOL/L (ref 135–144)
WBC OTHER # BLD: 8.3 K/UL (ref 3.5–11)

## 2024-04-05 PROCEDURE — 36415 COLL VENOUS BLD VENIPUNCTURE: CPT

## 2024-04-05 PROCEDURE — 80048 BASIC METABOLIC PNL TOTAL CA: CPT

## 2024-04-05 PROCEDURE — 2700000000 HC OXYGEN THERAPY PER DAY

## 2024-04-05 PROCEDURE — 6360000002 HC RX W HCPCS: Performed by: INTERNAL MEDICINE

## 2024-04-05 PROCEDURE — 6370000000 HC RX 637 (ALT 250 FOR IP): Performed by: NURSE PRACTITIONER

## 2024-04-05 PROCEDURE — 94640 AIRWAY INHALATION TREATMENT: CPT

## 2024-04-05 PROCEDURE — 6370000000 HC RX 637 (ALT 250 FOR IP): Performed by: INTERNAL MEDICINE

## 2024-04-05 PROCEDURE — 94669 MECHANICAL CHEST WALL OSCILL: CPT

## 2024-04-05 PROCEDURE — 6370000000 HC RX 637 (ALT 250 FOR IP)

## 2024-04-05 PROCEDURE — 2580000003 HC RX 258

## 2024-04-05 PROCEDURE — 94761 N-INVAS EAR/PLS OXIMETRY MLT: CPT

## 2024-04-05 PROCEDURE — 85025 COMPLETE CBC W/AUTO DIFF WBC: CPT

## 2024-04-05 PROCEDURE — 99239 HOSP IP/OBS DSCHRG MGMT >30: CPT | Performed by: INTERNAL MEDICINE

## 2024-04-05 RX ORDER — DEXTROMETHORPHAN POLISTIREX 30 MG/5ML
60 SUSPENSION ORAL EVERY 12 HOURS SCHEDULED
Qty: 148 ML | Refills: 0 | Status: SHIPPED | OUTPATIENT
Start: 2024-04-05 | End: 2024-04-05

## 2024-04-05 RX ORDER — DEXTROMETHORPHAN POLISTIREX 30 MG/5ML
60 SUSPENSION ORAL EVERY 12 HOURS SCHEDULED
Qty: 148 ML | Refills: 0 | OUTPATIENT
Start: 2024-04-05 | End: 2024-04-12

## 2024-04-05 RX ORDER — CARVEDILOL 12.5 MG/1
12.5 TABLET ORAL ONCE
Status: COMPLETED | OUTPATIENT
Start: 2024-04-05 | End: 2024-04-05

## 2024-04-05 RX ORDER — DEXTROMETHORPHAN POLISTIREX 30 MG/5ML
60 SUSPENSION ORAL EVERY 12 HOURS SCHEDULED
Qty: 148 ML | Refills: 0 | Status: SHIPPED | OUTPATIENT
Start: 2024-04-05 | End: 2024-04-12

## 2024-04-05 RX ADMIN — Medication 60 MG: at 09:16

## 2024-04-05 RX ADMIN — HYDROCODONE BITARTRATE AND ACETAMINOPHEN 1 TABLET: 7.5; 325 TABLET ORAL at 09:02

## 2024-04-05 RX ADMIN — FLUTICASONE PROPIONATE 2 SPRAY: 50 SPRAY, METERED NASAL at 09:20

## 2024-04-05 RX ADMIN — ALBUTEROL SULFATE 2.5 MG: 2.5 SOLUTION RESPIRATORY (INHALATION) at 11:30

## 2024-04-05 RX ADMIN — VENLAFAXINE HYDROCHLORIDE 150 MG: 150 CAPSULE, EXTENDED RELEASE ORAL at 09:17

## 2024-04-05 RX ADMIN — CARVEDILOL 12.5 MG: 12.5 TABLET, FILM COATED ORAL at 11:36

## 2024-04-05 RX ADMIN — CLONAZEPAM 0.5 MG: 0.5 TABLET ORAL at 14:02

## 2024-04-05 RX ADMIN — SALINE NASAL SPRAY 2 SPRAY: 1.5 SOLUTION NASAL at 11:36

## 2024-04-05 RX ADMIN — LEVOTHYROXINE SODIUM 75 MCG: 0.07 TABLET ORAL at 06:03

## 2024-04-05 RX ADMIN — POTASSIUM BICARBONATE 20 MEQ: 782 TABLET, EFFERVESCENT ORAL at 11:08

## 2024-04-05 RX ADMIN — BENZONATATE 200 MG: 200 CAPSULE ORAL at 09:16

## 2024-04-05 RX ADMIN — SALINE NASAL SPRAY 2 SPRAY: 1.5 SOLUTION NASAL at 09:19

## 2024-04-05 RX ADMIN — ENOXAPARIN SODIUM 40 MG: 100 INJECTION SUBCUTANEOUS at 09:18

## 2024-04-05 RX ADMIN — OMEPRAZOLE 40 MG: 40 CAPSULE, DELAYED RELEASE ORAL at 06:04

## 2024-04-05 RX ADMIN — ALBUTEROL SULFATE 2.5 MG: 2.5 SOLUTION RESPIRATORY (INHALATION) at 07:46

## 2024-04-05 RX ADMIN — ALBUTEROL SULFATE 2.5 MG: 2.5 SOLUTION RESPIRATORY (INHALATION) at 15:46

## 2024-04-05 RX ADMIN — SODIUM CHLORIDE, PRESERVATIVE FREE 10 ML: 5 INJECTION INTRAVENOUS at 09:18

## 2024-04-05 RX ADMIN — GUAIFENESIN 600 MG: 600 TABLET, EXTENDED RELEASE ORAL at 09:17

## 2024-04-05 RX ADMIN — PREDNISONE 40 MG: 20 TABLET ORAL at 09:17

## 2024-04-05 RX ADMIN — BENZONATATE 200 MG: 200 CAPSULE ORAL at 14:02

## 2024-04-05 RX ADMIN — MONTELUKAST 10 MG: 10 TABLET, FILM COATED ORAL at 09:17

## 2024-04-05 RX ADMIN — TIOTROPIUM BROMIDE AND OLODATEROL 2 PUFF: 3.124; 2.736 SPRAY, METERED RESPIRATORY (INHALATION) at 07:51

## 2024-04-05 RX ADMIN — CLONAZEPAM 0.5 MG: 0.5 TABLET ORAL at 06:12

## 2024-04-05 ASSESSMENT — ENCOUNTER SYMPTOMS
DIARRHEA: 1
NAUSEA: 1
SHORTNESS OF BREATH: 1
ABDOMINAL PAIN: 0
RHINORRHEA: 1
CONSTIPATION: 0
VOMITING: 0

## 2024-04-05 ASSESSMENT — PAIN SCALES - WONG BAKER
WONGBAKER_NUMERICALRESPONSE: NO HURT
WONGBAKER_NUMERICALRESPONSE: NO HURT

## 2024-04-05 ASSESSMENT — PAIN SCALES - GENERAL
PAINLEVEL_OUTOF10: 6
PAINLEVEL_OUTOF10: 6
PAINLEVEL_OUTOF10: 0
PAINLEVEL_OUTOF10: 8

## 2024-04-05 ASSESSMENT — PAIN DESCRIPTION - LOCATION: LOCATION: BACK

## 2024-04-05 ASSESSMENT — PAIN DESCRIPTION - ORIENTATION: ORIENTATION: MID

## 2024-04-05 ASSESSMENT — PAIN DESCRIPTION - DESCRIPTORS: DESCRIPTORS: ACHING;CRAMPING;DISCOMFORT

## 2024-04-05 ASSESSMENT — PAIN - FUNCTIONAL ASSESSMENT: PAIN_FUNCTIONAL_ASSESSMENT: ACTIVITIES ARE NOT PREVENTED

## 2024-04-05 NOTE — CARE COORDINATION
[id number] Pat. Rel. to Sub: SELF        CSN: [id number]             Angel Mullins RCP  Respiratory Therapist     Progress Notes     Signed     Date of Service: 2024  4:54 PM     Signed         Home Oxygen Evaluation     Home Oxygen Evaluation completed.     Patient is on 2 liters per minute via nasal cannula.  Resting SpO2 = 99%  Resting SpO2 on room air = 92%     SpO2 on room air with exercise = 85%  SpO2 on oxygen as above with exercise = 90%              Chceo Benson MD  Resident  Internal Medicine     Plan of Care     Signed     Date of Service: 2024 10:16 AM     Signed         Patient was evaluated today for the diagnosis of COPD.  I entered a DME order for home oxygen at 2 lpm because the diagnosis and testing require the patient to have supplemental oxygen.  Condition will improve or be benefited by oxygen use.  The patient is  able to perform good mobility in a home setting and therefore does not require the use of a portable oxygen system.  The need for this equipment was discussed with the patient and she understands and is in agreement.     Electronically signed by Checo Benson MD on 2024 at 10:19 AM                [id number]        CSN                                    Req/Control # [Problem retrieving Specimen ID]                                   Order Date:  2024  [id number]                                          Patient Information      Name:  Laura Rhodes  :  1960  Age:  63 y.o.   Address:  [address] [place] [address]   Zip:  [address]  PCP: Van Pate MD Sex:  F  SSN: [id number]  Home Phone: [phone]  Work Phone:    Patient MRN:  [id number]    Alt Patient ID:  [id number]  PCP Phone: [phone]       Authorizing Provider Information       AUTHORIZING PROVIDER: Checo Benson MD  Physician ID: [id number]  NPI:  [id number]  Site:   Address: [address] [place] [address] Zip: [place] [address]  Phone: [phone]  Fax: [phone]              EQUIPMENT ORDERED  DME Order for Home Oxygen as OP [[id number]] (ORD   #:   [id number]) Priority  Routine Class  Hospital Performed        Associated Diagnosis:  Chronic respiratory failure with hypoxia (HCC) (J96.11 [ICD-10-CM]); Chronic obstructive pulmonary disease, unspecified COPD type (HCC) (J44.9 [ICD-10-CM])        Comments:   You must complete the order parameters below and add the medical necessity documentation for this DME in a separate note.     Stationary Oxygen Concentrator at 2 lpm via Nasal Cannula     Stationary Prescribed at:  Continuous     Portable Gaseous O2 System and contents at 2 lpm via Nasal Cannula     Non Applicable     Diagnosis: COPD  Length of need: Lifetime            Scheduling Instructions:                                 Specimen Source             Collection Date    Collection Time    Order Status    Expected Date                 Electronically Signed By  Checo Benson MD  NPI:  [id number] Date  Apr 5, 2024  12:17 PM               Responsible Party /Guarantor Information     Guar-ActID   Relationship Account Type Home Phone   NABOR SOLIS S - [phone] [place] [address] Self P/F [phone]   Employer   Work Phone   DISABLED                  Insurance & Policy Pires Information         Primary Insurance:  Insurance/Subscriber ID:  [id number]  Subscriber Name:  PATI SOLIS              Relationship to Patient: Spouse     Secondary Insurance:  Insurance/Subscriber ID: [id number]  Subscriber Name: NABOR SOLIS  Relationship to Patient: Self   Signed ABN: N       Payor Name:  [employer]   Plan:  [employer] [place] [id number]   Group: [id number]        Payor Name: MEDICARE  Plan:  MEDICARE PART A AND B     Worker's Comp Date of Injury:

## 2024-04-05 NOTE — PROGRESS NOTES
Discharge paperwork reviewed and signed. Heart monitor removed. IV was removed with no complication. Patient states has all his belongings.

## 2024-04-05 NOTE — PROGRESS NOTES
[facility] PULMONARY,CRITICAL CARE & SLEEP   Saúl Cordero MD/Hieu Whitfield MD/ Christian Concepcion MD/Dr Lexie Castillo APRN AGAP-BC, NP-C      Lizette Rivera APRN NP-C     Chantel Mathews APRN NP-C                                           Pulmonary Progress Note    Patient - Laura Rhodes   Age - 63 y.o.   - 1960  MRN - [id number]  [id number]  Date of Admission - 3/31/2024  8:06 AM    Consulting Service/Physician:       Primary Care Physician: Van Pate MD    SUBJECTIVE:     Chief Complaint:   Chief Complaint   Patient presents with    Shortness of Breath     Subjective:    Laura is seen lying in bed.  Plan is for discharge home today.  She qualifies for oxygen 2 L with exertion.  She has been afebrile.  She is on a prednisone taper.  She states her breathing is feeling slightly better today..    VITALS  BP (!) 140/72   Pulse 72   Temp 97.7 °F (36.5 °C) (Oral)   Resp 15   Ht 1.6 m (5' 3\")   Wt 52.4 kg (115 lb 8.3 oz)   SpO2 97%   BMI 20.46 kg/m²   Wt Readings from Last 3 Encounters:   24 52.4 kg (115 lb 8.3 oz)   24 47 kg (103 lb 9.9 oz)   24 45.8 kg (101 lb)     I/O (24 Hours)    Intake/Output Summary (Last 24 hours) at 2024 1329  Last data filed at 2024 0902  Gross per 24 hour   Intake 480 ml   Output --   Net 480 ml     Ventilator:   Settings  FiO2 : 40 %  Insp Rise Time (%): 2 %  Exam:   Physical Exam   Constitutional: Cachectic appearing elderly female lying in bed on 2 L  HENT: Unremarkable  Head: Normocephalic and atraumatic.   Eyes: EOM are normal. Pupils are equal, round, and reactive to light.   Neck: Neck supple.   Cardiovascular:  Regular rate and rhythm.  Normal heart tones.  No JVD.    Pulmonary/Chest: Respirations even and unlabored, slightly improved air movement,  on 2 L with pulse ox 100%   Abdominal: Soft. Bowel sounds are normal.  Musculoskeletal: Normal range of motion.   Neurological: Patient is alert and  2 spray, Each Nostril, 4x Daily, Hieu Boone MD, 2 spray at 04/05/24 1136    midodrine (PROAMATINE) tablet 5 mg, 5 mg, Oral, TID PRN, Anahi Mazariegos MD, 5 mg at 03/31/24 1400    calcium carbonate (TUMS) chewable tablet 1,000 mg, 1,000 mg, Oral, Q6H PRN, Davonte Williamson MD, 1,000 mg at 04/04/24 2043    venlafaxine (EFFEXOR XR) extended release capsule 150 mg, 150 mg, Oral, Daily with breakfast, Davonte Williamson MD, 150 mg at 04/05/24 0917    clonazePAM (KLONOPIN) tablet 0.5 mg, 0.5 mg, Oral, TID PRN, Gayla Vega APRN - NP, 0.5 mg at 04/05/24 0612    Lab Results:     Lab Results   Component Value Date    WBC 8.3 04/05/2024    HGB 11.7 (L) 04/05/2024    HCT 35.7 (L) 04/05/2024    MCV 96.9 04/05/2024     04/05/2024     Lab Results   Component Value Date    CALCIUM 7.8 (L) 04/05/2024     04/05/2024    K 3.6 (L) 04/05/2024    CO2 32 (H) 04/05/2024    CL 99 04/05/2024    BUN 8 04/05/2024    CREATININE 0.4 (L) 04/05/2024       Lab Results   Component Value Date    INR 0.9 02/27/2024    PROTIME 12.3 02/27/2024       Radiology:       ASSESSMENT:       Severe stage IV COPD with exacerbation (FEV1 is 0.49, 20% of predicted 3/22)  Chronic hypoxic respiratory failure on 2 to 3 L nasal cannula  Chronic hypercapnic respiratory failure on noninvasive ventilator at home (astral)-baseline pCO2 is around 60-65  Severe protein calorie malnutrition secondary to pulmonary cachexia  80+ pack-year history of smoking, still smoking half a pack a day  COVID/influenza infection negative  DNR CCA no intubation  PLAN:   Prednisone taper as ordered  No objection to discharge today from pulmonary standpoint  BiPAP at night and with naps, has home NIV  Titrate oxygen to keep pulse ox above 89%  Qualifies for oxygen with activity, face-to-face evaluation was completed  Plan to taper prednisone down to 10 mg then remain on 10 mg  Continue bronchodilators  Encourage smoking cessation  Follow-up in our office in 2 to 3

## 2024-04-05 NOTE — CARE COORDINATION
DISCHARGE PLANNING NOTE:    Spoke with the patient regarding discharge. The patient completed home O2 evaluation and qualified for portability. This writer faxed the order for DME oxygen, as well as face-to-face, and facesheet, with most recent pulmonology note to JEAN[facility]PIERRE. Onelia with [facility] following and reported once she receives everything she will have the portable tank delivered.    LEXY needs signed by MD to be faxed to [facility].    LILY Izquierdo, bedside nurse updated via [employer].     IMM letter provided to patient.  Patient offered four hours to make informed decision regarding appeal process; patient agreeable to discharge.     Electronically signed by Luci De La Vega RN on 4/5/2024 at 12:51 PM

## 2024-04-05 NOTE — PLAN OF CARE
Patient was evaluated today for the diagnosis of COPD.  I entered a DME order for home oxygen at 2 lpm because the diagnosis and testing require the patient to have supplemental oxygen.  Condition will improve or be benefited by oxygen use.  The patient is  able to perform good mobility in a home setting and therefore does not require the use of a portable oxygen system.  The need for this equipment was discussed with the patient and she understands and is in agreement.    Electronically signed by Checo Benson MD on 4/5/2024 at 10:19 AM

## 2024-04-05 NOTE — PROGRESS NOTES
Noted    Acute on chronic respiratory failure (HCC) [J96.20] 04/01/2024    COPD exacerbation (HCC) [J44.1] 12/02/2023       Plan:      Patient status Admit as inpatient in the  Progressive Unit/Step down     Acute on chronic hypoxic respiratory failure 2/2 COPD exacerbation-resolving    -Per pulmonology, transition to oral prednisone taper and long-term prednisone 10 mg  -Home O2 eval completed  -3/31 ABG: pH 7.422, pCO2 59.2, pO2 76.6, HCO3 38.4  -Stage IV COPD with chronic hypoxic respiratory failure on home O2 2 to 3 L  -Respiratory culture direct exam positive for gram-positive cocci in clusters,   -Negative respiratory panel, COVID and influenza, Legionella, MRSA  -Nicotine patch, patient still smoking  -Palliative care consulted: Patient would like to remain DNR CCA no intubation, does not want hospice  -Nutrition consulted    Hypokalemia and hypomagnesemia-resolved  -Potassium 3.6 down from 4.7 yesterday  -Magnesium 2.7 up from  -Potassium and magnesium replaced IV    Congestion and sinus tenderness likely 2/2 chronic sinusitis  -Continue Flonase, Mucinex, oxymetazoline  -May consider CT imaging    Hypertension  -Home Coreg and amlodipine resumed with holding parameters  -Concern for hypotension 80s systolic on admission  -Home dose Lasix with parameters  -Midodrine 5 Mg Q3H PRN if SBP<90     Hypothyroidism  -Continue Synthroid 75 mcg     DVT prophylaxis: Lovenox  GI prophylaxis: Protonix 40 mg  Diet: Regular  Disposition: Home     OT/PT/SW     Code Status: DNR CC-A, No Intubation    Checo Benson MD  PGY I Transitional Year Resident  4/5/2024 9:51 AM   Attending Physician Statement  I have discussed the care of Laura Rhodes and I have examined the patient myself and taken ROS and HPI, including pertinent history and exam findings, with the resident. I have reviewed the key elements of all parts of the encounter with the resident.  I agree with the assessment, plan and orders as documented by the

## 2024-04-05 NOTE — CARE COORDINATION
Continuity of Care Form    Patient Name: Laura Rhodes   :  1960  MRN:  [id number]    Admit date:  3/31/2024  Discharge date:  2024    Code Status Order: DNR-CCA   Advance Directives:     Admitting Physician:  Aren Ashley MD  PCP: Van Pate MD    Discharging Nurse: Stefani Sigala  Discharging Hospital Unit/Room#: 4/4-01  Discharging Unit Phone Number: [phone]    Emergency Contact:   Extended Emergency Contact Information  Primary Emergency Contact: Dionicio Rhodes  Address: [address] [place] [address] of [facility]  Home Phone: [phone]  Work Phone: [phone]  Mobile Phone: [phone]  Relation: Spouse   needed? No    Past Surgical History:  Past Surgical History:   Procedure Laterality Date    BACK SURGERY      diskectomy, L3-L5    COLONOSCOPY N/A 2019    COLONOSCOPY DIAGNOSTIC performed by Rajwinder Aleman MD at [facility] OR    COLONOSCOPY  2019    COLONOSCOPY POLYPECTOMY SNARE/COLD BIOPSY performed by Rajwinder Aleman MD at [facility]    DILATION AND CURETTAGE OF UTERUS      FEMUR FRACTURE SURGERY Right 2023    FEMUR IM NAIL HALIMA INSERTION performed by Juli Vann MD at [facility] OR    OTHER SURGICAL HISTORY      ectopic pregnancy with tube removal    PAIN MANAGEMENT PROCEDURE      Left sided Transforaminal Epidural Steriod Injection    THYROIDECTOMY, PARTIAL      UPPER GASTROINTESTINAL ENDOSCOPY      UPPER GASTROINTESTINAL ENDOSCOPY N/A 2020    EGD BIOPSY with dilatation performed by Ralph Joy MD at [facility] ENDO    UPPER GASTROINTESTINAL ENDOSCOPY N/A 2022    EGD BIOPSY performed by Fritz Zacarias MD at [facility] ENDO       Immunization History:   Immunization History   Administered Date(s) Administered    Influenza Virus Vaccine 2018, 2020    Influenza, FLUARIX, FLULAVAL, FLUZONE (age 6 mo+) AND AFLURIA, (age 3 y+), PF, 0.5mL 10/05/2019, 10/14/2020    Influenza, FLUCELVAX, (age 6 mo+), MDCK, PF,

## 2024-04-05 NOTE — DISCHARGE SUMMARY
[facility]   IN-PATIENT SERVICE   [facility]    Discharge Summary     Patient ID: Laura Rhodes  :  1960   MRN: [id number]     ACCOUNT:  [id number]   Patient's PCP: Van Pate MD  Admit Date: 3/31/2024   Discharge Date: 2024   Length of Stay: 5  Code Status:  DNR-CCA  Admitting Physician: Aren Ashley MD  Discharge Physician: Checo Benson MD     Active Discharge Diagnoses:       Primary Problem  COPD exacerbation (HCC)      Hospital Problems  Active Hospital Problems    Diagnosis Date Noted    Acute on chronic respiratory failure (HCC) [J96.20] 2024    COPD exacerbation (HCC) [J44.1] 2023       Admission Condition:  poor     Discharged Condition: fair    Hospital Stay:       Hospital Course:  Laura Rhodes is a 63 y.o. female who was admitted for the management of   COPD exacerbation (HCC) , presented to ER with Shortness of Breath  PMH significant for stage IV COPD and chronic hypoxic respiratory failure on home O2 of 2 to 3 L 24 hours a day, hypertension and hypothyroidism presented with complaints of shortness of breath and wheezing.  Patient states that last week she started having symptoms of rhinorrhea, postnasal discharge and a mild cough. Patient states that she was using her breathing treatments but they were not making things better.  She reports having feelings of fevers and chills last night.  States that this morning she did not feel any better so decided to come to the ED.  Patient is still currently smoking, states that she smokes 2 to 8 cigarettes a day. Patient was recently admitted at the end of February and discharged on  for similar symptoms.  Patient was found to be hypokalemic and have hypomagnesemia on admission, both of which were replaced in the ED.  Patient also given IV Solu-Medrol and breathing treatments.  Patient being admitted for the management of acute exacerbation of COPD.    Over hospital course,

## 2024-04-05 NOTE — DISCHARGE INSTRUCTIONS
Follow up with your primary care physician, Dr. Pate, in 7 days  Take all your medication as prescribed   Take prednisone 10 mg 4 tablets daily for 5 days, then 3 tablets daily for 5 days, then 1 tablet daily  Take Delsym 10 mL by mouth every 12 hours for 7 days as needed for cough  . If your prescription has refills, obtain the medication refills from the pharmacy before you run out. Seek medical attention if you run out of a medication you need and do not have any refills of.   Reasons to call your doctor or go to the ER: shortness of breath, chest pain, or any other concerning symptoms.

## 2024-04-05 NOTE — PLAN OF CARE
Problem: Discharge Planning  Goal: Discharge to home or other facility with appropriate resources  4/5/2024 0438 by Love Beauchamp RN  Outcome: Progressing    Problem: Pain  Goal: Verbalizes/displays adequate comfort level or baseline comfort level  4/5/2024 0438 by Love Beauchamp RN  Outcome: Progressing       Problem: Safety - Adult  Goal: Free from fall injury  4/5/2024 0438 by Love Beauchamp RN  Outcome: Progressing       Problem: ABCDS Injury Assessment  Goal: Absence of physical injury  4/5/2024 0438 by Love Beauchamp RN  Outcome: Progressing    Problem: Anxiety  Goal: Will report anxiety at manageable levels  Description: INTERVENTIONS:  1. Administer medication as ordered  2. Teach and rehearse alternative coping skills  3. Provide emotional support with 1:1 interaction with staff  4/5/2024 0438 by Love Beauchamp RN  Outcome: Progressing

## 2024-04-05 NOTE — PROGRESS NOTES
04/05/24 1517   Encounter Summary   Encounter Overview/Reason  Spiritual/Emotional Needs   Service Provided For: Patient   Referral/Consult From: Palliative Care   Last Encounter  04/05/24   Complexity of Encounter Low   Spiritual/Emotional needs   Type Spiritual Support   Palliative Care   Type Palliative Care, Follow-up   Assessment/Intervention/Outcome   Assessment Calm   Intervention Sustaining Presence/Ministry of presence   Outcome Coping

## 2024-04-08 ENCOUNTER — CARE COORDINATION (OUTPATIENT)
Dept: CASE MANAGEMENT | Age: 64
End: 2024-04-08

## 2024-04-08 DIAGNOSIS — J44.1 COPD EXACERBATION (HCC): Primary | ICD-10-CM

## 2024-04-08 NOTE — CARE COORDINATION
Care Transitions Initial Follow Up Call    Call within 2 business days of discharge: Yes    Patient Current Location:  Home: [address] [place] [id number]    Care Transition Nurse contacted the patient by telephone to perform post hospital discharge assessment. Verified name and  with patient as identifiers. Provided introduction to self, and explanation of the Care Transition Nurse role.     Patient: Laura Rhodes Patient : 1960   MRN: [id number]  Reason for Admission: COPD exacerbation  Discharge Date: 24 RARS: Readmission Risk Score: 31.5      Last Discharge Facility       Date Complaint Diagnosis Description Type Department Provider    3/31/24 Shortness of Breath COPD exacerbation (HCC) ... ED to Hosp-Admission (Discharged) (ADMITTED) Aren Wolff MD; Abdi Núñez...            Was this an external facility discharge? No Discharge Facility: [facility]    Challenges to be reviewed by the provider   Additional needs identified to be addressed with provider: No  none               Method of communication with provider: none.    Spoke with patient for initial 24 hour call.  Patient had come to the hospital with shortness of breath.  Patient was treated for COPD exacerbation and discharged to home on tapering dose of steroids and O2.  Today, she said she is doing ok, she has O2 on at 2L/min, O2 saturations are running 100%.  She has some KU but denies any f/c, n/v, cough or other issues with her breathing today. She does complain of increased anxiety on the steroids, states it gets better once she is able to taper down from the 40 mg.  She discharged to home with [facility], they were out to see her yesterday.  Patient has issues with back pain as well, she follows with Dr. Briceño with pain management is currently on Norco for her pain.  Discharge instructions reviewed, 1111 F  done. Patient has appointment with PCP scheduled for tomorrow.  She has all

## 2024-04-11 ENCOUNTER — CARE COORDINATION (OUTPATIENT)
Dept: CASE MANAGEMENT | Age: 64
End: 2024-04-11

## 2024-04-11 NOTE — CARE COORDINATION
Care Transitions Follow Up Call    Patient Current Location:  Home: [address] [place] [address]    Care Transition Nurse contacted the patient by telephone to follow up after admission on 3/31.  Verified name and  with patient as identifiers.    Patient: Laura Rhodes  Patient : 1960   MRN: [id number]  Reason for Admission: COPD exacerbation  Discharge Date: 24 RARS: Readmission Risk Score: 31.5      Needs to be reviewed by the provider   Additional needs identified to be addressed with provider: No  none             Method of communication with provider: none.    Spoke with Laura today for transitional follow up call. She is feeling ok today, is wearing her O2, O2 saturations running 98%.  She is on 30 mg of Prednisone now, states the decreased helped slightly with her anxiety from the steroids. She had a follow up scheduled with PCP on Tuesday but had to cancel as her spouse had to work and she did not have a ride. She states she will reschedule.  Her breathing is at baseline, has some KU.  She has diarrhea stools right now and has had for a while.  She is going to restart taking her Probiotic, states the last time this happened, the probiotic really helped with the loose stools.  She is on Zithromax 3x/week prophylactic.  She denies any other issues at this time.  Patient is currently being followed by [facility].  She denies any new needs or concerns at this time.     Addressed changes since last contact:  none  Discussed follow-up appointments. If no appointment was previously scheduled, appointment scheduling offered: Yes.   Is follow up appointment scheduled within 7 days of discharge? No.    Follow Up  Future Appointments   Date Time Provider Department Center   2024  3:00 PM Humble Crook MD [facility]     External follow up appointment(s): n/a    Care Transition Nurse reviewed medical action plan and red flags with patient and discussed any barriers to

## 2024-04-18 ENCOUNTER — CARE COORDINATION (OUTPATIENT)
Dept: CASE MANAGEMENT | Age: 64
End: 2024-04-18

## 2024-04-18 NOTE — CARE COORDINATION
discharge? Pt did have appointment within 7 days she had to reschedule  appointment 4/25/24 .    Follow Up  Future Appointments   Date Time Provider Department Center   4/25/2024  3:15 PM [facility]   5/2/2024  3:00 PM Humble Crook MD [facility] [place]   5/13/2024  2:45 PM Juli Vann MD [facility]   7/8/2024  2:45 PM Gumaro Edward MD [facility]         LPN Care Coordinator reviewed discharge instructions, medical action plan, and red flags with patient and discussed any barriers to care and/or understanding of plan of care after discharge. Discussed appropriate site of care based on symptoms and resources available to patient including: PCP  Specialist  Urgent care clinics  When to call 911  [employer]. The patient agrees to contact the PCP office for questions related to their healthcare.     Advance Care Planning:   reviewed and current.     Patients top risk factors for readmission: medical condition-copd  Interventions to address risk factors: Obtained and reviewed discharge summary and/or continuity of care documents    Offered patient enrollment in the Remote Patient Monitoring (RPM) program for in-home monitoring:  did not discuss today  .     Care Transitions Subsequent and Final Call    Subsequent and Final Calls  Do you have any ongoing symptoms?: Yes  Onset of Patient-reported symptoms: In the past 7 days  Patient-reported symptoms: Other  Interventions for patient-reported symptoms: Notified PCP/Physician  Have your medications changed?: No  Do you have any questions related to your medications?: No  Do you currently have any active services?: Yes  Are you currently active with any services?: Home Health  Do you have any needs or concerns that I can assist you with?: No  Identified Barriers: None  Care Transitions Interventions  Other Interventions:             LPN Care Coordinator provided contact information for future needs. Plan

## 2024-04-25 ENCOUNTER — OFFICE VISIT (OUTPATIENT)
Dept: PRIMARY CARE CLINIC | Age: 64
End: 2024-04-25

## 2024-04-25 ENCOUNTER — CARE COORDINATION (OUTPATIENT)
Dept: CASE MANAGEMENT | Age: 64
End: 2024-04-25

## 2024-04-25 VITALS
DIASTOLIC BLOOD PRESSURE: 80 MMHG | BODY MASS INDEX: 17.29 KG/M2 | HEIGHT: 63 IN | WEIGHT: 97.6 LBS | SYSTOLIC BLOOD PRESSURE: 124 MMHG | OXYGEN SATURATION: 98 % | HEART RATE: 85 BPM

## 2024-04-25 DIAGNOSIS — R19.7 DIARRHEA OF PRESUMED INFECTIOUS ORIGIN: ICD-10-CM

## 2024-04-25 DIAGNOSIS — F33.2 MDD (MAJOR DEPRESSIVE DISORDER), RECURRENT SEVERE, WITHOUT PSYCHOSIS (HCC): ICD-10-CM

## 2024-04-25 DIAGNOSIS — E83.42 HYPOMAGNESEMIA: ICD-10-CM

## 2024-04-25 DIAGNOSIS — J44.9 COPD, SEVERE (HCC): ICD-10-CM

## 2024-04-25 DIAGNOSIS — Z09 HOSPITAL DISCHARGE FOLLOW-UP: Primary | ICD-10-CM

## 2024-04-25 LAB
ANION GAP SERPL CALCULATED.3IONS-SCNC: 9 MMOL/L (ref 9–16)
BASOPHILS ABSOLUTE: <0.03 K/UL (ref 0–0.2)
BASOPHILS RELATIVE PERCENT: 0 % (ref 0–2)
BUN BLDV-MCNC: 7 MG/DL (ref 8–23)
CALCIUM SERPL-MCNC: 8.8 MG/DL (ref 8.6–10.4)
CHLORIDE BLD-SCNC: 96 MMOL/L (ref 98–107)
CO2: 34 MMOL/L (ref 20–31)
CREAT SERPL-MCNC: 0.6 MG/DL (ref 0.5–0.9)
EOSINOPHILS ABSOLUTE: <0.03 K/UL (ref 0–0.44)
EOSINOPHILS RELATIVE PERCENT: 0 % (ref 1–4)
GFR SERPL CREATININE-BSD FRML MDRD: >90 ML/MIN/1.73M2
GLUCOSE BLD-MCNC: 136 MG/DL (ref 74–99)
HCT VFR BLD CALC: 38.1 % (ref 36.3–47.1)
HEMOGLOBIN: 11.9 G/DL (ref 11.9–15.1)
IMMATURE GRANULOCYTES %: 0 %
IMMATURE GRANULOCYTES ABSOLUTE: <0.03 K/UL (ref 0–0.3)
LYMPHOCYTES ABSOLUTE: 1.53 K/UL (ref 1.1–3.7)
LYMPHOCYTES RELATIVE PERCENT: 26 % (ref 24–43)
MAGNESIUM: 1.9 MG/DL (ref 1.6–2.4)
MCH RBC QN AUTO: 31.1 PG (ref 25.2–33.5)
MCHC RBC AUTO-ENTMCNC: 31.2 G/DL (ref 28.4–34.8)
MCV RBC AUTO: 99.5 FL (ref 82.6–102.9)
MONOCYTES ABSOLUTE: 0.37 K/UL (ref 0.1–1.2)
MONOCYTES RELATIVE PERCENT: 6 % (ref 3–12)
NEUTROPHILS ABSOLUTE: 4.02 K/UL (ref 1.5–8.1)
NEUTROPHILS RELATIVE PERCENT: 68 % (ref 36–65)
NRBC AUTOMATED: 0 PER 100 WBC
PDW BLD-RTO: 15.6 % (ref 11.8–14.4)
PLATELET # BLD: 285 K/UL (ref 138–453)
PMV BLD AUTO: 9.5 FL (ref 8.1–13.5)
POTASSIUM SERPL-SCNC: 5.5 MMOL/L (ref 3.7–5.3)
RBC # BLD: 3.83 M/UL (ref 3.95–5.11)
RBC # BLD: ABNORMAL 10*6/UL
SODIUM BLD-SCNC: 139 MMOL/L (ref 136–145)
WBC # BLD: 6 K/UL (ref 3.5–11.3)

## 2024-04-25 RX ORDER — CLONAZEPAM 0.5 MG/1
0.5 TABLET ORAL 2 TIMES DAILY PRN
Qty: 60 TABLET | Refills: 0
Start: 2024-04-25 | End: 2024-05-25

## 2024-04-25 RX ORDER — CHOLESTYRAMINE 4 G/9G
1 POWDER, FOR SUSPENSION ORAL 3 TIMES DAILY
Qty: 90 PACKET | Refills: 3 | Status: SHIPPED | OUTPATIENT
Start: 2024-04-25

## 2024-04-25 SDOH — ECONOMIC STABILITY: FOOD INSECURITY: WITHIN THE PAST 12 MONTHS, THE FOOD YOU BOUGHT JUST DIDN'T LAST AND YOU DIDN'T HAVE MONEY TO GET MORE.: NEVER TRUE

## 2024-04-25 SDOH — ECONOMIC STABILITY: FOOD INSECURITY: WITHIN THE PAST 12 MONTHS, YOU WORRIED THAT YOUR FOOD WOULD RUN OUT BEFORE YOU GOT MONEY TO BUY MORE.: NEVER TRUE

## 2024-04-25 SDOH — ECONOMIC STABILITY: INCOME INSECURITY: HOW HARD IS IT FOR YOU TO PAY FOR THE VERY BASICS LIKE FOOD, HOUSING, MEDICAL CARE, AND HEATING?: NOT HARD AT ALL

## 2024-04-25 ASSESSMENT — ENCOUNTER SYMPTOMS
VOMITING: 0
ABDOMINAL PAIN: 0
EYE DISCHARGE: 0
RHINORRHEA: 0
DIARRHEA: 0
EYE REDNESS: 0
COUGH: 0
SORE THROAT: 0
WHEEZING: 0
SHORTNESS OF BREATH: 1
NAUSEA: 0

## 2024-04-25 NOTE — PROGRESS NOTES
Post-Discharge Transitional Care Follow Up      Laura Rhodes   YOB: 1960    Date of Office Visit:  4/25/2024  Date of Hospital Admission: 3/31/24  Date of Hospital Discharge: 4/5/24  Readmission Risk Score (high >=14%. Medium >=10%):Readmission Risk Score: 31.5      Care management risk score Rising risk (score 2-5) and Complex Care (Scores >=6): No Risk Score On File     Non face to face  following discharge, date last encounter closed (first attempt may have been earlier): *No documented post hospital discharge outreach found in the last 14 days     Call initiated 2 business days of discharge: *No response recorded in the last 14 days     Hospital discharge follow-up  -     NM DISCHARGE MEDS RECONCILED W/ CURRENT OUTPATIENT MED LIST  Diarrhea of presumed infectious origin  -     CBC with Auto Differential; Future  -     Basic Metabolic Panel; Future  -     Clostridium Difficile Toxin/Antigen; Future  COPD, severe (HCC)  Hypomagnesemia  -     Magnesium; Future  MDD (major depressive disorder), recurrent severe, without psychosis (HCC)  -     clonazePAM (KLONOPIN) 0.5 MG tablet; Take 1 tablet by mouth 2 times daily as needed for Anxiety for up to 30 days. TAKE 1 TABLET THREE TIMES A DAY AS NEEDED FOR ANXIETY Max Daily Amount: 1 mg, Disp-60 tablet, R-0NO PRINT      Medical Decision Making: high complexity  Return in about 3 months (around 7/25/2024).           Subjective:   HPI    Inpatient course: Discharge summary reviewed- see chart.    Interval history/Current status: Patient was admitted to the hospital for COPD exacerbation.  Patient was started on Zithromax 3 times a week by pulmonary for prophylaxis.  Patient now with diarrhea.  States sometimes watery.  Has been going on ever since her hospital admission.  States was low on magnesium and potassium.  These were replaced.  Patient asking to be on potassium effervescent tablet.  States shortness of breath is the same.    Patient had seen

## 2024-04-25 NOTE — CARE COORDINATION
care based on symptoms and resources available to patient including: PCP  Specialist  Urgent care clinics  Home health  When to call 911  [employer]. The patient agrees to contact the PCP office for questions related to their healthcare.     Advance Care Planning:   reviewed and current.     Patients top risk factors for readmission: medical condition-copd  Interventions to address risk factors: Obtained and reviewed discharge summary and/or continuity of care documents    Offered patient enrollment in the Remote Patient Monitoring (RPM) program for in-home monitoring: Patient is not eligible for RPM program because: pt declined  .     Care Transitions Subsequent and Final Call    Subsequent and Final Calls  Have your medications changed?: Yes  Patient Reports: Questan  1 packed TID, potassium 1 tab 2x daily  clonazepam  Do you currently have any active services?: Yes  Are you currently active with any services?: Home Health  Do you have any needs or concerns that I can assist you with?: No  Identified Barriers: None  Care Transitions Interventions  Other Interventions:             LPN Care Coordinator provided contact information for future needs. Plan for follow-up call in 5-7 days based on severity of symptoms and risk factors.  Plan for next call: symptom management-Is new medications Helping ? Did you  get c-diff test done? 5/2 pain management review     Neelima Martinez LPN

## 2024-05-02 ENCOUNTER — CARE COORDINATION (OUTPATIENT)
Dept: CASE MANAGEMENT | Age: 64
End: 2024-05-02

## 2024-05-02 NOTE — CARE COORDINATION
Care Transitions Follow Up Call    Patient Current Location:  Home: [address] [place] [id number]    Care Transition Nurse contacted the patient by telephone to follow up after admission on 3/31.  Verified name and  with patient as identifiers.    Patient: Laura Rhodes  Patient : 1960   MRN: [id number]  Reason for Admission: COPD exacerbation  Discharge Date: 24 RARS: Readmission Risk Score: 31.5      Needs to be reviewed by the provider   Additional needs identified to be addressed with provider: No  none             Method of communication with provider: none.    Spoke with Laura today for transitional follow up call.  She feels pretty good today, has no issues with cough, f/c, n/v, shortness of breath or other issues related to her breathing.  She does have some sinus congestion/drainage recently.  She had been having issues with diarrhea since coming home from the hospital. At first was thinking it was related to the ATB she received, but even after taking probiotic, diarrhea persisted.  She was prescribed Questran by her PCP to help with the diarrhea which did help at first but then she took a dose of Robitussin that she has been using and the diarrhea started back up.  She has stopped using the Robitussin and now bowels are no longer loose.  O2 saturations have been up at 98%, she has some yellow phlegm she brings up on occasion, states the nebulized treatments help bring that up.  We did discuss using Claritin for her sinus issues, expressed allergy season is coming with lots of pollen as she may benefit from taking that daily during allergy season.    Patient denies any new needs or concerns at this time. She is followed by [facility]. Expressed to call with any new needs or issues.     Addressed changes since last contact:  none  Discussed follow-up appointments. If no appointment was previously scheduled, appointment scheduling offered: Yes.   Is follow up appointment scheduled within 7

## 2024-05-07 ENCOUNTER — TELEPHONE (OUTPATIENT)
Dept: PRIMARY CARE CLINIC | Age: 64
End: 2024-05-07

## 2024-05-07 RX ORDER — AMLODIPINE BESYLATE 10 MG/1
10 TABLET ORAL DAILY
Qty: 90 TABLET | Refills: 3 | Status: SHIPPED | OUTPATIENT
Start: 2024-05-07

## 2024-05-07 RX ORDER — CARVEDILOL 25 MG/1
25 TABLET ORAL 2 TIMES DAILY
Qty: 180 TABLET | Refills: 3 | Status: SHIPPED | OUTPATIENT
Start: 2024-05-07

## 2024-05-07 NOTE — TELEPHONE ENCOUNTER
Patient called stating her BP has been running high for the past two weeks. She doesn't see her cardiologist until June. She also stated that her home health nurse supposedly faxed over documents of her BP readings. Patient stated her BP would read higher in the mornings and go down at night but now its remaining close to the same.    /103  today     pt stated the readings have stayed around these numbers     Please advise.

## 2024-05-07 NOTE — TELEPHONE ENCOUNTER
Pt states she has already been taking amlodipine 10mg, rest of her medications on her list are accurate.  Please advise.

## 2024-05-08 ENCOUNTER — CARE COORDINATION (OUTPATIENT)
Dept: CASE MANAGEMENT | Age: 64
End: 2024-05-08

## 2024-05-08 NOTE — TELEPHONE ENCOUNTER
Patient states her blood pressure today was 165/100. Patient states she did not  the carvedilol yet so she will do that today.

## 2024-05-08 NOTE — CARE COORDINATION
Brittany VALADEZ Raymond is here today for Follow-up, Hypertension, and Diabetes    Concerns/symptoms: none    Medications: medications verified, no change  Refills needed today? No     Tobacco history: verified     Advanced Directives: Yes on file.    BP >140/90? No    Care Teams Updated? No    Patient Preference for result Communication via:  home phone      Health Maintenance Due   Topic Date Due   • Shingles Vaccine (1 of 2) Never done   • DTaP/Tdap/Td Vaccine (1 - Tdap) 10/30/1999   • Diabetes Foot Exam  08/21/2020   • Medicare Advantage- Medicare Wellness Visit  01/01/2022      Patient is due for topics as listed above but is not proceeding with anything at this time. .    Immunization History   Administered Date(s) Administered   • COVID-19 12Y+ Pfizer-BioNtech - Requires Dilution 03/18/2021, 04/12/2021, 12/07/2021   • Influenza, Unspecified Formulation 10/18/2005, 11/17/2006, 10/31/2007, 10/22/2008, 12/01/2011, 10/02/2012, 10/03/2013, 10/17/2014   • Influenza, high dose quadrivalent, preservative-free 10/26/2020   • Influenza, high dose seasonal, preservative-free 10/11/2016, 11/09/2017, 10/09/2018, 09/10/2019   • Influenza, injectable, quadrivalent 10/08/2015   • Influenza, seasonal, injectable, trivalent 10/18/2005, 11/17/2006, 10/31/2007, 10/22/2008, 10/04/2011, 12/01/2011, 10/02/2012   • Pneumococcal Conjugate 13 Valent Vacc (Prevnar 13) 10/08/2015   • Pneumococcal polysaccharide, adult, 23 valent 10/06/2011, 10/11/2016   • TD Adult, Adsorbed 10/29/1999   • Td:Adult type tetanus/diphtheria 10/29/1999         PHQ 2:  Date Adult PHQ 2 Score Adult PHQ 2 Interpretation   12/21/2021 0 No further screening needed       PHQ 9:          Care Transitions Follow Up Call    Patient Current Location:  Home: [address] [place] [id number]    LPN Care Coordinator contacted the patient by telephone to follow up after admission on 2024.  Verified name and  with patient as identifiers.    Patient: Laura Rhodes  Patient : 1960   MRN: [id number]  Reason for Admission: COPD exacerbation   Discharge Date: 24 RARS: Readmission Risk Score: 31.5      Needs to be reviewed by the provider   Additional needs identified to be addressed with provider: No  none             Method of communication with provider: none.    Writer spoke with Laura for her final care transitions call. She states she is doing good from a COPD stand point but has been sick for the past 2 days. She states she has been having nausea,vomiting and diarrhea. She states she is drinking water. Advised to also try popsicles or pedialyte pops to assist in maintaining hydration. Advised to call PCP if sxs get worse or do not improve. PCP also increased her Coreg to 25mg BID yesterday due to her BP being elevated. She states she has not yet picked up the new dose. BP yesterday was 165/100. CTN to end care transitions at this time.     Addressed changes since last contact:   Coreg increased  Discussed follow-up appointments. If no appointment was previously scheduled, appointment scheduling offered: Yes.   Is follow up appointment scheduled within 7 days of discharge? No.    Follow Up  Future Appointments   Date Time Provider Department Center   2024  2:45 PM Juli Vann MD [facility]   2024  2:45 PM Gumaro Edward MD [facility]     External follow up appointment(s): N/A    LPN Care Coordinator reviewed red flags with patient and discussed any barriers to care and/or understanding of plan of care after discharge. Discussed appropriate site of care based on symptoms and resources available to patient including: PCP  Specialist  [employer]

## 2024-05-10 ENCOUNTER — TELEPHONE (OUTPATIENT)
Dept: PRIMARY CARE CLINIC | Age: 64
End: 2024-05-10

## 2024-05-10 NOTE — TELEPHONE ENCOUNTER
I recommend she continue to try to get through with the Klonopin 2 times per day and alternate with hydrocodone acetaminophen.

## 2024-05-10 NOTE — TELEPHONE ENCOUNTER
Pt states that her anxiety is out of control and would like to go back on the Klonopin tid, from bid. Not eating and losing a lb a week. Wgt is at 95 lbs. Does not need a rx. Ok to do the med tid?  Dr. Pate pt.

## 2024-05-13 ENCOUNTER — OFFICE VISIT (OUTPATIENT)
Dept: ORTHOPEDIC SURGERY | Age: 64
End: 2024-05-13
Payer: COMMERCIAL

## 2024-05-13 DIAGNOSIS — M25.551 RIGHT HIP PAIN: Primary | ICD-10-CM

## 2024-05-13 PROCEDURE — 3017F COLORECTAL CA SCREEN DOC REV: CPT | Performed by: ORTHOPAEDIC SURGERY

## 2024-05-13 PROCEDURE — G8419 CALC BMI OUT NRM PARAM NOF/U: HCPCS | Performed by: ORTHOPAEDIC SURGERY

## 2024-05-13 PROCEDURE — G8428 CUR MEDS NOT DOCUMENT: HCPCS | Performed by: ORTHOPAEDIC SURGERY

## 2024-05-13 PROCEDURE — 4004F PT TOBACCO SCREEN RCVD TLK: CPT | Performed by: ORTHOPAEDIC SURGERY

## 2024-05-13 PROCEDURE — 99212 OFFICE O/P EST SF 10 MIN: CPT | Performed by: ORTHOPAEDIC SURGERY

## 2024-05-13 NOTE — PROGRESS NOTES
Procedure: Right hip intertrochanteric femur fracture surgical stabilization with intramedullary nail  Date of Procedure: 7/29/2023    HPI: Laura Rhodes is a 63 y.o. old female who is approximately 9.5 months sp the aforementioned procedure. she states that she has been in and out of the hospital for several months now causing her to miss her scheduled follow-up appointments.  Prior to her next the last admission she states that she was virtually pain-free but and spent a lot of time laying in bed for about a month and has since developed pain in the right hip which she localizes to the lateral aspect of the hip, the groin area as well as posterior aspect of the hip/lower back.  It occasionally radiates distally across the anterior aspect of the thigh to about the level of the knee.  She does report having some numbness and tingling in her foot.    Physical Exam:  General Appearance: alert, well appearing, and in no distress  Mental Status: alert, oriented to person, place, and time  Evaluation of the right hip and lower extremity demonstrates her incisions to be appropriately healed. Sensation is grossly intact to light touch in all dermatomes and she has 2+ pedal pulses distally.  She is mildly tender to palpation over the lateral aspect of the hip at about the level of her helical blade as well as over the medial proximal thigh adjacent to the groin    Imaging Studies: An AP of the pelvis as well as AP and lateral views of the right hip completed on 5/13/2024 were reviewed independently demonstrating the intertrochanteric fracture to be in acceptable alignment.  There appears to be complete consolidation across the fracture site without evidence of loosening or migration of her implants.    Impression and plan: Laura Rhodes is a 63 y.o. old female who is approximately 9.5 months sp a right hip intertrochanteric femur fracture surgical stabilization with intramedullary nail.  She appears to be healed

## 2024-05-14 DIAGNOSIS — F33.2 MDD (MAJOR DEPRESSIVE DISORDER), RECURRENT SEVERE, WITHOUT PSYCHOSIS (HCC): ICD-10-CM

## 2024-05-14 RX ORDER — CLONAZEPAM 0.5 MG/1
0.5 TABLET ORAL 3 TIMES DAILY PRN
Qty: 42 TABLET | Refills: 0 | Status: SHIPPED | OUTPATIENT
Start: 2024-05-14 | End: 2024-05-28

## 2024-05-21 ENCOUNTER — TELEPHONE (OUTPATIENT)
Dept: PRIMARY CARE CLINIC | Age: 64
End: 2024-05-21

## 2024-05-21 NOTE — TELEPHONE ENCOUNTER
Patient scheduled an appt for 6/11 but would like to be seen sooner. She is still vomiting and having diarrhea losing weight. She also needs to address her anxiety and  hernia.    Please advise.

## 2024-05-23 ENCOUNTER — OFFICE VISIT (OUTPATIENT)
Dept: PRIMARY CARE CLINIC | Age: 64
End: 2024-05-23
Payer: COMMERCIAL

## 2024-05-23 VITALS
DIASTOLIC BLOOD PRESSURE: 80 MMHG | SYSTOLIC BLOOD PRESSURE: 124 MMHG | OXYGEN SATURATION: 97 % | HEART RATE: 84 BPM | BODY MASS INDEX: 17.89 KG/M2 | WEIGHT: 101 LBS | HEIGHT: 63 IN

## 2024-05-23 DIAGNOSIS — R19.7 DIARRHEA, UNSPECIFIED TYPE: ICD-10-CM

## 2024-05-23 DIAGNOSIS — E43 SEVERE MALNUTRITION (HCC): ICD-10-CM

## 2024-05-23 DIAGNOSIS — E03.9 ACQUIRED HYPOTHYROIDISM: ICD-10-CM

## 2024-05-23 DIAGNOSIS — E83.42 HYPOMAGNESEMIA: ICD-10-CM

## 2024-05-23 DIAGNOSIS — J44.9 COPD, SEVERE (HCC): ICD-10-CM

## 2024-05-23 DIAGNOSIS — R19.7 DIARRHEA OF PRESUMED INFECTIOUS ORIGIN: Primary | ICD-10-CM

## 2024-05-23 DIAGNOSIS — I10 ESSENTIAL HYPERTENSION: ICD-10-CM

## 2024-05-23 PROCEDURE — 3079F DIAST BP 80-89 MM HG: CPT | Performed by: FAMILY MEDICINE

## 2024-05-23 PROCEDURE — G8427 DOCREV CUR MEDS BY ELIG CLIN: HCPCS | Performed by: FAMILY MEDICINE

## 2024-05-23 PROCEDURE — G8419 CALC BMI OUT NRM PARAM NOF/U: HCPCS | Performed by: FAMILY MEDICINE

## 2024-05-23 PROCEDURE — 3017F COLORECTAL CA SCREEN DOC REV: CPT | Performed by: FAMILY MEDICINE

## 2024-05-23 PROCEDURE — 3023F SPIROM DOC REV: CPT | Performed by: FAMILY MEDICINE

## 2024-05-23 PROCEDURE — 3074F SYST BP LT 130 MM HG: CPT | Performed by: FAMILY MEDICINE

## 2024-05-23 PROCEDURE — 4004F PT TOBACCO SCREEN RCVD TLK: CPT | Performed by: FAMILY MEDICINE

## 2024-05-23 PROCEDURE — 99214 OFFICE O/P EST MOD 30 MIN: CPT | Performed by: FAMILY MEDICINE

## 2024-05-23 RX ORDER — CHOLESTYRAMINE 4 G/9G
1 POWDER, FOR SUSPENSION ORAL 3 TIMES DAILY
Qty: 90 PACKET | Refills: 3 | Status: ON HOLD | OUTPATIENT
Start: 2024-05-23

## 2024-05-23 ASSESSMENT — ENCOUNTER SYMPTOMS
NAUSEA: 1
SHORTNESS OF BREATH: 0
RHINORRHEA: 0
ABDOMINAL PAIN: 0
EYE REDNESS: 0
COUGH: 0
SORE THROAT: 0
WHEEZING: 0
VOMITING: 0
DIARRHEA: 1
EYE DISCHARGE: 0

## 2024-05-23 NOTE — PROGRESS NOTES
[facility]  [facility]  [address] [address]  [facility] [address]  Dept: [phone]    Laura Rhodes is a 63 y.o. female Established patient, who presents today for her medical conditions/complaints as noted below.      Chief Complaint   Patient presents with    Anxiety       HPI:     HPI  Pt states anxiety is severe.   states he thinks she needs to be in the hospital.  Pt has difficulty functioning.  High risk of falls. States has persistent nausea.     Pt seen ortho, told hip fracture doing well.      Pt had seen dr. Briceño, pain management.  Given Norco.  Send visit was with nurse practioner, told needed to cut her klonopin down.      Pt states cannot decrease her klonopin to two daily.  Anxiety is extreme.  Pt feels she can do well with one pain pill per day.     Pt states if she eats, has vomiting.     Patient continues to be very weak.  Patient states when she goes to the hospital gets admitted, she does well for a few days but then goes back to smoking and then becomes extremely weak again.  Patient having difficulties even getting to the bathroom.  Having difficulty doing ADLs at home.    Reviewed prior notes None  Reviewed previous Labs    No components found for: \"LDLCHOLESTEROL\", \"LDLCALC\"    (goal LDL is <100)   AST (U/L)   Date Value   03/31/2024 12     ALT (U/L)   Date Value   03/31/2024 5     BUN (mg/dL)   Date Value   04/25/2024 7 (L)     Hemoglobin A1C (%)   Date Value   12/13/2023 5.2     TSH (uIU/mL)   Date Value   02/01/2024 1.89     BP Readings from Last 3 Encounters:   05/23/24 124/80   04/25/24 124/80   04/05/24 115/64          (goal 120/80)    Past Medical History:   Diagnosis Date    Acid reflux     Anxiety     Arthritis     Cancer (HCC)     cervical    Cervical osteoarthritis 11/30/2020    COPD (chronic obstructive pulmonary disease) (HCC) 02/23/2018    DDD (degenerative disc disease), cervical     Depression     Hiatal hernia     History of

## 2024-05-25 ENCOUNTER — HOSPITAL ENCOUNTER (INPATIENT)
Age: 64
LOS: 8 days | Discharge: HOME | DRG: 438 | End: 2024-06-02
Attending: EMERGENCY MEDICINE | Admitting: INTERNAL MEDICINE
Payer: COMMERCIAL

## 2024-05-25 ENCOUNTER — APPOINTMENT (OUTPATIENT)
Dept: CT IMAGING | Age: 64
DRG: 438 | End: 2024-05-25
Payer: COMMERCIAL

## 2024-05-25 ENCOUNTER — APPOINTMENT (OUTPATIENT)
Dept: GENERAL RADIOLOGY | Age: 64
DRG: 438 | End: 2024-05-25
Attending: EMERGENCY MEDICINE
Payer: COMMERCIAL

## 2024-05-25 DIAGNOSIS — K85.90 ACUTE PANCREATITIS, UNSPECIFIED COMPLICATION STATUS, UNSPECIFIED PANCREATITIS TYPE: Primary | ICD-10-CM

## 2024-05-25 DIAGNOSIS — J44.1 COPD EXACERBATION (HCC): ICD-10-CM

## 2024-05-25 DIAGNOSIS — E86.0 DEHYDRATION: ICD-10-CM

## 2024-05-25 DIAGNOSIS — R10.13 EPIGASTRIC PAIN: ICD-10-CM

## 2024-05-25 LAB
ALBUMIN SERPL-MCNC: 3.8 G/DL (ref 3.5–5.2)
ALP SERPL-CCNC: 58 U/L (ref 35–104)
ALT SERPL-CCNC: 9 U/L (ref 5–33)
ANION GAP SERPL CALCULATED.3IONS-SCNC: 11 MMOL/L (ref 9–17)
AST SERPL-CCNC: 16 U/L
BACTERIA URNS QL MICRO: ABNORMAL
BASOPHILS # BLD: 0 K/UL (ref 0–0.2)
BASOPHILS NFR BLD: 0 % (ref 0–2)
BILIRUB SERPL-MCNC: 0.3 MG/DL (ref 0.3–1.2)
BILIRUB UR QL STRIP: NEGATIVE
BUN SERPL-MCNC: 9 MG/DL (ref 8–23)
CALCIUM SERPL-MCNC: 8.3 MG/DL (ref 8.6–10.4)
CHLORIDE SERPL-SCNC: 92 MMOL/L (ref 98–107)
CLARITY UR: CLEAR
CO2 SERPL-SCNC: 32 MMOL/L (ref 20–31)
COLOR UR: YELLOW
CREAT SERPL-MCNC: 0.4 MG/DL (ref 0.5–0.9)
EOSINOPHIL # BLD: 0 K/UL (ref 0–0.4)
EOSINOPHILS RELATIVE PERCENT: 0 % (ref 0–4)
EPI CELLS #/AREA URNS HPF: ABNORMAL /HPF
ERYTHROCYTE [DISTWIDTH] IN BLOOD BY AUTOMATED COUNT: 15.7 % (ref 11.5–14.9)
GFR, ESTIMATED: >90 ML/MIN/1.73M2
GLUCOSE SERPL-MCNC: 103 MG/DL (ref 70–99)
GLUCOSE UR STRIP-MCNC: NEGATIVE MG/DL
HCT VFR BLD AUTO: 37.5 % (ref 36–46)
HGB BLD-MCNC: 12.4 G/DL (ref 12–16)
HGB UR QL STRIP.AUTO: NEGATIVE
KETONES UR STRIP-MCNC: NEGATIVE MG/DL
LACTATE BLDV-SCNC: 3.6 MMOL/L (ref 0.5–2.2)
LEUKOCYTE ESTERASE UR QL STRIP: ABNORMAL
LIPASE SERPL-CCNC: 269 U/L (ref 13–60)
LYMPHOCYTES NFR BLD: 0.6 K/UL (ref 1–4.8)
LYMPHOCYTES RELATIVE PERCENT: 8 % (ref 24–44)
MAGNESIUM SERPL-MCNC: 1.7 MG/DL (ref 1.6–2.6)
MCH RBC QN AUTO: 32.6 PG (ref 26–34)
MCHC RBC AUTO-ENTMCNC: 32.9 G/DL (ref 31–37)
MCV RBC AUTO: 99.2 FL (ref 80–100)
MONOCYTES NFR BLD: 0.5 K/UL (ref 0.1–1.3)
MONOCYTES NFR BLD: 6 % (ref 1–7)
NEUTROPHILS NFR BLD: 86 % (ref 36–66)
NEUTS SEG NFR BLD: 6.2 K/UL (ref 1.3–9.1)
NITRITE UR QL STRIP: NEGATIVE
PH UR STRIP: 5.5 [PH] (ref 5–8)
PLATELET # BLD AUTO: 278 K/UL (ref 150–450)
PMV BLD AUTO: 7.2 FL (ref 6–12)
POTASSIUM SERPL-SCNC: 3.9 MMOL/L (ref 3.7–5.3)
PROT SERPL-MCNC: 6 G/DL (ref 6.4–8.3)
PROT UR STRIP-MCNC: NEGATIVE MG/DL
RBC # BLD AUTO: 3.78 M/UL (ref 4–5.2)
RBC #/AREA URNS HPF: ABNORMAL /HPF
SODIUM SERPL-SCNC: 135 MMOL/L (ref 135–144)
SP GR UR STRIP: 1 (ref 1–1.03)
UROBILINOGEN UR STRIP-ACNC: NORMAL EU/DL (ref 0–1)
WBC #/AREA URNS HPF: ABNORMAL /HPF
WBC OTHER # BLD: 7.2 K/UL (ref 3.5–11)

## 2024-05-25 PROCEDURE — 99285 EMERGENCY DEPT VISIT HI MDM: CPT

## 2024-05-25 PROCEDURE — 36415 COLL VENOUS BLD VENIPUNCTURE: CPT

## 2024-05-25 PROCEDURE — 74177 CT ABD & PELVIS W/CONTRAST: CPT

## 2024-05-25 PROCEDURE — 85025 COMPLETE CBC W/AUTO DIFF WBC: CPT

## 2024-05-25 PROCEDURE — 99223 1ST HOSP IP/OBS HIGH 75: CPT | Performed by: INTERNAL MEDICINE

## 2024-05-25 PROCEDURE — 6360000002 HC RX W HCPCS

## 2024-05-25 PROCEDURE — 6360000002 HC RX W HCPCS: Performed by: EMERGENCY MEDICINE

## 2024-05-25 PROCEDURE — 81001 URINALYSIS AUTO W/SCOPE: CPT

## 2024-05-25 PROCEDURE — 2580000003 HC RX 258: Performed by: EMERGENCY MEDICINE

## 2024-05-25 PROCEDURE — 6370000000 HC RX 637 (ALT 250 FOR IP): Performed by: INTERNAL MEDICINE

## 2024-05-25 PROCEDURE — 71045 X-RAY EXAM CHEST 1 VIEW: CPT

## 2024-05-25 PROCEDURE — 80053 COMPREHEN METABOLIC PANEL: CPT

## 2024-05-25 PROCEDURE — 2700000000 HC OXYGEN THERAPY PER DAY

## 2024-05-25 PROCEDURE — 6370000000 HC RX 637 (ALT 250 FOR IP)

## 2024-05-25 PROCEDURE — 94761 N-INVAS EAR/PLS OXIMETRY MLT: CPT

## 2024-05-25 PROCEDURE — 96374 THER/PROPH/DIAG INJ IV PUSH: CPT

## 2024-05-25 PROCEDURE — 5A09557 ASSISTANCE WITH RESPIRATORY VENTILATION, GREATER THAN 96 CONSECUTIVE HOURS, CONTINUOUS POSITIVE AIRWAY PRESSURE: ICD-10-PCS | Performed by: INTERNAL MEDICINE

## 2024-05-25 PROCEDURE — 2580000003 HC RX 258

## 2024-05-25 PROCEDURE — 83735 ASSAY OF MAGNESIUM: CPT

## 2024-05-25 PROCEDURE — 6360000004 HC RX CONTRAST MEDICATION: Performed by: EMERGENCY MEDICINE

## 2024-05-25 PROCEDURE — 6370000000 HC RX 637 (ALT 250 FOR IP): Performed by: EMERGENCY MEDICINE

## 2024-05-25 PROCEDURE — 83690 ASSAY OF LIPASE: CPT

## 2024-05-25 PROCEDURE — 96361 HYDRATE IV INFUSION ADD-ON: CPT

## 2024-05-25 PROCEDURE — 94660 CPAP INITIATION&MGMT: CPT

## 2024-05-25 PROCEDURE — 94640 AIRWAY INHALATION TREATMENT: CPT

## 2024-05-25 PROCEDURE — 1200000000 HC SEMI PRIVATE

## 2024-05-25 PROCEDURE — 83605 ASSAY OF LACTIC ACID: CPT

## 2024-05-25 RX ORDER — POTASSIUM CHLORIDE 7.45 MG/ML
10 INJECTION INTRAVENOUS PRN
Status: DISCONTINUED | OUTPATIENT
Start: 2024-05-25 | End: 2024-06-02 | Stop reason: HOSPADM

## 2024-05-25 RX ORDER — CARVEDILOL 25 MG/1
25 TABLET ORAL 2 TIMES DAILY
Status: DISCONTINUED | OUTPATIENT
Start: 2024-05-25 | End: 2024-05-25

## 2024-05-25 RX ORDER — DEXTROSE MONOHYDRATE 100 MG/ML
INJECTION, SOLUTION INTRAVENOUS CONTINUOUS PRN
Status: DISCONTINUED | OUTPATIENT
Start: 2024-05-25 | End: 2024-06-02 | Stop reason: HOSPADM

## 2024-05-25 RX ORDER — TIZANIDINE 4 MG/1
4 TABLET ORAL EVERY 8 HOURS PRN
Status: DISCONTINUED | OUTPATIENT
Start: 2024-05-25 | End: 2024-06-02 | Stop reason: HOSPADM

## 2024-05-25 RX ORDER — ONDANSETRON 4 MG/1
4 TABLET, ORALLY DISINTEGRATING ORAL EVERY 8 HOURS PRN
Status: DISCONTINUED | OUTPATIENT
Start: 2024-05-25 | End: 2024-06-02 | Stop reason: HOSPADM

## 2024-05-25 RX ORDER — AMLODIPINE BESYLATE 10 MG/1
10 TABLET ORAL DAILY
Status: DISCONTINUED | OUTPATIENT
Start: 2024-05-25 | End: 2024-05-25

## 2024-05-25 RX ORDER — LEVOTHYROXINE SODIUM 0.07 MG/1
75 TABLET ORAL DAILY
Status: DISCONTINUED | OUTPATIENT
Start: 2024-05-25 | End: 2024-06-02 | Stop reason: HOSPADM

## 2024-05-25 RX ORDER — SODIUM CHLORIDE 9 MG/ML
INJECTION, SOLUTION INTRAVENOUS PRN
Status: DISCONTINUED | OUTPATIENT
Start: 2024-05-25 | End: 2024-06-02 | Stop reason: HOSPADM

## 2024-05-25 RX ORDER — LANOLIN ALCOHOL/MO/W.PET/CERES
400 CREAM (GRAM) TOPICAL DAILY
Status: DISCONTINUED | OUTPATIENT
Start: 2024-05-25 | End: 2024-06-02 | Stop reason: HOSPADM

## 2024-05-25 RX ORDER — ONDANSETRON 2 MG/ML
4 INJECTION INTRAMUSCULAR; INTRAVENOUS EVERY 6 HOURS PRN
Status: DISCONTINUED | OUTPATIENT
Start: 2024-05-25 | End: 2024-06-02 | Stop reason: HOSPADM

## 2024-05-25 RX ORDER — PANTOPRAZOLE SODIUM 40 MG/1
40 TABLET, DELAYED RELEASE ORAL
Status: DISCONTINUED | OUTPATIENT
Start: 2024-05-26 | End: 2024-05-26

## 2024-05-25 RX ORDER — PRIMIDONE 50 MG/1
100 TABLET ORAL NIGHTLY
Status: DISCONTINUED | OUTPATIENT
Start: 2024-05-25 | End: 2024-06-02 | Stop reason: HOSPADM

## 2024-05-25 RX ORDER — MAGNESIUM SULFATE HEPTAHYDRATE 40 MG/ML
2000 INJECTION, SOLUTION INTRAVENOUS PRN
Status: DISCONTINUED | OUTPATIENT
Start: 2024-05-25 | End: 2024-06-02 | Stop reason: HOSPADM

## 2024-05-25 RX ORDER — SODIUM CHLORIDE 0.9 % (FLUSH) 0.9 %
5-40 SYRINGE (ML) INJECTION PRN
Status: DISCONTINUED | OUTPATIENT
Start: 2024-05-25 | End: 2024-06-02 | Stop reason: HOSPADM

## 2024-05-25 RX ORDER — MORPHINE SULFATE 4 MG/ML
4 INJECTION, SOLUTION INTRAMUSCULAR; INTRAVENOUS ONCE
Status: COMPLETED | OUTPATIENT
Start: 2024-05-25 | End: 2024-05-25

## 2024-05-25 RX ORDER — SODIUM CHLORIDE 0.9 % (FLUSH) 0.9 %
5-40 SYRINGE (ML) INJECTION EVERY 12 HOURS SCHEDULED
Status: DISCONTINUED | OUTPATIENT
Start: 2024-05-25 | End: 2024-06-02 | Stop reason: HOSPADM

## 2024-05-25 RX ORDER — CLONAZEPAM 0.5 MG/1
0.5 TABLET ORAL 3 TIMES DAILY PRN
Status: DISCONTINUED | OUTPATIENT
Start: 2024-05-25 | End: 2024-06-02 | Stop reason: HOSPADM

## 2024-05-25 RX ORDER — AZITHROMYCIN 250 MG/1
250 TABLET, FILM COATED ORAL
Status: DISCONTINUED | OUTPATIENT
Start: 2024-05-27 | End: 2024-06-02 | Stop reason: HOSPADM

## 2024-05-25 RX ORDER — ALBUTEROL SULFATE 2.5 MG/3ML
2.5 SOLUTION RESPIRATORY (INHALATION) EVERY 4 HOURS PRN
Status: DISCONTINUED | OUTPATIENT
Start: 2024-05-25 | End: 2024-06-02 | Stop reason: HOSPADM

## 2024-05-25 RX ORDER — AMLODIPINE BESYLATE 10 MG/1
10 TABLET ORAL DAILY
Status: DISCONTINUED | OUTPATIENT
Start: 2024-05-25 | End: 2024-06-02 | Stop reason: HOSPADM

## 2024-05-25 RX ORDER — SODIUM CHLORIDE 0.9 % (FLUSH) 0.9 %
10 SYRINGE (ML) INJECTION PRN
Status: DISCONTINUED | OUTPATIENT
Start: 2024-05-25 | End: 2024-06-02 | Stop reason: HOSPADM

## 2024-05-25 RX ORDER — POTASSIUM CHLORIDE 20 MEQ/1
40 TABLET, EXTENDED RELEASE ORAL PRN
Status: DISCONTINUED | OUTPATIENT
Start: 2024-05-25 | End: 2024-06-02 | Stop reason: HOSPADM

## 2024-05-25 RX ORDER — LANOLIN ALCOHOL/MO/W.PET/CERES
3 CREAM (GRAM) TOPICAL NIGHTLY PRN
Status: DISCONTINUED | OUTPATIENT
Start: 2024-05-25 | End: 2024-06-02 | Stop reason: HOSPADM

## 2024-05-25 RX ORDER — MONTELUKAST SODIUM 10 MG/1
10 TABLET ORAL DAILY
Status: DISCONTINUED | OUTPATIENT
Start: 2024-05-25 | End: 2024-06-02 | Stop reason: HOSPADM

## 2024-05-25 RX ORDER — VENLAFAXINE HYDROCHLORIDE 150 MG/1
150 CAPSULE, EXTENDED RELEASE ORAL DAILY
Status: DISCONTINUED | OUTPATIENT
Start: 2024-05-25 | End: 2024-06-02 | Stop reason: HOSPADM

## 2024-05-25 RX ORDER — ALBUTEROL SULFATE 90 UG/1
2 AEROSOL, METERED RESPIRATORY (INHALATION) EVERY 4 HOURS PRN
Status: DISCONTINUED | OUTPATIENT
Start: 2024-05-25 | End: 2024-06-02 | Stop reason: HOSPADM

## 2024-05-25 RX ORDER — MORPHINE SULFATE 2 MG/ML
1 INJECTION, SOLUTION INTRAMUSCULAR; INTRAVENOUS EVERY 4 HOURS PRN
Status: DISCONTINUED | OUTPATIENT
Start: 2024-05-25 | End: 2024-05-26

## 2024-05-25 RX ORDER — ACETAMINOPHEN 325 MG/1
650 TABLET ORAL EVERY 6 HOURS PRN
Status: DISCONTINUED | OUTPATIENT
Start: 2024-05-25 | End: 2024-06-02 | Stop reason: HOSPADM

## 2024-05-25 RX ORDER — POLYETHYLENE GLYCOL 3350 17 G/17G
17 POWDER, FOR SOLUTION ORAL DAILY PRN
Status: DISCONTINUED | OUTPATIENT
Start: 2024-05-25 | End: 2024-06-02 | Stop reason: HOSPADM

## 2024-05-25 RX ORDER — CARVEDILOL 25 MG/1
25 TABLET ORAL 2 TIMES DAILY
Status: DISCONTINUED | OUTPATIENT
Start: 2024-05-25 | End: 2024-05-30

## 2024-05-25 RX ORDER — IPRATROPIUM BROMIDE AND ALBUTEROL SULFATE 2.5; .5 MG/3ML; MG/3ML
1 SOLUTION RESPIRATORY (INHALATION)
Status: DISCONTINUED | OUTPATIENT
Start: 2024-05-25 | End: 2024-05-27

## 2024-05-25 RX ORDER — CHOLESTYRAMINE 4 G/9G
1 POWDER, FOR SUSPENSION ORAL 3 TIMES DAILY
Status: CANCELLED | OUTPATIENT
Start: 2024-05-25

## 2024-05-25 RX ORDER — 0.9 % SODIUM CHLORIDE 0.9 %
1000 INTRAVENOUS SOLUTION INTRAVENOUS ONCE
Status: COMPLETED | OUTPATIENT
Start: 2024-05-25 | End: 2024-05-25

## 2024-05-25 RX ORDER — ASCORBIC ACID 500 MG
250 TABLET ORAL DAILY
Status: DISCONTINUED | OUTPATIENT
Start: 2024-05-25 | End: 2024-06-02 | Stop reason: HOSPADM

## 2024-05-25 RX ORDER — 0.9 % SODIUM CHLORIDE 0.9 %
100 INTRAVENOUS SOLUTION INTRAVENOUS ONCE
Status: COMPLETED | OUTPATIENT
Start: 2024-05-25 | End: 2024-05-25

## 2024-05-25 RX ORDER — ENOXAPARIN SODIUM 100 MG/ML
30 INJECTION SUBCUTANEOUS DAILY
Status: DISCONTINUED | OUTPATIENT
Start: 2024-05-25 | End: 2024-06-02 | Stop reason: HOSPADM

## 2024-05-25 RX ORDER — ACETAMINOPHEN 650 MG/1
650 SUPPOSITORY RECTAL EVERY 6 HOURS PRN
Status: DISCONTINUED | OUTPATIENT
Start: 2024-05-25 | End: 2024-06-02 | Stop reason: HOSPADM

## 2024-05-25 RX ADMIN — SODIUM CHLORIDE 1000 ML: 9 INJECTION, SOLUTION INTRAVENOUS at 15:38

## 2024-05-25 RX ADMIN — PRIMIDONE 100 MG: 50 TABLET ORAL at 21:50

## 2024-05-25 RX ADMIN — MORPHINE SULFATE 1 MG: 2 INJECTION, SOLUTION INTRAMUSCULAR; INTRAVENOUS at 18:17

## 2024-05-25 RX ADMIN — WATER 125 MG: 1 INJECTION INTRAMUSCULAR; INTRAVENOUS; SUBCUTANEOUS at 14:05

## 2024-05-25 RX ADMIN — ENOXAPARIN SODIUM 30 MG: 100 INJECTION SUBCUTANEOUS at 18:12

## 2024-05-25 RX ADMIN — IPRATROPIUM BROMIDE AND ALBUTEROL SULFATE 1 DOSE: 2.5; .5 SOLUTION RESPIRATORY (INHALATION) at 19:14

## 2024-05-25 RX ADMIN — SODIUM CHLORIDE 100 ML: 9 INJECTION, SOLUTION INTRAVENOUS at 14:22

## 2024-05-25 RX ADMIN — CHOLECALCIFEROL TAB 125 MCG (5000 UNIT) 5000 UNITS: 125 TAB at 18:11

## 2024-05-25 RX ADMIN — OXYCODONE HYDROCHLORIDE AND ACETAMINOPHEN 250 MG: 500 TABLET ORAL at 17:34

## 2024-05-25 RX ADMIN — SODIUM CHLORIDE, PRESERVATIVE FREE 10 ML: 5 INJECTION INTRAVENOUS at 14:22

## 2024-05-25 RX ADMIN — CLONAZEPAM 0.5 MG: 0.5 TABLET ORAL at 21:43

## 2024-05-25 RX ADMIN — WATER 40 MG: 1 INJECTION INTRAMUSCULAR; INTRAVENOUS; SUBCUTANEOUS at 17:34

## 2024-05-25 RX ADMIN — IPRATROPIUM BROMIDE AND ALBUTEROL SULFATE 1 DOSE: 2.5; .5 SOLUTION RESPIRATORY (INHALATION) at 15:32

## 2024-05-25 RX ADMIN — MONTELUKAST 10 MG: 10 TABLET, FILM COATED ORAL at 17:34

## 2024-05-25 RX ADMIN — Medication 3 MG: at 21:43

## 2024-05-25 RX ADMIN — SODIUM CHLORIDE, PRESERVATIVE FREE 10 ML: 5 INJECTION INTRAVENOUS at 21:43

## 2024-05-25 RX ADMIN — CARVEDILOL 25 MG: 25 TABLET, FILM COATED ORAL at 18:11

## 2024-05-25 RX ADMIN — TIZANIDINE 4 MG: 4 TABLET ORAL at 21:43

## 2024-05-25 RX ADMIN — Medication 400 MG: at 17:33

## 2024-05-25 RX ADMIN — MIRTAZAPINE 45 MG: 30 TABLET, FILM COATED ORAL at 21:43

## 2024-05-25 RX ADMIN — IOPAMIDOL 75 ML: 755 INJECTION, SOLUTION INTRAVENOUS at 14:22

## 2024-05-25 RX ADMIN — ALBUTEROL SULFATE 2.5 MG: 2.5 SOLUTION RESPIRATORY (INHALATION) at 22:12

## 2024-05-25 RX ADMIN — MORPHINE SULFATE 4 MG: 4 INJECTION, SOLUTION INTRAMUSCULAR; INTRAVENOUS at 15:38

## 2024-05-25 ASSESSMENT — PAIN SCALES - GENERAL
PAINLEVEL_OUTOF10: 9
PAINLEVEL_OUTOF10: 9
PAINLEVEL_OUTOF10: 6
PAINLEVEL_OUTOF10: 8

## 2024-05-25 ASSESSMENT — PAIN DESCRIPTION - DESCRIPTORS
DESCRIPTORS: ACHING
DESCRIPTORS: ACHING;CRAMPING

## 2024-05-25 ASSESSMENT — PAIN DESCRIPTION - LOCATION
LOCATION: ABDOMEN;BACK;HIP
LOCATION: ABDOMEN;BACK;HIP
LOCATION: ABDOMEN
LOCATION: ABDOMEN

## 2024-05-25 ASSESSMENT — PAIN DESCRIPTION - ORIENTATION: ORIENTATION: MID

## 2024-05-25 ASSESSMENT — PAIN DESCRIPTION - PAIN TYPE: TYPE: ACUTE PAIN

## 2024-05-25 ASSESSMENT — PAIN - FUNCTIONAL ASSESSMENT: PAIN_FUNCTIONAL_ASSESSMENT: 0-10

## 2024-05-25 NOTE — ED NOTES
Report given to RN from [facility].   Report method by phone   The following was reviewed with receiving RN:   Current vital signs:  BP (!) 131/90   Pulse 76   Temp 98.3 °F (36.8 °C)   Resp 16   Ht 1.6 m (5' 3\")   Wt 44.3 kg (97 lb 9.6 oz)   SpO2 97%   BMI 17.29 kg/m²                MEWS Score: 2     Any medication or safety alerts were reviewed. Any pending diagnostics and notifications were also reviewed, as well as any safety concerns or issues, abnormal labs, abnormal imaging, and abnormal assessment findings. Questions were answered.

## 2024-05-25 NOTE — H&P
[facility]  IN-PATIENT SERVICE  [facility]  IN-PATIENT SERVICE   [facility]     HISTORY AND PHYSICAL EXAMINATION            Date:   5/25/2024  Patient name:  Laura Rhodes  Date of admission:  5/25/2024 12:54 PM  MRN:   [id number]  Account:  [id number]  YOB: 1960  PCP:    Van Pate MD  Room:   FLOYD/FLOYD  Code Status:    Prior    Chief Complaint:     Chief Complaint   Patient presents with    Abdominal Pain    Emesis    Diarrhea       History Obtained From:     patient, electronic medical record    History of Present Illness:     The patient is a 63 y.o.  Non- / non  female who presents withAbdominal Pain, Emesis, and Diarrhea   and she is admitted to the hospital for the management of dehydration and COPD.  Patient was discharged from the hospital on 4/5/2024 for COPD exacerbation.  She discharged with oral prednisone taper.  Patient states that she has been having diarrhea for the past month.  Patient saw primary care physician on 5/23/2024.  Blood work as well as stool analysis for C. difficile, elastase, and lactoferrin but were never completed.  She was restarted on Questran for chronic diarrhea but she says that if she takes this medication, she gets constipated and will then have diarrhea after missing dose.  Moreover, she states that she vomits 12 hours after having a meal.  Whenever she has dinner, which is her only meal of the day, she will vomit the next morning.  If she eats, she also endorsed feeling of illness and gets palpitations.  She tried smaller meals such as eating few crackers but she had emesis this morning.  Patient wears 2 to 3 L of oxygen nasal cannula at home.  Over the past month, she has lost 5 pounds.  She denies any blood in the vomit or stool.  She states that liquids are staying down.  She does have an increase sense of shortness of breath as

## 2024-05-25 NOTE — ED PROVIDER NOTES
Result Value    RBC 3.78 (*)     RDW 15.7 (*)     Neutrophils % 86 (*)     Lymphocytes % 8 (*)     Lymphocytes Absolute 0.60 (*)     All other components within normal limits   COMPREHENSIVE METABOLIC PANEL - Abnormal; Notable for the following components:    Chloride 92 (*)     CO2 32 (*)     Glucose 103 (*)     Creatinine 0.4 (*)     Calcium 8.3 (*)     Total Protein 6.0 (*)     All other components within normal limits   LACTIC ACID - Abnormal; Notable for the following components:    Lactic Acid 3.6 (*)     All other components within normal limits   LIPASE - Abnormal; Notable for the following components:    Lipase 269 (*)     All other components within normal limits   URINALYSIS WITH MICROSCOPIC - Abnormal; Notable for the following components:    Leukocyte Esterase, Urine TRACE (*)     WBC, UA 3 to 5 (*)     Bacteria, UA FEW (*)     All other components within normal limits   C DIFF TOXIN/ANTIGEN   MAGNESIUM   LIPID PANEL   BASIC METABOLIC PANEL W/ REFLEX TO MG FOR LOW K   CBC WITH AUTO DIFFERENTIAL       Vitals Reviewed:    Vitals:    05/25/24 1500 05/25/24 1532 05/25/24 1630 05/25/24 1745   BP: (!) 131/90  113/71 (!) 145/71   Pulse: 88 76 80 81   Resp: 21 16 16 16   Temp:   98.8 °F (37.1 °C) 98.2 °F (36.8 °C)   TempSrc:   Oral Oral   SpO2: 97% 97% 98% 98%   Weight:       Height:         MEDICATIONS GIVEN TO PATIENT THIS ENCOUNTER:  Orders Placed This Encounter   Medications    methylPREDNISolone sodium succ (SOLU-MEDROL) 125 mg in sterile water 2 mL injection    ipratropium 0.5 mg-albuterol 2.5 mg (DUONEB) nebulizer solution 1 Dose     Order Specific Question:   Initiate RT Bronchodilator Protocol     Answer:   Yes - Inpatient Protocol    sodium chloride 0.9 % bolus 100 mL    iopamidol (ISOVUE-370) 76 % injection 75 mL    sodium chloride flush 0.9 % injection 10 mL    sodium chloride 0.9 % bolus 1,000 mL    morphine injection 4 mg    albuterol sulfate HFA (PROVENTIL;VENTOLIN;PROAIR) 108 (90 Base) MCG/ACT

## 2024-05-25 NOTE — ED TRIAGE NOTES
Mode of arrival (squad #, walk in, police, etc) : EMS        Chief complaint(s): abd pain, N/V/D        Arrival Note (brief scenario, treatment PTA, etc).: BIB EMS d/t c/o persistent abd pain, N/V/D, symptom onset x1 month. Seen by PCP on 5/23 and outpt orders received for labs, urine, and stool. Pt states orders not completed d/t  having to work and no transportation to get to facility. Does admit to ETOH this a.m., one beer. VSS, afebrile. Pt states decreased appetite and increased weakness. Arrived on 3L NC, Hx COPD, wears 3L NC continuously at home.        C= \"Have you ever felt that you should Cut down on your drinking?\"  No  A= \"Have people Annoyed you by criticizing your drinking?\"  No  G= \"Have you ever felt bad or Guilty about your drinking?\"  No  E= \"Have you ever had a drink as an Eye-opener first thing in the morning to steady your nerves or to help a hangover?\"  No      Deferred []      Reason for deferring: N/A    *If yes to two or more: probable alcohol abuse.*

## 2024-05-26 ENCOUNTER — APPOINTMENT (OUTPATIENT)
Dept: GENERAL RADIOLOGY | Age: 64
DRG: 438 | End: 2024-05-26
Payer: COMMERCIAL

## 2024-05-26 LAB
ANION GAP SERPL CALCULATED.3IONS-SCNC: 6 MMOL/L (ref 9–17)
BASOPHILS # BLD: 0 K/UL (ref 0–0.2)
BASOPHILS NFR BLD: 0 % (ref 0–2)
BUN SERPL-MCNC: 10 MG/DL (ref 8–23)
CALCIUM SERPL-MCNC: 8.5 MG/DL (ref 8.6–10.4)
CHLORIDE SERPL-SCNC: 94 MMOL/L (ref 98–107)
CHOLEST SERPL-MCNC: 189 MG/DL
CHOLESTEROL/HDL RATIO: 2.6
CO2 SERPL-SCNC: 39 MMOL/L (ref 20–31)
CREAT SERPL-MCNC: 0.4 MG/DL (ref 0.5–0.9)
EOSINOPHIL # BLD: 0 K/UL (ref 0–0.4)
EOSINOPHILS RELATIVE PERCENT: 0 % (ref 0–4)
ERYTHROCYTE [DISTWIDTH] IN BLOOD BY AUTOMATED COUNT: 16.1 % (ref 11.5–14.9)
GFR, ESTIMATED: >90 ML/MIN/1.73M2
GLUCOSE SERPL-MCNC: 95 MG/DL (ref 70–99)
HCT VFR BLD AUTO: 32.2 % (ref 36–46)
HDLC SERPL-MCNC: 74 MG/DL
HGB BLD-MCNC: 10.4 G/DL (ref 12–16)
LACTATE BLDV-SCNC: 1.2 MMOL/L (ref 0.5–2.2)
LDLC SERPL CALC-MCNC: 99 MG/DL (ref 0–130)
LYMPHOCYTES NFR BLD: 0.58 K/UL (ref 1–4.8)
LYMPHOCYTES RELATIVE PERCENT: 9 % (ref 24–44)
MCH RBC QN AUTO: 31.8 PG (ref 26–34)
MCHC RBC AUTO-ENTMCNC: 32.2 G/DL (ref 31–37)
MCV RBC AUTO: 98.6 FL (ref 80–100)
MONOCYTES NFR BLD: 0.32 K/UL (ref 0.1–1.3)
MONOCYTES NFR BLD: 5 % (ref 1–7)
MORPHOLOGY: ABNORMAL
MORPHOLOGY: ABNORMAL
NEUTROPHILS NFR BLD: 86 % (ref 36–66)
NEUTS SEG NFR BLD: 5.5 K/UL (ref 1.3–9.1)
PLATELET # BLD AUTO: 252 K/UL (ref 150–450)
PMV BLD AUTO: 7.3 FL (ref 6–12)
POTASSIUM SERPL-SCNC: 4.1 MMOL/L (ref 3.7–5.3)
RBC # BLD AUTO: 3.26 M/UL (ref 4–5.2)
SODIUM SERPL-SCNC: 139 MMOL/L (ref 135–144)
TRIGL SERPL-MCNC: 81 MG/DL
WBC OTHER # BLD: 6.4 K/UL (ref 3.5–11)

## 2024-05-26 PROCEDURE — 6370000000 HC RX 637 (ALT 250 FOR IP)

## 2024-05-26 PROCEDURE — 6370000000 HC RX 637 (ALT 250 FOR IP): Performed by: INTERNAL MEDICINE

## 2024-05-26 PROCEDURE — 2060000000 HC ICU INTERMEDIATE R&B

## 2024-05-26 PROCEDURE — 94660 CPAP INITIATION&MGMT: CPT

## 2024-05-26 PROCEDURE — 36415 COLL VENOUS BLD VENIPUNCTURE: CPT

## 2024-05-26 PROCEDURE — 6360000002 HC RX W HCPCS: Performed by: INTERNAL MEDICINE

## 2024-05-26 PROCEDURE — 85025 COMPLETE CBC W/AUTO DIFF WBC: CPT

## 2024-05-26 PROCEDURE — 6370000000 HC RX 637 (ALT 250 FOR IP): Performed by: EMERGENCY MEDICINE

## 2024-05-26 PROCEDURE — 83605 ASSAY OF LACTIC ACID: CPT

## 2024-05-26 PROCEDURE — 2580000003 HC RX 258: Performed by: INTERNAL MEDICINE

## 2024-05-26 PROCEDURE — 2580000003 HC RX 258

## 2024-05-26 PROCEDURE — 97535 SELF CARE MNGMENT TRAINING: CPT

## 2024-05-26 PROCEDURE — 80061 LIPID PANEL: CPT

## 2024-05-26 PROCEDURE — 6360000002 HC RX W HCPCS

## 2024-05-26 PROCEDURE — 97166 OT EVAL MOD COMPLEX 45 MIN: CPT

## 2024-05-26 PROCEDURE — 80048 BASIC METABOLIC PNL TOTAL CA: CPT

## 2024-05-26 PROCEDURE — 99223 1ST HOSP IP/OBS HIGH 75: CPT | Performed by: INTERNAL MEDICINE

## 2024-05-26 PROCEDURE — 94761 N-INVAS EAR/PLS OXIMETRY MLT: CPT

## 2024-05-26 PROCEDURE — C9113 INJ PANTOPRAZOLE SODIUM, VIA: HCPCS

## 2024-05-26 PROCEDURE — 2700000000 HC OXYGEN THERAPY PER DAY

## 2024-05-26 PROCEDURE — 71045 X-RAY EXAM CHEST 1 VIEW: CPT

## 2024-05-26 PROCEDURE — 94640 AIRWAY INHALATION TREATMENT: CPT

## 2024-05-26 PROCEDURE — A4216 STERILE WATER/SALINE, 10 ML: HCPCS

## 2024-05-26 RX ORDER — MORPHINE SULFATE 2 MG/ML
2 INJECTION, SOLUTION INTRAMUSCULAR; INTRAVENOUS EVERY 4 HOURS PRN
Status: DISCONTINUED | OUTPATIENT
Start: 2024-05-26 | End: 2024-05-26

## 2024-05-26 RX ORDER — SODIUM CHLORIDE 9 MG/ML
INJECTION, SOLUTION INTRAVENOUS CONTINUOUS
Status: DISCONTINUED | OUTPATIENT
Start: 2024-05-26 | End: 2024-05-27

## 2024-05-26 RX ORDER — KETOROLAC TROMETHAMINE 30 MG/ML
15 INJECTION, SOLUTION INTRAMUSCULAR; INTRAVENOUS EVERY 6 HOURS PRN
Status: DISCONTINUED | OUTPATIENT
Start: 2024-05-26 | End: 2024-05-27

## 2024-05-26 RX ORDER — FLUTICASONE PROPIONATE 50 MCG
1 SPRAY, SUSPENSION (ML) NASAL DAILY
Status: DISCONTINUED | OUTPATIENT
Start: 2024-05-26 | End: 2024-06-02 | Stop reason: HOSPADM

## 2024-05-26 RX ADMIN — ALBUTEROL SULFATE 2.5 MG: 2.5 SOLUTION RESPIRATORY (INHALATION) at 19:49

## 2024-05-26 RX ADMIN — SODIUM CHLORIDE, PRESERVATIVE FREE 10 ML: 5 INJECTION INTRAVENOUS at 08:30

## 2024-05-26 RX ADMIN — OXYCODONE HYDROCHLORIDE AND ACETAMINOPHEN 250 MG: 500 TABLET ORAL at 08:29

## 2024-05-26 RX ADMIN — Medication 2 PUFF: at 06:55

## 2024-05-26 RX ADMIN — MORPHINE SULFATE 1 MG: 2 INJECTION, SOLUTION INTRAMUSCULAR; INTRAVENOUS at 06:15

## 2024-05-26 RX ADMIN — SODIUM CHLORIDE: 9 INJECTION, SOLUTION INTRAVENOUS at 08:38

## 2024-05-26 RX ADMIN — CARVEDILOL 25 MG: 25 TABLET, FILM COATED ORAL at 17:17

## 2024-05-26 RX ADMIN — SODIUM CHLORIDE, PRESERVATIVE FREE 10 ML: 5 INJECTION INTRAVENOUS at 21:26

## 2024-05-26 RX ADMIN — LEVOTHYROXINE SODIUM 75 MCG: 0.07 TABLET ORAL at 08:29

## 2024-05-26 RX ADMIN — TIZANIDINE 4 MG: 4 TABLET ORAL at 21:25

## 2024-05-26 RX ADMIN — CHOLECALCIFEROL TAB 125 MCG (5000 UNIT) 5000 UNITS: 125 TAB at 08:32

## 2024-05-26 RX ADMIN — VENLAFAXINE HYDROCHLORIDE 150 MG: 150 CAPSULE, EXTENDED RELEASE ORAL at 08:29

## 2024-05-26 RX ADMIN — ENOXAPARIN SODIUM 30 MG: 100 INJECTION SUBCUTANEOUS at 08:30

## 2024-05-26 RX ADMIN — SODIUM CHLORIDE, PRESERVATIVE FREE 40 MG: 5 INJECTION INTRAVENOUS at 10:51

## 2024-05-26 RX ADMIN — Medication 400 MG: at 08:29

## 2024-05-26 RX ADMIN — MONTELUKAST 10 MG: 10 TABLET, FILM COATED ORAL at 08:29

## 2024-05-26 RX ADMIN — IPRATROPIUM BROMIDE AND ALBUTEROL SULFATE 1 DOSE: 2.5; .5 SOLUTION RESPIRATORY (INHALATION) at 06:45

## 2024-05-26 RX ADMIN — FLUTICASONE PROPIONATE 1 SPRAY: 50 SPRAY, METERED NASAL at 11:59

## 2024-05-26 RX ADMIN — WATER 40 MG: 1 INJECTION INTRAMUSCULAR; INTRAVENOUS; SUBCUTANEOUS at 05:51

## 2024-05-26 RX ADMIN — IPRATROPIUM BROMIDE AND ALBUTEROL SULFATE 1 DOSE: 2.5; .5 SOLUTION RESPIRATORY (INHALATION) at 14:32

## 2024-05-26 RX ADMIN — MIRTAZAPINE 45 MG: 30 TABLET, FILM COATED ORAL at 21:25

## 2024-05-26 RX ADMIN — KETOROLAC TROMETHAMINE 15 MG: 30 INJECTION, SOLUTION INTRAMUSCULAR at 18:47

## 2024-05-26 RX ADMIN — IPRATROPIUM BROMIDE AND ALBUTEROL SULFATE 1 DOSE: 2.5; .5 SOLUTION RESPIRATORY (INHALATION) at 18:27

## 2024-05-26 RX ADMIN — PRIMIDONE 100 MG: 50 TABLET ORAL at 21:25

## 2024-05-26 RX ADMIN — IPRATROPIUM BROMIDE AND ALBUTEROL SULFATE 1 DOSE: 2.5; .5 SOLUTION RESPIRATORY (INHALATION) at 10:35

## 2024-05-26 RX ADMIN — CARVEDILOL 25 MG: 25 TABLET, FILM COATED ORAL at 08:29

## 2024-05-26 RX ADMIN — CLONAZEPAM 0.5 MG: 0.5 TABLET ORAL at 18:42

## 2024-05-26 RX ADMIN — MORPHINE SULFATE 1 MG: 2 INJECTION, SOLUTION INTRAMUSCULAR; INTRAVENOUS at 10:51

## 2024-05-26 RX ADMIN — CLONAZEPAM 0.5 MG: 0.5 TABLET ORAL at 14:10

## 2024-05-26 RX ADMIN — PANTOPRAZOLE SODIUM 40 MG: 40 TABLET, DELAYED RELEASE ORAL at 05:51

## 2024-05-26 RX ADMIN — WATER 40 MG: 1 INJECTION INTRAMUSCULAR; INTRAVENOUS; SUBCUTANEOUS at 21:17

## 2024-05-26 RX ADMIN — WATER 40 MG: 1 INJECTION INTRAMUSCULAR; INTRAVENOUS; SUBCUTANEOUS at 17:17

## 2024-05-26 ASSESSMENT — PAIN SCALES - GENERAL
PAINLEVEL_OUTOF10: 0
PAINLEVEL_OUTOF10: 0
PAINLEVEL_OUTOF10: 8
PAINLEVEL_OUTOF10: 7
PAINLEVEL_OUTOF10: 0
PAINLEVEL_OUTOF10: 8
PAINLEVEL_OUTOF10: 0
PAINLEVEL_OUTOF10: 2

## 2024-05-26 ASSESSMENT — PAIN DESCRIPTION - LOCATION
LOCATION: ABDOMEN;HIP
LOCATION: ABDOMEN;BACK
LOCATION: ABDOMEN;BACK

## 2024-05-26 ASSESSMENT — PAIN SCALES - WONG BAKER: WONGBAKER_NUMERICALRESPONSE: NO HURT

## 2024-05-26 ASSESSMENT — PAIN DESCRIPTION - ORIENTATION: ORIENTATION: RIGHT;LEFT

## 2024-05-26 ASSESSMENT — PAIN DESCRIPTION - DESCRIPTORS: DESCRIPTORS: DISCOMFORT

## 2024-05-27 PROBLEM — R11.2 NAUSEA AND VOMITING: Status: ACTIVE | Noted: 2024-05-27

## 2024-05-27 PROBLEM — R19.7 DIARRHEA: Status: ACTIVE | Noted: 2024-05-27

## 2024-05-27 PROBLEM — R10.9 ABDOMINAL PAIN: Status: ACTIVE | Noted: 2019-01-25

## 2024-05-27 LAB
ANION GAP SERPL CALCULATED.3IONS-SCNC: 10 MMOL/L (ref 9–17)
BASOPHILS # BLD: 0 K/UL (ref 0–0.2)
BASOPHILS NFR BLD: 0 % (ref 0–2)
BUN SERPL-MCNC: 14 MG/DL (ref 8–23)
CALCIUM SERPL-MCNC: 8 MG/DL (ref 8.6–10.4)
CHLORIDE SERPL-SCNC: 100 MMOL/L (ref 98–107)
CO2 SERPL-SCNC: 29 MMOL/L (ref 20–31)
CREAT SERPL-MCNC: <0.4 MG/DL (ref 0.5–0.9)
EOSINOPHIL # BLD: 0 K/UL (ref 0–0.4)
EOSINOPHILS RELATIVE PERCENT: 0 % (ref 0–4)
ERYTHROCYTE [DISTWIDTH] IN BLOOD BY AUTOMATED COUNT: 15.7 % (ref 11.5–14.9)
GFR, ESTIMATED: ABNORMAL ML/MIN/1.73M2
GLUCOSE SERPL-MCNC: 104 MG/DL (ref 70–99)
HCT VFR BLD AUTO: 31.7 % (ref 36–46)
HGB BLD-MCNC: 10.3 G/DL (ref 12–16)
LYMPHOCYTES NFR BLD: 0.6 K/UL (ref 1–4.8)
LYMPHOCYTES RELATIVE PERCENT: 11 % (ref 24–44)
MCH RBC QN AUTO: 32.1 PG (ref 26–34)
MCHC RBC AUTO-ENTMCNC: 32.6 G/DL (ref 31–37)
MCV RBC AUTO: 98.3 FL (ref 80–100)
MONOCYTES NFR BLD: 0.2 K/UL (ref 0.1–1.3)
MONOCYTES NFR BLD: 3 % (ref 1–7)
NEUTROPHILS NFR BLD: 86 % (ref 36–66)
NEUTS SEG NFR BLD: 4.8 K/UL (ref 1.3–9.1)
PLATELET # BLD AUTO: 250 K/UL (ref 150–450)
PMV BLD AUTO: 7.2 FL (ref 6–12)
POTASSIUM SERPL-SCNC: 4.3 MMOL/L (ref 3.7–5.3)
RBC # BLD AUTO: 3.22 M/UL (ref 4–5.2)
SODIUM SERPL-SCNC: 139 MMOL/L (ref 135–144)
T4 FREE SERPL-MCNC: 1.1 NG/DL (ref 0.9–1.7)
TSH SERPL DL<=0.05 MIU/L-ACNC: 0.21 UIU/ML (ref 0.3–5)
WBC OTHER # BLD: 5.6 K/UL (ref 3.5–11)

## 2024-05-27 PROCEDURE — 6370000000 HC RX 637 (ALT 250 FOR IP)

## 2024-05-27 PROCEDURE — 6360000002 HC RX W HCPCS: Performed by: INTERNAL MEDICINE

## 2024-05-27 PROCEDURE — 85025 COMPLETE CBC W/AUTO DIFF WBC: CPT

## 2024-05-27 PROCEDURE — 94640 AIRWAY INHALATION TREATMENT: CPT

## 2024-05-27 PROCEDURE — 2580000003 HC RX 258: Performed by: INTERNAL MEDICINE

## 2024-05-27 PROCEDURE — 2580000003 HC RX 258

## 2024-05-27 PROCEDURE — 6370000000 HC RX 637 (ALT 250 FOR IP): Performed by: INTERNAL MEDICINE

## 2024-05-27 PROCEDURE — 36415 COLL VENOUS BLD VENIPUNCTURE: CPT

## 2024-05-27 PROCEDURE — 94761 N-INVAS EAR/PLS OXIMETRY MLT: CPT

## 2024-05-27 PROCEDURE — 94660 CPAP INITIATION&MGMT: CPT

## 2024-05-27 PROCEDURE — 84439 ASSAY OF FREE THYROXINE: CPT

## 2024-05-27 PROCEDURE — 99254 IP/OBS CNSLTJ NEW/EST MOD 60: CPT | Performed by: INTERNAL MEDICINE

## 2024-05-27 PROCEDURE — 6360000002 HC RX W HCPCS

## 2024-05-27 PROCEDURE — A4216 STERILE WATER/SALINE, 10 ML: HCPCS

## 2024-05-27 PROCEDURE — 2060000000 HC ICU INTERMEDIATE R&B

## 2024-05-27 PROCEDURE — 6370000000 HC RX 637 (ALT 250 FOR IP): Performed by: EMERGENCY MEDICINE

## 2024-05-27 PROCEDURE — C9113 INJ PANTOPRAZOLE SODIUM, VIA: HCPCS

## 2024-05-27 PROCEDURE — 99233 SBSQ HOSP IP/OBS HIGH 50: CPT | Performed by: INTERNAL MEDICINE

## 2024-05-27 PROCEDURE — 2700000000 HC OXYGEN THERAPY PER DAY

## 2024-05-27 PROCEDURE — 80048 BASIC METABOLIC PNL TOTAL CA: CPT

## 2024-05-27 PROCEDURE — APPNB60 APP NON BILLABLE TIME 46-60 MINS: Performed by: NURSE PRACTITIONER

## 2024-05-27 PROCEDURE — 84443 ASSAY THYROID STIM HORMONE: CPT

## 2024-05-27 RX ORDER — GUAIFENESIN 600 MG/1
600 TABLET, EXTENDED RELEASE ORAL 2 TIMES DAILY
Status: DISCONTINUED | OUTPATIENT
Start: 2024-05-27 | End: 2024-06-02 | Stop reason: HOSPADM

## 2024-05-27 RX ORDER — ALBUTEROL SULFATE 2.5 MG/3ML
2.5 SOLUTION RESPIRATORY (INHALATION)
Status: DISCONTINUED | OUTPATIENT
Start: 2024-05-27 | End: 2024-06-02 | Stop reason: HOSPADM

## 2024-05-27 RX ORDER — OXYCODONE HYDROCHLORIDE AND ACETAMINOPHEN 5; 325 MG/1; MG/1
1 TABLET ORAL EVERY 6 HOURS PRN
Status: DISCONTINUED | OUTPATIENT
Start: 2024-05-27 | End: 2024-05-30

## 2024-05-27 RX ADMIN — CLONAZEPAM 0.5 MG: 0.5 TABLET ORAL at 13:55

## 2024-05-27 RX ADMIN — TIZANIDINE 4 MG: 4 TABLET ORAL at 08:08

## 2024-05-27 RX ADMIN — IPRATROPIUM BROMIDE AND ALBUTEROL SULFATE 1 DOSE: 2.5; .5 SOLUTION RESPIRATORY (INHALATION) at 11:06

## 2024-05-27 RX ADMIN — WATER 40 MG: 1 INJECTION INTRAMUSCULAR; INTRAVENOUS; SUBCUTANEOUS at 13:55

## 2024-05-27 RX ADMIN — SODIUM CHLORIDE, PRESERVATIVE FREE 10 ML: 5 INJECTION INTRAVENOUS at 21:14

## 2024-05-27 RX ADMIN — ALBUTEROL SULFATE 2.5 MG: 2.5 SOLUTION RESPIRATORY (INHALATION) at 19:12

## 2024-05-27 RX ADMIN — AZITHROMYCIN DIHYDRATE 250 MG: 250 TABLET ORAL at 08:10

## 2024-05-27 RX ADMIN — POLYETHYLENE GLYCOL 3350 17 G: 17 POWDER, FOR SOLUTION ORAL at 12:58

## 2024-05-27 RX ADMIN — GUAIFENESIN 600 MG: 600 TABLET, EXTENDED RELEASE ORAL at 21:11

## 2024-05-27 RX ADMIN — VENLAFAXINE HYDROCHLORIDE 150 MG: 150 CAPSULE, EXTENDED RELEASE ORAL at 08:08

## 2024-05-27 RX ADMIN — Medication 2 PUFF: at 08:31

## 2024-05-27 RX ADMIN — IPRATROPIUM BROMIDE AND ALBUTEROL SULFATE 1 DOSE: 2.5; .5 SOLUTION RESPIRATORY (INHALATION) at 15:38

## 2024-05-27 RX ADMIN — SODIUM CHLORIDE, PRESERVATIVE FREE 10 ML: 5 INJECTION INTRAVENOUS at 08:09

## 2024-05-27 RX ADMIN — WATER 40 MG: 1 INJECTION INTRAMUSCULAR; INTRAVENOUS; SUBCUTANEOUS at 21:14

## 2024-05-27 RX ADMIN — MONTELUKAST 10 MG: 10 TABLET, FILM COATED ORAL at 08:08

## 2024-05-27 RX ADMIN — IPRATROPIUM BROMIDE AND ALBUTEROL SULFATE 1 DOSE: 2.5; .5 SOLUTION RESPIRATORY (INHALATION) at 06:58

## 2024-05-27 RX ADMIN — CLONAZEPAM 0.5 MG: 0.5 TABLET ORAL at 08:08

## 2024-05-27 RX ADMIN — CLONAZEPAM 0.5 MG: 0.5 TABLET ORAL at 21:12

## 2024-05-27 RX ADMIN — OXYCODONE HYDROCHLORIDE AND ACETAMINOPHEN 1 TABLET: 5; 325 TABLET ORAL at 13:55

## 2024-05-27 RX ADMIN — WATER 40 MG: 1 INJECTION INTRAMUSCULAR; INTRAVENOUS; SUBCUTANEOUS at 02:58

## 2024-05-27 RX ADMIN — LEVOTHYROXINE SODIUM 75 MCG: 0.07 TABLET ORAL at 06:29

## 2024-05-27 RX ADMIN — ENOXAPARIN SODIUM 30 MG: 100 INJECTION SUBCUTANEOUS at 08:09

## 2024-05-27 RX ADMIN — PRIMIDONE 100 MG: 50 TABLET ORAL at 21:13

## 2024-05-27 RX ADMIN — AMLODIPINE BESYLATE 10 MG: 10 TABLET ORAL at 21:13

## 2024-05-27 RX ADMIN — MIRTAZAPINE 45 MG: 30 TABLET, FILM COATED ORAL at 21:12

## 2024-05-27 RX ADMIN — OXYCODONE HYDROCHLORIDE AND ACETAMINOPHEN 1 TABLET: 5; 325 TABLET ORAL at 21:13

## 2024-05-27 RX ADMIN — CARVEDILOL 25 MG: 25 TABLET, FILM COATED ORAL at 18:02

## 2024-05-27 RX ADMIN — FLUTICASONE PROPIONATE 1 SPRAY: 50 SPRAY, METERED NASAL at 08:18

## 2024-05-27 RX ADMIN — SODIUM CHLORIDE, PRESERVATIVE FREE 40 MG: 5 INJECTION INTRAVENOUS at 08:08

## 2024-05-27 RX ADMIN — Medication 3 MG: at 21:14

## 2024-05-27 RX ADMIN — TIZANIDINE 4 MG: 4 TABLET ORAL at 21:13

## 2024-05-27 RX ADMIN — WATER 40 MG: 1 INJECTION INTRAMUSCULAR; INTRAVENOUS; SUBCUTANEOUS at 08:09

## 2024-05-27 RX ADMIN — KETOROLAC TROMETHAMINE 15 MG: 30 INJECTION, SOLUTION INTRAMUSCULAR at 08:08

## 2024-05-27 ASSESSMENT — PAIN SCALES - GENERAL
PAINLEVEL_OUTOF10: 8
PAINLEVEL_OUTOF10: 9
PAINLEVEL_OUTOF10: 8
PAINLEVEL_OUTOF10: 2
PAINLEVEL_OUTOF10: 4
PAINLEVEL_OUTOF10: 2
PAINLEVEL_OUTOF10: 3
PAINLEVEL_OUTOF10: 3

## 2024-05-27 ASSESSMENT — PAIN DESCRIPTION - ORIENTATION
ORIENTATION: RIGHT;LOWER
ORIENTATION: LOWER
ORIENTATION: RIGHT;LOWER

## 2024-05-27 ASSESSMENT — PAIN DESCRIPTION - LOCATION
LOCATION: BACK

## 2024-05-27 ASSESSMENT — PAIN DESCRIPTION - DESCRIPTORS
DESCRIPTORS: SHARP
DESCRIPTORS: ACHING
DESCRIPTORS: STABBING

## 2024-05-27 NOTE — CONSULTS
encounter with the nurse practitioner/resident.  I agree with the assessment, plan and orders as documented by the above health care provider.  I have made necessary changes as deemed appropriate directly in the note.     More than 50% of the time was spent taking care of this patient in addition to the nurse practitioner time.  That also included history taking follow-up physical examination and review of system.    Electronically signed by Vishal Joe MD     
Lillian    [place] Office:  [address] , [place] [address]   [phone]     [place] Office:  [address], [place] [address] [phone]         Patient seen and examined by me.  Patient has difficulty with trying to get the air out and was placed [facility].  I referred her to ICU intermediate care.  She is a DNR CC arrest no intubation.  Most recent chest x-ray reveals hyperinflation but no acute process    Continue with DuoNeb treatments every 4 hours while awake  Will change Solu-Medrol to every 6 hours from every 12 hour  Patient now on AVAPS.  Will decrease the AVAPS to a target tidal volume of 300 from 400 patient is a BMI of only 17  On Zithromax for COPD exacerbation  On Lovenox for DVT prophylaxis.    Continue to follow with other services.    Thank you for the consult.      Saúl Cordero MD

## 2024-05-27 NOTE — DISCHARGE INSTR - COC
Continuity of Care Form    Patient Name: Laura Rhodes   :  1960  MRN:  [id number]    Admit date:  2024  Discharge date:  24    Code Status Order: DNR-CCA   Advance Directives:     Admitting Physician:  Davonte Williamson MD  PCP: Van Pate MD    Discharging Nurse[facility] Unit/Room#:   Discharging Unit Phone Number: [phone]    Emergency Contact:   Extended Emergency Contact Information  Primary Emergency Contact: Dionicio Rhodes  Address: [address] [place] [address] of [place]  Home Phone: [phone]  Work Phone: [phone]  Mobile Phone: [phone]  Relation: Spouse   needed? No    Past Surgical History:  Past Surgical History:   Procedure Laterality Date    BACK SURGERY      diskectomy, L3-L5    COLONOSCOPY N/A 2019    COLONOSCOPY DIAGNOSTIC performed by Rajwinder Aleman MD at [facility] OR    COLONOSCOPY  2019    COLONOSCOPY POLYPECTOMY SNARE/COLD BIOPSY performed by Rajwinder Aleman MD at [facility]    DILATION AND CURETTAGE OF UTERUS      FEMUR FRACTURE SURGERY Right 2023    FEMUR IM NAIL HALIMA INSERTION performed by Juli Vann MD at [facility] OR    OTHER SURGICAL HISTORY      ectopic pregnancy with tube removal    PAIN MANAGEMENT PROCEDURE      Left sided Transforaminal Epidural Steriod Injection    THYROIDECTOMY, PARTIAL      UPPER GASTROINTESTINAL ENDOSCOPY      UPPER GASTROINTESTINAL ENDOSCOPY N/A 2020    EGD BIOPSY with dilatation performed by Ralph Joy MD at [facility] ENDO    UPPER GASTROINTESTINAL ENDOSCOPY N/A 2022    EGD BIOPSY performed by Fritz Zacarias MD at [facility] ENDO       Immunization History:   Immunization History   Administered Date(s) Administered    Influenza Virus Vaccine 2018, 2020    Influenza, FLUARIX, FLULAVAL, FLUZONE (age 6 mo+) AND AFLURIA, (age 3 y+), PF, 0.5mL 10/05/2019, 10/14/2020    Influenza, FLUCELVAX, (age 6 mo+), MDCK, PF, 0.5mL

## 2024-05-28 ENCOUNTER — APPOINTMENT (OUTPATIENT)
Dept: ULTRASOUND IMAGING | Age: 64
DRG: 438 | End: 2024-05-28
Payer: COMMERCIAL

## 2024-05-28 LAB
ALBUMIN SERPL-MCNC: 3.3 G/DL (ref 3.5–5.2)
ALP SERPL-CCNC: 44 U/L (ref 35–104)
ALT SERPL-CCNC: 10 U/L (ref 5–33)
ANION GAP SERPL CALCULATED.3IONS-SCNC: 10 MMOL/L (ref 9–17)
AST SERPL-CCNC: 18 U/L
BASOPHILS # BLD: 0 K/UL (ref 0–0.2)
BASOPHILS NFR BLD: 0 % (ref 0–2)
BILIRUB DIRECT SERPL-MCNC: 0.1 MG/DL
BILIRUB INDIRECT SERPL-MCNC: 0.1 MG/DL (ref 0–1)
BILIRUB SERPL-MCNC: 0.2 MG/DL (ref 0.3–1.2)
BUN SERPL-MCNC: 11 MG/DL (ref 8–23)
CALCIUM SERPL-MCNC: 8.2 MG/DL (ref 8.6–10.4)
CHLORIDE SERPL-SCNC: 99 MMOL/L (ref 98–107)
CO2 SERPL-SCNC: 29 MMOL/L (ref 20–31)
CREAT SERPL-MCNC: <0.4 MG/DL (ref 0.5–0.9)
EOSINOPHIL # BLD: 0 K/UL (ref 0–0.4)
EOSINOPHILS RELATIVE PERCENT: 0 % (ref 0–4)
ERYTHROCYTE [DISTWIDTH] IN BLOOD BY AUTOMATED COUNT: 15.9 % (ref 11.5–14.9)
GFR, ESTIMATED: ABNORMAL ML/MIN/1.73M2
GLUCOSE SERPL-MCNC: 100 MG/DL (ref 70–99)
HCT VFR BLD AUTO: 32.7 % (ref 36–46)
HGB BLD-MCNC: 10.8 G/DL (ref 12–16)
LIPASE SERPL-CCNC: 93 U/L (ref 13–60)
LYMPHOCYTES NFR BLD: 1.2 K/UL (ref 1–4.8)
LYMPHOCYTES RELATIVE PERCENT: 14 % (ref 24–44)
MCH RBC QN AUTO: 32.7 PG (ref 26–34)
MCHC RBC AUTO-ENTMCNC: 33.1 G/DL (ref 31–37)
MCV RBC AUTO: 98.9 FL (ref 80–100)
MONOCYTES NFR BLD: 0.6 K/UL (ref 0.1–1.3)
MONOCYTES NFR BLD: 7 % (ref 1–7)
NEUTROPHILS NFR BLD: 79 % (ref 36–66)
NEUTS SEG NFR BLD: 6.7 K/UL (ref 1.3–9.1)
PLATELET # BLD AUTO: 288 K/UL (ref 150–450)
PMV BLD AUTO: 7.2 FL (ref 6–12)
POTASSIUM SERPL-SCNC: 4.2 MMOL/L (ref 3.7–5.3)
PROT SERPL-MCNC: 5.4 G/DL (ref 6.4–8.3)
RBC # BLD AUTO: 3.3 M/UL (ref 4–5.2)
SODIUM SERPL-SCNC: 138 MMOL/L (ref 135–144)
WBC OTHER # BLD: 8.5 K/UL (ref 3.5–11)

## 2024-05-28 PROCEDURE — 6360000002 HC RX W HCPCS: Performed by: INTERNAL MEDICINE

## 2024-05-28 PROCEDURE — 2580000003 HC RX 258: Performed by: INTERNAL MEDICINE

## 2024-05-28 PROCEDURE — 83690 ASSAY OF LIPASE: CPT

## 2024-05-28 PROCEDURE — C9113 INJ PANTOPRAZOLE SODIUM, VIA: HCPCS

## 2024-05-28 PROCEDURE — 94761 N-INVAS EAR/PLS OXIMETRY MLT: CPT

## 2024-05-28 PROCEDURE — 94640 AIRWAY INHALATION TREATMENT: CPT

## 2024-05-28 PROCEDURE — 99233 SBSQ HOSP IP/OBS HIGH 50: CPT | Performed by: INTERNAL MEDICINE

## 2024-05-28 PROCEDURE — 6360000002 HC RX W HCPCS

## 2024-05-28 PROCEDURE — 6370000000 HC RX 637 (ALT 250 FOR IP)

## 2024-05-28 PROCEDURE — 92610 EVALUATE SWALLOWING FUNCTION: CPT

## 2024-05-28 PROCEDURE — 85025 COMPLETE CBC W/AUTO DIFF WBC: CPT

## 2024-05-28 PROCEDURE — 2700000000 HC OXYGEN THERAPY PER DAY

## 2024-05-28 PROCEDURE — 2580000003 HC RX 258

## 2024-05-28 PROCEDURE — APPSS30 APP SPLIT SHARED TIME 16-30 MINUTES: Performed by: NURSE PRACTITIONER

## 2024-05-28 PROCEDURE — 2060000000 HC ICU INTERMEDIATE R&B

## 2024-05-28 PROCEDURE — 36415 COLL VENOUS BLD VENIPUNCTURE: CPT

## 2024-05-28 PROCEDURE — 80048 BASIC METABOLIC PNL TOTAL CA: CPT

## 2024-05-28 PROCEDURE — 80076 HEPATIC FUNCTION PANEL: CPT

## 2024-05-28 PROCEDURE — 6370000000 HC RX 637 (ALT 250 FOR IP): Performed by: INTERNAL MEDICINE

## 2024-05-28 PROCEDURE — 76705 ECHO EXAM OF ABDOMEN: CPT

## 2024-05-28 PROCEDURE — A4216 STERILE WATER/SALINE, 10 ML: HCPCS

## 2024-05-28 RX ADMIN — ENOXAPARIN SODIUM 30 MG: 100 INJECTION SUBCUTANEOUS at 08:21

## 2024-05-28 RX ADMIN — SODIUM CHLORIDE, PRESERVATIVE FREE 10 ML: 5 INJECTION INTRAVENOUS at 08:21

## 2024-05-28 RX ADMIN — OXYCODONE HYDROCHLORIDE AND ACETAMINOPHEN 1 TABLET: 5; 325 TABLET ORAL at 21:08

## 2024-05-28 RX ADMIN — VENLAFAXINE HYDROCHLORIDE 150 MG: 150 CAPSULE, EXTENDED RELEASE ORAL at 08:20

## 2024-05-28 RX ADMIN — WATER 40 MG: 1 INJECTION INTRAMUSCULAR; INTRAVENOUS; SUBCUTANEOUS at 02:03

## 2024-05-28 RX ADMIN — MIRTAZAPINE 45 MG: 30 TABLET, FILM COATED ORAL at 21:08

## 2024-05-28 RX ADMIN — SODIUM CHLORIDE, PRESERVATIVE FREE 40 MG: 5 INJECTION INTRAVENOUS at 08:21

## 2024-05-28 RX ADMIN — ALBUTEROL SULFATE 2.5 MG: 2.5 SOLUTION RESPIRATORY (INHALATION) at 17:55

## 2024-05-28 RX ADMIN — OXYCODONE HYDROCHLORIDE AND ACETAMINOPHEN 1 TABLET: 5; 325 TABLET ORAL at 08:21

## 2024-05-28 RX ADMIN — CARVEDILOL 25 MG: 25 TABLET, FILM COATED ORAL at 08:20

## 2024-05-28 RX ADMIN — ALBUTEROL SULFATE 2.5 MG: 2.5 SOLUTION RESPIRATORY (INHALATION) at 14:42

## 2024-05-28 RX ADMIN — TIZANIDINE 4 MG: 4 TABLET ORAL at 18:30

## 2024-05-28 RX ADMIN — OXYCODONE HYDROCHLORIDE AND ACETAMINOPHEN 1 TABLET: 5; 325 TABLET ORAL at 14:26

## 2024-05-28 RX ADMIN — ALBUTEROL SULFATE 2.5 MG: 2.5 SOLUTION RESPIRATORY (INHALATION) at 20:06

## 2024-05-28 RX ADMIN — MONTELUKAST 10 MG: 10 TABLET, FILM COATED ORAL at 08:20

## 2024-05-28 RX ADMIN — WATER 40 MG: 1 INJECTION INTRAMUSCULAR; INTRAVENOUS; SUBCUTANEOUS at 08:21

## 2024-05-28 RX ADMIN — GUAIFENESIN 600 MG: 600 TABLET, EXTENDED RELEASE ORAL at 08:20

## 2024-05-28 RX ADMIN — TIZANIDINE 4 MG: 4 TABLET ORAL at 08:20

## 2024-05-28 RX ADMIN — ALBUTEROL SULFATE 2.5 MG: 2.5 SOLUTION RESPIRATORY (INHALATION) at 07:11

## 2024-05-28 RX ADMIN — PRIMIDONE 100 MG: 50 TABLET ORAL at 21:16

## 2024-05-28 RX ADMIN — CLONAZEPAM 0.5 MG: 0.5 TABLET ORAL at 08:20

## 2024-05-28 RX ADMIN — CLONAZEPAM 0.5 MG: 0.5 TABLET ORAL at 14:26

## 2024-05-28 RX ADMIN — WATER 40 MG: 1 INJECTION INTRAMUSCULAR; INTRAVENOUS; SUBCUTANEOUS at 16:19

## 2024-05-28 RX ADMIN — Medication 3 MG: at 21:08

## 2024-05-28 RX ADMIN — AMLODIPINE BESYLATE 10 MG: 10 TABLET ORAL at 21:08

## 2024-05-28 RX ADMIN — SODIUM CHLORIDE, PRESERVATIVE FREE 10 ML: 5 INJECTION INTRAVENOUS at 21:12

## 2024-05-28 RX ADMIN — GUAIFENESIN 600 MG: 600 TABLET, EXTENDED RELEASE ORAL at 21:08

## 2024-05-28 RX ADMIN — FLUTICASONE PROPIONATE 1 SPRAY: 50 SPRAY, METERED NASAL at 08:19

## 2024-05-28 RX ADMIN — ALBUTEROL SULFATE 2.5 MG: 2.5 SOLUTION RESPIRATORY (INHALATION) at 10:40

## 2024-05-28 RX ADMIN — Medication 2 PUFF: at 09:46

## 2024-05-28 ASSESSMENT — PAIN DESCRIPTION - DESCRIPTORS
DESCRIPTORS: DISCOMFORT
DESCRIPTORS: DISCOMFORT
DESCRIPTORS: DISCOMFORT;BURNING

## 2024-05-28 ASSESSMENT — PAIN DESCRIPTION - PAIN TYPE
TYPE: ACUTE PAIN;CHRONIC PAIN
TYPE: ACUTE PAIN

## 2024-05-28 ASSESSMENT — PAIN DESCRIPTION - DIRECTION
RADIATING_TOWARDS: BACK
RADIATING_TOWARDS: BACK

## 2024-05-28 ASSESSMENT — PAIN DESCRIPTION - FREQUENCY
FREQUENCY: INTERMITTENT
FREQUENCY: INTERMITTENT
FREQUENCY: CONTINUOUS

## 2024-05-28 ASSESSMENT — PAIN SCALES - GENERAL
PAINLEVEL_OUTOF10: 1
PAINLEVEL_OUTOF10: 4
PAINLEVEL_OUTOF10: 8
PAINLEVEL_OUTOF10: 8
PAINLEVEL_OUTOF10: 0
PAINLEVEL_OUTOF10: 6
PAINLEVEL_OUTOF10: 7
PAINLEVEL_OUTOF10: 7

## 2024-05-28 ASSESSMENT — PAIN DESCRIPTION - ORIENTATION
ORIENTATION: RIGHT;MID
ORIENTATION: UPPER;RIGHT
ORIENTATION: RIGHT;UPPER

## 2024-05-28 ASSESSMENT — PAIN DESCRIPTION - LOCATION
LOCATION: ABDOMEN;BACK
LOCATION: ABDOMEN
LOCATION: ABDOMEN;BACK
LOCATION: BACK
LOCATION: BACK
LOCATION: ABDOMEN

## 2024-05-28 ASSESSMENT — PAIN DESCRIPTION - ONSET
ONSET: PROGRESSIVE
ONSET: PROGRESSIVE
ONSET: ON-GOING

## 2024-05-28 NOTE — ACP (ADVANCE CARE PLANNING)
Advance Care Planning     Advance Care Planning Activator (Inpatient)  Conversation Note      Date of ACP Conversation: 5/28/2024     Conversation Conducted with: Patient with Decision Making Capacity    ACP Activator: Laura Lincoln RN        Health Care Decision Maker:     Current Designated Health Care Decision Maker:     Primary Decision Maker: Dionicio Rhodes - Spouse - [phone]    Today we documented Decision Maker(s) consistent with Legal Next of Kin hierarchy.    Care Preferences    Ventilation:  \"If you were in your present state of health and suddenly became very ill and were unable to breathe on your own, what would your preference be about the use of a ventilator (breathing machine) if it were available to you?\"      Would the patient desire the use of ventilator (breathing machine)?: no    \"If your health worsens and it becomes clear that your chance of recovery is unlikely, what would your preference be about the use of a ventilator (breathing machine) if it were available to you?\"     Would the patient desire the use of ventilator (breathing machine)?: No      Resuscitation  \"CPR works best to restart the heart when there is a sudden event, like a heart attack, in someone who is otherwise healthy. Unfortunately, CPR does not typically restart the heart for people who have serious health conditions or who are very sick.\"    \"In the event your heart stopped as a result of an underlying serious health condition, would you want attempts to be made to restart your heart (answer \"yes\" for attempt to resuscitate) or would you prefer a natural death (answer \"no\" for do not attempt to resuscitate)?\" no       [] Yes   [] No   Educated Patient / Decision Maker regarding differences between Advance Directives and portable DNR orders.    Length of ACP Conversation in minutes:      Conversation Outcomes:  ACP discussion completed    Follow-up plan:    [] Schedule follow-up conversation to continue planning  []

## 2024-05-29 ENCOUNTER — ANESTHESIA EVENT (OUTPATIENT)
Dept: ENDOSCOPY | Age: 64
End: 2024-05-29
Payer: COMMERCIAL

## 2024-05-29 ENCOUNTER — ANESTHESIA (OUTPATIENT)
Dept: ENDOSCOPY | Age: 64
End: 2024-05-29
Payer: COMMERCIAL

## 2024-05-29 LAB
ANION GAP SERPL CALCULATED.3IONS-SCNC: 10 MMOL/L (ref 9–17)
BASOPHILS # BLD: 0 K/UL (ref 0–0.2)
BASOPHILS NFR BLD: 0 % (ref 0–2)
BUN SERPL-MCNC: 9 MG/DL (ref 8–23)
CALCIUM SERPL-MCNC: 8.2 MG/DL (ref 8.6–10.4)
CHLORIDE SERPL-SCNC: 100 MMOL/L (ref 98–107)
CO2 SERPL-SCNC: 30 MMOL/L (ref 20–31)
CREAT SERPL-MCNC: <0.4 MG/DL (ref 0.5–0.9)
EOSINOPHIL # BLD: 0 K/UL (ref 0–0.4)
EOSINOPHILS RELATIVE PERCENT: 0 % (ref 0–4)
ERYTHROCYTE [DISTWIDTH] IN BLOOD BY AUTOMATED COUNT: 15.8 % (ref 11.5–14.9)
GFR, ESTIMATED: ABNORMAL ML/MIN/1.73M2
GLUCOSE SERPL-MCNC: 105 MG/DL (ref 70–99)
HCT VFR BLD AUTO: 34 % (ref 36–46)
HGB BLD-MCNC: 11 G/DL (ref 12–16)
LIPASE SERPL-CCNC: 55 U/L (ref 13–60)
LYMPHOCYTES NFR BLD: 0.7 K/UL (ref 1–4.8)
LYMPHOCYTES RELATIVE PERCENT: 13 % (ref 24–44)
MCH RBC QN AUTO: 32.2 PG (ref 26–34)
MCHC RBC AUTO-ENTMCNC: 32.3 G/DL (ref 31–37)
MCV RBC AUTO: 99.8 FL (ref 80–100)
MONOCYTES NFR BLD: 0.4 K/UL (ref 0.1–1.3)
MONOCYTES NFR BLD: 7 % (ref 1–7)
NEUTROPHILS NFR BLD: 80 % (ref 36–66)
NEUTS SEG NFR BLD: 4.7 K/UL (ref 1.3–9.1)
PLATELET # BLD AUTO: 275 K/UL (ref 150–450)
PMV BLD AUTO: 7.1 FL (ref 6–12)
POTASSIUM SERPL-SCNC: 4 MMOL/L (ref 3.7–5.3)
RBC # BLD AUTO: 3.41 M/UL (ref 4–5.2)
SODIUM SERPL-SCNC: 140 MMOL/L (ref 135–144)
WBC OTHER # BLD: 5.8 K/UL (ref 3.5–11)

## 2024-05-29 PROCEDURE — 3700000000 HC ANESTHESIA ATTENDED CARE: Performed by: INTERNAL MEDICINE

## 2024-05-29 PROCEDURE — 88305 TISSUE EXAM BY PATHOLOGIST: CPT

## 2024-05-29 PROCEDURE — 83690 ASSAY OF LIPASE: CPT

## 2024-05-29 PROCEDURE — 2709999900 HC NON-CHARGEABLE SUPPLY: Performed by: INTERNAL MEDICINE

## 2024-05-29 PROCEDURE — 2580000003 HC RX 258: Performed by: INTERNAL MEDICINE

## 2024-05-29 PROCEDURE — 6360000002 HC RX W HCPCS: Performed by: INTERNAL MEDICINE

## 2024-05-29 PROCEDURE — 80048 BASIC METABOLIC PNL TOTAL CA: CPT

## 2024-05-29 PROCEDURE — 2500000003 HC RX 250 WO HCPCS: Performed by: NURSE ANESTHETIST, CERTIFIED REGISTERED

## 2024-05-29 PROCEDURE — 6360000002 HC RX W HCPCS

## 2024-05-29 PROCEDURE — 2580000003 HC RX 258: Performed by: ANESTHESIOLOGY

## 2024-05-29 PROCEDURE — 2580000003 HC RX 258

## 2024-05-29 PROCEDURE — 2700000000 HC OXYGEN THERAPY PER DAY

## 2024-05-29 PROCEDURE — 6370000000 HC RX 637 (ALT 250 FOR IP)

## 2024-05-29 PROCEDURE — 6370000000 HC RX 637 (ALT 250 FOR IP): Performed by: INTERNAL MEDICINE

## 2024-05-29 PROCEDURE — 2060000000 HC ICU INTERMEDIATE R&B

## 2024-05-29 PROCEDURE — 94761 N-INVAS EAR/PLS OXIMETRY MLT: CPT

## 2024-05-29 PROCEDURE — 94640 AIRWAY INHALATION TREATMENT: CPT

## 2024-05-29 PROCEDURE — 6360000002 HC RX W HCPCS: Performed by: NURSE ANESTHETIST, CERTIFIED REGISTERED

## 2024-05-29 PROCEDURE — 36415 COLL VENOUS BLD VENIPUNCTURE: CPT

## 2024-05-29 PROCEDURE — 85025 COMPLETE CBC W/AUTO DIFF WBC: CPT

## 2024-05-29 PROCEDURE — 7100000000 HC PACU RECOVERY - FIRST 15 MIN: Performed by: INTERNAL MEDICINE

## 2024-05-29 PROCEDURE — C9113 INJ PANTOPRAZOLE SODIUM, VIA: HCPCS

## 2024-05-29 PROCEDURE — 0DB68ZX EXCISION OF STOMACH, VIA NATURAL OR ARTIFICIAL OPENING ENDOSCOPIC, DIAGNOSTIC: ICD-10-PCS | Performed by: INTERNAL MEDICINE

## 2024-05-29 PROCEDURE — 3609012400 HC EGD TRANSORAL BIOPSY SINGLE/MULTIPLE: Performed by: INTERNAL MEDICINE

## 2024-05-29 PROCEDURE — 99233 SBSQ HOSP IP/OBS HIGH 50: CPT | Performed by: INTERNAL MEDICINE

## 2024-05-29 PROCEDURE — 7100000001 HC PACU RECOVERY - ADDTL 15 MIN: Performed by: INTERNAL MEDICINE

## 2024-05-29 RX ORDER — DICYCLOMINE HYDROCHLORIDE 10 MG/1
20 CAPSULE ORAL 3 TIMES DAILY PRN
Status: DISCONTINUED | OUTPATIENT
Start: 2024-05-29 | End: 2024-06-02 | Stop reason: HOSPADM

## 2024-05-29 RX ORDER — LIDOCAINE HYDROCHLORIDE 20 MG/ML
INJECTION, SOLUTION INFILTRATION; PERINEURAL PRN
Status: DISCONTINUED | OUTPATIENT
Start: 2024-05-29 | End: 2024-05-29 | Stop reason: SDUPTHER

## 2024-05-29 RX ORDER — PROPOFOL 10 MG/ML
INJECTION, EMULSION INTRAVENOUS PRN
Status: DISCONTINUED | OUTPATIENT
Start: 2024-05-29 | End: 2024-05-29 | Stop reason: SDUPTHER

## 2024-05-29 RX ORDER — SODIUM CHLORIDE 9 MG/ML
INJECTION, SOLUTION INTRAVENOUS CONTINUOUS
Status: DISCONTINUED | OUTPATIENT
Start: 2024-05-29 | End: 2024-05-29 | Stop reason: HOSPADM

## 2024-05-29 RX ADMIN — TIZANIDINE 4 MG: 4 TABLET ORAL at 20:36

## 2024-05-29 RX ADMIN — SODIUM CHLORIDE, PRESERVATIVE FREE 10 ML: 5 INJECTION INTRAVENOUS at 20:31

## 2024-05-29 RX ADMIN — DICYCLOMINE HYDROCHLORIDE 20 MG: 10 CAPSULE ORAL at 15:46

## 2024-05-29 RX ADMIN — ALBUTEROL SULFATE 2.5 MG: 2.5 SOLUTION RESPIRATORY (INHALATION) at 15:01

## 2024-05-29 RX ADMIN — WATER 40 MG: 1 INJECTION INTRAMUSCULAR; INTRAVENOUS; SUBCUTANEOUS at 15:46

## 2024-05-29 RX ADMIN — MIRTAZAPINE 45 MG: 30 TABLET, FILM COATED ORAL at 20:31

## 2024-05-29 RX ADMIN — LEVOTHYROXINE SODIUM 75 MCG: 0.07 TABLET ORAL at 04:32

## 2024-05-29 RX ADMIN — Medication 2 PUFF: at 08:07

## 2024-05-29 RX ADMIN — ALBUTEROL SULFATE 2.5 MG: 2.5 SOLUTION RESPIRATORY (INHALATION) at 19:37

## 2024-05-29 RX ADMIN — CARVEDILOL 25 MG: 25 TABLET, FILM COATED ORAL at 17:53

## 2024-05-29 RX ADMIN — WATER 40 MG: 1 INJECTION INTRAMUSCULAR; INTRAVENOUS; SUBCUTANEOUS at 01:33

## 2024-05-29 RX ADMIN — MONTELUKAST 10 MG: 10 TABLET, FILM COATED ORAL at 08:25

## 2024-05-29 RX ADMIN — SODIUM CHLORIDE, PRESERVATIVE FREE 10 ML: 5 INJECTION INTRAVENOUS at 09:47

## 2024-05-29 RX ADMIN — CLONAZEPAM 0.5 MG: 0.5 TABLET ORAL at 04:24

## 2024-05-29 RX ADMIN — WATER 40 MG: 1 INJECTION INTRAMUSCULAR; INTRAVENOUS; SUBCUTANEOUS at 08:28

## 2024-05-29 RX ADMIN — OXYCODONE HYDROCHLORIDE AND ACETAMINOPHEN 1 TABLET: 5; 325 TABLET ORAL at 20:36

## 2024-05-29 RX ADMIN — FLUTICASONE PROPIONATE 1 SPRAY: 50 SPRAY, METERED NASAL at 08:28

## 2024-05-29 RX ADMIN — CLONAZEPAM 0.5 MG: 0.5 TABLET ORAL at 13:48

## 2024-05-29 RX ADMIN — PRIMIDONE 100 MG: 50 TABLET ORAL at 20:41

## 2024-05-29 RX ADMIN — GUAIFENESIN 600 MG: 600 TABLET, EXTENDED RELEASE ORAL at 08:25

## 2024-05-29 RX ADMIN — VENLAFAXINE HYDROCHLORIDE 150 MG: 150 CAPSULE, EXTENDED RELEASE ORAL at 08:25

## 2024-05-29 RX ADMIN — AMLODIPINE BESYLATE 10 MG: 10 TABLET ORAL at 20:31

## 2024-05-29 RX ADMIN — SODIUM CHLORIDE: 9 INJECTION, SOLUTION INTRAVENOUS at 11:45

## 2024-05-29 RX ADMIN — TIZANIDINE 4 MG: 4 TABLET ORAL at 08:28

## 2024-05-29 RX ADMIN — LIDOCAINE HYDROCHLORIDE 60 MG: 20 INJECTION, SOLUTION INFILTRATION; PERINEURAL at 12:11

## 2024-05-29 RX ADMIN — PROPOFOL 60 MG: 10 INJECTION, EMULSION INTRAVENOUS at 12:11

## 2024-05-29 RX ADMIN — GUAIFENESIN 600 MG: 600 TABLET, EXTENDED RELEASE ORAL at 20:31

## 2024-05-29 RX ADMIN — AZITHROMYCIN DIHYDRATE 250 MG: 250 TABLET ORAL at 08:25

## 2024-05-29 RX ADMIN — ALBUTEROL SULFATE 2.5 MG: 2.5 SOLUTION RESPIRATORY (INHALATION) at 07:57

## 2024-05-29 RX ADMIN — Medication 3 MG: at 20:36

## 2024-05-29 RX ADMIN — SODIUM CHLORIDE, PRESERVATIVE FREE 40 MG: 5 INJECTION INTRAVENOUS at 08:28

## 2024-05-29 RX ADMIN — ALBUTEROL SULFATE 2.5 MG: 2.5 SOLUTION RESPIRATORY (INHALATION) at 01:33

## 2024-05-29 RX ADMIN — CLONAZEPAM 0.5 MG: 0.5 TABLET ORAL at 20:35

## 2024-05-29 RX ADMIN — ALBUTEROL SULFATE 2.5 MG: 2.5 SOLUTION RESPIRATORY (INHALATION) at 23:39

## 2024-05-29 ASSESSMENT — PAIN SCALES - GENERAL
PAINLEVEL_OUTOF10: 3
PAINLEVEL_OUTOF10: 3
PAINLEVEL_OUTOF10: 8

## 2024-05-29 ASSESSMENT — PAIN DESCRIPTION - LOCATION: LOCATION: BACK;HIP

## 2024-05-29 ASSESSMENT — PAIN DESCRIPTION - DESCRIPTORS: DESCRIPTORS: ACHING

## 2024-05-29 ASSESSMENT — PAIN - FUNCTIONAL ASSESSMENT
PAIN_FUNCTIONAL_ASSESSMENT: 0-10
PAIN_FUNCTIONAL_ASSESSMENT: NONE - DENIES PAIN
PAIN_FUNCTIONAL_ASSESSMENT: ACTIVITIES ARE NOT PREVENTED

## 2024-05-29 ASSESSMENT — PAIN DESCRIPTION - ORIENTATION: ORIENTATION: RIGHT;LOWER

## 2024-05-29 ASSESSMENT — COPD QUESTIONNAIRES: CAT_SEVERITY: SEVERE

## 2024-05-29 ASSESSMENT — ENCOUNTER SYMPTOMS: SHORTNESS OF BREATH: 1

## 2024-05-29 NOTE — OP NOTE
PROCEDURE NOTE    DATE OF PROCEDURE: 5/29/2024     SURGEON: Lizbeth Garcia MD  Facility: \"[facility]  ASSISTANT: None  Anesthesia: Monitored anesthesia care  PREOPERATIVE DIAGNOSIS: Abdominal pain    Diagnosis:    POSTOPERATIVE DIAGNOSIS: As described below    OPERATION: Upper GI endoscopy with Biopsy    ANESTHESIA: Moderate Sedation     ESTIMATED BLOOD LOSS: Less than 50 ml    COMPLICATIONS: None.     SPECIMENS:  Was Obtained: gastric mucosa    HISTORY: The patient is a 63 y.o. year old female with history of above preop diagnosis.  I recommended esophagogastroduodenoscopy with possible biopsy and I explained the risk, benefits, expected outcome, and alternatives to the procedure.  Risks included but are not limited to bleeding, infection, respiratory distress, hypotension, and perforation of the esophagus, stomach, or duodenum.  Patient understands and is in agreement.      PROCEDURE: The patient was given IV conscious sedation.  The patient's SPO2 remained above 90% throughout the procedure.The gastroscope was inserted orally and advanced under direct vision through the esophagus, through the stomach, through the pylorus, and into the descending duodenum.      Post sedation note :The patient's SPO2 remained above 90% throughout the procedure.the vital signs remained stable , and no immediate complication form the procedure noted, patient will be ready for d/c when criteria is met .      Findings:    Retropharyngeal area was grossly normal appearing    Esophagus: abnormal: small hiatal hernia;     Esophagogastric markings: Diaphragmatic hiatus- 38 cm; GE junction- 35 cm; Squamo-columnar junction- 35 cm    Stomach:    Fundus: normal    Body: normal    Antrum: normal  Biopsy obtained to r/o- H pylori     Duodenum:     Descending: normal    Bulb: normal    The scope was removed and the patient tolerated the procedure well.     Impression- Small hiatal hernia    Recommendations/Plan:   F/U Biopsies  Reflux

## 2024-05-29 NOTE — ANESTHESIA PRE PROCEDURE
IntraVENous PRN Patricia Graves MD       • magnesium sulfate 2000 mg in water 50 mL IVPB  2,000 mg IntraVENous PRN Patricia Graves MD       • [Held by provider] enoxaparin Sodium (LOVENOX) injection 30 mg  30 mg SubCUTAneous Daily Patricia Graves MD   30 mg at 05/28/24 0821   • ondansetron (ZOFRAN-ODT) disintegrating tablet 4 mg  4 mg Oral Q8H PRN Patricia Graves MD        Or   • ondansetron (ZOFRAN) injection 4 mg  4 mg IntraVENous Q6H PRN Patricia Graves MD       • polyethylene glycol (GLYCOLAX) packet 17 g  17 g Oral Daily PRN Patricia Graves MD   17 g at 05/27/24 1258   • acetaminophen (TYLENOL) tablet 650 mg  650 mg Oral Q6H PRN Patricia Graves MD        Or   • acetaminophen (TYLENOL) suppository 650 mg  650 mg Rectal Q6H PRN Patricia Graves MD       • glucose chewable tablet 16 g  4 tablet Oral PRN Patricia Graves MD       • dextrose bolus 10% 125 mL  125 mL IntraVENous PRN Patricia Graves MD        Or   • dextrose bolus 10% 250 mL  250 mL IntraVENous PRN Patricia Graves MD       • glucagon injection 1 mg  1 mg SubCUTAneous PRN Patricia Graves MD       • dextrose 10 % infusion   IntraVENous Continuous PRN Patricia Graves MD       • amLODIPine (NORVASC) tablet 10 mg  10 mg Oral Daily Sherry Diane MD   10 mg at 05/28/24 2108   • carvedilol (COREG) tablet 25 mg  25 mg Oral BID Sherry Diane MD   25 mg at 05/28/24 0820       Allergies:    Allergies   Allergen Reactions   • Buspar [Buspirone] Anxiety   • Hydroxyzine Hcl Anxiety       Problem List:    Patient Active Problem List   Diagnosis Code   • Abdominal bloating R14.0   • Abdominal pain R10.9   • MDD (major depressive disorder), recurrent severe, without psychosis (HCC) F33.2   • Panic disorder F41.0   • Obsessive compulsive disorder F42.9   • Irritable bowel syndrome with constipation K58.1   • Chronic constipation K59.09   • Esophageal dysphagia R13.19   • Cervical osteoarthritis M47.812   • Chronic bilateral low back pain without

## 2024-05-29 NOTE — ANESTHESIA POSTPROCEDURE EVALUATION
Department of Anesthesiology  Postprocedure Note    Patient: Laura Rhodes  MRN: [id number]  YOB: 1960  Date of evaluation: 5/29/2024    Procedure Summary       Date: 05/29/24 Room / Location: [place] [address] / [facility]    Anesthesia Start: 1208 Anesthesia Stop: 1216    Procedure: ESOPHAGOGASTRODUODENOSCOPY BIOPSY OF STOMACH (Esophagus) Diagnosis:       Epigastric pain      (Epigastric pain [R10.13])    Surgeons: Lizbeth Garcia MD Responsible Provider:     Anesthesia Type: general, TIVA ASA Status: 4            Anesthesia Type: No value filed.    Linda Phase I: Linda Score: 9    Linda Phase II:      Anesthesia Post Evaluation    Patient location during evaluation: PACU  Patient participation: complete - patient participated  Level of consciousness: awake and alert  Airway patency: patent  Nausea & Vomiting: no vomiting  Cardiovascular status: hemodynamically stable  Respiratory status: acceptable  Hydration status: euvolemic  Comments: POST- ANESTHESIA EVALUATION       Pt Name: Laura Rhodes  MRN: [id number]  YOB: 1960  Date of evaluation: 5/29/2024  Time:  1:47 PM      /84   Pulse 56   Temp 98.8 °F (37.1 °C) (Infrared)   Resp 12   Ht 1.6 m (5' 2.99\")   Wt 47.6 kg (104 lb 15 oz)   SpO2 100%   BMI 18.59 kg/m²      Consciousness Level  Awake  Cardiopulmonary Status  Stable  Pain Adequately Treated YES  Nausea / Vomiting  NO  Adequate Hydration  YES  Anesthesia Related Complications NONE      Electronically signed by Anders Samson MD on 5/29/2024 at 1:47 PM         Pain management: satisfactory to patient    No notable events documented.

## 2024-05-30 LAB
ANION GAP SERPL CALCULATED.3IONS-SCNC: 9 MMOL/L (ref 9–17)
BASOPHILS # BLD: 0 K/UL (ref 0–0.2)
BASOPHILS NFR BLD: 0 % (ref 0–2)
BUN SERPL-MCNC: 13 MG/DL (ref 8–23)
CALCIUM SERPL-MCNC: 8.1 MG/DL (ref 8.6–10.4)
CHLORIDE SERPL-SCNC: 102 MMOL/L (ref 98–107)
CO2 SERPL-SCNC: 28 MMOL/L (ref 20–31)
CREAT SERPL-MCNC: 0.5 MG/DL (ref 0.5–0.9)
EOSINOPHIL # BLD: 0 K/UL (ref 0–0.4)
EOSINOPHILS RELATIVE PERCENT: 0 % (ref 0–4)
ERYTHROCYTE [DISTWIDTH] IN BLOOD BY AUTOMATED COUNT: 15.8 % (ref 11.5–14.9)
GFR, ESTIMATED: >90 ML/MIN/1.73M2
GLUCOSE SERPL-MCNC: 156 MG/DL (ref 70–99)
HCT VFR BLD AUTO: 35.4 % (ref 36–46)
HGB BLD-MCNC: 11.3 G/DL (ref 12–16)
LIPASE SERPL-CCNC: 77 U/L (ref 13–60)
LYMPHOCYTES NFR BLD: 0.7 K/UL (ref 1–4.8)
LYMPHOCYTES RELATIVE PERCENT: 10 % (ref 24–44)
MCH RBC QN AUTO: 31.4 PG (ref 26–34)
MCHC RBC AUTO-ENTMCNC: 31.9 G/DL (ref 31–37)
MCV RBC AUTO: 98.5 FL (ref 80–100)
MONOCYTES NFR BLD: 0.7 K/UL (ref 0.1–1.3)
MONOCYTES NFR BLD: 10 % (ref 1–7)
NEUTROPHILS NFR BLD: 80 % (ref 36–66)
NEUTS SEG NFR BLD: 5.7 K/UL (ref 1.3–9.1)
PLATELET # BLD AUTO: 312 K/UL (ref 150–450)
PMV BLD AUTO: 7.1 FL (ref 6–12)
POTASSIUM SERPL-SCNC: 4.1 MMOL/L (ref 3.7–5.3)
RBC # BLD AUTO: 3.59 M/UL (ref 4–5.2)
SODIUM SERPL-SCNC: 139 MMOL/L (ref 135–144)
WBC OTHER # BLD: 7 K/UL (ref 3.5–11)

## 2024-05-30 PROCEDURE — 2580000003 HC RX 258: Performed by: NURSE PRACTITIONER

## 2024-05-30 PROCEDURE — 6370000000 HC RX 637 (ALT 250 FOR IP): Performed by: INTERNAL MEDICINE

## 2024-05-30 PROCEDURE — 83690 ASSAY OF LIPASE: CPT

## 2024-05-30 PROCEDURE — C9113 INJ PANTOPRAZOLE SODIUM, VIA: HCPCS | Performed by: INTERNAL MEDICINE

## 2024-05-30 PROCEDURE — 2580000003 HC RX 258: Performed by: INTERNAL MEDICINE

## 2024-05-30 PROCEDURE — 94761 N-INVAS EAR/PLS OXIMETRY MLT: CPT

## 2024-05-30 PROCEDURE — 2700000000 HC OXYGEN THERAPY PER DAY

## 2024-05-30 PROCEDURE — 36415 COLL VENOUS BLD VENIPUNCTURE: CPT

## 2024-05-30 PROCEDURE — 6360000002 HC RX W HCPCS: Performed by: INTERNAL MEDICINE

## 2024-05-30 PROCEDURE — 99232 SBSQ HOSP IP/OBS MODERATE 35: CPT | Performed by: NURSE PRACTITIONER

## 2024-05-30 PROCEDURE — 94640 AIRWAY INHALATION TREATMENT: CPT

## 2024-05-30 PROCEDURE — 6370000000 HC RX 637 (ALT 250 FOR IP)

## 2024-05-30 PROCEDURE — 99233 SBSQ HOSP IP/OBS HIGH 50: CPT | Performed by: INTERNAL MEDICINE

## 2024-05-30 PROCEDURE — 85025 COMPLETE CBC W/AUTO DIFF WBC: CPT

## 2024-05-30 PROCEDURE — 2060000000 HC ICU INTERMEDIATE R&B

## 2024-05-30 PROCEDURE — 80048 BASIC METABOLIC PNL TOTAL CA: CPT

## 2024-05-30 RX ORDER — METOCLOPRAMIDE 5 MG/1
5 TABLET ORAL
Status: DISCONTINUED | OUTPATIENT
Start: 2024-05-30 | End: 2024-06-02 | Stop reason: HOSPADM

## 2024-05-30 RX ORDER — PANTOPRAZOLE SODIUM 40 MG/1
40 TABLET, DELAYED RELEASE ORAL
Status: DISCONTINUED | OUTPATIENT
Start: 2024-05-31 | End: 2024-06-02 | Stop reason: HOSPADM

## 2024-05-30 RX ORDER — OXYCODONE HYDROCHLORIDE AND ACETAMINOPHEN 5; 325 MG/1; MG/1
1 TABLET ORAL EVERY 6 HOURS PRN
Status: DISCONTINUED | OUTPATIENT
Start: 2024-05-30 | End: 2024-06-02 | Stop reason: HOSPADM

## 2024-05-30 RX ORDER — 0.9 % SODIUM CHLORIDE 0.9 %
500 INTRAVENOUS SOLUTION INTRAVENOUS ONCE
Status: COMPLETED | OUTPATIENT
Start: 2024-05-30 | End: 2024-05-30

## 2024-05-30 RX ORDER — CARVEDILOL 12.5 MG/1
12.5 TABLET ORAL 2 TIMES DAILY
Status: DISCONTINUED | OUTPATIENT
Start: 2024-05-30 | End: 2024-06-02 | Stop reason: HOSPADM

## 2024-05-30 RX ADMIN — ENOXAPARIN SODIUM 30 MG: 100 INJECTION SUBCUTANEOUS at 09:47

## 2024-05-30 RX ADMIN — WATER 40 MG: 1 INJECTION INTRAMUSCULAR; INTRAVENOUS; SUBCUTANEOUS at 09:47

## 2024-05-30 RX ADMIN — POLYETHYLENE GLYCOL 3350 17 G: 17 POWDER, FOR SOLUTION ORAL at 10:21

## 2024-05-30 RX ADMIN — SODIUM CHLORIDE, PRESERVATIVE FREE 10 ML: 5 INJECTION INTRAVENOUS at 09:47

## 2024-05-30 RX ADMIN — FLUTICASONE PROPIONATE 1 SPRAY: 50 SPRAY, METERED NASAL at 09:50

## 2024-05-30 RX ADMIN — GUAIFENESIN 600 MG: 600 TABLET, EXTENDED RELEASE ORAL at 09:47

## 2024-05-30 RX ADMIN — MIRTAZAPINE 45 MG: 30 TABLET, FILM COATED ORAL at 19:57

## 2024-05-30 RX ADMIN — TIZANIDINE 4 MG: 4 TABLET ORAL at 04:47

## 2024-05-30 RX ADMIN — ALBUTEROL SULFATE 2.5 MG: 2.5 SOLUTION RESPIRATORY (INHALATION) at 19:12

## 2024-05-30 RX ADMIN — METOCLOPRAMIDE 5 MG: 5 TABLET ORAL at 14:59

## 2024-05-30 RX ADMIN — OXYCODONE AND ACETAMINOPHEN 1 TABLET: 5; 325 TABLET ORAL at 14:59

## 2024-05-30 RX ADMIN — Medication 3 MG: at 19:57

## 2024-05-30 RX ADMIN — WATER 40 MG: 1 INJECTION INTRAMUSCULAR; INTRAVENOUS; SUBCUTANEOUS at 00:22

## 2024-05-30 RX ADMIN — TIZANIDINE 4 MG: 4 TABLET ORAL at 18:35

## 2024-05-30 RX ADMIN — ALBUTEROL SULFATE 2.5 MG: 2.5 SOLUTION RESPIRATORY (INHALATION) at 11:22

## 2024-05-30 RX ADMIN — AMLODIPINE BESYLATE 10 MG: 10 TABLET ORAL at 20:01

## 2024-05-30 RX ADMIN — CLONAZEPAM 0.5 MG: 0.5 TABLET ORAL at 12:58

## 2024-05-30 RX ADMIN — PRIMIDONE 100 MG: 50 TABLET ORAL at 20:56

## 2024-05-30 RX ADMIN — OXYCODONE AND ACETAMINOPHEN 1 TABLET: 5; 325 TABLET ORAL at 20:56

## 2024-05-30 RX ADMIN — CLONAZEPAM 0.5 MG: 0.5 TABLET ORAL at 04:47

## 2024-05-30 RX ADMIN — LEVOTHYROXINE SODIUM 75 MCG: 0.07 TABLET ORAL at 04:47

## 2024-05-30 RX ADMIN — SODIUM CHLORIDE, PRESERVATIVE FREE 10 ML: 5 INJECTION INTRAVENOUS at 20:02

## 2024-05-30 RX ADMIN — Medication 2 PUFF: at 07:48

## 2024-05-30 RX ADMIN — ALBUTEROL SULFATE 2.5 MG: 2.5 SOLUTION RESPIRATORY (INHALATION) at 14:57

## 2024-05-30 RX ADMIN — VENLAFAXINE HYDROCHLORIDE 150 MG: 150 CAPSULE, EXTENDED RELEASE ORAL at 09:47

## 2024-05-30 RX ADMIN — SODIUM CHLORIDE 500 ML: 9 INJECTION, SOLUTION INTRAVENOUS at 00:24

## 2024-05-30 RX ADMIN — WATER 40 MG: 1 INJECTION INTRAMUSCULAR; INTRAVENOUS; SUBCUTANEOUS at 20:57

## 2024-05-30 RX ADMIN — GUAIFENESIN 600 MG: 600 TABLET, EXTENDED RELEASE ORAL at 20:01

## 2024-05-30 RX ADMIN — MONTELUKAST 10 MG: 10 TABLET, FILM COATED ORAL at 09:47

## 2024-05-30 RX ADMIN — SODIUM CHLORIDE, PRESERVATIVE FREE 40 MG: 5 INJECTION INTRAVENOUS at 09:47

## 2024-05-30 RX ADMIN — ALBUTEROL SULFATE 2.5 MG: 2.5 SOLUTION RESPIRATORY (INHALATION) at 07:48

## 2024-05-30 ASSESSMENT — PAIN DESCRIPTION - LOCATION
LOCATION: BACK;NECK
LOCATION: BACK;HIP
LOCATION: BACK;HIP

## 2024-05-30 ASSESSMENT — PAIN SCALES - GENERAL
PAINLEVEL_OUTOF10: 7
PAINLEVEL_OUTOF10: 8
PAINLEVEL_OUTOF10: 7
PAINLEVEL_OUTOF10: 7
PAINLEVEL_OUTOF10: 0
PAINLEVEL_OUTOF10: 8
PAINLEVEL_OUTOF10: 6
PAINLEVEL_OUTOF10: 0

## 2024-05-30 ASSESSMENT — PAIN DESCRIPTION - ORIENTATION
ORIENTATION: RIGHT;LEFT;LOWER
ORIENTATION: RIGHT;LOWER
ORIENTATION: POSTERIOR

## 2024-05-30 ASSESSMENT — PAIN DESCRIPTION - DESCRIPTORS
DESCRIPTORS: ACHING
DESCRIPTORS: ACHING

## 2024-05-30 NOTE — FLOWSHEET NOTE
05/30/24 1200 05/30/24 1238   Pain Assessment   Pain Assessment 0-10 0-10   Pain Level 7 7   Pain Location Back;Neck  --    Pain Orientation Posterior  --    Pain Descriptors Aching  --    Non-Pharmaceutical Pain Intervention(s) Emotional support;Environmental changes;Exercise;Food;Repositioned;Rest;Provider notified (comment)  (pt unable to have percocet, d/c d due to sbp being low. Updated Dr. Perez in person of patient concerns. Dr. Perez at bedside reviewing situation with patient. Pt declines tylenol, lidocaine patch as well as voltaren gel at this time as alternatives.)  --    Response to Pain Intervention  --  None (pain unchanged or no improvement)   Side Effects  --  No reported side effects

## 2024-05-30 NOTE — ANESTHESIA POSTPROCEDURE EVALUATION
Department of Anesthesiology  Postprocedure Note    Patient: Laura Rhodes  MRN: [id number]  YOB: 1960  Date of evaluation: 5/30/2024    Procedure Summary       Date: 05/29/24 Room / Location: [place] [address] / [facility]    Anesthesia Start: 1208 Anesthesia Stop: 1216    Procedure: ESOPHAGOGASTRODUODENOSCOPY BIOPSY OF STOMACH (Esophagus) Diagnosis:       Epigastric pain      (Epigastric pain [R10.13])    Surgeons: Lizbeth Garcia MD Responsible Provider: Benjamín Don MD    Anesthesia Type: general, TIVA ASA Status: 4            Anesthesia Type: No value filed.    Linda Phase I: Linda Score: 9    Linda Phase II:      Anesthesia Post Evaluation    Comments: POD #1.  Patient seen at bedside.  No anesthesia complications reported.        No notable events documented.

## 2024-05-31 LAB
ANION GAP SERPL CALCULATED.3IONS-SCNC: 4 MMOL/L (ref 9–17)
BASOPHILS # BLD: 0 K/UL (ref 0–0.2)
BASOPHILS NFR BLD: 0 % (ref 0–2)
BUN SERPL-MCNC: 9 MG/DL (ref 8–23)
CALCIUM SERPL-MCNC: 8.5 MG/DL (ref 8.6–10.4)
CHLORIDE SERPL-SCNC: 101 MMOL/L (ref 98–107)
CO2 SERPL-SCNC: 35 MMOL/L (ref 20–31)
CREAT SERPL-MCNC: 0.4 MG/DL (ref 0.5–0.9)
EOSINOPHIL # BLD: 0 K/UL (ref 0–0.4)
EOSINOPHILS RELATIVE PERCENT: 0 % (ref 0–4)
ERYTHROCYTE [DISTWIDTH] IN BLOOD BY AUTOMATED COUNT: 15.2 % (ref 11.5–14.9)
GFR, ESTIMATED: >90 ML/MIN/1.73M2
GLUCOSE SERPL-MCNC: 123 MG/DL (ref 70–99)
HCT VFR BLD AUTO: 34.3 % (ref 36–46)
HGB BLD-MCNC: 11.1 G/DL (ref 12–16)
LIPASE SERPL-CCNC: 54 U/L (ref 13–60)
LYMPHOCYTES NFR BLD: 1.9 K/UL (ref 1–4.8)
LYMPHOCYTES RELATIVE PERCENT: 27 % (ref 24–44)
MCH RBC QN AUTO: 32.1 PG (ref 26–34)
MCHC RBC AUTO-ENTMCNC: 32.5 G/DL (ref 31–37)
MCV RBC AUTO: 98.7 FL (ref 80–100)
MONOCYTES NFR BLD: 0.5 K/UL (ref 0.1–1.3)
MONOCYTES NFR BLD: 8 % (ref 1–7)
NEUTROPHILS NFR BLD: 65 % (ref 36–66)
NEUTS SEG NFR BLD: 4.5 K/UL (ref 1.3–9.1)
PLATELET # BLD AUTO: 314 K/UL (ref 150–450)
PMV BLD AUTO: 7.2 FL (ref 6–12)
POTASSIUM SERPL-SCNC: 4.8 MMOL/L (ref 3.7–5.3)
RBC # BLD AUTO: 3.47 M/UL (ref 4–5.2)
SODIUM SERPL-SCNC: 140 MMOL/L (ref 135–144)
WBC OTHER # BLD: 6.9 K/UL (ref 3.5–11)

## 2024-05-31 PROCEDURE — 2580000003 HC RX 258: Performed by: INTERNAL MEDICINE

## 2024-05-31 PROCEDURE — 6360000002 HC RX W HCPCS: Performed by: NURSE PRACTITIONER

## 2024-05-31 PROCEDURE — 6370000000 HC RX 637 (ALT 250 FOR IP)

## 2024-05-31 PROCEDURE — 94640 AIRWAY INHALATION TREATMENT: CPT

## 2024-05-31 PROCEDURE — 6370000000 HC RX 637 (ALT 250 FOR IP): Performed by: INTERNAL MEDICINE

## 2024-05-31 PROCEDURE — 2580000003 HC RX 258: Performed by: NURSE PRACTITIONER

## 2024-05-31 PROCEDURE — 94761 N-INVAS EAR/PLS OXIMETRY MLT: CPT

## 2024-05-31 PROCEDURE — 80048 BASIC METABOLIC PNL TOTAL CA: CPT

## 2024-05-31 PROCEDURE — 97161 PT EVAL LOW COMPLEX 20 MIN: CPT

## 2024-05-31 PROCEDURE — 6360000002 HC RX W HCPCS: Performed by: INTERNAL MEDICINE

## 2024-05-31 PROCEDURE — 99232 SBSQ HOSP IP/OBS MODERATE 35: CPT | Performed by: INTERNAL MEDICINE

## 2024-05-31 PROCEDURE — 2700000000 HC OXYGEN THERAPY PER DAY

## 2024-05-31 PROCEDURE — 83690 ASSAY OF LIPASE: CPT

## 2024-05-31 PROCEDURE — 97530 THERAPEUTIC ACTIVITIES: CPT

## 2024-05-31 PROCEDURE — 85025 COMPLETE CBC W/AUTO DIFF WBC: CPT

## 2024-05-31 PROCEDURE — 2060000000 HC ICU INTERMEDIATE R&B

## 2024-05-31 PROCEDURE — 36415 COLL VENOUS BLD VENIPUNCTURE: CPT

## 2024-05-31 RX ORDER — PREDNISONE 10 MG/1
10 TABLET ORAL DAILY
Status: DISCONTINUED | OUTPATIENT
Start: 2024-06-07 | End: 2024-06-02 | Stop reason: HOSPADM

## 2024-05-31 RX ORDER — PREDNISONE 20 MG/1
20 TABLET ORAL DAILY
Status: DISCONTINUED | OUTPATIENT
Start: 2024-06-04 | End: 2024-06-02 | Stop reason: HOSPADM

## 2024-05-31 RX ORDER — SENNOSIDES A AND B 8.6 MG/1
2 TABLET, FILM COATED ORAL DAILY
Status: DISCONTINUED | OUTPATIENT
Start: 2024-05-31 | End: 2024-06-02 | Stop reason: HOSPADM

## 2024-05-31 RX ADMIN — Medication 3 MG: at 20:55

## 2024-05-31 RX ADMIN — ALBUTEROL SULFATE 2.5 MG: 2.5 SOLUTION RESPIRATORY (INHALATION) at 15:06

## 2024-05-31 RX ADMIN — OXYCODONE AND ACETAMINOPHEN 1 TABLET: 5; 325 TABLET ORAL at 16:44

## 2024-05-31 RX ADMIN — METOCLOPRAMIDE 5 MG: 5 TABLET ORAL at 06:05

## 2024-05-31 RX ADMIN — AZITHROMYCIN DIHYDRATE 250 MG: 250 TABLET ORAL at 08:13

## 2024-05-31 RX ADMIN — Medication 2 PUFF: at 06:49

## 2024-05-31 RX ADMIN — VENLAFAXINE HYDROCHLORIDE 150 MG: 150 CAPSULE, EXTENDED RELEASE ORAL at 08:13

## 2024-05-31 RX ADMIN — CLONAZEPAM 0.5 MG: 0.5 TABLET ORAL at 16:38

## 2024-05-31 RX ADMIN — ALBUTEROL SULFATE 2.5 MG: 2.5 SOLUTION RESPIRATORY (INHALATION) at 19:09

## 2024-05-31 RX ADMIN — GUAIFENESIN 600 MG: 600 TABLET, EXTENDED RELEASE ORAL at 08:13

## 2024-05-31 RX ADMIN — LEVOTHYROXINE SODIUM 75 MCG: 0.07 TABLET ORAL at 06:05

## 2024-05-31 RX ADMIN — SODIUM CHLORIDE, PRESERVATIVE FREE 10 ML: 5 INJECTION INTRAVENOUS at 08:20

## 2024-05-31 RX ADMIN — METOCLOPRAMIDE 5 MG: 5 TABLET ORAL at 11:41

## 2024-05-31 RX ADMIN — WATER 40 MG: 1 INJECTION INTRAMUSCULAR; INTRAVENOUS; SUBCUTANEOUS at 20:44

## 2024-05-31 RX ADMIN — TIZANIDINE 4 MG: 4 TABLET ORAL at 20:54

## 2024-05-31 RX ADMIN — MIRTAZAPINE 45 MG: 30 TABLET, FILM COATED ORAL at 20:54

## 2024-05-31 RX ADMIN — CARVEDILOL 12.5 MG: 12.5 TABLET, FILM COATED ORAL at 08:14

## 2024-05-31 RX ADMIN — PANTOPRAZOLE SODIUM 40 MG: 40 TABLET, DELAYED RELEASE ORAL at 06:05

## 2024-05-31 RX ADMIN — METOCLOPRAMIDE 5 MG: 5 TABLET ORAL at 16:39

## 2024-05-31 RX ADMIN — ENOXAPARIN SODIUM 30 MG: 100 INJECTION SUBCUTANEOUS at 08:13

## 2024-05-31 RX ADMIN — WATER 40 MG: 1 INJECTION INTRAMUSCULAR; INTRAVENOUS; SUBCUTANEOUS at 08:15

## 2024-05-31 RX ADMIN — PRIMIDONE 100 MG: 50 TABLET ORAL at 20:55

## 2024-05-31 RX ADMIN — AMLODIPINE BESYLATE 10 MG: 10 TABLET ORAL at 20:55

## 2024-05-31 RX ADMIN — FLUTICASONE PROPIONATE 1 SPRAY: 50 SPRAY, METERED NASAL at 10:54

## 2024-05-31 RX ADMIN — ALBUTEROL SULFATE 2.5 MG: 2.5 SOLUTION RESPIRATORY (INHALATION) at 06:49

## 2024-05-31 RX ADMIN — SODIUM CHLORIDE, PRESERVATIVE FREE 10 ML: 5 INJECTION INTRAVENOUS at 20:53

## 2024-05-31 RX ADMIN — SENNOSIDES 17.2 MG: 8.6 TABLET, FILM COATED ORAL at 13:26

## 2024-05-31 RX ADMIN — OXYCODONE AND ACETAMINOPHEN 1 TABLET: 5; 325 TABLET ORAL at 08:15

## 2024-05-31 RX ADMIN — MONTELUKAST 10 MG: 10 TABLET, FILM COATED ORAL at 08:13

## 2024-05-31 RX ADMIN — CLONAZEPAM 0.5 MG: 0.5 TABLET ORAL at 06:34

## 2024-05-31 RX ADMIN — GUAIFENESIN 600 MG: 600 TABLET, EXTENDED RELEASE ORAL at 20:55

## 2024-05-31 RX ADMIN — ALBUTEROL SULFATE 2.5 MG: 2.5 SOLUTION RESPIRATORY (INHALATION) at 11:01

## 2024-05-31 ASSESSMENT — PAIN DESCRIPTION - LOCATION
LOCATION: BACK;NECK
LOCATION: BACK;LEG;NECK
LOCATION: BACK;HIP
LOCATION: BACK;ABDOMEN;NECK

## 2024-05-31 ASSESSMENT — PAIN SCALES - GENERAL
PAINLEVEL_OUTOF10: 0
PAINLEVEL_OUTOF10: 8

## 2024-05-31 ASSESSMENT — PAIN DESCRIPTION - DESCRIPTORS
DESCRIPTORS: ACHING
DESCRIPTORS: ACHING;BURNING;STABBING;SPASM
DESCRIPTORS: ACHING;DULL
DESCRIPTORS: ACHING;DISCOMFORT;CRAMPING;TIGHTNESS

## 2024-05-31 ASSESSMENT — PAIN DESCRIPTION - ORIENTATION: ORIENTATION: LOWER;RIGHT

## 2024-06-01 LAB
ANION GAP SERPL CALCULATED.3IONS-SCNC: 7 MMOL/L (ref 9–17)
BASOPHILS # BLD: 0 K/UL (ref 0–0.2)
BASOPHILS NFR BLD: 0 % (ref 0–2)
BUN SERPL-MCNC: 15 MG/DL (ref 8–23)
CALCIUM SERPL-MCNC: 8.4 MG/DL (ref 8.6–10.4)
CHLORIDE SERPL-SCNC: 100 MMOL/L (ref 98–107)
CO2 SERPL-SCNC: 33 MMOL/L (ref 20–31)
CREAT SERPL-MCNC: 0.5 MG/DL (ref 0.5–0.9)
EOSINOPHIL # BLD: 0 K/UL (ref 0–0.4)
EOSINOPHILS RELATIVE PERCENT: 0 % (ref 0–4)
ERYTHROCYTE [DISTWIDTH] IN BLOOD BY AUTOMATED COUNT: 15.6 % (ref 11.5–14.9)
GFR, ESTIMATED: >90 ML/MIN/1.73M2
GLUCOSE SERPL-MCNC: 105 MG/DL (ref 70–99)
HCT VFR BLD AUTO: 34.4 % (ref 36–46)
HGB BLD-MCNC: 11.2 G/DL (ref 12–16)
LYMPHOCYTES NFR BLD: 1.8 K/UL (ref 1–4.8)
LYMPHOCYTES RELATIVE PERCENT: 24 % (ref 24–44)
MCH RBC QN AUTO: 32.2 PG (ref 26–34)
MCHC RBC AUTO-ENTMCNC: 32.6 G/DL (ref 31–37)
MCV RBC AUTO: 98.8 FL (ref 80–100)
MONOCYTES NFR BLD: 0.5 K/UL (ref 0.1–1.3)
MONOCYTES NFR BLD: 7 % (ref 1–7)
NEUTROPHILS NFR BLD: 69 % (ref 36–66)
NEUTS SEG NFR BLD: 5.2 K/UL (ref 1.3–9.1)
PLATELET # BLD AUTO: 322 K/UL (ref 150–450)
PMV BLD AUTO: 7.1 FL (ref 6–12)
POTASSIUM SERPL-SCNC: 4.1 MMOL/L (ref 3.7–5.3)
RBC # BLD AUTO: 3.48 M/UL (ref 4–5.2)
SODIUM SERPL-SCNC: 140 MMOL/L (ref 135–144)
WBC OTHER # BLD: 7.6 K/UL (ref 3.5–11)

## 2024-06-01 PROCEDURE — 99232 SBSQ HOSP IP/OBS MODERATE 35: CPT | Performed by: INTERNAL MEDICINE

## 2024-06-01 PROCEDURE — 2580000003 HC RX 258: Performed by: INTERNAL MEDICINE

## 2024-06-01 PROCEDURE — 6370000000 HC RX 637 (ALT 250 FOR IP): Performed by: INTERNAL MEDICINE

## 2024-06-01 PROCEDURE — 2060000000 HC ICU INTERMEDIATE R&B

## 2024-06-01 PROCEDURE — 87329 GIARDIA AG IA: CPT

## 2024-06-01 PROCEDURE — 83630 LACTOFERRIN FECAL (QUAL): CPT

## 2024-06-01 PROCEDURE — 6360000002 HC RX W HCPCS: Performed by: INTERNAL MEDICINE

## 2024-06-01 PROCEDURE — 94640 AIRWAY INHALATION TREATMENT: CPT

## 2024-06-01 PROCEDURE — 87506 IADNA-DNA/RNA PROBE TQ 6-11: CPT

## 2024-06-01 PROCEDURE — 85025 COMPLETE CBC W/AUTO DIFF WBC: CPT

## 2024-06-01 PROCEDURE — 2700000000 HC OXYGEN THERAPY PER DAY

## 2024-06-01 PROCEDURE — 80048 BASIC METABOLIC PNL TOTAL CA: CPT

## 2024-06-01 PROCEDURE — 36415 COLL VENOUS BLD VENIPUNCTURE: CPT

## 2024-06-01 PROCEDURE — 6370000000 HC RX 637 (ALT 250 FOR IP)

## 2024-06-01 PROCEDURE — 87328 CRYPTOSPORIDIUM AG IA: CPT

## 2024-06-01 PROCEDURE — 6370000000 HC RX 637 (ALT 250 FOR IP): Performed by: NURSE PRACTITIONER

## 2024-06-01 PROCEDURE — 94761 N-INVAS EAR/PLS OXIMETRY MLT: CPT

## 2024-06-01 PROCEDURE — 82653 EL-1 FECAL QUANTITATIVE: CPT

## 2024-06-01 RX ADMIN — LEVOTHYROXINE SODIUM 75 MCG: 0.07 TABLET ORAL at 05:32

## 2024-06-01 RX ADMIN — CLONAZEPAM 0.5 MG: 0.5 TABLET ORAL at 03:29

## 2024-06-01 RX ADMIN — GUAIFENESIN 600 MG: 600 TABLET, EXTENDED RELEASE ORAL at 20:53

## 2024-06-01 RX ADMIN — VENLAFAXINE HYDROCHLORIDE 150 MG: 150 CAPSULE, EXTENDED RELEASE ORAL at 09:57

## 2024-06-01 RX ADMIN — Medication 3 MG: at 20:53

## 2024-06-01 RX ADMIN — TIZANIDINE 4 MG: 4 TABLET ORAL at 20:52

## 2024-06-01 RX ADMIN — MIRTAZAPINE 45 MG: 30 TABLET, FILM COATED ORAL at 20:52

## 2024-06-01 RX ADMIN — ENOXAPARIN SODIUM 30 MG: 100 INJECTION SUBCUTANEOUS at 09:58

## 2024-06-01 RX ADMIN — METOCLOPRAMIDE 5 MG: 5 TABLET ORAL at 05:32

## 2024-06-01 RX ADMIN — OXYCODONE AND ACETAMINOPHEN 1 TABLET: 5; 325 TABLET ORAL at 17:12

## 2024-06-01 RX ADMIN — PRIMIDONE 100 MG: 50 TABLET ORAL at 20:52

## 2024-06-01 RX ADMIN — ALBUTEROL SULFATE 2.5 MG: 2.5 SOLUTION RESPIRATORY (INHALATION) at 06:58

## 2024-06-01 RX ADMIN — ALBUTEROL SULFATE 2.5 MG: 2.5 SOLUTION RESPIRATORY (INHALATION) at 15:16

## 2024-06-01 RX ADMIN — SODIUM CHLORIDE, PRESERVATIVE FREE 10 ML: 5 INJECTION INTRAVENOUS at 20:55

## 2024-06-01 RX ADMIN — METOCLOPRAMIDE 5 MG: 5 TABLET ORAL at 16:38

## 2024-06-01 RX ADMIN — METOCLOPRAMIDE 5 MG: 5 TABLET ORAL at 11:47

## 2024-06-01 RX ADMIN — ALBUTEROL SULFATE 2.5 MG: 2.5 SOLUTION RESPIRATORY (INHALATION) at 20:23

## 2024-06-01 RX ADMIN — Medication 2 PUFF: at 06:58

## 2024-06-01 RX ADMIN — CARVEDILOL 12.5 MG: 12.5 TABLET, FILM COATED ORAL at 10:11

## 2024-06-01 RX ADMIN — FLUTICASONE PROPIONATE 1 SPRAY: 50 SPRAY, METERED NASAL at 09:58

## 2024-06-01 RX ADMIN — OXYCODONE AND ACETAMINOPHEN 1 TABLET: 5; 325 TABLET ORAL at 10:11

## 2024-06-01 RX ADMIN — SENNOSIDES 17.2 MG: 8.6 TABLET, FILM COATED ORAL at 09:57

## 2024-06-01 RX ADMIN — GUAIFENESIN 600 MG: 600 TABLET, EXTENDED RELEASE ORAL at 09:57

## 2024-06-01 RX ADMIN — SODIUM CHLORIDE, PRESERVATIVE FREE 10 ML: 5 INJECTION INTRAVENOUS at 10:12

## 2024-06-01 RX ADMIN — CLONAZEPAM 0.5 MG: 0.5 TABLET ORAL at 14:10

## 2024-06-01 RX ADMIN — AMLODIPINE BESYLATE 10 MG: 10 TABLET ORAL at 20:53

## 2024-06-01 RX ADMIN — MONTELUKAST 10 MG: 10 TABLET, FILM COATED ORAL at 09:57

## 2024-06-01 RX ADMIN — PANTOPRAZOLE SODIUM 40 MG: 40 TABLET, DELAYED RELEASE ORAL at 05:32

## 2024-06-01 RX ADMIN — ALBUTEROL SULFATE 2.5 MG: 2.5 SOLUTION RESPIRATORY (INHALATION) at 11:12

## 2024-06-01 RX ADMIN — PREDNISONE 30 MG: 10 TABLET ORAL at 09:57

## 2024-06-01 ASSESSMENT — PAIN - FUNCTIONAL ASSESSMENT: PAIN_FUNCTIONAL_ASSESSMENT: ACTIVITIES ARE NOT PREVENTED

## 2024-06-01 ASSESSMENT — PAIN SCALES - WONG BAKER
WONGBAKER_NUMERICALRESPONSE: NO HURT
WONGBAKER_NUMERICALRESPONSE: NO HURT

## 2024-06-01 ASSESSMENT — PAIN SCALES - GENERAL
PAINLEVEL_OUTOF10: 8
PAINLEVEL_OUTOF10: 5
PAINLEVEL_OUTOF10: 8
PAINLEVEL_OUTOF10: 7
PAINLEVEL_OUTOF10: 8
PAINLEVEL_OUTOF10: 6

## 2024-06-01 ASSESSMENT — PAIN DESCRIPTION - LOCATION
LOCATION: ABDOMEN;LEG
LOCATION: ABDOMEN

## 2024-06-01 ASSESSMENT — PAIN DESCRIPTION - DESCRIPTORS
DESCRIPTORS: ACHING;DISCOMFORT;TIGHTNESS
DESCRIPTORS: ACHING

## 2024-06-02 VITALS
SYSTOLIC BLOOD PRESSURE: 130 MMHG | BODY MASS INDEX: 18.24 KG/M2 | RESPIRATION RATE: 18 BRPM | HEART RATE: 75 BPM | DIASTOLIC BLOOD PRESSURE: 88 MMHG | OXYGEN SATURATION: 100 % | HEIGHT: 63 IN | WEIGHT: 102.95 LBS | TEMPERATURE: 98.7 F

## 2024-06-02 LAB
CAMPYLOBACTER DNA SPEC NAA+PROBE: NORMAL
ETEC ELTA+ESTB GENES STL QL NAA+PROBE: NORMAL
LACTOFERRIN STL QL: NORMAL
P SHIGELLOIDES DNA STL QL NAA+PROBE: NORMAL
SALMONELLA DNA SPEC QL NAA+PROBE: NORMAL
SHIGA TOXIN STX GENE SPEC NAA+PROBE: NORMAL
SHIGELLA DNA SPEC QL NAA+PROBE: NORMAL
SPECIMEN DESCRIPTION: NORMAL
SURGICAL PATHOLOGY REPORT: NORMAL
V CHOL+PARA RFBL+TRKH+TNAA STL QL NAA+PR: NORMAL
Y ENTERO RECN STL QL NAA+PROBE: NORMAL

## 2024-06-02 PROCEDURE — 2700000000 HC OXYGEN THERAPY PER DAY

## 2024-06-02 PROCEDURE — 6370000000 HC RX 637 (ALT 250 FOR IP): Performed by: INTERNAL MEDICINE

## 2024-06-02 PROCEDURE — 6370000000 HC RX 637 (ALT 250 FOR IP)

## 2024-06-02 PROCEDURE — 6370000000 HC RX 637 (ALT 250 FOR IP): Performed by: NURSE PRACTITIONER

## 2024-06-02 PROCEDURE — 2580000003 HC RX 258: Performed by: INTERNAL MEDICINE

## 2024-06-02 PROCEDURE — 6360000002 HC RX W HCPCS: Performed by: INTERNAL MEDICINE

## 2024-06-02 PROCEDURE — 94761 N-INVAS EAR/PLS OXIMETRY MLT: CPT

## 2024-06-02 PROCEDURE — 94664 DEMO&/EVAL PT USE INHALER: CPT

## 2024-06-02 PROCEDURE — 94640 AIRWAY INHALATION TREATMENT: CPT

## 2024-06-02 RX ORDER — METOCLOPRAMIDE 5 MG/1
5 TABLET ORAL 3 TIMES DAILY
Qty: 30 TABLET | Refills: 0 | Status: SHIPPED | OUTPATIENT
Start: 2024-06-02 | End: 2024-06-12

## 2024-06-02 RX ORDER — PREDNISONE 20 MG/1
20 TABLET ORAL DAILY
Qty: 3 TABLET | Refills: 0 | Status: SHIPPED | OUTPATIENT
Start: 2024-06-04 | End: 2024-06-07

## 2024-06-02 RX ORDER — PREDNISONE 10 MG/1
10 TABLET ORAL DAILY
Qty: 10 TABLET | Refills: 0 | Status: SHIPPED | OUTPATIENT
Start: 2024-06-07 | End: 2024-06-17

## 2024-06-02 RX ORDER — PREDNISONE 10 MG/1
30 TABLET ORAL DAILY
Qty: 3 TABLET | Refills: 0 | Status: SHIPPED | OUTPATIENT
Start: 2024-06-03 | End: 2024-06-04

## 2024-06-02 RX ADMIN — ALBUTEROL SULFATE 2.5 MG: 2.5 SOLUTION RESPIRATORY (INHALATION) at 10:53

## 2024-06-02 RX ADMIN — PANTOPRAZOLE SODIUM 40 MG: 40 TABLET, DELAYED RELEASE ORAL at 06:08

## 2024-06-02 RX ADMIN — FLUTICASONE PROPIONATE 1 SPRAY: 50 SPRAY, METERED NASAL at 08:12

## 2024-06-02 RX ADMIN — ALBUTEROL SULFATE 2.5 MG: 2.5 SOLUTION RESPIRATORY (INHALATION) at 06:57

## 2024-06-02 RX ADMIN — CARVEDILOL 12.5 MG: 12.5 TABLET, FILM COATED ORAL at 11:04

## 2024-06-02 RX ADMIN — SENNOSIDES 17.2 MG: 8.6 TABLET, FILM COATED ORAL at 08:10

## 2024-06-02 RX ADMIN — GUAIFENESIN 600 MG: 600 TABLET, EXTENDED RELEASE ORAL at 08:11

## 2024-06-02 RX ADMIN — VENLAFAXINE HYDROCHLORIDE 150 MG: 150 CAPSULE, EXTENDED RELEASE ORAL at 08:10

## 2024-06-02 RX ADMIN — CLONAZEPAM 0.5 MG: 0.5 TABLET ORAL at 02:21

## 2024-06-02 RX ADMIN — METOCLOPRAMIDE 5 MG: 5 TABLET ORAL at 11:03

## 2024-06-02 RX ADMIN — PREDNISONE 30 MG: 10 TABLET ORAL at 08:11

## 2024-06-02 RX ADMIN — ENOXAPARIN SODIUM 30 MG: 100 INJECTION SUBCUTANEOUS at 08:09

## 2024-06-02 RX ADMIN — LEVOTHYROXINE SODIUM 75 MCG: 0.07 TABLET ORAL at 06:08

## 2024-06-02 RX ADMIN — ALBUTEROL SULFATE 2.5 MG: 2.5 SOLUTION RESPIRATORY (INHALATION) at 00:49

## 2024-06-02 RX ADMIN — Medication 2 PUFF: at 07:02

## 2024-06-02 RX ADMIN — MONTELUKAST 10 MG: 10 TABLET, FILM COATED ORAL at 08:11

## 2024-06-02 RX ADMIN — METOCLOPRAMIDE 5 MG: 5 TABLET ORAL at 06:08

## 2024-06-02 RX ADMIN — CLONAZEPAM 0.5 MG: 0.5 TABLET ORAL at 11:13

## 2024-06-02 RX ADMIN — SODIUM CHLORIDE, PRESERVATIVE FREE 10 ML: 5 INJECTION INTRAVENOUS at 08:10

## 2024-06-02 RX ADMIN — OXYCODONE AND ACETAMINOPHEN 1 TABLET: 5; 325 TABLET ORAL at 08:10

## 2024-06-02 ASSESSMENT — PAIN - FUNCTIONAL ASSESSMENT: PAIN_FUNCTIONAL_ASSESSMENT: ACTIVITIES ARE NOT PREVENTED

## 2024-06-02 ASSESSMENT — PAIN DESCRIPTION - DESCRIPTORS: DESCRIPTORS: ACHING;DISCOMFORT

## 2024-06-02 ASSESSMENT — PAIN SCALES - GENERAL
PAINLEVEL_OUTOF10: 6
PAINLEVEL_OUTOF10: 8
PAINLEVEL_OUTOF10: 6

## 2024-06-02 ASSESSMENT — PAIN DESCRIPTION - ORIENTATION: ORIENTATION: RIGHT;MID

## 2024-06-02 ASSESSMENT — PULMONARY FUNCTION TESTS: PEFR_L/MIN: 54

## 2024-06-02 ASSESSMENT — PAIN DESCRIPTION - LOCATION: LOCATION: BACK;HIP;ABDOMEN

## 2024-06-02 NOTE — DISCHARGE SUMMARY
[facility]   IN-PATIENT SERVICE   [facility]    Discharge Summary     Patient ID: Laura Rhodes  :  1960   MRN: [id number]     ACCOUNT:  [id number]   Patient's PCP: Van Pate MD  Admit Date: 2024   Discharge Date: 2024   Length of Stay: 8  Code Status:  Prior  Admitting Physician: Davonte Williamson MD  Discharge Physician: Kamaljit Tompkins MD     Active Discharge Diagnoses:       Primary Problem  Acute pancreatitis, unspecified complication status, unspecified pancreatitis type      Hospital Problems  Active Hospital Problems    Diagnosis Date Noted    Nausea and vomiting [R11.2] 2024    Diarrhea [R19.7] 2024    Acute pancreatitis, unspecified complication status, unspecified pancreatitis type [K85.90] 2024    Abdominal pain [R10.9] 2019       Admission Condition:  fair     Discharged Condition: good    Hospital Stay:       Hospital Course:  Laura Rhodes is a 63 y.o. female who was admitted for the management of acute pancreatitis and COPD exacerbation.  On presentation to the ED, her lipase was significantly elevated at 269 CT abdomen showed thickening of the gastric fundus and gastric body was which may have been related to gastritis.  GI on board EGD showed small hiatal hernia that will require outpatient follow-up 3 to 4 weeks. Pulmonology was also consulted for COPD exacerbation.  Outpatient follow-up in 4 weeks as well as continue with the prednisone taper.  On the day of discharge, patient was able to tolerate oral feeds without any difficulties and was breathing well with no wheezing.  All services were okay with discharge.    Significant therapeutic interventions:     Significant Diagnostic Studies:   Labs / Micro:  CBC:   Lab Results   Component Value Date/Time    WBC 7.6 2024 05:41 AM    RBC 3.48 2024 05:41 AM    HGB 11.2 2024 05:41 AM    HCT 34.4 2024 05:41 AM    MCV 98.8 2024 05:41

## 2024-06-02 NOTE — CARE COORDINATION
Continuity of Care Form    Patient Name: Laura Rhodes   :  1960  MRN:  [id number]    Admit date:  2024  Discharge date:  24    Code Status Order: DNR-CCA   Advance Directives:     Admitting Physician:  Davonte Williamson MD  PCP: Van Pate MD    Discharging Nurse: Stefani Cainarging Hospital Unit/Room#:   Discharging Unit Phone Number: [phone]    Emergency Contact:   Extended Emergency Contact Information  Primary Emergency Contact: Dionicio Rhodes  Address: [address] [place] [address] [place]  Home Phone: [phone]  Work Phone: [phone]  Mobile Phone: [phone]  Relation: Spouse   needed? No    Past Surgical History:  Past Surgical History:   Procedure Laterality Date    BACK SURGERY      diskectomy, L3-L5    COLONOSCOPY N/A 2019    COLONOSCOPY DIAGNOSTIC performed by Rajwinder Aleman MD at [facility] OR    COLONOSCOPY  2019    COLONOSCOPY POLYPECTOMY SNARE/COLD BIOPSY performed by Rajwinder Aleman MD at [facility]oscopy    DILATION AND CURETTAGE OF UTERUS      FEMUR FRACTURE SURGERY Right 2023    FEMUR IM NAIL HALIMA INSERTION performed by Juli Vann MD at [facility] OR    OTHER SURGICAL HISTORY      ectopic pregnancy with tube removal    PAIN MANAGEMENT PROCEDURE      Left sided Transforaminal Epidural Steriod Injection    THYROIDECTOMY, PARTIAL      UPPER GASTROINTESTINAL ENDOSCOPY      UPPER GASTROINTESTINAL ENDOSCOPY N/A 2020    EGD BIOPSY with dilatation performed by Ralph Joy MD at [facility] ENDO    UPPER GASTROINTESTINAL ENDOSCOPY N/A 2022    EGD BIOPSY performed by Fritz Zacarias MD at [facility] ENDO       Immunization History:   Immunization History   Administered Date(s) Administered    Influenza Virus Vaccine 2018, 2020    Influenza, FLUARIX, FLULAVAL, FLUZONE (age 6 mo+) AND AFLURIA, (age 3 y+), PF, 0.5mL 10/05/2019, 10/14/2020    Influenza, FLUCELVAX, (age 6 mo+), MDCK, PF, 0.5mL 
Dr. Cordero notified of new consult.  
Marina Simmons NP notified of new GI consult via perfect serve. Message read.  
Met with Ernestina from [facility]. Facility is able to accept patient.   
ONGOING DISCHARGE PLAN:    Patient is alert and oriented x4.    Spoke with patient regarding discharge plan and patient confirms that plan is still home with [facility]. Patient states she will probably need [facility]-accepted. Will need therapy notes to start auth.    POD#1 EGD-small hiatal hernia, biopsies taken  Lipase  77  Regular diet    IV steroids 40 Q 12  Oral zithromax    PT/OT pending    Will continue to follow for additional discharge needs.    If patient is discharged prior to next notation, then this note serves as note for discharge by case management.    Electronically signed by Laura Lincoln RN on 5/29/2024 at 1:41 PM   
ONGOING DISCHARGE PLAN:    Patient is alert and oriented x4.    Spoke with patient regarding discharge plan and patient confirms that plan is still home with [facility]. Follows with Dr Briceño for Pain Management.       GI consult  Full liquid diet    Gallbladder US-CBD dilated    92% on 2L  IV steroids 40 Q 6  Oral zithromax    PT/OT      Will continue to follow for additional discharge needs.    If patient is discharged prior to next notation, then this note serves as note for discharge by case management.    Electronically signed by Laura Lincoln RN on 5/28/2024 at 12:18 PM   
ONGOING DISCHARGE PLAN:    Patient is alert and oriented x4.    Spoke with patient regarding discharge plan and patient confirms that she will be returning home with visiting nurse service. LEXY and DC orders faxed to [facility].    Will continue to follow for additional discharge needs.    If patient is discharged prior to next notation, then this note serves as note for discharge by case management.    Electronically signed by Luci De La Vega RN on 6/2/2024 at 12:26 PM    
ONGOING DISCHARGE PLAN:    Patient is alert and oriented x4.    Spoke with patient regarding discharge plan and updated her that because she walked 238 feet with therapy yesterday it is likely that insurance will deny her admission to a skilled nursing facility. At this time the patient does not feel \"ready to return home, maybe one more day.\" This writer explained that she is current with [facility], therefore when she is medically ready for discharge we will ensure that visiting nurse service follows with her.     LILY Gallardo, bedside nurse updated as well.     Will continue to follow for additional discharge needs.    If patient is discharged prior to next notation, then this note serves as note for discharge by case management.    Electronically signed by Luci De La Vega RN on 6/1/2024 at 2:02 PM    
Patient c/o trouble breathing. [facility] RT called, duoneb treatment administered and bipap placed. Pt states neither is working and she feels as if she cannot get air out. Dr. Williamson ordered stat CXR. Oral klonopin administered. Writer spoke with Dr. Gil clifton, transfer pt to ICU intermediate ordered.  
Report given to ICU RN. All questions and concerns addressed.  
Writer spoke with Yvon at [facility]. Facility is able to accept patient. Insurance authorization will be started today once therapy notes are in  
provide assistance at DC: Yes  Would you like Case Management to discuss the discharge plan with any other family members/significant others, and if so, who? No  Plans to Return to Present Housing: Yes  Other Identified Issues/Barriers to RETURNING to current housing: NO  Potential Assistance needed at discharge: Home Care, Skilled Nursing Facility (Pt requested referral be sent to [facility] in case she needs SNF. Referral sent. Otherwise she will go home with [facility].)            Potential DME:  N/A  Patient expects to discharge to: House  Plan for transportation at discharge: Family    Financial    Payor: [employer] / Plan: [employer] [place] [id number] / Product Type: *No Product type* /     Does insurance require precert for SNF: Yes    Potential assistance Purchasing Medications: No  Meds-to-Beds request:        [id number] - [place], [place] - [address] [address] [address] - P [phone] - F [phone]  [address] [address] [address]  Phone: [phone] Fax: [phone]    [id number] [place], [place] - [address] [facility] [facility] -  [phone] - F [phone]  [address] [facility] [address]  Phone: [phone] Fax: [phone]    [facility] - [place], [place] - [address] [place] [address] - P [phone] - F [phone]  [address] [facility] [address]  Phone: [phone] Fax: [phone]    [id number] - [place], [place] - [address] - P [phone] - F [phone]  [address]  [facility] [place] [address]  Phone: [phone] Fax: [phone]      Notes:    Factors facilitating achievement of predicted outcomes: Family support, Motivated, Cooperative, and Pleasant    Barriers to discharge: N/A    Additional Case Management Notes: From home with spouse, independent and spouse transports. DME: walker, [place], neb, NIV ([employer]), O2 2.5L NC 24/7 conc/port tanks ([facility]). Current with [facility] (referral sent to notify of

## 2024-06-02 NOTE — PROGRESS NOTES
ICU Progress Note (Non-Vent)  [facility]    Patient - Laura Rhodes,  Age - 63 y.o.    - 1960      Room Number -    MRN -  [id number]   [facility] # - [id number]  Date of Admission -  2024 12:54 PM    Events of Past 24 Hours   Lipase has improved currently 55  (was 269 on admission)   EGD planned for this 11:45 this AM due to persistent epigastric pain, pt NPO for procedure   Kept in intermediate ICU yesterday due to feeling SOB, this AM pt feels better and is not bronchospastic.     Vitals    height is 1.6 m (5' 2.99\") and weight is 47.6 kg (104 lb 15 oz). Her oral temperature is 98.3 °F (36.8 °C). Her blood pressure is 110/86 and her pulse is 61. Her respiration is 15 and oxygen saturation is 98%.       Temperature Range: Temp: 98.3 °F (36.8 °C) Temp  Av.4 °F (36.9 °C)  Min: 98.3 °F (36.8 °C)  Max: 98.6 °F (37 °C)  BP Range:  Systolic (24hrs), Av , Min:84 , Max:135     Diastolic (24hrs), Av, Min:53, Max:91    Pulse Range: Pulse  Av.7  Min: 44  Max: 85  Respiration Range: Resp  Av.2  Min: 9  Max: 28  Current Pulse Ox::  SpO2: 98 %  24HR Pulse Ox Range:  SpO2  Av.1 %  Min: 76 %  Max: 100 %  Oxygen Amount and Delivery: O2 Flow Rate (L/min): 2 L/min    Wt Readings from Last 3 Encounters:   24 47.6 kg (104 lb 15 oz)   24 45.8 kg (101 lb)   24 44.3 kg (97 lb 9.6 oz)     I/O     Intake/Output Summary (Last 24 hours) at 2024 0851  Last data filed at 2024 0432  Gross per 24 hour   Intake 720 ml   Output 1050 ml   Net -330 ml     DRAIN/TUBE OUTPUT       Invasive Lines   ICP PRESSURE RANGE  No data recorded  CVP PRESSURE RANGE  No data recorded      Medications      methylPREDNISolone  40 mg IntraVENous Q8H    guaiFENesin  600 mg Oral BID    albuterol  2.5 mg Nebulization Q4H WA RT    pantoprazole (PROTONIX) 40 mg in sodium chloride (PF) 0.9 % 
    [facility] PULMONARY,CRITICAL CARE & SLEEP   Saúl Cordero MD/Hieu EVANS AGACNP-BC, NP-C      Chantel EVANS NP-C     Deshawn EVANS NP-C                                          Pulmonary Progress Note    Patient - Laura Rhodes   Age - 63 y.o.   - 1960  MRN - [id number]  Acct # - [id number]  Date of Admission - 2024 12:54 PM    Consulting Service/Physician:       Primary Care Physician: Van Pate MD    SUBJECTIVE:     Chief Complaint:   Chief Complaint   Patient presents with    Abdominal Pain    Emesis    Diarrhea     Subjective:    Laura is seen sitting up in bed.  She is on 2 L nasal cannula which is her baseline.  Pulse ox 98%.  She states she is feeling okay today from breathing standpoint.  She is using her home NIV at night and with naps.    VITALS  /63   Pulse 82   Temp 98 °F (36.7 °C) (Oral)   Resp 18   Ht 1.6 m (5' 2.99\")   Wt 47.6 kg (104 lb 15 oz)   SpO2 98%   BMI 18.59 kg/m²   Wt Readings from Last 3 Encounters:   24 47.6 kg (104 lb 15 oz)   24 45.8 kg (101 lb)   24 44.3 kg (97 lb 9.6 oz)     I/O (24 Hours)  No intake or output data in the 24 hours ending 24 1306  Ventilator:   Settings  FiO2 : 30 %  Insp Rise Time (%): 2 %  Exam:   Physical Exam   Constitutional: Cachectic appearing elderly female laying in bed on 2 L in no distress  HENT: Unremarkable  Head: Normocephalic and atraumatic.   Eyes: EOM are normal. Pupils are equal, round, and reactive to light.   Neck: Neck supple.   Cardiovascular:  Regular rate and rhythm.  Normal heart tones.  No JVD.    Pulmonary/Chest: Respirations even and unlabored, lungs severely diminished posteriorly, on 2 L with pulse ox 98%   Abdominal: Soft. Bowel sounds are normal.    Musculoskeletal: Normal range of motion.   Neurological: Patient is alert and oriented to person, place, and time.   Skin: Skin is warm and dry. No rash noted.   Extremities: 
   GI Progress notes    5/30/2024   12:19 PM    Name:  Laura Rhodes  MRN:    [id number]     Acct:     [id number]   Room:  2013/2013-01  IP Day: 5     Admit Date: 5/25/2024 12:54 PM  PCP: Van Pate MD    Subjective:     C/C:   Chief Complaint   Patient presents with    Abdominal Pain    Emesis    Diarrhea       Interval History: Status: not changed.     Patient seen and examined  No acute events overnight   S/p EGD revealing small hiatal hernia.  Tolerated dinner  Having increased sob this am    ROS:  Constitutional: negative for chills, fevers and sweats  Gastrointestinal: negative for abdominal pain, constipation, diarrhea, nausea and vomiting  Neurological: negative for dizziness and headaches    Medications:     Allergies:   Allergies   Allergen Reactions    Buspar [Buspirone] Anxiety    Hydroxyzine Hcl Anxiety       Current Meds: oxyCODONE-acetaminophen (PERCOCET) 5-325 MG per tablet 1 tablet, Q6H PRN  methylPREDNISolone sodium succ (SOLU-MEDROL) 40 mg in sterile water 1 mL injection, Q12H  dicyclomine (BENTYL) capsule 20 mg, TID PRN  guaiFENesin (MUCINEX) extended release tablet 600 mg, BID  albuterol (PROVENTIL) (2.5 MG/3ML) 0.083% nebulizer solution 2.5 mg, Q4H WA RT  pantoprazole (PROTONIX) 40 mg in sodium chloride (PF) 0.9 % 10 mL injection, Daily  fluticasone (FLONASE) 50 MCG/ACT nasal spray 1 spray, Daily  sodium chloride flush 0.9 % injection 10 mL, PRN  albuterol sulfate HFA (PROVENTIL;VENTOLIN;PROAIR) 108 (90 Base) MCG/ACT inhaler 2 puff, Q4H PRN  albuterol (PROVENTIL) (2.5 MG/3ML) 0.083% nebulizer solution 2.5 mg, Q4H PRN  [Held by provider] ascorbic acid (VITAMIN C) tablet 250 mg, Daily  azithromycin (ZITHROMAX) tablet 250 mg, Once per day on Mon Wed Fri  levothyroxine (SYNTHROID) tablet 75 mcg, Daily  [Held by provider] magnesium oxide (MAG-OX) tablet 400 mg, Daily  melatonin tablet 3 mg, Nightly PRN  mirtazapine (REMERON) tablet 45 mg, Nightly  montelukast (SINGULAIR) tablet 10 mg, 
  [facility]    PROGRESS NOTE             5/30/2024    7:34 AM    Name:   Laura Rhodes  MRN:     [id number]     Acct:      [id number]   Room:   2013/2013-01   Day:  5  Admit Date:  5/25/2024 12:54 PM    PCP:  Van Pate MD  Code Status:  DNR-CCA    Subjective:     C/C:   Chief Complaint   Patient presents with    Abdominal Pain    Emesis    Diarrhea     Interval History Status: improved.    Patient was seen and examined at bedside this morning.  Patient states last night she was able to eat a sandwich.  She did get nauseous but did not have emesis.  Patient has not had a bowel movement since being in the hospital.  She is not having any discomfort symptoms from constipation.  She is passing gas and feels some cramps after the EGD yesterday.  She does feel that the Bentyl is helping.  Patient's breathing similar to days prior.  She is wheezing and coughing.  She is on 2 to 3 L of oxygen nasal cannula which is her home baseline.    Brief History:     The patient is a 63 y.o.  Non- / non  female who presents withAbdominal Pain, Emesis, and Diarrhea and she is admitted to the hospital for the management of dehydration and COPD.  Patient was discharged from the hospital on 4/5/2024 for COPD exacerbation.  She discharged with oral prednisone taper.  Patient states that she has been having diarrhea for the past month.  Patient saw primary care physician on 5/23/2024.  Blood work as well as stool analysis for C. difficile, elastase, and lactoferrin but were never completed.  She was restarted on Questran for chronic diarrhea but she says that if she takes this medication, she gets constipated and will then have diarrhea after missing dose.  Moreover, she states that she vomits 12 hours after having a meal.  Whenever she has dinner, which is her only meal of the day, she will vomit the next morning.  If she eats, she also 
  [facility]    PROGRESS NOTE             5/28/2024    7:54 AM    Name:   Laura Rhodes  MRN:     [id number]     Acct:      [id number]   Room:   2013/2013-01  IP Day:  3  Admit Date:  5/25/2024 12:54 PM    PCP:  Van Pate MD  Code Status:  DNR-CCA    Subjective:     C/C:   Chief Complaint   Patient presents with    Abdominal Pain    Emesis    Diarrhea     Interval History Status: improved.    Patient was seen and examined at bedside this morning.  Patient states she is still having abdominal pain located epigastric and right upper quadrant area.  Patient states that she is still on clear liquid diet and is trying to tolerate food but is unable to due to nausea.  She states her breathing is stable and she is on 2 L of oxygen nasal cannula equal to her home baseline.    Brief History:         Review of Systems:     Review of Systems   Respiratory:  Positive for cough (productive similar to baseline), shortness of breath and wheezing.    Cardiovascular:  Positive for chest pain.   Gastrointestinal:  Positive for abdominal pain (epigastric and RUQ) and nausea. Negative for diarrhea and vomiting.   Genitourinary:  Negative for dysuria.   Neurological:  Negative for dizziness, light-headedness and headaches.         Medications:     Allergies:    Allergies   Allergen Reactions    Buspar [Buspirone] Anxiety    Hydroxyzine Hcl Anxiety       Current Meds:   Scheduled Meds:    guaiFENesin  600 mg Oral BID    albuterol  2.5 mg Nebulization Q4H WA RT    pantoprazole (PROTONIX) 40 mg in sodium chloride (PF) 0.9 % 10 mL injection  40 mg IntraVENous Daily    fluticasone  1 spray Each Nostril Daily    methylPREDNISolone  40 mg IntraVENous Q6H    [Held by provider] vitamin C  250 mg Oral Daily    azithromycin  250 mg Oral Once per day on Mon Wed Fri    levothyroxine  75 mcg Oral Daily    [Held by provider] magnesium oxide  400 mg Oral Daily    
Attempted to call [employer], , at [phone] with no response. Left voicemail. To update about patient transfer out of ICU to room 2084.   
Attempted to complete consult     05/28/24 7099   Encounter Summary   Encounter Overview/Reason Attempted Encounter   Service Provided For Patient not available  (patient on phone)       
Attempted to complete consult    05/28/24 3660   Encounter Summary   Encounter Overview/Reason Attempted Encounter   Service Provided For Patient not available  (patient with staff)       
BRONCHOSPASM/BRONCHOCONSTRICTION     [x]         IMPROVE AERATION/BREATH SOUNDS  [x]   ADMINISTER BRONCHODILATOR THERAPY AS APPROPRIATE  [x]   ASSESS BREATH SOUNDS  []   IMPLEMENT AEROSOL/MDI PROTOCOL  [x]   PATIENT EDUCATION AS NEEDED    
BRONCHOSPASM/BRONCHOCONSTRICTION     [x]         IMPROVE AERATION/BREATH SOUNDS  [x]   ADMINISTER BRONCHODILATOR THERAPY AS APPROPRIATE  [x]   ASSESS BREATH SOUNDS  []   IMPLEMENT AEROSOL/MDI PROTOCOL  [x]   PATIENT EDUCATION AS NEEDED    Pt currently resting on home bipap. Tx given inline. Pt in no distress at this time   
BRONCHOSPASM/BRONCHOCONSTRICTION     [x]         IMPROVE AERATION/BREATH SOUNDS  [x]   ADMINISTER BRONCHODILATOR THERAPY AS APPROPRIATE  [x]   ASSESS BREATH SOUNDS  []   IMPLEMENT AEROSOL/MDI PROTOCOL  [x]   PATIENT EDUCATION AS NEEDED  .  Pt currently on 2lnc Diminished breath sounds t/o pt wore home bipap overnight. Pt in no distress at this time  
BRONCHOSPASM/BRONCHOCONSTRICTION     [x]         IMPROVE AERATION/BREATH SOUNDS  [x]   ADMINISTER BRONCHODILATOR THERAPY AS APPROPRIATE  [x]   ASSESS BREATH SOUNDS  []   IMPLEMENT AEROSOL/MDI PROTOCOL  [x]   PATIENT EDUCATION AS NEEDED  PROVIDE ADEQUATE OXYGENATION WITH ACCEPTABLE SP02/ABG'S    [x]  IDENTIFY APPROPRIATE OXYGEN THERAPY  [x]   MONITOR SP02/ABG'S AS NEEDED   [x]   PATIENT EDUCATION AS NEEDED    
BRONCHOSPASM/BRONCHOCONSTRICTION     [x]         IMPROVE AERATION/BREATH SOUNDS  [x]   ADMINISTER BRONCHODILATOR THERAPY AS APPROPRIATE  [x]   ASSESS BREATH SOUNDS  [x]   IMPLEMENT AEROSOL/MDI PROTOCOL  [x]   PATIENT EDUCATION AS NEEDED    
BRONCHOSPASM/BRONCHOCONSTRICTION   [x]         IMPROVE AERATION/BREATH SOUNDS  [x]   ADMINISTER BRONCHODILATOR THERAPY AS APPROPRIATE  [x]   ASSESS BREATH SOUNDS  []   IMPLEMENT AEROSOL/MDI PROTOCOL  [x]   PATIENT EDUCATION AS NEEDED    NONINVASIVE VENTILATION  PROVIDE OPTIMAL VENTILATION/ACCEPTABLE SPO2   IMPLEMENT NONINVASIVE VENTILATION PROTOCOL   MAINTAIN ACCEPTABLE SPO2   ASSESS SKIN INTEGRITY/BREAKDOWN SCORE   PATIENT EDUCATION AS NEEDED   BIPAP AS NEEDED        
BRONCHOSPASM/BRONCHOCONSTRICTION   [x]  IMPROVE AERATION/BREATH SOUNDS  [x]   ADMINISTER BRONCHODILATOR THERAPY AS APPROPRIATE  [x]   ASSESS BREATH SOUNDS  []   IMPLEMENT AEROSOL/MDI PROTOCOL  [x]   PATIENT EDUCATION AS NEEDED    
Comprehensive Nutrition Assessment    Type and Reason for Visit:  Initial    Nutrition Recommendations/Plan:   Continue NPO     Malnutrition Assessment:  Malnutrition Status:  Severe malnutrition (05/26/24 1318)    Context:  Acute Illness     Findings of the 6 clinical characteristics of malnutrition:  Energy Intake:  50% or less of estimated energy requirements for 5 or more days  Weight Loss:  Greater than 5% over 1 month     Body Fat Loss:  Unable to assess     Muscle Mass Loss:  Unable to assess    Fluid Accumulation:  No significant fluid accumulation     Strength:  Not Performed    Nutrition Assessment:    Pt admitted due to pancreatitis, currently NPO. Pt to have C-diff rule out. Pt does receive Questran for chronic diarrhea.    Nutrition Related Findings:    No edema. Labs and meds reviewed. Wound Type: None       Current Nutrition Intake & Therapies:    Average Meal Intake: NPO     Diet NPO    Anthropometric Measures:  Height: 160 cm (5' 2.99\")  Ideal Body Weight (IBW): 115 lbs (52 kg)       Current Body Weight: 44.3 kg (97 lb 10.6 oz), 84.9 % IBW. Weight Source: Stated  Current BMI (kg/m2): 17.3    BMI Categories: Underweight (BMI less than 18.5)    Estimated Daily Nutrient Needs:  Energy Requirements Based On: Kcal/kg  Weight Used for Energy Requirements: Admission  Energy (kcal/day): 1540 based on 35kcal/kg  Weight Used for Protein Requirements: Admission  Protein (g/day): 66 based on 1.5g/kg  Method Used for Fluid Requirements: Other (Comment) (Per physician)      Nutrition Diagnosis:   Severe malnutrition related to catabolic illness as evidenced by Criteria as identified in malnutrition assessment    Nutrition Interventions:   Food and/or Nutrient Delivery: Continue NPO  Nutrition Education/Counseling: No recommendation at this time  Coordination of Nutrition Care: Continue to monitor while inpatient     Goals:     Goals: Meet at least 75% of estimated needs     Nutrition Monitoring and 
Comprehensive Nutrition Assessment    Type and Reason for Visit:  Initial    Nutrition Recommendations/Plan:   Continue current diet  Continue Ensure Plus High Protein twice daily     Malnutrition Assessment:  Malnutrition Status:  Severe malnutrition (05/26/24 1318)    Context:  Acute Illness     Findings of the 6 clinical characteristics of malnutrition:  Energy Intake:  50% or less of estimated energy requirements for 5 or more days  Weight Loss:  Greater than 5% over 1 month     Body Fat Loss:  Unable to assess     Muscle Mass Loss:  Unable to assess    Fluid Accumulation:  No significant fluid accumulation     Strength:  Not Performed    Nutrition Assessment:    Pt states 25-50% intake and tolerating well oral nutrition support twice daily.    Nutrition Related Findings:    Pt reports increased appetite. No edema. Lab: gluc 123. Wound Type: None       Current Nutrition Intake & Therapies:    Average Meal Intake: 26-50%  Average Supplements Intake: 26-50%  ADULT DIET; Regular  ADULT ORAL NUTRITION SUPPLEMENT; Breakfast, Dinner, Lunch; Standard High Calorie/High Protein Oral Supplement    Anthropometric Measures:  Height: 160 cm (5' 2.99\")  Ideal Body Weight (IBW): 115 lbs (52 kg)       Current Body Weight: 47.2 kg (104 lb), 84.9 % IBW. Weight Source: Bed Scale  Current BMI (kg/m2): 18.4        Weight Adjustment For: No Adjustment                 BMI Categories: Underweight (BMI less than 18.5)    Estimated Daily Nutrient Needs:  Energy Requirements Based On: Kcal/kg  Weight Used for Energy Requirements: Admission  Energy (kcal/day): 1540 based on 35kcal/kg  Weight Used for Protein Requirements: Admission  Protein (g/day): 66 based on 1.5g/kg  Method Used for Fluid Requirements: Other (Comment) (Per physician)       Nutrition Diagnosis:   Severe malnutrition related to catabolic illness as evidenced by Criteria as identified in malnutrition assessment    Nutrition Interventions:   Food and/or Nutrient 
Comprehensive Nutrition Assessment    Type and Reason for Visit:  Reassess    Nutrition Recommendations/Plan:   Will continue Regular diet and add Ensure Plus High Protein twice daily     Malnutrition Assessment:  Malnutrition Status:  Severe malnutrition (05/26/24 1318)    Context:  Acute Illness     Findings of the 6 clinical characteristics of malnutrition:  Energy Intake:  50% or less of estimated energy requirements for 5 or more days  Weight Loss:  Greater than 5% over 1 month     Body Fat Loss:  Unable to assess     Muscle Mass Loss:  Unable to assess    Fluid Accumulation:  No significant fluid accumulation     Strength:  Not Performed    Nutrition Assessment:    Pt is s/p EGD earlier today showing small Hiatal Hernia. Intake is documented as 25-50% of Regular diet.    Nutrition Related Findings:    no edema, Labs: Reviewed, Meds: Solumedrol, Synthroid, Remeron, BM 5/24 Wound Type: None       Current Nutrition Intake & Therapies:    Average Meal Intake: 26-50%     ADULT DIET; Regular    Anthropometric Measures:  Height: 160 cm (5' 2.99\")  Ideal Body Weight (IBW): 115 lbs (52 kg)       Current Body Weight: 47.2 kg (104 lb), 84.9 % IBW. Weight Source: Bed Scale  Current BMI (kg/m2): 18.4                          BMI Categories: Underweight (BMI less than 18.5)    Estimated Daily Nutrient Needs:  Energy Requirements Based On: Kcal/kg  Weight Used for Energy Requirements: Admission  Energy (kcal/day): 1540 based on 35kcal/kg  Weight Used for Protein Requirements: Admission  Protein (g/day): 66 based on 1.5g/kg  Method Used for Fluid Requirements: Other (Comment) (Per physician)    Nutrition Diagnosis:   Severe malnutrition related to catabolic illness as evidenced by Criteria as identified in malnutrition assessment    Nutrition Interventions:   Food and/or Nutrient Delivery: Continue Current Diet, Start Oral Nutrition Supplement  Nutrition Education/Counseling: No recommendation at this time  Coordination 
Dr. Cordero at bedside. Pt expressed concerns about increased mucus, verbal orders received for guaifenesin 600 mg tablet BID. The patient has orders to transfer to PCU if okay with Pulm. Dr. Cordero would like to keep the patient in intermediate ICU for one more night before transferring out   
Patient c/o abdominal cramping post EGD. Writer reached out to residents regarding this.   
Patient can have sips of water with medications after confirming with NP Luz. Patient denies she experiences any stomach issues taking her night medications even if she's NPO like yesterday.  
Patient transported to Pre op on monitor. Report given to pre op RN. Vinicius removed and locked in patient room.   
Physical Therapy  Facility/Department: [facility] PROGRESSIVE CARE  Physical Therapy Initial Assessment    Name: Laura Rhodes  : 1960  MRN: [id number]  Date of Service: 2024    Discharge Recommendations:  Home with assist PRN, Home with Home health PT   PT Equipment Recommendations  Equipment Needed: No      Patient Diagnosis(es): The primary encounter diagnosis was Acute pancreatitis, unspecified complication status, unspecified pancreatitis type. Diagnoses of COPD exacerbation (HCC), Dehydration, and Epigastric pain were also pertinent to this visit.  Past Medical History:  has a past medical history of Acid reflux, Anxiety, Arthritis, Cancer (HCC), Cervical osteoarthritis, COPD (chronic obstructive pulmonary disease) (HCC), DDD (degenerative disc disease), cervical, Depression, Hiatal hernia, History of blood transfusion, On home O2, Spinal stenosis, Tachycardia, and Thyroid disease.  Past Surgical History:  has a past surgical history that includes Upper gastrointestinal endoscopy; Thyroidectomy, partial; back surgery; other surgical history; Dilation and curettage of uterus; Colonoscopy (N/A, 2019); Colonoscopy (2019); Upper gastrointestinal endoscopy (N/A, 2020); Upper gastrointestinal endoscopy (N/A, 2022); Pain management procedure; Femur fracture surgery (Right, 2023); and Upper gastrointestinal endoscopy (N/A, 2024).    Assessment   Body Structures, Functions, Activity Limitations Requiring Skilled Therapeutic Intervention: Decreased functional mobility ;Decreased endurance  Assessment: Impaired mobility due to decreased tolerance to activity  Decision Making: Low Complexity  History: COPD  Exam: decreased mobility  Clinical Presentation: stable  Requires PT Follow-Up: Yes  Activity Tolerance  Activity Tolerance: Patient tolerated evaluation without incident  Activity Tolerance Comments: SpO2 from 100-85% while ambulating with 3L of O2     Plan   Physical Therapy 
[facility]   OCCUPATIONAL THERAPY MISSED TREATMENT NOTE   INPATIENT   Date: 24  Patient Name: Laura Rhodes       Room:   MRN: [id number]   Account #: [id number]    : 1960  (63 y.o.)  Gender: female   Referring Practitioner: Patricia Graves MD  Diagnosis: Dehydration     REASON FOR MISSED TREATMENT:  24    -    Refusal by Patient - Pt refused OT tx stating \"not right now, I am going for a test soon and am feeling anxious\" Pt requested OT to try again later, OT will continue to attempt as able.     10:16        Electronically signed by HENRRY KELLER S/OT on 24 at 4:08 PM EDT   
Pt arrived to pcu room 2084. Vitals obtained. Pt placed on monitor, NSR at this time  
Pt discharged via wheelchair to home with . Iv removed and discharge paperwork reviewed with patient. All questions answered. Tele removed and patient discharged with all personal belongings.    
Pt transferred to ICU bed 2001. Pt attached to monitors, vital signs assessed, oriented to room, safety parameters in place.  
Pt transferred to room 2084 with cell phone, , paperwork, medications (given to RN/pharmacy medications from inpatient), glasses, shoes, clothing, jewelry (rings, earrings, piercing to abdomen, necklaces). Pt assisted into bed after bathroom. Kacey given report and updated in department.   
Pt's home cpap unit's electrical connections have been checked and sticker applied for non-hospital equipment check off. Pt responsible for alerting staff if cpap is not operating at it's usual capacity.  
Pulmonary Progress Note  Pulmonary and Critical Care Specialists      Patient - Laura Rhodes,  Age - 63 y.o.    - 1960      Room Number -    N -  [id number]   [facility] # - [id number]  Date of Admission -  2024 12:54 PM    Consulting Service/Physician   Consulting - Davonte Williamson MD  Primary Care Physician - Van Pate MD     SUBJECTIVE   Patient is feeling better.  She appears to be very anxious at this time because she had issues with getting the air out.  She was on AVAPS and clearly responded well.  She looks much better    OBJECTIVE   VITALS    height is 1.6 m (5' 2.99\") and weight is 47.6 kg (104 lb 15 oz). Her axillary temperature is 99.2 °F (37.3 °C). Her blood pressure is 150/83 (abnormal) and her pulse is 62. Her respiration is 18 and oxygen saturation is 98%.     Body mass index is 18.59 kg/m².  Temperature Range: Temp: 99.2 °F (37.3 °C) Temp  Av.3 °F (37.4 °C)  Min: 98.3 °F (36.8 °C)  Max: 99.7 °F (37.6 °C)  BP Range:  Systolic (24hrs), Av , Min:78 , Max:161     Diastolic (24hrs), Av, Min:23, Max:112    Pulse Range: Pulse  Av.7  Min: 53  Max: 92  Respiration Range: Resp  Av.6  Min: 13  Max: 32  Current Pulse Ox::  SpO2: 98 %  24HR Pulse Ox Range:  SpO2  Av.3 %  Min: 83 %  Max: 100 %  Oxygen Amount and Delivery: O2 Flow Rate (L/min): 3 L/min    Wt Readings from Last 3 Encounters:   24 47.6 kg (104 lb 15 oz)   24 45.8 kg (101 lb)   24 44.3 kg (97 lb 9.6 oz)       I/O (24 Hours)    Intake/Output Summary (Last 24 hours) at 2024 1658  Last data filed at 2024 1249  Gross per 24 hour   Intake 1000 ml   Output 500 ml   Net 500 ml       EXAM     General Appearance  Awake, alert, oriented, in no acute distress  HEENT - normocephalic, atraumatic.  Neck - Supple,  trachea midline   Lungs -coarse breath sounds no crackles rales or wheezes  Heart Exam:PMI normal. No lifts, heaves, or thrills. RRR. No murmurs, clicks, gallops, 
SLP ALL NOTES  Facility/Department: [facility] ICU   CLINICAL BEDSIDE SWALLOW EVALUATION    NAME: Laura Rhodes  : 1960  MRN: [id number]    ADMISSION DATE: 2024  ADMITTING DIAGNOSIS: has Abdominal bloating; Abdominal pain; MDD (major depressive disorder), recurrent severe, without psychosis (HCC); Panic disorder; Obsessive compulsive disorder; Irritable bowel syndrome with constipation; Chronic constipation; Esophageal dysphagia; Cervical osteoarthritis; Chronic bilateral low back pain without sciatica; COPD with acute exacerbation (HCC); Lumbar foraminal stenosis; Acquired hypothyroidism; Compression fracture of L2 lumbar vertebra (HCC); Essential hypertension; Essential tremor; Other osteoporosis with current pathological fracture, vertebra(e), initial encounter for fracture ([facility]); Fibromyalgia; Disorder of thyroid; Degeneration of intervertebral disc of lumbar region; COPD, severe (HCC); On home oxygen therapy; History of psychiatric disorder; Left lumbar radiculitis; Lumbosacral spondylosis without myelopathy; COPD (chronic obstructive pulmonary disease) (HCC); Severe malnutrition (HCC); Oropharyngeal dysphagia; Throat irritation; Closed displaced intertrochanteric fracture of right femur (HCC); Chronic respiratory failure with hypoxia ([facility]); Irregular heart beat; Laryngitis; COPD exacerbation (HCC); Goals of care, counseling/discussion; DNR (do not resuscitate) discussion; ACP (advance care planning); Palliative care encounter; Shortness of breath; Anxiety; Right hip pain; Acute on chronic respiratory failure (HCC); Acute pancreatitis, unspecified complication status, unspecified pancreatitis type; Nausea and vomiting; and Diarrhea on their problem list.    Recent Chest Xray/CT of Chest: ( Date CXR )Emphysema with basilar scarring.     Date of Eval: 2024  Evaluating Therapist: NORM Dobson    Current Diet level:  Current Liquid Diet : Full    Primary Complaint   Per GI H&P: 63/F w/ pmhx of 
Writer provided medical update regarding her medical testing earlier today and her test results; patient worried but hopeful; listening presence and support; welcomed prayer     05/29/24 8970   Encounter Summary   Encounter Overview/Reason Spiritual/Emotional Needs   Service Provided For Patient   Referral/Consult From Rounding   Complexity of Encounter Moderate   Spiritual/Emotional needs   Type Spiritual Support   Assessment/Intervention/Outcome   Assessment Coping;Hopeful;Powerlessness;Impaired resilience   Intervention Active listening;Discussed illness injury and it’s impact;Explored/Affirmed feelings, thoughts, concerns;Prayer (assurance of)/[place];Sustaining Presence/Ministry of presence   Outcome Comfort;Coping;Engaged in conversation;Expressed feelings, needs, and concerns;Expressed Gratitude;Receptive       
Writer responded to consult to see patient.  Patient known to writer.  Patient hopeful and worried as she provided medical update.  Patient also shared life review and discussed her relationship with her .  Listening presence and support.  Patient finds comfort and strength in prayer and was grateful for visit.     05/28/24 1415   Encounter Summary   Encounter Overview/Reason Spiritual/Emotional Needs   Service Provided For Patient   Referral/Consult From Nurse;Rounding   Support System Spouse   Last Encounter  05/28/24   Complexity of Encounter Moderate   Spiritual/Emotional needs   Type Spiritual Support   Palliative Care   Type Palliative Care, Follow-up   Assessment/Intervention/Outcome   Assessment Coping;Hopeful;Impaired resilience;Powerlessness   Intervention Active listening;Discussed belief system/Zoroastrian practices/tess;Discussed illness injury and it’s impact;Explored/Affirmed feelings, thoughts, concerns;Prayer (assurance of)/[place];Sustaining Presence/Ministry of presence   Outcome Comfort;Coping;Engaged in conversation;Expressed feelings, needs, and concerns;Expressed Gratitude;Receptive       
05/28/2024 04:16 AM    CALCIUM 8.2 05/28/2024 04:16 AM    GFRAA >60 04/28/2022 01:42 PM    LABGLOM Can not be calculated 05/28/2024 04:16 AM    LABGLOM >90 04/25/2024 03:43 PM     ABGs:  Lab Results   Component Value Date/Time    PHART 7.422 03/31/2024 11:45 AM    PO2ART 76.6 03/31/2024 11:45 AM    MSN6DDL 59.2 03/31/2024 11:45 AM    No results found for: \"IFIO2\", \"MODE\", \"SETTIDVOL\", \"SETPEEP\"  Ionized Calcium:  No components found for: \"IONCA\"  Magnesium:    Lab Results   Component Value Date/Time    MG 1.7 05/25/2024 01:04 PM     Phosphorus:    Lab Results   Component Value Date/Time    PHOS 3.6 01/02/2024 08:37 PM        LIVER PROFILE   Recent Labs     05/25/24  1304   AST 16   ALT 9   LIPASE 269*   BILITOT 0.3   ALKPHOS 58     INR No results for input(s): \"INR\" in the last 72 hours.  PTT   Lab Results   Component Value Date    APTT 26.0 12/03/2023         RADIOLOGY   5/28/24:  Gallbladder US: dilated common bile duct, consider MRCP if biliary labs abnormal    ASSESSMENT/PLAN   Acute pancreatitis vs gastroenteritis   COPD exacerbation-History of very severe COPD FEV1--0.49 which is 20% predicted.  This is on the lung function test and March 2022-  Chronic hypoxic respiratory failure-baseline 2 to 3 L nasal cannula  Chronic hypercapnic respiratory failure on noninvasive ventilator at home (astral) baseline pCO2 60-65  Tobacco abuse-2 packs a day for 40 years recently cut down to 5 cigarettes daily  HTN  Hypothyroidism  Anxiety  Insomnia  DVT Prophylaxis- Zanaflex  GI prophylaxis- Protonix  BMI 18.59 severe protein calorie malnutrition  DNR CCA no Intubation      Acute pancreatitis vs gastroenteritis -Lipase was elevated at 269 on admission but CT scan did not mention anything about evidence of pancreatitis, pt states she does not drink often, pt was on morphine but stopped due to respiratory depression, on clear liquid diet, lipid panel normal, gallbladder US showed dilated common bile duct- consider MRCP if 
daily activities  Short Term Goal 5: Pt will actively participate in 15+ minutes of therapeutic exercise/functional activity to promote safety and independence with self-care tasks  Occupational Therapy Plan  Times Per Week: 3-5  Current Treatment Recommendations: Strengthening, Functional mobility training, Balance training, Endurance training, Pain management, Safety education & training, Patient/Caregiver education & training, Equipment evaluation, education, & procurement, Self-Care / ADL, Home management training    Assessment  Activity Tolerance  Activity Tolerance: Patient limited by fatigue, Patient Tolerated treatment well  Assessment  Performance deficits / Impairments: Decreased functional mobility , Decreased ADL status, Decreased strength, Decreased safe awareness, Decreased endurance, Decreased balance, Decreased high-level IADLs  Treatment Diagnosis: Impaired self-care status  Prognosis: Good  Decision Making: Medium Complexity  Discharge Recommendations: Patient would benefit from continued therapy after discharge  OT Equipment Recommendations  Other: TBD  Safety Devices  Type of Devices: Left in bed, Call light within reach, Gait belt    AM-[facility] Daily Activities Inpatient  AM-PAC Daily Activity - Inpatient   How much help is needed for putting on and taking off regular lower body clothing?: A Lot  How much help is needed for bathing (which includes washing, rinsing, drying)?: A Little  How much help is needed for toileting (which includes using toilet, bedpan, or urinal)?: A Little  How much help is needed for putting on and taking off regular upper body clothing?: A Little  How much help is needed for taking care of personal grooming?: A Little  How much help for eating meals?: A Little  AM-[facility] Inpatient Daily Activity Raw Score: 17  AM-PAC Inpatient ADL T-Scale Score : 37.26  ADL Inpatient CMS 0-100% Score: 50.11  ADL Inpatient CMS G-Code Modifier : CK    OT Minutes  OT Individual Minutes  Time In: 
observations and clinical reasoning unless otherwise noted. Pt currently limited due to decreased strength, activity tolerance and balance impacting safety and indepdendence with self-care tasks.       UE Function  LUE AROM (degrees)  LUE AROM : WFL  Left Hand AROM (degrees)  Left Hand AROM: WFL  Tone LUE  LUE Tone: Normotonic  LUE Strength  L Hand General: 4-/5  LUE Strength Comment: grossly 4-/5    RUE AROM (degrees)  RUE AROM : WFL  Right Hand AROM (degrees)  Right Hand AROM: WFL  Tone RUE  RUE Tone: Normotonic  RUE Strength  R Hand General: 4-/5  RUE Strength Comment: grossly 4-/5       Fine Motor Skills/Coordination  Coordination  Movements Are Fluid And Coordinated: Yes              Bed Mobility  Bed mobility  Supine to Sit: Supervision  Sit to Supine: Supervision  Scooting: Supervision  Bed Mobility Comments: Bed mobility completed with HOB slightly elevated.    Balance  Balance  Sitting Balance: Supervision  Standing Balance: Contact guard assistance (CGA-SBA)       Transfers  Transfers  Sit to stand: Stand by assistance  Stand to sit: Stand by assistance  Transfer Comments: SBA for safety  Toilet Transfers  Toilet - Technique: Ambulating (w/ RW)  Equipment Used: Standard toilet (with toilet frame)  Toilet Transfer: Stand by assistance  Toilet Transfers Comments: SBA for safety    Functional Mobility  Functional - Mobility Device: Rolling Walker  Activity: To/from bathroom, Other (to/from doorway)  Assist Level: Contact guard assistance (CGA-SBA)  Functional Mobility Comments: Pt initally CGA without assistive device with pt reaching for writers shoulder for support. Pt unsteady. Writer educated pt on use of RW for stability and agreeable to trial. Pt SBA with RW, verbal cues for technique.    Assessment  Assessment  Performance deficits / Impairments: Decreased functional mobility , Decreased ADL status, Decreased strength, Decreased safe awareness, Decreased endurance, Decreased balance, Decreased 
Results   Component Value Date/Time    LACTA 1.2 05/26/2024 07:37 AM    LACTA 3.6 05/25/2024 01:31 PM    LACTA 2.4 05/27/2023 10:58 PM        BNP   No results for input(s): \"BNP\" in the last 72 hours.     Radiology   EGD yesterday: small hiatal hernia found, H pylori culture pending     SYSTEMS ASSESSMENT  Impression  Acute pancreatitis vs gastroenteritis; status post EGD 5/29 hiatal hernia found but no bleeding or obstruction  COPD exacerbation-History of very severe COPD FEV1--0.49 which is 20% predicted.  This is on the lung function test and March 2022-  Chronic hypoxic respiratory failure-baseline 2 to 3 L nasal cannula  Chronic hypercapnic respiratory failure on noninvasive ventilator at home (astral) baseline pCO2 60-65  Tobacco abuse-2 packs a day for 40 years recently cut down to 5 cigarettes daily  HTN  Hypothyroidism  Anxiety  Insomnia  DVT Prophylaxis- Zanaflex  GI prophylaxis- Protonix  BMI 18.59 severe protein calorie malnutrition  DNR CCA no Intubation      Plan  Acute pancreatitis vs gastroenteritis -Lipase was elevated at 269 on admission but CT scan did not mention anything about evidence of pancreatitis, pt states she does not drink often, pt was on morphine but stopped due to respiratory depression, lipid panel normal, gallbladder US showed dilated common bile duct- but due to normal liver labs no MRCP,; EGD showed no obvious active bleeding, just hiatal hernia  COPD- On 2 to 3 L oxygen nasal cannula at home , currently on 2 L oxygen by NC which is about baseline, Chronically on azithromycin 3 times a week and prednisone 10 mg daily, Patient on Stiolto, albuterol and montelukast. Solumedrol cut down to 40 mg every 8 hours yesterday.     Hiatal Hernia found on EGD- H pylori culture pending.  HTN- On home meds coreg and norvasc    Hypothyroidism- home meds synthroid  Anxiety- home meds klonopin, and effexor  Insomnia- home meds remeron and primidone  Active tobacco use smokes 5 cigarettes a day 
last 72 hours.    I/O(24Hr):    Intake/Output Summary (Last 24 hours) at 5/26/2024 0906  Last data filed at 5/25/2024 2158  Gross per 24 hour   Intake --   Output 200 ml   Net -200 ml       Labs:    [unfilled]    Lab Results   Component Value Date/Time    SPECIAL NOT REPORTED 08/30/2020 02:47 PM     Lab Results   Component Value Date/Time    CULTURE NORMAL RESPIRATORY VINCE MODERATE GROWTH 03/31/2024 09:50 AM       [unfilled]    Radiology:    CT ABDOMEN PELVIS W IV CONTRAST Additional Contrast? None    Result Date: 5/25/2024  EXAMINATION: CT OF THE ABDOMEN AND PELVIS WITH CONTRAST 5/25/2024 2:23 pm TECHNIQUE: CT of the abdomen and pelvis was performed with the administration of intravenous contrast. Multiplanar reformatted images are provided for review. Automated exposure control, iterative reconstruction, and/or weight based adjustment of the mA/kV was utilized to reduce the radiation dose to as low as reasonably achievable. COMPARISON: None HISTORY: ORDERING SYSTEM PROVIDED HISTORY: abdominal pain, vomiting elevated lipase TECHNOLOGIST PROVIDED HISTORY: abdominal pain, vomiting elevated lipase Decision Support Exception - unselect if not a suspected or confirmed emergency medical condition->Emergency Medical Condition (MA) Reason for Exam: C/o general abdominal pain x 1 week, increasing x 3 days, Nausea, vomiting, constipation and diarrhea. Additional signs and symptoms: elevated lipase FINDINGS: Lower Chest: No acute abnormality in the included lung bases.  Trace pericardial fluid is noted. Organs: Liver, spleen, adrenals and kidneys demonstrate no acute abnormality. Pancreas is top-normal in size without focal mass or appreciable inflammatory change.  Gallbladder is distended without wall thickening or gallstones. GI/Bowel: No free fluid, free air or bowel obstruction is noted.  However, there is thickening of the gastric fundal and gastric body wall which may be related to gastritis.  Mildly increased small 
hernia found  COPD exacerbation-History of very severe COPD FEV1--0.49 which is 20% predicted.    Chronic hypoxic respiratory failure-baseline 2 to 3 L nasal cannula  Chronic hypercapnic respiratory failure on noninvasive ventilator at home (astral) baseline pCO2 60-65  Tobacco abuse-2 packs a day for 40 years recently cut down to 5 cigarettes daily  HTN  Hypothyroidism  Anxiety  Insomnia  BMI 18.59 severe protein calorie malnutrition/pulmonary cachexia  DNR CCA no Intubation  PLAN:   Currently at home oxygen flow, titrate to keep pulse ox above 88%  Prednisone taper  Continue bronchodilators  Will need eventual outpatient follow-up in our office in 4 weeks  No objection to discharge or skilled nursing facility from pulmonary standpoint.  Patient tells me she feels too weak to go home and is interested in possible rehab stay.  Continue astral at night and with naps        Electronically signed by NATHAN Rowell CNP on 06/01/24     This progress note was completed using a voice transcription system. Every effort was made to ensure accuracy. However, inadvertent computerized transcription errors may be present.    Chantel Mathews, NP-C, MSN  [facility]  
formulated in collaboration with Dr. Garcia    Explained to the patient and d/W Nursing Staff  Will F/U with you  Please call or Page for any issues or change in status  Thanks    This note is created with the assistance of the speech recognition program.  While intending to generate a document that actually reflects the content of the visit, document can still have some errors including those of syntax and sound like substitutions which may escape proof reading.  Actual meaning can be extrapolated by contextual diversion.    Electronically signed by NATHAN Schaefer NP on 5/28/2024 at 2:30 PM           
COMPARISON: 03/31/2024 HISTORY: ORDERING SYSTEM PROVIDED HISTORY: sob TECHNOLOGIST PROVIDED HISTORY: sob FINDINGS: The lungs are without acute focal process.  Diffuse emphysematous changes again noted.  There is no effusion or pneumothorax. The cardiomediastinal silhouette is stable. The osseous structures are stable.     No acute process. Diffuse severe emphysematous changes again noted         Physical Examination:        Physical Exam  Constitutional:       Appearance: Normal appearance.   Cardiovascular:      Rate and Rhythm: Normal rate and regular rhythm.      Pulses: Normal pulses.      Heart sounds: No murmur heard.     No friction rub. No gallop.   Pulmonary:      Effort: No respiratory distress.      Breath sounds: Wheezing (Diffuse) present. No rhonchi or rales.   Abdominal:      General: There is no distension.      Tenderness: There is abdominal tenderness (Right upper quadrant and epigastric). There is no guarding.   Musculoskeletal:         General: No swelling.      Right lower leg: No edema.      Left lower leg: No edema.   Neurological:      General: No focal deficit present.      Mental Status: She is alert.   Psychiatric:         Mood and Affect: Mood normal.           Assessment:        Primary Problem  Acute pancreatitis, unspecified complication status, unspecified pancreatitis type    Active Hospital Problems    Diagnosis Date Noted    Nausea and vomiting [R11.2] 05/27/2024    Diarrhea [R19.7] 05/27/2024    Acute pancreatitis, unspecified complication status, unspecified pancreatitis type [K85.90] 05/25/2024    Abdominal pain [R10.9] 01/25/2019       Plan:        Acute pancreatitis versus gastroenteritis and enteritis  -Nausea, vomiting, and diarrhea over the past month  -Restarted on Questran, complaining of constipation  -CT abdomen pelvis showed thickening in the gastric fundal and gastric body which is suspicious for gastroenteritis as well as increase small bowel fluid which is 
HISTORY: ORDERING SYSTEM PROVIDED HISTORY: sob TECHNOLOGIST PROVIDED HISTORY: sob FINDINGS: The lungs are without acute focal process.  Diffuse emphysematous changes again noted.  There is no effusion or pneumothorax. The cardiomediastinal silhouette is stable. The osseous structures are stable.     No acute process. Diffuse severe emphysematous changes again noted         Physical Examination:        Constitutional:       General: She is not in acute distress.     Appearance: Normal appearance.   Cardiovascular:      Rate and Rhythm: Normal rate and regular rhythm.      Pulses: Normal pulses.      Heart sounds: Normal heart sounds. No murmur heard.  Pulmonary:      Effort: Pulmonary effort is normal.      Breath sounds: Normal breath sounds. No wheezing, rhonchi or rales.   Abdominal:      General: Abdomen is flat. Bowel sounds are normal.      Palpations: Abdomen is soft. Mild epigastric tenderness   Musculoskeletal:      Right lower leg: No edema.      Left lower leg: No edema.   Neurological:      General: No focal deficit present.      Mental Status: He is alert and oriented to person, place, and time.       Assessment:        Primary Problem  Acute pancreatitis, unspecified complication status, unspecified pancreatitis type    Active Hospital Problems    Diagnosis Date Noted    Nausea and vomiting [R11.2] 05/27/2024    Diarrhea [R19.7] 05/27/2024    Acute pancreatitis, unspecified complication status, unspecified pancreatitis type [K85.90] 05/25/2024    Abdominal pain [R10.9] 01/25/2019       Plan:        Acute pancreatitis  -Nausea, vomiting, and diarrhea over the past month  -Restarted on Questran, complaining of constipation  -CT abdomen pelvis showed thickening in the gastric fundal and gastric body which is suspicious for gastroenteritis as well as increase small bowel fluid which is nonspecific but probably related to enteritis  -Lipase has been fluctuating but is currently 77  -Lactate was 3.6 on 
gastric body which is suspicious for gastroenteritis as well as increase small bowel fluid which is nonspecific but probably related to enteritis  -Lipase elevated at 269  -Lactate was 3.6 on admission, repeated lactic acid normalized  -States she uses alcohol once or twice a month  -Morphine discontinued, Toradol 15 mg IV every 6 hours as needed was added overnight though patient does have a history of peptic ulcers, may discontinue  -Continue IV fluid normal saline 50 mL/hr  -Lipid panel normal  -RUQ ultrasound pending  -Urinalysis did show few bacteria, patient denies any urinary symptoms  -Pain and nausea slightly improved, start clear liquid diet as tolerated  -Respiratory eval and treat  -GI on board and following, appreciate recommendation    COPD exacerbation  -On 2 to 3 L oxygen nasal cannula at home  -Required BiPAP yesterday evening, and overnight, transferred to intermediate ICU, currently on 3 L oxygen by NC which is baseline  -Discontinued morphine yesterday due to respiratory depression  -Fine wheezing diffuse on physical exam this morning  -Chronically on azithromycin 3 times a week and prednisone 10 mg daily  -Respiratory eval and treat  -Received IV Solu-Medrol 125 mg in ED  -Started on IV Solu-Medrol 40 mg every 6 hours  -Continue albuterol  -Continue Stiolto, and Singulair  -Continue DuoNeb  -Continue azithromycin for COPD exacerbation  -Pulmonology on board and following, appreciate recommendations     Essential hypertension  -Home medication includes Coreg and Norvasc  -Continue home medications     Hypothyroidism  -Home medication includes Synthroid  -Continue home medications  -TSH pending     Anxiety  -Home medication includes Klonopin, Effexor, and Remeron  -Continue home medications     Insomnia  -Home medication includes Remeron and primidone  -Continue home medications     Chronic bilateral low back pain  -Home medication includes Zanaflex  -Continue home medication     DVT prophylaxis: 
duct at 0.7 cm with normal HFT's  -Urinalysis did show few bacteria, patient denies any urinary symptoms  -Pain and nausea slightly improved, start clear liquid diet as tolerated  -Respiratory eval and treat  -GI on board and following, appreciate recommendation              -Elastase, GI panel, lactoferrin, Giardia/Cryptosporidium pending              -C. difficile panel pending and sent  -EGD showed small hiatal hernia, will follow-up on biopsies, reflux precautions, PPI daily, outpatient follow-up in 3 to 4 weeks  -GI signed off     COPD exacerbation  -On 2 to 3 L oxygen nasal cannula at home  -Required BiPAP in evening, and overnight, transferred to intermediate ICU, currently on 2 L oxygen by NC which is baseline  -Chronically on azithromycin 3 times a week and prednisone 10 mg daily  -Respiratory eval and treat  -Received IV Solu-Medrol 125 mg in ED  - Switched from IV Solumedrol to Prednisone taper (30 mg for 3 days, 20 mg for 3 days, 10 mg 1 day  -Continue albuterol  -DuoNebs  -Continue Stiolto, and Singulair  -Mucinex added  -Continue azithromycin for COPD exacerbation  -Pulmonology on board - okay with discharge vs SNF. Follow up in office after 4 weeks     Essential hypertension  -Home medication includes Coreg and Norvasc  -Continue home medications     Hypothyroidism  -Home medication includes Synthroid  -Continue home medications  -TSH 0.21 and free T4 within normal limits     Anxiety  -Home medication includes Klonopin, Effexor, and Remeron  -Continue home medications     Insomnia  -Home medication includes Remeron and primidone  -Continue home medications     Chronic bilateral low back pain  -Home medication includes Zanaflex  -Continue home medication     DVT prophylaxis: Lovenox  GI prophylaxis: Protonix 40 mg tablet oral  Diet: Regular diet     OT/PT/SW     Code Status: DNR CCA no intubation    Kamaljit Tompkins MD  PGY-1 Family Medicine Resident  6/2/2024  10:45 AM    
month  -Restarted on Questran, complaining of constipation  -CT abdomen pelvis showed thickening in the gastric fundal and gastric body which is suspicious for gastroenteritis as well as increase small bowel fluid which is nonspecific but probably related to enteritis  -Lipase has been fluctuating but is currently 77  -Lactate was 3.6 on admission, repeated lactic acid normalized  -States she uses alcohol once or twice a month  -Percocet every 6 hours as needed  -Lipid panel normal  -RUQ ultrasound showed dilated common bile duct at 0.7 cm with normal HFT's  -Urinalysis did show few bacteria, patient denies any urinary symptoms  -Pain and nausea slightly improved, start clear liquid diet as tolerated  -Respiratory eval and treat  -GI on board and following, appreciate recommendation              -Elastase, GI panel, lactoferrin, Giardia/Cryptosporidium pending              -C. difficile panel pending and sent  -EGD showed small hiatal hernia, will follow-up on biopsies, reflux precautions, PPI daily, outpatient follow-up in 3 to 4 weeks  -GI signed off     COPD exacerbation  -On 2 to 3 L oxygen nasal cannula at home  -Required BiPAP in evening, and overnight, transferred to intermediate ICU, currently on 2 L oxygen by NC which is baseline  -Chronically on azithromycin 3 times a week and prednisone 10 mg daily  -Respiratory eval and treat  -Received IV Solu-Medrol 125 mg in ED  -Started on IV Solu-Medrol 40 mg every 12 hours  -Continue albuterol  -DuoNebs  -Continue Stiolto, and Singulair  -Mucinex added  -Continue azithromycin for COPD exacerbation  -Pulmonology on board and following, appreciate recommendations     Essential hypertension  -Home medication includes Coreg and Norvasc  -Continue home medications     Hypothyroidism  -Home medication includes Synthroid  -Continue home medications  -TSH 0.21 and free T4 within normal limits     Anxiety  -Home medication includes Klonopin, Effexor, and Remeron  -Continue

## 2024-06-02 NOTE — PLAN OF CARE
Problem: Discharge Planning  Goal: Discharge to home or other facility with appropriate resources  5/27/2024 0454 by Mitra Hitchcock RN  Outcome: Progressing  Flowsheets (Taken 5/26/2024 1930)  Discharge to home or other facility with appropriate resources:   Identify barriers to discharge with patient and caregiver   Identify discharge learning needs (meds, wound care, etc)  Note: Not yet fit for discharge.     Problem: Pain  Goal: Verbalizes/displays adequate comfort level or baseline comfort level  5/27/2024 0454 by Mitra Hitchcock RN  Outcome: Progressing  Flowsheets (Taken 5/26/2024 1930)  Verbalizes/displays adequate comfort level or baseline comfort level:   Encourage patient to monitor pain and request assistance   Assess pain using appropriate pain scale   Administer analgesics based on type and severity of pain and evaluate response   Implement non-pharmacological measures as appropriate and evaluate response     Problem: Safety - Adult  Goal: Free from fall injury  5/27/2024 0454 by Mitra Hitchcock RN  Outcome: Progressing  Flowsheets (Taken 5/26/2024 2200)  Free From Fall Injury: Instruct family/caregiver on patient safety  Note: Bed was locked and in lowest position. Bed alarm on. Raised side rails to ensure safety. Call light within reach.    Problem: Skin/Tissue Integrity  Goal: Absence of new skin breakdown  Description: 1.  Monitor for areas of redness and/or skin breakdown  2.  Assess vascular access sites hourly  3.  Every 4-6 hours minimum:  Change oxygen saturation probe site  4.  Every 4-6 hours:  If on nasal continuous positive airway pressure, respiratory therapy assess nares and determine need for appliance change or resting period.  Outcome: Progressing  Note: With documented skin issues. No new skin breakdown noted.     Problem: ABCDS Injury Assessment  Goal: Absence of physical injury  Outcome: Progressing  Flowsheets (Taken 5/26/2024 
  Problem: Discharge Planning  Goal: Discharge to home or other facility with appropriate resources  5/27/2024 1503 by Aishwarya Reyez RN  Outcome: Progressing     Problem: Pain  Goal: Verbalizes/displays adequate comfort level or baseline comfort level  5/27/2024 1503 by Aishwarya Reyez RN  Outcome: Progressing     Problem: Safety - Adult  Goal: Free from fall injury  5/27/2024 1503 by Aishwarya Reyez RN  Outcome: Progressing     Problem: Skin/Tissue Integrity  Goal: Absence of new skin breakdown  Description: 1.  Monitor for areas of redness and/or skin breakdown  2.  Assess vascular access sites hourly  3.  Every 4-6 hours minimum:  Change oxygen saturation probe site  4.  Every 4-6 hours:  If on nasal continuous positive airway pressure, respiratory therapy assess nares and determine need for appliance change or resting period.  5/27/2024 1503 by Aishwarya Reyez RN  Outcome: Progressing     Problem: ABCDS Injury Assessment  Goal: Absence of physical injury  5/27/2024 1503 by Aishwarya Reyez RN  Outcome: Progressing     Problem: Neurosensory - Adult  Goal: Achieves stable or improved neurological status  5/27/2024 1503 by Aishwarya Reyez RN  Outcome: Progressing  Flowsheets (Taken 5/27/2024 0800)  Achieves stable or improved neurological status:   Assess for and report changes in neurological status   Initiate measures to prevent increased intracranial pressure   Maintain blood pressure and fluid volume within ordered parameters to optimize cerebral perfusion and minimize risk of hemorrhage   Monitor temperature, glucose, and sodium. Initiate appropriate interventions as ordered     Problem: Respiratory - Adult  Goal: Achieves optimal ventilation and oxygenation  5/27/2024 1503 by Aishwarya Reyez RN  Outcome: Progressing  Flowsheets (Taken 5/27/2024 0800)  Achieves optimal ventilation and oxygenation:   Assess for changes in respiratory status   Assess for changes in mentation and behavior   Oxygen supplementation based 
  Problem: Discharge Planning  Goal: Discharge to home or other facility with appropriate resources  5/28/2024 0653 by Arianna Solorzano RN  Outcome: Progressing  5/28/2024 0653 by Arianna Solorzano RN  Outcome: Progressing  Flowsheets (Taken 5/27/2024 2000)  Discharge to home or other facility with appropriate resources: Identify barriers to discharge with patient and caregiver     Problem: Pain  Goal: Verbalizes/displays adequate comfort level or baseline comfort level  5/28/2024 0653 by Arianna Solorzano RN  Outcome: Progressing  5/28/2024 0653 by Arianna Solorzano RN  Outcome: Progressing  Flowsheets (Taken 5/27/2024 2000)  Verbalizes/displays adequate comfort level or baseline comfort level: Encourage patient to monitor pain and request assistance     Problem: Safety - Adult  Goal: Free from fall injury  5/28/2024 0653 by Arianna Solorzano RN  Outcome: Progressing  5/28/2024 0653 by Arianna Solorzano RN  Outcome: Progressing     Problem: Skin/Tissue Integrity  Goal: Absence of new skin breakdown  Description: 1.  Monitor for areas of redness and/or skin breakdown  2.  Assess vascular access sites hourly  3.  Every 4-6 hours minimum:  Change oxygen saturation probe site  4.  Every 4-6 hours:  If on nasal continuous positive airway pressure, respiratory therapy assess nares and determine need for appliance change or resting period.  5/28/2024 0653 by Arianna Solorzano RN  Outcome: Progressing  5/28/2024 0653 by Arianna Solrozano RN  Outcome: Progressing     Problem: ABCDS Injury Assessment  Goal: Absence of physical injury  Outcome: Progressing     Problem: Neurosensory - Adult  Goal: Achieves stable or improved neurological status  5/28/2024 0653 by Arianna Solorzano RN  Outcome: Progressing  5/28/2024 0653 by Arianna Solorzano RN  Outcome: Progressing  Flowsheets  Taken 5/28/2024 0400  Achieves stable or improved neurological status: Initiate measures to prevent increased intracranial pressure  Taken 5/27/2024 2000  Achieves stable or improved 
  Problem: Discharge Planning  Goal: Discharge to home or other facility with appropriate resources  5/28/2024 1021 by Susan Johnson RN  Outcome: Progressing  Flowsheets (Taken 5/28/2024 0815)  Discharge to home or other facility with appropriate resources: Identify barriers to discharge with patient and caregiver  5/28/2024 0653 by Arianna Solorzano RN  Outcome: Progressing  5/28/2024 0653 by Arianna Solorzano RN  Outcome: Progressing  Flowsheets (Taken 5/27/2024 2000)  Discharge to home or other facility with appropriate resources: Identify barriers to discharge with patient and caregiver     Problem: Pain  Goal: Verbalizes/displays adequate comfort level or baseline comfort level  5/28/2024 1021 by Susan Johnson RN  Outcome: Progressing  5/28/2024 0653 by Arianna Solorzano RN  Outcome: Progressing  5/28/2024 0653 by Arianna Solorzano RN  Outcome: Progressing  Flowsheets (Taken 5/27/2024 2000)  Verbalizes/displays adequate comfort level or baseline comfort level: Encourage patient to monitor pain and request assistance     Problem: Safety - Adult  Goal: Free from fall injury  5/28/2024 1021 by Susan Johnson RN  Outcome: Progressing  5/28/2024 0653 by Arianna Solorzano RN  Outcome: Progressing  5/28/2024 0653 by Arianna Solorzano RN  Outcome: Progressing     Problem: Skin/Tissue Integrity  Goal: Absence of new skin breakdown  Description: 1.  Monitor for areas of redness and/or skin breakdown  2.  Assess vascular access sites hourly  3.  Every 4-6 hours minimum:  Change oxygen saturation probe site  4.  Every 4-6 hours:  If on nasal continuous positive airway pressure, respiratory therapy assess nares and determine need for appliance change or resting period.  5/28/2024 1021 by Susan Johnson RN  Outcome: Progressing  5/28/2024 0653 by Arianna Solorzano RN  Outcome: Progressing  5/28/2024 0653 by Arianna Solorzano RN  Outcome: Progressing     Problem: ABCDS Injury Assessment  Goal: Absence of physical injury  5/28/2024 1021 by Alex 
  Problem: Discharge Planning  Goal: Discharge to home or other facility with appropriate resources  5/28/2024 2352 by Sybil Bryan RN  Outcome: Progressing  Flowsheets (Taken 5/28/2024 2352)  Discharge to home or other facility with appropriate resources:   Identify barriers to discharge with patient and caregiver   Arrange for needed discharge resources and transportation as appropriate   Refer to discharge planning if patient needs post-hospital services based on physician order or complex needs related to functional status, cognitive ability or social support system   Identify discharge learning needs (meds, wound care, etc)     Problem: Pain  Goal: Verbalizes/displays adequate comfort level or baseline comfort level  5/28/2024 2352 by Sybil Bryan, RN  Outcome: Progressing  Flowsheets (Taken 5/28/2024 2352)  Verbalizes/displays adequate comfort level or baseline comfort level:   Encourage patient to monitor pain and request assistance   Assess pain using appropriate pain scale   Administer analgesics based on type and severity of pain and evaluate response   Implement non-pharmacological measures as appropriate and evaluate response     Problem: Safety - Adult  Goal: Free from fall injury  5/28/2024 2352 by Sybil Bryan RN  Outcome: Progressing  Flowsheets (Taken 5/26/2024 2200 by Mitra Hitchcock, RN)  Free From Fall Injury: Instruct family/caregiver on patient safety     Problem: Skin/Tissue Integrity  Goal: Absence of new skin breakdown  Description: 1.  Monitor for areas of redness and/or skin breakdown  2.  Assess vascular access sites hourly  3.  Every 4-6 hours minimum:  Change oxygen saturation probe site  4.  Every 4-6 hours:  If on nasal continuous positive airway pressure, respiratory therapy assess nares and determine need for appliance change or resting period.  5/28/2024 2352 by Sybil Bryan, RN  Outcome: Progressing  Note: No evidence of skin breakdown, patient kept dry using 
  Problem: Discharge Planning  Goal: Discharge to home or other facility with appropriate resources  5/29/2024 2319 by Joanne Gordon RN  Outcome: Progressing  Flowsheets (Taken 5/29/2024 2319)  Discharge to home or other facility with appropriate resources:   Identify barriers to discharge with patient and caregiver   Arrange for needed discharge resources and transportation as appropriate   Identify discharge learning needs (meds, wound care, etc)     Problem: Pain  Goal: Verbalizes/displays adequate comfort level or baseline comfort level  5/29/2024 2319 by Joanne Gordon RN  Outcome: Progressing  Flowsheets (Taken 5/29/2024 2319)  Verbalizes/displays adequate comfort level or baseline comfort level:   Encourage patient to monitor pain and request assistance   Assess pain using appropriate pain scale   Administer analgesics based on type and severity of pain and evaluate response   Implement non-pharmacological measures as appropriate and evaluate response     Problem: Safety - Adult  Goal: Free from fall injury  5/29/2024 2319 by Joanne Gordon RN  Outcome: Progressing  Flowsheets (Taken 5/29/2024 2319)  Free From Fall Injury: Instruct family/caregiver on patient safety     Problem: ABCDS Injury Assessment  Goal: Absence of physical injury  5/29/2024 2319 by Joanne Gordon RN  Outcome: Progressing  Flowsheets (Taken 5/29/2024 2319)  Absence of Physical Injury: Implement safety measures based on patient assessment     
  Problem: Discharge Planning  Goal: Discharge to home or other facility with appropriate resources  5/30/2024 1246 by Breanna Oliver RN  Outcome: Progressing  Flowsheets (Taken 5/30/2024 0940)  Discharge to home or other facility with appropriate resources: Identify barriers to discharge with patient and caregiver   Pt to discharge to [facility] once medically cleared.   Problem: Pain  Goal: Verbalizes/displays adequate comfort level or baseline comfort level  5/30/2024 1246 by Breanna Oliver RN  Outcome: Progressing  Flowsheets  Taken 5/30/2024 1200  Verbalizes/displays adequate comfort level or baseline comfort level: Encourage patient to monitor pain and request assistance  Taken 5/30/2024 0800  Verbalizes/displays adequate comfort level or baseline comfort level: Encourage patient to monitor pain and request assistance   Pt continues to have increased pain. Prn Zanaflex and klonopin helpful. Will continue to monitor.   Problem: Safety - Adult  Goal: Free from fall injury  5/30/2024 1246 by Breanna Oliver RN  Outcome: Progressing  Flowsheets (Taken 5/30/2024 1053)  Free From Fall Injury: Instruct family/caregiver on patient safety   No injury noted this shift.   Problem: Skin/Tissue Integrity  Goal: Absence of new skin breakdown  Description: 1.  Monitor for areas of redness and/or skin breakdown  2.  Assess vascular access sites hourly  3.  Every 4-6 hours minimum:  Change oxygen saturation probe site  4.  Every 4-6 hours:  If on nasal continuous positive airway pressure, respiratory therapy assess nares and determine need for appliance change or resting period.  Outcome: Progressing   Monitoring.   Problem: ABCDS Injury Assessment  Goal: Absence of physical injury  5/30/2024 1246 by Breanna Oliver RN  Outcome: Progressing  Flowsheets (Taken 5/30/2024 1053)  Absence of Physical Injury: Implement safety measures based on patient assessment   No injury noted this shift.   Problem: Neurosensory - 
  Problem: Discharge Planning  Goal: Discharge to home or other facility with appropriate resources  5/30/2024 1618 by Glo Espana RN  Outcome: Progressing     Problem: Pain  Goal: Verbalizes/displays adequate comfort level or baseline comfort level  5/30/2024 1618 by Glo Espana RN  Outcome: Progressing     Problem: Safety - Adult  Goal: Free from fall injury  5/30/2024 1618 by Glo Espana RN  Outcome: Progressing     
  Problem: Discharge Planning  Goal: Discharge to home or other facility with appropriate resources  5/31/2024 0124 by Kimberly Collier RN  Outcome: Progressing  Flowsheets (Taken 5/30/2024 1950)  Discharge to home or other facility with appropriate resources:   Identify barriers to discharge with patient and caregiver   Arrange for needed discharge resources and transportation as appropriate   Identify discharge learning needs (meds, wound care, etc)   Refer to discharge planning if patient needs post-hospital services based on physician order or complex needs related to functional status, cognitive ability or social support system     Problem: Pain  Goal: Verbalizes/displays adequate comfort level or baseline comfort level  5/31/2024 0124 by Kimberly Collier RN  Outcome: Progressing     Problem: Safety - Adult  Goal: Free from fall injury  5/31/2024 0124 by Kimberly Collier RN  Outcome: Progressing     Problem: Skin/Tissue Integrity  Goal: Absence of new skin breakdown  Description: 1.  Monitor for areas of redness and/or skin breakdown  2.  Assess vascular access sites hourly  3.  Every 4-6 hours minimum:  Change oxygen saturation probe site  4.  Every 4-6 hours:  If on nasal continuous positive airway pressure, respiratory therapy assess nares and determine need for appliance change or resting period.  5/31/2024 0124 by Kimberly Collier RN  Outcome: Progressing     Problem: ABCDS Injury Assessment  Goal: Absence of physical injury  5/31/2024 0124 by Kimberly Collier RN  Outcome: Progressing     Problem: Neurosensory - Adult  Goal: Achieves stable or improved neurological status  5/31/2024 0124 by Kimberly Collier RN  Outcome: Progressing  Flowsheets (Taken 5/30/2024 1950)  Achieves stable or improved neurological status: Assess for and report changes in neurological status     Problem: Respiratory - Adult  Goal: Achieves optimal ventilation and oxygenation  5/31/2024 0124 by Kimberly Collier RN  Outcome: Progressing  Flowsheets 
  Problem: Discharge Planning  Goal: Discharge to home or other facility with appropriate resources  6/1/2024 0334 by Vanessa Manley RN  Outcome: Progressing  Flowsheets (Taken 6/1/2024 0334)  Discharge to home or other facility with appropriate resources:   Identify discharge learning needs (meds, wound care, etc)   Arrange for needed discharge resources and transportation as appropriate   Arrange for interpreters to assist at discharge as needed     Problem: Pain  Goal: Verbalizes/displays adequate comfort level or baseline comfort level  6/1/2024 0334 by Vanessa Manley RN  Outcome: Progressing  Flowsheets (Taken 6/1/2024 0334)  Verbalizes/displays adequate comfort level or baseline comfort level:   Administer analgesics based on type and severity of pain and evaluate response   Notify Licensed Independent Practitioner if interventions unsuccessful or patient reports new pain   Implement non-pharmacological measures as appropriate and evaluate response  Note: Pt medicated with pain medication prn.  Assessed all pain characteristics including level, type, location, frequency, and onset.  Non-pharmacologic interventions offered to pt as well.  Pt states pain is tolerable at this time.        Problem: Safety - Adult  Goal: Free from fall injury  6/1/2024 0334 by Vanessa Manley, RN  Outcome: Progressing  Note: No falls noted this shift. Patient ambulates with x1 staff assistance without difficulty.  Bed kept in low position. Safe environment maintained. Bedside table & call light in reach. Uses call light appropriately when needing assistance.        Problem: Genitourinary - Adult  Goal: Absence of urinary retention  6/1/2024 0334 by Vanessa Manley, RN  Outcome: Progressing     
  Problem: Discharge Planning  Goal: Discharge to home or other facility with appropriate resources  6/1/2024 1713 by Paulina Ko, RN  Outcome: Progressing  Flowsheets (Taken 6/1/2024 0730)  Discharge to home or other facility with appropriate resources: Identify barriers to discharge with patient and caregiver     Problem: Pain  Goal: Verbalizes/displays adequate comfort level or baseline comfort level  6/1/2024 1713 by Paulina Ko, RN  Outcome: Progressing     Problem: Safety - Adult  Goal: Free from fall injury  6/1/2024 1713 by Paulina Ko, RN  Outcome: Progressing     
  Problem: Discharge Planning  Goal: Discharge to home or other facility with appropriate resources  6/2/2024 0315 by Vanessa Manley RN  Outcome: Progressing  Flowsheets (Taken 6/2/2024 0315)  Discharge to home or other facility with appropriate resources:   Identify barriers to discharge with patient and caregiver   Refer to discharge planning if patient needs post-hospital services based on physician order or complex needs related to functional status, cognitive ability or social support system   Arrange for interpreters to assist at discharge as needed     Problem: Pain  Goal: Verbalizes/displays adequate comfort level or baseline comfort level  6/2/2024 0315 by Vanessa Manley, RN  Outcome: Progressing  Note: Pt medicated with pain medication prn.  Assessed all pain characteristics including level, type, location, frequency, and onset.  Non-pharmacologic interventions offered to pt as well.  Pt states pain is tolerable at this time.        Problem: Safety - Adult  Goal: Free from fall injury  6/2/2024 0315 by Vanessa Manley, RN  Outcome: Progressing  Note: No falls noted this shift. Patient ambulates with x1 staff assistance without difficulty.  Bed kept in low position. Safe environment maintained. Bedside table & call light in reach. Uses call light appropriately when needing assistance.        Problem: Gastrointestinal - Adult  Goal: Minimal or absence of nausea and vomiting  Outcome: Progressing     
  Problem: Discharge Planning  Goal: Discharge to home or other facility with appropriate resources  Outcome: Progressing     Problem: Pain  Goal: Verbalizes/displays adequate comfort level or baseline comfort level  Outcome: Progressing     Problem: Safety - Adult  Goal: Free from fall injury  Outcome: Progressing     
  Problem: Discharge Planning  Goal: Discharge to home or other facility with appropriate resources  Outcome: Progressing     Problem: Pain  Goal: Verbalizes/displays adequate comfort level or baseline comfort level  Outcome: Progressing     Problem: Safety - Adult  Goal: Free from fall injury  Outcome: Progressing     Problem: Skin/Tissue Integrity  Goal: Absence of new skin breakdown  Description: 1.  Monitor for areas of redness and/or skin breakdown  2.  Assess vascular access sites hourly  3.  Every 4-6 hours minimum:  Change oxygen saturation probe site  4.  Every 4-6 hours:  If on nasal continuous positive airway pressure, respiratory therapy assess nares and determine need for appliance change or resting period.  Outcome: Progressing     Problem: ABCDS Injury Assessment  Goal: Absence of physical injury  Outcome: Progressing     Problem: Neurosensory - Adult  Goal: Achieves stable or improved neurological status  Outcome: Progressing     Problem: Respiratory - Adult  Goal: Achieves optimal ventilation and oxygenation  Outcome: Progressing     Problem: Skin/Tissue Integrity - Adult  Goal: Skin integrity remains intact  Outcome: Progressing     Problem: Gastrointestinal - Adult  Goal: Minimal or absence of nausea and vomiting  Outcome: Progressing     Problem: Genitourinary - Adult  Goal: Absence of urinary retention  Outcome: Progressing     Problem: Infection - Adult  Goal: Absence of infection during hospitalization  Outcome: Progressing     Problem: Nutrition Deficit:  Goal: Optimize nutritional status  Outcome: Progressing  Flowsheets (Taken 5/31/2024 1157 by Hanna Ruggiero RD)  Nutrient intake appropriate for improving, restoring, or maintaining nutritional needs: Monitor oral intake, labs, and treatment plans     
Was notified that patient is having increasing bruping and cramping sensations after EGD.  Bentyl 3 times daily as needed ordered.  If continuing, gastroenterology will be notified.    Electronically signed by Adarsh Salamanca DO on 5/29/2024 at 3:35 PM    
hydration     Problem: Genitourinary - Adult  Goal: Absence of urinary retention  Outcome: Progressing  Flowsheets (Taken 5/29/2024 0800)  Absence of urinary retention: Assess patient’s ability to void and empty bladder     Problem: Infection - Adult  Goal: Absence of infection during hospitalization  Outcome: Progressing  Flowsheets (Taken 5/29/2024 0800)  Absence of infection during hospitalization: Assess and monitor for signs and symptoms of infection

## 2024-06-03 ENCOUNTER — CARE COORDINATION (OUTPATIENT)
Dept: CASE MANAGEMENT | Age: 64
End: 2024-06-03

## 2024-06-03 LAB
C PARVUM AG STL QL IA: NEGATIVE
G LAMBLIA AG STL QL IA: NEGATIVE
SOURCE: NORMAL
SPECIMEN DESCRIPTION: NORMAL

## 2024-06-03 NOTE — ADT AUTH CERT
intrahepatic biliary ductal dilatation. BILIARY SYSTEM:  Gallbladder is unremarkable without evidence of pericholecystic fluid, wall thickening or stones.  Negative sonographic Obrien's sign. Common bile duct is within normal limits measuring 0.7 cm. RIGHT KIDNEY: The right kidney is grossly unremarkable without evidence of hydronephrosis. The right kidney measures 10.5 x 4.5 x 5.1 cm. PANCREAS:  Visualized portions of the pancreas are unremarkable. OTHER: No evidence of right upper quadrant ascites.      Dilated common bile duct measuring up to 0.7 cm. If biliary labs are abnormal, consideration for MRCP is recommended.      XR CHEST PORTABLE     Result Date: 5/26/2024  EXAMINATION: ONE XRAY VIEW OF THE CHEST 5/26/2024 7:12 pm COMPARISON: 05/25/2024 HISTORY: ORDERING SYSTEM PROVIDED HISTORY: SOB TECHNOLOGIST PROVIDED HISTORY: SOB Reason for Exam: SOB FINDINGS: Emphysema is demonstrated.  Mild heterogeneous left basilar opacity is again seen, suggestive of scarring.  Air-filled bowel within the left upper quadrant adjacent to the diaphragm.  No pneumothorax.  Cardiac and mediastinal silhouettes are similar to prior.      Emphysema with basilar scarring.      CT ABDOMEN PELVIS W IV CONTRAST Additional Contrast? None     Result Date: 5/25/2024  EXAMINATION: CT OF THE ABDOMEN AND PELVIS WITH CONTRAST 5/25/2024 2:23 pm TECHNIQUE: CT of the abdomen and pelvis was performed with the administration of intravenous contrast. Multiplanar reformatted images are provided for review. Automated exposure control, iterative reconstruction, and/or weight based adjustment of the mA/kV was utilized to reduce the radiation dose to as low as reasonably achievable. COMPARISON: None HISTORY: ORDERING SYSTEM PROVIDED HISTORY: abdominal pain, vomiting elevated lipase TECHNOLOGIST PROVIDED HISTORY: abdominal pain, vomiting elevated lipase Decision Support Exception - unselect if not a suspected or confirmed emergency medical

## 2024-06-03 NOTE — CARE COORDINATION
Care Transitions Initial Follow Up Call    Call within 2 business days of discharge: Yes    Patient: Larua Rhodes Patient : 1960   MRN: [id number]  Reason for Admission: Acute pancreatitis,/COPD  Discharge Date: 24 RARS: Readmission Risk Score: 31.8      Last Discharge Facility       Date Complaint Diagnosis Description Type Department Provider    24 Abdominal Pain; Emesis; Diarrhea Acute pancreatitis, unspecified complication status, unspecified pancreatitis type ... ED to Hosp-Admission (Discharged) (ADMITTED) Davonte Wang MD; Priya,...            Was this an external facility discharge? No Discharge Facility: [facility]    Challenges to be reviewed by the provider   Additional needs identified to be addressed with provider: Yes  7 day HFU appointment               Method of communication with provider: chart routing.    1st attempt to reach patient for Care Transitions. LMOM requesting return call. Contact information provided.  [phone]    [facility] called and spoke with Liana.  She stated that they pulled the necessary information needed and will setting up date for SOC.       Care Transitions 24 Hour Call    Do you have all of your prescriptions and are they filled?: Yes  Post Acute Services: Home Health (Comment: [facility])  Care Transitions Interventions       Follow Up  Future Appointments   Date Time Provider Department Center   2024  2:45 PM Gumaro Edward MD [facility] RIVERA, LILY

## 2024-06-04 ENCOUNTER — CARE COORDINATION (OUTPATIENT)
Dept: CASE MANAGEMENT | Age: 64
End: 2024-06-04

## 2024-06-04 LAB — FECAL PANCREATIC ELASTASE-1: 79 UG/G

## 2024-06-04 NOTE — CARE COORDINATION
Care Transitions Note    Initial Call - Call within 2 business days of discharge: Yes     Attempted to reach patient for transitions of care follow up. Unable to reach patient.    Outreach Attempts:   Multiple attempts to contact patient at phone numbers on file.   HIPAA compliant voicemail left for patient.     Patient: Laura Rhodes    Patient : 1960   MRN: [id number]    Reason for Admission: Acute pancreatitis/COPD  Discharge Date: 24  RURS: Readmission Risk Score: 31.8    Last Discharge Facility       Date Complaint Diagnosis Description Type Department Provider    24 Abdominal Pain; Emesis; Diarrhea Acute pancreatitis, unspecified complication status, unspecified pancreatitis type ... ED to Hosp-Admission (Discharged) (ADMITTED) Davonte Wang MD; Priya,...            Was this an external facility discharge? No    Follow Up Appointment:   Patient does not have a follow up appointment scheduled at time of call.  Message sent to office to schedule HFU  Future Appointments         Provider Specialty Dept Phone    2024 2:45 PM Gumaro Edward MD Cardiology [phone]            No further follow-up call indicated final attempt, program ended.       SHIVANI RIVERA RN

## 2024-06-05 NOTE — TELEPHONE ENCOUNTER
Patient, was seen in the hospital, heart pancreatic elastase is low, patient of Dr. Pate, patient need to follow-up with her primary care physician, need to be on Creon

## 2024-06-07 ENCOUNTER — TELEPHONE (OUTPATIENT)
Dept: PRIMARY CARE CLINIC | Age: 64
End: 2024-06-07

## 2024-06-07 DIAGNOSIS — R10.9 ABDOMINAL CRAMPING: Primary | ICD-10-CM

## 2024-06-07 RX ORDER — DICYCLOMINE HYDROCHLORIDE 10 MG/1
10 CAPSULE ORAL
Qty: 56 CAPSULE | Refills: 0 | Status: SHIPPED | OUTPATIENT
Start: 2024-06-07 | End: 2024-06-21

## 2024-06-07 NOTE — TELEPHONE ENCOUNTER
Rosa with [facility] called requesting a prescription for the patient. She stated that while the patient was in the hospital on 5/25 for Pancreatitis they had given her Bentyl for her abdominal cramps and it worked well for her. She doesn't know the dosage. Patient would like a prescription for Bentyl sent into [facility] in [place].    Please advise.

## 2024-06-08 NOTE — PROGRESS NOTES
Physical Therapy Cancel Note      DATE: 4/3/2024    NAME: Laura Rhodes  MRN: [id number]   : 1960      Patient not seen this date for Physical Therapy due to:    Patient Declined: Attempt @ 0820, pt declines at this time because she is eating breakfast. Agreeable to later tx. Pt declining tx upon second attempt d/t nausea and diahrrea. LILY Vicente notified. Time in room 2227-6520.      Electronically signed by Kylee Arthur PTA on 4/3/2024 at 10:46 AM       Problem: Patient/Family Goals  Goal: Patient/Family Short Term Goal  Description: Patient's Short Term Goal:   6/6 NOC: sleep  6/7 nocs : sleep  Interventions:   - prn ambien  - cluster care  - See additional Care Plan goals for specific interventions  Outcome: Progressing     Problem: RESPIRATORY - ADULT  Goal: Achieves optimal ventilation and oxygenation  Description: INTERVENTIONS:  - Assess for changes in respiratory status  - Assess for changes in mentation and behavior  - Position to facilitate oxygenation and minimize respiratory effort  - Oxygen supplementation based on oxygen saturation or ABGs  - Provide Smoking Cessation handout, if applicable  - Encourage broncho-pulmonary hygiene including cough, deep breathe, Incentive Spirometry  - Assess the need for suctioning and perform as needed  - Assess and instruct to report SOB or any respiratory difficulty  - Respiratory Therapy support as indicated  - Manage/alleviate anxiety  - Monitor for signs/symptoms of CO2 retention  Outcome: Progressing       Patient received tonight alert and oriented x4, vss, denies pain.  He is on room air, tele sr.  Pt requested ambien at 2200, given per orders.   He has a peg tube, dressing changed tonight, pt refused 2000 bolus feeds.  Discussed poc, he verbalized understanding.  Safety and comfort measures provided, will monitor.

## 2024-06-28 ENCOUNTER — HOSPITAL ENCOUNTER (INPATIENT)
Age: 64
LOS: 7 days | Discharge: HOME | DRG: 190 | End: 2024-07-05
Attending: EMERGENCY MEDICINE | Admitting: INTERNAL MEDICINE
Payer: COMMERCIAL

## 2024-06-28 ENCOUNTER — APPOINTMENT (OUTPATIENT)
Dept: GENERAL RADIOLOGY | Age: 64
DRG: 190 | End: 2024-06-28
Payer: COMMERCIAL

## 2024-06-28 DIAGNOSIS — E86.0 DEHYDRATION: ICD-10-CM

## 2024-06-28 DIAGNOSIS — J44.1 COPD EXACERBATION (HCC): Primary | ICD-10-CM

## 2024-06-28 DIAGNOSIS — J41.1 MUCOPURULENT CHRONIC BRONCHITIS (HCC): ICD-10-CM

## 2024-06-28 LAB
ALBUMIN SERPL-MCNC: 3.8 G/DL (ref 3.5–5.2)
ALP SERPL-CCNC: 50 U/L (ref 35–104)
ALT SERPL-CCNC: 9 U/L (ref 5–33)
ANION GAP SERPL CALCULATED.3IONS-SCNC: 4 MMOL/L (ref 9–17)
AST SERPL-CCNC: 13 U/L
BASOPHILS # BLD: 0 K/UL (ref 0–0.2)
BASOPHILS NFR BLD: 0 % (ref 0–2)
BILIRUB SERPL-MCNC: 0.2 MG/DL (ref 0.3–1.2)
BUN SERPL-MCNC: 6 MG/DL (ref 8–23)
CALCIUM SERPL-MCNC: 7.9 MG/DL (ref 8.6–10.4)
CHLORIDE SERPL-SCNC: 93 MMOL/L (ref 98–107)
CO2 SERPL-SCNC: 36 MMOL/L (ref 20–31)
CREAT SERPL-MCNC: 0.5 MG/DL (ref 0.5–0.9)
EOSINOPHIL # BLD: 0 K/UL (ref 0–0.4)
EOSINOPHILS RELATIVE PERCENT: 0 % (ref 0–4)
ERYTHROCYTE [DISTWIDTH] IN BLOOD BY AUTOMATED COUNT: 15.8 % (ref 11.5–14.9)
GFR, ESTIMATED: >90 ML/MIN/1.73M2
GLUCOSE SERPL-MCNC: 173 MG/DL (ref 70–99)
HCT VFR BLD AUTO: 34.1 % (ref 36–46)
HGB BLD-MCNC: 11 G/DL (ref 12–16)
LYMPHOCYTES NFR BLD: 2.6 K/UL (ref 1–4.8)
LYMPHOCYTES RELATIVE PERCENT: 41 % (ref 24–44)
MAGNESIUM SERPL-MCNC: 3.1 MG/DL (ref 1.6–2.6)
MCH RBC QN AUTO: 32.1 PG (ref 26–34)
MCHC RBC AUTO-ENTMCNC: 32.3 G/DL (ref 31–37)
MCV RBC AUTO: 99.6 FL (ref 80–100)
MONOCYTES NFR BLD: 0.7 K/UL (ref 0.1–1.3)
MONOCYTES NFR BLD: 11 % (ref 1–7)
NEUTROPHILS NFR BLD: 48 % (ref 36–66)
NEUTS SEG NFR BLD: 3.1 K/UL (ref 1.3–9.1)
PLATELET # BLD AUTO: 274 K/UL (ref 150–450)
PMV BLD AUTO: 7.4 FL (ref 6–12)
POTASSIUM SERPL-SCNC: 4.4 MMOL/L (ref 3.7–5.3)
PROT SERPL-MCNC: 5.7 G/DL (ref 6.4–8.3)
RBC # BLD AUTO: 3.42 M/UL (ref 4–5.2)
SODIUM SERPL-SCNC: 133 MMOL/L (ref 135–144)
WBC OTHER # BLD: 6.4 K/UL (ref 3.5–11)

## 2024-06-28 PROCEDURE — 82805 BLOOD GASES W/O2 SATURATION: CPT

## 2024-06-28 PROCEDURE — 96374 THER/PROPH/DIAG INJ IV PUSH: CPT

## 2024-06-28 PROCEDURE — 85025 COMPLETE CBC W/AUTO DIFF WBC: CPT

## 2024-06-28 PROCEDURE — 36415 COLL VENOUS BLD VENIPUNCTURE: CPT

## 2024-06-28 PROCEDURE — 2580000003 HC RX 258: Performed by: EMERGENCY MEDICINE

## 2024-06-28 PROCEDURE — 94640 AIRWAY INHALATION TREATMENT: CPT

## 2024-06-28 PROCEDURE — 93005 ELECTROCARDIOGRAM TRACING: CPT | Performed by: INTERNAL MEDICINE

## 2024-06-28 PROCEDURE — 6370000000 HC RX 637 (ALT 250 FOR IP): Performed by: EMERGENCY MEDICINE

## 2024-06-28 PROCEDURE — 36600 WITHDRAWAL OF ARTERIAL BLOOD: CPT

## 2024-06-28 PROCEDURE — 6360000002 HC RX W HCPCS: Performed by: EMERGENCY MEDICINE

## 2024-06-28 PROCEDURE — 71046 X-RAY EXAM CHEST 2 VIEWS: CPT

## 2024-06-28 PROCEDURE — 96361 HYDRATE IV INFUSION ADD-ON: CPT

## 2024-06-28 PROCEDURE — 99285 EMERGENCY DEPT VISIT HI MDM: CPT

## 2024-06-28 PROCEDURE — 83735 ASSAY OF MAGNESIUM: CPT

## 2024-06-28 PROCEDURE — 80053 COMPREHEN METABOLIC PANEL: CPT

## 2024-06-28 PROCEDURE — 2060000000 HC ICU INTERMEDIATE R&B

## 2024-06-28 RX ORDER — SODIUM CHLORIDE 0.9 % (FLUSH) 0.9 %
5-40 SYRINGE (ML) INJECTION EVERY 12 HOURS SCHEDULED
Status: DISCONTINUED | OUTPATIENT
Start: 2024-06-28 | End: 2024-07-05 | Stop reason: HOSPADM

## 2024-06-28 RX ORDER — SODIUM CHLORIDE 9 MG/ML
INJECTION, SOLUTION INTRAVENOUS PRN
Status: DISCONTINUED | OUTPATIENT
Start: 2024-06-28 | End: 2024-07-05 | Stop reason: HOSPADM

## 2024-06-28 RX ORDER — IPRATROPIUM BROMIDE AND ALBUTEROL SULFATE 2.5; .5 MG/3ML; MG/3ML
1 SOLUTION RESPIRATORY (INHALATION) EVERY 4 HOURS PRN
Status: DISCONTINUED | OUTPATIENT
Start: 2024-06-28 | End: 2024-07-05 | Stop reason: HOSPADM

## 2024-06-28 RX ORDER — GUAIFENESIN/DEXTROMETHORPHAN 100-10MG/5
10 SYRUP ORAL 2 TIMES DAILY PRN
Status: DISCONTINUED | OUTPATIENT
Start: 2024-06-28 | End: 2024-07-05 | Stop reason: HOSPADM

## 2024-06-28 RX ORDER — ONDANSETRON 4 MG/1
4 TABLET, ORALLY DISINTEGRATING ORAL EVERY 8 HOURS PRN
Status: DISCONTINUED | OUTPATIENT
Start: 2024-06-28 | End: 2024-07-05 | Stop reason: HOSPADM

## 2024-06-28 RX ORDER — LEVOTHYROXINE SODIUM 0.07 MG/1
75 TABLET ORAL DAILY
Status: DISCONTINUED | OUTPATIENT
Start: 2024-06-29 | End: 2024-07-05 | Stop reason: HOSPADM

## 2024-06-28 RX ORDER — SODIUM CHLORIDE 0.9 % (FLUSH) 0.9 %
5-40 SYRINGE (ML) INJECTION PRN
Status: DISCONTINUED | OUTPATIENT
Start: 2024-06-28 | End: 2024-07-05 | Stop reason: HOSPADM

## 2024-06-28 RX ORDER — ACETAMINOPHEN 650 MG/1
650 SUPPOSITORY RECTAL EVERY 6 HOURS PRN
Status: DISCONTINUED | OUTPATIENT
Start: 2024-06-28 | End: 2024-07-05 | Stop reason: HOSPADM

## 2024-06-28 RX ORDER — TIZANIDINE 4 MG/1
4 TABLET ORAL 3 TIMES DAILY PRN
Status: DISCONTINUED | OUTPATIENT
Start: 2024-06-28 | End: 2024-07-05 | Stop reason: HOSPADM

## 2024-06-28 RX ORDER — CHOLESTYRAMINE LIGHT 4 G/5.7G
1 POWDER, FOR SUSPENSION ORAL 3 TIMES DAILY
Status: DISCONTINUED | OUTPATIENT
Start: 2024-06-28 | End: 2024-07-05 | Stop reason: HOSPADM

## 2024-06-28 RX ORDER — POLYETHYLENE GLYCOL 3350 17 G/17G
17 POWDER, FOR SOLUTION ORAL DAILY PRN
Status: DISCONTINUED | OUTPATIENT
Start: 2024-06-28 | End: 2024-07-05 | Stop reason: HOSPADM

## 2024-06-28 RX ORDER — SUCRALFATE 1 G/1
1 TABLET ORAL EVERY 8 HOURS SCHEDULED
Status: DISCONTINUED | OUTPATIENT
Start: 2024-06-28 | End: 2024-07-05 | Stop reason: HOSPADM

## 2024-06-28 RX ORDER — PRIMIDONE 50 MG/1
100 TABLET ORAL NIGHTLY
Status: DISCONTINUED | OUTPATIENT
Start: 2024-06-28 | End: 2024-07-05 | Stop reason: HOSPADM

## 2024-06-28 RX ORDER — 0.9 % SODIUM CHLORIDE 0.9 %
1000 INTRAVENOUS SOLUTION INTRAVENOUS ONCE
Status: COMPLETED | OUTPATIENT
Start: 2024-06-28 | End: 2024-06-28

## 2024-06-28 RX ORDER — ONDANSETRON 2 MG/ML
4 INJECTION INTRAMUSCULAR; INTRAVENOUS EVERY 6 HOURS PRN
Status: DISCONTINUED | OUTPATIENT
Start: 2024-06-28 | End: 2024-07-05 | Stop reason: HOSPADM

## 2024-06-28 RX ORDER — AMLODIPINE BESYLATE 10 MG/1
10 TABLET ORAL DAILY
Status: DISCONTINUED | OUTPATIENT
Start: 2024-06-29 | End: 2024-07-05 | Stop reason: HOSPADM

## 2024-06-28 RX ORDER — DICYCLOMINE HYDROCHLORIDE 10 MG/1
10 CAPSULE ORAL
Status: DISCONTINUED | OUTPATIENT
Start: 2024-06-28 | End: 2024-07-05 | Stop reason: HOSPADM

## 2024-06-28 RX ORDER — PREDNISONE 20 MG/1
40 TABLET ORAL DAILY
Status: DISCONTINUED | OUTPATIENT
Start: 2024-07-01 | End: 2024-06-29

## 2024-06-28 RX ORDER — LORAZEPAM 2 MG/ML
1 INJECTION INTRAMUSCULAR ONCE
Status: COMPLETED | OUTPATIENT
Start: 2024-06-28 | End: 2024-06-28

## 2024-06-28 RX ORDER — MONTELUKAST SODIUM 10 MG/1
10 TABLET ORAL DAILY
Status: DISCONTINUED | OUTPATIENT
Start: 2024-06-29 | End: 2024-07-05 | Stop reason: HOSPADM

## 2024-06-28 RX ORDER — LANOLIN ALCOHOL/MO/W.PET/CERES
3 CREAM (GRAM) TOPICAL NIGHTLY PRN
Status: DISCONTINUED | OUTPATIENT
Start: 2024-06-28 | End: 2024-07-05 | Stop reason: HOSPADM

## 2024-06-28 RX ORDER — ACETAMINOPHEN 325 MG/1
650 TABLET ORAL EVERY 6 HOURS PRN
Status: DISCONTINUED | OUTPATIENT
Start: 2024-06-28 | End: 2024-07-05 | Stop reason: HOSPADM

## 2024-06-28 RX ORDER — CARVEDILOL 25 MG/1
25 TABLET ORAL 2 TIMES DAILY
Status: DISCONTINUED | OUTPATIENT
Start: 2024-06-28 | End: 2024-07-05 | Stop reason: HOSPADM

## 2024-06-28 RX ORDER — ALBUTEROL SULFATE 90 UG/1
2 AEROSOL, METERED RESPIRATORY (INHALATION) EVERY 4 HOURS PRN
Status: DISCONTINUED | OUTPATIENT
Start: 2024-06-28 | End: 2024-07-05 | Stop reason: HOSPADM

## 2024-06-28 RX ORDER — PANTOPRAZOLE SODIUM 40 MG/1
40 TABLET, DELAYED RELEASE ORAL
Status: DISCONTINUED | OUTPATIENT
Start: 2024-06-29 | End: 2024-07-05 | Stop reason: HOSPADM

## 2024-06-28 RX ORDER — SENNA AND DOCUSATE SODIUM 50; 8.6 MG/1; MG/1
2 TABLET, FILM COATED ORAL DAILY PRN
Status: DISCONTINUED | OUTPATIENT
Start: 2024-06-29 | End: 2024-07-05 | Stop reason: HOSPADM

## 2024-06-28 RX ORDER — CLONAZEPAM 0.5 MG/1
0.5 TABLET ORAL 3 TIMES DAILY PRN
Status: DISCONTINUED | OUTPATIENT
Start: 2024-06-28 | End: 2024-07-02

## 2024-06-28 RX ORDER — VENLAFAXINE HYDROCHLORIDE 150 MG/1
150 CAPSULE, EXTENDED RELEASE ORAL DAILY
Status: DISCONTINUED | OUTPATIENT
Start: 2024-06-29 | End: 2024-07-05 | Stop reason: HOSPADM

## 2024-06-28 RX ORDER — SODIUM CHLORIDE 9 MG/ML
INJECTION, SOLUTION INTRAVENOUS CONTINUOUS
Status: DISCONTINUED | OUTPATIENT
Start: 2024-06-28 | End: 2024-06-29

## 2024-06-28 RX ORDER — ENOXAPARIN SODIUM 100 MG/ML
30 INJECTION SUBCUTANEOUS DAILY
Status: DISCONTINUED | OUTPATIENT
Start: 2024-06-29 | End: 2024-07-05 | Stop reason: HOSPADM

## 2024-06-28 RX ADMIN — SODIUM CHLORIDE: 9 INJECTION, SOLUTION INTRAVENOUS at 23:08

## 2024-06-28 RX ADMIN — SODIUM CHLORIDE 1000 ML: 9 INJECTION, SOLUTION INTRAVENOUS at 21:28

## 2024-06-28 RX ADMIN — LORAZEPAM 1 MG: 2 INJECTION INTRAMUSCULAR; INTRAVENOUS at 23:42

## 2024-06-28 RX ADMIN — IPRATROPIUM BROMIDE AND ALBUTEROL SULFATE 1 DOSE: 2.5; .5 SOLUTION RESPIRATORY (INHALATION) at 20:51

## 2024-06-28 RX ADMIN — WATER 125 MG: 1 INJECTION INTRAMUSCULAR; INTRAVENOUS; SUBCUTANEOUS at 20:53

## 2024-06-28 ASSESSMENT — PAIN - FUNCTIONAL ASSESSMENT
PAIN_FUNCTIONAL_ASSESSMENT: NONE - DENIES PAIN
PAIN_FUNCTIONAL_ASSESSMENT: NONE - DENIES PAIN

## 2024-06-28 ASSESSMENT — LIFESTYLE VARIABLES
HOW MANY STANDARD DRINKS CONTAINING ALCOHOL DO YOU HAVE ON A TYPICAL DAY: PATIENT DOES NOT DRINK
HOW OFTEN DO YOU HAVE A DRINK CONTAINING ALCOHOL: MONTHLY OR LESS

## 2024-06-29 ENCOUNTER — APPOINTMENT (OUTPATIENT)
Dept: GENERAL RADIOLOGY | Age: 64
DRG: 190 | End: 2024-06-29
Payer: COMMERCIAL

## 2024-06-29 LAB
ANION GAP SERPL CALCULATED.3IONS-SCNC: 5 MMOL/L (ref 9–17)
ARTERIAL PATENCY WRIST A: ABNORMAL
ARTERIAL PATENCY WRIST A: ABNORMAL
BASOPHILS # BLD: 0 K/UL (ref 0–0.2)
BASOPHILS NFR BLD: 0 % (ref 0–2)
BDY SITE: ABNORMAL
BDY SITE: ABNORMAL
BODY TEMPERATURE: 37
BUN SERPL-MCNC: 6 MG/DL (ref 8–23)
CALCIUM SERPL-MCNC: 7.7 MG/DL (ref 8.6–10.4)
CHLORIDE SERPL-SCNC: 99 MMOL/L (ref 98–107)
CO2 SERPL-SCNC: 32 MMOL/L (ref 20–31)
COHGB MFR BLD: 2.1 % (ref 0–5)
COHGB MFR BLD: 4.4 % (ref 0–5)
CREAT SERPL-MCNC: 0.4 MG/DL (ref 0.5–0.9)
EOSINOPHIL # BLD: 0 K/UL (ref 0–0.4)
EOSINOPHILS RELATIVE PERCENT: 0 % (ref 0–4)
ERYTHROCYTE [DISTWIDTH] IN BLOOD BY AUTOMATED COUNT: 15.6 % (ref 11.5–14.9)
GAS FLOW.O2 O2 DELIVERY SYS: ABNORMAL L/MIN
GAS FLOW.O2 O2 DELIVERY SYS: ABNORMAL L/MIN
GFR, ESTIMATED: >90 ML/MIN/1.73M2
GLUCOSE SERPL-MCNC: 163 MG/DL (ref 70–99)
HCO3 ARTERIAL: 33.2 MMOL/L (ref 22–26)
HCO3 ARTERIAL: 36 MMOL/L (ref 22–26)
HCT VFR BLD AUTO: 36.3 % (ref 36–46)
HGB BLD-MCNC: 11.4 G/DL (ref 12–16)
LYMPHOCYTES NFR BLD: 0.4 K/UL (ref 1–4.8)
LYMPHOCYTES RELATIVE PERCENT: 14 % (ref 24–44)
MAGNESIUM SERPL-MCNC: 2.2 MG/DL (ref 1.6–2.6)
MCH RBC QN AUTO: 31.6 PG (ref 26–34)
MCHC RBC AUTO-ENTMCNC: 31.5 G/DL (ref 31–37)
MCV RBC AUTO: 100.3 FL (ref 80–100)
METHEMOGLOBIN: 0.3 % (ref 0–1.9)
METHEMOGLOBIN: 1.1 % (ref 0–1.9)
MONOCYTES NFR BLD: 0 K/UL (ref 0.1–1.3)
MONOCYTES NFR BLD: 1 % (ref 1–7)
NEUTROPHILS NFR BLD: 85 % (ref 36–66)
NEUTS SEG NFR BLD: 2.7 K/UL (ref 1.3–9.1)
O2 SAT, ARTERIAL: 92.2 % (ref 95–98)
O2 SAT, ARTERIAL: 94.6 % (ref 95–98)
PCO2 ARTERIAL: 55.1 MMHG (ref 35–45)
PCO2 ARTERIAL: 69.8 MMHG (ref 35–45)
PH ARTERIAL: 7.29 (ref 7.35–7.45)
PH ARTERIAL: 7.42 (ref 7.35–7.45)
PLATELET # BLD AUTO: 274 K/UL (ref 150–450)
PMV BLD AUTO: 7 FL (ref 6–12)
PO2 ARTERIAL: 356.2 MMHG (ref 80–100)
PO2 ARTERIAL: 70.7 MMHG (ref 80–100)
POSITIVE BASE EXCESS, ART: 11.6 MMOL/L (ref 0–2)
POSITIVE BASE EXCESS, ART: 4.7 MMOL/L (ref 0–2)
POTASSIUM SERPL-SCNC: 4.3 MMOL/L (ref 3.7–5.3)
PT. POSITION: ABNORMAL
PT. POSITION: ABNORMAL
RBC # BLD AUTO: 3.62 M/UL (ref 4–5.2)
RESPIRATORY RATE: 18
SODIUM SERPL-SCNC: 136 MMOL/L (ref 135–144)
TEXT FOR RESPIRATORY: ABNORMAL
TEXT FOR RESPIRATORY: ABNORMAL
TSH SERPL DL<=0.05 MIU/L-ACNC: 0.5 UIU/ML (ref 0.3–5)
WBC OTHER # BLD: 3.2 K/UL (ref 3.5–11)

## 2024-06-29 PROCEDURE — 85025 COMPLETE CBC W/AUTO DIFF WBC: CPT

## 2024-06-29 PROCEDURE — 2580000003 HC RX 258

## 2024-06-29 PROCEDURE — 82805 BLOOD GASES W/O2 SATURATION: CPT

## 2024-06-29 PROCEDURE — 6360000002 HC RX W HCPCS: Performed by: INTERNAL MEDICINE

## 2024-06-29 PROCEDURE — 6370000000 HC RX 637 (ALT 250 FOR IP): Performed by: INTERNAL MEDICINE

## 2024-06-29 PROCEDURE — 2700000000 HC OXYGEN THERAPY PER DAY

## 2024-06-29 PROCEDURE — 94761 N-INVAS EAR/PLS OXIMETRY MLT: CPT

## 2024-06-29 PROCEDURE — 94640 AIRWAY INHALATION TREATMENT: CPT

## 2024-06-29 PROCEDURE — 2060000000 HC ICU INTERMEDIATE R&B

## 2024-06-29 PROCEDURE — 84443 ASSAY THYROID STIM HORMONE: CPT

## 2024-06-29 PROCEDURE — 36415 COLL VENOUS BLD VENIPUNCTURE: CPT

## 2024-06-29 PROCEDURE — 6370000000 HC RX 637 (ALT 250 FOR IP)

## 2024-06-29 PROCEDURE — 71045 X-RAY EXAM CHEST 1 VIEW: CPT

## 2024-06-29 PROCEDURE — 80048 BASIC METABOLIC PNL TOTAL CA: CPT

## 2024-06-29 PROCEDURE — 6360000002 HC RX W HCPCS

## 2024-06-29 PROCEDURE — 6370000000 HC RX 637 (ALT 250 FOR IP): Performed by: EMERGENCY MEDICINE

## 2024-06-29 PROCEDURE — 99223 1ST HOSP IP/OBS HIGH 75: CPT | Performed by: INTERNAL MEDICINE

## 2024-06-29 PROCEDURE — 5A09457 ASSISTANCE WITH RESPIRATORY VENTILATION, 24-96 CONSECUTIVE HOURS, CONTINUOUS POSITIVE AIRWAY PRESSURE: ICD-10-PCS | Performed by: INTERNAL MEDICINE

## 2024-06-29 PROCEDURE — 83735 ASSAY OF MAGNESIUM: CPT

## 2024-06-29 PROCEDURE — 36600 WITHDRAWAL OF ARTERIAL BLOOD: CPT

## 2024-06-29 PROCEDURE — 94660 CPAP INITIATION&MGMT: CPT

## 2024-06-29 PROCEDURE — 2580000003 HC RX 258: Performed by: INTERNAL MEDICINE

## 2024-06-29 RX ORDER — OXYCODONE HYDROCHLORIDE 5 MG/1
2.5 TABLET ORAL EVERY 4 HOURS PRN
Status: DISCONTINUED | OUTPATIENT
Start: 2024-06-29 | End: 2024-07-05 | Stop reason: HOSPADM

## 2024-06-29 RX ORDER — ALBUTEROL SULFATE 2.5 MG/3ML
2.5 SOLUTION RESPIRATORY (INHALATION) EVERY 4 HOURS PRN
Status: DISCONTINUED | OUTPATIENT
Start: 2024-06-29 | End: 2024-07-05 | Stop reason: HOSPADM

## 2024-06-29 RX ORDER — 0.9 % SODIUM CHLORIDE 0.9 %
500 INTRAVENOUS SOLUTION INTRAVENOUS ONCE
Status: COMPLETED | OUTPATIENT
Start: 2024-06-29 | End: 2024-06-29

## 2024-06-29 RX ORDER — HYDROCODONE BITARTRATE AND ACETAMINOPHEN 7.5; 325 MG/1; MG/1
1 TABLET ORAL EVERY 6 HOURS PRN
Status: DISCONTINUED | OUTPATIENT
Start: 2024-06-29 | End: 2024-06-29 | Stop reason: SDUPTHER

## 2024-06-29 RX ORDER — MIDODRINE HYDROCHLORIDE 5 MG/1
5 TABLET ORAL ONCE
Status: COMPLETED | OUTPATIENT
Start: 2024-06-29 | End: 2024-06-29

## 2024-06-29 RX ORDER — AZITHROMYCIN 250 MG/1
250 TABLET, FILM COATED ORAL
Status: DISCONTINUED | OUTPATIENT
Start: 2024-07-01 | End: 2024-07-05 | Stop reason: HOSPADM

## 2024-06-29 RX ORDER — MIDODRINE HYDROCHLORIDE 5 MG/1
5 TABLET ORAL 3 TIMES DAILY PRN
Status: DISCONTINUED | OUTPATIENT
Start: 2024-06-29 | End: 2024-07-05 | Stop reason: HOSPADM

## 2024-06-29 RX ORDER — OXYCODONE HYDROCHLORIDE AND ACETAMINOPHEN 5; 325 MG/1; MG/1
1 TABLET ORAL EVERY 4 HOURS PRN
Status: DISCONTINUED | OUTPATIENT
Start: 2024-06-29 | End: 2024-06-29

## 2024-06-29 RX ORDER — OXYCODONE HYDROCHLORIDE 5 MG/1
2.5 TABLET ORAL EVERY 4 HOURS PRN
Status: DISCONTINUED | OUTPATIENT
Start: 2024-06-29 | End: 2024-06-29

## 2024-06-29 RX ORDER — OXYCODONE HYDROCHLORIDE AND ACETAMINOPHEN 5; 325 MG/1; MG/1
1 TABLET ORAL EVERY 8 HOURS PRN
Status: DISCONTINUED | OUTPATIENT
Start: 2024-06-29 | End: 2024-07-05 | Stop reason: HOSPADM

## 2024-06-29 RX ORDER — ALBUTEROL SULFATE 2.5 MG/3ML
2.5 SOLUTION RESPIRATORY (INHALATION)
Status: DISCONTINUED | OUTPATIENT
Start: 2024-06-29 | End: 2024-07-05 | Stop reason: HOSPADM

## 2024-06-29 RX ORDER — HYDROCODONE BITARTRATE AND ACETAMINOPHEN 7.5; 325 MG/1; MG/1
1 TABLET ORAL 2 TIMES DAILY PRN
Status: DISCONTINUED | OUTPATIENT
Start: 2024-06-29 | End: 2024-06-29 | Stop reason: SDUPTHER

## 2024-06-29 RX ORDER — MIRTAZAPINE 30 MG/1
30 TABLET, FILM COATED ORAL NIGHTLY
Status: DISCONTINUED | OUTPATIENT
Start: 2024-06-29 | End: 2024-07-05 | Stop reason: HOSPADM

## 2024-06-29 RX ADMIN — LEVOTHYROXINE SODIUM 75 MCG: 0.07 TABLET ORAL at 05:55

## 2024-06-29 RX ADMIN — ALBUTEROL SULFATE 2.5 MG: 2.5 SOLUTION RESPIRATORY (INHALATION) at 14:47

## 2024-06-29 RX ADMIN — WATER 40 MG: 1 INJECTION INTRAMUSCULAR; INTRAVENOUS; SUBCUTANEOUS at 05:55

## 2024-06-29 RX ADMIN — MONTELUKAST 10 MG: 10 TABLET, FILM COATED ORAL at 08:55

## 2024-06-29 RX ADMIN — IPRATROPIUM BROMIDE AND ALBUTEROL SULFATE 1 DOSE: 2.5; .5 SOLUTION RESPIRATORY (INHALATION) at 09:57

## 2024-06-29 RX ADMIN — VENLAFAXINE HYDROCHLORIDE 150 MG: 150 CAPSULE, EXTENDED RELEASE ORAL at 08:55

## 2024-06-29 RX ADMIN — WATER 40 MG: 1 INJECTION INTRAMUSCULAR; INTRAVENOUS; SUBCUTANEOUS at 11:25

## 2024-06-29 RX ADMIN — PRIMIDONE 100 MG: 50 TABLET ORAL at 01:47

## 2024-06-29 RX ADMIN — OXYCODONE HYDROCHLORIDE 2.5 MG: 5 TABLET ORAL at 14:22

## 2024-06-29 RX ADMIN — SODIUM CHLORIDE, PRESERVATIVE FREE 10 ML: 5 INJECTION INTRAVENOUS at 20:49

## 2024-06-29 RX ADMIN — TIOTROPIUM BROMIDE AND OLODATEROL 2 PUFF: 3.124; 2.736 SPRAY, METERED RESPIRATORY (INHALATION) at 10:01

## 2024-06-29 RX ADMIN — DEXTROMETHORPHAN HYDROBROMIDE, GUAIFENESIN 10 ML: 10; 100 LIQUID ORAL at 17:34

## 2024-06-29 RX ADMIN — MIDODRINE HYDROCHLORIDE 5 MG: 5 TABLET ORAL at 04:32

## 2024-06-29 RX ADMIN — TIZANIDINE 4 MG: 4 TABLET ORAL at 20:44

## 2024-06-29 RX ADMIN — OXYCODONE HYDROCHLORIDE AND ACETAMINOPHEN 1 TABLET: 5; 325 TABLET ORAL at 14:22

## 2024-06-29 RX ADMIN — SODIUM CHLORIDE, PRESERVATIVE FREE 10 ML: 5 INJECTION INTRAVENOUS at 00:35

## 2024-06-29 RX ADMIN — SUCRALFATE 1 G: 1 TABLET ORAL at 05:55

## 2024-06-29 RX ADMIN — CLONAZEPAM 0.5 MG: 0.5 TABLET ORAL at 17:30

## 2024-06-29 RX ADMIN — PRIMIDONE 100 MG: 50 TABLET ORAL at 20:44

## 2024-06-29 RX ADMIN — DICYCLOMINE HYDROCHLORIDE 10 MG: 10 CAPSULE ORAL at 05:55

## 2024-06-29 RX ADMIN — PANTOPRAZOLE SODIUM 40 MG: 40 TABLET, DELAYED RELEASE ORAL at 05:55

## 2024-06-29 RX ADMIN — SUCRALFATE 1 G: 1 TABLET ORAL at 20:44

## 2024-06-29 RX ADMIN — SUCRALFATE 1 G: 1 TABLET ORAL at 00:34

## 2024-06-29 RX ADMIN — ENOXAPARIN SODIUM 30 MG: 100 INJECTION SUBCUTANEOUS at 08:55

## 2024-06-29 RX ADMIN — ALBUTEROL SULFATE 2.5 MG: 2.5 SOLUTION RESPIRATORY (INHALATION) at 19:05

## 2024-06-29 RX ADMIN — WATER 40 MG: 1 INJECTION INTRAMUSCULAR; INTRAVENOUS; SUBCUTANEOUS at 23:33

## 2024-06-29 RX ADMIN — WATER 40 MG: 1 INJECTION INTRAMUSCULAR; INTRAVENOUS; SUBCUTANEOUS at 17:30

## 2024-06-29 RX ADMIN — CLONAZEPAM 0.5 MG: 0.5 TABLET ORAL at 09:12

## 2024-06-29 RX ADMIN — CLONAZEPAM 0.5 MG: 0.5 TABLET ORAL at 23:33

## 2024-06-29 RX ADMIN — SODIUM CHLORIDE 500 ML: 9 INJECTION, SOLUTION INTRAVENOUS at 03:21

## 2024-06-29 RX ADMIN — MIRTAZAPINE 45 MG: 30 TABLET, FILM COATED ORAL at 01:28

## 2024-06-29 RX ADMIN — SUCRALFATE 1 G: 1 TABLET ORAL at 13:42

## 2024-06-29 RX ADMIN — Medication 3 MG: at 00:34

## 2024-06-29 RX ADMIN — SODIUM CHLORIDE, PRESERVATIVE FREE 10 ML: 5 INJECTION INTRAVENOUS at 11:30

## 2024-06-29 RX ADMIN — MIRTAZAPINE 30 MG: 30 TABLET, FILM COATED ORAL at 20:44

## 2024-06-29 ASSESSMENT — PAIN DESCRIPTION - LOCATION: LOCATION: HIP;BACK

## 2024-06-29 ASSESSMENT — PAIN SCALES - GENERAL
PAINLEVEL_OUTOF10: 8
PAINLEVEL_OUTOF10: 0
PAINLEVEL_OUTOF10: 0

## 2024-06-29 ASSESSMENT — PAIN DESCRIPTION - ORIENTATION: ORIENTATION: RIGHT;LOWER

## 2024-06-29 NOTE — H&P
[facility]   IN-PATIENT SERVICE   [facility]    HISTORY AND PHYSICAL EXAMINATION            Date:   6/29/2024  Patient name:  Laura Rhodes  Date of admission:  6/28/2024  8:22 PM  MRN:   [id number]  Account:  [id number]  YOB: 1960  PCP:    Van Pate MD  Room:   2012/2012-01  Code Status:    DNR-CCA    Chief Complaint:     Chief Complaint   Patient presents with    Shortness of Breath    Wheezing    Anxiety       History Obtained From:     patient    History of Present Illness:     The patient is a 63 y.o.  Non- / non  female who presents with Shortness of Breath, Wheezing, and Anxiety   and she is admitted to the hospital for the management of      Patient is 63-year-old female with multiple comorbidities including advanced COPD on 2-3 L of oxygen at home,, hypertension, hypothyroidism anxiety shortness of breath worsening wheezing.  Patient was also found to be confused at home as per report,  The ER patient had chest x-ray which was negative, found to have acute respiratory acidosis, pCO2 almost 6-9.8, pH 2.9     Patient seen and examined this morning patient states that she is feeling much better, continues to smoke 5 to 10 cigarettes a day, repeat a as BG showed improvement patient was on 2 L of oxygen through nasal    Past Medical History:     Past Medical History:   Diagnosis Date    Acid reflux     Anxiety     Arthritis     Cancer (HCC)     cervical    Cervical osteoarthritis 11/30/2020    COPD (chronic obstructive pulmonary disease) (HCC) 02/23/2018    DDD (degenerative disc disease), cervical     Depression     Hiatal hernia     History of blood transfusion     Reaction fever went up to 107 . per Pt.     On home O2     Spinal stenosis     Tachycardia     Thyroid disease     hypo        Past Surgical History:     Past Surgical History:   Procedure Laterality Date    BACK SURGERY      diskectomy, L3-L5    COLONOSCOPY N/A 04/08/2019     resumed, will decrease the dose to every 8, monitor for oversedation next  Hypothyroidism, patient had TSH check 4 weeks ago which was low T4 was normal, will repeat DVT prophy  Consultations:   IP CONSULT TO CRITICAL CARE     Patient is admitted as inpatient status because of co-morbidities listed above, severity of signs and symptoms as outlined, requirement for current medical therapies and most importantly because of direct risk to patient if care not provided in a hospital setting.    Eileen Hylton MD  6/29/2024  3:20 PM    Copy sent to Dr. Pate, Van Montes MD    Please note that this chart was generated using voice recognition Dragon dictation software.  Although every effort was made to ensure the accuracy of this automated transcription, some errors in transcription may have occurred.

## 2024-06-29 NOTE — ED NOTES
Report given to LILY Moyer from ICU.   Report method in person   The following was reviewed with receiving RN:   Current vital signs:  /75   Pulse 79   Temp 98 °F (36.7 °C)   Resp 22   Ht 1.6 m (5' 2.99\")   Wt 44 kg (97 lb)   SpO2 99%   BMI 17.19 kg/m²                MEWS Score: 2     Any medication or safety alerts were reviewed. Any pending diagnostics and notifications were also reviewed, as well as any safety concerns or issues, abnormal labs, abnormal imaging, and abnormal assessment findings. Questions were answered.

## 2024-06-29 NOTE — ED TRIAGE NOTES
Mode of arrival (squad #, walk in, police, etc) : EMS         Chief complaint(s): SOB        Arrival Note (brief scenario, treatment PTA, etc).: Shortness of breathing and  wheezing , anxiety . COPD on 3 liter  24/7. Received  Albuterol Nebs and Magnesium   enroute  by EMS . Breathing on 4 liter with Nasal Cannula , SpO2 97 % . Alert and oriented X4.         C= \"Have you ever felt that you should Cut down on your drinking?\"  No  A= \"Have people Annoyed you by criticizing your drinking?\"  No  G= \"Have you ever felt bad or Guilty about your drinking?\"  No  E= \"Have you ever had a drink as an Eye-opener first thing in the morning to steady your nerves or to help a hangover?\"  No      Deferred []      Reason for deferring: N/A    *If yes to two or more: probable alcohol abuse.*

## 2024-06-29 NOTE — PROGRESS NOTES
Pt transferred to ICU bed 2012. Pt attached to monitors, oriented to room, safety parameters in place.

## 2024-06-29 NOTE — ED NOTES
Pt put on call light stating that she \"cannot breathe\". Writer to bedside. Writer noted a SPO2 of 65% with a good wave form. Writer applied a nonrebreather. Pt SPO2 now 99% on 15L nonrebreather. Writer called AJIT Shukla and Dr. Núñez to bedside to evaluate pt. Respiratory was also called to come evaluate pt.

## 2024-06-29 NOTE — PLAN OF CARE
Problem: Discharge Planning  Goal: Discharge to home or other facility with appropriate resources  Outcome: Progressing  Flowsheets (Taken 6/29/2024 0030)  Discharge to home or other facility with appropriate resources: Identify barriers to discharge with patient and caregiver     Problem: Safety - Adult  Goal: Free from fall injury  Outcome: Progressing

## 2024-06-29 NOTE — PROGRESS NOTES
06/29/24 1525   Encounter Summary   Encounter Overview/Reason Spiritual/Emotional Needs   Service Provided For Patient   Referral/Consult From Rounding   Support System Spouse;Children   Complexity of Encounter Low   Spiritual/Emotional needs   Type Spiritual Support   Assessment/Intervention/Outcome   Assessment Unable to assess  (sleeping)   Intervention Prayer (assurance of)/[place]

## 2024-06-29 NOTE — CONSULTS
[facility]   CONSULT NOTE:      DATE OF CONSULT 6/29/2024    REASON FOR CONSULTATION:  Pulmonary and critical care management      PCP Van Pate MD     CHIEF COMPLAINT: Acute mental status changes, hypercapnia    HISTORY OF PRESENT ILLNESS:     Laura is a 63-year-old female with stage IV COPD and chronic hypoxic respiratory failure on 2 to 3 L 24 hours a day.  She is well-known to our service in the inpatient and outpatient setting.  She has had multiple admissions in February, March, and the end of May.  She has very advanced COPD and is on home oxygen therapy as well as noninvasive ventilation.  Unfortunately continues to smoke 5 to 10 cigarettes a day.    I had actually seen her at the end of May when she was discharged from the hospital at that time, she had abdominal pain and her breathing was actually fairly well-preserved at her baseline.  She says she went home and about a week ago she started having intermittent wheezing.  However she did not feel that she was that dyspneic.  Apparently the visiting nurse came and saw her yesterday and said her lungs were clear.  She had friends over yesterday and made her \"famous mashed potatoes\" but to her, it did not taste right.  She then later on became disoriented/confused and EMS was called.  In the ER, she was noted to be wheezing.  Chest x-ray done was clear.  She was initially on 2 L nasal cannula but then complained of difficulty breathing her oxygen saturation dropped to 65% with a good waveform she was placed on nonrebreather at 15 L and saturation went up to 99%.  She was then switched over to bilevel positive pressure and remain on that throughout the night.  An ABG done showed a pH of 7.29 with a pCO2 of 70 and pO2 of 356 on a nonrebreather.      At home, she is maintained on Stiolto, albuterol, and and in the past Zithromax: Monday, Wednesday, Friday.  She has noninvasive astral ventilator at home and     LACTA 2.4 05/27/2023 10:58 PM        PRO-BNP   No results for input(s): \"PROBNP\" in the last 72 hours.        ABGs:   Lab Results   Component Value Date/Time    PHART 7.295 06/28/2024 11:18 PM    PO2ART 356.2 06/28/2024 11:18 PM    CTU0PLH 69.8 06/28/2024 11:18 PM       No results found for: \"IFIO2\", \"MODE\", \"SETTIDVOL\", \"SETPEEP\"      Impression    Acute on chronic hypoxic and hypercapnic respiratory failure (patient normally on 2 to 3 L nasal cannula and has astral noninvasive ventilator at home.  Baseline pCO2 is usually 60-65)  Acute exacerbation of stage IV COPD (FEV1 in 2022 was 0.49 or 20% of predicted)  Active tobacco use-80+ pack-year history of smoking, smoking 5 to 10 cigarettes a day  Severe protein calorie malnutrition/pulmonary cachexia  DNR CCA no intubation      Plan:      She has no pulmonary reserve which is why she developed an elevated CO2 so quickly  She has been complaining in the middle of the night that she gets short of breath she should take an albuterol inhaler or nebulizer when that happens  Placed on scheduled albuterol aerosols every 4 hours  Placed on Solu-Medrol 40 mg every 6 hours  Decrease Remeron back to 30 mg at night-can be a potent sedating medication especially given how frail she is  Oxygen to keep saturations greater than 88%  Hep-Lock IV she does not appear to be dehydrated  Check ABG if her pH is normalized can transfer out of intermediate ICU to stepdown  Placed on Zithromax Monday, Wednesday, Friday which is her anti-inflammatory dose  CODE STATUS ordered as DNR CCA no intubation  Discussed with primary service

## 2024-06-29 NOTE — DISCHARGE INSTR - COC
Continuity of Care Form    Patient Name: Laura Rhodes   :  1960  MRN:  [id number]    Admit date:  2024  Discharge date:  24    Code Status Order: DNR-CCA   Advance Directives:     Admitting Physician:  Aren Ashley MD  PCP: Van Pate MD    Discharging Nurse: LILY Quiroz  Discharging Hospital Unit/Room#: 2096/2096-01  Discharging Unit Phone Number: [phone]    Emergency Contact:   Extended Emergency Contact Information  Primary Emergency Contact: Dionicio Rhodes  Address: [address] [place] [address] of [place]  Home Phone: [phone]  Work Phone: [phone]  Mobile Phone: [phone]  Relation: Spouse   needed? No    Past Surgical History:  Past Surgical History:   Procedure Laterality Date    BACK SURGERY      diskectomy, L3-L5    COLONOSCOPY N/A 2019    COLONOSCOPY DIAGNOSTIC performed by Rajwinder Aleman MD at [facility] OR    COLONOSCOPY  2019    COLONOSCOPY POLYPECTOMY SNARE/COLD BIOPSY performed by Rajwinder Aleman MD at [facility] Endoscopy    DILATION AND CURETTAGE OF UTERUS      FEMUR FRACTURE SURGERY Right 2023    FEMUR IM NAIL HALIMA INSERTION performed by Juli Vann MD at [facility] OR    OTHER SURGICAL HISTORY      ectopic pregnancy with tube removal    PAIN MANAGEMENT PROCEDURE      Left sided Transforaminal Epidural Steriod Injection    THYROIDECTOMY, PARTIAL      UPPER GASTROINTESTINAL ENDOSCOPY      UPPER GASTROINTESTINAL ENDOSCOPY N/A 2020    EGD BIOPSY with dilatation performed by Ralph Joy MD at [facility] ENDO    UPPER GASTROINTESTINAL ENDOSCOPY N/A 2022    EGD BIOPSY performed by Fritz Zacarias MD at [facility] ENDO    UPPER GASTROINTESTINAL ENDOSCOPY N/A 2024    ESOPHAGOGASTRODUODENOSCOPY BIOPSY OF STOMACH performed by Lizbeth Garcia MD at [facility] ENDO       Immunization History:   Immunization History   Administered Date(s) Administered    Influenza Virus Vaccine 2018, 2020    Influenza,

## 2024-06-29 NOTE — PROGRESS NOTES
Pt's blood pressures soft - verified by manual blood pressure. RN notified NP Gayla. 500mL NS bolus ordered.

## 2024-06-29 NOTE — PROGRESS NOTES
[facility] Internal Medicine  Jomar Jo MD; Enrique Motta MD; Kartik Salas MD; MD Alejandra Love MD; Aren Ashley MD  [facility] Internal Medicine   IN-PATIENT SERVICE  [facility]                 Date:   6/29/2024  Patientname:  Laura Rhodes  Date of admission:  6/28/2024  8:22 PM  MRN:   [id number]  Account:  [id number]  YOB: 1960  PCP:    Van Pate MD  Room:   2012/2012-01  Code Status:    DNR-CCA      Chief Complaint:     Chief Complaint   Patient presents with    Shortness of Breath    Wheezing    Anxiety       History of Present Illness:     Laura Rhodes is a 63 y.o. Non- / non  female with a history of major depressive disorder, panic disorder, OCD, COPD, hypertension, and home oxygen therapy who presents with and is admitted to the hospital for the management of COPD exacerbation (HCC).    According to patient, she has been experiencing severe difficulty of breathing.  She has been wheezing for several days.  She states that it wakes her up at night and she is unable to breathe.  She is unsure as to whether she is having a panic attack or if there is something wrong with her breathing.  She states that she uses a trilogy at home and follows with [place] pulmonology.  She uses baseline 2 L nasal cannula of oxygen and remains on her 2 L.    Upon upon presentation to the ED the patient was short of breath but not hypoxic.  She appeared to be dehydrated with magnesium of 3.1 and calcium of 7.7.  Her arterial pH was 7.295.  During her assessment the patient appeared to have difficulty with her airway.  Her lung sounds were diminished.  Breathing treatments, continuous BiPAP, and Ativan were ordered.  The patient's symptoms resolved before any intervention was performed.  However, due to the patient's uncompensated respiratory acidosis, critical care was consulted and the patient was admitted to stepdown ICU.     It

## 2024-06-29 NOTE — CARE COORDINATION
Case Management Assessment  Initial Evaluation    Date/Time of Evaluation: 6/29/2024 4:57 PM  Assessment Completed by: Elizabet Car RN    If patient is discharged prior to next notation, then this note serves as note for discharge by case management.    Patient Name: Laura Rhodes                   YOB: 1960  Diagnosis: Dehydration [E86.0]  COPD exacerbation (HCC) [J44.1]                   Date / Time: 6/28/2024  8:22 PM    Patient Admission Status: Inpatient   Readmission Risk (Low < 19, Mod (19-27), High > 27): Readmission Risk Score: 34.1    Current PCP: Van Pate MD  PCP verified by CM? Yes    Chart Reviewed: Yes      History Provided by: Patient  Patient Orientation: Alert and Oriented    Patient Cognition: Alert    Hospitalization in the last 30 days (Readmission):  Yes    If yes, Readmission Assessment in  Navigator will be completed.    Readmission Assessment  Number of Days since last admission?: 8-30 days  Previous Disposition: Home with Home Health  Who is being Interviewed: Patient  What was the patient's/caregiver's perception as to why they think they needed to return back to the hospital?: Other (Comment) (short of breath)  Did you visit your Primary Care Physician after you left the hospital, before you returned this time?: No  Why weren't you able to visit your PCP?: Did not have an appointment  Did you see a specialist, such as Cardiac, Pulmonary, Orthopedic Physician, etc. after you left the hospital?: No  Who advised the patient to return to the hospital?: Self-referral  Does the patient report anything that got in the way of taking their medications?: No  In our efforts to provide the best possible care to you and others like you, can you think of anything that we could have done to help you after you left the hospital the first time, so that you might not have needed to return so soon?: Other (Comment) (n/a)    Advance Directives:      Code Status: DNR-CCA  [phone]    [facility] - [place], [place] - [address] - P [phone] - F [phone]  [address] [address]  Phone: [phone] Fax: [phone]    [id number] - [place], [place] - [address] - P [phone] - F [phone]  [address]  [place] [address]  Phone: [phone] Fax: [phone]      Notes:    Factors facilitating achievement of predicted outcomes: Family support, Motivated, Cooperative, Pleasant, and Good insight into deficits    Barriers to discharge: n/a    Additional Case Management Notes: 6/29: MEDICARE. From home with spouse, independent and spouse transports. DME: walker, [place], neb, NIV ([employer]), O2 2.5L NC 24/7 (port/conc from [facility]). VNS: [facility] current (ref sent). LEXY NEEDS SIGNED/COMPLETED. //AR     The Plan for Transition of Care is related to the following treatment goals of Dehydration [E86.0]  COPD exacerbation (HCC) [J44.1]    IF APPLICABLE: The Patient and/or patient representative Laura and her family were provided with a choice of provider and agrees with the discharge plan. Freedom of choice list with basic dialogue that supports the patient's individualized plan of care/goals and shares the quality data associated with the providers was provided to: Patient   Patient Representative Name:       The Patient and/or Patient Representative Agree with the Discharge Plan? Yes    Elizabet Car RN  Case Management Department  Ph: [phone] Fax: [phone]

## 2024-06-29 NOTE — ED PROVIDER NOTES
EMERGENCY DEPARTMENT ENCOUNTER    Pt Name: Laura Rhodes  MRN: [id number]  Birthdate 1960  Date of evaluation: 6/28/24  CHIEF COMPLAINT       Chief Complaint   Patient presents with    Shortness of Breath    Wheezing    Anxiety     HISTORY OF PRESENT ILLNESS   63-year-old female with past medical history of anxiety, COPD is presenting with complaint of shortness of breath.  She is also had accompanying wheezing over the last few days.  She is on her baseline 2 L nasal cannula of oxygen and on arrival she is at 96% on this 2 L.  She denies any chest pain.  She does admit to still smoking cigarettes.  She has tried her inhaler and nebulizer without relief prompting her visit.  Patient said that she is feeling short of breath especially at night and she thinks that maybe her anxiety is contributing to this as well.    The history is provided by the patient.           REVIEW OF SYSTEMS     Review of Systems   Constitutional:  Negative for chills and fever.   HENT:  Negative for congestion.    Eyes:  Negative for visual disturbance.   Respiratory:  Positive for shortness of breath and wheezing. Negative for cough.    Cardiovascular:  Negative for chest pain.   Gastrointestinal:  Negative for abdominal pain, nausea and vomiting.   Genitourinary:  Negative for dysuria, flank pain, frequency and urgency.   Musculoskeletal:  Negative for myalgias.   Neurological:  Negative for dizziness, light-headedness and headaches.   Psychiatric/Behavioral:  The patient is nervous/anxious.      PASTMEDICAL HISTORY     Past Medical History:   Diagnosis Date    Acid reflux     Anxiety     Arthritis     Cancer (HCC)     cervical    Cervical osteoarthritis 11/30/2020    COPD (chronic obstructive pulmonary disease) (HCC) 02/23/2018    DDD (degenerative disc disease), cervical     Depression     Hiatal hernia     History of blood transfusion     Reaction fever went up to 107 . per Pt.     On home O2     Spinal stenosis     Tachycardia      respiratory distress.      Breath sounds: Wheezing present.   Abdominal:      General: There is no distension.      Palpations: Abdomen is soft.      Tenderness: There is no abdominal tenderness. There is no guarding or rebound.   Musculoskeletal:         General: Normal range of motion.   Skin:     General: Skin is warm.   Neurological:      General: No focal deficit present.      Mental Status: She is alert and oriented to person, place, and time.   Psychiatric:         Behavior: Behavior normal.         MEDICAL DECISION MAKING / ED COURSE:         1)  Number and Complexity of Problems Addressed at this Encounter  Problem List This Visit: Shortness of breath, wheezing, anxiety    Differential Diagnosis: COPD exacerbation, anxiety    Diagnoses Considered but Do Not Suspect: Pneumonia    63-year-old female with past medical history of anxiety, COPD is presenting with complaint of shortness of breath.  She is also had accompanying wheezing over the last few days.  She is on her baseline 2 L nasal cannula of oxygen and on arrival she is at 96% on this 2 L.  She denies any chest pain.  She does admit to still smoking cigarettes.  She has tried her inhaler and nebulizer without relief prompting her visit.  Patient said that she is feeling short of breath especially at night and she thinks that maybe her anxiety is contributing to this as well.    2)  Data Reviewed and Analyzed  (Lab and radiology tests/orders below in next section)    Patient has a sodium of 133 and a magnesium of 3.1.  She was given a bolus of IV fluids because her hypomagnesemia is likely secondary to dehydration.    Imaging that is independently reviewed and interpreted by me as: Chest x-ray shows emphysema with no acute changes.      3)  Treatment and Disposition           Patient was given DuoNebs and Solu-Medrol and reevaluated.  Although she is at her baseline oxygen, she says that she is still having difficulty breathing.  I have educated her on

## 2024-06-30 LAB
ANION GAP SERPL CALCULATED.3IONS-SCNC: 3 MMOL/L (ref 9–17)
BASOPHILS # BLD: 0 K/UL (ref 0–0.2)
BASOPHILS NFR BLD: 0 % (ref 0–2)
BUN SERPL-MCNC: 11 MG/DL (ref 8–23)
CALCIUM SERPL-MCNC: 8 MG/DL (ref 8.6–10.4)
CHLORIDE SERPL-SCNC: 98 MMOL/L (ref 98–107)
CO2 SERPL-SCNC: 35 MMOL/L (ref 20–31)
CREAT SERPL-MCNC: <0.4 MG/DL (ref 0.5–0.9)
EOSINOPHIL # BLD: 0 K/UL (ref 0–0.4)
EOSINOPHILS RELATIVE PERCENT: 0 % (ref 0–4)
ERYTHROCYTE [DISTWIDTH] IN BLOOD BY AUTOMATED COUNT: 15.5 % (ref 11.5–14.9)
GFR, ESTIMATED: ABNORMAL ML/MIN/1.73M2
GLUCOSE SERPL-MCNC: 121 MG/DL (ref 70–99)
HCT VFR BLD AUTO: 32.8 % (ref 36–46)
HGB BLD-MCNC: 10.9 G/DL (ref 12–16)
LYMPHOCYTES NFR BLD: 1 K/UL (ref 1–4.8)
LYMPHOCYTES RELATIVE PERCENT: 16 % (ref 24–44)
MCH RBC QN AUTO: 33 PG (ref 26–34)
MCHC RBC AUTO-ENTMCNC: 33.3 G/DL (ref 31–37)
MCV RBC AUTO: 99 FL (ref 80–100)
MONOCYTES NFR BLD: 0.5 K/UL (ref 0.1–1.3)
MONOCYTES NFR BLD: 7 % (ref 1–7)
NEUTROPHILS NFR BLD: 77 % (ref 36–66)
NEUTS SEG NFR BLD: 5.1 K/UL (ref 1.3–9.1)
PLATELET # BLD AUTO: 273 K/UL (ref 150–450)
PMV BLD AUTO: 6.8 FL (ref 6–12)
POTASSIUM SERPL-SCNC: 4.6 MMOL/L (ref 3.7–5.3)
RBC # BLD AUTO: 3.31 M/UL (ref 4–5.2)
SODIUM SERPL-SCNC: 136 MMOL/L (ref 135–144)
WBC OTHER # BLD: 6.6 K/UL (ref 3.5–11)

## 2024-06-30 PROCEDURE — 6370000000 HC RX 637 (ALT 250 FOR IP): Performed by: NURSE PRACTITIONER

## 2024-06-30 PROCEDURE — 94660 CPAP INITIATION&MGMT: CPT

## 2024-06-30 PROCEDURE — 80048 BASIC METABOLIC PNL TOTAL CA: CPT

## 2024-06-30 PROCEDURE — 2060000000 HC ICU INTERMEDIATE R&B

## 2024-06-30 PROCEDURE — 2580000003 HC RX 258: Performed by: INTERNAL MEDICINE

## 2024-06-30 PROCEDURE — 6360000002 HC RX W HCPCS: Performed by: NURSE PRACTITIONER

## 2024-06-30 PROCEDURE — 6370000000 HC RX 637 (ALT 250 FOR IP): Performed by: INTERNAL MEDICINE

## 2024-06-30 PROCEDURE — 6360000002 HC RX W HCPCS

## 2024-06-30 PROCEDURE — 6360000002 HC RX W HCPCS: Performed by: INTERNAL MEDICINE

## 2024-06-30 PROCEDURE — 99232 SBSQ HOSP IP/OBS MODERATE 35: CPT | Performed by: INTERNAL MEDICINE

## 2024-06-30 PROCEDURE — 85025 COMPLETE CBC W/AUTO DIFF WBC: CPT

## 2024-06-30 PROCEDURE — 2700000000 HC OXYGEN THERAPY PER DAY

## 2024-06-30 PROCEDURE — 6370000000 HC RX 637 (ALT 250 FOR IP)

## 2024-06-30 PROCEDURE — 36415 COLL VENOUS BLD VENIPUNCTURE: CPT

## 2024-06-30 PROCEDURE — 2580000003 HC RX 258

## 2024-06-30 PROCEDURE — 94640 AIRWAY INHALATION TREATMENT: CPT

## 2024-06-30 PROCEDURE — 94761 N-INVAS EAR/PLS OXIMETRY MLT: CPT

## 2024-06-30 PROCEDURE — 2580000003 HC RX 258: Performed by: NURSE PRACTITIONER

## 2024-06-30 RX ORDER — LORAZEPAM 2 MG/ML
0.5 INJECTION INTRAMUSCULAR ONCE
Status: COMPLETED | OUTPATIENT
Start: 2024-06-30 | End: 2024-06-30

## 2024-06-30 RX ORDER — BENZONATATE 200 MG/1
200 CAPSULE ORAL 3 TIMES DAILY PRN
Status: DISCONTINUED | OUTPATIENT
Start: 2024-06-30 | End: 2024-07-05 | Stop reason: HOSPADM

## 2024-06-30 RX ORDER — FLUTICASONE PROPIONATE 50 MCG
1 SPRAY, SUSPENSION (ML) NASAL DAILY
Status: DISCONTINUED | OUTPATIENT
Start: 2024-06-30 | End: 2024-07-05 | Stop reason: HOSPADM

## 2024-06-30 RX ADMIN — MONTELUKAST 10 MG: 10 TABLET, FILM COATED ORAL at 08:45

## 2024-06-30 RX ADMIN — ALBUTEROL SULFATE 2.5 MG: 2.5 SOLUTION RESPIRATORY (INHALATION) at 19:03

## 2024-06-30 RX ADMIN — FLUTICASONE PROPIONATE 1 SPRAY: 50 SPRAY, METERED NASAL at 12:45

## 2024-06-30 RX ADMIN — CLONAZEPAM 0.5 MG: 0.5 TABLET ORAL at 15:48

## 2024-06-30 RX ADMIN — SUCRALFATE 1 G: 1 TABLET ORAL at 05:42

## 2024-06-30 RX ADMIN — MIRTAZAPINE 30 MG: 30 TABLET, FILM COATED ORAL at 20:05

## 2024-06-30 RX ADMIN — DEXTROMETHORPHAN HYDROBROMIDE, GUAIFENESIN 10 ML: 10; 100 LIQUID ORAL at 11:10

## 2024-06-30 RX ADMIN — CARVEDILOL 25 MG: 25 TABLET, FILM COATED ORAL at 20:04

## 2024-06-30 RX ADMIN — LEVOTHYROXINE SODIUM 75 MCG: 0.07 TABLET ORAL at 05:42

## 2024-06-30 RX ADMIN — ALBUTEROL SULFATE 2.5 MG: 2.5 SOLUTION RESPIRATORY (INHALATION) at 06:45

## 2024-06-30 RX ADMIN — CLONAZEPAM 0.5 MG: 0.5 TABLET ORAL at 09:50

## 2024-06-30 RX ADMIN — TIOTROPIUM BROMIDE AND OLODATEROL 2 PUFF: 3.124; 2.736 SPRAY, METERED RESPIRATORY (INHALATION) at 08:20

## 2024-06-30 RX ADMIN — LORAZEPAM 0.5 MG: 2 INJECTION INTRAMUSCULAR; INTRAVENOUS at 20:35

## 2024-06-30 RX ADMIN — SODIUM CHLORIDE, PRESERVATIVE FREE 10 ML: 5 INJECTION INTRAVENOUS at 20:18

## 2024-06-30 RX ADMIN — BENZONATATE 200 MG: 200 CAPSULE ORAL at 20:35

## 2024-06-30 RX ADMIN — ALBUTEROL SULFATE 2.5 MG: 2.5 SOLUTION RESPIRATORY (INHALATION) at 14:45

## 2024-06-30 RX ADMIN — PANTOPRAZOLE SODIUM 40 MG: 40 TABLET, DELAYED RELEASE ORAL at 05:42

## 2024-06-30 RX ADMIN — OXYCODONE AND ACETAMINOPHEN 1 TABLET: 5; 325 TABLET ORAL at 15:00

## 2024-06-30 RX ADMIN — CLONAZEPAM 0.5 MG: 0.5 TABLET ORAL at 23:12

## 2024-06-30 RX ADMIN — TIZANIDINE 4 MG: 4 TABLET ORAL at 20:11

## 2024-06-30 RX ADMIN — WATER 40 MG: 1 INJECTION INTRAMUSCULAR; INTRAVENOUS; SUBCUTANEOUS at 05:42

## 2024-06-30 RX ADMIN — VENLAFAXINE HYDROCHLORIDE 150 MG: 150 CAPSULE, EXTENDED RELEASE ORAL at 08:45

## 2024-06-30 RX ADMIN — SUCRALFATE 1 G: 1 TABLET ORAL at 12:45

## 2024-06-30 RX ADMIN — DEXTROMETHORPHAN HYDROBROMIDE, GUAIFENESIN 10 ML: 10; 100 LIQUID ORAL at 23:12

## 2024-06-30 RX ADMIN — PRIMIDONE 100 MG: 50 TABLET ORAL at 20:05

## 2024-06-30 RX ADMIN — ALBUTEROL SULFATE 2.5 MG: 2.5 SOLUTION RESPIRATORY (INHALATION) at 10:51

## 2024-06-30 RX ADMIN — SUCRALFATE 1 G: 1 TABLET ORAL at 20:05

## 2024-06-30 RX ADMIN — WATER 40 MG: 1 INJECTION INTRAMUSCULAR; INTRAVENOUS; SUBCUTANEOUS at 20:05

## 2024-06-30 RX ADMIN — ENOXAPARIN SODIUM 30 MG: 100 INJECTION SUBCUTANEOUS at 08:44

## 2024-06-30 RX ADMIN — WATER 40 MG: 1 INJECTION INTRAMUSCULAR; INTRAVENOUS; SUBCUTANEOUS at 12:44

## 2024-06-30 RX ADMIN — OXYCODONE AND ACETAMINOPHEN 1 TABLET: 5; 325 TABLET ORAL at 08:45

## 2024-06-30 ASSESSMENT — PAIN DESCRIPTION - DESCRIPTORS
DESCRIPTORS: ACHING
DESCRIPTORS: ACHING
DESCRIPTORS: TIGHTNESS

## 2024-06-30 ASSESSMENT — PAIN DESCRIPTION - LOCATION
LOCATION: BACK;HIP
LOCATION: CHEST
LOCATION: BACK

## 2024-06-30 ASSESSMENT — PAIN DESCRIPTION - DIRECTION: RADIATING_TOWARDS: MIDSTERNAL

## 2024-06-30 ASSESSMENT — PAIN DESCRIPTION - PAIN TYPE: TYPE: ACUTE PAIN

## 2024-06-30 ASSESSMENT — PAIN SCALES - GENERAL
PAINLEVEL_OUTOF10: 5
PAINLEVEL_OUTOF10: 5
PAINLEVEL_OUTOF10: 8
PAINLEVEL_OUTOF10: 9
PAINLEVEL_OUTOF10: 9
PAINLEVEL_OUTOF10: 4

## 2024-06-30 ASSESSMENT — PAIN DESCRIPTION - ONSET: ONSET: SUDDEN

## 2024-06-30 ASSESSMENT — PAIN DESCRIPTION - FREQUENCY: FREQUENCY: OTHER (COMMENT)

## 2024-06-30 ASSESSMENT — PAIN DESCRIPTION - ORIENTATION
ORIENTATION: MID
ORIENTATION: RIGHT;LOWER

## 2024-06-30 NOTE — PROGRESS NOTES
patient had an 8 beat of SVT of 133 BPM at 19:49. no reported symptoms or discomfort.. Reported to NP. Will continue to monitor

## 2024-06-30 NOTE — CARE COORDINATION
ONGOING DISCHARGE PLAN:    Spoke with patient and family yesterday. They confirmed that plan is home with [facility].    Active order for PO Zithro and IV Solu-medrol 40mg every 6 hours.    Will continue to follow for additional discharge needs.    Electronically signed by Elizabet Car RN on 6/30/2024 at 9:58 AM

## 2024-06-30 NOTE — PROGRESS NOTES
APRN - NP    0.9 % sodium chloride infusion, , IntraVENous, PRN, Gayla Vega APRN - NP    ondansetron (ZOFRAN-ODT) disintegrating tablet 4 mg, 4 mg, Oral, Q8H PRN **OR** ondansetron (ZOFRAN) injection 4 mg, 4 mg, IntraVENous, Q6H PRN, Gayla Vega APRN - NP    polyethylene glycol (GLYCOLAX) packet 17 g, 17 g, Oral, Daily PRN, Gayla Vega APRN - NP    enoxaparin Sodium (LOVENOX) injection 30 mg, 30 mg, SubCUTAneous, Daily, Gayla Vega APRN - NP, 30 mg at 06/30/24 0844    acetaminophen (TYLENOL) tablet 650 mg, 650 mg, Oral, Q6H PRN **OR** acetaminophen (TYLENOL) suppository 650 mg, 650 mg, Rectal, Q6H PRN, Gayla Vega APRN - NP    Lab Results:     Lab Results   Component Value Date    WBC 6.6 06/30/2024    HGB 10.9 (L) 06/30/2024    HCT 32.8 (L) 06/30/2024    MCV 99.0 06/30/2024     06/30/2024     Lab Results   Component Value Date    CALCIUM 8.0 (L) 06/30/2024     06/30/2024    K 4.6 06/30/2024    CO2 35 (H) 06/30/2024    CL 98 06/30/2024    BUN 11 06/30/2024    CREATININE <0.4 (L) 06/30/2024       Lab Results   Component Value Date    INR 0.9 02/27/2024    PROTIME 12.3 02/27/2024       Radiology:       ASSESSMENT:       Acute on chronic hypoxic and hypercapnic respiratory failure (patient normally on 2 to 3 L nasal cannula and has astral noninvasive ventilator at home.  Baseline pCO2 is usually 50's)  Acute exacerbation of stage IV COPD (FEV1 in 2022 was 0.49 or 20% of predicted)  Active tobacco use-80+ pack-year history of smoking, smoking 5 to 10 cigarettes a day  Severe protein calorie malnutrition/pulmonary cachexia  DNR CCA no intubation    PLAN:   Will decrease steroids to 40 mg every 8 hours, baseline is 10 mg daily  Continue bronchodilators  Currently at baseline oxygen  Continue BiPAP at night and with naps and as needed  Will need eventual follow-up in our office  Will restart her Flonase  Not quite ready for discharge from pulmonary  standpoint      Electronically signed by NATHAN Rowell CNP on 06/30/24     This progress note was completed using a voice transcription system. Every effort was made to ensure accuracy. However, inadvertent computerized transcription errors may be present.    Chantel Mathews, NP-C, MSN  [facility]

## 2024-06-30 NOTE — PROGRESS NOTES
BRONCHOSPASM/BRONCHOCONSTRICTION     [x]         IMPROVE AERATION/BREATH SOUNDS  [x]   ADMINISTER BRONCHODILATOR THERAPY AS APPROPRIATE  [x]   ASSESS BREATH SOUNDS  []   IMPLEMENT AEROSOL/MDI PROTOCOL  [x]   PATIENT EDUCATION AS NEEDED  PROVIDE ADEQUATE OXYGENATION WITH ACCEPTABLE SP02/ABG'S    [x]  IDENTIFY APPROPRIATE OXYGEN THERAPY  [x]   MONITOR SP02/ABG'S AS NEEDED   [x]   PATIENT EDUCATION AS NEEDED

## 2024-06-30 NOTE — PROGRESS NOTES
performed by Rajwinder Aleman MD at [facility] OR    COLONOSCOPY  11/13/2019    COLONOSCOPY POLYPECTOMY SNARE/COLD BIOPSY performed by Rajwinder Aleman MD at [facility]    DILATION AND CURETTAGE OF UTERUS      FEMUR FRACTURE SURGERY Right 7/29/2023    FEMUR IM NAIL HALIMA INSERTION performed by Juli Vann MD at [facility] OR    OTHER SURGICAL HISTORY      ectopic pregnancy with tube removal    PAIN MANAGEMENT PROCEDURE      Left sided Transforaminal Epidural Steriod Injection    THYROIDECTOMY, PARTIAL      UPPER GASTROINTESTINAL ENDOSCOPY      UPPER GASTROINTESTINAL ENDOSCOPY N/A 11/27/2020    EGD BIOPSY with dilatation performed by Ralph Joy MD at [facility] ENDO    UPPER GASTROINTESTINAL ENDOSCOPY N/A 04/22/2022    EGD BIOPSY performed by Fritz Zacarias MD at [facility] ENDO    UPPER GASTROINTESTINAL ENDOSCOPY N/A 5/29/2024    ESOPHAGOGASTRODUODENOSCOPY BIOPSY OF STOMACH performed by Lizbeth Garcia MD at [facility] ENDO        Medications Prior to Admission:     Prior to Admission medications    Medication Sig Start Date End Date Taking? Authorizing Provider   clonazePAM (KLONOPIN) 0.5 MG tablet Take 1 tablet by mouth 3 times daily as needed for Anxiety for up to 90 days. Max Daily Amount: 1.5 mg 6/14/24 9/12/24  Van Pate MD   dicyclomine (BENTYL) 10 MG capsule Take 1 capsule by mouth 4 times daily (before meals and nightly) for 14 days As needed for cramps 6/7/24 6/21/24  Kalie Mackenzie MD   metoclopramide (REGLAN) 5 MG tablet Take 1 tablet by mouth 3 times daily for 10 days 6/2/24 6/12/24  Kamaljit Tompkins MD   cholestyramine (QUESTRAN) 4 g packet Take 1 packet by mouth 3 times daily 5/23/24   Van Pate MD   amLODIPine (NORVASC) 10 MG tablet Take 1 tablet by mouth daily 5/7/24   Van Pate MD   carvedilol (COREG) 25 MG tablet Take 1 tablet by mouth 2 times daily 5/7/24   Van Pate MD   HYDROcodone-acetaminophen (NORCO) 7.5-325 MG per tablet Take 1 tablet by mouth 2 times daily  of  CARDIOVASCULAR:  negative for chest pain, palpitations.  GASTROINTESTINAL:  negative for nausea, vomiting, diarrhea, constipation, change in bowel habits, abdominal pain   GENITOURINARY:  negative for difficulty of urination, burning with urination, frequency   INTEGUMENT:  negative for rash, skin lesions, easy bruising   HEMATOLOGIC/LYMPHATIC:  negative for swelling/edema   ALLERGIC/IMMUNOLOGIC:  negative for urticaria , itching  ENDOCRINE:  negative increase in drinking, increase in urination, hot or cold intolerance  MUSCULOSKELETAL:  negative joint pains, muscle aches, swelling of joints  NEUROLOGICAL:  negative for headaches, dizziness, lightheadedness, numbness, pain, tingling extremities  BEHAVIOR/PSYCH:  negative for depression, anxiety    Physical Exam:   /71   Pulse 66   Temp 98.4 °F (36.9 °C)   Resp 18   Ht 1.6 m (5' 2.99\")   Wt 44.2 kg (97 lb 7.1 oz)   SpO2 99%   BMI 17.27 kg/m²   Temp (24hrs), Av.1 °F (36.7 °C), Min:97.6 °F (36.4 °C), Max:98.7 °F (37.1 °C)    No results for input(s): \"POCGLU\" in the last 72 hours.  No intake or output data in the 24 hours ending 24 1540      General Appearance:  alert, well appearing, and in no acute distress  Mental status: oriented to person, place, and time with normal affect  Head:  normocephalic, atraumatic.  Eye: no icterus, redness, pupils equal and reactive, extraocular eye movements intact, conjunctiva clear  Ear: normal external ear, no discharge, hearing intact  Nose:  no drainage noted  Mouth: mucous membranes moist  Neck: supple, no carotid bruits, thyroid not palpable  Lungs: Lateral expiratory wheeze  Cardiovascular: normal rate, regular rhythm, no murmur, gallop, rub.  Abdomen: Soft, nontender, nondistended, normal bowel sounds, no hepatomegaly or splenomegaly  Neurologic: There are no new focal motor or sensory deficits, normal muscle tone and bulk, no abnormal sensation, normal speech, cranial nerves II through XII grossly

## 2024-06-30 NOTE — PLAN OF CARE
Problem: Discharge Planning  Goal: Discharge to home or other facility with appropriate resources  6/29/2024 2209 by Pepper Mars, RN  Outcome: Progressing     Problem: Safety - Adult  Goal: Free from fall injury  6/29/2024 2209 by Pepper Mars, RN  Outcome: Progressing     Problem: Pain  Goal: Verbalizes/displays adequate comfort level or baseline comfort level  Outcome: Progressing     Problem: Nutrition Deficit:  Goal: Optimize nutritional status  Outcome: Progressing     Problem: Skin/Tissue Integrity  Goal: Absence of new skin breakdown  Description: 1.  Monitor for areas of redness and/or skin breakdown  2.  Assess vascular access sites hourly  3.  Every 4-6 hours minimum:  Change oxygen saturation probe site  4.  Every 4-6 hours:  If on nasal continuous positive airway pressure, respiratory therapy assess nares and determine need for appliance change or resting period.  Outcome: Progressing

## 2024-07-01 LAB
ANION GAP SERPL CALCULATED.3IONS-SCNC: 4 MMOL/L (ref 9–17)
BASOPHILS # BLD: 0 K/UL (ref 0–0.2)
BASOPHILS NFR BLD: 0 % (ref 0–2)
BUN SERPL-MCNC: 11 MG/DL (ref 8–23)
CALCIUM SERPL-MCNC: 8.5 MG/DL (ref 8.6–10.4)
CHLORIDE SERPL-SCNC: 100 MMOL/L (ref 98–107)
CO2 SERPL-SCNC: 36 MMOL/L (ref 20–31)
CREAT SERPL-MCNC: 0.4 MG/DL (ref 0.5–0.9)
EOSINOPHIL # BLD: 0 K/UL (ref 0–0.4)
EOSINOPHILS RELATIVE PERCENT: 0 % (ref 0–4)
ERYTHROCYTE [DISTWIDTH] IN BLOOD BY AUTOMATED COUNT: 15 % (ref 11.5–14.9)
GFR, ESTIMATED: >90 ML/MIN/1.73M2
GLUCOSE SERPL-MCNC: 99 MG/DL (ref 70–99)
HCT VFR BLD AUTO: 32.3 % (ref 36–46)
HGB BLD-MCNC: 10.7 G/DL (ref 12–16)
LYMPHOCYTES NFR BLD: 2.2 K/UL (ref 1–4.8)
LYMPHOCYTES RELATIVE PERCENT: 31 % (ref 24–44)
MCH RBC QN AUTO: 32.7 PG (ref 26–34)
MCHC RBC AUTO-ENTMCNC: 33 G/DL (ref 31–37)
MCV RBC AUTO: 98.9 FL (ref 80–100)
MONOCYTES NFR BLD: 0.7 K/UL (ref 0.1–1.3)
MONOCYTES NFR BLD: 9 % (ref 1–7)
NEUTROPHILS NFR BLD: 60 % (ref 36–66)
NEUTS SEG NFR BLD: 4.3 K/UL (ref 1.3–9.1)
PLATELET # BLD AUTO: 272 K/UL (ref 150–450)
PMV BLD AUTO: 6.7 FL (ref 6–12)
POTASSIUM SERPL-SCNC: 5 MMOL/L (ref 3.7–5.3)
RBC # BLD AUTO: 3.27 M/UL (ref 4–5.2)
SODIUM SERPL-SCNC: 140 MMOL/L (ref 135–144)
WBC OTHER # BLD: 7.1 K/UL (ref 3.5–11)

## 2024-07-01 PROCEDURE — 6370000000 HC RX 637 (ALT 250 FOR IP): Performed by: INTERNAL MEDICINE

## 2024-07-01 PROCEDURE — 6360000002 HC RX W HCPCS: Performed by: NURSE PRACTITIONER

## 2024-07-01 PROCEDURE — 97530 THERAPEUTIC ACTIVITIES: CPT

## 2024-07-01 PROCEDURE — 6370000000 HC RX 637 (ALT 250 FOR IP)

## 2024-07-01 PROCEDURE — 36415 COLL VENOUS BLD VENIPUNCTURE: CPT

## 2024-07-01 PROCEDURE — 6360000002 HC RX W HCPCS: Performed by: INTERNAL MEDICINE

## 2024-07-01 PROCEDURE — 2060000000 HC ICU INTERMEDIATE R&B

## 2024-07-01 PROCEDURE — 2580000003 HC RX 258

## 2024-07-01 PROCEDURE — 97165 OT EVAL LOW COMPLEX 30 MIN: CPT

## 2024-07-01 PROCEDURE — 6370000000 HC RX 637 (ALT 250 FOR IP): Performed by: NURSE PRACTITIONER

## 2024-07-01 PROCEDURE — 6360000002 HC RX W HCPCS

## 2024-07-01 PROCEDURE — 94761 N-INVAS EAR/PLS OXIMETRY MLT: CPT

## 2024-07-01 PROCEDURE — 2700000000 HC OXYGEN THERAPY PER DAY

## 2024-07-01 PROCEDURE — 97161 PT EVAL LOW COMPLEX 20 MIN: CPT

## 2024-07-01 PROCEDURE — 99232 SBSQ HOSP IP/OBS MODERATE 35: CPT | Performed by: INTERNAL MEDICINE

## 2024-07-01 PROCEDURE — 94660 CPAP INITIATION&MGMT: CPT

## 2024-07-01 PROCEDURE — 85025 COMPLETE CBC W/AUTO DIFF WBC: CPT

## 2024-07-01 PROCEDURE — 94640 AIRWAY INHALATION TREATMENT: CPT

## 2024-07-01 PROCEDURE — 80048 BASIC METABOLIC PNL TOTAL CA: CPT

## 2024-07-01 PROCEDURE — 94664 DEMO&/EVAL PT USE INHALER: CPT

## 2024-07-01 PROCEDURE — 2580000003 HC RX 258: Performed by: NURSE PRACTITIONER

## 2024-07-01 RX ORDER — LORAZEPAM 2 MG/ML
0.5 INJECTION INTRAMUSCULAR ONCE
Status: COMPLETED | OUTPATIENT
Start: 2024-07-01 | End: 2024-07-01

## 2024-07-01 RX ADMIN — BENZONATATE 200 MG: 200 CAPSULE ORAL at 06:10

## 2024-07-01 RX ADMIN — WATER 40 MG: 1 INJECTION INTRAMUSCULAR; INTRAVENOUS; SUBCUTANEOUS at 06:07

## 2024-07-01 RX ADMIN — DEXTROMETHORPHAN HYDROBROMIDE, GUAIFENESIN 10 ML: 10; 100 LIQUID ORAL at 12:08

## 2024-07-01 RX ADMIN — PANTOPRAZOLE SODIUM 40 MG: 40 TABLET, DELAYED RELEASE ORAL at 06:06

## 2024-07-01 RX ADMIN — CARVEDILOL 25 MG: 25 TABLET, FILM COATED ORAL at 10:30

## 2024-07-01 RX ADMIN — FLUTICASONE PROPIONATE 1 SPRAY: 50 SPRAY, METERED NASAL at 08:32

## 2024-07-01 RX ADMIN — CARVEDILOL 25 MG: 25 TABLET, FILM COATED ORAL at 20:41

## 2024-07-01 RX ADMIN — AZITHROMYCIN DIHYDRATE 250 MG: 250 TABLET ORAL at 20:41

## 2024-07-01 RX ADMIN — ALBUTEROL SULFATE 2.5 MG: 2.5 SOLUTION RESPIRATORY (INHALATION) at 10:51

## 2024-07-01 RX ADMIN — LEVOTHYROXINE SODIUM 75 MCG: 0.07 TABLET ORAL at 06:06

## 2024-07-01 RX ADMIN — ALBUTEROL SULFATE 2.5 MG: 2.5 SOLUTION RESPIRATORY (INHALATION) at 19:13

## 2024-07-01 RX ADMIN — DEXTROMETHORPHAN HYDROBROMIDE, GUAIFENESIN 10 ML: 10; 100 LIQUID ORAL at 21:00

## 2024-07-01 RX ADMIN — TIZANIDINE 4 MG: 4 TABLET ORAL at 20:45

## 2024-07-01 RX ADMIN — VENLAFAXINE HYDROCHLORIDE 150 MG: 150 CAPSULE, EXTENDED RELEASE ORAL at 08:28

## 2024-07-01 RX ADMIN — SODIUM CHLORIDE, PRESERVATIVE FREE 10 ML: 5 INJECTION INTRAVENOUS at 08:29

## 2024-07-01 RX ADMIN — ALBUTEROL SULFATE 2.5 MG: 2.5 SOLUTION RESPIRATORY (INHALATION) at 14:56

## 2024-07-01 RX ADMIN — TIOTROPIUM BROMIDE AND OLODATEROL 2 PUFF: 3.124; 2.736 SPRAY, METERED RESPIRATORY (INHALATION) at 07:40

## 2024-07-01 RX ADMIN — MIRTAZAPINE 30 MG: 30 TABLET, FILM COATED ORAL at 20:42

## 2024-07-01 RX ADMIN — Medication 3 MG: at 20:41

## 2024-07-01 RX ADMIN — CLONAZEPAM 0.5 MG: 0.5 TABLET ORAL at 15:38

## 2024-07-01 RX ADMIN — TIZANIDINE 4 MG: 4 TABLET ORAL at 06:10

## 2024-07-01 RX ADMIN — DICYCLOMINE HYDROCHLORIDE 10 MG: 10 CAPSULE ORAL at 20:42

## 2024-07-01 RX ADMIN — WATER 40 MG: 1 INJECTION INTRAMUSCULAR; INTRAVENOUS; SUBCUTANEOUS at 13:12

## 2024-07-01 RX ADMIN — WATER 40 MG: 1 INJECTION INTRAMUSCULAR; INTRAVENOUS; SUBCUTANEOUS at 20:41

## 2024-07-01 RX ADMIN — SUCRALFATE 1 G: 1 TABLET ORAL at 06:06

## 2024-07-01 RX ADMIN — ENOXAPARIN SODIUM 30 MG: 100 INJECTION SUBCUTANEOUS at 08:29

## 2024-07-01 RX ADMIN — OXYCODONE AND ACETAMINOPHEN 1 TABLET: 5; 325 TABLET ORAL at 17:29

## 2024-07-01 RX ADMIN — PRIMIDONE 100 MG: 50 TABLET ORAL at 20:41

## 2024-07-01 RX ADMIN — OXYCODONE AND ACETAMINOPHEN 1 TABLET: 5; 325 TABLET ORAL at 08:28

## 2024-07-01 RX ADMIN — ALBUTEROL SULFATE 2.5 MG: 2.5 SOLUTION RESPIRATORY (INHALATION) at 07:30

## 2024-07-01 RX ADMIN — CLONAZEPAM 0.5 MG: 0.5 TABLET ORAL at 06:10

## 2024-07-01 RX ADMIN — MONTELUKAST 10 MG: 10 TABLET, FILM COATED ORAL at 08:28

## 2024-07-01 RX ADMIN — SODIUM CHLORIDE, PRESERVATIVE FREE 10 ML: 5 INJECTION INTRAVENOUS at 20:43

## 2024-07-01 RX ADMIN — LORAZEPAM 0.5 MG: 2 INJECTION INTRAMUSCULAR; INTRAVENOUS at 14:46

## 2024-07-01 RX ADMIN — SUCRALFATE 1 G: 1 TABLET ORAL at 17:29

## 2024-07-01 ASSESSMENT — PAIN SCALES - GENERAL
PAINLEVEL_OUTOF10: 9
PAINLEVEL_OUTOF10: 8
PAINLEVEL_OUTOF10: 4
PAINLEVEL_OUTOF10: 6
PAINLEVEL_OUTOF10: 8
PAINLEVEL_OUTOF10: 0
PAINLEVEL_OUTOF10: 9

## 2024-07-01 ASSESSMENT — PAIN DESCRIPTION - LOCATION
LOCATION: BACK;HIP
LOCATION: BACK;CHEST
LOCATION: GENERALIZED
LOCATION: BACK;HIP

## 2024-07-01 ASSESSMENT — PAIN DESCRIPTION - FREQUENCY
FREQUENCY: INTERMITTENT
FREQUENCY: INTERMITTENT

## 2024-07-01 ASSESSMENT — PAIN DESCRIPTION - DIRECTION
RADIATING_TOWARDS: GENERALIZED
RADIATING_TOWARDS: GENERALIZED

## 2024-07-01 ASSESSMENT — PAIN DESCRIPTION - PAIN TYPE
TYPE: CHRONIC PAIN
TYPE: CHRONIC PAIN

## 2024-07-01 ASSESSMENT — PAIN DESCRIPTION - DESCRIPTORS
DESCRIPTORS: ACHING
DESCRIPTORS: ACHING
DESCRIPTORS: ACHING;SORE
DESCRIPTORS: ACHING

## 2024-07-01 ASSESSMENT — PAIN DESCRIPTION - ORIENTATION
ORIENTATION: RIGHT
ORIENTATION: RIGHT;LOWER
ORIENTATION: RIGHT;LEFT;MID;POSTERIOR;ANTERIOR

## 2024-07-01 ASSESSMENT — PAIN DESCRIPTION - ONSET: ONSET: GRADUAL

## 2024-07-01 NOTE — CARE COORDINATION
ONGOING DISCHARGE PLAN:    Patient is alert and oriented x4.    Spoke with patient regarding discharge plan and patient confirms that plan is still home with [facility] current and will follow up at discharge.    F/U appt with Dr Boone on 7/9 @ 2:30 pm. Patient updated.    Pulm consult-IV steroids 40 Q 8, oral zithromax  93% on 3L    Has NIV at home    PT/OT    States not ready to go today. States she had an \"episode\" last night after her steroids were decreased.       Will continue to follow for additional discharge needs.    If patient is discharged prior to next notation, then this note serves as note for discharge by case management.    Electronically signed by Laura Lincoln RN on 7/1/2024 at 10:33 AM

## 2024-07-01 NOTE — PROGRESS NOTES
[facility]   IN-PATIENT SERVICE   [facility]    Progress note            Date:   7/1/2024  Patient name:  Laura Rhodes  Date of admission:  6/28/2024  8:22 PM  MRN:   [id number]  Account:  [id number]  YOB: 1960  PCP:    Van Pate MD  Room:   2092096-  Code Status:    DNR-CCA    Chief Complaint:     Chief Complaint   Patient presents with    Shortness of Breath    Wheezing    Anxiety       History Obtained From:     patient    History of Present Illness:     The patient is a 63 y.o.  Non- / non  female who presents with Shortness of Breath, Wheezing, and Anxiety   and she is admitted to the hospital for the management of      Patient is 63-year-old female with multiple comorbidities including advanced COPD on 2-3 L of oxygen at home,, hypertension, hypothyroidism anxiety shortness of breath worsening wheezing.  Patient was also found to be confused at home as per report,  The ER patient had chest x-ray which was negative, found to have acute respiratory acidosis, pCO2 almost 6-9.8, pH 2.9     Patient seen and examined this morning patient states that she is feeling much better, continues to smoke 5 to 10 cigarettes a day, repeat a as BG showed improvement patient was on 2 L of oxygen through nasal    Past Medical History:     Past Medical History:   Diagnosis Date    Acid reflux     Anxiety     Arthritis     Cancer (HCC)     cervical    Cervical osteoarthritis 11/30/2020    COPD (chronic obstructive pulmonary disease) (HCC) 02/23/2018    DDD (degenerative disc disease), cervical     Depression     Hiatal hernia     History of blood transfusion     Reaction fever went up to 107 . per Pt.     On home O2     Spinal stenosis     Tachycardia     Thyroid disease     hypo        Past Surgical History:     Past Surgical History:   Procedure Laterality Date    BACK SURGERY      diskectomy, L3-L5    COLONOSCOPY N/A 04/08/2019    COLONOSCOPY DIAGNOSTIC  grossly intact  Skin: No gross lesions, rashes, bruising or bleeding on exposed skin area  Extremities:  peripheral pulses palpable, no pedal edema or calf pain with palpation  Psych: normal affect     Investigations:      Laboratory Testing:  Recent Results (from the past 24 hour(s))   Basic Metabolic Panel w/ Reflex to MG    Collection Time: 07/01/24  5:43 AM   Result Value Ref Range    Sodium 140 135 - 144 mmol/L    Potassium 5.0 3.7 - 5.3 mmol/L    Chloride 100 98 - 107 mmol/L    CO2 36 (H) 20 - 31 mmol/L    Anion Gap 4 (L) 9 - 17 mmol/L    Glucose 99 70 - 99 mg/dL    BUN 11 8 - 23 mg/dL    Creatinine 0.4 (L) 0.5 - 0.9 mg/dL    Est, Glom Filt Rate >90 >60 mL/min/1.73m2    Calcium 8.5 (L) 8.6 - 10.4 mg/dL   CBC with Auto Differential    Collection Time: 07/01/24  5:43 AM   Result Value Ref Range    WBC 7.1 3.5 - 11.0 k/uL    RBC 3.27 (L) 4.0 - 5.2 m/uL    Hemoglobin 10.7 (L) 12.0 - 16.0 g/dL    Hematocrit 32.3 (L) 36 - 46 %    MCV 98.9 80 - 100 fL    MCH 32.7 26 - 34 pg    MCHC 33.0 31 - 37 g/dL    RDW 15.0 (H) 11.5 - 14.9 %    Platelets 272 150 - 450 k/uL    MPV 6.7 6.0 - 12.0 fL    Neutrophils % 60 36 - 66 %    Lymphocytes % 31 24 - 44 %    Monocytes % 9 (H) 1 - 7 %    Eosinophils % 0 0 - 4 %    Basophils % 0 0 - 2 %    Neutrophils Absolute 4.30 1.3 - 9.1 k/uL    Lymphocytes Absolute 2.20 1.0 - 4.8 k/uL    Monocytes Absolute 0.70 0.1 - 1.3 k/uL    Eosinophils Absolute 0.00 0.0 - 0.4 k/uL    Basophils Absolute 0.00 0.0 - 0.2 k/uL       Imaging/Diagnostics:    XR CHEST PORTABLE  Narrative: EXAMINATION:  ONE XRAY VIEW OF THE CHEST    6/29/2024 6:26 am    COMPARISON:  06/28/2024, 05/26/2024    HISTORY:  ORDERING SYSTEM PROVIDED HISTORY: bipap, COPD  TECHNOLOGIST PROVIDED HISTORY:  bipap, COPD  Reason for Exam: bipap, COPD    FINDINGS:  The cardiac and mediastinal contours appear unchanged. Hyperinflation and  findings of emphysema again demonstrated.  No new airspace disease identified  in the interval.  No

## 2024-07-01 NOTE — PROGRESS NOTES
[place]   Occupational Therapy Evaluation  Date: 24  Patient Name: Laura Rhodes       Room: 6-  MRN: [id number]  Account: [id number]   : 1960  (63 y.o.) Gender: female     Discharge Recommendations:  The patient's needs are being met with no further Occupational Therapy recommended at discharge.       OT Equipment Recommendations  Equipment Needed: No    Referring Practitioner: Gayla Vega, NATHAN - NP  Diagnosis: COPD exacerbation   Additional Pertinent Hx: Per H&P Note: 63-year-old female with multiple comorbidities including advanced COPD on 2-3 L of oxygen at home,, hypertension, hypothyroidism anxiety shortness of breath worsening wheezing.  Patient was also found to be confused at home as per report,  The ER patient had chest x-ray which was negative, found to have acute respiratory acidosis, pCO2 almost 6-9.8, pH 2.9      Patient seen and examined this morning patient states that she is feeling much better, continues to smoke 5 to 10 cigarettes a day, repeat a as BG showed improvement patient was on 2 L of oxygen through nasal         Past Medical History:  has a past medical history of Acid reflux, Anxiety, Arthritis, Cancer (HCC), Cervical osteoarthritis, COPD (chronic obstructive pulmonary disease) (HCC), DDD (degenerative disc disease), cervical, Depression, Hiatal hernia, History of blood transfusion, On home O2, Spinal stenosis, Tachycardia, and Thyroid disease.    Past Surgical History:   has a past surgical history that includes Upper gastrointestinal endoscopy; Thyroidectomy, partial; back surgery; other surgical history; Dilation and curettage of uterus; Colonoscopy (N/A, 2019); Colonoscopy (2019); Upper gastrointestinal endoscopy (N/A, 2020); Upper gastrointestinal endoscopy (N/A, 2022); Pain management procedure; Femur fracture surgery (Right, 2023); and Upper gastrointestinal endoscopy (N/A,  5/29/2024).    Restrictions  Restrictions/Precautions  Restrictions/Precautions: General Precautions      Vitals  Vitals  Pulse: 82  SpO2: 97 %  O2 Device: Nasal cannula (2 L O2- increased to 3 L with activity per pt request)  Comment: No noted SOB with activity     Subjective  Subjective: Pt is pleasant and agreeable for therapy  Comments: Okay for OT PT eval and treat per RN  Subjective  Pain: Chronic lower back pain 5/10      Social/Functional History  Social/Functional History  Lives With: Spouse  Type of Home: House  Home Layout: Two level, Able to Live on Main level with bedroom/bathroom  Home Access: Stairs to enter without rails  Entrance Stairs - Number of Steps: 2  Bathroom Shower/Tub: Tub/Shower unit, Shower chair with back  Bathroom Toilet: Standard  Bathroom Equipment: Grab bars in shower, Grab bars around toilet, Shower chair  Bathroom Accessibility: Walker accessible  Home Equipment: Oxygen, Walker - Rolling (3L O2 at all times; nebulizer)  Has the patient had two or more falls in the past year or any fall with injury in the past year?: No  ADL Assistance: Independent  Homemaking Assistance: Needs assistance (Hired help for cleaning; Groceries delivered)  Homemaking Responsibilities: Yes (Pt cooks and does laundry (on main floor))  Ambulation Assistance: Independent (with RW)  Transfer Assistance: Independent  Active : No  Patient's  Info:  drives  Occupation: On disability  Type of Occupation: Former  at [employer] Comments: Pt sleeps in an adjustable bed (just head adjusts)  Additional Comments: Spouse works 6am-7pm, varying shifts (2-3 days on/2-3 days off).  Pt reports that she has supportive neighbors that she can call if she needs A while her spouse is at work. Pt reports recent home health OT/PT, pt states she was discharged from OT but continued to receive home health PT      Objective  Orientation  Overall Orientation Status: Within Functional

## 2024-07-01 NOTE — CARE COORDINATION
Patient is a frequent readmission, Hospital follow up appt made on July 9th at 2:30p.m. with Dr Boone office.    Electronically signed by Norma Hernandez RN on 7/1/2024 at 9:54 AM

## 2024-07-01 NOTE — PROGRESS NOTES
Patient c/o of SOB, unable to expel air out. Patient Spo2 100% on baseline 2L NC. Patient using accessory muscles to breath. RN placed patient on bipap. Patient stating she is very anxious and appears to be shaking. Respiratory called for a treatment. RN updated Dr. Ashley and ordered 0.5 IV ativan 1x dose to be given. Order placed.

## 2024-07-01 NOTE — PROGRESS NOTES
Patient refusing bed alarm. RN reviewed fall risk protocol. Patient continued to refuse and signed refusal paperwork. Dr. Ashley notified.

## 2024-07-01 NOTE — PROGRESS NOTES
Physical Therapy  Facility/Department: [facility] PROGRESSIVE CARE  Physical Therapy Initial Assessment    Name: Laura Rhodes  : 1960  MRN: [id number]  Date of Service: 2024    Discharge Recommendations:  Home with assist PRN   PT Equipment Recommendations  Equipment Needed: No      Patient Diagnosis(es): The primary encounter diagnosis was COPD exacerbation (HCC). A diagnosis of Dehydration was also pertinent to this visit.  Past Medical History:  has a past medical history of Acid reflux, Anxiety, Arthritis, Cancer (HCC), Cervical osteoarthritis, COPD (chronic obstructive pulmonary disease) (HCC), DDD (degenerative disc disease), cervical, Depression, Hiatal hernia, History of blood transfusion, On home O2, Spinal stenosis, Tachycardia, and Thyroid disease.  Past Surgical History:  has a past surgical history that includes Upper gastrointestinal endoscopy; Thyroidectomy, partial; back surgery; other surgical history; Dilation and curettage of uterus; Colonoscopy (N/A, 2019); Colonoscopy (2019); Upper gastrointestinal endoscopy (N/A, 2020); Upper gastrointestinal endoscopy (N/A, 2022); Pain management procedure; Femur fracture surgery (Right, 2023); and Upper gastrointestinal endoscopy (N/A, 2024).    Assessment   Assessment: pt demonstrates sfae, functional mobility and is at her prior level of function  Decision Making: Low Complexity  History: COPD  Exam: WFLs  Clinical Presentation: stable  No Skilled PT: No PT goals identified  Requires PT Follow-Up: No  Activity Tolerance  Activity Tolerance: Patient tolerated evaluation without incident  Activity Tolerance Comments: SpO2 96%(2 L) prior to ambulating; 99% during ambulation and 96% after ambulating(3 L)     Plan   Physical Therapy Plan  Additional Comments: Discontinue PT at this time  Safety Devices  Type of Devices: Left in bed, Call light within reach    Balance  Sitting - Static: Good  Sitting - Dynamic: Good  Standing - Static: Good  Standing - Dynamic: Good           Therapy Time   Individual Concurrent Group Co-treatment   Time In 0929         Time Out 0955         Minutes 26         Timed Code Treatment Minutes: 15 Minutes       Andressa Altamirano, PT

## 2024-07-01 NOTE — PROGRESS NOTES
patient just called out complaining of inability to breath. all vitals at the time were WNL. she also complained of a tightness sensation in the chest as if she \"cannot get air out\" of her lungs. patient put on BIpap immediately, coreg and solumedrol given. she feels a lot better.    she is definitely dysplaying anxiety and I noticed she coughs a lot. Not due for her Klonopin and robitussin.    Order received from NP for one time dose of ativan IV 0.5 mg and benzonatate.    Meds given. Patient is feeling much more better.     Will continue to monitor

## 2024-07-01 NOTE — PROGRESS NOTES
[facility] PULMONARY,CRITICAL CARE & SLEEP   Saúl Cordero MD/Hieu Whitfield MD/Joshua EVANS AGACNP-BC, NP-C      Chantel EVANS NP-C    Deshawn EVANS NP-C                                         Pulmonary Progress Note    Patient - Laura Rhodes   Age - 63 y.o.   - 1960  MRN - [id number]  [id number]  Date of Admission - 2024  8:22 PM    Consulting Service/Physician:       Primary Care Physician: Van Pate MD    SUBJECTIVE:     Chief Complaint:   Chief Complaint   Patient presents with    Shortness of Breath    Wheezing    Anxiety     Subjective:    She states she was very short of breath last night, they did need to give her breathing treatments and then place her on BiPAP at 7 PM.  She currently is on her baseline oxygen of 2 L looks very comfortable, she tells me she did just recently have a breathing treatment.  She states she needed to wear the BiPAP for almost 12 hours.  She states she did have difficulty with the BiPAP on initially.  She feels this is due to being weaned from the steroids too quickly.  She was changed from IV every 6 to IV every 8 yesterday.  She does have difficulties bringing up phlegm, she denies any chest pain.  She denies any fevers.    VITALS  BP (!) 156/82   Pulse 67   Temp 98 °F (36.7 °C) (Oral)   Resp 18   Ht 1.6 m (5' 2.99\")   Wt 44.2 kg (97 lb 7.1 oz)   SpO2 94%   BMI 17.27 kg/m²   Wt Readings from Last 3 Encounters:   24 44.2 kg (97 lb 7.1 oz)   24 46.7 kg (102 lb 15.3 oz)   24 45.8 kg (101 lb)     I/O (24 Hours)  No intake or output data in the 24 hours ending 24 1155  Ventilator:   Settings  FiO2 : 35 %  Insp Rise Time (%): 2 %  Exam:   Physical Exam   Constitutional:  Oriented to person, place, and time. Appears well-developed and well-nourished.  Cachectic thin female, not in any distress on 2 L  HENT: Unremarkable, nasal cannula in place  Head: Normocephalic

## 2024-07-01 NOTE — PLAN OF CARE
Problem: Discharge Planning  Goal: Discharge to home or other facility with appropriate resources  7/1/2024 0454 by Pepper Mars, RN  Outcome: Progressing  Flowsheets (Taken 6/30/2024 1921)  Discharge to home or other facility with appropriate resources:   Identify barriers to discharge with patient and caregiver   Arrange for needed discharge resources and transportation as appropriate     Problem: Safety - Adult  Goal: Free from fall injury  7/1/2024 0454 by Pepper Mars, RN  Outcome: Progressing     Problem: Pain  Goal: Verbalizes/displays adequate comfort level or baseline comfort level  7/1/2024 0454 by Pepper Mars, RN  Outcome: Progressing     Problem: Nutrition Deficit:  Goal: Optimize nutritional status  7/1/2024 0454 by Pepper Mars RN  Outcome: Progressing     Problem: Skin/Tissue Integrity  Goal: Absence of new skin breakdown  Description: 1.  Monitor for areas of redness and/or skin breakdown  2.  Assess vascular access sites hourly  3.  Every 4-6 hours minimum:  Change oxygen saturation probe site  4.  Every 4-6 hours:  If on nasal continuous positive airway pressure, respiratory therapy assess nares and determine need for appliance change or resting period.  7/1/2024 0454 by Pepper Mars, RN  Outcome: Progressing

## 2024-07-01 NOTE — PLAN OF CARE
Problem: Discharge Planning  Goal: Discharge to home or other facility with appropriate resources  7/1/2024 1545 by Isabell Cross RN  Outcome: Progressing     Problem: Safety - Adult  Goal: Free from fall injury  7/1/2024 1545 by Isabell Cross RN  Outcome: Progressing     Problem: Pain  Goal: Verbalizes/displays adequate comfort level or baseline comfort level  7/1/2024 1545 by Isabell Cross RN  Outcome: Progressing     Problem: Nutrition Deficit:  Goal: Optimize nutritional status  7/1/2024 1545 by Isabell Cross RN  Outcome: Progressing     Problem: Skin/Tissue Integrity  Goal: Absence of new skin breakdown  Description: 1.  Monitor for areas of redness and/or skin breakdown  2.  Assess vascular access sites hourly  3.  Every 4-6 hours minimum:  Change oxygen saturation probe site  4.  Every 4-6 hours:  If on nasal continuous positive airway pressure, respiratory therapy assess nares and determine need for appliance change or resting period.  7/1/2024 1545 by Isabell Cross RN  Outcome: Progressing

## 2024-07-01 NOTE — PROGRESS NOTES
Pt is known to writer from previous admissions; pt was on bipap and grabbed writer's hand when writer came into room.  Writer indicated to pt not to try to talk but that writer wanted to pray. Pt nodded yes and expressed thanks after. Chaplains will continue to follow pt.    07/01/24 1520   Encounter Summary   Encounter Overview/Reason Spiritual/Emotional Needs   Service Provided For Patient   Referral/Consult From Rounding   Support System Spouse;Children   Last Encounter  07/01/24   Complexity of Encounter High   Spiritual/Emotional needs   Type Spiritual Support   Assessment/Intervention/Outcome   Assessment Anxious;Compromised coping   Intervention Active listening;Prayer (assurance of)/[place];Sustaining Presence/Ministry of presence   Outcome Expressed Gratitude;Receptive

## 2024-07-02 LAB
BASOPHILS # BLD: 0 K/UL (ref 0–0.2)
BASOPHILS NFR BLD: 0 % (ref 0–2)
EOSINOPHIL # BLD: 0 K/UL (ref 0–0.4)
EOSINOPHILS RELATIVE PERCENT: 0 % (ref 0–4)
ERYTHROCYTE [DISTWIDTH] IN BLOOD BY AUTOMATED COUNT: 15.1 % (ref 11.5–14.9)
HCT VFR BLD AUTO: 33.3 % (ref 36–46)
HGB BLD-MCNC: 11.1 G/DL (ref 12–16)
LYMPHOCYTES NFR BLD: 1.9 K/UL (ref 1–4.8)
LYMPHOCYTES RELATIVE PERCENT: 28 % (ref 24–44)
MCH RBC QN AUTO: 32.8 PG (ref 26–34)
MCHC RBC AUTO-ENTMCNC: 33.3 G/DL (ref 31–37)
MCV RBC AUTO: 98.7 FL (ref 80–100)
MONOCYTES NFR BLD: 0.6 K/UL (ref 0.1–1.3)
MONOCYTES NFR BLD: 9 % (ref 1–7)
NEUTROPHILS NFR BLD: 63 % (ref 36–66)
NEUTS SEG NFR BLD: 4.2 K/UL (ref 1.3–9.1)
PLATELET # BLD AUTO: 272 K/UL (ref 150–450)
PMV BLD AUTO: 6.9 FL (ref 6–12)
RBC # BLD AUTO: 3.37 M/UL (ref 4–5.2)
WBC OTHER # BLD: 6.6 K/UL (ref 3.5–11)

## 2024-07-02 PROCEDURE — 2700000000 HC OXYGEN THERAPY PER DAY

## 2024-07-02 PROCEDURE — 2060000000 HC ICU INTERMEDIATE R&B

## 2024-07-02 PROCEDURE — 99232 SBSQ HOSP IP/OBS MODERATE 35: CPT | Performed by: INTERNAL MEDICINE

## 2024-07-02 PROCEDURE — 94761 N-INVAS EAR/PLS OXIMETRY MLT: CPT

## 2024-07-02 PROCEDURE — 6370000000 HC RX 637 (ALT 250 FOR IP): Performed by: INTERNAL MEDICINE

## 2024-07-02 PROCEDURE — 6370000000 HC RX 637 (ALT 250 FOR IP)

## 2024-07-02 PROCEDURE — 6360000002 HC RX W HCPCS: Performed by: NURSE PRACTITIONER

## 2024-07-02 PROCEDURE — 2580000003 HC RX 258: Performed by: NURSE PRACTITIONER

## 2024-07-02 PROCEDURE — 2580000003 HC RX 258

## 2024-07-02 PROCEDURE — 94664 DEMO&/EVAL PT USE INHALER: CPT

## 2024-07-02 PROCEDURE — 6360000002 HC RX W HCPCS

## 2024-07-02 PROCEDURE — 36415 COLL VENOUS BLD VENIPUNCTURE: CPT

## 2024-07-02 PROCEDURE — 94640 AIRWAY INHALATION TREATMENT: CPT

## 2024-07-02 PROCEDURE — 94660 CPAP INITIATION&MGMT: CPT

## 2024-07-02 PROCEDURE — 6360000002 HC RX W HCPCS: Performed by: INTERNAL MEDICINE

## 2024-07-02 PROCEDURE — 85025 COMPLETE CBC W/AUTO DIFF WBC: CPT

## 2024-07-02 RX ORDER — CLONAZEPAM 0.5 MG/1
0.5 TABLET ORAL 3 TIMES DAILY PRN
Status: DISCONTINUED | OUTPATIENT
Start: 2024-07-02 | End: 2024-07-05 | Stop reason: HOSPADM

## 2024-07-02 RX ORDER — LORAZEPAM 0.5 MG/1
0.5 TABLET ORAL EVERY 8 HOURS PRN
Status: DISCONTINUED | OUTPATIENT
Start: 2024-07-02 | End: 2024-07-02

## 2024-07-02 RX ADMIN — DICYCLOMINE HYDROCHLORIDE 10 MG: 10 CAPSULE ORAL at 21:38

## 2024-07-02 RX ADMIN — TIZANIDINE 4 MG: 4 TABLET ORAL at 22:05

## 2024-07-02 RX ADMIN — WATER 40 MG: 1 INJECTION INTRAMUSCULAR; INTRAVENOUS; SUBCUTANEOUS at 12:58

## 2024-07-02 RX ADMIN — ALBUTEROL SULFATE 2.5 MG: 2.5 SOLUTION RESPIRATORY (INHALATION) at 06:57

## 2024-07-02 RX ADMIN — MIRTAZAPINE 30 MG: 30 TABLET, FILM COATED ORAL at 21:38

## 2024-07-02 RX ADMIN — WATER 40 MG: 1 INJECTION INTRAMUSCULAR; INTRAVENOUS; SUBCUTANEOUS at 21:37

## 2024-07-02 RX ADMIN — SODIUM CHLORIDE, PRESERVATIVE FREE 10 ML: 5 INJECTION INTRAVENOUS at 21:38

## 2024-07-02 RX ADMIN — ALBUTEROL SULFATE 2.5 MG: 2.5 SOLUTION RESPIRATORY (INHALATION) at 10:49

## 2024-07-02 RX ADMIN — OXYCODONE AND ACETAMINOPHEN 1 TABLET: 5; 325 TABLET ORAL at 17:08

## 2024-07-02 RX ADMIN — SODIUM CHLORIDE, PRESERVATIVE FREE 10 ML: 5 INJECTION INTRAVENOUS at 09:22

## 2024-07-02 RX ADMIN — DICYCLOMINE HYDROCHLORIDE 10 MG: 10 CAPSULE ORAL at 17:08

## 2024-07-02 RX ADMIN — DICYCLOMINE HYDROCHLORIDE 10 MG: 10 CAPSULE ORAL at 06:53

## 2024-07-02 RX ADMIN — CARVEDILOL 25 MG: 25 TABLET, FILM COATED ORAL at 09:20

## 2024-07-02 RX ADMIN — TIOTROPIUM BROMIDE AND OLODATEROL 2 PUFF: 3.124; 2.736 SPRAY, METERED RESPIRATORY (INHALATION) at 06:57

## 2024-07-02 RX ADMIN — TIZANIDINE 4 MG: 4 TABLET ORAL at 12:58

## 2024-07-02 RX ADMIN — DEXTROMETHORPHAN HYDROBROMIDE, GUAIFENESIN 10 ML: 10; 100 LIQUID ORAL at 09:30

## 2024-07-02 RX ADMIN — ALBUTEROL SULFATE 2.5 MG: 2.5 SOLUTION RESPIRATORY (INHALATION) at 19:39

## 2024-07-02 RX ADMIN — PRIMIDONE 100 MG: 50 TABLET ORAL at 21:38

## 2024-07-02 RX ADMIN — ENOXAPARIN SODIUM 30 MG: 100 INJECTION SUBCUTANEOUS at 09:19

## 2024-07-02 RX ADMIN — CARVEDILOL 25 MG: 25 TABLET, FILM COATED ORAL at 21:38

## 2024-07-02 RX ADMIN — OXYCODONE AND ACETAMINOPHEN 1 TABLET: 5; 325 TABLET ORAL at 02:05

## 2024-07-02 RX ADMIN — SUCRALFATE 1 G: 1 TABLET ORAL at 06:53

## 2024-07-02 RX ADMIN — CLONAZEPAM 0.5 MG: 0.5 TABLET ORAL at 06:53

## 2024-07-02 RX ADMIN — ALBUTEROL SULFATE 2.5 MG: 2.5 SOLUTION RESPIRATORY (INHALATION) at 23:02

## 2024-07-02 RX ADMIN — MONTELUKAST 10 MG: 10 TABLET, FILM COATED ORAL at 09:20

## 2024-07-02 RX ADMIN — OXYCODONE HYDROCHLORIDE 2.5 MG: 5 TABLET ORAL at 17:09

## 2024-07-02 RX ADMIN — SUCRALFATE 1 G: 1 TABLET ORAL at 17:08

## 2024-07-02 RX ADMIN — OXYCODONE AND ACETAMINOPHEN 1 TABLET: 5; 325 TABLET ORAL at 09:20

## 2024-07-02 RX ADMIN — LEVOTHYROXINE SODIUM 75 MCG: 0.07 TABLET ORAL at 06:52

## 2024-07-02 RX ADMIN — DEXTROMETHORPHAN HYDROBROMIDE, GUAIFENESIN 10 ML: 10; 100 LIQUID ORAL at 22:05

## 2024-07-02 RX ADMIN — FLUTICASONE PROPIONATE 1 SPRAY: 50 SPRAY, METERED NASAL at 09:22

## 2024-07-02 RX ADMIN — CLONAZEPAM 0.5 MG: 0.5 TABLET ORAL at 14:52

## 2024-07-02 RX ADMIN — SUCRALFATE 1 G: 1 TABLET ORAL at 11:31

## 2024-07-02 RX ADMIN — VENLAFAXINE HYDROCHLORIDE 150 MG: 150 CAPSULE, EXTENDED RELEASE ORAL at 09:20

## 2024-07-02 RX ADMIN — PANTOPRAZOLE SODIUM 40 MG: 40 TABLET, DELAYED RELEASE ORAL at 06:53

## 2024-07-02 RX ADMIN — WATER 40 MG: 1 INJECTION INTRAMUSCULAR; INTRAVENOUS; SUBCUTANEOUS at 06:53

## 2024-07-02 RX ADMIN — DICYCLOMINE HYDROCHLORIDE 10 MG: 10 CAPSULE ORAL at 09:20

## 2024-07-02 RX ADMIN — ALBUTEROL SULFATE 2.5 MG: 2.5 SOLUTION RESPIRATORY (INHALATION) at 15:04

## 2024-07-02 RX ADMIN — OXYCODONE HYDROCHLORIDE 2.5 MG: 5 TABLET ORAL at 09:20

## 2024-07-02 ASSESSMENT — PAIN DESCRIPTION - LOCATION
LOCATION: BACK;HIP
LOCATION: BACK
LOCATION: BACK;CHEST
LOCATION: BACK

## 2024-07-02 ASSESSMENT — PAIN SCALES - GENERAL
PAINLEVEL_OUTOF10: 0
PAINLEVEL_OUTOF10: 4
PAINLEVEL_OUTOF10: 6
PAINLEVEL_OUTOF10: 7
PAINLEVEL_OUTOF10: 5
PAINLEVEL_OUTOF10: 3
PAINLEVEL_OUTOF10: 8
PAINLEVEL_OUTOF10: 6
PAINLEVEL_OUTOF10: 9

## 2024-07-02 ASSESSMENT — PAIN DESCRIPTION - DESCRIPTORS
DESCRIPTORS: ACHING;SORE
DESCRIPTORS: ACHING;DISCOMFORT

## 2024-07-02 ASSESSMENT — PAIN DESCRIPTION - ORIENTATION
ORIENTATION: RIGHT
ORIENTATION: RIGHT;LEFT;MID;POSTERIOR
ORIENTATION: RIGHT

## 2024-07-02 ASSESSMENT — PAIN DESCRIPTION - PAIN TYPE: TYPE: CHRONIC PAIN

## 2024-07-02 NOTE — PLAN OF CARE
Problem: Safety - Adult  Goal: Free from fall injury  7/2/2024 0034 by Kelli Marquez, RN  Outcome: Progressing   No falls/injuries this shift, bed in lowest position, brakes on, bed alarm on, call light in reach, side rails up x2  Problem: Pain  Goal: Verbalizes/displays adequate comfort level or baseline comfort level  7/2/2024 0034 by Kelli Marquez, RN  Outcome: Progressing   No new signs/symptoms of pain noted, pain rating < 3 on scale 0-10, pain controlled with medication/repositioning  Problem: Skin/Tissue Integrity  Goal: Absence of new skin breakdown  Description: 1.  Monitor for areas of redness and/or skin breakdown  2.  Assess vascular access sites hourly  3.  Every 4-6 hours minimum:  Change oxygen saturation probe site  4.  Every 4-6 hours:  If on nasal continuous positive airway pressure, respiratory therapy assess nares and determine need for appliance change or resting period.  7/2/2024 0034 by Kelli Marquez, RN  Outcome: Progressing   No new skin breakdown noted, no new signs/symptoms of infection, continue to monitor labwork including WBC, medications administered per physician orders

## 2024-07-02 NOTE — CARE COORDINATION
ONGOING DISCHARGE PLAN:    Patient is alert and oriented x4.    Spoke with patient regarding discharge plan and patient confirms that plan is still home with [facility] current and will follow up at discharge.    F/U appt with Dr Boone on 7/9 @ 2:30 pm. Patient updated.    Pulm consult-IV steroids 40 Q 8, oral zithromax  99% on 2.5L    Has NIV at home    PT/OT    States not ready to go today. States she had an 2 \"episodes\" . She states panic after coughing.       Will continue to follow for additional discharge needs.    If patient is discharged prior to next notation, then this note serves as note for discharge by case management.    Electronically signed by Laura Lincoln RN on 7/2/2024 at 9:13 AM

## 2024-07-02 NOTE — PROGRESS NOTES
[facility] PULMONARY,CRITICAL CARE & SLEEP   Saúl Cordero MD/Hieu Whitfield MD/Joshua EVANS AGACNP-BC, NP-C      Chantel EVANS NP-C    Deshawn EVANS NP-C                                         Pulmonary Progress Note    Patient - Laura Rhodes   Age - 63 y.o.   - 1960  [facility] - [id number]  [id number]  Date of Admission - 2024  8:22 PM    Consulting Service/Physician:       Primary Care Physician: Van Pate MD    SUBJECTIVE:     Chief Complaint:   Chief Complaint   Patient presents with    Shortness of Breath    Wheezing    Anxiety     Subjective:    Patient resting in bed, on 2 and half liters nasal cannula, sats 99%  Patient mentions that she drops her oxygen saturations to the low 80s with exertion when going to the bathroom, but still recoverable with rest    She is concerned about her wheezing given that she had some episodes yesterday and required administration of Ativan and needing to be placed on BiPAP; discussed with patient that on my exam today she has very minimal wheezing and sometimes anxiety mixed with COPD can make dyspnea much worse  She is concerned about me reducing her steroid dose and actually wants me to increase the dose due to her episodes yesterday    I discussed the patient I will leave her dose as is today given that I can still hear some wheezing    She continues to complain of shortness of breath and wheezing      VITALS  BP (!) 143/73   Pulse 55   Temp 98.6 °F (37 °C) (Oral)   Resp 20   Ht 1.6 m (5' 2.99\")   Wt 44.2 kg (97 lb 7.1 oz)   SpO2 99%   BMI 17.27 kg/m²   Wt Readings from Last 3 Encounters:   24 44.2 kg (97 lb 7.1 oz)   24 46.7 kg (102 lb 15.3 oz)   24 45.8 kg (101 lb)     I/O (24 Hours)    Intake/Output Summary (Last 24 hours) at 2024 1055  Last data filed at 2024  Gross per 24 hour   Intake 10 ml   Output --   Net 10 ml     Ventilator:  tablet, 2 tablet, Oral, Daily PRN, Gayla Vega APRN - NP    sucralfate (CARAFATE) tablet 1 g, 1 g, Oral, 3 times per day, Gayla Vega APRN - NP, 1 g at 07/02/24 0653    tiotropium-olodaterol (STIOLTO) 2.5-2.5 MCG/ACT inhaler 2 puff, 2 puff, Inhalation, Daily, Gayla Vega APRN - NP, 2 puff at 07/02/24 0657    tiZANidine (ZANAFLEX) tablet 4 mg, 4 mg, Oral, TID PRN, Gayla Vega APRN - NP, 4 mg at 07/01/24 2045    venlafaxine (EFFEXOR XR) extended release capsule 150 mg, 150 mg, Oral, Daily, Gayla Vega APRN - NP, 150 mg at 07/02/24 0920    sodium chloride flush 0.9 % injection 5-40 mL, 5-40 mL, IntraVENous, 2 times per day, Gayla Vega APRN - NP, 10 mL at 07/02/24 0922    sodium chloride flush 0.9 % injection 5-40 mL, 5-40 mL, IntraVENous, PRN, Gayla Vega APRN - NP    0.9 % sodium chloride infusion, , IntraVENous, PRN, Gayla Vega, APRN - NP    ondansetron (ZOFRAN-ODT) disintegrating tablet 4 mg, 4 mg, Oral, Q8H PRN **OR** ondansetron (ZOFRAN) injection 4 mg, 4 mg, IntraVENous, Q6H PRN, Gayla Vega, APRN - NP    polyethylene glycol (GLYCOLAX) packet 17 g, 17 g, Oral, Daily PRN, Galya Vega APRN - NP    enoxaparin Sodium (LOVENOX) injection 30 mg, 30 mg, SubCUTAneous, Daily, Gayla Vega APRN - NP, 30 mg at 07/02/24 0919    acetaminophen (TYLENOL) tablet 650 mg, 650 mg, Oral, Q6H PRN **OR** acetaminophen (TYLENOL) suppository 650 mg, 650 mg, Rectal, Q6H PRN, Gayla Vega L, APRN - NP    Lab Results:     Lab Results   Component Value Date    WBC 6.6 07/02/2024    HGB 11.1 (L) 07/02/2024    HCT 33.3 (L) 07/02/2024    MCV 98.7 07/02/2024     07/02/2024     Lab Results   Component Value Date    CALCIUM 8.5 (L) 07/01/2024     07/01/2024    K 5.0 07/01/2024    CO2 36 (H) 07/01/2024     07/01/2024    BUN 11 07/01/2024    CREATININE 0.4 (L) 07/01/2024     No components found for: \"ABGSAMPLETYP\",

## 2024-07-02 NOTE — PROGRESS NOTES
Bipap on standby at this time.  Pulse Ox-99% 2.5lpm-  Pt wore last night.    Skin score--   0   Nasal gel pad available at bedside.  Pt awake/alert/no distress noted.       BRONCHOSPASM/BRONCHOCONSTRICTION     [x]         IMPROVE AERATION/BREATH SOUNDS  [x]   ADMINISTER BRONCHODILATOR THERAPY AS APPROPRIATE  [x]   ASSESS BREATH SOUNDS  []   IMPLEMENT AEROSOL/MDI PROTOCOL  [x]   PATIENT EDUCATION AS NEEDED

## 2024-07-02 NOTE — PROGRESS NOTES
muscle tone and bulk, no abnormal sensation, normal speech, cranial nerves II through XII grossly intact  Skin: No gross lesions, rashes, bruising or bleeding on exposed skin area  Extremities:  peripheral pulses palpable, no pedal edema or calf pain with palpation  Psych: normal affect     Investigations:      Laboratory Testing:  Recent Results (from the past 24 hour(s))   CBC with Auto Differential    Collection Time: 07/02/24  5:15 AM   Result Value Ref Range    WBC 6.6 3.5 - 11.0 k/uL    RBC 3.37 (L) 4.0 - 5.2 m/uL    Hemoglobin 11.1 (L) 12.0 - 16.0 g/dL    Hematocrit 33.3 (L) 36 - 46 %    MCV 98.7 80 - 100 fL    MCH 32.8 26 - 34 pg    MCHC 33.3 31 - 37 g/dL    RDW 15.1 (H) 11.5 - 14.9 %    Platelets 272 150 - 450 k/uL    MPV 6.9 6.0 - 12.0 fL    Neutrophils % 63 36 - 66 %    Lymphocytes % 28 24 - 44 %    Monocytes % 9 (H) 1 - 7 %    Eosinophils % 0 0 - 4 %    Basophils % 0 0 - 2 %    Neutrophils Absolute 4.20 1.3 - 9.1 k/uL    Lymphocytes Absolute 1.90 1.0 - 4.8 k/uL    Monocytes Absolute 0.60 0.1 - 1.3 k/uL    Eosinophils Absolute 0.00 0.0 - 0.4 k/uL    Basophils Absolute 0.00 0.0 - 0.2 k/uL       Imaging/Diagnostics:    XR CHEST PORTABLE  Narrative: EXAMINATION:  ONE XRAY VIEW OF THE CHEST    6/29/2024 6:26 am    COMPARISON:  06/28/2024, 05/26/2024    HISTORY:  ORDERING SYSTEM PROVIDED HISTORY: bipap, COPD  TECHNOLOGIST PROVIDED HISTORY:  bipap, COPD  Reason for Exam: bipap, COPD    FINDINGS:  The cardiac and mediastinal contours appear unchanged. Hyperinflation and  findings of emphysema again demonstrated.  No new airspace disease identified  in the interval.  No evidence for pneumothorax.  Impression: No significant interval change.       Assessment :      Primary Problem  COPD exacerbation (HCC)    Active Hospital Problems    Diagnosis Date Noted    COPD exacerbation (HCC) [J44.1] 12/02/2023       Plan:     Patient status Admit as inpatient in the  Medical ICU    Acute on chronic hypoxic hypercarbic

## 2024-07-03 PROCEDURE — 6370000000 HC RX 637 (ALT 250 FOR IP): Performed by: INTERNAL MEDICINE

## 2024-07-03 PROCEDURE — 6370000000 HC RX 637 (ALT 250 FOR IP)

## 2024-07-03 PROCEDURE — 6360000002 HC RX W HCPCS

## 2024-07-03 PROCEDURE — 6360000002 HC RX W HCPCS: Performed by: NURSE PRACTITIONER

## 2024-07-03 PROCEDURE — 94761 N-INVAS EAR/PLS OXIMETRY MLT: CPT

## 2024-07-03 PROCEDURE — 2060000000 HC ICU INTERMEDIATE R&B

## 2024-07-03 PROCEDURE — 94640 AIRWAY INHALATION TREATMENT: CPT

## 2024-07-03 PROCEDURE — 94667 MNPJ CHEST WALL 1ST: CPT

## 2024-07-03 PROCEDURE — 99232 SBSQ HOSP IP/OBS MODERATE 35: CPT | Performed by: INTERNAL MEDICINE

## 2024-07-03 PROCEDURE — 2580000003 HC RX 258: Performed by: NURSE PRACTITIONER

## 2024-07-03 PROCEDURE — 2700000000 HC OXYGEN THERAPY PER DAY

## 2024-07-03 PROCEDURE — 94660 CPAP INITIATION&MGMT: CPT

## 2024-07-03 PROCEDURE — 99223 1ST HOSP IP/OBS HIGH 75: CPT | Performed by: NURSE PRACTITIONER

## 2024-07-03 PROCEDURE — 2580000003 HC RX 258

## 2024-07-03 PROCEDURE — 6360000002 HC RX W HCPCS: Performed by: INTERNAL MEDICINE

## 2024-07-03 RX ADMIN — AZITHROMYCIN DIHYDRATE 250 MG: 250 TABLET ORAL at 20:28

## 2024-07-03 RX ADMIN — VENLAFAXINE HYDROCHLORIDE 150 MG: 150 CAPSULE, EXTENDED RELEASE ORAL at 09:24

## 2024-07-03 RX ADMIN — WATER 40 MG: 1 INJECTION INTRAMUSCULAR; INTRAVENOUS; SUBCUTANEOUS at 20:28

## 2024-07-03 RX ADMIN — CARVEDILOL 25 MG: 25 TABLET, FILM COATED ORAL at 09:25

## 2024-07-03 RX ADMIN — OXYCODONE HYDROCHLORIDE 2.5 MG: 5 TABLET ORAL at 09:33

## 2024-07-03 RX ADMIN — ALBUTEROL SULFATE 2.5 MG: 2.5 SOLUTION RESPIRATORY (INHALATION) at 15:00

## 2024-07-03 RX ADMIN — LEVOTHYROXINE SODIUM 75 MCG: 0.07 TABLET ORAL at 05:06

## 2024-07-03 RX ADMIN — CARVEDILOL 25 MG: 25 TABLET, FILM COATED ORAL at 20:28

## 2024-07-03 RX ADMIN — CLONAZEPAM 0.5 MG: 0.5 TABLET ORAL at 03:19

## 2024-07-03 RX ADMIN — MONTELUKAST 10 MG: 10 TABLET, FILM COATED ORAL at 09:24

## 2024-07-03 RX ADMIN — MIRTAZAPINE 30 MG: 30 TABLET, FILM COATED ORAL at 20:28

## 2024-07-03 RX ADMIN — DEXTROMETHORPHAN HYDROBROMIDE, GUAIFENESIN 10 ML: 10; 100 LIQUID ORAL at 09:23

## 2024-07-03 RX ADMIN — ALBUTEROL SULFATE 2.5 MG: 2.5 SOLUTION RESPIRATORY (INHALATION) at 11:14

## 2024-07-03 RX ADMIN — ALBUTEROL SULFATE 2.5 MG: 2.5 SOLUTION RESPIRATORY (INHALATION) at 20:14

## 2024-07-03 RX ADMIN — SODIUM CHLORIDE, PRESERVATIVE FREE 10 ML: 5 INJECTION INTRAVENOUS at 20:29

## 2024-07-03 RX ADMIN — OXYCODONE AND ACETAMINOPHEN 1 TABLET: 5; 325 TABLET ORAL at 17:37

## 2024-07-03 RX ADMIN — FLUTICASONE PROPIONATE 1 SPRAY: 50 SPRAY, METERED NASAL at 09:26

## 2024-07-03 RX ADMIN — TIOTROPIUM BROMIDE AND OLODATEROL 2 PUFF: 3.124; 2.736 SPRAY, METERED RESPIRATORY (INHALATION) at 07:12

## 2024-07-03 RX ADMIN — SUCRALFATE 1 G: 1 TABLET ORAL at 11:55

## 2024-07-03 RX ADMIN — PANTOPRAZOLE SODIUM 40 MG: 40 TABLET, DELAYED RELEASE ORAL at 05:06

## 2024-07-03 RX ADMIN — ENOXAPARIN SODIUM 30 MG: 100 INJECTION SUBCUTANEOUS at 09:24

## 2024-07-03 RX ADMIN — PRIMIDONE 100 MG: 50 TABLET ORAL at 20:28

## 2024-07-03 RX ADMIN — TIZANIDINE 4 MG: 4 TABLET ORAL at 20:29

## 2024-07-03 RX ADMIN — DICYCLOMINE HYDROCHLORIDE 10 MG: 10 CAPSULE ORAL at 05:06

## 2024-07-03 RX ADMIN — ALBUTEROL SULFATE 2.5 MG: 2.5 SOLUTION RESPIRATORY (INHALATION) at 07:09

## 2024-07-03 RX ADMIN — CLONAZEPAM 0.5 MG: 0.5 TABLET ORAL at 11:57

## 2024-07-03 RX ADMIN — SODIUM CHLORIDE, PRESERVATIVE FREE 10 ML: 5 INJECTION INTRAVENOUS at 09:26

## 2024-07-03 RX ADMIN — WATER 40 MG: 1 INJECTION INTRAMUSCULAR; INTRAVENOUS; SUBCUTANEOUS at 09:24

## 2024-07-03 RX ADMIN — DICYCLOMINE HYDROCHLORIDE 10 MG: 10 CAPSULE ORAL at 20:29

## 2024-07-03 RX ADMIN — SUCRALFATE 1 G: 1 TABLET ORAL at 07:10

## 2024-07-03 RX ADMIN — SUCRALFATE 1 G: 1 TABLET ORAL at 16:56

## 2024-07-03 RX ADMIN — DEXTROMETHORPHAN HYDROBROMIDE, GUAIFENESIN 10 ML: 10; 100 LIQUID ORAL at 20:35

## 2024-07-03 RX ADMIN — OXYCODONE AND ACETAMINOPHEN 1 TABLET: 5; 325 TABLET ORAL at 09:23

## 2024-07-03 RX ADMIN — OXYCODONE HYDROCHLORIDE 2.5 MG: 5 TABLET ORAL at 17:37

## 2024-07-03 RX ADMIN — CLONAZEPAM 0.5 MG: 0.5 TABLET ORAL at 22:00

## 2024-07-03 ASSESSMENT — PAIN SCALES - GENERAL
PAINLEVEL_OUTOF10: 8
PAINLEVEL_OUTOF10: 0
PAINLEVEL_OUTOF10: 5
PAINLEVEL_OUTOF10: 9
PAINLEVEL_OUTOF10: 5

## 2024-07-03 ASSESSMENT — PAIN - FUNCTIONAL ASSESSMENT: PAIN_FUNCTIONAL_ASSESSMENT: ACTIVITIES ARE NOT PREVENTED

## 2024-07-03 ASSESSMENT — PAIN DESCRIPTION - LOCATION
LOCATION: BACK

## 2024-07-03 ASSESSMENT — PAIN DESCRIPTION - DESCRIPTORS: DESCRIPTORS: DISCOMFORT;SORE

## 2024-07-03 ASSESSMENT — PAIN DESCRIPTION - PAIN TYPE: TYPE: CHRONIC PAIN

## 2024-07-03 ASSESSMENT — PAIN DESCRIPTION - ORIENTATION: ORIENTATION: RIGHT

## 2024-07-03 NOTE — CARE COORDINATION
[facility] Encounter Date/Time: 2024    Hospital Account: [id number]    MRN: [id number]    Patient: Laura Rhodes    Contact Serial #: [id number]      ENCOUNTER          Patient Class: I Private Enc?  No Unit RM BD: GEMA PROG    Hospital Service: MED   Encounter DX: Dehydration [E86.0]   ADM Provider: Aren Ashley MD   Procedure:     ATT Provider: Aren Ashley MD   REF Provider:        Admission DX: Dehydration, COPD exacerbation (HCC) and DX codes: E86.0, J44.1      PATIENT                 Name: Laura Rhodes : 1960 (63 yrs)   Address: [address] [place] [address] Sex: Female   City: [facility] [address]         Marital Status:    Employer: DISABLED         Sikhism: Pentecostalism   Primary Care Provider: Van Pate MD         Primary Phone: [phone]   EMERGENCY CONTACT   Contact Name Legal Guardian? Relationship to Patient Home Phone Work Phone   1. Dionicio Rhodes  2. *No Contact Specified* No    Spouse    [phone]            GUARANTOR            Guarantor: Laura Rhodes     : 1960   Address: [address] [place] [address] Sex: [place] [address]     Relation to Patient: Self       Home Phone: [phone]   Guarantor ID: [id number]       Work Phone:     Guarantor Employer: DISABLED         Status: DISABLED      COVERAGE        PRIMARY INSURANCE   Payor: [employer] Plan: [employer] [facility] [id number]   Payor Address: [facility] [id number] [place] [id number]       Group Number: [id number] Insurance Type: INDEMNITY   Subscriber Name: WILLPATI Subscriber : 10/01/1958   Subscriber ID: [id number] Pat. Rel. to Sub: Spouse   SECONDARY INSURANCE   Payor: MEDICARE Plan: MEDICARE PART A AND B   Payor Address:   [address] , [place] [address]          Group Number:   Insurance Type: INDEMNITY   Subscriber Name: LAURA RHODES Subscriber : 1960   Subscriber ID: [id number] Pat. Rel. to Sub: SELF        CSN: [id number]

## 2024-07-03 NOTE — PLAN OF CARE
Problem: Discharge Planning  Goal: Discharge to home or other facility with appropriate resources  7/3/2024 0352 by Isabel Estrada RN  Outcome: Progressing  7/2/2024 1606 by Odilia Bustillo RN  Outcome: Progressing     Problem: Safety - Adult  Goal: Free from fall injury  7/3/2024 0352 by Isabel Estrada RN  Outcome: Progressing  7/2/2024 1606 by Odilia Bustillo RN  Outcome: Progressing     Problem: Pain  Goal: Verbalizes/displays adequate comfort level or baseline comfort level  7/3/2024 0352 by Isabel Estrada RN  Outcome: Progressing  7/2/2024 1606 by Odilia Bustillo RN  Outcome: Progressing     Problem: Nutrition Deficit:  Goal: Optimize nutritional status  7/3/2024 0352 by Isabel Estrada RN  Outcome: Progressing  7/2/2024 1606 by Odilia Bustillo RN  Outcome: Progressing     Problem: Skin/Tissue Integrity  Goal: Absence of new skin breakdown  Description: 1.  Monitor for areas of redness and/or skin breakdown  2.  Assess vascular access sites hourly  3.  Every 4-6 hours minimum:  Change oxygen saturation probe site  4.  Every 4-6 hours:  If on nasal continuous positive airway pressure, respiratory therapy assess nares and determine need for appliance change or resting period.  7/3/2024 0352 by Isabel Estrada RN  Outcome: Progressing  7/2/2024 1606 by Odilia Bustillo RN  Outcome: Progressing

## 2024-07-03 NOTE — CARE COORDINATION
ONGOING DISCHARGE PLAN:    Patient is alert and oriented x4.    Spoke with patient regarding discharge plan and patient confirms that plan is still home with [facility] current and will follow up at discharge.      Pulm consult-IV steroids 40 Q 12  oral zithromax  97% on 2L    Has NIV at home    PT/OT    Palliative Care    Plan for smart vest at discharge.    Will continue to follow for additional discharge needs.    If patient is discharged prior to next notation, then this note serves as note for discharge by case management.    Electronically signed by Laura Lincoln RN on 7/3/2024 at 11:39 AM

## 2024-07-03 NOTE — CONSULTS
..  Palliative Care Inpatient Consult    NAME:  Laura Rhodes  MEDICAL RECORD NUMBER:  [id number]  AGE: 63 y.o.   GENDER: female  : 1960  TODAY'S DATE:  7/3/2024    Reasons for Consultation:    Symptom and/or pain management  Provision of information regarding PC and/or hospice philosophies  Complex, time-intensive communication and interdisciplinary psychosocial support  Clarification of goals of care and/or assistance with difficult decision-making  Guidance in regards to resources and transition(s)    Members of PC team contributing to this consultation are : Kelli Gutierrez, Palliative Care APRN-CNP    Plan      Palliative Interaction: I went to see patient and she was lying in bed awake on 2LNC which is baseline for her. I explained my role and patient is very familiar with writer as we are often consulted when she is here. She does not follow with palliative care but does have [facility] care and therapy. She reports insurance covering these but not covering palliative care/hospice care with this company? She use to follow with [facility] palliative [facility] but was not happy with them d/t them not managing her chronic symptoms as well including back and R hip pain. She follows with Dr. Briceño for her chronic pain and is planning a steroid injection in near future but she needed to follow up with surgeon first.     We discussed patients history and she reports falling and Fx hip prior and relates this to her previous alcohol use. She reports that she drank a 12 pack a day in her 40's but as she got older and her lung disease progressed, she no longer tolerates more than a beer or two. She reports that her  controls her intake and its more social now. She reports that she smokes 5-10 cigarettes a day. She reports that she try's to stay closer to 5 because she feels that she has less exacerbations. She understands that her lung function is poor and she should not smoke. She reports smoking being something that  SURGERY      diskectomy, L3-L5    COLONOSCOPY N/A 04/08/2019    COLONOSCOPY DIAGNOSTIC performed by Rajwinder Aleman MD at [facility] OR    COLONOSCOPY  11/13/2019    COLONOSCOPY POLYPECTOMY SNARE/COLD BIOPSY performed by Rajwinder Aleman MD at [facility]    DILATION AND CURETTAGE OF UTERUS      FEMUR FRACTURE SURGERY Right 7/29/2023    FEMUR IM NAIL HALIMA INSERTION performed by Juli Vann MD at [facility] OR    OTHER SURGICAL HISTORY      ectopic pregnancy with tube removal    PAIN MANAGEMENT PROCEDURE      Left sided Transforaminal Epidural Steriod Injection    THYROIDECTOMY, PARTIAL      UPPER GASTROINTESTINAL ENDOSCOPY      UPPER GASTROINTESTINAL ENDOSCOPY N/A 11/27/2020    EGD BIOPSY with dilatation performed by Ralph Joy MD at [facility] ENDO    UPPER GASTROINTESTINAL ENDOSCOPY N/A 04/22/2022    EGD BIOPSY performed by Fritz Zacarias MD at [facility] ENDO    UPPER GASTROINTESTINAL ENDOSCOPY N/A 5/29/2024    ESOPHAGOGASTRODUODENOSCOPY BIOPSY OF STOMACH performed by Lizbeth Garcia MD at [facility] ENDO       SOCIAL HISTORY  Social History     Tobacco Use    Smoking status: Every Day     Current packs/day: 0.50     Average packs/day: 0.5 packs/day for 48.0 years (24.0 ttl pk-yrs)     Types: Cigarettes    Smokeless tobacco: Never    Tobacco comments:     previous 3 ppd smoker   Vaping Use    Vaping Use: Former   Substance Use Topics    Alcohol use: Yes     Comment: occassional    Drug use: Never       FAMILY HISTORY  ..  Family History   Problem Relation Age of Onset    Diabetes Father         ALLERGIES  Allergies   Allergen Reactions    Buspar [Buspirone] Anxiety    Hydroxyzine Hcl Anxiety         MEDICATIONS  Current Medications    methylPREDNISolone  40 mg IntraVENous Q12H    fluticasone  1 spray Each Nostril Daily    mirtazapine  30 mg Oral Nightly    albuterol  2.5 mg Nebulization Q4H While awake    azithromycin  250 mg Oral Once per day on Mon Wed Fri    [Held by provider] amLODIPine  10 mg Oral Daily    carvedilol  25 mg

## 2024-07-03 NOTE — PROGRESS NOTES
of  CARDIOVASCULAR:  negative for chest pain, palpitations.  GASTROINTESTINAL:  negative for nausea, vomiting, diarrhea, constipation, change in bowel habits, abdominal pain   GENITOURINARY:  negative for difficulty of urination, burning with urination, frequency   INTEGUMENT:  negative for rash, skin lesions, easy bruising   HEMATOLOGIC/LYMPHATIC:  negative for swelling/edema   ALLERGIC/IMMUNOLOGIC:  negative for urticaria , itching  ENDOCRINE:  negative increase in drinking, increase in urination, hot or cold intolerance  MUSCULOSKELETAL:  negative joint pains, muscle aches, swelling of joints  NEUROLOGICAL:  negative for headaches, dizziness, lightheadedness, numbness, pain, tingling extremities  BEHAVIOR/PSYCH:  negative for depression, anxiety    Physical Exam:   BP (!) 154/92   Pulse 61   Temp 98.4 °F (36.9 °C) (Oral)   Resp 20   Ht 1.6 m (5' 2.99\")   Wt 45 kg (99 lb 3.3 oz)   SpO2 100%   BMI 17.58 kg/m²   Temp (24hrs), Av.1 °F (36.7 °C), Min:96.8 °F (36 °C), Max:98.7 °F (37.1 °C)    No results for input(s): \"POCGLU\" in the last 72 hours.    Intake/Output Summary (Last 24 hours) at 7/3/2024 1758  Last data filed at 7/3/2024 1003  Gross per 24 hour   Intake 390 ml   Output --   Net 390 ml         General Appearance:  alert, well appearing, and in no acute distress  Mental status: oriented to person, place, and time with normal affect  Head:  normocephalic, atraumatic.  Eye: no icterus, redness, pupils equal and reactive, extraocular eye movements intact, conjunctiva clear  Ear: normal external ear, no discharge, hearing intact  Nose:  no drainage noted  Mouth: mucous membranes moist  Neck: supple, no carotid bruits, thyroid not palpable  Lungs: Lateral expiratory wheeze  Cardiovascular: normal rate, regular rhythm, no murmur, gallop, rub.  Abdomen: Soft, nontender, nondistended, normal bowel sounds, no hepatomegaly or splenomegaly  Neurologic: There are no new focal motor or sensory deficits, normal  muscle tone and bulk, no abnormal sensation, normal speech, cranial nerves II through XII grossly intact  Skin: No gross lesions, rashes, bruising or bleeding on exposed skin area  Extremities:  peripheral pulses palpable, no pedal edema or calf pain with palpation  Psych: normal affect     Investigations:      Laboratory Testing:  No results found for this or any previous visit (from the past 24 hour(s)).      Imaging/Diagnostics:    XR CHEST PORTABLE  Narrative: EXAMINATION:  ONE XRAY VIEW OF THE CHEST    6/29/2024 6:26 am    COMPARISON:  06/28/2024, 05/26/2024    HISTORY:  ORDERING SYSTEM PROVIDED HISTORY: bipap, COPD  TECHNOLOGIST PROVIDED HISTORY:  bipap, COPD  Reason for Exam: bipap, COPD    FINDINGS:  The cardiac and mediastinal contours appear unchanged. Hyperinflation and  findings of emphysema again demonstrated.  No new airspace disease identified  in the interval.  No evidence for pneumothorax.  Impression: No significant interval change.       Assessment :      Primary Problem  COPD exacerbation (HCC)    Active Hospital Problems    Diagnosis Date Noted    COPD exacerbation (HCC) [J44.1] 12/02/2023       Plan:     Patient status Admit as inpatient in the  Medical ICU    Acute on chronic hypoxic hypercarbic respiratory failure secondary to COPD exacerbation patient continues to smoke, on 2 L of oxygen at home  Pulm consulted, repeat ABG showed improvement in respiratory acidosis, currently awake and alert, started on IV steroids, azithromycin Monday Wednesday Friday as per pulm, on bronchodilators  Hypertension patient was hypotensive in the morning, antihypertensives were held, will gradually resume  Labs reviewed, mild leukopenia, continue to monitor  Generalized anxiety disorder, patient on clonazepam at as needed and Remeron, dose of regular already decreased  Chronic pain, also take Percocets which has been resumed, will decrease the dose to every 8, monitor for oversedation next  Hypothyroidism,

## 2024-07-03 NOTE — CARE COORDINATION
Writer spoke with Monse Coronel, clinical  for Smart Vest.     Writer faxed face sheet, progress note with face to face, and DME smart vest. Electronically signed by Laura iLncoln RN on 7/3/2024 at 3:05 PM

## 2024-07-03 NOTE — PROGRESS NOTES
[facility] PULMONARY,CRITICAL CARE & SLEEP   Saúl Cordero MD/Hieu Whitfield MD/Joshua EVANS AGAP-BC, NP-C      Chantel EVANS NP-C    Deshawn EVANS NP-C                                         Pulmonary Progress Note    Patient - Laura Rhodes   Age - 63 y.o.   - 1960  MRN - [id number]  [id number]  Date of Admission - 2024  8:22 PM    Consulting Service/Physician:       Primary Care Physician: Van Pate MD    SUBJECTIVE:     Chief Complaint:   Chief Complaint   Patient presents with    Shortness of Breath    Wheezing    Anxiety     Subjective:    She still feels that she is not ready for discharge.  She has been on Solu-Medrol IV 40 mg every 12 starting today.  She is using the BiPAP at night and for breaks.  Palliative care has been following along with her.  She does have a noninvasive ventilator at home, she does not have a smart vest at home.  She does struggle with her secretions.    VITALS  /70   Pulse 54   Temp 98.1 °F (36.7 °C) (Oral)   Resp 18   Ht 1.6 m (5' 2.99\")   Wt 45 kg (99 lb 3.3 oz)   SpO2 97%   BMI 17.58 kg/m²   Wt Readings from Last 3 Encounters:   24 45 kg (99 lb 3.3 oz)   24 46.7 kg (102 lb 15.3 oz)   24 45.8 kg (101 lb)     I/O (24 Hours)    Intake/Output Summary (Last 24 hours) at 7/3/2024 1308  Last data filed at 7/3/2024 1003  Gross per 24 hour   Intake 390 ml   Output --   Net 390 ml     Ventilator:   Settings  FiO2 : 35 %  Insp Rise Time (%): 1 %  Exam:   Physical Exam   Constitutional:  Oriented to person, place, and time.  Cachectic thin female, not in any distress on 2 L  HENT: Unremarkable, nasal cannula in place  Head: Normocephalic and atraumatic.   Eyes: EOM are normal. Pupils are equal, round, and reactive to light.   Neck: Neck supple.   Cardiovascular:  Regular rate and rhythm.  Normal heart tones.  No JVD.    Pulmonary/Chest: Lung sounds are very diminished,  was 0.49 or 20% of predicted)  Active tobacco use-80+ pack-year history of smoking, smoking 5 to 10 cigarettes a day  Severe protein calorie malnutrition/pulmonary cachexia  DNR CCA no intubation  PLAN:   Continue with IV Solu-Medrol today, plan will be to de-escalate to oral prednisone with a taper starting tomorrow  Continue with bronchodilator therapy every 4 hours  Continue with BiPAP at night and with naps, she has been compliant  PT/OT  Increase activity as tolerated  Not ready for discharge, will need 1 more day  Face-to-face visit completed today, she would use and benefit from smart vest therapy.  She does have episodes of mucous plugging, difficulty clearing her secretions despite attempts with vibratory device and Mucinex.  She would benefit from smart vest device to prevent hospitalizations with recurrent admissions.  Discussed with case management  Discussed with RN  Hopeful discharge tomorrow  Discussed with Dr. Ashley      Electronically signed by NATHAN Wills - CNP on 07/03/24     This progress note was completed using a voice transcription system. Every effort was made to ensure accuracy. However, inadvertent computerized transcription errors may be present.    COSME EVANS AGACNP-BC, NP-C  [facility] Pulmonary, Critical Care & Sleep

## 2024-07-04 PROCEDURE — 2580000003 HC RX 258: Performed by: NURSE PRACTITIONER

## 2024-07-04 PROCEDURE — 6360000002 HC RX W HCPCS: Performed by: NURSE PRACTITIONER

## 2024-07-04 PROCEDURE — 94761 N-INVAS EAR/PLS OXIMETRY MLT: CPT

## 2024-07-04 PROCEDURE — 94669 MECHANICAL CHEST WALL OSCILL: CPT

## 2024-07-04 PROCEDURE — 6370000000 HC RX 637 (ALT 250 FOR IP): Performed by: INTERNAL MEDICINE

## 2024-07-04 PROCEDURE — 6360000002 HC RX W HCPCS: Performed by: INTERNAL MEDICINE

## 2024-07-04 PROCEDURE — 2060000000 HC ICU INTERMEDIATE R&B

## 2024-07-04 PROCEDURE — 6360000002 HC RX W HCPCS

## 2024-07-04 PROCEDURE — 94668 MNPJ CHEST WALL SBSQ: CPT

## 2024-07-04 PROCEDURE — 6370000000 HC RX 637 (ALT 250 FOR IP)

## 2024-07-04 PROCEDURE — 2580000003 HC RX 258

## 2024-07-04 PROCEDURE — 94640 AIRWAY INHALATION TREATMENT: CPT

## 2024-07-04 PROCEDURE — 2700000000 HC OXYGEN THERAPY PER DAY

## 2024-07-04 PROCEDURE — 99232 SBSQ HOSP IP/OBS MODERATE 35: CPT | Performed by: INTERNAL MEDICINE

## 2024-07-04 RX ORDER — PREDNISONE 10 MG/1
10 TABLET ORAL DAILY
Status: DISCONTINUED | OUTPATIENT
Start: 2024-07-14 | End: 2024-07-05 | Stop reason: HOSPADM

## 2024-07-04 RX ORDER — PREDNISONE 20 MG/1
40 TABLET ORAL DAILY
Status: DISCONTINUED | OUTPATIENT
Start: 2024-07-05 | End: 2024-07-05 | Stop reason: HOSPADM

## 2024-07-04 RX ORDER — PREDNISONE 20 MG/1
20 TABLET ORAL DAILY
Status: DISCONTINUED | OUTPATIENT
Start: 2024-07-11 | End: 2024-07-05 | Stop reason: HOSPADM

## 2024-07-04 RX ADMIN — OXYCODONE AND ACETAMINOPHEN 1 TABLET: 5; 325 TABLET ORAL at 09:04

## 2024-07-04 RX ADMIN — WATER 40 MG: 1 INJECTION INTRAMUSCULAR; INTRAVENOUS; SUBCUTANEOUS at 20:54

## 2024-07-04 RX ADMIN — ALBUTEROL SULFATE 2.5 MG: 2.5 SOLUTION RESPIRATORY (INHALATION) at 14:49

## 2024-07-04 RX ADMIN — SODIUM CHLORIDE, PRESERVATIVE FREE 10 ML: 5 INJECTION INTRAVENOUS at 09:15

## 2024-07-04 RX ADMIN — ALBUTEROL SULFATE 2.5 MG: 2.5 SOLUTION RESPIRATORY (INHALATION) at 07:07

## 2024-07-04 RX ADMIN — TIZANIDINE 4 MG: 4 TABLET ORAL at 20:20

## 2024-07-04 RX ADMIN — ENOXAPARIN SODIUM 30 MG: 100 INJECTION SUBCUTANEOUS at 09:03

## 2024-07-04 RX ADMIN — CARVEDILOL 25 MG: 25 TABLET, FILM COATED ORAL at 09:08

## 2024-07-04 RX ADMIN — ALBUTEROL SULFATE 2.5 MG: 2.5 SOLUTION RESPIRATORY (INHALATION) at 10:51

## 2024-07-04 RX ADMIN — PRIMIDONE 100 MG: 50 TABLET ORAL at 20:13

## 2024-07-04 RX ADMIN — CLONAZEPAM 0.5 MG: 0.5 TABLET ORAL at 16:45

## 2024-07-04 RX ADMIN — DEXTROMETHORPHAN HYDROBROMIDE, GUAIFENESIN 10 ML: 10; 100 LIQUID ORAL at 09:04

## 2024-07-04 RX ADMIN — MONTELUKAST 10 MG: 10 TABLET, FILM COATED ORAL at 09:06

## 2024-07-04 RX ADMIN — SUCRALFATE 1 G: 1 TABLET ORAL at 07:01

## 2024-07-04 RX ADMIN — SODIUM CHLORIDE, PRESERVATIVE FREE 10 ML: 5 INJECTION INTRAVENOUS at 20:14

## 2024-07-04 RX ADMIN — ALBUTEROL SULFATE 2.5 MG: 2.5 SOLUTION RESPIRATORY (INHALATION) at 18:57

## 2024-07-04 RX ADMIN — DEXTROMETHORPHAN HYDROBROMIDE, GUAIFENESIN 10 ML: 10; 100 LIQUID ORAL at 20:13

## 2024-07-04 RX ADMIN — SUCRALFATE 1 G: 1 TABLET ORAL at 10:39

## 2024-07-04 RX ADMIN — DICYCLOMINE HYDROCHLORIDE 10 MG: 10 CAPSULE ORAL at 16:46

## 2024-07-04 RX ADMIN — VENLAFAXINE HYDROCHLORIDE 150 MG: 150 CAPSULE, EXTENDED RELEASE ORAL at 09:06

## 2024-07-04 RX ADMIN — OXYCODONE HYDROCHLORIDE 2.5 MG: 5 TABLET ORAL at 09:04

## 2024-07-04 RX ADMIN — WATER 40 MG: 1 INJECTION INTRAMUSCULAR; INTRAVENOUS; SUBCUTANEOUS at 09:06

## 2024-07-04 RX ADMIN — DICYCLOMINE HYDROCHLORIDE 10 MG: 10 CAPSULE ORAL at 10:39

## 2024-07-04 RX ADMIN — PANTOPRAZOLE SODIUM 40 MG: 40 TABLET, DELAYED RELEASE ORAL at 05:10

## 2024-07-04 RX ADMIN — WATER 40 MG: 1 INJECTION INTRAMUSCULAR; INTRAVENOUS; SUBCUTANEOUS at 14:44

## 2024-07-04 RX ADMIN — CLONAZEPAM 0.5 MG: 0.5 TABLET ORAL at 10:39

## 2024-07-04 RX ADMIN — SUCRALFATE 1 G: 1 TABLET ORAL at 16:45

## 2024-07-04 RX ADMIN — DICYCLOMINE HYDROCHLORIDE 10 MG: 10 CAPSULE ORAL at 05:10

## 2024-07-04 RX ADMIN — FLUTICASONE PROPIONATE 1 SPRAY: 50 SPRAY, METERED NASAL at 09:14

## 2024-07-04 RX ADMIN — MIRTAZAPINE 30 MG: 30 TABLET, FILM COATED ORAL at 20:13

## 2024-07-04 RX ADMIN — TIOTROPIUM BROMIDE AND OLODATEROL 2 PUFF: 3.124; 2.736 SPRAY, METERED RESPIRATORY (INHALATION) at 07:23

## 2024-07-04 RX ADMIN — LEVOTHYROXINE SODIUM 75 MCG: 0.07 TABLET ORAL at 05:10

## 2024-07-04 ASSESSMENT — PAIN SCALES - GENERAL
PAINLEVEL_OUTOF10: 7
PAINLEVEL_OUTOF10: 8
PAINLEVEL_OUTOF10: 0

## 2024-07-04 ASSESSMENT — PAIN DESCRIPTION - ORIENTATION: ORIENTATION: RIGHT;LOWER

## 2024-07-04 ASSESSMENT — PAIN - FUNCTIONAL ASSESSMENT: PAIN_FUNCTIONAL_ASSESSMENT: ACTIVITIES ARE NOT PREVENTED

## 2024-07-04 ASSESSMENT — PAIN DESCRIPTION - DESCRIPTORS: DESCRIPTORS: ACHING

## 2024-07-04 ASSESSMENT — PAIN DESCRIPTION - LOCATION: LOCATION: HIP;BACK

## 2024-07-04 NOTE — PROGRESS NOTES
K 5.0 07/01/2024    CO2 36 (H) 07/01/2024     07/01/2024    BUN 11 07/01/2024    CREATININE 0.4 (L) 07/01/2024       Lab Results   Component Value Date    INR 0.9 02/27/2024    PROTIME 12.3 02/27/2024       Radiology:   No new chest imaging    ASSESSMENT:       Acute on chronic hypoxic and hypercapnic respiratory failure (patient normally on 2 to 3 L nasal cannula and has astral noninvasive ventilator at home.  Baseline pCO2 is usually 50's)  Acute exacerbation of stage IV COPD (FEV1 in 2022 was 0.49 or 20% of predicted)  Active tobacco use-80+ pack-year history of smoking, smoking 5 to 10 cigarettes a day  Severe protein calorie malnutrition/pulmonary cachexia  DNR CCA no intubation  PLAN:   Will give 1 extra dose of IV Solu-Medrol this afternoon, but plan to transition her to oral prednisone tomorrow starting at 40 mg.  Upon discharge I would decrease by 10 mg every 3 days.,  She tells me that Dr. Ashley agreed she could stay 1 more day  Continue with bronchodilator therapy every 4 hours  Continue with BiPAP at night and with naps, she has been compliant, she does have a trilogy noninvasive ventilator at home  PT/OT  Increase activity as tolerated  She is having some difficulty with vest therapy here in the hospital, she may not tolerate vest therapy at home  Face-to-face visit completed today, she would use and benefit from smart vest therapy.  She does have episodes of mucous plugging, difficulty clearing her secretions despite attempts with vibratory device and Mucinex.  She would benefit from smart vest device to prevent hospitalizations with recurrent admissions.  Discussed with Dr. Boone, he states that she feels that she cannot be discharged in the next 24 hours, she may need to evaluate if extended-care facility would be of better assistance for her, doubtful she would qualify for LTAC, she also states [facility] is out of her network  Discussed with RT at bedside      Electronically signed by  NATHAN Wills - CNP on 07/04/24     This progress note was completed using a voice transcription system. Every effort was made to ensure accuracy. However, inadvertent computerized transcription errors may be present.    COSME MOELLER-BC, NP-C  [facility] Pulmonary, Critical Care & Sleep

## 2024-07-04 NOTE — PROGRESS NOTES
BRONCHOSPASM/BRONCHOCONSTRICTION   [x]  IMPROVE AERATION/BREATH SOUNDS  [x]   ADMINISTER BRONCHODILATOR THERAPY AS APPROPRIATE  [x]   ASSESS BREATH SOUNDS  []   IMPLEMENT AEROSOL/MDI PROTOCOL  [x]   PATIENT EDUCATION AS NEEDED

## 2024-07-04 NOTE — PROGRESS NOTES
muscle tone and bulk, no abnormal sensation, normal speech, cranial nerves II through XII grossly intact  Skin: No gross lesions, rashes, bruising or bleeding on exposed skin area  Extremities:  peripheral pulses palpable, no pedal edema or calf pain with palpation  Psych: normal affect     Investigations:      Laboratory Testing:  No results found for this or any previous visit (from the past 24 hour(s)).      Imaging/Diagnostics:    XR CHEST PORTABLE  Narrative: EXAMINATION:  ONE XRAY VIEW OF THE CHEST    6/29/2024 6:26 am    COMPARISON:  06/28/2024, 05/26/2024    HISTORY:  ORDERING SYSTEM PROVIDED HISTORY: bipap, COPD  TECHNOLOGIST PROVIDED HISTORY:  bipap, COPD  Reason for Exam: bipap, COPD    FINDINGS:  The cardiac and mediastinal contours appear unchanged. Hyperinflation and  findings of emphysema again demonstrated.  No new airspace disease identified  in the interval.  No evidence for pneumothorax.  Impression: No significant interval change.       Assessment :      Primary Problem  COPD exacerbation (HCC)    Active Hospital Problems    Diagnosis Date Noted    COPD exacerbation (HCC) [J44.1] 12/02/2023       Plan:     Patient status Admit as inpatient in the  Medical ICU    Acute on chronic hypoxic hypercarbic respiratory failure secondary to COPD exacerbation patient continues to smoke, on 2 L of oxygen at home  Pulm consulted, repeat ABG showed improvement in respiratory acidosis, currently awake and alert, started on IV steroids, azithromycin Monday Wednesday Friday as per pulm, on bronchodilators  Hypertension patient was hypotensive in the morning, antihypertensives were held, will gradually resume  Labs reviewed, mild leukopenia, continue to monitor  Generalized anxiety disorder, patient on clonazepam at as needed and Remeron, dose of regular already decreased  Chronic pain, also take Percocets which has been resumed, will decrease the dose to every 8, monitor for oversedation next  Hypothyroidism,  patient had TSH check 4 weeks ago which was low T4 was normal, will repeat DVT prophy    July 4  Acute on chronic hypoxic and hypercapnic Respiratory Failure,  Current Smoker, Morbid 80-pack-year History of Smoking,  ABG Reviewed,  Chronic Pain on Medications Reviewed,  Hypothyroidism,  Patient very anxious, prn Ativan, tolerating BiPAP now  Pulmonary advised smart vest, got it abd using it   possible discharge tomorrow      Consultations:   IP CONSULT TO CRITICAL CARE  IP CONSULT TO PALLIATIVE CARE     Patient is admitted as inpatient status because of co-morbidities listed above, severity of signs and symptoms as outlined, requirement for current medical therapies and most importantly because of direct risk to patient if care not provided in a hospital setting.    Aren Ashley MD  7/4/2024  3:10 PM    Copy sent to Dr. Pate, Van Montes MD    Please note that this chart was generated using voice recognition Dragon dictation software.  Although every effort was made to ensure the accuracy of this automated transcription, some errors in transcription may have occurred.

## 2024-07-04 NOTE — PLAN OF CARE
Problem: Discharge Planning  Goal: Discharge to home or other facility with appropriate resources  7/4/2024 1524 by Nuha Quintana, RN  Outcome: Progressing  D/C barriers: IV steroids switching to oral taper tomorrow     Problem: Safety - Adult  Goal: Free from fall injury  7/4/2024 1524 by Nuha Quintana, RN  Outcome: Progressing  Pt assessed as a fall risk this shift. Remains free from falls and accidental injury at this time. Fall precautions in place Floor free from obstacles, and bed is locked and in lowest position. Adequate lighting provided.  Pt refusing bed alarm.      Problem: Pain  Goal: Verbalizes/displays adequate comfort level or baseline comfort level  7/4/2024 1524 by Nuha Quintana, RN  Outcome: Progressing  Pt medicated with pain medication prn.  Assessed all pain characteristics including level, type, and location. Non-pharmacologic interventions offered to pt as well.

## 2024-07-04 NOTE — CARE COORDINATION
DISCHARGE PLANNING NOTE:    Reviewed previous case management notes, and discharge plan is home with [facility].     Pt would NOT qualify for SNF as she walked >500ft.    Smart Vest ordered yesterday.    Active order for PO Zithro and PO steroids.    Will continue to follow for additional discharge needs.    Electronically signed by Elizabet Car RN on 7/4/2024 at 2:56 PM

## 2024-07-04 NOTE — PROGRESS NOTES
Pt refusing BID chest vest physiotherapy at this time. States she may do it with next RT rounds at 1200.

## 2024-07-05 VITALS
HEIGHT: 63 IN | TEMPERATURE: 97.8 F | BODY MASS INDEX: 17.97 KG/M2 | SYSTOLIC BLOOD PRESSURE: 112 MMHG | OXYGEN SATURATION: 99 % | DIASTOLIC BLOOD PRESSURE: 65 MMHG | HEART RATE: 80 BPM | RESPIRATION RATE: 18 BRPM | WEIGHT: 101.41 LBS

## 2024-07-05 PROBLEM — E43 SEVERE MALNUTRITION (HCC): Chronic | Status: ACTIVE | Noted: 2024-07-05

## 2024-07-05 PROCEDURE — 6370000000 HC RX 637 (ALT 250 FOR IP): Performed by: NURSE PRACTITIONER

## 2024-07-05 PROCEDURE — 6360000002 HC RX W HCPCS

## 2024-07-05 PROCEDURE — 6370000000 HC RX 637 (ALT 250 FOR IP)

## 2024-07-05 PROCEDURE — 94668 MNPJ CHEST WALL SBSQ: CPT

## 2024-07-05 PROCEDURE — 6360000002 HC RX W HCPCS: Performed by: INTERNAL MEDICINE

## 2024-07-05 PROCEDURE — 94640 AIRWAY INHALATION TREATMENT: CPT

## 2024-07-05 PROCEDURE — 2580000003 HC RX 258

## 2024-07-05 PROCEDURE — 94761 N-INVAS EAR/PLS OXIMETRY MLT: CPT

## 2024-07-05 PROCEDURE — 99232 SBSQ HOSP IP/OBS MODERATE 35: CPT | Performed by: INTERNAL MEDICINE

## 2024-07-05 PROCEDURE — 2700000000 HC OXYGEN THERAPY PER DAY

## 2024-07-05 PROCEDURE — 6370000000 HC RX 637 (ALT 250 FOR IP): Performed by: INTERNAL MEDICINE

## 2024-07-05 RX ORDER — PREDNISONE 20 MG/1
20 TABLET ORAL DAILY
Qty: 3 TABLET | Refills: 0 | Status: SHIPPED | OUTPATIENT
Start: 2024-07-11 | End: 2024-07-14

## 2024-07-05 RX ORDER — PREDNISONE 10 MG/1
10 TABLET ORAL DAILY
Qty: 90 TABLET | Refills: 0 | Status: SHIPPED | OUTPATIENT
Start: 2024-07-14 | End: 2024-10-12

## 2024-07-05 RX ORDER — PREDNISONE 10 MG/1
30 TABLET ORAL DAILY
Qty: 9 TABLET | Refills: 0 | Status: SHIPPED | OUTPATIENT
Start: 2024-07-08 | End: 2024-07-11

## 2024-07-05 RX ORDER — PREDNISONE 20 MG/1
40 TABLET ORAL DAILY
Qty: 4 TABLET | Refills: 0 | Status: SHIPPED | OUTPATIENT
Start: 2024-07-06 | End: 2024-07-08

## 2024-07-05 RX ADMIN — ALBUTEROL SULFATE 2.5 MG: 2.5 SOLUTION RESPIRATORY (INHALATION) at 10:39

## 2024-07-05 RX ADMIN — VENLAFAXINE HYDROCHLORIDE 150 MG: 150 CAPSULE, EXTENDED RELEASE ORAL at 07:19

## 2024-07-05 RX ADMIN — OXYCODONE HYDROCHLORIDE 2.5 MG: 5 TABLET ORAL at 07:19

## 2024-07-05 RX ADMIN — PREDNISONE 40 MG: 20 TABLET ORAL at 07:18

## 2024-07-05 RX ADMIN — OXYCODONE AND ACETAMINOPHEN 1 TABLET: 5; 325 TABLET ORAL at 07:21

## 2024-07-05 RX ADMIN — OXYCODONE AND ACETAMINOPHEN 1 TABLET: 5; 325 TABLET ORAL at 15:40

## 2024-07-05 RX ADMIN — MONTELUKAST 10 MG: 10 TABLET, FILM COATED ORAL at 07:19

## 2024-07-05 RX ADMIN — CLONAZEPAM 0.5 MG: 0.5 TABLET ORAL at 09:42

## 2024-07-05 RX ADMIN — PANTOPRAZOLE SODIUM 40 MG: 40 TABLET, DELAYED RELEASE ORAL at 05:06

## 2024-07-05 RX ADMIN — TIOTROPIUM BROMIDE AND OLODATEROL 2 PUFF: 3.124; 2.736 SPRAY, METERED RESPIRATORY (INHALATION) at 07:05

## 2024-07-05 RX ADMIN — TIZANIDINE 4 MG: 4 TABLET ORAL at 13:27

## 2024-07-05 RX ADMIN — SUCRALFATE 1 G: 1 TABLET ORAL at 11:00

## 2024-07-05 RX ADMIN — ENOXAPARIN SODIUM 30 MG: 100 INJECTION SUBCUTANEOUS at 07:18

## 2024-07-05 RX ADMIN — SUCRALFATE 1 G: 1 TABLET ORAL at 17:15

## 2024-07-05 RX ADMIN — SODIUM CHLORIDE, PRESERVATIVE FREE 10 ML: 5 INJECTION INTRAVENOUS at 07:27

## 2024-07-05 RX ADMIN — CLONAZEPAM 0.5 MG: 0.5 TABLET ORAL at 02:30

## 2024-07-05 RX ADMIN — ALBUTEROL SULFATE 2.5 MG: 2.5 SOLUTION RESPIRATORY (INHALATION) at 15:08

## 2024-07-05 RX ADMIN — FLUTICASONE PROPIONATE 1 SPRAY: 50 SPRAY, METERED NASAL at 07:22

## 2024-07-05 RX ADMIN — ALBUTEROL SULFATE 2.5 MG: 2.5 SOLUTION RESPIRATORY (INHALATION) at 07:02

## 2024-07-05 RX ADMIN — LEVOTHYROXINE SODIUM 75 MCG: 0.07 TABLET ORAL at 05:06

## 2024-07-05 RX ADMIN — OXYCODONE HYDROCHLORIDE 2.5 MG: 5 TABLET ORAL at 15:41

## 2024-07-05 RX ADMIN — DEXTROMETHORPHAN HYDROBROMIDE, GUAIFENESIN 10 ML: 10; 100 LIQUID ORAL at 09:42

## 2024-07-05 RX ADMIN — DICYCLOMINE HYDROCHLORIDE 10 MG: 10 CAPSULE ORAL at 11:00

## 2024-07-05 RX ADMIN — SUCRALFATE 1 G: 1 TABLET ORAL at 07:27

## 2024-07-05 ASSESSMENT — PAIN DESCRIPTION - ORIENTATION
ORIENTATION: MID
ORIENTATION: LOWER

## 2024-07-05 ASSESSMENT — PAIN DESCRIPTION - DESCRIPTORS
DESCRIPTORS: ACHING;THROBBING
DESCRIPTORS: ACHING

## 2024-07-05 ASSESSMENT — PAIN DESCRIPTION - LOCATION
LOCATION: BACK
LOCATION: BACK

## 2024-07-05 ASSESSMENT — PAIN SCALES - GENERAL
PAINLEVEL_OUTOF10: 7
PAINLEVEL_OUTOF10: 6
PAINLEVEL_OUTOF10: 8
PAINLEVEL_OUTOF10: 5

## 2024-07-05 NOTE — PLAN OF CARE
Problem: Discharge Planning  Goal: Discharge to home or other facility with appropriate resources  7/5/2024 0458 by Isabel Estrada RN  Outcome: Progressing  7/4/2024 1524 by Nuha Quintana RN  Outcome: Progressing     Problem: Safety - Adult  Goal: Free from fall injury  7/5/2024 0458 by Isabel Estrada RN  Outcome: Progressing  7/4/2024 1524 by Nuha Quintana RN  Outcome: Progressing     Problem: Pain  Goal: Verbalizes/displays adequate comfort level or baseline comfort level  7/5/2024 0458 by Isabel Estrada RN  Outcome: Progressing  7/4/2024 1524 by Nuha Quintana RN  Outcome: Progressing     Problem: Nutrition Deficit:  Goal: Optimize nutritional status  Outcome: Progressing     Problem: Skin/Tissue Integrity  Goal: Absence of new skin breakdown  Description: 1.  Monitor for areas of redness and/or skin breakdown  2.  Assess vascular access sites hourly  3.  Every 4-6 hours minimum:  Change oxygen saturation probe site  4.  Every 4-6 hours:  If on nasal continuous positive airway pressure, respiratory therapy assess nares and determine need for appliance change or resting period.  Outcome: Progressing

## 2024-07-05 NOTE — PROGRESS NOTES
Discharge teaching and instructions for diagnosis of COPD exacerbation completed with patient using teachback method. AVS reviewed. Escripted Rxs to home pharmacy. Patient voiced understanding regarding prescriptions, follow up appointments, and care of self at home. Denies questions. IV d/c'd with cath intact and drsg applied. Skin Pk/W/D. Easy respirations. Denies pain. D/c'd with all belongings. Discharged in a wheelchair to home with  [facility] nurse service per  spouse.

## 2024-07-05 NOTE — CARE COORDINATION
ONGOING DISCHARGE PLAN:    Patient is alert and oriented x4.    Spoke with patient regarding discharge plan and patient confirms that plan is still to return home with spouse.     DME: [place], [place], nebulizer, NIV ([employer]), O2 2.5L NC (port/conc from [facility]). Smart Vest ordered prior from  on 7/3.    VNS: [facility].     Patient's DME order for Smart Vest needs updated, will update pulmonology for orders.    IMM letter provided to patient.  Patient offered four hours to make informed decision regarding appeal process; patient agreeable to discharge.     Will continue to follow for additional discharge needs.    If patient is discharged prior to next notation, then this note serves as note for discharge by case management.    Electronically signed by Luci De La Vega RN on 7/5/2024 at 10:03 AM     ADDENDUM:    Chantel Mathews NP, updated via [employer] that per Smart Vest they are requesting updated information.     Electronically signed by Luci De La Vega RN on 7/5/2024 at 12:38 PM

## 2024-07-05 NOTE — PROGRESS NOTES
BRONCHOSPASM/BRONCHOCONSTRICTION     [x]         IMPROVE AERATION/BREATH SOUNDS  [x]   ADMINISTER BRONCHODILATOR THERAPY AS APPROPRIATE  [x]   ASSESS BREATH SOUNDS  []   IMPLEMENT AEROSOL/MDI PROTOCOL  [x]   PATIENT EDUCATION AS NEEDED    NONINVASIVE VENTILATION    PROVIDE OPTIMAL VENTILATION/ACCEPTABLE SPO2   IMPLEMENT NONINVASIVE VENTILATION PROTOCOL   MAINTAIN ACCEPTABLE SPO2   ASSESS SKIN INTEGRITY/BREAKDOWN SCORE   PATIENT EDUCATION AS NEEDED   BIPAP AS NEEDED

## 2024-07-05 NOTE — CARE COORDINATION
DISCHARGE PLANNING NOTE:    This writer contacted Monse Coronel with [employer] for the Smart Vest order; new DME order, facesheet and updated pulmonology note faxed to [phone]. Awaiting return call.     Electronically signed by Luci De La Vega RN on 7/5/2024 at 2:18 PM

## 2024-07-05 NOTE — PROGRESS NOTES
[facility]   IN-PATIENT SERVICE   [facility]    Progress note            Date:   7/5/2024  Patient name:  Laura Rhodes  Date of admission:  6/28/2024  8:22 PM  MRN:   [id number]  Account:  [id number]  YOB: 1960  PCP:    Van Pate MD  Room:   2096/2096-  Code Status:    DNR-CCA    Chief Complaint:     Chief Complaint   Patient presents with    Shortness of Breath    Wheezing    Anxiety       History Obtained From:     patient    History of Present Illness:     The patient is a 63 y.o.  Non- / non  female who presents with Shortness of Breath, Wheezing, and Anxiety   and she is admitted to the hospital for the management of      Patient is 63-year-old female with multiple comorbidities including advanced COPD on 2-3 L of oxygen at home,, hypertension, hypothyroidism anxiety shortness of breath worsening wheezing.  Patient was also found to be confused at home as per report,  The ER patient had chest x-ray which was negative, found to have acute respiratory acidosis, pCO2 almost 6-9.8, pH 2.9     Patient seen and examined this morning patient states that she is feeling much better, continues to smoke 5 to 10 cigarettes a day, repeat a as BG showed improvement patient was on 2 L of oxygen through nasal    7/5/2024  Patient currently on 3 L nasal cannula, still reports having a cough, has been using the vest therapy and used it twice yesterday.  Was able to bring up sputum.  Reports had wheezing yesterday, now this is improved  Still reports that she is very anxious about returning home as when she tried to take a shower she got very short of breath.  Unfortunately continues to smoke, reports that she will be trying to quit now.    Past Medical History:     Past Medical History:   Diagnosis Date    Acid reflux     Anxiety     Arthritis     Cancer (HCC)     cervical    Cervical osteoarthritis 11/30/2020    COPD (chronic obstructive pulmonary disease)  antihypertensives were held, will gradually resume  Labs reviewed, mild leukopenia, continue to monitor  Generalized anxiety disorder, patient on clonazepam at as needed and Remeron, dose of regular already decreased  Chronic pain, also take Percocets which has been resumed, will decrease the dose to every 8, monitor for oversedation next  Hypothyroidism, patient had TSH check 4 weeks ago which was low T4 was normal, will repeat DVT prophy    July 4  Acute on chronic hypoxic and hypercapnic Respiratory Failure,  Current Smoker, Morbid 80-pack-year History of Smoking,  ABG Reviewed,  Chronic Pain on Medications Reviewed,  Hypothyroidism,  Patient very anxious, prn Ativan, tolerating BiPAP now  Pulmonary advised smart vest, got it abd using it   possible discharge tomorrow    7/5/2024  Acute on chronic 2 L hypoxemic hypercarbic respiratory failure secondary to COPD exacerbation.  Continued tobacco use, strongly encouraged against  Continue bronchodilator therapy, wean steroids.  Will discuss with pulmonology appropriateness for discharge.  LifeVest arranged.  Check walking pulse ox      Consultations:   IP CONSULT TO CRITICAL CARE  IP CONSULT TO PALLIATIVE CARE     Patient is admitted as inpatient status because of co-morbidities listed above, severity of signs and symptoms as outlined, requirement for current medical therapies and most importantly because of direct risk to patient if care not provided in a hospital setting.    Brigida Perez MD  7/5/2024  10:12 AM    Copy sent to Dr. Pate, Van Montes MD    Please note that this chart was generated using voice recognition Dragon dictation software.  Although every effort was made to ensure the accuracy of this automated transcription, some errors in transcription may have occurred.

## 2024-07-05 NOTE — PROGRESS NOTES
Comprehensive Nutrition Assessment    Type and Reason for Visit:  Initial, RD Nutrition Re-Screen/LOS (LOS 7-days)    Nutrition Recommendations/Plan:   Continue current diet.  Add Ensure Plus High Protein with meals.     Malnutrition Assessment:  Malnutrition Status:  Severe malnutrition (07/05/24 1259)    Context:  Chronic Illness     Findings of the 6 clinical characteristics of malnutrition:  Energy Intake:  75% or less estimated energy requirements for 1 month or longer  Weight Loss:  Greater than 7.5% over 3 months (12% wt loss in 3 months: from 4/3/2024 with 52.4 kg (Bed) to today's date with 46 kg (Bed))     Body Fat Loss:  Severe body fat loss Orbital, Buccal region   Muscle Mass Loss:  Severe muscle mass loss Temples (temporalis), Hand (interosseous)  Fluid Accumulation:  No significant fluid accumulation     Strength:  Not Performed    Nutrition Assessment:    Pt admitted for COPD exarcebation, with PMH of dysphagia, severe malnutrition, cancer. Pt reports poor intake of less than 25% of meals, and taking at least one out of three Ensure Plus High Protein ordered daily. Pt may be discharged this date.    Nutrition Related Findings:    Pt reports gas, cramping. BM 7/4. Labs and meds reviewed. Wound Type: None       Current Nutrition Intake & Therapies:    Average Meal Intake: 1-25%  Average Supplements Intake: 1-25%  ADULT DIET; Regular  ADULT ORAL NUTRITION SUPPLEMENT; Breakfast, Lunch, Dinner; Standard High Calorie/High Protein Oral Supplement    Anthropometric Measures:  Height: 160 cm (5' 2.99\")  Ideal Body Weight (IBW): 115 lbs (52 kg)    Admission Body Weight: 44 kg (97 lb) (Stated)  Current Body Weight: 46 kg (101 lb 6.6 oz), 88.2 % IBW. Weight Source: Bed Scale  Current BMI (kg/m2): 18  Usual Body Weight: 52.4 kg (115 lb 8.3 oz) (4/3/2024 (~3mo.) Bed)  % Weight Change (Calculated): -12.2  Weight Adjustment For: No Adjustment                 BMI Categories: Underweight (BMI less than

## 2024-07-05 NOTE — DISCHARGE INSTR - DIET

## 2024-07-05 NOTE — CARE COORDINATION
Continuity of Care Form    Patient Name: Laura Rhodes   :  1960  MRN:  [id number]    Admit date:  2024  Discharge date:  24    Code Status Order: DNR-CCA   Advance Directives:     Admitting Physician:  Aren Ashley MD  PCP: Van Pate MD    Discharging Nurse: LILY Quiroz  Discharging Hospital Unit/Room#: 2096/2096-01  Discharging Unit Phone Number: [phone]    Emergency Contact:   Extended Emergency Contact Information  Primary Emergency Contact: Dionicio Rhodes  Address: [address] [place] [address] [place]  Home Phone: [phone]  Work Phone: [phone]  Mobile Phone: [phone]  Relation: Spouse   needed? No    Past Surgical History:  Past Surgical History:   Procedure Laterality Date    BACK SURGERY      diskectomy, L3-L5    COLONOSCOPY N/A 2019    COLONOSCOPY DIAGNOSTIC performed by Rajwinder Aleman MD at [facility] OR    COLONOSCOPY  2019    COLONOSCOPY POLYPECTOMY SNARE/COLD BIOPSY performed by Rajwinder Aleman MD at [facility] Endoscopy    DILATION AND CURETTAGE OF UTERUS      FEMUR FRACTURE SURGERY Right 2023    FEMUR IM NAIL HALIMA INSERTION performed by Juli Vann MD at [facility] OR    OTHER SURGICAL HISTORY      ectopic pregnancy with tube removal    PAIN MANAGEMENT PROCEDURE      Left sided Transforaminal Epidural Steriod Injection    THYROIDECTOMY, PARTIAL      UPPER GASTROINTESTINAL ENDOSCOPY      UPPER GASTROINTESTINAL ENDOSCOPY N/A 2020    EGD BIOPSY with dilatation performed by Ralph Joy MD at [facility] ENDO    UPPER GASTROINTESTINAL ENDOSCOPY N/A 2022    EGD BIOPSY performed by Fritz Zacarias MD at [facility] ENDO    UPPER GASTROINTESTINAL ENDOSCOPY N/A 2024    ESOPHAGOGASTRODUODENOSCOPY BIOPSY OF STOMACH performed by Lizbeth Garcia MD at [facility] ENDO       Immunization History:   Immunization History   Administered Date(s) Administered    Influenza Virus Vaccine 2018, 2020    Influenza,  FLUARIX, FLULAVAL, FLUZONE (age 6 mo+) AND AFLURIA, (age 3 y+), PF, 0.5mL 10/05/2019, 10/14/2020    Influenza, FLUCELVAX, (age 6 mo+), MDCK, PF, 0.5mL 11/15/2021, 09/29/2022, 10/16/2023    Pneumococcal, PCV20, PREVNAR 20, (age 6w+), IM, 0.5mL 06/23/2022    Pneumococcal, PPSV23, PNEUMOVAX 23, (age 2y+), SC/IM, 0.5mL 10/05/2019, 09/23/2020    Zoster Recombinant (Shingrix) 09/29/2022, 01/18/2023       Active Problems:  Patient Active Problem List   Diagnosis Code    Abdominal bloating R14.0    Abdominal pain R10.9    MDD (major depressive disorder), recurrent severe, without psychosis (Formerly [facility]) F33.2    Panic disorder F41.0    Obsessive compulsive disorder F42.9    Irritable bowel syndrome with constipation K58.1    Chronic constipation K59.09    Esophageal dysphagia R13.19    Cervical osteoarthritis M47.812    Chronic bilateral low back pain without sciatica M54.50, G89.29    COPD with acute exacerbation (Formerly [facility]) J44.1    Lumbar foraminal stenosis M48.061    Acquired hypothyroidism E03.9    Compression fracture of L2 lumbar vertebra (Formerly [facility]) S32.020A    Essential hypertension I10    Essential tremor G25.0    Other osteoporosis with current pathological fracture, vertebra(e), initial encounter for fracture ([facility]ly [facility]) M80.88XA    Fibromyalgia M79.7    Disorder of thyroid E07.9    Degeneration of intervertebral disc of lumbar region M51.36    COPD, severe ([facility]ly [facility]) J44.9    On home oxygen therapy Z99.81    History of psychiatric disorder Z86.59    Left lumbar radiculitis M54.16    Lumbosacral spondylosis without myelopathy M47.817    COPD (chronic obstructive pulmonary disease) (Formerly [facility]) J44.9    Severe malnutrition ([facility]ly [facility]) E43    Oropharyngeal dysphagia R13.12    Throat irritation J39.2    Closed displaced intertrochanteric fracture of right femur ([facility]ly [facility]) S72.141A    Chronic respiratory failure with hypoxia ([facility]ly [facility]) J96.11    Irregular heart beat I49.9    Laryngitis J04.0    COPD exacerbation ([facility]ly [facility]) J44.1    Goals of care, counseling/discussion

## 2024-07-05 NOTE — PROGRESS NOTES
Extremities: No edema or discoloration  Infusions:      sodium chloride       Meds:     Current Facility-Administered Medications:     predniSONE (DELTASONE) tablet 40 mg, 40 mg, Oral, Daily, 40 mg at 07/05/24 0718 **FOLLOWED BY** [START ON 7/8/2024] predniSONE (DELTASONE) tablet 30 mg, 30 mg, Oral, Daily **FOLLOWED BY** [START ON 7/11/2024] predniSONE (DELTASONE) tablet 20 mg, 20 mg, Oral, Daily **FOLLOWED BY** [START ON 7/14/2024] predniSONE (DELTASONE) tablet 10 mg, 10 mg, Oral, Daily, Lluvia Castillo, APRN - CNP    clonazePAM (KLONOPIN) tablet 0.5 mg, 0.5 mg, Oral, TID PRN, Aren Ashley MD, 0.5 mg at 07/05/24 0942    fluticasone (FLONASE) 50 MCG/ACT nasal spray 1 spray, 1 spray, Each Nostril, Daily, Chantel Mathews, APRN - CNP, 1 spray at 07/05/24 0722    benzonatate (TESSALON) capsule 200 mg, 200 mg, Oral, TID PRN, Waterhouse, Shirley Ann, APRN - CNP, 200 mg at 07/01/24 0610    midodrine (PROAMATINE) tablet 5 mg, 5 mg, Oral, TID PRN, Gayla Vega, APRN - NP    mirtazapine (REMERON) tablet 30 mg, 30 mg, Oral, Nightly, Hieu Boone MD, 30 mg at 07/04/24 2013    albuterol (PROVENTIL) (2.5 MG/3ML) 0.083% nebulizer solution 2.5 mg, 2.5 mg, Nebulization, Q4H While awake, Hieu Boone MD, 2.5 mg at 07/05/24 1039    albuterol (PROVENTIL) (2.5 MG/3ML) 0.083% nebulizer solution 2.5 mg, 2.5 mg, Nebulization, Q4H PRN, Hieu Boone MD, 2.5 mg at 07/02/24 2302    azithromycin (ZITHROMAX) tablet 250 mg, 250 mg, Oral, Once per day on Mon Wed Fri, Hieu Boone MD, 250 mg at 07/03/24 2028    oxyCODONE-acetaminophen (PERCOCET) 5-325 MG per tablet 1 tablet, 1 tablet, Oral, Q8H PRN, 1 tablet at 07/05/24 0721 **AND** oxyCODONE (ROXICODONE) immediate release tablet 2.5 mg, 2.5 mg, Oral, Q4H PRN, Eileen Hylton MD, 2.5 mg at 07/05/24 0719    ipratropium 0.5 mg-albuterol 2.5 mg (DUONEB) nebulizer solution 1 Dose, 1 Dose, Inhalation, Q4H PRN, Wilton Núñez DO, 1 Dose at 06/29/24

## 2024-07-08 ENCOUNTER — CARE COORDINATION (OUTPATIENT)
Dept: CARE COORDINATION | Age: 64
End: 2024-07-08

## 2024-07-08 NOTE — CARE COORDINATION
Care Transitions Note    Initial Call - Call within 2 business days of discharge: Yes    Attempted to reach patient for transitions of care follow up. Unable to reach patient.    Outreach Attempts:   HIPAA compliant voicemail left for patient.     Patient: Laura Rhodes    Patient : 1960   MRN: [id number]    Reason for Admission: COPD exacerbation  Discharge Date: 24  RURS: Readmission Risk Score: 28.5    Last Discharge Facility       Date Complaint Diagnosis Description Type Department Provider    24 Shortness of Breath; Wheezing; Anxiety COPD exacerbation (HCC) ... ED to Hosp-Admission (Discharged) (ADMITTED) Aren Wolff MD; Abdi Núñez...            Was this an external facility discharge? No    Follow Up Appointment:   Patient has hospital follow up appointment scheduled within 7 days of discharge.        Plan for follow-up on next business day.   [facility] called and they will be setting up SOC with pt.      SHIVANI RIVERA RN

## 2024-07-09 ENCOUNTER — CARE COORDINATION (OUTPATIENT)
Dept: CARE COORDINATION | Age: 64
End: 2024-07-09

## 2024-07-09 ENCOUNTER — TELEPHONE (OUTPATIENT)
Dept: PRIMARY CARE CLINIC | Age: 64
End: 2024-07-09

## 2024-07-09 LAB
EKG ATRIAL RATE: 60 BPM
EKG P AXIS: 56 DEGREES
EKG P-R INTERVAL: 140 MS
EKG Q-T INTERVAL: 420 MS
EKG QRS DURATION: 68 MS
EKG QTC CALCULATION (BAZETT): 420 MS
EKG R AXIS: 79 DEGREES
EKG T AXIS: 82 DEGREES
EKG VENTRICULAR RATE: 60 BPM

## 2024-07-09 PROCEDURE — 93010 ELECTROCARDIOGRAM REPORT: CPT | Performed by: INTERNAL MEDICINE

## 2024-07-09 NOTE — CARE COORDINATION
Care Transitions Note    Initial Call - Call within 2 business days of discharge: Yes    Attempted to reach patient for transitions of care follow up. Unable to reach patient.    Outreach Attempts:   Multiple attempts to contact patient at phone numbers on file.   HIPAA compliant voicemail left for patient.     Patient: Laura Rhodes    Patient : 1960   MRN: [id number]    Reason for Admission: COPD exacerbation  Discharge Date: 24  RURS: Readmission Risk Score: 28.5    Last Discharge Facility       Date Complaint Diagnosis Description Type Department Provider    24 Shortness of Breath; Wheezing; Anxiety COPD exacerbation (HCC) ... ED to Hosp-Admission (Discharged) (ADMITTED) Aren Wolff MD; Abdi Núñez...            Was this an external facility discharge? No    Follow Up Appointment:   Patient has hospital follow up appointment scheduled within 7 days of discharge.        No further follow-up call indicated  Final attempt program resolved     SHIVANI RIVERA RN

## 2024-07-09 NOTE — TELEPHONE ENCOUNTER
[facility] Transitions Initial Follow Up Call    Outreach made within 2 business days of discharge: Yes    Patient: Laura Rhodes Patient : 1960   MRN: [id number]  Reason for Admission: COPD  Discharge Date: 24       Spoke with: Laura    Discharge department/facility: [facility] Interactive Patient Contact:  Was patient able to fill all prescriptions: Yes  Was patient instructed to bring all medications to the follow-up visit: Yes  Is patient taking all medications as directed in the discharge summary? Yes  Does patient understand their discharge instructions: Yes  Does patient have questions or concerns that need addressed prior to 7-14 day follow up office visit: no    Scheduled appointment with PCP within 7-14 days    Follow Up  Future Appointments   Date Time Provider Department Center   2024  3:00 PM Van Pate MD [facility]       Eladia Correa MA

## 2024-07-17 ENCOUNTER — OFFICE VISIT (OUTPATIENT)
Dept: PRIMARY CARE CLINIC | Age: 64
End: 2024-07-17

## 2024-07-17 VITALS
OXYGEN SATURATION: 99 % | SYSTOLIC BLOOD PRESSURE: 125 MMHG | DIASTOLIC BLOOD PRESSURE: 75 MMHG | BODY MASS INDEX: 17.58 KG/M2 | WEIGHT: 99.2 LBS | HEART RATE: 53 BPM

## 2024-07-17 DIAGNOSIS — Z09 HOSPITAL DISCHARGE FOLLOW-UP: Primary | ICD-10-CM

## 2024-07-17 DIAGNOSIS — F33.2 MDD (MAJOR DEPRESSIVE DISORDER), RECURRENT SEVERE, WITHOUT PSYCHOSIS (HCC): ICD-10-CM

## 2024-07-17 RX ORDER — SUCRALFATE ORAL 1 G/10ML
0.5 SUSPENSION ORAL 4 TIMES DAILY
Qty: 1200 ML | Refills: 2 | Status: SHIPPED | OUTPATIENT
Start: 2024-07-17

## 2024-07-17 RX ORDER — SUCRALFATE ORAL 1 G/10ML
0.5 SUSPENSION ORAL 4 TIMES DAILY
Qty: 1200 ML | Refills: 2 | Status: SHIPPED | OUTPATIENT
Start: 2024-07-17 | End: 2024-07-17 | Stop reason: SDUPTHER

## 2024-07-17 RX ORDER — CLONAZEPAM 0.5 MG/1
0.5 TABLET ORAL 3 TIMES DAILY PRN
Qty: 90 TABLET | Refills: 2 | Status: SHIPPED | OUTPATIENT
Start: 2024-07-17 | End: 2024-10-15

## 2024-07-17 ASSESSMENT — ENCOUNTER SYMPTOMS
EYE DISCHARGE: 0
NAUSEA: 0
SORE THROAT: 0
DIARRHEA: 0
COUGH: 0
ABDOMINAL PAIN: 0
EYE REDNESS: 0
RHINORRHEA: 0
VOMITING: 0
SHORTNESS OF BREATH: 1
WHEEZING: 0

## 2024-07-17 NOTE — PROGRESS NOTES
Negative for sleep disturbance.        Objective:    /75 (Site: Left Upper Arm, Position: Sitting, Cuff Size: Medium Adult)   Pulse 53   Wt 45 kg (99 lb 3.2 oz)   SpO2 99%   BMI 17.58 kg/m²   Physical Exam  Vitals and nursing note reviewed.   Constitutional:       General: She is not in acute distress.     Appearance: She is well-developed. She is not ill-appearing.   HENT:      Head: Normocephalic and atraumatic.      Right Ear: External ear normal.      Left Ear: External ear normal.   Eyes:      General: No scleral icterus.        Right eye: No discharge.         Left eye: No discharge.      Conjunctiva/sclera: Conjunctivae normal.   Neck:      Thyroid: No thyromegaly.      Trachea: No tracheal deviation.   Cardiovascular:      Rate and Rhythm: Normal rate and regular rhythm.      Heart sounds: Normal heart sounds.   Pulmonary:      Effort: Pulmonary effort is normal. No respiratory distress.      Breath sounds: No wheezing.      Comments: Decreased breath sounds bilateral  Lymphadenopathy:      Cervical: No cervical adenopathy.   Skin:     General: Skin is warm.      Findings: No rash.   Neurological:      Mental Status: She is alert and oriented to person, place, and time.   Psychiatric:         Mood and Affect: Mood normal.         Behavior: Behavior normal.         Thought Content: Thought content normal.     Refill Klonopin  Keep follow-up with pulmonary      An electronic signature was used to authenticate this note.  --Van Pate MD

## 2024-07-17 NOTE — TELEPHONE ENCOUNTER
Patient said insurance will not cover medication at [facility], she is asking if you could please send it to [facility].

## 2024-07-23 DIAGNOSIS — F33.2 MDD (MAJOR DEPRESSIVE DISORDER), RECURRENT SEVERE, WITHOUT PSYCHOSIS (HCC): ICD-10-CM

## 2024-07-23 RX ORDER — AMLODIPINE BESYLATE 10 MG/1
5 TABLET ORAL DAILY
Qty: 90 TABLET | Refills: 3 | Status: SHIPPED | OUTPATIENT
Start: 2024-07-23

## 2024-07-23 RX ORDER — CARVEDILOL 25 MG/1
25 TABLET ORAL 2 TIMES DAILY
Qty: 180 TABLET | Refills: 3 | Status: SHIPPED | OUTPATIENT
Start: 2024-07-23

## 2024-07-23 RX ORDER — CLONAZEPAM 0.5 MG/1
0.5 TABLET ORAL 3 TIMES DAILY PRN
Qty: 270 TABLET | Refills: 0 | Status: SHIPPED | OUTPATIENT
Start: 2024-07-23 | End: 2024-10-21

## 2024-07-23 RX ORDER — LEVOTHYROXINE SODIUM 0.07 MG/1
75 TABLET ORAL DAILY
Qty: 90 TABLET | Refills: 3 | Status: SHIPPED | OUTPATIENT
Start: 2024-07-23

## 2024-07-23 RX ORDER — TIZANIDINE 4 MG/1
4 TABLET ORAL EVERY 6 HOURS PRN
Qty: 60 TABLET | Refills: 0 | Status: SHIPPED | OUTPATIENT
Start: 2024-07-23

## 2024-07-30 DIAGNOSIS — F33.2 MDD (MAJOR DEPRESSIVE DISORDER), RECURRENT SEVERE, WITHOUT PSYCHOSIS (HCC): ICD-10-CM

## 2024-07-30 RX ORDER — CLONAZEPAM 0.5 MG/1
0.5 TABLET ORAL 3 TIMES DAILY PRN
Qty: 90 TABLET | Refills: 0 | Status: SHIPPED | OUTPATIENT
Start: 2024-07-30 | End: 2024-08-29

## 2024-07-30 RX ORDER — TIZANIDINE 4 MG/1
4 TABLET ORAL EVERY 6 HOURS PRN
Qty: 120 TABLET | Refills: 0 | Status: SHIPPED | OUTPATIENT
Start: 2024-07-30

## 2024-08-05 ENCOUNTER — TELEPHONE (OUTPATIENT)
Dept: PRIMARY CARE CLINIC | Age: 64
End: 2024-08-05

## (undated) DEVICE — GLOVE ORANGE PI 7 1/2   MSG9075

## (undated) DEVICE — GLOVE SURG SZ 75 L12IN FNGR THK79MIL GRN LTX FREE

## (undated) DEVICE — DRESSING,GAUZE,XEROFORM,CURAD,1"X8",ST: Brand: CURAD

## (undated) DEVICE — CANNULA NSL AD L2IN ETCO2 SAMP SFT CRUSH RESIST FEM AIRLFE

## (undated) DEVICE — DEFENDO AIR WATER SUCTION AND BIOPSY VALVE KIT FOR  OLYMPUS: Brand: DEFENDO AIR/WATER/SUCTION AND BIOPSY VALVE

## (undated) DEVICE — ENDO KIT W/SYRINGE: Brand: MEDLINE INDUSTRIES, INC.

## (undated) DEVICE — BITEBLOCK 54FR W/ DENT RIM BLOX

## (undated) DEVICE — GAUZE,SPONGE,4"X4",16PLY,XRAY,STRL,LF: Brand: MEDLINE

## (undated) DEVICE — NEPTUNE E-SEP SMOKE EVACUATION PENCIL, COATED, 70MM BLADE, PUSH BUTTON SWITCH: Brand: NEPTUNE E-SEP

## (undated) DEVICE — SUTURE VCRL SZ 0 L36IN ABSRB UD CT-1 L36MM 1/2 CIR TAPR PNT VCP946H

## (undated) DEVICE — SOLUTION IRRIG 1000ML 0.9% SOD CHL USP POUR PLAS BTL

## (undated) DEVICE — SNARE ENDOSCP M L240CM LOOP W27MM SHTH DIA2.4MM OVL FLX

## (undated) DEVICE — ELECTRODE PT RET AD L9FT HI MOIST COND ADH HYDRGEL CORDED

## (undated) DEVICE — GLOVE ORANGE PI 7   MSG9070

## (undated) DEVICE — SINGLE PORT MANIFOLD: Brand: NEPTUNE 2

## (undated) DEVICE — TUBING, SUCTION, 3/16" X 10', STRAIGHT: Brand: MEDLINE

## (undated) DEVICE — 3M™ STERI-DRAPE™ U-DRAPE 1015: Brand: STERI-DRAPE™

## (undated) DEVICE — BIT DRL L330MM DIA4.2MM CALIB 100MM 3 FLUT QUIK CPL

## (undated) DEVICE — Device

## (undated) DEVICE — GOWN,POLY REINFORCED,LG: Brand: MEDLINE

## (undated) DEVICE — FORCEPS BX L240CM WRK CHN 2.8MM STD CAP W/ NDL MIC MESH

## (undated) DEVICE — SOLUTION IRRIG 1000ML STRL H2O USP PLAS POUR BTL

## (undated) DEVICE — DRESSING TRNSPAR W5XL4.5IN FLM SHT SEMIPERMEABLE WIND

## (undated) DEVICE — GUIDEWIRE ORTH L400MM DIA3.2MM FOR TFN

## (undated) DEVICE — GAUZE,SPONGE,FLUFF,6"X6.75",STRL,5/TRAY: Brand: MEDLINE

## (undated) DEVICE — JELLY,LUBE,STERILE,FLIP TOP,TUBE,2-OZ: Brand: MEDLINE

## (undated) DEVICE — DUP USE 291175 PAD GROUNDING ADULT 10FT CORD

## (undated) DEVICE — COVER,MAYO STAND,XL,STERILE: Brand: MEDLINE

## (undated) DEVICE — 6619 2 PTNT ISO SYS INCISE AREA&LT;(&GT;&&LT;)&GT;P: Brand: STERI-DRAPE™ IOBAN™ 2

## (undated) DEVICE — BLANKET WRM W29.9XL79.1IN UP BODY FORC AIR MISTRAL-AIR

## (undated) DEVICE — 1010 S-DRAPE TOWEL DRAPE 10/BX: Brand: STERI-DRAPE™

## (undated) DEVICE — YANKAUER,OPEN TIP,W/O VENT,STERILE: Brand: MEDLINE INDUSTRIES, INC.

## (undated) DEVICE — SUTURE VCRL + SZ 2-0 L27IN ABSRB CLR CT-1 1/2 CIR TAPERCUT VCP259H